# Patient Record
Sex: FEMALE | Race: WHITE | NOT HISPANIC OR LATINO | Employment: OTHER | URBAN - METROPOLITAN AREA
[De-identification: names, ages, dates, MRNs, and addresses within clinical notes are randomized per-mention and may not be internally consistent; named-entity substitution may affect disease eponyms.]

---

## 2017-04-28 ENCOUNTER — HOSPITAL ENCOUNTER (INPATIENT)
Facility: CLINIC | Age: 57
LOS: 4 days | Discharge: HOME | DRG: 617 | End: 2017-05-03
Attending: EMERGENCY MEDICINE | Admitting: INTERNAL MEDICINE

## 2017-04-28 ENCOUNTER — APPOINTMENT (OUTPATIENT)
Dept: GENERAL RADIOLOGY | Facility: CLINIC | Age: 57
DRG: 617 | End: 2017-04-28
Attending: EMERGENCY MEDICINE

## 2017-04-28 DIAGNOSIS — L03.032 CELLULITIS OF TOE OF LEFT FOOT: ICD-10-CM

## 2017-04-28 DIAGNOSIS — I96 GANGRENE OF TOE (H): Primary | ICD-10-CM

## 2017-04-28 DIAGNOSIS — Z79.4 UNCONTROLLED TYPE 2 DIABETES MELLITUS WITH HYPERGLYCEMIA, WITH LONG-TERM CURRENT USE OF INSULIN (H): ICD-10-CM

## 2017-04-28 DIAGNOSIS — I10 BENIGN ESSENTIAL HYPERTENSION: ICD-10-CM

## 2017-04-28 DIAGNOSIS — S92.901A FOOT FRACTURE, RIGHT, CLOSED, INITIAL ENCOUNTER: ICD-10-CM

## 2017-04-28 DIAGNOSIS — Z87.891 PERSONAL HISTORY OF TOBACCO USE, PRESENTING HAZARDS TO HEALTH: Chronic | ICD-10-CM

## 2017-04-28 DIAGNOSIS — L08.9 LEFT FOOT INFECTION: ICD-10-CM

## 2017-04-28 DIAGNOSIS — R73.9 HYPERGLYCEMIA: ICD-10-CM

## 2017-04-28 DIAGNOSIS — E11.65 UNCONTROLLED TYPE 2 DIABETES MELLITUS WITH HYPERGLYCEMIA, WITH LONG-TERM CURRENT USE OF INSULIN (H): ICD-10-CM

## 2017-04-28 DIAGNOSIS — E66.9 OBESITY, UNSPECIFIED OBESITY SEVERITY, UNSPECIFIED OBESITY TYPE: ICD-10-CM

## 2017-04-28 LAB
ANION GAP SERPL CALCULATED.3IONS-SCNC: 7 MMOL/L (ref 3–14)
BASE EXCESS BLDV CALC-SCNC: 6 MMOL/L
BASOPHILS # BLD AUTO: 0 10E9/L (ref 0–0.2)
BASOPHILS NFR BLD AUTO: 0.4 %
BUN SERPL-MCNC: 33 MG/DL (ref 7–30)
CALCIUM SERPL-MCNC: 8.5 MG/DL (ref 8.5–10.1)
CHLORIDE SERPL-SCNC: 89 MMOL/L (ref 94–109)
CO2 SERPL-SCNC: 31 MMOL/L (ref 20–32)
CREAT SERPL-MCNC: 1.45 MG/DL (ref 0.52–1.04)
DIFFERENTIAL METHOD BLD: ABNORMAL
EOSINOPHIL # BLD AUTO: 0.1 10E9/L (ref 0–0.7)
EOSINOPHIL NFR BLD AUTO: 0.9 %
ERYTHROCYTE [DISTWIDTH] IN BLOOD BY AUTOMATED COUNT: 13.2 % (ref 10–15)
GFR SERPL CREATININE-BSD FRML MDRD: 37 ML/MIN/1.7M2
GLUCOSE SERPL-MCNC: 743 MG/DL (ref 70–99)
HCO3 BLDV-SCNC: 32 MMOL/L (ref 21–28)
HCT VFR BLD AUTO: 31.7 % (ref 35–47)
HGB BLD-MCNC: 10.3 G/DL (ref 11.7–15.7)
IMM GRANULOCYTES # BLD: 0 10E9/L (ref 0–0.4)
IMM GRANULOCYTES NFR BLD: 0.4 %
LYMPHOCYTES # BLD AUTO: 1.5 10E9/L (ref 0.8–5.3)
LYMPHOCYTES NFR BLD AUTO: 16.4 %
MCH RBC QN AUTO: 27.3 PG (ref 26.5–33)
MCHC RBC AUTO-ENTMCNC: 32.5 G/DL (ref 31.5–36.5)
MCV RBC AUTO: 84 FL (ref 78–100)
MONOCYTES # BLD AUTO: 0.6 10E9/L (ref 0–1.3)
MONOCYTES NFR BLD AUTO: 6.6 %
NEUTROPHILS # BLD AUTO: 6.9 10E9/L (ref 1.6–8.3)
NEUTROPHILS NFR BLD AUTO: 75.3 %
NRBC # BLD AUTO: 0 10*3/UL
NRBC BLD AUTO-RTO: 0 /100
O2/TOTAL GAS SETTING VFR VENT: ABNORMAL %
PCO2 BLDV: 52 MM HG (ref 40–50)
PH BLDV: 7.39 PH (ref 7.32–7.43)
PLATELET # BLD AUTO: 298 10E9/L (ref 150–450)
PO2 BLDV: 23 MM HG (ref 25–47)
POTASSIUM SERPL-SCNC: 4.4 MMOL/L (ref 3.4–5.3)
RBC # BLD AUTO: 3.77 10E12/L (ref 3.8–5.2)
SODIUM SERPL-SCNC: 127 MMOL/L (ref 133–144)
WBC # BLD AUTO: 9.2 10E9/L (ref 4–11)

## 2017-04-28 PROCEDURE — 73620 X-RAY EXAM OF FOOT: CPT | Mod: 50

## 2017-04-28 PROCEDURE — 82803 BLOOD GASES ANY COMBINATION: CPT | Performed by: EMERGENCY MEDICINE

## 2017-04-28 PROCEDURE — 99285 EMERGENCY DEPT VISIT HI MDM: CPT | Mod: 25

## 2017-04-28 PROCEDURE — 85025 COMPLETE CBC W/AUTO DIFF WBC: CPT | Performed by: EMERGENCY MEDICINE

## 2017-04-28 PROCEDURE — 73610 X-RAY EXAM OF ANKLE: CPT | Mod: RT

## 2017-04-28 PROCEDURE — 87040 BLOOD CULTURE FOR BACTERIA: CPT | Performed by: EMERGENCY MEDICINE

## 2017-04-28 PROCEDURE — 83036 HEMOGLOBIN GLYCOSYLATED A1C: CPT | Performed by: EMERGENCY MEDICINE

## 2017-04-28 PROCEDURE — 80048 BASIC METABOLIC PNL TOTAL CA: CPT | Performed by: EMERGENCY MEDICINE

## 2017-04-28 RX ORDER — VANCOMYCIN HYDROCHLORIDE 1 G/200ML
1000 INJECTION, SOLUTION INTRAVENOUS ONCE
Status: COMPLETED | OUTPATIENT
Start: 2017-04-28 | End: 2017-04-29

## 2017-04-28 RX ORDER — AMPICILLIN AND SULBACTAM 2; 1 G/1; G/1
3 INJECTION, POWDER, FOR SOLUTION INTRAMUSCULAR; INTRAVENOUS EVERY 6 HOURS
Status: DISCONTINUED | OUTPATIENT
Start: 2017-04-29 | End: 2017-05-03 | Stop reason: HOSPADM

## 2017-04-28 RX ORDER — LIDOCAINE 40 MG/G
CREAM TOPICAL
Status: DISCONTINUED | OUTPATIENT
Start: 2017-04-28 | End: 2017-04-29

## 2017-04-28 ASSESSMENT — ENCOUNTER SYMPTOMS
NAUSEA: 0
VOMITING: 0
WOUND: 1
CHILLS: 0
COLOR CHANGE: 1
FEVER: 0

## 2017-04-28 NOTE — LETTER
"Transition Communication Hand-off for Care Transitions to Next Level of Care Provider    Name: Roxy Beaulieu  : 1960  MRN #: [id number]  Reason for Hospitalization:  Hyperglycemia [R73.9]  Cellulitis of toe of left foot [L03.032]  Foot fracture, right, closed, initial encounter [S92.901A]  Admit Date/Time: 2017  8:25 PM  Discharge Date:  5/3/2017    Reason for Communication Hand-off Referral: Other I & D Left foot infection    Discharge Plan:  Discharged to: Home with support                   Patient agreeable to post-hospital support suggestions:  Yes  Discharge Plan:  Home with support           Patient is on new medications:   Yes   amoxicillin-clavulanate 875-125 MG per tablet. Take 1 tablet by mouth 2 times daily   glipiZIDE 10 MG tablet. Take 1 tablet (10 mg) by mouth 2 times daily (before meals)   insulin isophane & regular susp. Inject 30 Units Subcutaneous 2 times daily (with meals)    insulin pen needle 31G X 8 MM. 1 Box of 100 insulin pen needles to be dispensed with   every insulin pen prescription    syringe/needle (disp) 30G X 1/2\" 1 ML Misc. 120 30 gauge x 1/2\" 1 ML syringes (ok to   substitute)   metoprolol 25 MG tablet. Take 1 tablet (25 mg) by mouth 2 times daily           MTM follow up recommended: No           Tel-Assurance program:  Ineligible           Follow-up specialty is recommended: Yes. Follow up with Podiatry in 7 - 10 days.   Follow-up plan:  No future appointments.    Any outstanding tests or procedures:  None recommended.       Key Recommendations:  Perform the following activities every 2 hours x 5 minutes:   -ankle ROM/calf massaging bilateral lower extremity. If you are not comfortable moving the surgical ankle, you can wiggle the toes on that foot.    -ambulation; keep in mind your weightbearing restrictions. Heel weight bearing left foot in surgical shoe. Elevate/rest foot 23/24 hours per day to facilitate healing. Follow up with Dr Fofana within 5-7 days of " discharge.   Patient was discharged on new medications. Please assess her understanding and compliance with these medications.     Communicated handoff via EPIC Comm Mgt to Dr. Dwain Camarillo's CC at [phone].      Grecia Mcmillan RN, BSN, CTS  [facility]  [phone]    AVS/Discharge Summary is the source of truth; this is a helpful guide for improved communication of patient story

## 2017-04-28 NOTE — IP AVS SNAPSHOT
[place] [address] [facility]    [address]    [facility] [address]    Phone:  [phone]    Fax:  [phone]                                       After Visit Summary   4/28/2017    Roxy Beaulieu    MRN: [id number]           After Visit Summary Signature Page     I have received my discharge instructions, and my questions have been answered. I have discussed any challenges I see with this plan with the nurse or doctor.    ..........................................................................................................................................  Patient/Patient Representative Signature      ..........................................................................................................................................  Patient Representative Print Name and Relationship to Patient    ..................................................               ................................................  Date                                            Time    ..........................................................................................................................................  Reviewed by Signature/Title    ...................................................              ..............................................  Date                                                            Time

## 2017-04-28 NOTE — IP AVS SNAPSHOT
MRN:[id number]                      After Visit Summary   4/28/2017    Roxy Beaulieu    MRN: [id number]           Thank you!     Thank you for choosing [facility] for your care. Our goal is always to provide you with excellent care. Hearing back from our patients is one way we can continue to improve our services. Please take a few minutes to complete the written survey that you may receive in the mail after you visit. If you would like to speak to someone directly about your visit please contact Patient Relations at [phone]. Thank you!          Patient Information     Date Of Birth          1960        Designated Caregiver       Most Recent Value    Caregiver    Will someone help with your care after discharge? yes    Name of designated caregiver Yuki/Courtney     Phone number of caregiver [phone]    Caregiver address na      About your hospital stay     You were admitted on:  April 29, 2017 You last received care in the:  [place] [address] [facility]    You were discharged on:  May 3, 2017        Reason for your hospital stay       You were hospitalized for infections of the feet and significantly elevated blood sugars.                  Who to Call     For medical emergencies, please call 911.  For non-urgent questions about your medical care, please call your primary care provider or clinic, None  For questions related to your surgery, please call your surgery clinic        Attending Provider     Provider Specialty    Mario Francis MD Emergency Medicine    Kamran Ortiz MD Internal Medicine       Primary Care Provider Fax #    [facility] [phone]       No address on file        After Care Instructions     Activity       1.  Perform the following activities every 2 hours x 5 minutes:  -ankle ROM/calf massaging bilateral lower extremity.  If you are not comfortable moving the surgical ankle, you can wiggle the toes on that  foot  -deep breathing/coughing exercises  -ambulation; keep in mind your weightbearing restrictions    2.  Heel weight bearing left foot in surgical shoe    3.  Elevate/rest foot 23/24 hours per day to facilitate healing.            Diet       Follow this diet upon discharge: Orders Placed This Encounter      Moderate Consistent CHO Diet            Wound care and dressings       Keep dressing clean, dry and intact.  Follow up immediately if bandage is loose, dirty or falls off                  Follow-up Appointments     Follow Up and recommended labs and tests       Follow up with Dr Fofana within 5-7 days of discharge            Follow Up and recommended labs and tests       Follow up with primary care provider, [facility], within 7-10 days to evaluate medication change and for hospital follow- up.  No follow up labs or test are needed.                  Further instructions from your care team       1. Follow up with Dr. Fofana in 5-7 days in podiatry clinic.  2. Your blood pressure medications were changed and now include Lisinopril and Metoprolol.  Do not take your old blood pressure medications with these.  3. Continue with 70/30 insulin twice daily.  4. Follow up with your primary care doctor in 7-10 days to evaluate blood pressure, blood glucose and review how you are doing after your hospitalization.    Pending Results     Date and Time Order Name Status Description    5/1/2017 1606 Surgical pathology exam In process     4/29/2017 0304 Blood culture ONE site Preliminary             Statement of Approval     Ordered          05/03/17 0816  I have reviewed and agree with all the recommendations and orders detailed in this document.  EFFECTIVE NOW     Approved and electronically signed by:  Eda Dumas MD             Admission Information     Date & Time Provider Department Dept. Phone    4/28/2017 Kamran Ortiz MD [place] [address] [facility] [phone]      Your Vitals Were     Blood  "Pressure Pulse Temperature Respirations Height Weight    166/65 (BP Location: Left arm) 80 97.7  F (36.5  C) (Oral) 18 1.715 m (5' 7.5\") 96.3 kg (212 lb 3.2 oz)    Pulse Oximetry BMI (Body Mass Index)                98% 32.74 kg/m2          MyChart Information     [employer] lets you send messages to your doctor, view your test results, renew your prescriptions, schedule appointments and more. To sign up, go to [email or web address] . Click on \"Log in\" on the left side of the screen, which will take you to the Welcome page. Then click on \"Sign up Now\" on the right side of the page.     You will be asked to enter the access code listed below, as well as some personal information. Please follow the directions to create your username and password.     Your access code is: [email or web address]  Expires: 2017  8:26 AM     Your access code will  in 90 days. If you need help or a new code, please call your [facility] clinic or [phone].        Care EveryWhere ID     This is your Care EveryWhere ID. This could be used by other organizations to access your [facility] medical records  [id number]           Review of your medicines      START taking        Dose / Directions    amoxicillin-clavulanate 875-125 MG per tablet   Commonly known as:  AUGMENTIN   Used for:  Cellulitis of toe of left foot        Dose:  1 tablet   Take 1 tablet by mouth 2 times daily   Quantity:  20 tablet   Refills:  0       glipiZIDE 10 MG tablet   Commonly known as:  GLUCOTROL        Dose:  10 mg   Take 1 tablet (10 mg) by mouth 2 times daily (before meals)   Quantity:  60 tablet   Refills:  0       insulin isophane & regular susp   Commonly known as:  HumuLIN MIX 70/30 PEN , NovoLIN MIX 70/30 PEN        Dose:  30 Units   Inject 30 Units Subcutaneous 2 times daily (with meals)   Quantity:  18 mL   Refills:  0       insulin pen needle 31G X 8 MM        1 Box of 100 insulin pen needles to be dispensed with every insulin pen prescription   Quantity:  " "100 each   Refills:  0       lisinopril 40 MG tablet   Commonly known as:  PRINIVIL/ZESTRIL   Used for:  Benign essential hypertension        Dose:  40 mg   Take 1 tablet (40 mg) by mouth daily   Quantity:  30 tablet   Refills:  0       metoprolol 25 MG tablet   Commonly known as:  LOPRESSOR   Used for:  Benign essential hypertension        Dose:  25 mg   Take 1 tablet (25 mg) by mouth 2 times daily   Quantity:  60 tablet   Refills:  0       syringe/needle (disp) 30G X 1/2\" 1 ML Misc        120 30 gauge x 1/2\" 1 ML syringes (ok to substitute)   Quantity:  120 each   Refills:  0         CONTINUE these medicines which have NOT CHANGED        Dose / Directions    LIPITOR PO        Dose:  40 mg   Take 40 mg by mouth   Refills:  0         STOP taking     amLODIPine 5 MG tablet   Commonly known as:  NORVASC           lisinopril-hydrochlorothiazide 20-25 MG per tablet   Commonly known as:  PRINZIDE/ZESTORETIC           NOVOLIN N RELION SC           NovoLIN R VIAL 100 UNIT/ML injection   Generic drug:  insulin regular                Where to get your medicines      These medications were sent to [facility] [address] [address]     Phone:  [phone]     amoxicillin-clavulanate 875-125 MG per tablet    glipiZIDE 10 MG tablet    insulin isophane & regular susp    insulin pen needle 31G X 8 MM    lisinopril 40 MG tablet    metoprolol 25 MG tablet    syringe/needle (disp) 30G X 1/2\" 1 ML Misc                Protect others around you: Learn how to safely use, store and throw away your medicines at www.disposemymeds.org.             Medication List: This is a list of all your medications and when to take them. Check marks below indicate your daily home schedule. Keep this list as a reference.      Medications           Morning Afternoon Evening Bedtime As Needed    amoxicillin-clavulanate 875-125 MG per tablet   Commonly known as:  AUGMENTIN " "  Take 1 tablet by mouth 2 times daily   Next Dose Due:  Tonight 5/3/2017                                      glipiZIDE 10 MG tablet   Commonly known as:  GLUCOTROL   Take 1 tablet (10 mg) by mouth 2 times daily (before meals)   Last time this was given:  10 mg on 5/3/2017  8:11 AM   Next Dose Due:  Tonight 5/3/2017                                      insulin isophane & regular susp   Commonly known as:  HumuLIN MIX 70/30 PEN , NovoLIN MIX 70/30 PEN   Inject 30 Units Subcutaneous 2 times daily (with meals)   Last time this was given:  30 Units on 5/3/2017  8:13 AM   Next Dose Due:  Tonight 5/3/2017                                      insulin pen needle 31G X 8 MM   1 Box of 100 insulin pen needles to be dispensed with every insulin pen prescription                                LIPITOR PO   Take 40 mg by mouth   Last time this was given:  40 mg on 5/2/2017  7:35 PM   Next Dose Due:  Tonight 5/3/2017                                   lisinopril 40 MG tablet   Commonly known as:  PRINIVIL/ZESTRIL   Take 1 tablet (40 mg) by mouth daily   Last time this was given:  40 mg on 5/3/2017  8:11 AM   Next Dose Due:  Tomorrow 5/4/2017                                   metoprolol 25 MG tablet   Commonly known as:  LOPRESSOR   Take 1 tablet (25 mg) by mouth 2 times daily   Last time this was given:  25 mg on 5/3/2017  8:11 AM   Next Dose Due:  Tonight 5/3/2017                                      syringe/needle (disp) 30G X 1/2\" 1 ML Misc   120 30 gauge x 1/2\" 1 ML syringes (ok to substitute)                                  "

## 2017-04-29 ENCOUNTER — APPOINTMENT (OUTPATIENT)
Dept: MRI IMAGING | Facility: CLINIC | Age: 57
DRG: 617 | End: 2017-04-29
Attending: PODIATRIST

## 2017-04-29 ENCOUNTER — APPOINTMENT (OUTPATIENT)
Dept: ULTRASOUND IMAGING | Facility: CLINIC | Age: 57
DRG: 617 | End: 2017-04-29
Attending: PODIATRIST

## 2017-04-29 PROBLEM — E66.9 OBESITY: Status: ACTIVE | Noted: 2017-04-29

## 2017-04-29 PROBLEM — E11.65 UNCONTROLLED TYPE 2 DIABETES MELLITUS WITH HYPERGLYCEMIA, WITH LONG-TERM CURRENT USE OF INSULIN (H): Status: ACTIVE | Noted: 2017-04-29

## 2017-04-29 PROBLEM — L08.9 LEFT FOOT INFECTION: Status: ACTIVE | Noted: 2017-04-29

## 2017-04-29 PROBLEM — Z87.891 PERSONAL HISTORY OF TOBACCO USE, PRESENTING HAZARDS TO HEALTH: Chronic | Status: ACTIVE | Noted: 2017-04-29

## 2017-04-29 PROBLEM — I96 GANGRENE OF TOE (H): Status: ACTIVE | Noted: 2017-04-29

## 2017-04-29 PROBLEM — Z79.4 UNCONTROLLED TYPE 2 DIABETES MELLITUS WITH HYPERGLYCEMIA, WITH LONG-TERM CURRENT USE OF INSULIN (H): Status: ACTIVE | Noted: 2017-04-29

## 2017-04-29 LAB
ANION GAP SERPL CALCULATED.3IONS-SCNC: 6 MMOL/L (ref 3–14)
BUN SERPL-MCNC: 31 MG/DL (ref 7–30)
CALCIUM SERPL-MCNC: 8.8 MG/DL (ref 8.5–10.1)
CHLORIDE SERPL-SCNC: 102 MMOL/L (ref 94–109)
CO2 BLDCOV-SCNC: 20 MMOL/L (ref 21–28)
CO2 SERPL-SCNC: 30 MMOL/L (ref 20–32)
CREAT SERPL-MCNC: 1.13 MG/DL (ref 0.52–1.04)
ERYTHROCYTE [SEDIMENTATION RATE] IN BLOOD BY WESTERGREN METHOD: 102 MM/H (ref 0–30)
GFR SERPL CREATININE-BSD FRML MDRD: 49 ML/MIN/1.7M2
GLUCOSE BLDC GLUCOMTR-MCNC: 132 MG/DL (ref 70–99)
GLUCOSE BLDC GLUCOMTR-MCNC: 148 MG/DL (ref 70–99)
GLUCOSE BLDC GLUCOMTR-MCNC: 159 MG/DL (ref 70–99)
GLUCOSE BLDC GLUCOMTR-MCNC: 164 MG/DL (ref 70–99)
GLUCOSE BLDC GLUCOMTR-MCNC: 168 MG/DL (ref 70–99)
GLUCOSE BLDC GLUCOMTR-MCNC: 168 MG/DL (ref 70–99)
GLUCOSE BLDC GLUCOMTR-MCNC: 178 MG/DL (ref 70–99)
GLUCOSE BLDC GLUCOMTR-MCNC: 208 MG/DL (ref 70–99)
GLUCOSE BLDC GLUCOMTR-MCNC: 212 MG/DL (ref 70–99)
GLUCOSE BLDC GLUCOMTR-MCNC: 256 MG/DL (ref 70–99)
GLUCOSE BLDC GLUCOMTR-MCNC: 280 MG/DL (ref 70–99)
GLUCOSE BLDC GLUCOMTR-MCNC: 311 MG/DL (ref 70–99)
GLUCOSE BLDC GLUCOMTR-MCNC: 529 MG/DL (ref 70–99)
GLUCOSE BLDC GLUCOMTR-MCNC: 586 MG/DL (ref 70–99)
GLUCOSE BLDC GLUCOMTR-MCNC: 70 MG/DL (ref 70–99)
GLUCOSE BLDC GLUCOMTR-MCNC: ABNORMAL MG/DL (ref 70–99)
GLUCOSE SERPL-MCNC: 124 MG/DL (ref 70–99)
HBA1C MFR BLD: 12.3 % (ref 4.3–6)
LACTATE BLD-SCNC: 2.2 MMOL/L (ref 0.7–2.1)
PCO2 BLDV: 38 MM HG (ref 40–50)
PH BLDV: 7.33 PH (ref 7.32–7.43)
PO2 BLDV: 18 MM HG (ref 25–47)
POTASSIUM SERPL-SCNC: 3.5 MMOL/L (ref 3.4–5.3)
SAO2 % BLDV FROM PO2: 25 %
SODIUM SERPL-SCNC: 138 MMOL/L (ref 133–144)

## 2017-04-29 PROCEDURE — 82803 BLOOD GASES ANY COMBINATION: CPT

## 2017-04-29 PROCEDURE — 12000007 ZZH R&B INTERMEDIATE

## 2017-04-29 PROCEDURE — 96361 HYDRATE IV INFUSION ADD-ON: CPT

## 2017-04-29 PROCEDURE — 25000125 ZZHC RX 250: Performed by: EMERGENCY MEDICINE

## 2017-04-29 PROCEDURE — 73718 MRI LOWER EXTREMITY W/O DYE: CPT | Mod: LT

## 2017-04-29 PROCEDURE — 25000128 H RX IP 250 OP 636: Performed by: EMERGENCY MEDICINE

## 2017-04-29 PROCEDURE — 25000131 ZZH RX MED GY IP 250 OP 636 PS 637: Performed by: HOSPITALIST

## 2017-04-29 PROCEDURE — 83605 ASSAY OF LACTIC ACID: CPT

## 2017-04-29 PROCEDURE — 25000128 H RX IP 250 OP 636: Performed by: INTERNAL MEDICINE

## 2017-04-29 PROCEDURE — 36415 COLL VENOUS BLD VENIPUNCTURE: CPT | Performed by: PODIATRIST

## 2017-04-29 PROCEDURE — 00000146 ZZHCL STATISTIC GLUCOSE BY METER IP

## 2017-04-29 PROCEDURE — 25800025 ZZH RX 258: Performed by: EMERGENCY MEDICINE

## 2017-04-29 PROCEDURE — 99223 1ST HOSP IP/OBS HIGH 75: CPT | Mod: AI | Performed by: INTERNAL MEDICINE

## 2017-04-29 PROCEDURE — 25000132 ZZH RX MED GY IP 250 OP 250 PS 637: Performed by: HOSPITALIST

## 2017-04-29 PROCEDURE — 11042 DBRDMT SUBQ TIS 1ST 20SQCM/<: CPT | Performed by: PODIATRIST

## 2017-04-29 PROCEDURE — 93922 UPR/L XTREMITY ART 2 LEVELS: CPT

## 2017-04-29 PROCEDURE — 96366 THER/PROPH/DIAG IV INF ADDON: CPT

## 2017-04-29 PROCEDURE — 85652 RBC SED RATE AUTOMATED: CPT | Performed by: PODIATRIST

## 2017-04-29 PROCEDURE — 96365 THER/PROPH/DIAG IV INF INIT: CPT

## 2017-04-29 PROCEDURE — 80048 BASIC METABOLIC PNL TOTAL CA: CPT | Performed by: HOSPITALIST

## 2017-04-29 PROCEDURE — 25000125 ZZHC RX 250: Performed by: INTERNAL MEDICINE

## 2017-04-29 PROCEDURE — 25000132 ZZH RX MED GY IP 250 OP 250 PS 637: Performed by: INTERNAL MEDICINE

## 2017-04-29 PROCEDURE — 25800025 ZZH RX 258: Performed by: INTERNAL MEDICINE

## 2017-04-29 PROCEDURE — 99221 1ST HOSP IP/OBS SF/LOW 40: CPT | Performed by: PODIATRIST

## 2017-04-29 RX ORDER — POTASSIUM CHLORIDE 1500 MG/1
20-40 TABLET, EXTENDED RELEASE ORAL
Status: DISCONTINUED | OUTPATIENT
Start: 2017-04-29 | End: 2017-05-03 | Stop reason: HOSPADM

## 2017-04-29 RX ORDER — INSULIN LISPRO 100 [IU]/ML
3 INJECTION, SUSPENSION SUBCUTANEOUS
Status: DISCONTINUED | OUTPATIENT
Start: 2017-04-29 | End: 2017-04-29

## 2017-04-29 RX ORDER — SODIUM CHLORIDE 9 MG/ML
1000 INJECTION, SOLUTION INTRAVENOUS CONTINUOUS
Status: DISCONTINUED | OUTPATIENT
Start: 2017-04-29 | End: 2017-05-02

## 2017-04-29 RX ORDER — PROCHLORPERAZINE 25 MG
25 SUPPOSITORY, RECTAL RECTAL EVERY 12 HOURS PRN
Status: DISCONTINUED | OUTPATIENT
Start: 2017-04-29 | End: 2017-05-03 | Stop reason: HOSPADM

## 2017-04-29 RX ORDER — NICOTINE POLACRILEX 4 MG
15-30 LOZENGE BUCCAL
Status: DISCONTINUED | OUTPATIENT
Start: 2017-04-29 | End: 2017-05-03 | Stop reason: HOSPADM

## 2017-04-29 RX ORDER — VANCOMYCIN HYDROCHLORIDE 1 G/200ML
1000 INJECTION, SOLUTION INTRAVENOUS ONCE
Status: COMPLETED | OUTPATIENT
Start: 2017-04-29 | End: 2017-04-29

## 2017-04-29 RX ORDER — OXYCODONE HYDROCHLORIDE 5 MG/1
5-10 TABLET ORAL
Status: DISCONTINUED | OUTPATIENT
Start: 2017-04-29 | End: 2017-05-03 | Stop reason: HOSPADM

## 2017-04-29 RX ORDER — AMOXICILLIN 250 MG
1-2 CAPSULE ORAL 2 TIMES DAILY PRN
Status: DISCONTINUED | OUTPATIENT
Start: 2017-04-29 | End: 2017-05-03 | Stop reason: HOSPADM

## 2017-04-29 RX ORDER — NALOXONE HYDROCHLORIDE 0.4 MG/ML
.1-.4 INJECTION, SOLUTION INTRAMUSCULAR; INTRAVENOUS; SUBCUTANEOUS
Status: DISCONTINUED | OUTPATIENT
Start: 2017-04-29 | End: 2017-05-01

## 2017-04-29 RX ORDER — ACETAMINOPHEN 325 MG/1
650 TABLET ORAL EVERY 4 HOURS PRN
Status: DISCONTINUED | OUTPATIENT
Start: 2017-04-29 | End: 2017-05-01

## 2017-04-29 RX ORDER — POTASSIUM CL/LIDO/0.9 % NACL 10MEQ/0.1L
10 INTRAVENOUS SOLUTION, PIGGYBACK (ML) INTRAVENOUS
Status: DISCONTINUED | OUTPATIENT
Start: 2017-04-29 | End: 2017-05-03 | Stop reason: HOSPADM

## 2017-04-29 RX ORDER — POTASSIUM CHLORIDE 1.5 G/1.58G
20-40 POWDER, FOR SOLUTION ORAL
Status: DISCONTINUED | OUTPATIENT
Start: 2017-04-29 | End: 2017-05-03 | Stop reason: HOSPADM

## 2017-04-29 RX ORDER — ONDANSETRON 4 MG/1
4 TABLET, ORALLY DISINTEGRATING ORAL EVERY 6 HOURS PRN
Status: DISCONTINUED | OUTPATIENT
Start: 2017-04-29 | End: 2017-05-03 | Stop reason: HOSPADM

## 2017-04-29 RX ORDER — DEXTROSE MONOHYDRATE 25 G/50ML
25-50 INJECTION, SOLUTION INTRAVENOUS
Status: DISCONTINUED | OUTPATIENT
Start: 2017-04-29 | End: 2017-04-29

## 2017-04-29 RX ORDER — SODIUM CHLORIDE 9 MG/ML
INJECTION, SOLUTION INTRAVENOUS CONTINUOUS
Status: DISCONTINUED | OUTPATIENT
Start: 2017-04-29 | End: 2017-04-30

## 2017-04-29 RX ORDER — LISINOPRIL 5 MG/1
5 TABLET ORAL DAILY
Status: DISCONTINUED | OUTPATIENT
Start: 2017-04-29 | End: 2017-05-01

## 2017-04-29 RX ORDER — PROCHLORPERAZINE MALEATE 5 MG
5-10 TABLET ORAL EVERY 6 HOURS PRN
Status: DISCONTINUED | OUTPATIENT
Start: 2017-04-29 | End: 2017-05-03 | Stop reason: HOSPADM

## 2017-04-29 RX ORDER — HYDROMORPHONE HYDROCHLORIDE 1 MG/ML
.3-.5 INJECTION, SOLUTION INTRAMUSCULAR; INTRAVENOUS; SUBCUTANEOUS
Status: DISCONTINUED | OUTPATIENT
Start: 2017-04-29 | End: 2017-05-03 | Stop reason: HOSPADM

## 2017-04-29 RX ORDER — DEXTROSE MONOHYDRATE 25 G/50ML
25-50 INJECTION, SOLUTION INTRAVENOUS
Status: DISCONTINUED | OUTPATIENT
Start: 2017-04-29 | End: 2017-05-03 | Stop reason: HOSPADM

## 2017-04-29 RX ORDER — AMLODIPINE BESYLATE 5 MG/1
5 TABLET ORAL DAILY
Status: ON HOLD | COMMUNITY
End: 2017-05-03

## 2017-04-29 RX ORDER — NICOTINE POLACRILEX 4 MG
15-30 LOZENGE BUCCAL
Status: DISCONTINUED | OUTPATIENT
Start: 2017-04-29 | End: 2017-04-29

## 2017-04-29 RX ORDER — ONDANSETRON 2 MG/ML
4 INJECTION INTRAMUSCULAR; INTRAVENOUS EVERY 6 HOURS PRN
Status: DISCONTINUED | OUTPATIENT
Start: 2017-04-29 | End: 2017-05-03 | Stop reason: HOSPADM

## 2017-04-29 RX ORDER — POTASSIUM CHLORIDE 29.8 MG/ML
20 INJECTION INTRAVENOUS
Status: DISCONTINUED | OUTPATIENT
Start: 2017-04-29 | End: 2017-05-03 | Stop reason: HOSPADM

## 2017-04-29 RX ORDER — LISINOPRIL AND HYDROCHLOROTHIAZIDE 20; 25 MG/1; MG/1
1 TABLET ORAL DAILY
Status: ON HOLD | COMMUNITY
End: 2017-05-03

## 2017-04-29 RX ORDER — MAGNESIUM SULFATE HEPTAHYDRATE 40 MG/ML
4 INJECTION, SOLUTION INTRAVENOUS EVERY 4 HOURS PRN
Status: DISCONTINUED | OUTPATIENT
Start: 2017-04-29 | End: 2017-05-03 | Stop reason: HOSPADM

## 2017-04-29 RX ORDER — METOPROLOL TARTRATE 25 MG/1
25 TABLET, FILM COATED ORAL 2 TIMES DAILY
Status: DISCONTINUED | OUTPATIENT
Start: 2017-04-29 | End: 2017-05-03 | Stop reason: HOSPADM

## 2017-04-29 RX ORDER — BISACODYL 10 MG
10 SUPPOSITORY, RECTAL RECTAL DAILY PRN
Status: DISCONTINUED | OUTPATIENT
Start: 2017-04-29 | End: 2017-05-03 | Stop reason: HOSPADM

## 2017-04-29 RX ORDER — AMLODIPINE BESYLATE 5 MG/1
5 TABLET ORAL DAILY
Status: DISCONTINUED | OUTPATIENT
Start: 2017-04-29 | End: 2017-04-30

## 2017-04-29 RX ORDER — POTASSIUM CHLORIDE 7.45 MG/ML
10 INJECTION INTRAVENOUS
Status: DISCONTINUED | OUTPATIENT
Start: 2017-04-29 | End: 2017-05-03 | Stop reason: HOSPADM

## 2017-04-29 RX ADMIN — SODIUM CHLORIDE: 9 INJECTION, SOLUTION INTRAVENOUS at 22:05

## 2017-04-29 RX ADMIN — HUMAN INSULIN 4 UNITS/HR: 100 INJECTION, SOLUTION SUBCUTANEOUS at 03:40

## 2017-04-29 RX ADMIN — SODIUM CHLORIDE, POTASSIUM CHLORIDE, SODIUM LACTATE AND CALCIUM CHLORIDE 1000 ML: 600; 310; 30; 20 INJECTION, SOLUTION INTRAVENOUS at 03:36

## 2017-04-29 RX ADMIN — AMLODIPINE BESYLATE 5 MG: 5 TABLET ORAL at 13:22

## 2017-04-29 RX ADMIN — AMPICILLIN SODIUM AND SULBACTAM SODIUM 3 G: 2; 1 INJECTION, POWDER, FOR SOLUTION INTRAMUSCULAR; INTRAVENOUS at 06:58

## 2017-04-29 RX ADMIN — AMPICILLIN SODIUM AND SULBACTAM SODIUM 3 G: 2; 1 INJECTION, POWDER, FOR SOLUTION INTRAMUSCULAR; INTRAVENOUS at 23:54

## 2017-04-29 RX ADMIN — LISINOPRIL 5 MG: 5 TABLET ORAL at 07:51

## 2017-04-29 RX ADMIN — INSULIN ASPART 2 UNITS: 100 INJECTION, SOLUTION INTRAVENOUS; SUBCUTANEOUS at 14:19

## 2017-04-29 RX ADMIN — SODIUM CHLORIDE: 9 INJECTION, SOLUTION INTRAVENOUS at 05:22

## 2017-04-29 RX ADMIN — INSULIN ASPART 5 UNITS: 100 INJECTION, SOLUTION INTRAVENOUS; SUBCUTANEOUS at 18:23

## 2017-04-29 RX ADMIN — AMPICILLIN SODIUM AND SULBACTAM SODIUM 3 G: 2; 1 INJECTION, POWDER, FOR SOLUTION INTRAMUSCULAR; INTRAVENOUS at 00:12

## 2017-04-29 RX ADMIN — INSULIN GLARGINE 20 UNITS: 100 INJECTION, SOLUTION SUBCUTANEOUS at 12:41

## 2017-04-29 RX ADMIN — VANCOMYCIN HYDROCHLORIDE 2000 MG: 10 INJECTION, POWDER, LYOPHILIZED, FOR SOLUTION INTRAVENOUS at 14:19

## 2017-04-29 RX ADMIN — VANCOMYCIN HYDROCHLORIDE 1000 MG: 1 INJECTION, SOLUTION INTRAVENOUS at 05:23

## 2017-04-29 RX ADMIN — HUMAN INSULIN 5.5 UNITS/HR: 100 INJECTION, SOLUTION SUBCUTANEOUS at 00:27

## 2017-04-29 RX ADMIN — AMPICILLIN SODIUM AND SULBACTAM SODIUM 3 G: 2; 1 INJECTION, POWDER, FOR SOLUTION INTRAMUSCULAR; INTRAVENOUS at 12:33

## 2017-04-29 RX ADMIN — SODIUM CHLORIDE 1000 ML: 9 INJECTION, SOLUTION INTRAVENOUS at 02:46

## 2017-04-29 RX ADMIN — METOPROLOL TARTRATE 25 MG: 25 TABLET ORAL at 20:31

## 2017-04-29 RX ADMIN — VANCOMYCIN HYDROCHLORIDE 1000 MG: 1 INJECTION, SOLUTION INTRAVENOUS at 01:41

## 2017-04-29 RX ADMIN — HUMAN INSULIN 12 UNITS/HR: 100 INJECTION, SOLUTION SUBCUTANEOUS at 01:54

## 2017-04-29 RX ADMIN — AMPICILLIN SODIUM AND SULBACTAM SODIUM 3 G: 2; 1 INJECTION, POWDER, FOR SOLUTION INTRAMUSCULAR; INTRAVENOUS at 17:23

## 2017-04-29 RX ADMIN — DEXTROSE MONOHYDRATE 25 ML: 500 INJECTION PARENTERAL at 09:55

## 2017-04-29 RX ADMIN — POTASSIUM CHLORIDE 20 MEQ: 1500 TABLET, EXTENDED RELEASE ORAL at 18:22

## 2017-04-29 RX ADMIN — METOPROLOL TARTRATE 25 MG: 25 TABLET ORAL at 07:51

## 2017-04-29 NOTE — PLAN OF CARE
Problem: Goal Outcome Summary  Goal: Goal Outcome Summary  Outcome: No Change     VSS, afebrile. Pt denies pain. A/Ox4, up with SBA. Pt is to be NWB on Rt foot, though is noncompliant at times, education provided. CAM boot and PT ordered by Podiatrist. MRI on L foot, pt refused CT on Rt food. US Doppler of BLE. Podiatry following. On insulin gtt initially this shift, d/c'd with BG check of 70, see provider notify note. SSI and Lantus ordered. See flowsheet for BG results. On IV Unasy and Craleneo, ID Consulted.

## 2017-04-29 NOTE — PHARMACY-ADMISSION MEDICATION HISTORY
Admission medication history interview status for this patient is complete. See [facility] admission navigator for allergy information, prior to admission medications and immunization status.     Medication history interview source(s):Patient  Medication history resources (including written lists, pill bottles, clinic record):None  Primary pharmacy: [facility]     Changes made to PTA medication list:  Added: amlodipine, lisinopril-hctz, novolin R, novolin N   Deleted: lantus, humalog mix pen, humalog  Changed: none     Actions taken by pharmacist (provider contacted, etc):None     Additional medication history information:None    Medication reconciliation/reorder completed by provider prior to medication history? No    For patients on insulin therapy: yes (Yes/No)  Lantus/levemir/NPH/Mix 70/30 dose:  20 units twice daily   Sliding scale Novolog Y/N 3 units per carb   If Yes, do you have a baseline novolog pre-meal dose:  ______units with meals   Patients eat three meals a day:   Y/N     Any Barriers to therapy:  cost of medications/comfortable with giving injections (if applicable)/ comfortable and confident with current diabetes regimen       Prior to Admission medications    Medication Sig Last Dose Taking? Auth Provider   Insulin NPH Human, Isophane, (NOVOLIN N RELION SC) Inject 20 Units Subcutaneous 2 times daily 4/28/2017 at pm Yes Unknown, Entered By History   insulin regular (NOVOLIN R VIAL) 100 UNIT/ML injection Inject Subcutaneous See Admin Instructions 3 units per carb with meals three times a day Past Month at Unknown time Yes Reported, Patient   lisinopril-hydrochlorothiazide (PRINZIDE/ZESTORETIC) 20-25 MG per tablet Take 1 tablet by mouth daily 4/28/2017 at Unknown time Yes Unknown, Entered By History   amLODIPine (NORVASC) 5 MG tablet Take 5 mg by mouth daily 4/28/2017 at Unknown time Yes Unknown, Entered By History   Atorvastatin Calcium (LIPITOR PO) Take 40 mg by mouth  Past Month at pm Yes  Reported, Patient

## 2017-04-29 NOTE — PLAN OF CARE
Problem: Goal Outcome Summary  Goal: Goal Outcome Summary     PT- Orders received.  Pt currently away at imaging.  Also has order for CAM boot.  Will check back on status later this date or reschedule PT eval tomorrow.

## 2017-04-29 NOTE — PROGRESS NOTES
[facility]  Hospitalist Progress Note  Patient Name: Roxy Beaulieu    MRN: [id number]  Provider:  Lor Chung MD  Date:04/29/2017      Initial presenting complaint/issue to hospital (Diagnosis): LE pain     Summary of Stay: Patient is a 56 year old female with history of type 2 diabetes, hypertension, and hypercholesterolemia who presented to the ED on 4/28/17 for evaluation of left 1st and 2nd toe pain and right ankle pain. ED work up showed severe hyperglycemia, infections of left 1st and 2nd toes, and bony deformity of right foot. Unfortunately, due to insurance issues, she has not been taking any short acting insulin lately. She was found to have uncontrolled BG up to 700's requiring insulin gtt.  Seen by podiatry, MRI showed LLE great toe osteomyelitis. Would likely need amputation          Assessment and Plan:      Osteomyelitis of left 1st and 2nd toe in patient with several months of uncontrolled diabetes. The patient had blisters on these toes for several months but just developed pain, erythema, and eschar on 2nd toe over the last few days. MRI on 4/29 showed   --Continue Unasyn and Vancomycin as started in the ED.  --appreciate Podiatry consult  --ID consult placed for abx management     Uncontrolled type 2 diabetes with blood glucose of 743, A1C~12: no plans for surgery today  --dc insulin ggt and resume lantus/aspart  --lantus 20 U, 3 U aspart w/ meals along with SSI     Pseudohyponatremia, due to hyperglycemia. This will correct as BG corrects.     Right foot deformity by XR. Podiatry consulted-TING today, possible amputation here vs transfer to [facility].      Dehydration, due to prolonged hyperglycemia. NS hydration.      Hypertension, uncontrolled. She is not sure what medicine she takes- I am not convinced that she has been taking any blood pressure medicines. I will start Metoprolol 25 mg BID and Lisinopril 5 mg daily.     Hypercholesterolemia.      Full code  Mechanical DVT  "prophylaxis  Estimated Disch Date / # of Days until Discharge: TBD        Interval History:      Pt has no complaints. States        Data reviewed today: I reviewed all new labs and imaging reports over the last 24 hours. I personally reviewed the MRI image(s) showing LLE osteomyelitis.         Physical Exam:      Last Vital Signs:  /77  Pulse 82  Temp 98.3  F (36.8  C) (Oral)  Resp 16  Ht 1.715 m (5' 7.5\")  Wt 96.3 kg (212 lb 3.2 oz)  SpO2 98%  BMI 32.74 kg/m2  GEN:  Alert, oriented x 3, appears comfortable, NAD.  HEENT:  Normocephalic/atraumatic, no scleral icterus, no nasal discharge, mouth moist.  CV:  Regular rate and rhythm, no murmur or JVD.  S1 + S2 noted, no S3 or S4.  LUNGS:  Clear to auscultation bilaterally without rales/rhonchi/wheezing/retractions.  Symmetric chest rise on inhalation noted.  ABD:  Active bowel sounds, soft, non-tender/non-distended.  No rebound/guarding/rigidity.  EXT:  No edema.  No cyanosis.  No joint synovitis noted. Left foot with macerated first toe with skin disruption, erythema, and drainage. Left second toe has black discoloration on the top, is red and tender, and has some associated streaking up the foot.  SKIN:  Dry to touch, no exanthems noted in the visualized areas.  Psych: appropriate          Medications:      All current medications were reviewed.         Data:      All new lab and imaging data was reviewed.   Data       Recent Labs  Lab 04/28/17 2120   WBC 9.2   HGB 10.3*   HCT 31.7*   MCV 84          Recent Labs  Lab 04/28/17  2120   *   POTASSIUM 4.4   CHLORIDE 89*   CO2 31   ANIONGAP 7   *   BUN 33*   CR 1.45*   GFRESTIMATED 37*   GFRESTBLACK 45*   GILL 8.5       Recent Results (from the past 24 hour(s))   Ankle XR, G/E 3 views, right    Narrative    ANKLE RIGHT THREE OR MORE VIEWS   FOOT BILATERAL TWO VIEWS   4/28/2017 10:23 PM     HISTORY: Ankle pain    COMPARISON: None.    FINDINGS:     Right foot: Surgical screws are in place in " the distal right fibula.  There are several corticated bony fragments within the right midfoot  that suggest old traumatic changes. I do not have a previous for  comparison, but no definite acute fracture fragment is identified. If  there is convincing acute symptomatology in the right mid and  hindfoot, then CT might be necessary to look for acute fragments. If  previous films are available elsewhere, these would be very useful for  comparison.    Left foot: Negative except for small calcaneal spur inferiorly.    Right ankle: Surgical screws in the distal fibula. Ankle mortise is  intact. Small bony fragments adjacent to the medial and lateral  malleoli appear corticated and should be chronic. Nothing clearly  acute.    There is vascular calcification bilaterally.      Impression    IMPRESSION:   1. Chronic-appearing disruption of the right mid and hindfoot. If  there is convincing acute symptomatology in this region, then CT or  comparison with available old films likely obtained elsewhere would be  useful.  2. Postoperative screws in the distal right fibula.  3. Vascular calcification.    JESUS CONTRERAS MD   XR Foot Bilateral 2 Views    Narrative    ANKLE RIGHT THREE OR MORE VIEWS   FOOT BILATERAL TWO VIEWS   4/28/2017 10:23 PM     HISTORY: Ankle pain    COMPARISON: None.    FINDINGS:     Right foot: Surgical screws are in place in the distal right fibula.  There are several corticated bony fragments within the right midfoot  that suggest old traumatic changes. I do not have a previous for  comparison, but no definite acute fracture fragment is identified. If  there is convincing acute symptomatology in the right mid and  hindfoot, then CT might be necessary to look for acute fragments. If  previous films are available elsewhere, these would be very useful for  comparison.    Left foot: Negative except for small calcaneal spur inferiorly.    Right ankle: Surgical screws in the distal fibula. Ankle mortise  is  intact. Small bony fragments adjacent to the medial and lateral  malleoli appear corticated and should be chronic. Nothing clearly  acute.    There is vascular calcification bilaterally.      Impression    IMPRESSION:   1. Chronic-appearing disruption of the right mid and hindfoot. If  there is convincing acute symptomatology in this region, then CT or  comparison with available old films likely obtained elsewhere would be  useful.  2. Postoperative screws in the distal right fibula.  3. Vascular calcification.    JESUS CONTRERAS MD   MR Foot Left w/o Contrast    Narrative    LEFT FOOT WITHOUT CONTRAST   4/29/2017 11:41 AM     HISTORY: First and second toe wounds, evaluate for osteomyelitis.    TECHNIQUE:  Sagittal, long axis, and short axis T1 and inversion  recovery pulse sequences.    FINDINGS:  Contrast was not utilized due to elevated creatinine.  Ill-defined marrow edema is present within the distal phalanx of the  great toe, highly suspicious for osteomyelitis, particularly if there  is an adjacent wound or draining sinus. Ill-defined marrow edema is  also noted in the proximal phalanx of the second toe. The head of the  second toe proximal phalanx may protrude through the dorsal aspect of  the skin. The middle phalanx is not well seen but likely increased in  signal as well. There is a small nonspecific cyst or erosion in the  dorsal margin of the first metatarsal head. A physiologic amount of  fluid is noted in the metatarsophalangeal joint. There is a bipartite  medial hallux sesamoid with marrow edema in the distal pole. Mild  subchondral marrow edema is noted about the second, third, and fourth  tarsometatarsal joints. Tarsometatarsal joint alignment appears within  normal limits. Marrow signal within the remainder of the forefoot  appears within normal limits. Dorsal and deep soft tissue edema is  noted within the forefoot. No discrete soft tissue fluid collection is  demonstrated.      Impression     IMPRESSION:  1. Great toe distal phalangeal and second toe proximal phalangeal  marrow edema. This is highly suspicious for osteomyelitis,  particularly if there is an adjacent wound or draining sinus.  2. Dorsal and deeper soft tissue edema within the forefoot. MR cannot  distinguish reactive edema from cellulitis. No forefoot soft tissue  fluid collection or abscess is identified.  3. Medial hallux sesamoiditis with marrow edema in the distal pole of  a bipartite sesamoid. This is likely degenerative. No joint effusion  is noted. Small cyst is present in the dorsal margin of the first  metatarsal head as well   4. Degenerative changes at the second through fourth tarsometatarsal  joints.       Lor Chung MD  Hospitalist  [facility] [address] [place] [address]

## 2017-04-29 NOTE — ED NOTES
"Pt complains of right ankle pain and swelling. Started about 6 weeks when she was getting out of a car and felt a pop.    Pt complains of left 2nd toe pain and redness with some red streaking going up foot; started with a blister last September.    Airway, breathing and circulation intact without need for intervention. Alert and interacting appropriately for age and situation.    HOME MEDICATIONS:  Pt is not taking any of her diabetic medications. \"I can't afford them.\"  Pt is taking two blood pressure medications of unknown names.  "

## 2017-04-29 NOTE — CONSULTS
Care Transition Initial Assessment - RN    Reason For Consult: discharge planning, financial concerns, medication concerns   Met with: Patient.    DATA   Active Problems:    Left foot infection    Uncontrolled type 2 diabetes mellitus with hyperglycemia, with long-term current use of insulin (H)       Cognitive Status: awake, alert and oriented.  Primary Care Clinic Name: [facility]  Primary Care MD Name: Dr. Camarillo  Contact information and PCP information verified: Yes    Lives With: alone     Quality Of Family Relationships: supportive  Description of Support System: Supportive   Who is your support system?: Sibling(s)         Insurance concerns: No Insurance issues identified    ASSESSMENT  Patient currently receives the following services:  none        Identified issues/concerns regarding health management: Met with patient verified demographics.  Pt states she lives alone, but does have support through her family. She still drives and states she does get to her appointments.  Patient declined to have writer make f/u appt.  She get most of her meds including diabetic supplies through [employer] and states this is affordable for her.  Financial Counseling did meet with her yesterday and gave her info regarding insurance benefits.  Writer offered to have a in-patient pharmacist but patient declined.      PLAN  Financial costs for the patient include n/a .  Patient given options and choices for discharge n/a .  Patient/family is agreeable to the plan?  Yes  Patient anticipates discharging to home .        Patient anticipates needs for home equipment: No    Plan/Disposition: Home   Appointments: Patient declined to have writer make f/u appt, she will make herself.     Care  (CTS) will continue to follow as needed.    Lorna Rock, RN, BSN, PHN, CTS  Care Transitions Specialist  [facility]  [phone]

## 2017-04-29 NOTE — H&P
[facility]  History and Physical   Hospitalist Service    Kamran Ortiz MD    Roxy Beaulieu MRN# [id number]   YOB: 1960 Age: 56 year old      Date of Admission:  4/28/2017           Assessment and Plan:   Patient is a 56 year old female with history of type 2 diabetes, hypertension, and hypercholesterolemia.  Unfortunately, due to insurance issues, she has not been taking any short acting insulin lately. She presented to the ED on 4/28/17 for evaluation of left 1st and 2nd toe pain and right ankle pain.  ED work up showed severe hyperglycemia, infections of left 1st and 2nd toes, and bony deformity of right foot.     Problem list:    1.  Infection of left 1st and 2nd toe in patient with several months of uncontrolled diabetes. The patient had blisters on these toes for several months but just developed pain, erythema, and eschar on 2nd toe over the last few days.  Continue Unasyn and Vancomycin as started in the ED. I will consult Podiatry for consideration of debridement versus amputation. Patient will be kept NPO pending Podiatry eval.    2.  Uncontrolled type 2 diabetes with blood glucose of 743.  Continue insulin drip that was started in the ED.  Hydrate with normal saline.    3.  Pseudohyponatremia, due to hyperglycemia. This will correct as BG corrects.    4.  Right foot deformity by XR.  Podiatry consult.     5.  Dehydration, due to prolonged hyperglycemia.  NS hydration.     6.  Hypertension, uncontrolled.  She is not sure what medicine she takes- I am not convinced that she has been taking any blood pressure medicines. I will start Metoprolol 25 mg BID and Lisinopril 5 mg daily.    7.  Hypercholesterolemia.     Full code  Mechanical DVT prophylaxis  Disposition. Admit as inpatient.               Code Status:   Full Code         Primary Care Physician:   [facility] None         Chief Complaint:   Left 1st and 2nd toe infection and right foot and ankle  pain    History is obtained from Dr. Tito Ramsey, and the medical record         History of Present Illness:   Roxy Beaulieu is a 56 year old female with history of type 2 diabetes, hypertension, and hypercholesterolemia.  Unfortunately, due to insurance issues, she has not been taking any short acting insulin lately. She presented to the ED on 4/28/17 for evaluation of left 1st and 2nd toe pain and right ankle pain. She had blisters on her left 1st and 2nd toes for several months.  These toes have become inflamed and tender over the last few days, and she developed some black discoloration on the top of the left second toe.  She also reported feeling a pop in her right foot several weeks ago and has had pain with walking since.  ED work up showed severe hyperglycemia ( without DKA), infections of left 1st and 2nd toes, and bony deformity of right foot. Roxy was started on Unasyn and Vancomycin and I was asked to admit her to the hospital for ongoing care.             Past Medical History:     Patient Active Problem List   Diagnosis     Left foot infection     Uncontrolled type 2 diabetes mellitus with hyperglycemia, with long-term current use of insulin (H)      Past Medical History:   Diagnosis Date     Diabetes (H)      High cholesterol      Hypertension              Past Surgical History:   History reviewed. No pertinent surgical history.         Home Medications:     Prior to Admission medications    Medication Sig Last Dose Taking? Auth Provider   Insulin Lispro, Human, (HUMALOG SC)    Reported, Patient   Insulin Glargine (LANTUS SC)    Reported, Patient   Atorvastatin Calcium (LIPITOR PO)    Reported, Patient   UNKNOWN TO PATIENT Blood pressure   Reported, Patient   insulin glargine (LANTUS VIAL) 100 UNIT/ML vial Inject 40 Units Subcutaneous At Bedtime   Rajani Ibarra MD   insulin lispro prot & lispro (HUMALOG MIX 75/25 PEN) 100 UNITS/ML susp Inject 3 Units Subcutaneous 3  times daily (before meals)   Rajani Ibarra MD            Allergies:   No Known Allergies         Social History:     Social History   Substance Use Topics     Smoking status: Former Smoker     Quit date: 7/25/2016     Smokeless tobacco: Not on file     Alcohol use No             Family History:   Patient denies any significant family medical history           Review of Systems:   The 10 point Review of Systems is negative other than as noted in the HPI.           Physical Exam:   Blood pressure 96/61, pulse 89, temperature 99.1  F (37.3  C), temperature source Oral, resp. rate 18, SpO2 97 %.  0 lbs 0 oz      GENERAL: Pleasant and cooperative. No acute distress.  EYES: Pupils equal and round. No scleral erythema or icterus.  ENT: External ears are normal without deformity. Posterior oropharynx is without erythem, swelling, or exudate.  NECK: Supple. No masses or swelling. No tenderness. Thyroid is normal without mass or tenderness.  CHEST: Clear to auscultation. Normal breath sounds. No retractions.   CV: Regular rate and rhythm. No JVD. Pulses normal.  ABDOMEN: Bowel sounds present. No tenderness. No masses or hernia.  EXTREMETIES: No clubbing, cyanosis, or ischemia. Left foot with macerated first toe with skin disruption, erythema, and drainage.  Left second toe has black discoloration on the top, is red and tender, and has some associated streaking up the foot.  SKIN: Warm and dry to touch. No wounds or rashes.  NEUROLOGIC: Strength and sensation are normal. Deep tendon reflexes are normal. Cranial nerves are normal.             Data:   All new lab and imaging data was reviewed.     Results for orders placed or performed during the hospital encounter of 04/28/17 (from the past 24 hour(s))   CBC with platelets differential   Result Value Ref Range    WBC 9.2 4.0 - 11.0 10e9/L    RBC Count 3.77 (L) 3.8 - 5.2 10e12/L    Hemoglobin 10.3 (L) 11.7 - 15.7 g/dL    Hematocrit 31.7 (L) 35.0 - 47.0 %    MCV 84 78 -  100 fl    MCH 27.3 26.5 - 33.0 pg    MCHC 32.5 31.5 - 36.5 g/dL    RDW 13.2 10.0 - 15.0 %    Platelet Count 298 150 - 450 10e9/L    Diff Method Automated Method     % Neutrophils 75.3 %    % Lymphocytes 16.4 %    % Monocytes 6.6 %    % Eosinophils 0.9 %    % Basophils 0.4 %    % Immature Granulocytes 0.4 %    Nucleated RBCs 0 0 /100    Absolute Neutrophil 6.9 1.6 - 8.3 10e9/L    Absolute Lymphocytes 1.5 0.8 - 5.3 10e9/L    Absolute Monocytes 0.6 0.0 - 1.3 10e9/L    Absolute Eosinophils 0.1 0.0 - 0.7 10e9/L    Absolute Basophils 0.0 0.0 - 0.2 10e9/L    Abs Immature Granulocytes 0.0 0 - 0.4 10e9/L    Absolute Nucleated RBC 0.0    Basic metabolic panel   Result Value Ref Range    Sodium 127 (L) 133 - 144 mmol/L    Potassium 4.4 3.4 - 5.3 mmol/L    Chloride 89 (L) 94 - 109 mmol/L    Carbon Dioxide 31 20 - 32 mmol/L    Anion Gap 7 3 - 14 mmol/L    Glucose 743 (HH) 70 - 99 mg/dL    Urea Nitrogen 33 (H) 7 - 30 mg/dL    Creatinine 1.45 (H) 0.52 - 1.04 mg/dL    GFR Estimate 37 (L) >60 mL/min/1.7m2    GFR Estimate If Black 45 (L) >60 mL/min/1.7m2    Calcium 8.5 8.5 - 10.1 mg/dL   Blood gas venous   Result Value Ref Range    Ph Venous 7.39 7.32 - 7.43 pH    PCO2 Venous 52 (H) 40 - 50 mm Hg    PO2 Venous 23 (L) 25 - 47 mm Hg    Bicarbonate Venous 32 (H) 21 - 28 mmol/L    Base Excess Venous 6.0 mmol/L    FIO2 Room Air    Ankle XR, G/E 3 views, right    Narrative    ANKLE RIGHT THREE OR MORE VIEWS   FOOT BILATERAL TWO VIEWS   4/28/2017 10:23 PM     HISTORY: Ankle pain    COMPARISON: None.    FINDINGS:     Right foot: Surgical screws are in place in the distal right fibula.  There are several corticated bony fragments within the right midfoot  that suggest old traumatic changes. I do not have a previous for  comparison, but no definite acute fracture fragment is identified. If  there is convincing acute symptomatology in the right mid and  hindfoot, then CT might be necessary to look for acute fragments. If  previous films are  available elsewhere, these would be very useful for  comparison.    Left foot: Negative except for small calcaneal spur inferiorly.    Right ankle: Surgical screws in the distal fibula. Ankle mortise is  intact. Small bony fragments adjacent to the medial and lateral  malleoli appear corticated and should be chronic. Nothing clearly  acute.    There is vascular calcification bilaterally.      Impression    IMPRESSION:   1. Chronic-appearing disruption of the right mid and hindfoot. If  there is convincing acute symptomatology in this region, then CT or  comparison with available old films likely obtained elsewhere would be  useful.  2. Postoperative screws in the distal right fibula.  3. Vascular calcification.    JESUS CONTRERAS MD   XR Foot Bilateral 2 Views    Narrative    ANKLE RIGHT THREE OR MORE VIEWS   FOOT BILATERAL TWO VIEWS   4/28/2017 10:23 PM     HISTORY: Ankle pain    COMPARISON: None.    FINDINGS:     Right foot: Surgical screws are in place in the distal right fibula.  There are several corticated bony fragments within the right midfoot  that suggest old traumatic changes. I do not have a previous for  comparison, but no definite acute fracture fragment is identified. If  there is convincing acute symptomatology in the right mid and  hindfoot, then CT might be necessary to look for acute fragments. If  previous films are available elsewhere, these would be very useful for  comparison.    Left foot: Negative except for small calcaneal spur inferiorly.    Right ankle: Surgical screws in the distal fibula. Ankle mortise is  intact. Small bony fragments adjacent to the medial and lateral  malleoli appear corticated and should be chronic. Nothing clearly  acute.    There is vascular calcification bilaterally.      Impression    IMPRESSION:   1. Chronic-appearing disruption of the right mid and hindfoot. If  there is convincing acute symptomatology in this region, then CT or  comparison with available old  films likely obtained elsewhere would be  useful.  2. Postoperative screws in the distal right fibula.  3. Vascular calcification.    JESUS CONTRERAS MD   Glucose by meter   Result Value Ref Range    Glucose >600  Dr/RN Notified   () 70 - 99 mg/dL   Glucose by meter   Result Value Ref Range    Glucose 586 () 70 - 99 mg/dL

## 2017-04-29 NOTE — PROVIDER NOTIFICATION
"  [id number]- MD paged, \"BG 70, Insulin gtt stopped, dextrose 25ml given.\"     [phone]- MD paged BG recheck 159, Ok for pt to go to US/MRI/CT per podiatry orders? Insulin gtt d/c'd, Lantus ordered. Pt care order rec'd  OK for pt to go for imaging. Rechecked BG in Radiology dept prior to MR (168).       "

## 2017-04-29 NOTE — CONSULTS
"CLINICAL NUTRITION SERVICES  -  ASSESSMENT NOTE      Malnutrition: Patient does not meet criteria for malnutrition at this time          REASON FOR ASSESSMENT  Roxy Beaulieu is a 56 year old female seen by Registered Dietitian for Admission Nutrition Risk Screen - Unintentional weight loss of 10# or more in past 2 months    NUTRITION HISTORY  - Information obtained from patient  - Patient is on patient diet at home  - Typical food/fluid intake: Patient reports that six weeks ago the pain in her lower extremity was so bad that she had a limited ability to get to the kitchen to eat/prepare meals. As a result, patient was only eating one meal/day; however she notes that the pain has improved over the past several days and she has been able to eat more regular meals throughout the day. She adds that she has been eating small, frequent meals.   - Recently, patient has been unable to afford her insulin. She also adds that he ability to comply with a diabetic diet depends on her ability to afford \"healthy foods\" such as fresh fruits and vegetables. She notes that she counts carbohydrates and monitor overall carbohydrate intake as able.   - Patient with history of DM2 (Lantus - 40 units at bedtime and Humalog 72/25 mix - 3 units TID before meals), HTN, and hypercholesterolemia. Patient has not been taking any of her short-acting insulin d/t insurance issues.       CURRENT NUTRITION ORDERS  Diet Order:     NPO     Current Intake/Tolerance:  Pt with limited opportunity for PO since admission secondary to potential for surgery.       PHYSICAL FINDINGS  Observed  -Obese   -No overt s/sx of malnutrition  Obtained from Chart/Interdisciplinary Team  -Left foot w/macerated first toe w/skin disruption, erythema and drainage  -Left second toe has black discoloration on top. It is also red and tender     ANTHROPOMETRICS  Height: 5' 7.5\"[Per pt[  Weight: 212 lbs 3.2 oz (96.3 kg)  Body mass index is 32.74 kg/(m^2).  Weight Status: "  Obesity Grade I BMI 30-34.9  IBW: 62.5 kg   % IBW: 154%  Weight History: Patient notes that she lost ~12 lbs x 6 weeks; however has since regained the weight. No recent changes to weight noted.   Wt Readings from Last 10 Encounters:   04/29/17 96.3 kg (212 lb 3.2 oz)   09/25/16 93 kg (205 lb)   ]    LABS  Labs reviewed  -BGs - 168-311 mg/dL (trending down w/insulin gtt)  -Hemoglobin A1c (4/28/17) - 12.3% (H)    MEDICATIONS  Medications reviewed  -Insulin gtt     Dosing Weight: 71 kg (adjusted weight)    ASSESSED NUTRITION NEEDS (PER APPROVED PRACTICE GUIDELINES):  Estimated Energy Needs: 2506-9139 kcals (20-25 Kcal/Kg)  Justification: obese  Estimated Protein Needs:  grams protein (1-1.5 g pro/Kg)  Justification: maintenance / wound healing  Estimated Fluid Needs: 2240-1973  mL (1 mL/Kcal)  Justification: maintenance    MALNUTRITION:  % Weight Loss:  None noted  % Intake:  </= 75% for >/= 1 month (severe malnutrition)  Subcutaneous Fat Loss:  None observed  Muscle Loss:  None observed  Fluid Retention:  None noted    Malnutrition Diagnosis: Patient does not meet two of the above criteria necessary for diagnosing malnutrition    NUTRITION DIAGNOSIS:  Altered nutrition-related lab value (BG) related to medication non-compliance secondary to lack of financial means as evidenced by BG on admission >600 mg/dL and hemoglobin A1c of 12.3%     NUTRITION INTERVENTIONS  Recommendations / Nutrition Prescription  1. Diet advancement to moderate consistent CHO as medically appropriate     2. MVI+mineral     Implementation  Nutrition education: Per Provider order if indicated. Discussed importance of BG control including the importance of monitoring carbohydrate intake and consumption of adequate protein intake for wound healing. Patient declined need for additional DM diet education at time of visit. Encouraged patient to consult RD with any questions/concers.      Multivitamin/Mineral - Ordered MVI+mineral given skin  breakdown to toes    Nutrition Goals  Pt to demonstrate importance BG control by express three ways to control BGs    MONITORING AND EVALUATION:  Food and Nutrition Knowledge/Skill - Need for additional DM education    Keren Desai RD, LD  Clinical Dietitian  3rd Floor/ICU Pager: [phone]  All Other Floors Pager: [phone]  Weekend/Holiday Pager: [phone]

## 2017-04-29 NOTE — PLAN OF CARE
Problem: Goal Outcome Summary  Goal: Goal Outcome Summary  Outcome: No Change  Pt arrived to the floor at 0450. VSS AO x4, denies pain. Had Insulin pump infusing upon arriving to the floor. Pt received vanco and Unasyn. Transfers with sba/ax1. Pt unsteady at times, uses a cane. Pt NPO with ice chips. L great toe and second toe has wounds. Has baseline neuropathy. R ankle slightly edematous. Pt had two loose stools this am. Will continue to monitor.

## 2017-04-29 NOTE — CONSULTS
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 Great toe distal phalangeal and second toe proximal phalangeal  marrow edema. This is highly suspicious for osteomyelitis,  particularly if there is an adjacent wound or draining sinus.  2. Dorsal and deeper soft tissue edema within the forefoot. MR cannot  distinguish reactive edema from cellulitis. No forefoot soft tissue  fluid collection or abscess is identified.  Podiatry bedside debridement (w/o needing any anesthesia) L 2nd toe    Note: Pt had blood glucose to > 700  !  HgbA1c   12.3 on admission   Apparently had not been taking diabetes meds (insurance / finance issues) as prescribed  Also smoke (up to 1 PPD) until 9 months ago    ROS    Review of systems / symptoms    no   Fever      no   Chills      no   Shakes (rigors)      no Night sweats      no SOB      no Cough      no Chest pain      no Sputum production      no Nausea      no Emesis      no Abdominal pain      no Diarrhea      no Dysuria      no Blood / pink in urine      no Urinary frequency      no Urinary urgency      no Headache      no Stiff neck      YES Wound   L great / 2nd toes   YES Sores on skin   L great / 2nd toes       OTHER HISTORY  Past Medical History:   Diagnosis Date     Diabetes (H)      High cholesterol   "    Hypertension      History reviewed. No pertinent surgical history.    Social History     Social History     Marital status: Single     Spouse name: N/A     Number of children: N/A     Years of education: N/A     Social History Main Topics     Smoking status: Former Smoker     Quit date: 7/25/2016     Smokeless tobacco: None     Alcohol use No     Drug use: No     Sexual activity: Not Asked     Other Topics Concern     None     Social History Narrative     No Known Allergies    There is no immunization history on file for this patient.  Prescription Medications as of 4/29/2017             Insulin NPH Human, Isophane, (NOVOLIN N RELION SC) Inject 20 Units Subcutaneous 2 times daily    insulin regular (NOVOLIN R VIAL) 100 UNIT/ML injection Inject Subcutaneous See Admin Instructions 3 units per carb with meals three times a day    lisinopril-hydrochlorothiazide (PRINZIDE/ZESTORETIC) 20-25 MG per tablet Take 1 tablet by mouth daily    amLODIPine (NORVASC) 5 MG tablet Take 5 mg by mouth daily    Atorvastatin Calcium (LIPITOR PO) Take 40 mg by mouth       Facility Administered Medications as of 4/29/2017             metoprolol (LOPRESSOR) tablet 25 mg Take 1 tablet (25 mg) by mouth 2 times daily    lisinopril (PRINIVIL/ZESTRIL) tablet 5 mg Take 1 tablet (5 mg) by mouth daily    naloxone (NARCAN) injection 0.1-0.4 mg Inject 0.25-1 mLs (0.1-0.4 mg) into the vein every 2 minutes as needed for opioid reversal    glucose 40 % gel 15-30 g Take 15-30 g by mouth every 15 minutes as needed for low blood sugar    Linked Group 1:  \"Or\" Linked Group Details     dextrose 50 % injection 25-50 mL Inject 25-50 mLs into the vein every 15 minutes as needed for low blood sugar    Linked Group 1:  \"Or\" Linked Group Details     glucagon injection 1 mg Inject 1 mg Subcutaneous every 15 minutes as needed for low blood sugar (May repeat x 1 only)    Linked Group 1:  \"Or\" Linked Group Details     0.9% sodium chloride infusion Inject into the " vein continuous    potassium chloride SA (K-DUR/KLOR-CON M) CR tablet 20-40 mEq Take 1-2 tablets (20-40 mEq) by mouth every 2 hours as needed for potassium supplementation    potassium chloride (KLOR-CON) Packet 20-40 mEq 20-40 mEq by Oral or Feeding Tube route every 2 hours as needed for potassium supplementation    potassium chloride 10 mEq in 100 mL intermittent infusion Inject 100 mLs (10 mEq) into the vein every hour as needed for potassium supplementation    potassium chloride 10 mEq in 100 mL intermittent infusion with 10 mg lidocaine Inject 100 mLs (10 mEq) into the vein every hour as needed for potassium supplementation    potassium chloride 20 mEq in 50 mL intermittent infusion Inject 50 mLs (20 mEq) into the vein every hour as needed for potassium supplementation    magnesium sulfate 2 g in NS intermittent infusion (PharMEDium or FV Cmpd) Inject 50 mLs (2 g) into the vein daily as needed for magnesium supplementation    magnesium sulfate 4 g in 100 mL sterile water (premade) Inject 100 mLs (4 g) into the vein every 4 hours as needed for magnesium supplementation    acetaminophen (TYLENOL) tablet 650 mg Take 2 tablets (650 mg) by mouth every 4 hours as needed for mild pain    oxyCODONE (ROXICODONE) IR tablet 5-10 mg Take 1-2 tablets (5-10 mg) by mouth every 3 hours as needed for moderate to severe pain    HYDROmorphone (PF) (DILAUDID) injection 0.3-0.5 mg Inject 0.3-0.5 mg into the vein every 2 hours as needed for severe pain    melatonin tablet 1 mg Take 1 tablet (1 mg) by mouth nightly as needed for sleep    senna-docusate (SENOKOT-S;PERICOLACE) 8.6-50 MG per tablet 1-2 tablet Take 1-2 tablets by mouth 2 times daily as needed (constipation )    magnesium hydroxide (MILK OF MAGNESIA) suspension 30 mL Take 30 mLs by mouth daily as needed for constipation    bisacodyl (DULCOLAX) Suppository 10 mg Place 1 suppository (10 mg) rectally daily as needed for constipation    ondansetron (ZOFRAN-ODT) ODT tab 4 mg  "Take 1 tablet (4 mg) by mouth every 6 hours as needed for nausea    Linked Group 2:  \"Or\" Linked Group Details     ondansetron (ZOFRAN) injection 4 mg Inject 2 mLs (4 mg) into the vein every 6 hours as needed for nausea or vomiting    Linked Group 2:  \"Or\" Linked Group Details     prochlorperazine (COMPAZINE) injection 5-10 mg Inject 1-2 mLs (5-10 mg) into the vein every 6 hours as needed for nausea or vomiting    Linked Group 3:  \"Or\" Linked Group Details     prochlorperazine (COMPAZINE) tablet 5-10 mg Take 1-2 tablets (5-10 mg) by mouth every 6 hours as needed for vomiting    Linked Group 3:  \"Or\" Linked Group Details     prochlorperazine (COMPAZINE) Suppository 25 mg Place 1 suppository (25 mg) rectally every 12 hours as needed for nausea or vomiting    Linked Group 3:  \"Or\" Linked Group Details     0.9% sodium chloride BOLUS Inject 1,000 mLs into the vein once    Linked Group 4:  \"Followed by\" Linked Group Details     0.9% sodium chloride infusion Inject 1,000 mLs into the vein continuous    Linked Group 4:  \"Followed by\" Linked Group Details     lactated ringers BOLUS 1,000 mL Inject 1,000 mLs into the vein once    vancomycin (VANCOCIN) 1000 mg in dextrose 5% 200 mL PREMIX Inject 200 mLs (1,000 mg) into the vein once    vancomycin (VANCOCIN) 2,000 mg in NaCl 0.9 % 500 mL intermittent infusion Inject 2,000 mg into the vein every 12 hours    dextrose 10 % 1,000 mL infusion Inject into the vein continuous prn (Give if on IV Insulin Infusion, and Parenteral or Enteral nutrition held or cycled off. )    insulin glargine (LANTUS) injection 20 Units Inject 20 Units Subcutaneous every morning (before breakfast)    insulin aspart (NovoLOG) inj (RAPID ACTING) Inject 1-10 Units Subcutaneous 3 times daily (before meals)    insulin aspart (NovoLOG) inj (RAPID ACTING) Inject 1-7 Units Subcutaneous At Bedtime    amLODIPine (NORVASC) tablet 5 mg Take 1 tablet (5 mg) by mouth daily    insulin aspart (NovoLOG) inj (RAPID ACTING) " "Inject 3 Units Subcutaneous 3 times daily (with meals)    dextrose 10 % 1,000 mL infusion (Discontinued) Inject into the vein continuous prn (Give if on IV Insulin Infusion, and Parenteral or Enteral nutrition held or cycled off. )    insulin 1 unit/mL in saline (NovoLIN, HumuLIN Regular) drip - ADULT IV Infusion (Discontinued) Inject 0-24 Units/hr into the vein continuous    insulin isophane & regular (HumuLIN MIX 70/30 PEN , NovoLIN MIX 70/30 PEN) injection 15 Units (Discontinued) Inject 15 Units Subcutaneous 2 times daily    glucose 40 % gel 15-30 g (Discontinued) Take 15-30 g by mouth every 15 minutes as needed for low blood sugar    Linked Group 5:  \"Or\" Linked Group Details     dextrose 50 % injection 25-50 mL (Discontinued) Inject 25-50 mLs into the vein every 15 minutes as needed for low blood sugar    Linked Group 5:  \"Or\" Linked Group Details     glucagon injection 1 mg (Discontinued) Inject 1 mg Subcutaneous every 15 minutes as needed for low blood sugar (May repeat x 1 only)    Linked Group 5:  \"Or\" Linked Group Details     insulin aspart (NovoLOG) inj (RAPID ACTING) (Discontinued) Inject 1-10 Units Subcutaneous 3 times daily (before meals)    insulin aspart (NovoLOG) inj (RAPID ACTING) (Discontinued) Inject 1-7 Units Subcutaneous At Bedtime    insulin aspart (NovoLOG) inj (RAPID ACTING) (Discontinued) Inject 5 Units Subcutaneous 3 times daily (with meals)    insulin 1 unit/mL in saline (NovoLIN, HumuLIN Regular) drip - ADULT IV Infusion (Discontinued) Inject 0-24 Units/hr into the vein continuous    glucose 40 % gel 15-30 g (Discontinued) Take 15-30 g by mouth every 15 minutes as needed for low blood sugar    Linked Group 6:  \"Or\" Linked Group Details     dextrose 50 % injection 25-50 mL (Discontinued) Inject 25-50 mLs into the vein every 15 minutes as needed for low blood sugar    Linked Group 6:  \"Or\" Linked Group Details     glucagon injection 1 mg (Discontinued) Inject 1 mg Subcutaneous every 15 " "minutes as needed for low blood sugar (May repeat x 1 only)    Linked Group 6:  \"Or\" Linked Group Details     insulin lispro prot & lispro (HumaLOG MIX 75/25 PEN) injection 3 Units (Discontinued) Inject 3 Units Subcutaneous 3 times daily (before meals)    ampicillin-sulbactam (UNASYN) 3 g vial to attach to  mL bag Inject 3 g into the vein every 6 hours    vancomycin (VANCOCIN) 1000 mg in dextrose 5% 200 mL PREMIX Inject 200 mLs (1,000 mg) into the vein once    lidocaine 1 % 1 mL (Discontinued) 1 mL by Other route every hour as needed (mild pain with VAD insertion or accessing implanted port)    lidocaine (LMX4) kit (Discontinued) Apply topically every hour as needed for pain (with VAD insertion or accessing implanted port.)    sodium chloride (PF) 0.9% PF flush 3 mL (Discontinued) 3 mLs by Intracatheter route every hour as needed for line flush (for peripheral IV flush post IV meds)    sodium chloride (PF) 0.9% PF flush 3 mL (Discontinued) 3 mLs by Intracatheter route every 8 hours    insulin 1 unit/1 mL in NS (NovoLIN, HumuLIN Regular) drip -ED DKA algorithm (Discontinued) Inject into the vein continuous        EXAM  /74  Pulse 88  Temp 99.4  F (37.4  C) (Oral)  Resp 16  Ht 1.715 m (5' 7.5\")  Wt 96.3 kg (212 lb 3.2 oz)  SpO2 97%  BMI 32.74 kg/m2    general   In bed  no acute distress     lungs   clear     abd   soft     CV   No palpable (for me) DP or PT pulse L  (+) palpable popliteal pulse L     extrem   Entire dorsum 2nd toe L is black  L great toe tip a bit dusky  Foul odor from toe ...    bilat lower leg edema     neuro   Awake  Pt initially did NOT remember podiatry visit or bedside debridement from earlier today ...     skin   pale     DATA  Cultures    Blood Neg so far    LABS  Recent Labs   Lab Test  04/28/17   2120   WBC  9.2                ASSESSMENT   SUGGESTIONS    I96  Gangrene of toe (H)  (primary encounter diagnosis)  L03.032  Cellulitis of toe of left foot  S92.901A Foot " fracture, right, closed, initial encounter  R73.9  Hyperglycemia  L08.9  Left foot infection  E11.65,  Z79.4 Uncontrolled type 2 diabetes mellitus with hyperglycemia, with long-term current use of insulin (H)  E66.9  Obesity, unspecified obesity severity, unspecified obesity type  Z87.891  Personal history of tobacco use, presenting hazards to health    Digit-threatening (but doubt limb-threatening) infection  Complicating and complicated by obesity / diabetes and hx of tobacco abuse  Pt has desk job, 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 Infectious Diseases  [phone]

## 2017-04-29 NOTE — ED PROVIDER NOTES
"  History     Chief Complaint:  Ankle Pain and Toe Pain    HPI   Roxy Beaulieu is a 56 year old female with history of diabetes who presents with left toe pain and right ankle pain. The patient has a history of diabetes and has been experiencing a blister to her left great toe ongoing for the past 5-6 months. During the past 1-2 week her left great toe has been increasingly more swollen and red. She indicated that she has neuropathy and does not have significant feeling in her toes at a baseline. Of note, she has been off her short acting insulin for the past 4 weeks, she doubled her dose of long-acting. Sugars between 400-500 during this time. She states that she can not afford her diabetes medication. The patient otherwise denies any fevers, nausea, vomiting, or other systemic symptoms.     The patient additionally states that her right ankle \"popped,\" 6 weeks ago while getting out of her car. Since that time she has been experiencing pain to her ankle with associated difficulty with ambulating. No numbness/weakness distally or otherwise.     Allergies:  NKDA      Medications:    Lantus  Humalog    Past Medical History:    Diabetes  Hypertension  Hyperlipidemia     Past Surgical History:    The patient does not have any pertinent past surgical history.   Family History:    No past pertinent family history.     Social History:  Current everyday smoker   Positive for alcohol use.    Marital Status:  Single [1]    Review of Systems   Constitutional: Negative for chills and fever.   Gastrointestinal: Negative for nausea and vomiting.   Musculoskeletal:        Positive for right ankle pain.    Skin: Positive for color change and wound (L 1st and 2nd toe).   All other systems reviewed and are negative.    Physical Exam   First Vitals:  BP: 180/89  Temp: 99.1  F (37.3  C)  Temp src: Oral  Pulse: 113  Resp: 16  SpO2: 98 % (04/28 2030)        Physical Exam  Constitutional:  Appears well-developed and well-nourished. " Alert. Conversant. Non toxic.  HENT:   Head: Atraumatic.   Nose: Nose normal.  Mouth/Throat: Oral mucosa is clear and moist. no trismus. Pharynx normal. Tonsils symmetric. No tonsillar enlargement, erythema, or exudate.  Eyes: Conjunctivae normal. EOM normal. Pupils equal, round, and reactive to light. No scleral icterus.   Neck: Normal range of motion. Neck supple. No tracheal deviation present.   Cardiovascular: Normal rate, regular rhythm. No gallop. No friction rub. No murmur heard. Symmetric radial and dorsalis pedis artery pulses   Pulmonary/Chest: Effort normal. No stridor. No respiratory distress. No wheezes. No rales. No rhonchi . No tenderness.   Abdominal: Soft. Bowel sounds normal. No distension. No mass. No tenderness. No rebound. No guarding.   Musculoskeletal: Upper extremities: Normal range of motion. No edema. No tenderness. No deformity   RLE: Normal except for ankle and foot.  There is ecchymosis and swelling of the ankle and the hindfoot and heel.  No definite crepitus.  No angulation.  No definite tenderness, but that may be limited by diabetic neuropathy   LLE: Normal except for toes and foot.  There is onychomycosis of her toenails.  On the distal tip of her great toe there is a small subcentimeter area of skin breakdown without much surrounding erythema.  No purulent drainage.  There is a 1 cm ulcer on the dorsum of her 2nd toe over the interphalangeal joint with a small amount of purulent drainage.  The skin on her 2nd toe has desquamated.  No active bleeding.  The tip of her 2nd toe is dry and black consistent with dry gangrene.  There is erythema of the 2nd toe and the base of it the small tendril of ascending lymphangitis about MCC up the dorsum of her foot.    Lymph: No cervical adenopathy.   Neurological: Alert and oriented to person, place, and time. Normal strength. CN II-VII intact. No sensory deficit. GCS eye subscore is 4. GCS verbal subscore is 5. GCS motor subscore is 6.  Normal coordination   Skin: Skin is warm and dry. No rash noted. No pallor. Normal capillary refill.  Psychiatric:  Normal mood. Normal affect.     Emergency Department Course   Imaging:  Radiographic findings were communicated with the patient who voiced understanding of the findings.    XR Foot, bilateral  1. Chronic-appearing disruption of the right mid and hindfoot. If  there is convincing acute symptomatology in this region, then CT or  comparison with available old films likely obtained elsewhere would be  useful.  2. Postoperative screws in the distal right fibula.  3. Vascular calcification. As per radiology.     XR Ankle, right:   1. Chronic-appearing disruption of the right mid and hindfoot. If  there is convincing acute symptomatology in this region, then CT or  comparison with available old films likely obtained elsewhere would be  useful.  2. Postoperative screws in the distal right fibula.  3. Vascular calcification. As per radiology.       Laboratory:  CBC: WBC: 9.2, HGB: 10.3(L), PLT: 296  BMP: Glucose 743(HH), Chloride 89(L), BUN 33(H), Creatinine 1.45(H), GFR 37(L) o/w WN.     VBG: pH 7.39 / PCO2 52(H) / PO2 23(L) / Bicarb 32(H), room air    Interventions:  Vancomycin 1000 mg in dextrose 5% 200mL IV  Unasyn 3g IV  Novolog / Humulin drip DKA algorithm IV     Emergency Department Course:  Nursing notes and vitals reviewed. I performed an exam of the patient as documented above.      Blood drawn. This was sent to the lab for further testing, results above.    The patient was sent for a bilateral foot xray and right ankle xray while in the emergency department, findings above.      0027 I consulted with Dr. Ortiz, hospitalist services.     Findings and plan explained to the Patient who consents to admission. Discussed the patient with Dr. Ortiz, who will admit the patient to a medical bed for further monitoring, evaluation, and treatment.     Impression & Plan    Medical Decision  "Making:  Roxy Beaulieu is a 56 year old female with a history of type II diabetes who has been off her short acting insulin for the past couple of months secondary to an insurance issue and she has been trying to manage her sugars with her long-acting Lantus. Her sugars have been ranging between 400-500. Along with this, she has swelling and bruising in her right foot and ankle; stemming from an episode where she stepped down on this and felt a \"pop,\" about 6 weeks ago. X-rays of her right foot and ankle reveal a disruption of the hind-foot and mid-foot, possibly a fracture, possibly a chronic deformity from diabetes, or possibly Charcot foot; orthopedic evaluation is indicated.     Evaluation of her left foot is more concerning as she clearly has evidence of cellulitis and probable early gangrene in the second toe of that foot with a small area of surrounding cellulitis and a thin tendril of ascending lymphangitis spreading up the foot. She also has a tiny spot of possible infection to the tip of her great toe, but without much erythema there. She is otherwise hemodynamically stable and her white blood cell count is normal. Metabolic profile shows renal insuffiencey and a markedly elevated sugar at 743; for which we will start an insulin drip to bring the sugar under control. Broad spectrum antibiotics were initiated and the patient will be admitted to the hospitalist services for further management.     Diagnosis:    ICD-10-CM    1. Cellulitis of toe of left foot L03.032    2. Foot fracture, right, closed, initial encounter S92.901A    3. Hyperglycemia R73.9      I, Nathaniel Christensen, am serving as a scribe on 4/28/2017 at 8:28 PM to personally document services performed by Mario Francis MD based on my observations and the provider's statements to me.     Nathaniel Christensen  4/28/2017   [facility] EMERGENCY DEPARTMENT       Mario Francis MD  04/29/17 0153    "

## 2017-04-29 NOTE — CONSULTS
"[facility]  April 29, 2017      ASSESSMENT:   L 1st and 2nd toe ulcerations, 2nd toe dry gangrene, suspicion of osteomyelitis  R midfoot fracture dislocation likely secondary to Charcot  PAD  Uncontrolled DM type II with neuropathy     PLAN:  Reviewed patient's chart in epic.  Discussed condition and treatment options including pros and cons.    Left 1st and 2nd toes with ulcerations, more extensive necrotic changes to 2nd toe.  Concern for significant PAD, infection including osteomyelitis.  Will order MRI.  Anticipate pt will need at least digital amputation pending findings.    Right foot with chronic traumatic changes likely 2/2 charcot process.  Will order CT for better assessment.  NWB on right foot advised.  PT ordered.  Will order CAM boot.    Also concern for PAD.  ABIs ordered.  Pt may need transfer to [facility] for Vascular intervention pending findings.    ESR, CRP ordered.    Continue IV abx.    With consent I performed excisional debridement of the necrotic appearing tissue on the tip of the left hallux given possible underlying fluid collection.  This was up to and including sub q with a tissue nipper.  Serous fluid expressed.  Wound approx 1.5cm x 1.0cm post debridement, probing to hard end point.  Sterile dressing applied to left foot.  No anesthesia needed.    Thank you for the consult.  Will follow.       Gurjit Francis DPM, FACFAS  Pager: [phone]      PATIENT HISTORY:  Roxy Beaulieu is a 56 year old female with hx of DM type II, HTN, hypercholesterolemia.  Pt reports several months of wounds on her left 1st and 2nd toes that have not healed.  Reports more redness, swelling over the past few days.  Also reports 6 wks ago she felt a \"pop\" in her right foot stepping out of her car.  Denies falling.  Reports swelling since that time.  No pain reported b/l at this time.  Pt admitted with BG of 743.  Denies f/c/n/v.     Review of Systems:  Rest of " 10 pt ROS neg except for HPI.       PAST MEDICAL HISTORY:   Past Medical History:   Diagnosis Date     Diabetes (H)      High cholesterol      Hypertension         PAST SURGICAL HISTORY: R ankle ORIF in 1980s.     MEDICATIONS:   Current Facility-Administered Medications:      metoprolol (LOPRESSOR) tablet 25 mg, 25 mg, Oral, BID, Kamran Ortiz MD, 25 mg at 04/29/17 0751     lisinopril (PRINIVIL/ZESTRIL) tablet 5 mg, 5 mg, Oral, Daily, Kamran Ortiz MD, 5 mg at 04/29/17 0751     naloxone (NARCAN) injection 0.1-0.4 mg, 0.1-0.4 mg, Intravenous, Q2 Min PRN, Kamran Ortiz MD     glucose 40 % gel 15-30 g, 15-30 g, Oral, Q15 Min PRN **OR** dextrose 50 % injection 25-50 mL, 25-50 mL, Intravenous, Q15 Min PRN **OR** glucagon injection 1 mg, 1 mg, Subcutaneous, Q15 Min PRN, Kamran Ortiz MD     0.9% sodium chloride infusion, , Intravenous, Continuous, Kamran Ortiz MD, Last Rate: 125 mL/hr at 04/29/17 0522     potassium chloride SA (K-DUR/KLOR-CON M) CR tablet 20-40 mEq, 20-40 mEq, Oral, Q2H PRN, Kamran Ortiz MD     potassium chloride (KLOR-CON) Packet 20-40 mEq, 20-40 mEq, Oral or Feeding Tube, Q2H PRN, Kamran Ortiz MD     potassium chloride 10 mEq in 100 mL intermittent infusion, 10 mEq, Intravenous, Q1H PRN, Kamran Ortiz MD     potassium chloride 10 mEq in 100 mL intermittent infusion with 10 mg lidocaine, 10 mEq, Intravenous, Q1H PRN, Kamran Ortiz MD     potassium chloride 20 mEq in 50 mL intermittent infusion, 20 mEq, Intravenous, Q1H PRN, Kamran Ortiz MD     magnesium sulfate 2 g in NS intermittent infusion ([employer]arMEDium or FV Cmpd), 2 g, Intravenous, Daily PRN, Kamran Ortiz MD     magnesium sulfate 4 g in 100 mL sterile water (premade), 4 g, Intravenous, Q4H PRN, Kamran Ortiz MD     acetaminophen (TYLENOL) tablet 650 mg, 650 mg, Oral, Q4H PRN, aKmran Ortiz MD     oxyCODONE (ROXICODONE) IR tablet 5-10 mg, 5-10 mg, Oral, Q3H PRN, Kamran Ortiz,  MD     HYDROmorphone (PF) (DILAUDID) injection 0.3-0.5 mg, 0.3-0.5 mg, Intravenous, Q2H PRN, Kamran Ortiz MD     melatonin tablet 1 mg, 1 mg, Oral, At Bedtime PRN, Kamran Ortiz MD     senna-docusate (SENOKOT-S;PERICOLACE) 8.6-50 MG per tablet 1-2 tablet, 1-2 tablet, Oral, BID PRN, Kamran Ortiz MD     magnesium hydroxide (MILK OF MAGNESIA) suspension 30 mL, 30 mL, Oral, Daily PRN, Kamran Ortiz MD     bisacodyl (DULCOLAX) Suppository 10 mg, 10 mg, Rectal, Daily PRN, Kamran Ortiz MD     ondansetron (ZOFRAN-ODT) ODT tab 4 mg, 4 mg, Oral, Q6H PRN **OR** ondansetron (ZOFRAN) injection 4 mg, 4 mg, Intravenous, Q6H PRN, Kamran Ortiz MD     prochlorperazine (COMPAZINE) injection 5-10 mg, 5-10 mg, Intravenous, Q6H PRN **OR** prochlorperazine (COMPAZINE) tablet 5-10 mg, 5-10 mg, Oral, Q6H PRN **OR** prochlorperazine (COMPAZINE) Suppository 25 mg, 25 mg, Rectal, Q12H PRN, Kamran Ortiz MD     [COMPLETED] 0.9% sodium chloride BOLUS, 1,000 mL, Intravenous, Once, Stopped at 04/29/17 0336 **FOLLOWED BY** 0.9% sodium chloride infusion, 1,000 mL, Intravenous, Continuous, Mario Francis MD, Last Rate: 125 mL/hr at 04/29/17 0748, 1,000 mL at 04/29/17 0748     vancomycin (VANCOCIN) 2,000 mg in NaCl 0.9 % 500 mL intermittent infusion, 2,000 mg, Intravenous, Q12H, Kamran Ortiz MD     dextrose 10 % 1,000 mL infusion, , Intravenous, Continuous PRN, Lor Chung MD     insulin 1 unit/mL in saline (NovoLIN, HumuLIN Regular) drip - ADULT IV Infusion, 0-24 Units/hr, Intravenous, Continuous, Lor Chung MD, Last Rate: 3 mL/hr at 04/29/17 0915, 3 Units/hr at 04/29/17 0915     ampicillin-sulbactam (UNASYN) 3 g vial to attach to  mL bag, 3 g, Intravenous, Q6H, Mario Francis MD, Last Rate: 0 mL/hr at 04/29/17 0115, 3 g at 04/29/17 0658     ALLERGIES:  No Known Allergies     SOCIAL HISTORY:   Social History     Social History     Marital status: Single      "Spouse name: N/A     Number of children: N/A     Years of education: N/A     Occupational History     Not on file.     Social History Main Topics     Smoking status: Former Smoker     Quit date: 7/25/2016     Smokeless tobacco: Not on file     Alcohol use No     Drug use: No     Sexual activity: Not on file     Other Topics Concern     Not on file     Social History Narrative        FAMILY HISTORY: No family history on file.     EXAM:Vitals: /59  Pulse 92  Temp 98  F (36.7  C) (Oral)  Resp 18  Ht 1.715 m (5' 7.5\")  Wt 96.3 kg (212 lb 3.2 oz)  SpO2 96%  BMI 32.74 kg/m2  BMI= Body mass index is 32.74 kg/(m^2).    General appearance: Patient is alert and fully cooperative with history & exam.  No sign of distress is noted during the visit.     Psychiatric: Affect is pleasant & appropriate.  Patient appears motivated to improve health.     Respiratory: Breathing is regular & unlabored while sitting.     HEENT: Hearing is intact to spoken word.  Speech is clear.  No gross evidence of visual impairment that would impact ambulation.     Dermatologic: Left hallux with necrotic appearing blister to tip of toe extending along medial side.  Left 2nd toe with dry gangrenous changes to proximal to the PIPJ, where there is exposed bone.  Erythema extending from base of 2nd toe with slight dorsal foot streaking.       Vascular: DP & PT pulses are absent.  Skin cool b/l.  Right rearfoot mild edema noted.     Neurologic: Lower extremity sensation is absent to light touch.     Musculoskeletal: Patient is ambulatory without assistive device or brace.  Right rearfoot/ankle with slight varus angulation, edema.  No pain on exam.  No gross instability noted.       Lab Results   Component Value Date    WBC 9.2 04/28/2017     Lab Results   Component Value Date    RBC 3.77 04/28/2017     Lab Results   Component Value Date    HGB 10.3 04/28/2017     Lab Results   Component Value Date    HCT 31.7 04/28/2017     No components found " for: MCT  Lab Results   Component Value Date    MCV 84 04/28/2017     Lab Results   Component Value Date    MCH 27.3 04/28/2017     Lab Results   Component Value Date    MCHC 32.5 04/28/2017     Lab Results   Component Value Date    RDW 13.2 04/28/2017     Lab Results   Component Value Date     04/28/2017         Imaging reviewed with pt:    ANKLE RIGHT THREE OR MORE VIEWS   FOOT BILATERAL TWO VIEWS 4/28/2017 10:23 PM      HISTORY: Ankle pain     COMPARISON: None.     FINDINGS:      Right foot: Surgical screws are in place in the distal right fibula.  There are several corticated bony fragments within the right midfoot  that suggest old traumatic changes. I do not have a previous for  comparison, but no definite acute fracture fragment is identified. If  there is convincing acute symptomatology in the right mid and  hindfoot, then CT might be necessary to look for acute fragments. If  previous films are available elsewhere, these would be very useful for  comparison.     Left foot: Negative except for small calcaneal spur inferiorly.     Right ankle: Surgical screws in the distal fibula. Ankle mortise is  intact. Small bony fragments adjacent to the medial and lateral  malleoli appear corticated and should be chronic. Nothing clearly  acute.     There is vascular calcification bilaterally.         IMPRESSION:   1. Chronic-appearing disruption of the right mid and hindfoot. If  there is convincing acute symptomatology in this region, then CT or  comparison with available old films likely obtained elsewhere would be  useful.  2. Postoperative screws in the distal right fibula.  3. Vascular calcification.     JESUS CONTRERAS MD

## 2017-04-30 ENCOUNTER — APPOINTMENT (OUTPATIENT)
Dept: PHYSICAL THERAPY | Facility: CLINIC | Age: 57
DRG: 617 | End: 2017-04-30
Attending: PODIATRIST

## 2017-04-30 LAB
ANION GAP SERPL CALCULATED.3IONS-SCNC: 4 MMOL/L (ref 3–14)
BASOPHILS # BLD AUTO: 0.1 10E9/L (ref 0–0.2)
BASOPHILS NFR BLD AUTO: 0.8 %
BUN SERPL-MCNC: 21 MG/DL (ref 7–30)
CALCIUM SERPL-MCNC: 8.2 MG/DL (ref 8.5–10.1)
CHLORIDE SERPL-SCNC: 109 MMOL/L (ref 94–109)
CO2 SERPL-SCNC: 28 MMOL/L (ref 20–32)
CREAT SERPL-MCNC: 1.05 MG/DL (ref 0.52–1.04)
DIFFERENTIAL METHOD BLD: ABNORMAL
EOSINOPHIL # BLD AUTO: 0.1 10E9/L (ref 0–0.7)
EOSINOPHIL NFR BLD AUTO: 2.2 %
ERYTHROCYTE [DISTWIDTH] IN BLOOD BY AUTOMATED COUNT: 13.2 % (ref 10–15)
ERYTHROCYTE [SEDIMENTATION RATE] IN BLOOD BY WESTERGREN METHOD: 89 MM/H (ref 0–30)
GFR SERPL CREATININE-BSD FRML MDRD: 54 ML/MIN/1.7M2
GLUCOSE BLDC GLUCOMTR-MCNC: 161 MG/DL (ref 70–99)
GLUCOSE BLDC GLUCOMTR-MCNC: 194 MG/DL (ref 70–99)
GLUCOSE BLDC GLUCOMTR-MCNC: 239 MG/DL (ref 70–99)
GLUCOSE BLDC GLUCOMTR-MCNC: 251 MG/DL (ref 70–99)
GLUCOSE BLDC GLUCOMTR-MCNC: 257 MG/DL (ref 70–99)
GLUCOSE SERPL-MCNC: 210 MG/DL (ref 70–99)
HCT VFR BLD AUTO: 28.8 % (ref 35–47)
HGB BLD-MCNC: 9.4 G/DL (ref 11.7–15.7)
IMM GRANULOCYTES # BLD: 0.1 10E9/L (ref 0–0.4)
IMM GRANULOCYTES NFR BLD: 0.8 %
LYMPHOCYTES # BLD AUTO: 1.5 10E9/L (ref 0.8–5.3)
LYMPHOCYTES NFR BLD AUTO: 23.4 %
MAGNESIUM SERPL-MCNC: 1.6 MG/DL (ref 1.6–2.3)
MCH RBC QN AUTO: 27.8 PG (ref 26.5–33)
MCHC RBC AUTO-ENTMCNC: 32.6 G/DL (ref 31.5–36.5)
MCV RBC AUTO: 85 FL (ref 78–100)
MONOCYTES # BLD AUTO: 0.5 10E9/L (ref 0–1.3)
MONOCYTES NFR BLD AUTO: 8.5 %
NEUTROPHILS # BLD AUTO: 4 10E9/L (ref 1.6–8.3)
NEUTROPHILS NFR BLD AUTO: 64.3 %
NRBC # BLD AUTO: 0 10*3/UL
NRBC BLD AUTO-RTO: 0 /100
PLATELET # BLD AUTO: 227 10E9/L (ref 150–450)
POTASSIUM SERPL-SCNC: 4.4 MMOL/L (ref 3.4–5.3)
RBC # BLD AUTO: 3.38 10E12/L (ref 3.8–5.2)
SODIUM SERPL-SCNC: 141 MMOL/L (ref 133–144)
VANCOMYCIN SERPL-MCNC: 32.2 MG/L
WBC # BLD AUTO: 6.2 10E9/L (ref 4–11)

## 2017-04-30 PROCEDURE — 83735 ASSAY OF MAGNESIUM: CPT | Performed by: INTERNAL MEDICINE

## 2017-04-30 PROCEDURE — 25000128 H RX IP 250 OP 636: Performed by: INTERNAL MEDICINE

## 2017-04-30 PROCEDURE — 85652 RBC SED RATE AUTOMATED: CPT | Performed by: INTERNAL MEDICINE

## 2017-04-30 PROCEDURE — 25000132 ZZH RX MED GY IP 250 OP 250 PS 637: Performed by: HOSPITALIST

## 2017-04-30 PROCEDURE — 25000128 H RX IP 250 OP 636: Performed by: HOSPITALIST

## 2017-04-30 PROCEDURE — 85025 COMPLETE CBC W/AUTO DIFF WBC: CPT | Performed by: INTERNAL MEDICINE

## 2017-04-30 PROCEDURE — 97530 THERAPEUTIC ACTIVITIES: CPT | Mod: GP

## 2017-04-30 PROCEDURE — 97116 GAIT TRAINING THERAPY: CPT | Mod: GP

## 2017-04-30 PROCEDURE — 25000132 ZZH RX MED GY IP 250 OP 250 PS 637: Performed by: INTERNAL MEDICINE

## 2017-04-30 PROCEDURE — 25000125 ZZHC RX 250: Performed by: EMERGENCY MEDICINE

## 2017-04-30 PROCEDURE — 25000125 ZZHC RX 250: Performed by: INTERNAL MEDICINE

## 2017-04-30 PROCEDURE — 99233 SBSQ HOSP IP/OBS HIGH 50: CPT | Performed by: HOSPITALIST

## 2017-04-30 PROCEDURE — 36415 COLL VENOUS BLD VENIPUNCTURE: CPT | Performed by: INTERNAL MEDICINE

## 2017-04-30 PROCEDURE — 80202 ASSAY OF VANCOMYCIN: CPT | Performed by: INTERNAL MEDICINE

## 2017-04-30 PROCEDURE — 99233 SBSQ HOSP IP/OBS HIGH 50: CPT | Performed by: PODIATRIST

## 2017-04-30 PROCEDURE — 12000007 ZZH R&B INTERMEDIATE

## 2017-04-30 PROCEDURE — 25000128 H RX IP 250 OP 636: Performed by: EMERGENCY MEDICINE

## 2017-04-30 PROCEDURE — 97162 PT EVAL MOD COMPLEX 30 MIN: CPT | Mod: GP

## 2017-04-30 PROCEDURE — 40000193 ZZH STATISTIC PT WARD VISIT

## 2017-04-30 PROCEDURE — 25000131 ZZH RX MED GY IP 250 OP 636 PS 637: Performed by: HOSPITALIST

## 2017-04-30 PROCEDURE — 00000146 ZZHCL STATISTIC GLUCOSE BY METER IP

## 2017-04-30 PROCEDURE — 80048 BASIC METABOLIC PNL TOTAL CA: CPT | Performed by: INTERNAL MEDICINE

## 2017-04-30 RX ORDER — HEPARIN SODIUM 5000 [USP'U]/.5ML
5000 INJECTION, SOLUTION INTRAVENOUS; SUBCUTANEOUS EVERY 12 HOURS
Status: DISCONTINUED | OUTPATIENT
Start: 2017-04-30 | End: 2017-04-30

## 2017-04-30 RX ORDER — HEPARIN SODIUM 5000 [USP'U]/.5ML
5000 INJECTION, SOLUTION INTRAVENOUS; SUBCUTANEOUS EVERY 12 HOURS
Status: DISCONTINUED | OUTPATIENT
Start: 2017-05-02 | End: 2017-05-03 | Stop reason: HOSPADM

## 2017-04-30 RX ORDER — ATORVASTATIN CALCIUM 40 MG/1
40 TABLET, FILM COATED ORAL
Status: DISCONTINUED | OUTPATIENT
Start: 2017-04-30 | End: 2017-05-03 | Stop reason: HOSPADM

## 2017-04-30 RX ORDER — CEFAZOLIN SODIUM 1 G/50ML
1250 SOLUTION INTRAVENOUS EVERY 12 HOURS
Status: DISCONTINUED | OUTPATIENT
Start: 2017-05-01 | End: 2017-05-02

## 2017-04-30 RX ADMIN — INSULIN ASPART 4 UNITS: 100 INJECTION, SOLUTION INTRAVENOUS; SUBCUTANEOUS at 18:13

## 2017-04-30 RX ADMIN — Medication 2 G: at 11:32

## 2017-04-30 RX ADMIN — METOPROLOL TARTRATE 25 MG: 25 TABLET ORAL at 07:38

## 2017-04-30 RX ADMIN — INSULIN ASPART 5 UNITS: 100 INJECTION, SOLUTION INTRAVENOUS; SUBCUTANEOUS at 12:46

## 2017-04-30 RX ADMIN — AMPICILLIN SODIUM AND SULBACTAM SODIUM 3 G: 2; 1 INJECTION, POWDER, FOR SOLUTION INTRAMUSCULAR; INTRAVENOUS at 06:11

## 2017-04-30 RX ADMIN — AMLODIPINE BESYLATE 5 MG: 5 TABLET ORAL at 07:38

## 2017-04-30 RX ADMIN — INSULIN ASPART 3 UNITS: 100 INJECTION, SOLUTION INTRAVENOUS; SUBCUTANEOUS at 07:39

## 2017-04-30 RX ADMIN — AMPICILLIN SODIUM AND SULBACTAM SODIUM 3 G: 2; 1 INJECTION, POWDER, FOR SOLUTION INTRAMUSCULAR; INTRAVENOUS at 18:04

## 2017-04-30 RX ADMIN — AMPICILLIN SODIUM AND SULBACTAM SODIUM 3 G: 2; 1 INJECTION, POWDER, FOR SOLUTION INTRAMUSCULAR; INTRAVENOUS at 12:58

## 2017-04-30 RX ADMIN — LISINOPRIL 5 MG: 5 TABLET ORAL at 07:38

## 2017-04-30 RX ADMIN — INSULIN GLARGINE 20 UNITS: 100 INJECTION, SOLUTION SUBCUTANEOUS at 07:38

## 2017-04-30 RX ADMIN — ATORVASTATIN CALCIUM 40 MG: 40 TABLET, FILM COATED ORAL at 19:41

## 2017-04-30 RX ADMIN — HEPARIN SODIUM 5000 UNITS: 10000 INJECTION, SOLUTION INTRAVENOUS; SUBCUTANEOUS at 09:59

## 2017-04-30 RX ADMIN — VANCOMYCIN HYDROCHLORIDE 2000 MG: 10 INJECTION, POWDER, LYOPHILIZED, FOR SOLUTION INTRAVENOUS at 02:01

## 2017-04-30 RX ADMIN — METOPROLOL TARTRATE 25 MG: 25 TABLET ORAL at 21:38

## 2017-04-30 RX ADMIN — SODIUM CHLORIDE: 9 INJECTION, SOLUTION INTRAVENOUS at 07:38

## 2017-04-30 RX ADMIN — Medication 1250 MG: at 23:38

## 2017-04-30 NOTE — PROGRESS NOTES
[facility]  Hospitalist Progress Note  Patient Name: Roxy Beaulieu    MRN: [id number]  Provider:  Lor Chung MD  Date:04/30/2017      Initial presenting complaint/issue to hospital (Diagnosis): LE pain     Summary of Stay: Patient is a 56 year old female with history of type 2 diabetes, hypertension, and hypercholesterolemia who presented to the ED on 4/28/17 for evaluation of left 1st and 2nd toe pain and right ankle pain. ED work up showed severe hyperglycemia, infections of left 1st and 2nd toes, and bony deformity of right foot. Unfortunately, due to insurance issues, she has not been taking any short acting insulin lately. She was found to have uncontrolled BG up to 700's requiring insulin gtt.  Seen by podiatry, MRI showed LLE great toe and 2nd toe osteomyelitis. Plan for partial amputation on 5/1 per podiatry.          Assessment and Plan:      Osteomyelitis of left Great and 2nd toe in patient with several months of uncontrolled diabetes. The patient had blisters on these toes for several months since ~sept last yr but just developed pain, erythema, and eschar on 2nd toe over the last few days. MRI on 4/29 showed osteomyelitis of 1st and 2nd toe. TING showed mild arterial insufficiancy on the Left.   --Continue Unasyn and Vancomycin per ID  --appreciate Podiatry consult- plan for amputation tomorrow   --ID consult appreciated     Uncontrolled type 2 diabetes with blood glucose of 743, A1C~12: pt could not afford short acting insulin so had been off for several months. According to her she was taking the 70/30 twice a day . She was on insulin ggt on admission now switched to lantus and aspart rita operatively. Would plan to get her back on her previous home regimen prior to dc   --lantus 25 U, 3 U aspart w/ meals along with SSI   --reduce lantus to 15 U since pt will be NPO after midnight tonight for surgery    Pseudohyponatremia, due to hyperglycemia. Improved with IVF and correction  "of BG. D/c IVF     Right foot deformity by XR. Podiatry consulted-TING acceptable. Ideally needs CT to evaluate extent of injury to the R foot but pt reluctant to get CT  --will need cam boot prior to dc  --will need PT eval prior to dc      Dehydration, due to prolonged hyperglycemia. NS hydration.      Hypertension, uncontrolled. She is not sure what medicine she takes- I am not convinced that she has been taking any blood pressure medicines.   --started on Metoprolol 25 mg BID and Lisinopril 5 mg daily. I have discontinued amlodipine and can possibly maximize current anti hypertensive's      Hypercholesterolemia. Started on lipitor      Full code  Mechanical DVT prophylaxis  Estimated Disch Date / # of Days until Discharge: TBD        Interval History:      Pt has no complaints   Denies pain  Aware of MRI findings and plan for surgery       Data reviewed today: I reviewed all new labs and imaging reports over the last 24 hours. I personally reviewed the MRI image(s) showing LLE osteomyelitis.         Physical Exam:      Last Vital Signs:  /75  Pulse 69  Temp 99.3  F (37.4  C) (Oral)  Resp 16  Ht 1.715 m (5' 7.5\")  Wt 96.3 kg (212 lb 3.2 oz)  SpO2 99%  BMI 32.74 kg/m2  GEN:  Alert, oriented x 3, appears comfortable, NAD.  HEENT:  Normocephalic/atraumatic, no scleral icterus, no nasal discharge, mouth moist.  CV:  Regular rate and rhythm, no murmur or JVD.  S1 + S2 noted, no S3 or S4.  LUNGS:  Clear to auscultation bilaterally without rales/rhonchi/wheezing/retractions.  Symmetric chest rise on inhalation noted.  ABD:  Active bowel sounds, soft, non-tender/non-distended.  No rebound/guarding/rigidity.  EXT:  No edema.  No cyanosis.  No joint synovitis noted. Left foot with macerated first toe with skin disruption, erythema, and drainage. Left second toe has black discoloration on the top, is red and tender, and has some associated streaking up the foot.  SKIN:  Dry to touch, no exanthems noted in the " visualized areas.  Psych: appropriate          Medications:      All current medications were reviewed.         Data:      All new lab and imaging data was reviewed.   Data       Recent Labs  Lab 04/30/17  0613 04/28/17 2120   WBC 6.2 9.2   HGB 9.4* 10.3*   HCT 28.8* 31.7*   MCV 85 84    298       Recent Labs  Lab 04/30/17  0613 04/29/17  0846 04/28/17 2120    138 127*   POTASSIUM 4.4 3.5 4.4   CHLORIDE 109 102 89*   CO2 28 30 31   ANIONGAP 4 6 7   * 124* 743*   BUN 21 31* 33*   CR 1.05* 1.13* 1.45*   GFRESTIMATED 54* 49* 37*   GFRESTBLACK 65 60* 45*   GILL 8.2* 8.8 8.5       Recent Results (from the past 24 hour(s))   US TING Doppler No Exercise    Narrative    ULTRASOUND ANKLE-BRACHIAL INDEX DOPPLER NO EXERCISE   4/29/2017 12:29  PM     HISTORY: PAD, left first and second toe wounds.    COMPARISON: None.    FINDINGS:  Right TING 0.93  Left TING 0.83    Right posterior tibial artery waveform is biphasic. The left posterior  tibial and dorsalis pedis artery waveforms are biphasic. The right  dorsalis pedis artery was not located secondary to edema.      Impression    IMPRESSION:   1. Normal right TING without evidence of arterial insufficiency.  2. Left TING suggests mild arterial insufficiency.    DO Lor FABIAN MD  Hospitalist  [facility] [address] [place] [address]

## 2017-04-30 NOTE — PLAN OF CARE
Problem: Goal Outcome Summary  Goal: Goal Outcome Summary  Outcome: Improving  VSS. A&OX4. Lung sounds clear.  Denies pain in bilateral LE's due to numbness r/t neuropathy. Left fot partially covered with gauze. Unable to assess toes. MRI of left foot shows possible osteomyelitis. Per MD, pt will likely get second toe removed. Ambulates with assist of one. Potassium replaced per protocol. NS running at 125 ml/hr.

## 2017-04-30 NOTE — PLAN OF CARE
Problem: Goal Outcome Summary  Goal: Goal Outcome Summary  Outcome: No Change     BP elevated, but stable. Low grade temp max 99.3. 99% on RA. Pt denies pain. Numbness present in BLE. Pt is A/Ox4, up with A1 walker, with CAM boot on RT foot. Requires reminding of NWB status on Rt foot. Alarms on for safety. PT following, provided walker, encourged to use at home to asst with NWB right foot. Podiatry following- Surgery tomorrow for L 1st and 2nd toe. ID Following. NPO at 6am tomorrow for surgery around 4pm. Notified by MD that patient SHOULD get her Lantus in AM as ordered. BG elevated, 194 and 257.      1342- Notified by lab of Critical Vancomycin levels, MD notified, see Provider notification note.

## 2017-04-30 NOTE — PROGRESS NOTES
"[facility]  April 30, 2017    A/P:  L 1st and 2nd toe ulcerations, 2nd toe dry gangrene, suspicion of osteomyelitis  R midfoot fracture dislocation likely secondary to Charcot  Uncontrolled DM type II with neuropathy      Discussed condition and treatment options including pros and cons.    MRI shows osteomyelitis of left 1st and 2nd toes.  Recommended toe amputations of left 1st and 2nd toes.  This will likely be complete 2nd toe amputation and partial 1st.  Pt agreed.  Will set up for tomorrow afternoon with Dr. Fofana.    ABIs show likely adequate flow for healing.    Potential risks associated with surgery include spreading of infection to non-infected areas, continued open wound at the surgical site, recurrent ulceration at the same or different location, hematoma, osteomyelitis, repeat surgery or amputation and blood clot.  Surgery is oftentimes partly for biopsy and results may necessitate further surgical resection.  Surgery attempts to improve the structure or function of the foot but that is not always possible.     Pt refusing CT on the right foot.   She states she does not want the scan or any surgical treatment on the right due to cost.  She is NWB in a boot on this side.  Discussed risk of progressive deformity, ulceration, infection, amputation.  Pt understands.    NPO after 6a tomorrow.    ID following.    Dr. Fofana is coving our service tomorrow.    Gurjit Francis DPM, FACFAS  Pager: [phone]      S:  Pt seen at bedside.  No acute concerns.  Mild temp today.    O:  /75  Pulse 69  Temp 99.3  F (37.4  C) (Oral)  Resp 16  Ht 1.715 m (5' 7.5\")  Wt 96.3 kg (212 lb 3.2 oz)  SpO2 99%  BMI 32.74 kg/m2    General appearance: Patient is alert and fully cooperative with history & exam. No sign of distress is noted during the visit.      Dermatologic: Left hallux with ulceration at tip of toe approx 1.5cm x 1.0cm that probes to hard end point. Left 2nd toe with " dry gangrenous changes to proximal to the PIPJ.  Exposed 2nd proximal phalangeal head noted.  Mild erythema extending from base of 2nd toe.      Vascular: DP & PT pulses are absent.       Neurologic: Lower extremity sensation is absent to light touch.      Musculoskeletal: Patient is ambulatory without assistive device or brace. CAM boot on right side.       Imaging reviewed with pt:    LEFT FOOT WITHOUT CONTRAST 4/29/2017 11:41 AM      HISTORY: First and second toe wounds, evaluate for osteomyelitis.     TECHNIQUE: Sagittal, long axis, and short axis T1 and inversion  recovery pulse sequences.     FINDINGS: Contrast was not utilized due to elevated creatinine.  Ill-defined marrow edema is present within the distal phalanx of the  great toe, highly suspicious for osteomyelitis, particularly if there  is an adjacent wound or draining sinus. Ill-defined marrow edema is  also noted in the proximal phalanx of the second toe. The head of the  second toe proximal phalanx may protrude through the dorsal aspect of  the skin. The middle phalanx is not well seen but likely increased in  signal as well. There is a small nonspecific cyst or erosion in the  dorsal margin of the first metatarsal head. A physiologic amount of  fluid is noted in the metatarsophalangeal joint. There is a bipartite  medial hallux sesamoid with marrow edema in the distal pole. Mild  subchondral marrow edema is noted about the second, third, and fourth  tarsometatarsal joints. Tarsometatarsal joint alignment appears within  normal limits. Marrow signal within the remainder of the forefoot  appears within normal limits. Dorsal and deep soft tissue edema is  noted within the forefoot. No discrete soft tissue fluid collection is  demonstrated.         IMPRESSION:  1. Great toe distal phalangeal and second toe proximal phalangeal  marrow edema. This is highly suspicious for osteomyelitis,  particularly if there is an adjacent wound or draining sinus.  2.  Dorsal and deeper soft tissue edema within the forefoot. MR cannot  distinguish reactive edema from cellulitis. No forefoot soft tissue  fluid collection or abscess is identified.  3. Medial hallux sesamoiditis with marrow edema in the distal pole of  a bipartite sesamoid. This is likely degenerative. No joint effusion  is noted. Small cyst is present in the dorsal margin of the first  metatarsal head as well   4. Degenerative changes at the second through fourth tarsometatarsal  joints.     RICHARD PARRA MD          ULTRASOUND ANKLE-BRACHIAL INDEX DOPPLER NO EXERCISE 4/29/2017 12:29  PM      HISTORY: PAD, left first and second toe wounds.     COMPARISON: None.     FINDINGS:  Right TING 0.93  Left TING 0.83     Right posterior tibial artery waveform is biphasic. The left posterior  tibial and dorsalis pedis artery waveforms are biphasic. The right  dorsalis pedis artery was not located secondary to edema.         IMPRESSION:   1. Normal right TING without evidence of arterial insufficiency.  2. Left TING suggests mild arterial insufficiency.     EVE YANG DO      Lab Results   Component Value Date    WBC 6.2 04/30/2017     Lab Results   Component Value Date    RBC 3.38 04/30/2017     Lab Results   Component Value Date    HGB 9.4 04/30/2017     Lab Results   Component Value Date    HCT 28.8 04/30/2017     No components found for: MCT  Lab Results   Component Value Date    MCV 85 04/30/2017     Lab Results   Component Value Date    MCH 27.8 04/30/2017     Lab Results   Component Value Date    MCHC 32.6 04/30/2017     Lab Results   Component Value Date    RDW 13.2 04/30/2017     Lab Results   Component Value Date     04/30/2017         ESR 89.

## 2017-04-30 NOTE — PLAN OF CARE
"Problem: Goal Outcome Summary  Goal: Goal Outcome Summary  VS /75  Pulse 76  Temp 98.4  F (36.9  C) (Oral)  Resp 16  Ht 1.715 m (5' 7.5\")  Wt 96.3 kg (212 lb 3.2 oz)  SpO2 96%  BMI 32.74 kg/m2  Lung sounds Clear/diminished  O2 Room air  BG Blood sugar 161 overnight  Tolerating mod cho diet  IVF NS at 125ml/hr  Activity up with assist x1  Pain denies  Patient/family centered care Plan of care discussed with patient. CAM boot ordered for R foot. Continue to provide supportive cares.   Discharge plan TBD      "

## 2017-04-30 NOTE — PLAN OF CARE
Problem: Goal Outcome Summary  Goal: Goal Outcome Summary     PT: Patient seen by physical therapy for evaluation and treatment.  Patient with past medical history significant for type 2 diabetes, HTN, hypercholesterolemia now admitted with uncontrolled DM, infections of left 1st and 2nd toes with planned amputation on 5/1, and bony deformity of right foot.  Patient reports that she lives alone in a 2 story town house; no stairs to enter home, kitchen/bath/living room on main level, bedroom on 2nd level.  Patient reports that her sister and neighbor are able to provide intermittent assistance.  Per Podiatry, patient is NWB at right LE due midfoot fracture dislocation and a CAM boot has been ordered for right LE.  Patient has been using a non-standard SEC/walking stick; therapist provided walker for use in hospital and education on NWB at right LE.  Patient performed bed mobility independently, transferred to standing at EOB with NWB at right LE noted.  Patient amb 30 feet with 2WW, unable to maintain NWB and placing right LE on ground during gait even with repeated verbal and tactile cueing.  Patient reported significant decrease in pain with decreased weight bearing through right LE and with donning of CAM walking boot.  Patient reported that she would be able to borrow a walker from her neighbor for home use.  At this time, patient is not able to maintain NWB at right LE.  Recommend use of walker vs cane during ambulation.  Recommend DC to home with assist from sister pending patient's weight bearing status following surgery tomorrow (if patient has limited weight bearing on her left LE as well as her right LE, may need to rent a wheelchair for discharge).

## 2017-04-30 NOTE — PROVIDER NOTIFICATION
"  [phone]- MD Dr. Chung paged \"Notified by lab of critical Vanco level 32.2. Please advise, thanks.\" MD requested writer to notify pharmacy. Pharmacy notified at 1400. HOLD 1400 Vanco   "

## 2017-04-30 NOTE — PROGRESS NOTES
04/30/17 0955   Quick Adds   Type of Visit Initial PT Evaluation   Living Environment   Lives With alone   Living Arrangements condominium   Home Accessibility bed not on first floor;stairs within home   Number of Stairs to Enter Home 0   Number of Stairs Within Home 12   Stair Railings at Home inside, present on right side   Transportation Available car;family or friend will provide   Living Environment [place]; no stairs to enter townhome, kitchen/1/2 bath, living room on main floor.  Bedroom and full bath located on 2nd floor. Sister and neighbor present to provide limited support (can assist with grocery shopping, arranging townhome for convelesence, etc)   Self-Care   Usual Activity Tolerance moderate   Current Activity Tolerance fair   Regular Exercise no   Equipment Currently Used at Home cane, straight   Activity/Exercise/Self-Care Comment Patient has been limited by peripheral neuropathy and right foot pain from charcot deformity.     Functional Level Prior   Ambulation 1-->assistive equipment   Transferring 1-->assistive equipment   Toileting 0-->independent   Bathing 0-->independent   Dressing 0-->independent   Eating 0-->independent   Communication 0-->understands/communicates without difficulty   Swallowing 0-->swallows foods/liquids without difficulty   Cognition 0 - no cognition issues reported   Fall history within last six months no   Which of the above functional risks had a recent onset or change? ambulation;transferring   Prior Functional Level Comment Patient has been using SEC secondary to pain at right LE.  Patient works from home and is highly motivated to return to work.   General Information   Referring Physician Gurjit Francis DPM   Patient/Family Goals Statement return to home ASAP for work   Pertinent History of Current Problem (include personal factors and/or comorbidities that impact the POC) Patient with past medical history significant  for type 2 diabetes, HTN, hypercholesterolemia now admitted with uncontrolled DM, infections of left 1st and 2nd toes with planned amputation on 5/1, and bony deformity of right foot.     Precautions/Limitations fall precautions   Weight-Bearing Status - RLE nonweight-bearing   General Info Comments Patient very concerned about finances and returning to home to access her work computer   Cognitive Status Examination   Orientation orientation to person, place and time   Personal Safety and Judgment impaired;impulsive   Memory intact   Cognitive Comment Patient unable to maintain weight bearing at right LE seocndary peripheral neuropathy and decreased UE strength, unaware of weight bearing   Pain Assessment   Patient Currently in Pain Yes, see Vital Sign flowsheet  (reports pain at right LE )   Integumentary/Edema   Integumentary/Edema Comments edema at right ankle and foot   Posture    Posture Forward head position;Protracted shoulders   Range of Motion (ROM)   ROM Comment Limited right ankle ROM due to edema and charcot deformity, all other ROM WFL   Strength   Strength Comments Moderate strength deficits, unable to support body weight through UEs    Bed Mobility   Bed Mobility Comments independent   Transfer Skills   Transfer Comments transferred to standing at EOB with NWB at right LE noted.   Gait   Gait Comments Patient amb 30 feet with 2WW, unable to maintain NWB and placing right LE on ground during gait even with repeated verbal and tactile cueing.     Balance   Balance Comments no loss of balance noted   Sensory Examination   Sensory Perception Comments bilateral peripheral neuropathy   Coordination   Coordination no deficits were identified   Muscle Tone   Muscle Tone no deficits were identified   General Therapy Interventions   Planned Therapy Interventions bed mobility training;gait training;strengthening;transfer training;home program guidelines;progressive activity/exercise   Clinical Impression  "  Criteria for Skilled Therapeutic Intervention yes, treatment indicated   PT Diagnosis decreased independence with ambulaiton   Influenced by the following impairments peripheral neuropathy   Clinical Presentation Evolving/Changing   Clinical Presentation Rationale significant medical history, uncotrolled diabetes, deficits at bilateral LEs, complicated social history   Clinical Decision Making (Complexity) Moderate complexity   Therapy Frequency` daily   Predicted Duration of Therapy Intervention (days/wks) 7 days   Anticipated Equipment Needs at Discharge shower chair;walker;wheelchair  (may require WC if left LE has WB precautions s/p amp)   Anticipated Discharge Disposition Home;Home with Home Therapy   Risk & Benefits of therapy have been explained Yes   Patient, Family & other staff in agreement with plan of care Yes   [facility] TM \"6 Clicks\"   2016, Trustees of [facility], under license to [employer].  All rights reserved.   6 Clicks Short Forms Basic Mobility Inpatient Short Form   [facility]  \"6 Clicks\" V.2 Basic Mobility Inpatient Short Form   1. Turning from your back to your side while in a flat bed without using bedrails? 4 - None   2. Moving from lying on your back to sitting on the side of a flat bed without using bedrails? 4 - None   3. Moving to and from a bed to a chair (including a wheelchair)? 3 - A Little   4. Standing up from a chair using your arms (e.g., wheelchair, or bedside chair)? 3 - A Little   5. To walk in hospital room? 3 - A Little   6. Climbing 3-5 steps with a railing? 2 - A Lot   Basic Mobility Raw Score (Score out of 24.Lower scores equate to lower levels of function) 19   Total Evaluation Time   Total Evaluation Time (Minutes) 5     "

## 2017-05-01 ENCOUNTER — ANESTHESIA (OUTPATIENT)
Dept: SURGERY | Facility: CLINIC | Age: 57
DRG: 617 | End: 2017-05-01

## 2017-05-01 ENCOUNTER — SURGERY (OUTPATIENT)
Age: 57
End: 2017-05-01

## 2017-05-01 ENCOUNTER — ANESTHESIA EVENT (OUTPATIENT)
Dept: SURGERY | Facility: CLINIC | Age: 57
DRG: 617 | End: 2017-05-01

## 2017-05-01 ENCOUNTER — APPOINTMENT (OUTPATIENT)
Dept: GENERAL RADIOLOGY | Facility: CLINIC | Age: 57
DRG: 617 | End: 2017-05-01
Attending: PODIATRIST

## 2017-05-01 LAB
ANION GAP SERPL CALCULATED.3IONS-SCNC: 6 MMOL/L (ref 3–14)
BUN SERPL-MCNC: 16 MG/DL (ref 7–30)
CALCIUM SERPL-MCNC: 8.8 MG/DL (ref 8.5–10.1)
CHLORIDE SERPL-SCNC: 109 MMOL/L (ref 94–109)
CO2 SERPL-SCNC: 26 MMOL/L (ref 20–32)
CREAT SERPL-MCNC: 1.02 MG/DL (ref 0.52–1.04)
CRP SERPL-MCNC: 5.9 MG/L (ref 0–8)
ERYTHROCYTE [DISTWIDTH] IN BLOOD BY AUTOMATED COUNT: 13.2 % (ref 10–15)
GFR SERPL CREATININE-BSD FRML MDRD: 55 ML/MIN/1.7M2
GLUCOSE BLDC GLUCOMTR-MCNC: 135 MG/DL (ref 70–99)
GLUCOSE BLDC GLUCOMTR-MCNC: 182 MG/DL (ref 70–99)
GLUCOSE BLDC GLUCOMTR-MCNC: 204 MG/DL (ref 70–99)
GLUCOSE BLDC GLUCOMTR-MCNC: 208 MG/DL (ref 70–99)
GLUCOSE BLDC GLUCOMTR-MCNC: 209 MG/DL (ref 70–99)
GLUCOSE BLDC GLUCOMTR-MCNC: 291 MG/DL (ref 70–99)
GLUCOSE SERPL-MCNC: 236 MG/DL (ref 70–99)
HCT VFR BLD AUTO: 29.1 % (ref 35–47)
HGB BLD-MCNC: 9.2 G/DL (ref 11.7–15.7)
MCH RBC QN AUTO: 27 PG (ref 26.5–33)
MCHC RBC AUTO-ENTMCNC: 31.6 G/DL (ref 31.5–36.5)
MCV RBC AUTO: 85 FL (ref 78–100)
PLATELET # BLD AUTO: 214 10E9/L (ref 150–450)
POTASSIUM SERPL-SCNC: 4.3 MMOL/L (ref 3.4–5.3)
RBC # BLD AUTO: 3.41 10E12/L (ref 3.8–5.2)
SODIUM SERPL-SCNC: 141 MMOL/L (ref 133–144)
WBC # BLD AUTO: 6 10E9/L (ref 4–11)

## 2017-05-01 PROCEDURE — 40000306 ZZH STATISTIC PRE PROC ASSESS II: Performed by: PODIATRIST

## 2017-05-01 PROCEDURE — 25000128 H RX IP 250 OP 636: Performed by: NURSE ANESTHETIST, CERTIFIED REGISTERED

## 2017-05-01 PROCEDURE — 25000125 ZZHC RX 250: Performed by: EMERGENCY MEDICINE

## 2017-05-01 PROCEDURE — 0Y6Q0Z1 DETACHMENT AT LEFT 1ST TOE, HIGH, OPEN APPROACH: ICD-10-PCS | Performed by: PODIATRIST

## 2017-05-01 PROCEDURE — 71000027 ZZH RECOVERY PHASE 2 EACH 15 MINS: Performed by: PODIATRIST

## 2017-05-01 PROCEDURE — 88305 TISSUE EXAM BY PATHOLOGIST: CPT | Performed by: PODIATRIST

## 2017-05-01 PROCEDURE — 36000052 ZZH SURGERY LEVEL 2 EA 15 ADDTL MIN: Performed by: PODIATRIST

## 2017-05-01 PROCEDURE — S0020 INJECTION, BUPIVICAINE HYDRO: HCPCS | Performed by: PODIATRIST

## 2017-05-01 PROCEDURE — 27210794 ZZH OR GENERAL SUPPLY STERILE: Performed by: PODIATRIST

## 2017-05-01 PROCEDURE — 27210995 ZZH RX 272: Performed by: PODIATRIST

## 2017-05-01 PROCEDURE — 00000146 ZZHCL STATISTIC GLUCOSE BY METER IP

## 2017-05-01 PROCEDURE — 25000132 ZZH RX MED GY IP 250 OP 250 PS 637: Performed by: HOSPITALIST

## 2017-05-01 PROCEDURE — 88305 TISSUE EXAM BY PATHOLOGIST: CPT | Mod: 26 | Performed by: PODIATRIST

## 2017-05-01 PROCEDURE — 25000132 ZZH RX MED GY IP 250 OP 250 PS 637: Performed by: INTERNAL MEDICINE

## 2017-05-01 PROCEDURE — 40000986 XR FOOT PORT LT 3 VW: Mod: LT

## 2017-05-01 PROCEDURE — 28825 PARTIAL AMPUTATION OF TOE: CPT | Mod: TA | Performed by: PODIATRIST

## 2017-05-01 PROCEDURE — 88311 DECALCIFY TISSUE: CPT | Performed by: PODIATRIST

## 2017-05-01 PROCEDURE — 28820 AMPUTATION OF TOE: CPT | Mod: T1 | Performed by: PODIATRIST

## 2017-05-01 PROCEDURE — 25000125 ZZHC RX 250: Performed by: PODIATRIST

## 2017-05-01 PROCEDURE — 25000128 H RX IP 250 OP 636: Performed by: INTERNAL MEDICINE

## 2017-05-01 PROCEDURE — 25800025 ZZH RX 258: Performed by: NURSE ANESTHETIST, CERTIFIED REGISTERED

## 2017-05-01 PROCEDURE — 25000131 ZZH RX MED GY IP 250 OP 636 PS 637: Performed by: HOSPITALIST

## 2017-05-01 PROCEDURE — 86140 C-REACTIVE PROTEIN: CPT | Performed by: HOSPITALIST

## 2017-05-01 PROCEDURE — 12000007 ZZH R&B INTERMEDIATE

## 2017-05-01 PROCEDURE — 36415 COLL VENOUS BLD VENIPUNCTURE: CPT | Performed by: HOSPITALIST

## 2017-05-01 PROCEDURE — 36000050 ZZH SURGERY LEVEL 2 1ST 30 MIN: Performed by: PODIATRIST

## 2017-05-01 PROCEDURE — 99233 SBSQ HOSP IP/OBS HIGH 50: CPT | Performed by: HOSPITALIST

## 2017-05-01 PROCEDURE — 0Y6S0Z0 DETACHMENT AT LEFT 2ND TOE, COMPLETE, OPEN APPROACH: ICD-10-PCS | Performed by: PODIATRIST

## 2017-05-01 PROCEDURE — 88311 DECALCIFY TISSUE: CPT | Mod: 26 | Performed by: PODIATRIST

## 2017-05-01 PROCEDURE — 80048 BASIC METABOLIC PNL TOTAL CA: CPT | Performed by: HOSPITALIST

## 2017-05-01 PROCEDURE — 37000008 ZZH ANESTHESIA TECHNICAL FEE, 1ST 30 MIN: Performed by: PODIATRIST

## 2017-05-01 PROCEDURE — 25000125 ZZHC RX 250: Performed by: NURSE ANESTHETIST, CERTIFIED REGISTERED

## 2017-05-01 PROCEDURE — 85027 COMPLETE CBC AUTOMATED: CPT | Performed by: HOSPITALIST

## 2017-05-01 PROCEDURE — 25000125 ZZHC RX 250: Performed by: INTERNAL MEDICINE

## 2017-05-01 PROCEDURE — 37000009 ZZH ANESTHESIA TECHNICAL FEE, EACH ADDTL 15 MIN: Performed by: PODIATRIST

## 2017-05-01 RX ORDER — TRAMADOL HYDROCHLORIDE 50 MG/1
50-100 TABLET ORAL EVERY 6 HOURS PRN
Status: DISCONTINUED | OUTPATIENT
Start: 2017-05-01 | End: 2017-05-03 | Stop reason: HOSPADM

## 2017-05-01 RX ORDER — SODIUM CHLORIDE, SODIUM LACTATE, POTASSIUM CHLORIDE, CALCIUM CHLORIDE 600; 310; 30; 20 MG/100ML; MG/100ML; MG/100ML; MG/100ML
INJECTION, SOLUTION INTRAVENOUS CONTINUOUS
Status: CANCELLED | OUTPATIENT
Start: 2017-05-01

## 2017-05-01 RX ORDER — ONDANSETRON 2 MG/ML
4 INJECTION INTRAMUSCULAR; INTRAVENOUS EVERY 30 MIN PRN
Status: DISCONTINUED | OUTPATIENT
Start: 2017-05-01 | End: 2017-05-01 | Stop reason: HOSPADM

## 2017-05-01 RX ORDER — NALOXONE HYDROCHLORIDE 0.4 MG/ML
.1-.4 INJECTION, SOLUTION INTRAMUSCULAR; INTRAVENOUS; SUBCUTANEOUS
Status: DISCONTINUED | OUTPATIENT
Start: 2017-05-01 | End: 2017-05-03 | Stop reason: HOSPADM

## 2017-05-01 RX ORDER — GLIPIZIDE 10 MG/1
10 TABLET ORAL
Status: DISCONTINUED | OUTPATIENT
Start: 2017-05-02 | End: 2017-05-02

## 2017-05-01 RX ORDER — LIDOCAINE 40 MG/G
CREAM TOPICAL
Status: CANCELLED | OUTPATIENT
Start: 2017-05-01

## 2017-05-01 RX ORDER — PROPOFOL 10 MG/ML
INJECTION, EMULSION INTRAVENOUS CONTINUOUS PRN
Status: DISCONTINUED | OUTPATIENT
Start: 2017-05-01 | End: 2017-05-01

## 2017-05-01 RX ORDER — ACETAMINOPHEN 325 MG/1
975 TABLET ORAL EVERY 8 HOURS
Status: DISCONTINUED | OUTPATIENT
Start: 2017-05-01 | End: 2017-05-03 | Stop reason: HOSPADM

## 2017-05-01 RX ORDER — CEFAZOLIN SODIUM 2 G/100ML
2 INJECTION, SOLUTION INTRAVENOUS
Status: DISCONTINUED | OUTPATIENT
Start: 2017-05-01 | End: 2017-05-01 | Stop reason: CLARIF

## 2017-05-01 RX ORDER — DEXTROSE MONOHYDRATE, SODIUM CHLORIDE, AND POTASSIUM CHLORIDE 50; 1.49; 4.5 G/1000ML; G/1000ML; G/1000ML
INJECTION, SOLUTION INTRAVENOUS CONTINUOUS
Status: DISCONTINUED | OUTPATIENT
Start: 2017-05-01 | End: 2017-05-02

## 2017-05-01 RX ORDER — SODIUM CHLORIDE, SODIUM LACTATE, POTASSIUM CHLORIDE, CALCIUM CHLORIDE 600; 310; 30; 20 MG/100ML; MG/100ML; MG/100ML; MG/100ML
INJECTION, SOLUTION INTRAVENOUS CONTINUOUS PRN
Status: DISCONTINUED | OUTPATIENT
Start: 2017-05-01 | End: 2017-05-01

## 2017-05-01 RX ORDER — ACETAMINOPHEN 325 MG/1
650 TABLET ORAL EVERY 4 HOURS PRN
Status: DISCONTINUED | OUTPATIENT
Start: 2017-05-04 | End: 2017-05-03 | Stop reason: HOSPADM

## 2017-05-01 RX ORDER — MAGNESIUM HYDROXIDE 1200 MG/15ML
LIQUID ORAL PRN
Status: DISCONTINUED | OUTPATIENT
Start: 2017-05-01 | End: 2017-05-01 | Stop reason: HOSPADM

## 2017-05-01 RX ORDER — HYDROMORPHONE HYDROCHLORIDE 1 MG/ML
.3-.5 INJECTION, SOLUTION INTRAMUSCULAR; INTRAVENOUS; SUBCUTANEOUS EVERY 5 MIN PRN
Status: DISCONTINUED | OUTPATIENT
Start: 2017-05-01 | End: 2017-05-01 | Stop reason: HOSPADM

## 2017-05-01 RX ORDER — FENTANYL CITRATE 50 UG/ML
25-50 INJECTION, SOLUTION INTRAMUSCULAR; INTRAVENOUS
Status: DISCONTINUED | OUTPATIENT
Start: 2017-05-01 | End: 2017-05-01 | Stop reason: HOSPADM

## 2017-05-01 RX ORDER — LIDOCAINE 40 MG/G
CREAM TOPICAL
Status: DISCONTINUED | OUTPATIENT
Start: 2017-05-01 | End: 2017-05-03 | Stop reason: HOSPADM

## 2017-05-01 RX ORDER — HYDRALAZINE HYDROCHLORIDE 20 MG/ML
10 INJECTION INTRAMUSCULAR; INTRAVENOUS EVERY 4 HOURS PRN
Status: DISCONTINUED | OUTPATIENT
Start: 2017-05-01 | End: 2017-05-03 | Stop reason: HOSPADM

## 2017-05-01 RX ORDER — SODIUM CHLORIDE, SODIUM LACTATE, POTASSIUM CHLORIDE, CALCIUM CHLORIDE 600; 310; 30; 20 MG/100ML; MG/100ML; MG/100ML; MG/100ML
INJECTION, SOLUTION INTRAVENOUS CONTINUOUS
Status: DISCONTINUED | OUTPATIENT
Start: 2017-05-01 | End: 2017-05-01 | Stop reason: HOSPADM

## 2017-05-01 RX ORDER — BUPIVACAINE HYDROCHLORIDE 5 MG/ML
INJECTION, SOLUTION EPIDURAL; INTRACAUDAL PRN
Status: DISCONTINUED | OUTPATIENT
Start: 2017-05-01 | End: 2017-05-01 | Stop reason: HOSPADM

## 2017-05-01 RX ORDER — LISINOPRIL 10 MG/1
10 TABLET ORAL DAILY
Status: DISCONTINUED | OUTPATIENT
Start: 2017-05-02 | End: 2017-05-02

## 2017-05-01 RX ORDER — ONDANSETRON 4 MG/1
4 TABLET, ORALLY DISINTEGRATING ORAL EVERY 30 MIN PRN
Status: DISCONTINUED | OUTPATIENT
Start: 2017-05-01 | End: 2017-05-01 | Stop reason: HOSPADM

## 2017-05-01 RX ORDER — FENTANYL CITRATE 50 UG/ML
INJECTION, SOLUTION INTRAMUSCULAR; INTRAVENOUS PRN
Status: DISCONTINUED | OUTPATIENT
Start: 2017-05-01 | End: 2017-05-01

## 2017-05-01 RX ADMIN — ONDANSETRON 4 MG: 2 INJECTION INTRAMUSCULAR; INTRAVENOUS at 16:15

## 2017-05-01 RX ADMIN — Medication 1250 MG: at 22:31

## 2017-05-01 RX ADMIN — LISINOPRIL 5 MG: 5 TABLET ORAL at 08:29

## 2017-05-01 RX ADMIN — METOPROLOL TARTRATE 25 MG: 25 TABLET ORAL at 22:23

## 2017-05-01 RX ADMIN — SODIUM CHLORIDE 200 ML: 900 IRRIGANT IRRIGATION at 16:13

## 2017-05-01 RX ADMIN — AMPICILLIN SODIUM AND SULBACTAM SODIUM 3 G: 2; 1 INJECTION, POWDER, FOR SOLUTION INTRAMUSCULAR; INTRAVENOUS at 01:29

## 2017-05-01 RX ADMIN — METOPROLOL TARTRATE 25 MG: 25 TABLET ORAL at 08:29

## 2017-05-01 RX ADMIN — AMPICILLIN SODIUM AND SULBACTAM SODIUM 3 G: 2; 1 INJECTION, POWDER, FOR SOLUTION INTRAMUSCULAR; INTRAVENOUS at 19:26

## 2017-05-01 RX ADMIN — PROPOFOL 25 MCG/KG/MIN: 10 INJECTION, EMULSION INTRAVENOUS at 15:46

## 2017-05-01 RX ADMIN — AMPICILLIN SODIUM AND SULBACTAM SODIUM 3 G: 2; 1 INJECTION, POWDER, FOR SOLUTION INTRAMUSCULAR; INTRAVENOUS at 08:29

## 2017-05-01 RX ADMIN — Medication 1250 MG: at 11:52

## 2017-05-01 RX ADMIN — INSULIN GLARGINE 15 UNITS: 100 INJECTION, SOLUTION SUBCUTANEOUS at 08:29

## 2017-05-01 RX ADMIN — ATORVASTATIN CALCIUM 40 MG: 40 TABLET, FILM COATED ORAL at 19:26

## 2017-05-01 RX ADMIN — FENTANYL CITRATE 100 MCG: 50 INJECTION, SOLUTION INTRAMUSCULAR; INTRAVENOUS at 15:46

## 2017-05-01 RX ADMIN — BUPIVACAINE HYDROCHLORIDE 20 ML: 5 INJECTION, SOLUTION EPIDURAL; INTRACAUDAL at 15:53

## 2017-05-01 RX ADMIN — SODIUM CHLORIDE, POTASSIUM CHLORIDE, SODIUM LACTATE AND CALCIUM CHLORIDE: 600; 310; 30; 20 INJECTION, SOLUTION INTRAVENOUS at 15:40

## 2017-05-01 RX ADMIN — MIDAZOLAM HYDROCHLORIDE 2 MG: 1 INJECTION, SOLUTION INTRAMUSCULAR; INTRAVENOUS at 15:40

## 2017-05-01 ASSESSMENT — ENCOUNTER SYMPTOMS
DYSRHYTHMIAS: 0
SEIZURES: 0

## 2017-05-01 ASSESSMENT — LIFESTYLE VARIABLES: TOBACCO_USE: 1

## 2017-05-01 NOTE — PROGRESS NOTES
[facility]  Hospitalist Progress Note  Patient Name: Roxy Beaulieu    MRN: [id number]  Provider:  Lor Chung MD  Date:05/01/2017      Initial presenting complaint/issue to hospital (Diagnosis): LE pain     Summary of Stay: Patient is a 56 year old female with history of type 2 diabetes, hypertension, and hypercholesterolemia who presented to the ED on 4/28/17 for evaluation of left 1st and 2nd toe pain and right ankle pain. ED work up showed severe hyperglycemia BG~700,chronic infection of left 1st and 2nd toes, and bony deformity of right foot. Unfortunately, due to insurance issues, she has not been taking any short acting insulin lately. She was found to have uncontrolled BG up to 700's requiring insulin gtt.  Seen by podiatry, MRI showed LLE great toe and 2nd toe osteomyelitis. Plan for partial amputation on 5/1 per podiatry. Would need adjustment of insulin post op and PT eval prior to dc          Assessment and Plan:      Osteomyelitis of left Great and 2nd toe in patient with several months of uncontrolled diabetes. The patient had blisters on these toes for several months since ~sept last yr but just developed pain, erythema, and eschar on 2nd toe over the last few days. MRI on 4/29 showed osteomyelitis of 1st and 2nd toe. TING showed mild arterial insufficiancy on the Left and normal RLE arterial flow.   --Continue Unasyn and Vancomycin per ID  --appreciate Podiatry consult- plan for amputation today  --ID consult appreciated     Uncontrolled type 2 diabetes with blood glucose of 743, A1C~12: pt could not afford short acting insulin so had been off for several months. According to her she was taking the 70/30 twice a day . She was on insulin ggt on admission now switched to lantus and aspart rita operatively. Would plan to get her back on her previous home regimen prior to dc   --lantus 15 U while she is NPO  --start 70/30 @30 U BID from tonight along with meal time aspart and titrate  "as needed (70/30 is more affordable to pt)  --start glucotrol 10 mg BID tomorrow  --consider metformin on dc     Pseudohyponatremia, due to hyperglycemia. Improved with IVF and correction of BG. D/c IVF     Right foot charcot deformity by XR. Podiatry consulted-TING acceptable. Ideally needs CT to evaluate extent of injury to the R foot but pt refusing to get CT  --will need cam boot prior to dc  --will need PT eval prior to dc      Dehydration, due to prolonged hyperglycemia. NS hydration.      Hypertension, uncontrolled. She is not sure what medicine she takes- I am not convinced that she has been taking any blood pressure medicines.   --started on Metoprolol 25 mg BID and Lisinopril 5 mg daily. I have discontinued amlodipine and can possibly maximize current anti hypertensive's if needed      Hypercholesterolemia. Started on lipitor      Full code  Mechanical DVT prophylaxis  Estimated Disch Date / # of Days until Discharge: TBD (pt adamant about leaving on Wednesday 5/3)        Interval History:      Pt has no complaints   No pain  Reiterating that she needs to leave be Wednesday and she will not stay any longer than that   No SOB or chest pain        Data reviewed today: I reviewed all new labs and imaging reports over the last 24 hours. I personally reviewed the MRI image(s) showing LLE osteomyelitis.         Physical Exam:      Last Vital Signs:  /66 (BP Location: Left arm)  Pulse 80  Temp 98.7  F (37.1  C) (Oral)  Resp 18  Ht 1.715 m (5' 7.5\")  Wt 96.3 kg (212 lb 3.2 oz)  SpO2 98%  BMI 32.74 kg/m2  GEN:  Alert, oriented x 3, appears comfortable, NAD.  HEENT:  Normocephalic/atraumatic, no scleral icterus, no nasal discharge, mouth moist.  CV:  Regular rate and rhythm, no murmur or JVD.  S1 + S2 noted, no S3 or S4.  LUNGS:  Clear to auscultation bilaterally without rales/rhonchi/wheezing/retractions.  Symmetric chest rise on inhalation noted.  ABD:  Active bowel sounds, soft, " non-tender/non-distended.  No rebound/guarding/rigidity.  EXT:  No edema.  No cyanosis.  No joint synovitis noted. Left foot with macerated first toe with skin disruption, erythema, and drainage. Left second toe has black discoloration on the top, is red and tender, and has some associated streaking up the foot. Now covered with ace wrap and gauze  SKIN:  Dry to touch, no exanthems noted in the visualized areas.  Psych: appropriate          Medications:      All current medications were reviewed.         Data:      All new lab and imaging data was reviewed.   Data       Recent Labs  Lab 05/01/17  0622 04/30/17  0613 04/28/17  2120   WBC 6.0 6.2 9.2   HGB 9.2* 9.4* 10.3*   HCT 29.1* 28.8* 31.7*   MCV 85 85 84    227 298       Recent Labs  Lab 05/01/17 0622 04/30/17  0613 04/29/17  0846    141 138   POTASSIUM 4.3 4.4 3.5   CHLORIDE 109 109 102   CO2 26 28 30   ANIONGAP 6 4 6   * 210* 124*   BUN 16 21 31*   CR 1.02 1.05* 1.13*   GFRESTIMATED 55* 54* 49*   GFRESTBLACK 67 65 60*   GILL 8.8 8.2* 8.8       No results found for this or any previous visit (from the past 24 hour(s)).    Lor Chung MD  Hospitalist  [facility] [address] [place] [address]

## 2017-05-01 NOTE — ANESTHESIA PREPROCEDURE EVALUATION
Anesthesia Evaluation     .             ROS/MED HX    ENT/Pulmonary:     (+)tobacco use, , . .   (-) asthma, sleep apnea and Other pulmonary disease   Neurologic:      (-) seizures, TIA, Other neuro hx and Dementia   Cardiovascular:     (+) Dyslipidemia, hypertension----. : . . . :. .      (-) CAD, CHF, arrhythmias and pulmonary hypertension   METS/Exercise Tolerance:     Hematologic:        (-) anemia   Musculoskeletal:   (+) arthritis, , , other musculoskeletal- Gangrene of Toes      GI/Hepatic:     (+) GERD      (-) hiatal hernia and hepatitis   Renal/Genitourinary:     (+) chronic renal disease,       Endo:     (+) type II DM (Poor Control) Using insulin .      Psychiatric:        (-) psychiatric history   Infectious Disease:         Malignancy:         Other:                     Physical Exam      Airway   Mallampati: II  TM distance: >3 FB  Neck ROM: full    Dental     Cardiovascular   Rhythm and rate: regular and normal  (-) no murmur    Pulmonary    breath sounds clear to auscultation    Other findings: Lab Test        05/01/17 04/30/17 04/28/17                       0622          0613          2120          WBC          6.0          6.2          9.2           HGB          9.2*         9.4*         10.3*         MCV          85           85           84            PLT          214          227          298            Lab Test        05/01/17 04/30/17 04/29/17                       0622          0613          0846          NA           141          141          138           POTASSIUM    4.3          4.4          3.5           CHLORIDE     109          109          102           CO2          26           28           30            BUN          16           21           31*           CR           1.02         1.05*        1.13*         ANIONGAP     6            4            6             GILL          8.8          8.2*         8.8           GLC          236*         210*         124*                         Anesthesia Plan      History & Physical Review  History and physical reviewed and following examination; no interval change.    ASA Status:  3 .    NPO Status:  > 8 hours    Plan for MAC Reason for MAC:  Deep or markedly invasive procedure (G8)  PONV prophylaxis:  Ondansetron (or other 5HT-3)       Postoperative Care  Postoperative pain management:  IV analgesics and Oral pain medications.      Consents  Anesthetic plan, risks, benefits and alternatives discussed with:  Patient..                          .

## 2017-05-01 NOTE — ANESTHESIA POSTPROCEDURE EVALUATION
Patient: Roxy Beaulieu    Procedure(s):  AMPUTATE FIRST AND SECOND LEFT TOES  - Wound Class: III-Contaminated    Diagnosis:Unknown  Diagnosis Additional Information: Ischemic ulcers left foot and diabetic with neuropathy/osteomyelitis    Anesthesia Type:  MAC    Note:  Anesthesia Post Evaluation    Patient location during evaluation: PACU  Patient participation: Able to fully participate in evaluation  Level of consciousness: awake  Pain management: adequate  Airway patency: patent  Cardiovascular status: acceptable  Respiratory status: acceptable  Hydration status: acceptable  PONV: controlled     Anesthetic complications: None          Last vitals:  Vitals:    05/01/17 1740 05/01/17 1758 05/01/17 1810   BP: 181/85 154/71 164/83   Pulse:      Resp: 16 16 16   Temp:  98.9  F (37.2  C)    SpO2: 98%           Electronically Signed By: Tyrell العراقي MD  May 1, 2017  6:48 PM

## 2017-05-01 NOTE — PLAN OF CARE
"Problem: Goal Outcome Summary  Goal: Goal Outcome Summary  VS /67 (BP Location: Left arm)  Pulse 74  Temp 99.1  F (37.3  C) (Oral)  Resp 18  Ht 1.715 m (5' 7.5\")  Wt 96.3 kg (212 lb 3.2 oz)  SpO2 99%  BMI 32.74 kg/m2  Lung sounds Clear   O2 Room air  NPO for surgery today 5/1  IV SL, IV abx unasyn and vanco  Activity up with SBA. Pt refuses bed alarm, calls.  Pain Denies  Patient/family centered car Plan of care discussed with patient. Plan to have amputation of 2nd toe, and partial of great toe today 5/1.   Discharge plan TBD      "

## 2017-05-01 NOTE — PLAN OF CARE
Problem: Goal Outcome Summary  Goal: Goal Outcome Summary     PT- Pt is scheduled for surgery later today, she doesn't feel a need for PT this morning.  Will re-schedule for tomorrow morning after surgery when JORDAN JULIO'ing will be known.

## 2017-05-01 NOTE — PROGRESS NOTES
CROSS COVER    Blood sugar remains high at 250  Similar reading at 6 pm and got 9 units  So will give 10 units novolog to add to the sliding scale of 3 units

## 2017-05-01 NOTE — ANESTHESIA CARE TRANSFER NOTE
Patient: Roxy Beaulieu    Procedure(s):  AMPUTATE FIRST AND SECOND LEFT TOES  - Wound Class: III-Contaminated    Diagnosis: Unknown  Diagnosis Additional Information: No value filed.    Anesthesia Type:   MAC     Note:  Airway :Room Air  Patient transferred to:PACU  Comments: Pt awake vss exchanging well report to rn      Vitals: (Last set prior to Anesthesia Care Transfer)    CRNA VITALS  5/1/2017 1600 - 5/1/2017 1630      5/1/2017             Pulse: 76    SpO2: 97 %                Electronically Signed By: RIK Callaway CRNA  May 1, 2017  4:30 PM

## 2017-05-01 NOTE — PROGRESS NOTES
"[facility]  Infectious Disease Progress Note          Assessment and Plan:   IMP 1 55 yo female L foot diabetic infection, gangrene and osteo, 1st and 2nd toes  2 poorly controlled DM  3 Likely PAD    REC await surgical findings and cx, vanco and unasyn for now        Interval History:   no new complaints T down , reviewed all, in OR.              Medications:       [Auto Hold] insulin isophane & regular  20 Units Subcutaneous BID     [Auto Hold] insulin aspart  5 Units Subcutaneous TID w/meals     [Auto Hold] atorvastatin  40 mg Oral Daily at 8 pm     [Auto Hold] vancomycin (VANCOCIN) IV  1,250 mg Intravenous Q12H     [Auto Hold] heparin  5,000 Units Subcutaneous Q12H     [Auto Hold] insulin aspart  10 Units Subcutaneous Once     [Auto Hold] metoprolol  25 mg Oral BID     [Auto Hold] lisinopril  5 mg Oral Daily     [Auto Hold] insulin aspart  1-10 Units Subcutaneous TID AC     [Auto Hold] insulin aspart  1-7 Units Subcutaneous At Bedtime     [Auto Hold] ampicillin-sulbactam (UNASYN) IV  3 g Intravenous Q6H                  Physical Exam:   Blood pressure 154/66, pulse 80, temperature 98.7  F (37.1  C), temperature source Oral, resp. rate 18, height 1.715 m (5' 7.5\"), weight 96.3 kg (212 lb 3.2 oz), SpO2 98 %.  Wt Readings from Last 2 Encounters:   04/29/17 96.3 kg (212 lb 3.2 oz)   09/25/16 93 kg (205 lb)     Vital Signs with Ranges  Temp:  [98.7  F (37.1  C)-99.9  F (37.7  C)] 98.7  F (37.1  C)  Pulse:  [74-85] 80  Resp:  [18] 18  BP: (127-158)/(64-73) 154/66  SpO2:  [98 %-99 %] 98 %    Constitutional: Awake, alert, cooperative, no apparent distress   Lungs: Clear to auscultation bilaterally, no crackles or wheezing   Cardiovascular: Regular rate and rhythm, normal S1 and S2, and no murmur noted   Abdomen: Normal bowel sounds, soft, non-distended, non-tender   Skin: No rashes, no cyanosis, no edema   Other:           Data:   All microbiology laboratory data reviewed.  Recent Labs   Lab Test "  05/01/17   0622  04/30/17   0613  04/28/17 2120   WBC  6.0  6.2  9.2   HGB  9.2*  9.4*  10.3*   HCT  29.1*  28.8*  31.7*   MCV  85  85  84   PLT  214  227  298     Recent Labs   Lab Test  05/01/17   0622  04/30/17   0613  04/29/17   0846   CR  1.02  1.05*  1.13*     Recent Labs   Lab Test  04/30/17   0613   SED  89*     Recent Labs   Lab Test  04/28/17 2120   CULT  No growth after 2 days

## 2017-05-01 NOTE — PLAN OF CARE
"Problem: Goal Outcome Summary  Goal: Goal Outcome Summary  Outcome: Improving  A&OX4. BP's elevated in the systolic in 150-160's. Temp slightly elevated at 99.9. Reminded to take deep breathes. Denies pain in bilateral LE's. Has numbness r/t neuropathy. CAM boot to right leg. Ambulates with standby assist. Refuses bed alarm. Calls appropriately. Anticipates surgery tomorrow.     2230: MD paged \"Please see blood sugar trends.\"  --MD ordered additional 10 units Novolog. Patient refused. MD notified.  "

## 2017-05-01 NOTE — BRIEF OP NOTE
[facility] Brief Operative Note    Pre-operative diagnosis: Ischemic ulcers left foot and diabetic with neuropathy/osteomyelitis   Post-operative diagnosis same   Procedure: Procedure(s):  AMPUTATE FIRST AND SECOND LEFT TOES  - Wound Class: III-Contaminated   Surgeon(s): Surgeon(s) and Role:     * Verna Fofana DPM, Podiatry/Foot and Ankle Surgery - Primary  First Assist: Kenya Carrera DPM   Estimated blood loss: 5 mL    Specimens:   ID Type Source Tests Collected by Time Destination   A : LEFT GREAT TOE Tissue Toe SURGICAL PATHOLOGY EXAM Verna Fofana DPM, Podiatry/Foot and Ankle Surgery 5/1/2017  4:05 PM    B : LEFT SECOND TOE Tissue Toe SURGICAL PATHOLOGY EXAM Verna Fofana DPM, Podiatry/Foot and Ankle Surgery 5/1/2017  4:05 PM       Findings: See op note

## 2017-05-01 NOTE — ANESTHESIA CARE TRANSFER NOTE
Patient: Roxy Beaulieu    Procedure(s):  AMPUTATE FIRST AND SECOND LEFT TOES  - Wound Class: III-Contaminated    Diagnosis: Unknown  Diagnosis Additional Information: No value filed.    Anesthesia Type:   MAC     Note:  Airway :Room Air  Patient transferred to:PACU  Comments: Pt awake vss exchanging well report to rn      Vitals: (Last set prior to Anesthesia Care Transfer)    CRNA VITALS  5/1/2017 1600 - 5/1/2017 1700      5/1/2017             Pulse: 76    SpO2: 97 %                Electronically Signed By: RIK Callaway CRNA  May 1, 2017  6:22 PM

## 2017-05-01 NOTE — PLAN OF CARE
"Problem: Goal Outcome Summary  Goal: Goal Outcome Summary  Outcome: No Change  [place]: A & O x 4.  VSS.  98% rm air. Denies respiratory symptoms.  Lung sounds clear. Denies pain.  Wearing CAM boot to right foot.  Pt to be NWB to right. Not following recommendations.  States, \"I am just putting a very small amount of weight on foot.\"  Scabbed wounds to 1st and second toe to left foot.  Black in color.  Denies pain, reports numbness to LE bilaterally. Transfers with SBA and walker.   PIV SL.  Pt receiving IV vanco, ancef.  Pt taking to surgery for amputattion of second toe, partial amputation of great toe.              "

## 2017-05-02 ENCOUNTER — APPOINTMENT (OUTPATIENT)
Dept: PHYSICAL THERAPY | Facility: CLINIC | Age: 57
DRG: 617 | End: 2017-05-02
Attending: PODIATRIST

## 2017-05-02 LAB
ANION GAP SERPL CALCULATED.3IONS-SCNC: 6 MMOL/L (ref 3–14)
BUN SERPL-MCNC: 13 MG/DL (ref 7–30)
CALCIUM SERPL-MCNC: 8.3 MG/DL (ref 8.5–10.1)
CHLORIDE SERPL-SCNC: 111 MMOL/L (ref 94–109)
CO2 SERPL-SCNC: 26 MMOL/L (ref 20–32)
CREAT SERPL-MCNC: 0.97 MG/DL (ref 0.52–1.04)
ERYTHROCYTE [DISTWIDTH] IN BLOOD BY AUTOMATED COUNT: 13.2 % (ref 10–15)
GFR SERPL CREATININE-BSD FRML MDRD: 59 ML/MIN/1.7M2
GLUCOSE BLDC GLUCOMTR-MCNC: 125 MG/DL (ref 70–99)
GLUCOSE BLDC GLUCOMTR-MCNC: 141 MG/DL (ref 70–99)
GLUCOSE BLDC GLUCOMTR-MCNC: 154 MG/DL (ref 70–99)
GLUCOSE BLDC GLUCOMTR-MCNC: 159 MG/DL (ref 70–99)
GLUCOSE BLDC GLUCOMTR-MCNC: 171 MG/DL (ref 70–99)
GLUCOSE SERPL-MCNC: 192 MG/DL (ref 70–99)
HCT VFR BLD AUTO: 29.3 % (ref 35–47)
HGB BLD-MCNC: 9.1 G/DL (ref 11.7–15.7)
MAGNESIUM SERPL-MCNC: 1.7 MG/DL (ref 1.6–2.3)
MCH RBC QN AUTO: 26.9 PG (ref 26.5–33)
MCHC RBC AUTO-ENTMCNC: 31.1 G/DL (ref 31.5–36.5)
MCV RBC AUTO: 87 FL (ref 78–100)
PLATELET # BLD AUTO: 194 10E9/L (ref 150–450)
POTASSIUM SERPL-SCNC: 4.3 MMOL/L (ref 3.4–5.3)
RBC # BLD AUTO: 3.38 10E12/L (ref 3.8–5.2)
SODIUM SERPL-SCNC: 143 MMOL/L (ref 133–144)
VANCOMYCIN SERPL-MCNC: 26.6 MG/L
WBC # BLD AUTO: 5.8 10E9/L (ref 4–11)

## 2017-05-02 PROCEDURE — 25000132 ZZH RX MED GY IP 250 OP 250 PS 637: Performed by: HOSPITALIST

## 2017-05-02 PROCEDURE — 40000193 ZZH STATISTIC PT WARD VISIT

## 2017-05-02 PROCEDURE — 97530 THERAPEUTIC ACTIVITIES: CPT | Mod: GP

## 2017-05-02 PROCEDURE — 36415 COLL VENOUS BLD VENIPUNCTURE: CPT | Performed by: HOSPITALIST

## 2017-05-02 PROCEDURE — 97164 PT RE-EVAL EST PLAN CARE: CPT | Mod: GP

## 2017-05-02 PROCEDURE — 25000125 ZZHC RX 250: Performed by: INTERNAL MEDICINE

## 2017-05-02 PROCEDURE — 25000132 ZZH RX MED GY IP 250 OP 250 PS 637: Performed by: PODIATRIST

## 2017-05-02 PROCEDURE — L3260 AMBULATORY SURGICAL BOOT EAC: HCPCS

## 2017-05-02 PROCEDURE — 85027 COMPLETE CBC AUTOMATED: CPT | Performed by: HOSPITALIST

## 2017-05-02 PROCEDURE — 25000131 ZZH RX MED GY IP 250 OP 636 PS 637: Performed by: HOSPITALIST

## 2017-05-02 PROCEDURE — 00000146 ZZHCL STATISTIC GLUCOSE BY METER IP

## 2017-05-02 PROCEDURE — 80202 ASSAY OF VANCOMYCIN: CPT | Performed by: HOSPITALIST

## 2017-05-02 PROCEDURE — 25000125 ZZHC RX 250: Performed by: EMERGENCY MEDICINE

## 2017-05-02 PROCEDURE — 25000128 H RX IP 250 OP 636: Performed by: EMERGENCY MEDICINE

## 2017-05-02 PROCEDURE — 99233 SBSQ HOSP IP/OBS HIGH 50: CPT | Performed by: HOSPITALIST

## 2017-05-02 PROCEDURE — 12000007 ZZH R&B INTERMEDIATE

## 2017-05-02 PROCEDURE — 83735 ASSAY OF MAGNESIUM: CPT | Performed by: HOSPITALIST

## 2017-05-02 PROCEDURE — 80048 BASIC METABOLIC PNL TOTAL CA: CPT | Performed by: HOSPITALIST

## 2017-05-02 PROCEDURE — 25000128 H RX IP 250 OP 636: Performed by: HOSPITALIST

## 2017-05-02 PROCEDURE — 97116 GAIT TRAINING THERAPY: CPT | Mod: GP

## 2017-05-02 RX ORDER — LISINOPRIL 20 MG/1
20 TABLET ORAL DAILY
Status: DISCONTINUED | OUTPATIENT
Start: 2017-05-03 | End: 2017-05-03

## 2017-05-02 RX ORDER — GLIPIZIDE 10 MG/1
10 TABLET ORAL
Status: DISCONTINUED | OUTPATIENT
Start: 2017-05-02 | End: 2017-05-03 | Stop reason: HOSPADM

## 2017-05-02 RX ADMIN — AMPICILLIN SODIUM AND SULBACTAM SODIUM 3 G: 2; 1 INJECTION, POWDER, FOR SOLUTION INTRAMUSCULAR; INTRAVENOUS at 13:53

## 2017-05-02 RX ADMIN — INSULIN ASPART 1 UNITS: 100 INJECTION, SOLUTION INTRAVENOUS; SUBCUTANEOUS at 13:08

## 2017-05-02 RX ADMIN — SODIUM CHLORIDE, PRESERVATIVE FREE 1000 ML: 5 INJECTION INTRAVENOUS at 05:42

## 2017-05-02 RX ADMIN — Medication 1250 MG: at 11:50

## 2017-05-02 RX ADMIN — INSULIN ASPART 2 UNITS: 100 INJECTION, SOLUTION INTRAVENOUS; SUBCUTANEOUS at 07:37

## 2017-05-02 RX ADMIN — INSULIN HUMAN 30 UNITS: 100 INJECTION, SUSPENSION SUBCUTANEOUS at 08:50

## 2017-05-02 RX ADMIN — METOPROLOL TARTRATE 25 MG: 25 TABLET ORAL at 21:49

## 2017-05-02 RX ADMIN — AMPICILLIN SODIUM AND SULBACTAM SODIUM 3 G: 2; 1 INJECTION, POWDER, FOR SOLUTION INTRAMUSCULAR; INTRAVENOUS at 07:50

## 2017-05-02 RX ADMIN — GLIPIZIDE 10 MG: 10 TABLET ORAL at 07:49

## 2017-05-02 RX ADMIN — AMPICILLIN SODIUM AND SULBACTAM SODIUM 3 G: 2; 1 INJECTION, POWDER, FOR SOLUTION INTRAMUSCULAR; INTRAVENOUS at 19:35

## 2017-05-02 RX ADMIN — METOPROLOL TARTRATE 25 MG: 25 TABLET ORAL at 07:53

## 2017-05-02 RX ADMIN — HEPARIN SODIUM 5000 UNITS: 10000 INJECTION, SOLUTION INTRAVENOUS; SUBCUTANEOUS at 19:35

## 2017-05-02 RX ADMIN — LISINOPRIL 10 MG: 10 TABLET ORAL at 07:53

## 2017-05-02 RX ADMIN — ATORVASTATIN CALCIUM 40 MG: 40 TABLET, FILM COATED ORAL at 19:35

## 2017-05-02 RX ADMIN — ACETAMINOPHEN 975 MG: 325 TABLET, FILM COATED ORAL at 09:47

## 2017-05-02 RX ADMIN — INSULIN HUMAN 30 UNITS: 100 INJECTION, SUSPENSION SUBCUTANEOUS at 17:31

## 2017-05-02 RX ADMIN — AMPICILLIN SODIUM AND SULBACTAM SODIUM 3 G: 2; 1 INJECTION, POWDER, FOR SOLUTION INTRAMUSCULAR; INTRAVENOUS at 01:57

## 2017-05-02 RX ADMIN — HEPARIN SODIUM 5000 UNITS: 10000 INJECTION, SOLUTION INTRAVENOUS; SUBCUTANEOUS at 07:49

## 2017-05-02 NOTE — PROGRESS NOTES
"[facility]  Infectious Disease Progress Note          Assessment and Plan:   IMP 1 57 yo female L foot diabetic infection, gangrene and osteo, 1st and 2nd toes, Sp amp, no prox infection, no systemic toxicity  2 poorly controlled DM  3 Likely PAD    REC await full surgical findings and bx, vanco level high and likely not needed, Dc it, unasyn for now but Ok po augmentin 875 bid 10 days and home when ok podiatry/medically        Interval History:   no new complaints T down ,OR noted, bx pending               Medications:       glipiZIDE  10 mg Oral BID AC     [START ON 5/3/2017] lisinopril  20 mg Oral Daily     sodium chloride (PF)  3 mL Intracatheter Q8H     acetaminophen  975 mg Oral Q8H     insulin isophane & regular  30 Units Subcutaneous BID w/meals     atorvastatin  40 mg Oral Daily at 8 pm     heparin  5,000 Units Subcutaneous Q12H     metoprolol  25 mg Oral BID     insulin aspart  1-10 Units Subcutaneous TID AC     insulin aspart  1-7 Units Subcutaneous At Bedtime     ampicillin-sulbactam (UNASYN) IV  3 g Intravenous Q6H                  Physical Exam:   Blood pressure 180/78, pulse 80, temperature 98  F (36.7  C), temperature source Oral, resp. rate 18, height 1.715 m (5' 7.5\"), weight 96.3 kg (212 lb 3.2 oz), SpO2 96 %.  Wt Readings from Last 2 Encounters:   04/29/17 96.3 kg (212 lb 3.2 oz)   09/25/16 93 kg (205 lb)     Vital Signs with Ranges  Temp:  [96.7  F (35.9  C)-98.9  F (37.2  C)] 98  F (36.7  C)  Heart Rate:  [67-82] 67  Resp:  [14-18] 18  BP: (111-181)/(46-85) 180/78  SpO2:  [96 %-100 %] 96 %    Constitutional: Awake, alert, cooperative, no apparent distress   Lungs: Clear to auscultation bilaterally, no crackles or wheezing   Cardiovascular: Regular rate and rhythm, normal S1 and S2, and no murmur noted   Abdomen: Normal bowel sounds, soft, non-distended, non-tender   Skin: No rashes, no cyanosis, no edema   Other:           Data:   All microbiology laboratory data " reviewed.  Recent Labs   Lab Test  05/02/17   0657  05/01/17   0622  04/30/17   0613   WBC  5.8  6.0  6.2   HGB  9.1*  9.2*  9.4*   HCT  29.3*  29.1*  28.8*   MCV  87  85  85   PLT  194  214  227     Recent Labs   Lab Test  05/02/17   0657  05/01/17   0622  04/30/17   0613   CR  0.97  1.02  1.05*     Recent Labs   Lab Test  04/30/17   0613   SED  89*     Recent Labs   Lab Test  04/28/17   2120   CULT  No growth after 3 days

## 2017-05-02 NOTE — PROGRESS NOTES
S. Patient was seen today at  for an evaluation for a post-op shoe for their left foot as ordered by Dr. Fofana. Dx:Infection  O/G. help protecte foot and promote healing.   A. Patient was fit with a Medium (size 8) post op shoe for her (left) foot.  The heel and foot were positioned in the shoe. The shoe was closed tightly and secured with the Velcro closure. Donning and doffing of the DH-shoe was explained.  Patient was told to watch her toes/foot for rubbing, red spots or abrasions. Patient was 1st going to get a cam boot but would not be able to walk with two cam boots.  Patients right Cam boot was not properly donned correctly to her right leg. I donned the cam boot to patiens right foot and showed her how to kathy the cam boot correctly.  Patient thanked me for showing her the correct way to wear her cam boot.      P. Patient was advised to contact this office with any problems or questions.

## 2017-05-02 NOTE — PROGRESS NOTES
05/02/17 1210   Quick Adds   Type of Visit PT Re-evaluation   Living Environment   Lives With alone   Living Arrangements condominium   Home Accessibility bed not on first floor;stairs within home   Number of Stairs to Enter Home 0   Number of Stairs Within Home 12   Stair Railings at Home inside, present on right side   Transportation Available car;family or friend will provide   Living Environment Comment Patient lives alone in 2 story townhome; no stairs to enter townhome, kitchen/1/2 bath, living room on main floor. Bedroom and full bath located on 2nd floor. Sister and neighbor present to provide limited support (can assist with grocery shopping, arranging townhome for convelesence, etc)   Self-Care   Usual Activity Tolerance moderate   Regular Exercise no   Equipment Currently Used at Home cane, straight   Activity/Exercise/Self-Care Comment Patient has been limited by peripheral neuropathy and right foot pain from charcot deformity.    Functional Level Prior   Ambulation 1-->assistive equipment   Transferring 1-->assistive equipment   Toileting 0-->independent   Bathing 0-->independent   Dressing 0-->independent   Eating 0-->independent   Communication 0-->understands/communicates without difficulty   Swallowing 0-->swallows foods/liquids without difficulty   Cognition 0 - no cognition issues reported   Fall history within last six months no   Which of the above functional risks had a recent onset or change? ambulation;transferring   Prior Functional Level Comment Patient has been using SEC secondary to pain at right LE.  Patient works from home and is highly motivated to return to work.  Patient will be borrowing 4WW with seat from neighbor to use to limit time spent standing/walking   General Information   Referring Physician Gurjit Francis DPM   Patient/Family Goals Statement return to home ASAP for work   Pertinent History of Current Problem (include personal factors and/or comorbidities that  impact the POC) Patient with past medical history significant for type 2 diabetes, HTN, hypercholesterolemia now admitted with uncontrolled DM, infecitons of left 1st and 2nd toes (now POD#2 of amputations of both toes), bony deformity (midfoot fx and dislocation) of right foot.   Precautions/Limitations fall precautions   Weight-Bearing Status - LLE (partical weight bearing through heel only)   Weight-Bearing Status - RLE nonweight-bearing   General Info Comments Patient very concerned about finances and returning to home to access her work computer   Cognitive Status Examination   Orientation orientation to person, place and time   Personal Safety and Judgment impulsive   Memory intact   Cognitive Comment Patient unable to maintain weight bearing at right LE seocndary peripheral neuropathy and decreased UE strength, unaware of weight bearing   Pain Assessment   Patient Currently in Pain Yes, see Vital Sign flowsheet  (reports pain at right LE )   Integumentary/Edema   Integumentary/Edema Comments edema at right ankle and foot   Posture    Posture Forward head position;Protracted shoulders   Range of Motion (ROM)   ROM Comment Limited right ankle ROM due to edema and charcot deformity, all other ROM WFL   Strength   Strength Comments Moderate strength deficits, unable to support body weight through UEs    Bed Mobility   Bed Mobility Comments independent   Transfer Skills   Transfer Comments transferred sit to stand with 2WW with verbal cueing to avoid weight bearing at right LE   Gait   Gait Comments Patient amb 60 feet; demonstrated partial heel weight bearing at left LE with verbal cueing, demonstrated partial weight bearing at right LE (unable to maintain NWB at right LE)   Balance   Balance Comments no LOB, however therapist provided SBA with all mobility secondary to peripheral neuropathy.   Sensory Examination   Sensory Perception Comments bilateral perpheral neuropathy   Coordination   Coordination no  "deficits were identified   Muscle Tone   Muscle Tone no deficits were identified   General Therapy Interventions   Planned Therapy Interventions balance training;gait training;transfer training;home program guidelines;progressive activity/exercise;other (see comments)   Clinical Impression   Criteria for Skilled Therapeutic Intervention yes, treatment indicated   PT Diagnosis decreased independence with ambulaiton   Clinical Presentation Evolving/Changing   Clinical Presentation Rationale significant medical history, uncotrolled diabetes, deficits at bilateral LEs, complicated social history   Clinical Decision Making (Complexity) Moderate complexity   Therapy Frequency` daily   Predicted Duration of Therapy Intervention (days/wks) 2 days   Anticipated Equipment Needs at Discharge shower chair;walker;wheelchair  (may require WC if left LE has WB precautions s/p amp)   Anticipated Discharge Disposition Home   Risk & Benefits of therapy have been explained Yes   Patient, Family & other staff in agreement with plan of care Yes   [facility] \"6 Clicks\"   2016, Trustees of [facility], under license to [employer].  All rights reserved.   6 Clicks Short Forms Basic Mobility Inpatient Short Form   [facility]  \"6 Clicks\" V.2 Basic Mobility Inpatient Short Form   1. Turning from your back to your side while in a flat bed without using bedrails? 4 - None   2. Moving from lying on your back to sitting on the side of a flat bed without using bedrails? 4 - None   3. Moving to and from a bed to a chair (including a wheelchair)? 3 - A Little   4. Standing up from a chair using your arms (e.g., wheelchair, or bedside chair)? 3 - A Little   5. To walk in hospital room? 3 - A Little   6. Climbing 3-5 steps with a railing? 2 - A Lot   Basic Mobility Raw Score (Score out of 24.Lower scores equate to lower levels of function) 19   Total Evaluation Time   Total Evaluation Time (Minutes) 5     "

## 2017-05-02 NOTE — PROGRESS NOTES
[facility]    Hospitalist Progress Note  Name: Roxy Beaulieu    MRN: [id number]  Provider:  Juan Luis Toledo DO, MPH  Date of Service: 05/02/2017    Summary of Stay: Patient is a 56 year old female with history of type 2 diabetes, hypertension, and hypercholesterolemia who presented to the ED on 4/28/17 for evaluation of left 1st and 2nd toe pain and right ankle pain. ED work up showed severe hyperglycemia BG~700,chronic infection of left 1st and 2nd toes, and bony deformity of right foot. Unfortunately, due to insurance issues, she has not been taking any short acting insulin lately. She was found to have uncontrolled BG up to 700's requiring insulin gtt. Seen by podiatry, MRI showed LLE great toe and 2nd toe osteomyelitis. Had partial amputation on 5/1 per podiatry. Still needs insulin adjustment, abx plan, PT evaluation.       Problem List:   Osteomyelitis of left Great and 2nd toe in patient with several months of uncontrolled diabetes. The patient had blisters on these toes for several months since ~sept last yr but just developed pain, erythema, and eschar on 2nd toe over the last few days. MRI on 4/29 showed osteomyelitis of 1st and 2nd toe. TING showed mild arterial insufficiancy on the Left and normal RLE arterial flow. Had amputation 1st and 2nd toes on left on 5/1.  --Continue Unasyn and Vancomycin per ID  --ID and podiatry consult appreciated      Uncontrolled type 2 diabetes with blood glucose of 743, A1C~12: Pt could not afford short acting insulin so had been off for several months. According to her she was taking the 70/30 twice a day . She was on insulin ggt on admission now switched to lantus and aspart rita operatively.    --continue 70/30 @30 U BID with meal time aspart (1 U novolog per 15 g carb - was taking 3 U per 15 g carb PTA) and titrate as needed (70/30 is more affordable to pt)  --continue glucotrol 10 mg BID   --consider metformin on dc      Pseudohyponatremia, due to  hyperglycemia. Improved with IVF and correction of BG.       Right foot charcot deformity by XR. Podiatry consulted-TING acceptable. Ideally needs CT to evaluate extent of injury to the R foot but pt refusing to get CT  --will need cam boot prior to dc  --will need PT eval prior to dc       Dehydration, due to prolonged hyperglycemia. NS hydration.       Hypertension, uncontrolled. She is not sure what medicine she takes- I am not convinced that she has been taking any blood pressure medicines.   --started on Metoprolol 25 mg BID and Lisinopril 5 mg daily (increase from 10 to 20 as continues to be hypertensive). Dr Chung has discontinued amlodipine and can possibly maximize current anti hypertensive's if needed       Hypercholesterolemia. Started on lipitor     DVT Prophylaxis: Pneumatic Compression Devices  Code Status: Full Code  Disposition: Expected discharge in 2-3 days to home vs TCU pending PT evaluation. Goals prior to discharge include BG control, abx plan, PT evalation.   Family updated today: No     Interval History   Assumed care from previous hospitalist. The history was fully reviewed.  Pt doing well. No chest pain or shortness of breath. No nausea, vomiting, diarrhea, constipation. No fevers. No other complaints identified. BG well controlled. No pain.    -Data reviewed today: I reviewed all new labs and imaging results over the last 24 hours.     Physical Exam   Temp: 98  F (36.7  C) Temp src: Oral BP: 180/78   Heart Rate: 67 Resp: 18 SpO2: 96 % O2 Device: None (Room air)    Vitals:    04/29/17 0456   Weight: 96.3 kg (212 lb 3.2 oz)     Vital Signs with Ranges  Temp:  [96.7  F (35.9  C)-98.9  F (37.2  C)] 98  F (36.7  C)  Heart Rate:  [67-82] 67  Resp:  [14-18] 18  BP: (111-181)/(46-85) 180/78  SpO2:  [96 %-100 %] 96 %  I/O last 3 completed shifts:  In: 1992 [P.O.:500; I.V.:1492]  Out: 3405 [Urine:3400; Blood:5]    GENERAL: No apparent distress. Awake, alert, and fully oriented.  HEENT:  Normocephalic, atraumatic. Extraocular movements intact.  CARDIOVASCULAR: Regular rate and rhythm without murmurs or rubs. No S3.  PULMONARY: Clear bilaterally.  GASTROINTESTINAL: Soft, non-tender, non-distended. Bowel sounds normoactive.   EXTREMITIES: No cyanosis or clubbing. No edema. Left 1st and 2nd toe amputations.  NEUROLOGICAL: CN 2-12 grossly intact, no focal neurological deficits.  DERMATOLOGICAL: No rash, ulcer, bruising, nor jaundice.     Medications     IV fluid REPLACEMENT ONLY         glipiZIDE  10 mg Oral BID AC     lisinopril  20 mg Oral Daily     sodium chloride (PF)  3 mL Intracatheter Q8H     acetaminophen  975 mg Oral Q8H     insulin isophane & regular  30 Units Subcutaneous BID w/meals     atorvastatin  40 mg Oral Daily at 8 pm     vancomycin (VANCOCIN) IV  1,250 mg Intravenous Q12H     heparin  5,000 Units Subcutaneous Q12H     metoprolol  25 mg Oral BID     insulin aspart  1-10 Units Subcutaneous TID AC     insulin aspart  1-7 Units Subcutaneous At Bedtime     ampicillin-sulbactam (UNASYN) IV  3 g Intravenous Q6H     Data     Laboratory:    Recent Labs  Lab 05/02/17  0657 05/01/17  0622 04/30/17  0613   WBC 5.8 6.0 6.2   HGB 9.1* 9.2* 9.4*   HCT 29.3* 29.1* 28.8*   MCV 87 85 85    214 227       Recent Labs  Lab 05/02/17  0657 05/01/17  0622 04/30/17  0613    141 141   POTASSIUM 4.3 4.3 4.4   CHLORIDE 111* 109 109   CO2 26 26 28   ANIONGAP 6 6 4   * 236* 210*   BUN 13 16 21   CR 0.97 1.02 1.05*   GFRESTIMATED 59* 55* 54*   GFRESTBLACK 71 67 65   GILL 8.3* 8.8 8.2*       Recent Labs  Lab 05/02/17  0734 05/02/17  0657 05/02/17  0156 05/01/17  2220 05/01/17  1631 05/01/17  1405  05/01/17  0622  04/30/17  0613  04/29/17  0846  04/28/17  2120   GLC  --  192*  --   --   --   --   --  236*  --  210*  --  124*  --  743*   *  --  154* 291* 135* 182*  < >  --   < >  --   < >  --   < >  --    < > = values in this interval not displayed.    Recent Labs  Lab 04/28/17 2120   CULT  No growth after 3 days       Imaging:  Recent Results (from the past 24 hour(s))   X-ray lt Foot 3 vw port: In PACU    Narrative    LEFT FOOT THREE VIEWS   5/1/2017  5:57 PM     HISTORY: Postop foot.    COMPARISON: None.      Impression    IMPRESSION: Partial amputation changes of the proximal phalanx of the  first digit and complete second digit amputation changes noted. No  gross erosive changes seen in the residual osseous structures.    MD Juan Luis HAAS DO MPH  [facility]  [address] [place] [address]  Pager: [phone]  05/02/2017

## 2017-05-02 NOTE — PROGRESS NOTES
Came to fit patient with loren but patient said feels like she wont be able to ambulate because of boot on right.  Feels like her ankle locked will keep her from heel-touch weight bearing.  She said she has a shoe at home that she got for the right to protect her toes but nobody is available to bring here.  I told her that I will contact Dr. Fofana and see how she would like to proceed.

## 2017-05-02 NOTE — PLAN OF CARE
Problem: Goal Outcome Summary  Goal: Goal Outcome Summary  A&OX4. BP's elevated in the systolic in 160's-180's. Recovering from surgery well. Left foot wrapped in ACE. No visible drainage. Diet advanced to regular. Denies pain in bilateral LE's. Has numbness r/t neuropathy. CAM boot to right leg. Ambulates with standby assist. Refuses bed alarm. Calls appropriately.

## 2017-05-02 NOTE — PROGRESS NOTES
"Foot & Ankle Surgery  May 2, 2017    Patient seen at bedside this PM POD#1 sp partial L hallux and total L 2nd toe amputation.  Doing well, no concerns.  Was given a CAM for her L foot, but because she's wearing a CAM on her R foot, orthotist will try shoe with more ankle ROM, federico to facilitate heel WB L foot    /78 (BP Location: Left arm)  Pulse 80  Temp 98  F (36.7  C) (Oral)  Resp 18  Ht 5' 7.5\" (1.715 m)  Wt 212 lb 3.2 oz (96.3 kg)  SpO2 96%  BMI 32.74 kg/m2    PE - dressing intact, no drainage.  Sutures intact, skin margins well coapted.  Minimal edema/erythema, necrosis    Imaging - IMPRESSION: Partial amputation changes of the proximal phalanx of the  first digit and complete second digit amputation changes noted. No  gross erosive changes seen in the residual osseous structures.      A/P - 57 yo female POD#1 sp above procedures  -dressing change at bedside  -continue PT for heel WB training left foot, recommend 1 more day of training  -will sign off; patient to follow up with Dr Fofana within 5-7 days of discharge  -please call with questions or acute changes to patient/wound status    Jose G Mayers DPM   Podiatric Foot & Ankle Surgeon  [facility]      "

## 2017-05-02 NOTE — PLAN OF CARE
Problem: Goal Outcome Summary  Goal: Goal Outcome Summary  Outcome: No Change  Amputate 1st and 2nd left toe on 5/1. Left leg elevated, no drainage noted, denies pain. SBP in the 160s, afebrile, on room air, sats upper 90s. Non-tele, lung sounds diminished. IVF infusing, , on SSI and NPH. Cont on Vanco and Unasyn. Podiatry, ID and PT following, see notes for recommendation. CAM boot to right leg.Up SBA. Refuses bed alarm. Calls appropriately. Discharge plan TBD. Will cont with POC.

## 2017-05-02 NOTE — PLAN OF CARE
Problem: Goal Outcome Summary  Goal: Goal Outcome Summary  Outcome: No Change  [place]: A & O x 4  VS:  T: 98.0  HR: 67  BP: 180/78  Resp: 18   O2: 96% rm air.   LS: Clear bilaterally.   GI: Bowel sounds active.  Passing flatus.  Pt denies nausea.  Tolerating PO foods and fluids well.   : WDL  Skin: Wounds to LLE covered by surgical dressing.  CAM boot to right foot  Pain: Denies pain.   Transfer: SBA.  Pt NWB to right foot, partial weight baring to left foot.  Has difficulty maintaining weight baring restrictions r/t numbness in lower extremities.   IV:  PIV SL WDL  Diet:  Mod Cho  Plan: Orthotics here today to evaluate pt for boot to LLE.  Pt refused full CAM boot.  Orthotics to contact Pediatry for alternate option.  Pt wants to discharge as soon as able.

## 2017-05-02 NOTE — PLAN OF CARE
Problem: Goal Outcome Summary  Goal: Goal Outcome Summary     PT: Patient seen by physical therapy for re-evaluation and treatment.  Patient with past medical history significant for type 2 diabetes, HTN, hypercholesterolemia now admitted with uncontrolled DM, infections of left 1st and 2nd toes now POD #1 s/p amputation on 5/1, and midfoot fx/dislocation due to Charcot deformity at right foot.  Patient reports that she lives alone in a 2 story town house; no stairs to enter home, kitchen/bath/living room on main level, bedroom on 2nd level.  Patient reports that her sister and neighbor are able to provide intermittent assistance.  Per Podiatry, patient is NWB at right LE due midfoot fracture dislocation and now is partial weight bearing on left heel.  Patient has been issued a tall CAM walking boot for her right LE; patient has requested a cast shoe for her left LE.  At baseline, patient was using a walking stick as a cane, patient has been using walker  in hospital and will borrow a 4WW with seat from her neighbor at discharge.      Reviewed weight bearing precautions with patient; patient indicated that she is not able to maintain NWB at right especially since her surgery.  Therapist provided education for return to home including education on stair negotiation (Patient plans to bump up/down stairs on bottom), car transfer education (recommended use of sedan vs full size truck), and minimizing all standing/walking as much as possible.    Patient performed bed mobility independently, transferred to standing at EOB with 2WW with verbal cueing to maintain NWB/partial weight bearing.  Patient amb 60 feet with 2WW, unable to maintain right NWB and placing right LE on ground during gait even with repeated verbal and tactile cueing.  Patient was able to demonstrate consistent partial weight bearing through left heel.  Patient IS NOT able to maintain NWB at right LE and rests CAM boot on the ground with at least partial  weight bearing.  Patient plans to sit on 4WW and wheeled chair at home to limit standing/walking as much as possible.  Discussed renting a wheelchair, but patient does not have the flexibility with finances to do this.  I do not anticipate that patient's ability to perform right NWB with left partial weight bearing will improve over multiple sessions/time since she is limited by complicated weight bearing status, peripheral neuropathy leading to limited balance/proprioception, and decreased UE strength (unable to support full body weight during left LE advancement).  Physical therapy will complete order at this time, please contact me with any questions/concerns,  Pretty Hadley DPT  Pager [phone]    Physical Therapy Discharge Summary    Reason for therapy discharge:    No expectation of change in functional mobility with current weight bearing status and compounding variables such as patient's finances and significant peripheral neuropathy    Progress towards therapy goal(s). See goals on Care Plan in [facility] electronic health record for goal details.  Goals partially met.  Barriers to achieving goals:   patient is not able to maintain weight bearing precautions.    Therapy recommendation(s):    Limited time spent in upright standing/walking, use of 4WW as a wheelchair

## 2017-05-03 VITALS
WEIGHT: 212.2 LBS | TEMPERATURE: 97.7 F | OXYGEN SATURATION: 98 % | HEART RATE: 80 BPM | RESPIRATION RATE: 18 BRPM | HEIGHT: 68 IN | BODY MASS INDEX: 32.16 KG/M2 | SYSTOLIC BLOOD PRESSURE: 166 MMHG | DIASTOLIC BLOOD PRESSURE: 65 MMHG

## 2017-05-03 LAB
ANION GAP SERPL CALCULATED.3IONS-SCNC: 4 MMOL/L (ref 3–14)
BUN SERPL-MCNC: 16 MG/DL (ref 7–30)
CALCIUM SERPL-MCNC: 8.7 MG/DL (ref 8.5–10.1)
CHLORIDE SERPL-SCNC: 109 MMOL/L (ref 94–109)
CO2 SERPL-SCNC: 27 MMOL/L (ref 20–32)
CREAT SERPL-MCNC: 1.06 MG/DL (ref 0.52–1.04)
GFR SERPL CREATININE-BSD FRML MDRD: 53 ML/MIN/1.7M2
GLUCOSE BLDC GLUCOMTR-MCNC: 129 MG/DL (ref 70–99)
GLUCOSE BLDC GLUCOMTR-MCNC: 139 MG/DL (ref 70–99)
GLUCOSE SERPL-MCNC: 161 MG/DL (ref 70–99)
MAGNESIUM SERPL-MCNC: 1.5 MG/DL (ref 1.6–2.3)
PLATELET # BLD AUTO: 216 10E9/L (ref 150–450)
POTASSIUM SERPL-SCNC: 4.6 MMOL/L (ref 3.4–5.3)
SODIUM SERPL-SCNC: 140 MMOL/L (ref 133–144)

## 2017-05-03 PROCEDURE — 25000132 ZZH RX MED GY IP 250 OP 250 PS 637: Performed by: HOSPITALIST

## 2017-05-03 PROCEDURE — 36415 COLL VENOUS BLD VENIPUNCTURE: CPT | Performed by: INTERNAL MEDICINE

## 2017-05-03 PROCEDURE — 25000128 H RX IP 250 OP 636: Performed by: HOSPITALIST

## 2017-05-03 PROCEDURE — 99239 HOSP IP/OBS DSCHRG MGMT >30: CPT | Performed by: INTERNAL MEDICINE

## 2017-05-03 PROCEDURE — 83735 ASSAY OF MAGNESIUM: CPT | Performed by: INTERNAL MEDICINE

## 2017-05-03 PROCEDURE — 25000132 ZZH RX MED GY IP 250 OP 250 PS 637: Performed by: INTERNAL MEDICINE

## 2017-05-03 PROCEDURE — 85049 AUTOMATED PLATELET COUNT: CPT | Performed by: INTERNAL MEDICINE

## 2017-05-03 PROCEDURE — 80048 BASIC METABOLIC PNL TOTAL CA: CPT | Performed by: INTERNAL MEDICINE

## 2017-05-03 PROCEDURE — 00000146 ZZHCL STATISTIC GLUCOSE BY METER IP

## 2017-05-03 PROCEDURE — 82565 ASSAY OF CREATININE: CPT | Performed by: INTERNAL MEDICINE

## 2017-05-03 PROCEDURE — 25000125 ZZHC RX 250: Performed by: EMERGENCY MEDICINE

## 2017-05-03 RX ORDER — GLIPIZIDE 10 MG/1
10 TABLET ORAL
Qty: 60 TABLET | Refills: 0 | Status: SHIPPED | OUTPATIENT
Start: 2017-05-03 | End: 2018-03-15

## 2017-05-03 RX ORDER — LISINOPRIL 40 MG/1
40 TABLET ORAL DAILY
Qty: 30 TABLET | Refills: 0 | Status: SHIPPED | OUTPATIENT
Start: 2017-05-03 | End: 2018-03-15

## 2017-05-03 RX ORDER — METOPROLOL TARTRATE 25 MG/1
25 TABLET, FILM COATED ORAL 2 TIMES DAILY
Qty: 60 TABLET | Refills: 0 | Status: SHIPPED | OUTPATIENT
Start: 2017-05-03 | End: 2018-03-15

## 2017-05-03 RX ORDER — LISINOPRIL 40 MG/1
40 TABLET ORAL DAILY
Status: DISCONTINUED | OUTPATIENT
Start: 2017-05-03 | End: 2017-05-03 | Stop reason: HOSPADM

## 2017-05-03 RX ADMIN — METOPROLOL TARTRATE 25 MG: 25 TABLET ORAL at 08:11

## 2017-05-03 RX ADMIN — GLIPIZIDE 10 MG: 10 TABLET ORAL at 08:11

## 2017-05-03 RX ADMIN — HEPARIN SODIUM 5000 UNITS: 10000 INJECTION, SOLUTION INTRAVENOUS; SUBCUTANEOUS at 08:15

## 2017-05-03 RX ADMIN — AMPICILLIN SODIUM AND SULBACTAM SODIUM 3 G: 2; 1 INJECTION, POWDER, FOR SOLUTION INTRAMUSCULAR; INTRAVENOUS at 03:19

## 2017-05-03 RX ADMIN — LISINOPRIL 40 MG: 40 TABLET ORAL at 08:11

## 2017-05-03 RX ADMIN — AMPICILLIN SODIUM AND SULBACTAM SODIUM 3 G: 2; 1 INJECTION, POWDER, FOR SOLUTION INTRAMUSCULAR; INTRAVENOUS at 08:08

## 2017-05-03 RX ADMIN — INSULIN HUMAN 30 UNITS: 100 INJECTION, SUSPENSION SUBCUTANEOUS at 08:13

## 2017-05-03 NOTE — DISCHARGE INSTRUCTIONS
1. Follow up with Dr. Fofana in 5-7 days in podiatry clinic.  2. Your blood pressure medications were changed and now include Lisinopril and Metoprolol.  Do not take your old blood pressure medications with these.  3. Continue with 70/30 insulin twice daily.  4. Follow up with your primary care doctor in 7-10 days to evaluate blood pressure, blood glucose and review how you are doing after your hospitalization.

## 2017-05-03 NOTE — PLAN OF CARE
Problem: Goal Outcome Summary  Goal: Goal Outcome Summary  Outcome: Adequate for Discharge Date Met:  05/03/17    Patient verbalized understanding of discharge instruction.  Patient discharged home, sister here to transfer patient.

## 2017-05-03 NOTE — PROGRESS NOTES
"Hand-off for Care Transitions to Next Level of Care Provider  Name: Roxy Beaulieu  : 1960  MRN #: [id number]  Reason for Hospitalization:  Hyperglycemia [R73.9]  Cellulitis of toe of left foot [L03.032]  Foot fracture, right, closed, initial encounter [S92.901A]  Admit Date/Time: 2017  8:25 PM  Discharge Date:  5/3/2017    Reason for Communication Hand-off Referral: Other I & D Left foot infection    Discharge Plan:  Discharged to: Home with support                   Patient agreeable to post-hospital support suggestions:  Yes  Discharge Plan:  Home with support           Patient is on new medications:   Yes   amoxicillin-clavulanate 875-125 MG per tablet. Take 1 tablet by mouth 2 times daily   glipiZIDE 10 MG tablet. Take 1 tablet (10 mg) by mouth 2 times daily (before meals)   insulin isophane & regular susp. Inject 30 Units Subcutaneous 2 times daily (with meals)    insulin pen needle 31G X 8 MM. 1 Box of 100 insulin pen needles to be dispensed with   every insulin pen prescription    syringe/needle (disp) 30G X 1/2\" 1 ML Misc. 120 30 gauge x 1/2\" 1 ML syringes (ok to   substitute)   metoprolol 25 MG tablet. Take 1 tablet (25 mg) by mouth 2 times daily           MTM follow up recommended: No           Tel-Ass[employer] program:  Ineligible           Follow-up specialty is recommended: Yes. Follow up with Podiatry in 7 - 10 days.   Follow-up plan:  No future appointments.    Any outstanding tests or procedures:  None recommended.       Key Recommendations:  Perform the following activities every 2 hours x 5 minutes:   -ankle ROM/calf massaging bilateral lower extremity. If you are not comfortable moving the surgical ankle, you can wiggle the toes on that foot.    -ambulation; keep in mind your weightbearing restrictions. Heel weight bearing left foot in surgical shoe. Elevate/rest foot 23/24 hours per day to facilitate healing. Follow up with Dr Fofana within 5-7 days of discharge.   Patient was " discharged on new medications. Please assess her understanding and compliance with these medications.     Communicated handoff via EPIC Comm Mgt to Dr. Dwain Camarillo's CC at [phone].      Grecia Mcmillan RN, BSN, [facility]  [phone]

## 2017-05-03 NOTE — PLAN OF CARE
Problem: Goal Outcome Summary  Goal: Goal Outcome Summary  Outcome: No Change  VS stable. No complaints of pain. Numbness in BLE (baseline). Slept well throughout the night.

## 2017-05-03 NOTE — OP NOTE
DATE OF SURGERY:  05/01/2017      SURGEON:  Verna Fofana DPM      FIRST ASSISTANT:  Diane Carrera DPM      PREOPERATIVE DIAGNOSES:   1.  Ischemic ulcerations, left first and second toes.   2.  Diabetic with neuropathy.   3.  Osteomyelitis.      POSTOPERATIVE DIAGNOSES:     1.  Ischemic ulcerations, left first and second toes.   2.  Diabetic with neuropathy.   3.  Osteomyelitis.      PROCEDURES:   1.  Left partial great toe amputation.   2.  Left second toe amputation.      ANESTHESIA:  MAC with local.      HEMOSTASIS:  None.      ESTIMATED BLOOD LOSS:  5 mL.      SPECIMENS:  Left first and second toes for pathology.      MATERIALS:  None.      INDICATIONS:  Ms. Roxy Beaulieu is a 56-year-old diabetic female that presented to the hospital for ulcerations to the left first and second toes and right ankle pain.  On physical exam, she has black eschar of the whole left second toe and distal left great toe.  MRI showed osteomyelitis at this area.  It was discussed with patient to go in and surgically remove this to try to prevent worsening infection of the leg or possible further loss of limb or life.  The patient wishes to proceed with surgery.      PROCEDURE #1:  The patient was brought to the operating room and placed on the operating table in a supine position.  Anesthesia was administered and local was injected.  The foot was prepped and draped using sterile technique.  Attention was directed to the distal aspect of the left great toe.  A curvilinear fishmouth incision was made around the IPJ full thickness with a #15 blade down to bone.  The skin was sharply dissected off of the distal phalanx and disarticulated the IPJ.  This was sent for pathology.  The distal aspect of the proximal phalanx skin was sharply dissected off of the bone for a flap closure and the distal one-fourth of the proximal phalanx was removed using bone rongeur.  The wound was flushed with copious amounts of normal saline.  The skin was  reapproximated using 4-0 Prolene.      PROCEDURE #2:  Attention was directed to the left second toe.  An elliptical incision was made around the base of the second toe at the MPJ full thickness with a #15 blade through skin.  The skin was sharply dissected off the base of the proximal phalanx and this was disarticulated at the metatarsophalangeal joint.  This was sent for pathology as well.  The wound was flushed with copious amounts of normal saline.  The skin edges were then reapproximated using 4-0 Prolene.        There was no pus noted to either wound.  Bleeding was minimal.  The patient's foot was placed in a dry sterile dressing.  The patient tolerated the procedure and anesthesia well and was transferred to recovery with vital signs stable and vascular status intact.        The patient will be minimal heel weightbearing for transfers in a cast boot on the left foot.  She is choosing not to have any intervention done with the right Charcot foot.         AMRITA CASSIDY DPM             D: 2017 16:23   T: 2017 21:53   MT: NICHOLAS#[id number]      Name:     ONDINA DENIS   MRN:      [id number]        Account:        [id number]   :      1960           Procedure Date: 2017      Document: [id number]

## 2017-05-03 NOTE — PLAN OF CARE
Problem: Goal Outcome Summary  Goal: Goal Outcome Summary  Outcome: Improving  Patient alert and oriented x4.  VSS, lung sounds clear. Fernando mod carb diet, ate 75%.  Denies nausea.  Dressing on Left foot CDI w/ no drainage, Right foot has CAM boot on.   Cont to rec Ampicillian.   and 141.  Plans to discharge tomorrow.

## 2017-05-03 NOTE — PHARMACY - DISCHARGE MEDICATION RECONCILIATION
Discharge medication review for this patient is complete. Face-to-face medication education was provided by the pharmacist.  See [facility] for allergy information and immunization status.   Pharmacist assisted with medication reconciliation of discharge medications with PTA medications.    Patient was informed to STOP taking the following HOME medications:   Lisinopril/HCTZ, Amlodipine, pta NPH and Reg Insulin for carb counting.     Patient was informed to START taking the following NEW medications:   Lisinopril, Metoprolol, Glipizide and Augmentin    Patient was informed to make the following changes to prior to admission medications:  Note insulin changes: had NPH and Regular insulin(for carb counting)-now on just Novolin 70/30 twice daily.    Patient was educated on the following for each discharge medication:   Rationale for therapy  Duration of treatment  Dosing and or monitoring drug levels  Common side effects  Importance of compliance  Drug/food interactions  Missed doses  Self monitoring parameters    Left written materials and instructions (Clinical notes from [facility]) for new medications: Yes    OUTCOMES: Patient verbalized understanding    Discharge Medication List     Review of your medicines      START taking       Dose / Directions    amoxicillin-clavulanate 875-125 MG per tablet   Commonly known as:  AUGMENTIN   Used for:  Cellulitis of toe of left foot        Dose:  1 tablet   Take 1 tablet by mouth 2 times daily   Quantity:  20 tablet   Refills:  0       glipiZIDE 10 MG tablet   Commonly known as:  GLUCOTROL        Dose:  10 mg   Take 1 tablet (10 mg) by mouth 2 times daily (before meals)   Quantity:  60 tablet   Refills:  0       insulin isophane & regular susp   Commonly known as:  HumuLIN MIX 70/30 PEN , NovoLIN MIX 70/30 PEN        Dose:  30 Units   Inject 30 Units Subcutaneous 2 times daily (with meals)   Quantity:  18 mL   Refills:  0       insulin pen needle 31G X 8 MM        1 Box of 100 insulin  "pen needles to be dispensed with every insulin pen prescription   Quantity:  100 each   Refills:  0       lisinopril 40 MG tablet   Commonly known as:  PRINIVIL/ZESTRIL   Used for:  Benign essential hypertension        Dose:  40 mg   Take 1 tablet (40 mg) by mouth daily   Quantity:  30 tablet   Refills:  0       metoprolol 25 MG tablet   Commonly known as:  LOPRESSOR   Used for:  Benign essential hypertension        Dose:  25 mg   Take 1 tablet (25 mg) by mouth 2 times daily   Quantity:  60 tablet   Refills:  0       syringe/needle (disp) 30G X 1/2\" 1 ML Misc        120 30 gauge x 1/2\" 1 ML syringes (ok to substitute)   Quantity:  120 each   Refills:  0         CONTINUE these medicines which have NOT CHANGED       Dose / Directions    LIPITOR PO        Dose:  40 mg   Take 40 mg by mouth   Refills:  0         STOP taking          amLODIPine 5 MG tablet   Commonly known as:  NORVASC           lisinopril-hydrochlorothiazide 20-25 MG per tablet   Commonly known as:  PRINZIDE/ZESTORETIC           NOVOLIN N RELION SC           NovoLIN R VIAL 100 UNIT/ML injection   Generic drug:  insulin regular                Where to get your medicines      These medications were sent to [facility] [id number] [address] [address]     Phone:  [phone]      amoxicillin-clavulanate 875-125 MG per tablet     glipiZIDE 10 MG tablet     insulin isophane & regular susp     insulin pen needle 31G X 8 MM     lisinopril 40 MG tablet     metoprolol 25 MG tablet     syringe/needle (disp) 30G X 1/2\" 1 ML Misc                   "

## 2017-05-03 NOTE — DISCHARGE SUMMARY
[facility]  Discharge Summary  Name: Roxy Beaulieu    MRN: [id number]  YOB: 1960    Age: 56 year old  Date of Discharge:  5/3/2017 11:15 AM  Date of Admission: 4/28/2017  Primary Care Provider: [facility], [place]  Discharge Physician:  Eda Dumas MD  Discharging Service:  Hospitalist      Discharge Diagnoses:  1. Osteomyelitis of left Great and 2nd Toe  2. Uncontrolled IDDM2  3. Pseudohyponatremia 2/2 Hyperglycemia  4. Right Foot Charcot Deformity on Xray  5. Dehydration due to Prolonged Hyperglycemia  6. Uncontrolled HTN  7. HLD     Hospital Course:  Roxy Beaulieu is a 56 year old female with history of type 2 diabetes, hypertension, and hypercholesterolemia who presented to the ED on 4/28/17 for evaluation of left 1st and 2nd toe pain and right ankle pain. ED work up showed severe hyperglycemia BG~700,chronic infection of left 1st and 2nd toes, and bony deformity of right foot. Unfortunately, due to insurance issues, she has not been taking any short acting insulin lately. She initially required insulin drip. Seen by podiatry, MRI showed LLE great toe and 2nd toe osteomyelitis. Had partial amputation on 5/1 per podiatry. Will complete antibiotic treatment with Augmentin.     Osteomyelitis of left Great and 2nd toe in patient with several months of uncontrolled diabetes: The patient had blisters on these toes for several months since ~sept last yr but just developed pain, erythema, and eschar on 2nd toe over the last few days PTA. MRI on 4/29 showed osteomyelitis of 1st and 2nd toe. TING showed mild arterial insufficiency on the Left and normal RLE arterial flow. Had amputation 1st and 2nd toes on left on 5/1.  She was initially on Vancomycin and Unasyn which was narrowed to Unasyn per ID.  On discharge will take Augmentin for 10 days.  Will follow up with Podiatry in 5-7 days.  She has significant weight bearing status and was discharged from PT with plans of using a walker  "with a seat in her home.      Uncontrolled type 2 diabetes with blood glucose of 743, A1C~12: Patient could not afford short acting insulin so had been off for several months. According to her she was taking the 70/30 twice a day. She was on insulin ggt on admission and subsequently switched to 70/30 BID with better control of glucose.  She will be discharged with 70/30 30 units BID ([facility] does carry this, was filled here prior to discharge).  She will continue with Glucotrol as well.  Will need to follow up with PCP for further glucose monitoring.       Pseudohyponatremia, due to hyperglycemia: Improved with IVF and correction of BG.       Right foot charcot deformity by XR: Podiatry consulted-TING acceptable. Ideally needs CT to evaluate extent of injury to the R foot but pt refusing to get CT.  She now has a CAM boot and was seen by PT.      Dehydration, due to prolonged hyperglycemia: NS hydration which has resolved completely during admission.       Hypertension, uncontrolled: She is not sure what medicine she takes, unclear if she was taking any antihypertensives PTA.  She was started on Metoprolol 25 mg BID and Lisinopril was titrated to 40 mg QD.  She will follow up with PCP for blood pressure check in the next one week.      Hypercholesterolemia: Started on Lipitor this admission.     Discharge Disposition:  Discharged to home     Allergies:  No Known Allergies     Condition on Discharge:  Discharge condition: Stable   Discharge vitals: Blood pressure 166/65, pulse 80, temperature 97.7  F (36.5  C), temperature source Oral, resp. rate 18, height 1.715 m (5' 7.5\"), weight 96.3 kg (212 lb 3.2 oz), SpO2 98 %.   Code status on discharge: Full Code     History of Illness:  See detailed admission note for full details.    Physical Exam:  Blood pressure 166/65, pulse 80, temperature 97.7  F (36.5  C), temperature source Oral, resp. rate 18, height 1.715 m (5' 7.5\"), weight 96.3 kg (212 lb 3.2 oz), SpO2 98 %.  Wt " Readings from Last 1 Encounters:   04/29/17 96.3 kg (212 lb 3.2 oz)     GENERAL: No apparent distress. Awake, alert, and fully oriented.  HEENT: Normocephalic, atraumatic. Extraocular movements intact.  CARDIOVASCULAR: Regular rate and rhythm without murmurs or rubs. No S3. No LE edema  PULMONARY: Clear bilaterally. Normal respirations.  No crackles or wheezing.  GASTROINTESTINAL: Soft, non-tender, non-distended. Bowel sounds normoactive.   EXTREMITIES: No cyanosis or clubbing. No edema. Left 1st and 2nd toe amputations with surgical boot in place and CAM boot on the right.  NEUROLOGICAL: CN 2-12 grossly intact, no focal neurological deficits.  DERMATOLOGICAL: No rash, ulcer, bruising, nor jaundice.     Procedures other than Imaging:  Insulin drip  Amputation first and second left toes     Imaging:  Results for orders placed or performed during the hospital encounter of 04/28/17   Ankle XR, G/E 3 views, right    Narrative    ANKLE RIGHT THREE OR MORE VIEWS   FOOT BILATERAL TWO VIEWS   4/28/2017 10:23 PM     HISTORY: Ankle pain    COMPARISON: None.    FINDINGS:     Right foot: Surgical screws are in place in the distal right fibula.  There are several corticated bony fragments within the right midfoot  that suggest old traumatic changes. I do not have a previous for  comparison, but no definite acute fracture fragment is identified. If  there is convincing acute symptomatology in the right mid and  hindfoot, then CT might be necessary to look for acute fragments. If  previous films are available elsewhere, these would be very useful for  comparison.    Left foot: Negative except for small calcaneal spur inferiorly.    Right ankle: Surgical screws in the distal fibula. Ankle mortise is  intact. Small bony fragments adjacent to the medial and lateral  malleoli appear corticated and should be chronic. Nothing clearly  acute.    There is vascular calcification bilaterally.      Impression    IMPRESSION:   1.  Chronic-appearing disruption of the right mid and hindfoot. If  there is convincing acute symptomatology in this region, then CT or  comparison with available old films likely obtained elsewhere would be  useful.  2. Postoperative screws in the distal right fibula.  3. Vascular calcification.    JESUS CONTRERAS MD   XR Foot Bilateral 2 Views    Narrative    ANKLE RIGHT THREE OR MORE VIEWS   FOOT BILATERAL TWO VIEWS   4/28/2017 10:23 PM     HISTORY: Ankle pain    COMPARISON: None.    FINDINGS:     Right foot: Surgical screws are in place in the distal right fibula.  There are several corticated bony fragments within the right midfoot  that suggest old traumatic changes. I do not have a previous for  comparison, but no definite acute fracture fragment is identified. If  there is convincing acute symptomatology in the right mid and  hindfoot, then CT might be necessary to look for acute fragments. If  previous films are available elsewhere, these would be very useful for  comparison.    Left foot: Negative except for small calcaneal spur inferiorly.    Right ankle: Surgical screws in the distal fibula. Ankle mortise is  intact. Small bony fragments adjacent to the medial and lateral  malleoli appear corticated and should be chronic. Nothing clearly  acute.    There is vascular calcification bilaterally.      Impression    IMPRESSION:   1. Chronic-appearing disruption of the right mid and hindfoot. If  there is convincing acute symptomatology in this region, then CT or  comparison with available old films likely obtained elsewhere would be  useful.  2. Postoperative screws in the distal right fibula.  3. Vascular calcification.    JESUS CONTRERAS MD   US TING Doppler No Exercise    Narrative    ULTRASOUND ANKLE-BRACHIAL INDEX DOPPLER NO EXERCISE   4/29/2017 12:29  PM     HISTORY: PAD, left first and second toe wounds.    COMPARISON: None.    FINDINGS:  Right TING 0.93  Left TING 0.83    Right posterior tibial artery  waveform is biphasic. The left posterior  tibial and dorsalis pedis artery waveforms are biphasic. The right  dorsalis pedis artery was not located secondary to edema.      Impression    IMPRESSION:   1. Normal right TING without evidence of arterial insufficiency.  2. Left TING suggests mild arterial insufficiency.    EVE YANG,    MR Foot Left w/o Contrast    Narrative    LEFT FOOT WITHOUT CONTRAST   4/29/2017 11:41 AM     HISTORY: First and second toe wounds, evaluate for osteomyelitis.    TECHNIQUE:  Sagittal, long axis, and short axis T1 and inversion  recovery pulse sequences.    FINDINGS:  Contrast was not utilized due to elevated creatinine.  Ill-defined marrow edema is present within the distal phalanx of the  great toe, highly suspicious for osteomyelitis, particularly if there  is an adjacent wound or draining sinus. Ill-defined marrow edema is  also noted in the proximal phalanx of the second toe. The head of the  second toe proximal phalanx may protrude through the dorsal aspect of  the skin. The middle phalanx is not well seen but likely increased in  signal as well. There is a small nonspecific cyst or erosion in the  dorsal margin of the first metatarsal head. A physiologic amount of  fluid is noted in the metatarsophalangeal joint. There is a bipartite  medial hallux sesamoid with marrow edema in the distal pole. Mild  subchondral marrow edema is noted about the second, third, and fourth  tarsometatarsal joints. Tarsometatarsal joint alignment appears within  normal limits. Marrow signal within the remainder of the forefoot  appears within normal limits. Dorsal and deep soft tissue edema is  noted within the forefoot. No discrete soft tissue fluid collection is  demonstrated.      Impression    IMPRESSION:  1. Great toe distal phalangeal and second toe proximal phalangeal  marrow edema. This is highly suspicious for osteomyelitis,  particularly if there is an adjacent wound or draining  sinus.  2. Dorsal and deeper soft tissue edema within the forefoot. MR cannot  distinguish reactive edema from cellulitis. No forefoot soft tissue  fluid collection or abscess is identified.  3. Medial hallux sesamoiditis with marrow edema in the distal pole of  a bipartite sesamoid. This is likely degenerative. No joint effusion  is noted. Small cyst is present in the dorsal margin of the first  metatarsal head as well   4. Degenerative changes at the second through fourth tarsometatarsal  joints.    RICHARD PARRA MD   X-ray lt Foot 3 vw port: In PACU    Narrative    LEFT FOOT THREE VIEWS   5/1/2017  5:57 PM     HISTORY: Postop foot.    COMPARISON: None.      Impression    IMPRESSION: Partial amputation changes of the proximal phalanx of the  first digit and complete second digit amputation changes noted. No  gross erosive changes seen in the residual osseous structures.    JON GOEL MD        Consultations:  Consultations This Hospital Stay   PHARMACY TO [facility]  PODIATRY IP CONSULT  PHARMACY IP CONSULT  ORTHOSIS EXTREMITY LOWER REFERRAL IP CONSULT  PHYSICAL THERAPY ADULT IP CONSULT  INFECTIOUS DISEASES IP CONSULT  CARE COORDINATOR IP CONSULT  PHARMACY DISCHARGE EDUCATION BY PHARMACIST  PHYSICAL THERAPY ADULT IP CONSULT  ORTHOSIS EXTREMITY LOWER REFERRAL IP CONSULT     Recent Lab Results:    Recent Labs  Lab 05/03/17  0725 05/02/17  0657 05/01/17  0622 04/30/17  0613   WBC  --  5.8 6.0 6.2   HGB  --  9.1* 9.2* 9.4*   HCT  --  29.3* 29.1* 28.8*   MCV  --  87 85 85    194 214 227       Recent Labs  Lab 05/03/17  0725 05/02/17  0657 05/01/17  0622    143 141   POTASSIUM 4.6 4.3 4.3   CHLORIDE 109 111* 109   CO2 27 26 26   ANIONGAP 4 6 6   * 192* 236*   BUN 16 13 16   CR 1.06* 0.97 1.02   GFRESTIMATED 53* 59* 55*   GFRESTBLACK 64 71 67   GILL 8.7 8.3* 8.8       Recent Labs  Lab 05/03/17  0732 05/03/17  0725 05/03/17  0243 05/02/17  2138 05/02/17  1725 05/02/17  1156  05/02/17  0657   05/01/17  0622  04/30/17  0613  04/29/17  0846   GLC  --  161*  --   --   --   --   --  192*  --  236*  --  210*  --  124*   *  --  129* 141* 125* 159*  < >  --   < >  --   < >  --   < >  --    < > = values in this interval not displayed.       Pending Results:    Unresulted Labs Ordered in the Past 30 Days of this Admission     Date and Time Order Name Status Description    5/1/2017 1606 Surgical pathology exam In process     4/29/2017 0304 Blood culture ONE site Preliminary            Discharge Instructions and Follow-Up:   Discharge Orders     Follow Up and recommended labs and tests   Follow up with Dr Fofana within 5-7 days of discharge     Activity   1.  Perform the following activities every 2 hours x 5 minutes:  -ankle ROM/calf massaging bilateral lower extremity.  If you are not comfortable moving the surgical ankle, you can wiggle the toes on that foot  -deep breathing/coughing exercises  -ambulation; keep in mind your weightbearing restrictions    2.  Heel weight bearing left foot in surgical shoe    3.  Elevate/rest foot 23/24 hours per day to facilitate healing.     Wound care and dressings   Keep dressing clean, dry and intact.  Follow up immediately if bandage is loose, dirty or falls off     Reason for your hospital stay   You were hospitalized for infections of the feet and significantly elevated blood sugars.     Follow Up and recommended labs and tests   Follow up with primary care provider, [place], within 7-10 days to evaluate medication change and for hospital follow- up.  No follow up labs or test are needed.     Full Code     Diet   Follow this diet upon discharge: Orders Placed This Encounter     Moderate Consistent CHO Diet       Discharge Medications   Current Discharge Medication List      START taking these medications    Details   glipiZIDE (GLUCOTROL) 10 MG tablet Take 1 tablet (10 mg) by mouth 2 times daily (before meals)  Qty: 60 tablet, Refills: 0    Associated  "Diagnoses: Uncontrolled type 2 diabetes mellitus with hyperglycemia, with long-term current use of insulin (H)      insulin isophane & regular (HUMULIN MIX 70/30 PEN , NOVOLIN MIX 70/30 PEN) susp Inject 30 Units Subcutaneous 2 times daily (with meals)  Qty: 18 mL, Refills: 0    Associated Diagnoses: Uncontrolled type 2 diabetes mellitus with hyperglycemia, with long-term current use of insulin (H)      lisinopril (PRINIVIL/ZESTRIL) 40 MG tablet Take 1 tablet (40 mg) by mouth daily  Qty: 30 tablet, Refills: 0    Associated Diagnoses: Benign essential hypertension      metoprolol (LOPRESSOR) 25 MG tablet Take 1 tablet (25 mg) by mouth 2 times daily  Qty: 60 tablet, Refills: 0    Associated Diagnoses: Benign essential hypertension      amoxicillin-clavulanate (AUGMENTIN) 875-125 MG per tablet Take 1 tablet by mouth 2 times daily  Qty: 20 tablet, Refills: 0    Associated Diagnoses: Cellulitis of toe of left foot      insulin pen needle 31G X 8 MM 1 Box of 100 insulin pen needles to be dispensed with every insulin pen prescription  Qty: 100 each, Refills: 0    Associated Diagnoses: Uncontrolled type 2 diabetes mellitus with hyperglycemia, with long-term current use of insulin (H)      syringe/needle, disp, 30G X 1/2\" 1 ML MISC 120 30 gauge x 1/2\" 1 ML syringes (ok to substitute)  Qty: 120 each, Refills: 0    Associated Diagnoses: Uncontrolled type 2 diabetes mellitus with hyperglycemia, with long-term current use of insulin (H)         CONTINUE these medications which have NOT CHANGED    Details   Atorvastatin Calcium (LIPITOR PO) Take 40 mg by mouth          STOP taking these medications       Insulin NPH Human, Isophane, (NOVOLIN N RELION SC) Comments:   Reason for Stopping:         insulin regular (NOVOLIN R VIAL) 100 UNIT/ML injection Comments:   Reason for Stopping:         lisinopril-hydrochlorothiazide (PRINZIDE/ZESTORETIC) 20-25 MG per tablet Comments:   Reason for Stopping:         amLODIPine (NORVASC) 5 MG " tablet Comments:   Reason for Stopping:               Time Spent on this Encounter   I, Eda Dumas, personally saw the patient today and spent greater than 30 minutes discharging this patient.    Eda Dumas MD

## 2017-05-04 LAB — COPATH REPORT: NORMAL

## 2017-05-05 LAB
BACTERIA SPEC CULT: NO GROWTH
Lab: NORMAL
MICRO REPORT STATUS: NORMAL
SPECIMEN SOURCE: NORMAL

## 2018-03-15 ENCOUNTER — APPOINTMENT (OUTPATIENT)
Dept: GENERAL RADIOLOGY | Facility: CLINIC | Age: 58
DRG: 493 | End: 2018-03-15
Attending: PHYSICIAN ASSISTANT

## 2018-03-15 ENCOUNTER — HOSPITAL ENCOUNTER (INPATIENT)
Facility: CLINIC | Age: 58
LOS: 2 days | Discharge: HOME | DRG: 493 | End: 2018-03-17
Attending: EMERGENCY MEDICINE | Admitting: INTERNAL MEDICINE

## 2018-03-15 ENCOUNTER — APPOINTMENT (OUTPATIENT)
Dept: GENERAL RADIOLOGY | Facility: CLINIC | Age: 58
DRG: 493 | End: 2018-03-15
Attending: EMERGENCY MEDICINE

## 2018-03-15 DIAGNOSIS — S82.892A CLOSED FRACTURE DISLOCATION OF LEFT ANKLE, INITIAL ENCOUNTER: ICD-10-CM

## 2018-03-15 DIAGNOSIS — Z91.199 MEDICAL NON-COMPLIANCE: ICD-10-CM

## 2018-03-15 DIAGNOSIS — R73.9 HYPERGLYCEMIA: ICD-10-CM

## 2018-03-15 DIAGNOSIS — E88.89 KETOSIS (H): ICD-10-CM

## 2018-03-15 DIAGNOSIS — E11.9 DIABETES MELLITUS WITHOUT COMPLICATION (H): Primary | ICD-10-CM

## 2018-03-15 PROBLEM — S82.832A FRACTURE OF FIBULA, DISTAL, LEFT, CLOSED: Status: ACTIVE | Noted: 2018-03-15

## 2018-03-15 LAB
ALBUMIN UR-MCNC: >499 MG/DL
ANION GAP SERPL CALCULATED.3IONS-SCNC: 10 MMOL/L (ref 3–14)
ANION GAP SERPL CALCULATED.3IONS-SCNC: 6 MMOL/L (ref 3–14)
APPEARANCE UR: CLEAR
BASE EXCESS BLDV CALC-SCNC: 1.6 MMOL/L
BASOPHILS # BLD AUTO: 0 10E9/L (ref 0–0.2)
BASOPHILS NFR BLD AUTO: 0.3 %
BILIRUB UR QL STRIP: NEGATIVE
BUN SERPL-MCNC: 48 MG/DL (ref 7–30)
BUN SERPL-MCNC: 50 MG/DL (ref 7–30)
CALCIUM SERPL-MCNC: 8.7 MG/DL (ref 8.5–10.1)
CALCIUM SERPL-MCNC: 9.1 MG/DL (ref 8.5–10.1)
CHLORIDE SERPL-SCNC: 100 MMOL/L (ref 94–109)
CHLORIDE SERPL-SCNC: 88 MMOL/L (ref 94–109)
CO2 SERPL-SCNC: 25 MMOL/L (ref 20–32)
CO2 SERPL-SCNC: 29 MMOL/L (ref 20–32)
COLOR UR AUTO: YELLOW
CREAT SERPL-MCNC: 1.37 MG/DL (ref 0.52–1.04)
CREAT SERPL-MCNC: 1.46 MG/DL (ref 0.52–1.04)
DIFFERENTIAL METHOD BLD: ABNORMAL
EOSINOPHIL # BLD AUTO: 0 10E9/L (ref 0–0.7)
EOSINOPHIL NFR BLD AUTO: 0.3 %
ERYTHROCYTE [DISTWIDTH] IN BLOOD BY AUTOMATED COUNT: 13.5 % (ref 10–15)
GFR SERPL CREATININE-BSD FRML MDRD: 37 ML/MIN/1.7M2
GFR SERPL CREATININE-BSD FRML MDRD: 39 ML/MIN/1.7M2
GLUCOSE BLDC GLUCOMTR-MCNC: 142 MG/DL (ref 70–99)
GLUCOSE BLDC GLUCOMTR-MCNC: 152 MG/DL (ref 70–99)
GLUCOSE BLDC GLUCOMTR-MCNC: 180 MG/DL (ref 70–99)
GLUCOSE BLDC GLUCOMTR-MCNC: 260 MG/DL (ref 70–99)
GLUCOSE BLDC GLUCOMTR-MCNC: 311 MG/DL (ref 70–99)
GLUCOSE BLDC GLUCOMTR-MCNC: 346 MG/DL (ref 70–99)
GLUCOSE BLDC GLUCOMTR-MCNC: 396 MG/DL (ref 70–99)
GLUCOSE BLDC GLUCOMTR-MCNC: >600 MG/DL (ref 70–99)
GLUCOSE BLDC GLUCOMTR-MCNC: >600 MG/DL (ref 70–99)
GLUCOSE SERPL-MCNC: 207 MG/DL (ref 70–99)
GLUCOSE SERPL-MCNC: 444 MG/DL (ref 70–99)
GLUCOSE SERPL-MCNC: 623 MG/DL (ref 70–99)
GLUCOSE SERPL-MCNC: 743 MG/DL (ref 70–99)
GLUCOSE SERPL-MCNC: 877 MG/DL (ref 70–99)
GLUCOSE UR STRIP-MCNC: >499 MG/DL
HBA1C MFR BLD: >16 % (ref 4.3–6)
HCO3 BLDV-SCNC: 27 MMOL/L (ref 21–28)
HCT VFR BLD AUTO: 33.4 % (ref 35–47)
HGB BLD-MCNC: 11.1 G/DL (ref 11.7–15.7)
HGB UR QL STRIP: ABNORMAL
IMM GRANULOCYTES # BLD: 0.1 10E9/L (ref 0–0.4)
IMM GRANULOCYTES NFR BLD: 0.5 %
INTERPRETATION ECG - MUSE: NORMAL
KETONES BLD-SCNC: 0 MMOL/L (ref 0–0.6)
KETONES BLD-SCNC: 1.4 MMOL/L (ref 0–0.6)
KETONES UR STRIP-MCNC: 5 MG/DL
LEUKOCYTE ESTERASE UR QL STRIP: NEGATIVE
LYMPHOCYTES # BLD AUTO: 1 10E9/L (ref 0.8–5.3)
LYMPHOCYTES NFR BLD AUTO: 10.4 %
MAGNESIUM SERPL-MCNC: 2.1 MG/DL (ref 1.6–2.3)
MCH RBC QN AUTO: 27.3 PG (ref 26.5–33)
MCHC RBC AUTO-ENTMCNC: 33.2 G/DL (ref 31.5–36.5)
MCV RBC AUTO: 82 FL (ref 78–100)
MONOCYTES # BLD AUTO: 0.7 10E9/L (ref 0–1.3)
MONOCYTES NFR BLD AUTO: 6.8 %
MRSA DNA SPEC QL NAA+PROBE: NEGATIVE
MUCOUS THREADS #/AREA URNS LPF: PRESENT /LPF
NEUTROPHILS # BLD AUTO: 8 10E9/L (ref 1.6–8.3)
NEUTROPHILS NFR BLD AUTO: 81.7 %
NITRATE UR QL: NEGATIVE
NRBC # BLD AUTO: 0 10*3/UL
NRBC BLD AUTO-RTO: 0 /100
O2/TOTAL GAS SETTING VFR VENT: NORMAL %
OXYHGB MFR BLDV: 54 %
PCO2 BLDV: 46 MM HG (ref 40–50)
PH BLDV: 7.38 PH (ref 7.32–7.43)
PH UR STRIP: 6 PH (ref 5–7)
PHOSPHATE SERPL-MCNC: 3.2 MG/DL (ref 2.5–4.5)
PLATELET # BLD AUTO: 250 10E9/L (ref 150–450)
PO2 BLDV: 28 MM HG (ref 25–47)
POTASSIUM SERPL-SCNC: 4.1 MMOL/L (ref 3.4–5.3)
POTASSIUM SERPL-SCNC: 4.4 MMOL/L (ref 3.4–5.3)
POTASSIUM SERPL-SCNC: 5 MMOL/L (ref 3.4–5.3)
RBC # BLD AUTO: 4.07 10E12/L (ref 3.8–5.2)
RBC #/AREA URNS AUTO: 1 /HPF (ref 0–2)
SODIUM SERPL-SCNC: 123 MMOL/L (ref 133–144)
SODIUM SERPL-SCNC: 135 MMOL/L (ref 133–144)
SOURCE: ABNORMAL
SP GR UR STRIP: 1.02 (ref 1–1.03)
SPECIMEN SOURCE: NORMAL
SQUAMOUS #/AREA URNS AUTO: <1 /HPF (ref 0–1)
UROBILINOGEN UR STRIP-MCNC: 0 MG/DL (ref 0–2)
WBC # BLD AUTO: 9.7 10E9/L (ref 4–11)
WBC #/AREA URNS AUTO: 1 /HPF (ref 0–5)

## 2018-03-15 PROCEDURE — 25000128 H RX IP 250 OP 636: Performed by: EMERGENCY MEDICINE

## 2018-03-15 PROCEDURE — 82805 BLOOD GASES W/O2 SATURATION: CPT | Performed by: PHYSICIAN ASSISTANT

## 2018-03-15 PROCEDURE — 83735 ASSAY OF MAGNESIUM: CPT | Performed by: INTERNAL MEDICINE

## 2018-03-15 PROCEDURE — 25000128 H RX IP 250 OP 636: Performed by: PHYSICIAN ASSISTANT

## 2018-03-15 PROCEDURE — 73590 X-RAY EXAM OF LOWER LEG: CPT | Mod: LT

## 2018-03-15 PROCEDURE — 40000278 XR SURGERY CARM FLUORO LESS THAN 5 MIN: Mod: TC

## 2018-03-15 PROCEDURE — 25000128 H RX IP 250 OP 636: Performed by: INTERNAL MEDICINE

## 2018-03-15 PROCEDURE — 85025 COMPLETE CBC W/AUTO DIFF WBC: CPT | Performed by: PHYSICIAN ASSISTANT

## 2018-03-15 PROCEDURE — 0QSKXZZ REPOSITION LEFT FIBULA, EXTERNAL APPROACH: ICD-10-PCS | Performed by: EMERGENCY MEDICINE

## 2018-03-15 PROCEDURE — 99233 SBSQ HOSP IP/OBS HIGH 50: CPT | Performed by: INTERNAL MEDICINE

## 2018-03-15 PROCEDURE — 2W3MX1Z IMMOBILIZATION OF LEFT LOWER EXTREMITY USING SPLINT: ICD-10-PCS | Performed by: EMERGENCY MEDICINE

## 2018-03-15 PROCEDURE — 87641 MR-STAPH DNA AMP PROBE: CPT | Performed by: INTERNAL MEDICINE

## 2018-03-15 PROCEDURE — 84132 ASSAY OF SERUM POTASSIUM: CPT | Performed by: INTERNAL MEDICINE

## 2018-03-15 PROCEDURE — 82947 ASSAY GLUCOSE BLOOD QUANT: CPT | Performed by: EMERGENCY MEDICINE

## 2018-03-15 PROCEDURE — 82010 KETONE BODYS QUAN: CPT | Performed by: PHYSICIAN ASSISTANT

## 2018-03-15 PROCEDURE — 20000003 ZZH R&B ICU

## 2018-03-15 PROCEDURE — 36415 COLL VENOUS BLD VENIPUNCTURE: CPT | Performed by: INTERNAL MEDICINE

## 2018-03-15 PROCEDURE — 36415 COLL VENOUS BLD VENIPUNCTURE: CPT | Performed by: EMERGENCY MEDICINE

## 2018-03-15 PROCEDURE — 96366 THER/PROPH/DIAG IV INF ADDON: CPT

## 2018-03-15 PROCEDURE — 27788 TREATMENT OF ANKLE FRACTURE: CPT | Mod: LT

## 2018-03-15 PROCEDURE — 80048 BASIC METABOLIC PNL TOTAL CA: CPT | Performed by: PHYSICIAN ASSISTANT

## 2018-03-15 PROCEDURE — 73610 X-RAY EXAM OF ANKLE: CPT | Mod: LT

## 2018-03-15 PROCEDURE — 00000146 ZZHCL STATISTIC GLUCOSE BY METER IP

## 2018-03-15 PROCEDURE — 96375 TX/PRO/DX INJ NEW DRUG ADDON: CPT

## 2018-03-15 PROCEDURE — 96365 THER/PROPH/DIAG IV INF INIT: CPT

## 2018-03-15 PROCEDURE — 99207 ZZC CDG-CODE CATEGORY CHANGED: CPT | Performed by: INTERNAL MEDICINE

## 2018-03-15 PROCEDURE — 83036 HEMOGLOBIN GLYCOSYLATED A1C: CPT | Performed by: INTERNAL MEDICINE

## 2018-03-15 PROCEDURE — 99285 EMERGENCY DEPT VISIT HI MDM: CPT | Mod: 25

## 2018-03-15 PROCEDURE — 81001 URINALYSIS AUTO W/SCOPE: CPT | Performed by: PHYSICIAN ASSISTANT

## 2018-03-15 PROCEDURE — 84100 ASSAY OF PHOSPHORUS: CPT | Performed by: INTERNAL MEDICINE

## 2018-03-15 PROCEDURE — 82947 ASSAY GLUCOSE BLOOD QUANT: CPT | Performed by: PHYSICIAN ASSISTANT

## 2018-03-15 PROCEDURE — 40000986 XR ANKLE LT G/E 3 VW: Mod: LT

## 2018-03-15 PROCEDURE — 82010 KETONE BODYS QUAN: CPT | Performed by: INTERNAL MEDICINE

## 2018-03-15 PROCEDURE — 25000131 ZZH RX MED GY IP 250 OP 636 PS 637: Performed by: PHYSICIAN ASSISTANT

## 2018-03-15 PROCEDURE — 36415 COLL VENOUS BLD VENIPUNCTURE: CPT | Performed by: PHYSICIAN ASSISTANT

## 2018-03-15 PROCEDURE — 87640 STAPH A DNA AMP PROBE: CPT | Performed by: INTERNAL MEDICINE

## 2018-03-15 PROCEDURE — 40000986 XR ANKLE LT 2 VW: Mod: LT

## 2018-03-15 PROCEDURE — 80048 BASIC METABOLIC PNL TOTAL CA: CPT | Performed by: INTERNAL MEDICINE

## 2018-03-15 RX ORDER — FENTANYL CITRATE 50 UG/ML
100 INJECTION, SOLUTION INTRAMUSCULAR; INTRAVENOUS ONCE
Status: COMPLETED | OUTPATIENT
Start: 2018-03-15 | End: 2018-03-15

## 2018-03-15 RX ORDER — ONDANSETRON 2 MG/ML
4 INJECTION INTRAMUSCULAR; INTRAVENOUS EVERY 6 HOURS PRN
Status: DISCONTINUED | OUTPATIENT
Start: 2018-03-15 | End: 2018-03-16

## 2018-03-15 RX ORDER — ONDANSETRON 4 MG/1
4 TABLET, ORALLY DISINTEGRATING ORAL EVERY 6 HOURS PRN
Status: DISCONTINUED | OUTPATIENT
Start: 2018-03-15 | End: 2018-03-16

## 2018-03-15 RX ORDER — DEXTROSE MONOHYDRATE 25 G/50ML
25-50 INJECTION, SOLUTION INTRAVENOUS
Status: DISCONTINUED | OUTPATIENT
Start: 2018-03-15 | End: 2018-03-16

## 2018-03-15 RX ORDER — OXYCODONE HYDROCHLORIDE 5 MG/1
5-10 TABLET ORAL
Status: DISCONTINUED | OUTPATIENT
Start: 2018-03-15 | End: 2018-03-16

## 2018-03-15 RX ORDER — SODIUM CHLORIDE, SODIUM LACTATE, POTASSIUM CHLORIDE, CALCIUM CHLORIDE 600; 310; 30; 20 MG/100ML; MG/100ML; MG/100ML; MG/100ML
1000 INJECTION, SOLUTION INTRAVENOUS CONTINUOUS
Status: DISCONTINUED | OUTPATIENT
Start: 2018-03-15 | End: 2018-03-16

## 2018-03-15 RX ORDER — NALOXONE HYDROCHLORIDE 0.4 MG/ML
.1-.4 INJECTION, SOLUTION INTRAMUSCULAR; INTRAVENOUS; SUBCUTANEOUS
Status: DISCONTINUED | OUTPATIENT
Start: 2018-03-15 | End: 2018-03-16

## 2018-03-15 RX ORDER — ACETAMINOPHEN 325 MG/1
650 TABLET ORAL EVERY 4 HOURS PRN
Status: DISCONTINUED | OUTPATIENT
Start: 2018-03-15 | End: 2018-03-16

## 2018-03-15 RX ORDER — SODIUM CHLORIDE 9 MG/ML
1000 INJECTION, SOLUTION INTRAVENOUS CONTINUOUS
Status: DISCONTINUED | OUTPATIENT
Start: 2018-03-15 | End: 2018-03-15

## 2018-03-15 RX ORDER — SODIUM CHLORIDE 9 MG/ML
INJECTION, SOLUTION INTRAVENOUS CONTINUOUS
Status: DISCONTINUED | OUTPATIENT
Start: 2018-03-15 | End: 2018-03-16

## 2018-03-15 RX ADMIN — SODIUM CHLORIDE, POTASSIUM CHLORIDE, SODIUM LACTATE AND CALCIUM CHLORIDE 1000 ML: 600; 310; 30; 20 INJECTION, SOLUTION INTRAVENOUS at 12:04

## 2018-03-15 RX ADMIN — SODIUM CHLORIDE 7 UNITS/HR: 9 INJECTION, SOLUTION INTRAVENOUS at 21:21

## 2018-03-15 RX ADMIN — SODIUM CHLORIDE: 9 INJECTION, SOLUTION INTRAVENOUS at 16:38

## 2018-03-15 RX ADMIN — SODIUM CHLORIDE, POTASSIUM CHLORIDE, SODIUM LACTATE AND CALCIUM CHLORIDE 1000 ML: 600; 310; 30; 20 INJECTION, SOLUTION INTRAVENOUS at 15:56

## 2018-03-15 RX ADMIN — SODIUM CHLORIDE: 9 INJECTION, SOLUTION INTRAVENOUS at 17:43

## 2018-03-15 RX ADMIN — SODIUM CHLORIDE: 9 INJECTION, SOLUTION INTRAVENOUS at 11:36

## 2018-03-15 RX ADMIN — FENTANYL CITRATE 100 MCG: 50 INJECTION INTRAMUSCULAR; INTRAVENOUS at 11:25

## 2018-03-15 ASSESSMENT — ENCOUNTER SYMPTOMS
COUGH: 0
DIFFICULTY URINATING: 0
APPETITE CHANGE: 0
PALPITATIONS: 0
RHINORRHEA: 0
DIZZINESS: 0
ACTIVITY CHANGE: 0
JOINT SWELLING: 1
LIGHT-HEADEDNESS: 0
ABDOMINAL PAIN: 0
BACK PAIN: 0
HEADACHES: 0
CHEST TIGHTNESS: 0
ARTHRALGIAS: 1
SHORTNESS OF BREATH: 0
WEAKNESS: 0
CHILLS: 0
CONFUSION: 0
FEVER: 0

## 2018-03-15 ASSESSMENT — ACTIVITIES OF DAILY LIVING (ADL)
DRESS: 0-->INDEPENDENT
TOILETING: 0-->INDEPENDENT
BATHING: 0-->INDEPENDENT
RETIRED_EATING: 0-->INDEPENDENT
SWALLOWING: 0-->SWALLOWS FOODS/LIQUIDS WITHOUT DIFFICULTY
RETIRED_COMMUNICATION: 0-->UNDERSTANDS/COMMUNICATES WITHOUT DIFFICULTY

## 2018-03-15 NOTE — ED NOTES
"Pt states she sprained her ankle 2 weeks ago, pt was not seen for that. Last night pt states the \"ankle bone popped out\". Pt states she is not able to bear any weight on her foot. Pt has great toe removed and sutures are in place for surgery 1 year ago per pt. Pt states she works 2 jobs and is not able to afford her insulin or any other medications so she is not currently taking anything. At rest the pt does not complain of pain. Foot is warm to touch and has good color. Pt has limited sensation d/t disease progression  "

## 2018-03-15 NOTE — ED PROVIDER NOTES
"Emergency Department Attending Supervision Note  3/15/2018  3:02 PM      I evaluated this patient in conjunction with Alphonse MAURO      57-year-old female presented to the ED with primary complaints of left ankle pain.  Patient states that she was walking yesterday at home when she suddenly felt her ankle \"pop.\"  She had immediate onset of pain.  The pain is primarily to the ankle and does not radiate to any other location.  She was initially able to ambulate but at some point became non-ambulatory due to the pain.  She denies any other traumatic related injury.  She also mentioned that she has diabetes but has been off all her medications in the last 8 months as she is unable to afford the medication.      On my exam:    Constitutional:  Pleasant, age appropriate.   EYES:   Conjunctiva normal.  NECK:    Supple, no meningismus.   CV:     Regular rate and rhythm     No murmurs, rubs or gallops.       1+ DP pulses bilateral.  PULM:    Clear to auscultation bilateral.       No respiratory distress.      No wheezing, rales or stridor.  ABD:    Soft, non-tender, non-distended.  No pulsatile masses.       No rebound or guarding.  MSK:     Left lower extremity :       No tenderness to the proximal fibula..        Allison test within normal limits.        Achilles tendon is palpated without tenderness and without defect.         Deformity with moderate tenderness to the left ankle.  No tenting of the skin or overlying laceration/skin defect.       No bony tenderness to the foot.  LYMPH:   No cervical lymphadenopathy.  NEURO:   Alert.      Left lower extremity:      Strength and sensation intact.  SKIN:    Warm, dry and intact.  PSYCH:    Mood is good and affect is appropriate.      Plain films of the left ankle revealed distal fibula fracture with dislocation of the talotibial joint.  Because of the patient's neuropathy she was able to be reduced with fentanyl alone.  Initial reduction was adequate but when splint was " placed she had recurrent dislocation.  Repeat reduction was undertaken this time under direct fluoroscopy.  Patient now has adequate alignment of the talotibial joint.  She was placed in a short leg posterior stirrup splint.  Orthopedic surgery was consulted.    She was also noted to have marked hyperglycemia with ketosis but no acidosis.  She does not fit formal criteria for DKA.  She was given IV fluids and eventually insulin drip infused.  Patient will be admitted for further management of her hyperglycemia related to medical noncompliance.        Diagnosis  (R73.9) Hyperglycemia    (E88.89) Ketosis (H)    (Z91.19) Medical non-compliance      (S82.892A) Closed fracture dislocation of left ankle, initial encounter      Farzad Camarillo MD, MD  03/15/18 [id number]

## 2018-03-15 NOTE — ED PROVIDER NOTES
Fracture reduction    Performed by: Farzad Altamirano M.D.  SITE:  Left ankle  INDICATIONS:  Left ankle dislocation with distal fibula fracture  REDUCTION COMMENTS:  The patient's ankle was grasped providing axial traction. There was recreation of the deformity while applying a lateral to medial force.  There was resolution of deformity.    COMPLICATIONS:  Patient tolerated the procedure well and there were no complications.      Narrative: Short leg posterior and stirrup splint       Orthoglass splint was applied and after placement I checked and adjusted the fit to ensure proper positioning. Patient was more comfortable with splint in place. Sensation and circulation are intact after splint placement.         Farzad Altamirano MD  03/15/18 [id number]

## 2018-03-15 NOTE — CONSULTS
"[facility] Orthopedic Consultation    Roxy Beaulieu MRN# [id number]   Age: 57 year old YOB: 1960     Date of Admission:  3/15/2018    Reason for consult: Left ankle fracture/dislocation        Requesting physician: Dr. Lopez       Level of consult: Consult, follow and place orders           Assessment and Plan:   Assessment:   1) Left ankle dislocation with oblique fracture of distal fibula. In post reduction x-rays: Ankle mortise restored with only mild lateral displacement of distal fibula fragment.         Plan:   1) I discussed case with Ortho Trauma Surgeon, Dr. Stone- this fracture will likely require surgical intervention.  Exact timing of surgery still to be determined. Patient with uncontrolled diabetes and currently on insulin drip. Discussed with Dr. Lopez- could possibly be ready for surgery tomorrow but will depend on how patient responds to insulin drip and IV fluids overnight. Hospitalist assistance greatly appreciated.    2) Keep NPO tonight in case patient medically ready for surgery tomorrow.   3) Hold aspirin and any anticoagulant medications  4) NWB on LLE   5) Elevate LLE on 2-3 pillows to decrease swelling              Chief Complaint:   Left ankle pain          History of Present Illness:   This patient is a 57 year old female who presents with the following condition requiring a hospital admission:    Patient reports that about 2 weeks ago she had a slip on the ice getting out of her car and her left foot twisted underneath her. She felt the ankle was just sprained and treated it herself with an ACE wrap and was able to ambulate short distances with some pain. Yesterday, she was walking when she felt a distinct \"pop\" in her left ankle and then noticed the ankle appeared deformed. She also had 10/10 pain when she tried to ambulate or put any weight on the LLE. She came into the ED today for further evaluation. X-rays in ED revealed an oblique left distal fibula " fracture with angulation and foreshortening, subluxation of the distal tibia and significant widening of the medial tibiotalar joint space consistent with deltoid ligament disruption. X-rays of the proximal tibia/fibula were negative for fracture. Post-reduction x-rays of the left ankle showed mild lateral displacement of the left distal fibula fracture fragment with restoration of the ankle mortise. Patient is seen in ED prior to reduction and splinting today. She reports mild pain in the left ankle when resting in bed but this increases to 10/10 if she tries to put any weight on it. She has clear deformity of the left ankle in the area of the medial malleolus. She has baseline peripheral neuropathy but sensation to light touch appears intact in LLE on exam. She is able to plantar/dorsiflex her left foot, 1+ DP pulse, wiggles toes. 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 Fofana from Podiatry in May 2017. The sutures from this procedure were still in place today- patient states that due to financial concerns she was not able to follow up with Dr Fofana for removal of sutures. She also comes in today with uncontrolled diabetes. Blood glucoses ranging from 600-800's in ED. She states she has not been able to afford her medications so has been off them for 8 months.           Past Medical History:     Past Medical History:   Diagnosis Date     Diabetes (H)      Gangrene of toe (H) 4/29/2017     High cholesterol      Hypertension      Obesity 4/29/2017     Personal history of tobacco use, presenting hazards to health 4/29/2017     Uncontrolled type 2 diabetes mellitus with hyperglycemia, with long-term current use of insulin (H) 4/29/2017             Past Surgical History:     Past Surgical History:   Procedure Laterality Date     AMPUTATE TOE(S) Left 5/1/2017    Procedure: AMPUTATE TOE(S);  Amputate first and second left toes;  Surgeon:  "Verna Fofana DPM, Podiatry/Foot and Ankle Surgery;  Location:  OR             Social History:     Social History   Substance Use Topics     Smoking status: Former Smoker     Quit date: 7/25/2016     Smokeless tobacco: Never Used     Alcohol use No             Family History:   No family history on file.          Immunizations:     VACCINE/DOSE   Diptheria   DPT   DTAP   HBIG   Hepatitis A   Hepatitis B   HIB   Influenza   Measles   Meningococcal   MMR   Mumps   Pneumococcal   Polio   Rubella   Small Pox   TDAP   Varicella   Zoster             Allergies:   No Known Allergies          Medications:     Current Facility-Administered Medications   Medication     lactated ringers BOLUS 1,000 mL    Followed by     lactated ringers infusion     dextrose 50 % injection 25-50 mL     insulin 1 unit/1 mL in NS (NovoLIN, HumuLIN Regular) drip -ED DKA algorithm     No current outpatient prescriptions on file.             Review of Systems:   CV: NEGATIVE for chest pain, palpitations or peripheral edema  C: NEGATIVE for fever, chills, change in weight  E/M: NEGATIVE for ear, mouth and throat problems  R: NEGATIVE for significant cough or SOB          Physical Exam:   All vitals have been reviewed  Patient Vitals for the past 24 hrs:   BP Temp Temp src Pulse Heart Rate Resp SpO2 Height Weight   03/15/18 1345 123/55 - - - - - - - -   03/15/18 1330 111/65 - - - - - - - -   03/15/18 1315 113/65 - - - - - - - -   03/15/18 1300 116/60 - - - - - - - -   03/15/18 1245 97/58 - - - - - 95 % - -   03/15/18 1200 - - - - 93 21 96 % - -   03/15/18 1145 - - - - 94 (!) 53 94 % - -   03/15/18 1130 (!) 191/93 - - - 92 11 100 % - -   03/15/18 1115 - - - - 91 15 98 % - -   03/15/18 1045 - - - - - - 98 % - -   03/15/18 0934 - 98.1  F (36.7  C) Oral - - - - - -   03/15/18 0924 (!) 159/91 98  F (36.7  C) Temporal 96 - 18 95 % 1.702 m (5' 7\") 95.3 kg (210 lb)     No intake or output data in the 24 hours ending 03/15/18 1630  Musculoskeletal:   LLE: " Notable deformity to left ankle in area of medial malleolus with mild erythema localized in this area. Mild swelling. Skin intact but dry. Partial amputation of left great toe and amputation of left 2nd toe- sutures still in place. No drainage or erythema around sutures.   - +TTP of left medial and left lateral malleolus. No TTP of proximal tibia or fibular head  - +DF/PF/EHL  - 1+ DP pulses  - baseline peripheral neuropathy but sensation to light touch appears intact  - Wiggles toes  - Able to perform SLR in LLE              Data:   All laboratory data reviewed  Results for orders placed or performed during the hospital encounter of 03/15/18   Ankle XR, G/E 3 views, left    Narrative    XR ANKLE LT G/E 3 VW 3/15/2018 10:05 AM    HISTORY: Left ankle pain and deformity.    COMPARISON: None.    FINDINGS: Oblique distal fibular fracture with angulation and  foreshortening at the fracture site. No definite tibial fracture  although there is anterior subluxation of the distal tibia relative to  the talar dome and marked widening of the medial tibiotalar joint  space consistent with deltoid ligament disruption.      Impression    IMPRESSION: Distal fibular fracture, anterior subluxation of the  distal tibia and soft tissue injury.    LISA LUKE MD   Tib/Fib XR, left    Narrative    XR TIBIA & FIBULA LT 2 VW 3/15/2018 11:06 AM    HISTORY: Ankle fracture.    COMPARISON: Ankle, 3/15/2018    FINDINGS: Proximal tibia and fibula are intact. Distal fibular  fracture is evident on the AP view.      Impression    IMPRESSION: Proximal tibia and fibula are intact.    LISA LUKE MD   Ankle XR, G/E 3 views, left    Narrative    XR ANKLE LT G/E 3 VW 3/15/2018 12:16 PM    HISTORY: Postreduction.    COMPARISON: 3/15/2018, prereduction    FINDINGS: Marked widening of the medial tibiotalar joint space and  angulation/foreshortening at the fibular fracture site are not  significantly changed. On the lateral view there does appear to  be  some interval improvement in alignment between the tibial articular  surface and the talar dome.      Impression    IMPRESSION: Some interval improvement in tibiotalar alignment on the  lateral view.    LISA LUKE MD   XR Surgery CHRISTIANO L/T 5 Min Fluoro    Narrative    This exam was marked as non-reportable because it will not be read by a   radiologist or a [place] non-radiologist provider.             XR Ankle Left 2 Views    Narrative    LEFT ANKLE TWO VIEWS  3/15/2018 2:12 PM    HISTORY:  Status post reduction.     COMPARISON:  3/15/2018, earlier today.      Impression    IMPRESSION:  The fracture dislocation at the ankle has been further  reduced. Mild 0.5 cm persistent lateral displacement of the distal  fibular fracture fragment. Ankle mortise has been restored. There is  likely mild widening of the lateral ankle mortise.   CBC with platelets differential   Result Value Ref Range    WBC 9.7 4.0 - 11.0 10e9/L    RBC Count 4.07 3.8 - 5.2 10e12/L    Hemoglobin 11.1 (L) 11.7 - 15.7 g/dL    Hematocrit 33.4 (L) 35.0 - 47.0 %    MCV 82 78 - 100 fl    MCH 27.3 26.5 - 33.0 pg    MCHC 33.2 31.5 - 36.5 g/dL    RDW 13.5 10.0 - 15.0 %    Platelet Count 250 150 - 450 10e9/L    Diff Method Automated Method     % Neutrophils 81.7 %    % Lymphocytes 10.4 %    % Monocytes 6.8 %    % Eosinophils 0.3 %    % Basophils 0.3 %    % Immature Granulocytes 0.5 %    Nucleated RBCs 0 0 /100    Absolute Neutrophil 8.0 1.6 - 8.3 10e9/L    Absolute Lymphocytes 1.0 0.8 - 5.3 10e9/L    Absolute Monocytes 0.7 0.0 - 1.3 10e9/L    Absolute Eosinophils 0.0 0.0 - 0.7 10e9/L    Absolute Basophils 0.0 0.0 - 0.2 10e9/L    Abs Immature Granulocytes 0.1 0 - 0.4 10e9/L    Absolute Nucleated RBC 0.0    Basic metabolic panel   Result Value Ref Range    Sodium 123 (L) 133 - 144 mmol/L    Potassium 5.0 3.4 - 5.3 mmol/L    Chloride 88 (L) 94 - 109 mmol/L    Carbon Dioxide 25 20 - 32 mmol/L    Anion Gap 10 3 - 14 mmol/L    Glucose 877 (HH) 70 - 99 mg/dL     Urea Nitrogen 48 (H) 7 - 30 mg/dL    Creatinine 1.37 (H) 0.52 - 1.04 mg/dL    GFR Estimate 39 (L) >60 mL/min/1.7m2    GFR Estimate If Black 48 (L) >60 mL/min/1.7m2    Calcium 9.1 8.5 - 10.1 mg/dL   UA with Microscopic   Result Value Ref Range    Color Urine Yellow     Appearance Urine Clear     Glucose Urine >499 (A) NEG^Negative mg/dL    Bilirubin Urine Negative NEG^Negative    Ketones Urine 5 (A) NEG^Negative mg/dL    Specific Gravity Urine 1.024 1.003 - 1.035    Blood Urine Small (A) NEG^Negative    pH Urine 6.0 5.0 - 7.0 pH    Protein Albumin Urine >499 (A) NEG^Negative mg/dL    Urobilinogen mg/dL 0.0 0.0 - 2.0 mg/dL    Nitrite Urine Negative NEG^Negative    Leukocyte Esterase Urine Negative NEG^Negative    Source Midstream Urine     WBC Urine 1 0 - 5 /HPF    RBC Urine 1 0 - 2 /HPF    Squamous Epithelial /HPF Urine <1 0 - 1 /HPF    Mucous Urine Present (A) NEG^Negative /LPF   Blood gas venous and oxyhgb   Result Value Ref Range    Ph Venous 7.38 7.32 - 7.43 pH    PCO2 Venous 46 40 - 50 mm Hg    PO2 Venous 28 25 - 47 mm Hg    Bicarbonate Venous 27 21 - 28 mmol/L    FIO2 Room Air     Oxyhemoglobin Venous 54 %    Base Excess Venous 1.6 mmol/L   Glucose by meter   Result Value Ref Range    Glucose >600 (HH) 70 - 99 mg/dL   Ketone Beta-Hydroxybutyrate Quantitative   Result Value Ref Range    Ketone Quantitative 1.4 (H) 0.0 - 0.6 mmol/L   Glucose by meter   Result Value Ref Range    Glucose >600 (HH) 70 - 99 mg/dL   Glucose   Result Value Ref Range    Glucose 743 (HH) 70 - 99 mg/dL   Glucose   Result Value Ref Range    Glucose 623 (HH) 70 - 99 mg/dL   Glucose   Result Value Ref Range    Glucose 444 (H) 70 - 99 mg/dL   EKG 12-lead, tracing only   Result Value Ref Range    Interpretation ECG Click View Image link to view waveform and result           Attestation:  I have reviewed today's vital signs, notes, medications, labs and imaging with Dr. Stone.  Amount of time performed on this consult: 45  minutes.    RIK Cristina CNP

## 2018-03-15 NOTE — IP AVS SNAPSHOT
[facility]    [address]    [facility] [address]    Phone:  [phone]    Fax:  [phone]                                       After Visit Summary   3/15/2018    Roxy Baeulieu    MRN: [id number]           After Visit Summary Signature Page     I have received my discharge instructions, and my questions have been answered. I have discussed any challenges I see with this plan with the nurse or doctor.    ..........................................................................................................................................  Patient/Patient Representative Signature      ..........................................................................................................................................  Patient Representative Print Name and Relationship to Patient    ..................................................               ................................................  Date                                            Time    ..........................................................................................................................................  Reviewed by Signature/Title    ...................................................              ..............................................  Date                                                            Time

## 2018-03-15 NOTE — PROGRESS NOTES
- Patient seen and examined in ED prior to reduction and splinting. Has left ankle dislocation and oblique left distal fibula fracture that will likely require surgical intervention. Has uncontrolled diabetes and hyperglycemia with BG's ranging from 600's- 800's today in ED. Agree with plan to admit to Hospitalist service for management of hyperglycemia and will also need pre-operative clearance. I will discuss timing of surgery further with Ortho Trauma Surgeon. NPO after midnight tonight, hold any anticoagulant medications, NWB on LLE. Full Ortho consult note to follow.

## 2018-03-15 NOTE — IP AVS SNAPSHOT
MRN:[id number]                      After Visit Summary   3/15/2018    Roxy Beaulieu    MRN: [id number]           Thank you!     Thank you for choosing [facility] for your care. Our goal is always to provide you with excellent care. Hearing back from our patients is one way we can continue to improve our services. Please take a few minutes to complete the written survey that you may receive in the mail after you visit. If you would like to speak to someone directly about your visit please contact Patient Relations at [phone]. Thank you!          Patient Information     Date Of Birth          1960        Designated Caregiver       Most Recent Value    Caregiver    Will someone help with your care after discharge? yes    Name of designated caregiver martha    Phone number of caregiver [phone]    Caregiver address [place]      About your hospital stay     You were admitted on:  March 15, 2018 You last received care in the:  [facility]    You were discharged on:  March 17, 2018        Reason for your hospital stay       Status post Open reduction and internal fixation of displaced left lateral malleolar fracture.  Uncontrolled diabetes with hyperglycemia                  Who to Call     For medical emergencies, please call 911.  For non-urgent questions about your medical care, please call your primary care provider or clinic, None  For questions related to your surgery, please call your surgery clinic        Attending Provider     Provider Specialty    Farzad Altamirano MD Emergency Medicine    Rachael Lopez MD Internal Medicine    Sumanth Wen MD Orthopedics       Primary Care Provider Fax #    Radha Faithet [phone]       When to contact your care team       Call Dr. Wen's office [phone] if you have any of the following: temperature greater than 101.5, increased numbness in left toes/foot and unable to  wiggle toes, increased drainage, increased swelling or increased pain that is not controlled with pain medications.                  After Care Instructions     Activity       Your activity upon discharge: Up with assist or assistive device as tolerated but non-weight bearing on your left lower extremity.            Diet       Follow this diet upon discharge: Orders Placed This Encounter      Consistent Carbohydrate Diet 2153-3981 Calories: Moderate Consistent CHO (4-6 CHO units/meal)            Discharge Instructions       Keep your left leg elevated on 2 to 3 pillows and apply ice for 20 minutes several times each day to decrease swelling.            Wound care and dressings       Instructions to care for your wound at home: Keep left leg splint clean and dry. Ok to shower but do not get splint wet, cover splint with plastic bag or leave left leg out of the shower if possible.                  Follow-up Appointments     Follow-up and recommended labs and tests        Orthopedic Follow Up: Please make a follow up appointment with Dr. Wen at [facility] in [place] 10-14 days after your surgery. Please call Dr. Wen's Care Coordinator, Mercedes, at [phone] to schedule this appointment.  Primary MD follow up within 1 week. Labs: cbc/BMP                  Pending Results     No orders found from 3/13/2018 to 3/16/2018.            Statement of Approval     Ordered          03/17/18 8299  I have reviewed and agree with all the recommendations and orders detailed in this document.  EFFECTIVE NOW     Approved and electronically signed by:  Sumanth Wen MD           03/17/18 6111  I have reviewed and agree with all the recommendations and orders detailed in this document.  EFFECTIVE NOW     Approved and electronically signed by:  Milton Julien MD             Admission Information     Date & Time Provider Department Dept. Phone    3/15/2018 Sumanth Wen MD [facility]  "[phone]      Your Vitals Were     Blood Pressure Pulse Temperature Respirations Height Weight    153/66 97 99.5  F (37.5  C) (Oral) 20 1.727 m (5' 8\") 83.5 kg (184 lb)    Pulse Oximetry BMI (Body Mass Index)                99% 27.98 kg/m2          My[employer]Cloudmeter Information     [employer] lets you send messages to your doctor, view your test results, renew your prescriptions, schedule appointments and more. To sign up, go to [email or web address] . Click on \"Log in\" on the left side of the screen, which will take you to the Welcome page. Then click on \"Sign up Now\" on the right side of the page.     You will be asked to enter the access code listed below, as well as some personal information. Please follow the directions to create your username and password.     Your access code is: [email or web address]  Expires: 6/15/2018  8:22 AM     Your access code will  in 90 days. If you need help or a new code, please call your [facility] clinic or [phone].        Care EveryWhere ID     This is your Care EveryWhere ID. This could be used by other organizations to access your [facility] medical records  [id number]        Equal Access to Services     JAIRO KIMBLE AH: Shon justiceo Sopatrick, waaxda luqadaha, qaybta kaalmada adeegyada, harshil hillman. So [facility] [phone].    ATENCIÓN: Si habla español, tiene a cramer disposición servicios gratuitos de asistencia lingüística. Alma al [phone].    We comply with applicable federal civil rights laws and [place] laws. We do not discriminate on the basis of race, color, national origin, age, disability, sex, sexual orientation, or gender identity.               Review of your medicines      START taking        Dose / Directions    acetaminophen 325 MG tablet   Commonly known as:  TYLENOL   Used for:  Closed fracture dislocation of left ankle, initial encounter        Dose:  975 mg   Take 3 tablets (975 mg) by mouth every 8 hours as needed for mild " pain or fever   Quantity:  100 tablet   Refills:  0       aspirin  MG EC tablet   Used for:  Closed fracture dislocation of left ankle, initial encounter        Dose:  325 mg   Take 1 tablet (325 mg) by mouth daily   Quantity:  30 tablet   Refills:  0       insulin aspart 100 UNIT/ML injection   Commonly known as:  NovoLOG FLEXPEN   Used for:  Diabetes mellitus without complication (H)        6 units before breakfast, 6 units before lunch, 6 units before dinner   Quantity:  15 mL   Refills:  1       insulin glargine 100 UNIT/ML injection   Commonly known as:  LANTUS SOLOSTAR   Used for:  Diabetes mellitus without complication (H)        Dose:  25 Units   Inject 25 Units Subcutaneous At Bedtime   Quantity:  7.5 mL   Refills:  1       oxyCODONE IR 5 MG tablet   Commonly known as:  ROXICODONE   Used for:  Closed fracture dislocation of left ankle, initial encounter        Dose:  5-10 mg   Take 1-2 tablets (5-10 mg) by mouth every 4 hours as needed for moderate to severe pain   Quantity:  30 tablet   Refills:  0       senna-docusate 8.6-50 MG per tablet   Commonly known as:  SENOKOT-S;PERICOLACE   Used for:  Closed fracture dislocation of left ankle, initial encounter        Dose:  1-2 tablet   Take 1-2 tablets by mouth 2 times daily as needed for constipation   Quantity:  30 tablet   Refills:  0            Where to get your medicines      These medications were sent to [facility] [address] [address]     Phone:  [phone]     acetaminophen 325 MG tablet    aspirin  MG EC tablet    insulin aspart 100 UNIT/ML injection    insulin glargine 100 UNIT/ML injection    senna-docusate 8.6-50 MG per tablet         Some of these will need a paper prescription and others can be bought over the counter. Ask your nurse if you have questions.     Bring a paper prescription for each of these medications     oxyCODONE IR 5 MG tablet                 Protect others around you: Learn how to safely use, store and throw away your medicines at www.disposemymeds.org.        Information about OPIOIDS     PRESCRIPTION OPIOIDS: WHAT YOU NEED TO KNOW    Prescription opioids can be used to help relieve moderate to severe pain and are often prescribed following a surgery or injury, or for certain health conditions. These medications can be an important part of treatment but also come with serious risks. It is important to work with your health care provider to make sure you are getting the safest, most effective care.    WHAT ARE THE RISKS AND SIDE EFFECTS OF OPIOID USE?  Prescription opioids carry serious risks of addiction and overdose, especially with prolonged use. An opioid overdose, often marked by slowed breathing can cause sudden death. The use of prescription opioids can have a number of side effects as well, even when taken as directed:      Tolerance - meaning you might need to take more of a medication for the same pain relief    Physical dependence - meaning you have symptoms of withdrawal when a medication is stopped    Increased sensitivity to pain    Constipation    Nausea, vomiting, and dry mouth    Sleepiness and dizziness    Confusion    Depression    Low levels of testosterone that can result in lower sex drive, energy, and strength    Itching and sweating    RISKS ARE GREATER WITH:    History of drug misuse, substance use disorder, or overdose    Mental health conditions (such as depression or anxiety)    Sleep apnea    Older age (65 years or older)    Pregnancy    Avoid alcohol while taking prescription opioids.   Also, unless specifically advised by your health care provider, medications to avoid include:    Benzodiazepines (such as Xanax or Valium)    Muscle relaxants (such as Soma or Flexeril)    Hypnotics (such as Ambien or Lunesta)    Other prescription opioids    KNOW YOUR OPTIONS:  Talk to your health care provider about ways to manage  your pain that do not involve prescription opioids. Some of these options may actually work better and have fewer risks and side effects:    Pain relievers such as acetaminophen, ibuprofen, and naproxen    Some medications that are also used for depression or seizures    Physical therapy and exercise    Cognitive behavioral therapy, a psychological, goal-directed approach, in which patients learn how to modify physical, behavioral, and emotional triggers of pain and stress    IF YOU ARE PRESCRIBED OPIOIDS FOR PAIN:    Never take opioids in greater amounts or more often than prescribed    Follow up with your primary health care provider and work together to create a plan on how to manage your pain.    Talk about ways to help manage your pain that do not involve prescription opioids    Talk about all concerns and side effects    Help prevent misuse and abuse    Never sell or share prescription opioids    Never use another person's prescription opioids    Store prescription opioids in a secure place and out of reach of others (this may include visitors, children, friends, and family)    Visit www.cdc.gov/drugoverdose to learn about risks of opioid abuse and overdose    If you believe you may be struggling with addiction, tell your health care provider and ask for guidance or call [facility]'s National Helpline at 8-007-221-HELP    LEARN MORE / www.cdc.gov/drugoverdose/prescribing/guideline.html    Safely dispose of unused prescription opioids: Find your local drug take-back programs and more information about the importance of safe disposal at www.doseofreality.mn.gov             Medication List: This is a list of all your medications and when to take them. Check marks below indicate your daily home schedule. Keep this list as a reference.      Medications           Morning Afternoon Evening Bedtime As Needed    acetaminophen 325 MG tablet   Commonly known as:  TYLENOL   Take 3 tablets (975 mg) by mouth every 8 hours as  needed for mild pain or fever                                aspirin  MG EC tablet   Take 1 tablet (325 mg) by mouth daily                                insulin aspart 100 UNIT/ML injection   Commonly known as:  NovoLOG FLEXPEN   6 units before breakfast, 6 units before lunch, 6 units before dinner   Last time this was given:  8 Units on 3/17/2018  7:45 AM                                insulin glargine 100 UNIT/ML injection   Commonly known as:  LANTUS SOLOSTAR   Inject 25 Units Subcutaneous At Bedtime                                oxyCODONE IR 5 MG tablet   Commonly known as:  ROXICODONE   Take 1-2 tablets (5-10 mg) by mouth every 4 hours as needed for moderate to severe pain                                senna-docusate 8.6-50 MG per tablet   Commonly known as:  SENOKOT-S;PERICOLACE   Take 1-2 tablets by mouth 2 times daily as needed for constipation

## 2018-03-15 NOTE — H&P
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  His blood sugar was elevated at 877.  Lab workup showed ketone 1.4, creatinine 1.37, sodium 123.  X-ray of the left ankle joint showed left distal fibular fracture dislocation.  In the ER, she was started on IV fluid boluses and insulin drip for hyperglycemia with ketosis.  His left ankle was also immobilized.  Orthopedic surgery was consulted and recommended admission for further evaluation and management.    1.  Recent left ankle sprain with left distal fibular fracture with dislocation: Status post reduction and splinting in the ER.   --  X-ray of the left ankle reveals distal fibular fracture with dislocation.  -- Patient was seen by orthopedic surgery PA in the ER and was recommended admission for possible surgical intervention tomorrow.    --Will put him on Tylenol and oxycodone as needed for pain control.   --Will control her blood sugar prior to her anticipated surgery.  She was already started on IV fluid and insulin drip in the ER    2.  Hyperglycemia with ketosis in a patient with poorly controlled type 2 diabetes mellitus with medication noncompliance.  --Patient does not been taking her insulin for the last 8 months.  She she stated that she was unable to afford her medications.  She has not seen a physician for more than 8 months  --Blood sugar 877, ketone 1.4.  She was given 2 L of Ringer's lactate in the ER.  She was started on insulin drip.  We will continue IV fluid and  insulin drip.  --We will check a hemoglobin A1c    3.  Acute kidney injury:  Likely due to dehydration with hyperglycemia.Her baseline creatinine is around 1.1.  Creatinine elevated at 1.37.   --Will continue IV fluid. Will monitor her renal function.    4.  Hyponatremia likely due to hyperglycemia with dehydration: She was given 2 L of lactated Ringer's in the ER.  Will continue IV fluid.  Will recheck her sodium level.    5.  Social/financial issues: Will consult  for assessment of a financial/insurance issues    Will admit patient to inpatient status.    DVT Prophylaxis: Pneumatic Compression Devices    Code Status: Full Code    Disposition: Expected discharge: Needs more than 2 nights of hospital course until her blood sugar is controlled and in her left ankle fracture is managed per ortho.     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  She stated that she sprained her left ankle 2 weeks ago but she has not seen a doctor for that.  She applied Ace bandage to her left ankle.  She stated that yesterday.  Ankle bone 'popped out' while she was walking.  She started to have pain left ankle.  She stated that she had infection of left foot last May for which she required surgery.  Patient was noted to have sutures on his left ankle from where surgery about a year ago.  Patient has not been following with her physician.  She stated that she did not get her insulin refill  for more than 8 months.  On arrival to emergency  room, her vital signs showed temperature 98.1, pulse 96, blood pressure 159/91, oxygen saturation 95% on room air.  Lab workup showed sodium 123, potassium 5.0, creatinine 1.37, blood glucose 877, ketones 1.4, WBC 9.7, hemoglobin 11.1.  X-ray of the left ankle was done and showed left distal fibular fracture with dislocation.  In the ER, she was started on IV fluid and insulin drip.  Orthopedic surgery was consulted and recommended admission for further management.  She was admitted to inpatient status.    Past Medical History    I have reviewed this patient's medical history and updated it with pertinent information if needed.   Past Medical History:   Diagnosis Date     Diabetes (H)      Gangrene of toe (H) 4/29/2017     High cholesterol      Hypertension      Obesity 4/29/2017     Personal history of tobacco use, presenting hazards to health 4/29/2017     Uncontrolled type 2 diabetes mellitus with hyperglycemia, with long-term current use of insulin (H) 4/29/2017       Past Surgical History   I have reviewed this patient's surgical history and updated it with pertinent information if needed.  Past Surgical History:   Procedure Laterality Date     AMPUTATE TOE(S) Left 5/1/2017    Procedure: AMPUTATE TOE(S);  Amputate first and second left toes;  Surgeon: Verna Fofana DPM, Podiatry/Foot and Ankle Surgery;  Location: [facility] OR       Prior to Admission Medications   None     Allergies   No Known Allergies    Social History   I have reviewed this patient's social history and updated it with pertinent information if needed. Roxy Beaulieu  reports that she quit smoking about 19 months ago. She has never used smokeless tobacco. She reports that she does not drink alcohol or use illicit drugs.    Family History   I have reviewed this patient's family history and updated it with pertinent information if needed.   She stated that she does not know her family history as she does biological parents    Review of Systems    The 10 point Review of Systems is negative other than noted in the HPI or here.  Recent fall, left ankle pain    Physical Exam   Temp: 98.1  F (36.7  C) Temp src: Oral BP: 123/55 Pulse: 96 Heart Rate: 93 Resp: 21 SpO2: 95 %      Vital Signs with Ranges  Temp:  [98  F (36.7  C)-98.1  F (36.7  C)] 98.1  F (36.7  C)  Pulse:  [96] 96  Heart Rate:  [91-94] 93  Resp:  [11-53] 21  BP: ()/(55-93) 123/55  SpO2:  [94 %-100 %] 95 %  210 lbs 0 oz    GEN:  Alert, oriented x 3, appears comfortable, NAD.  HEENT:  Normocephalic/atraumatic, no scleral icterus, no nasal discharge, mouth moist.  CV:  Regular rate and rhythm, no murmur or JVD.  S1 + S2 noted, no S3 or S4.  LUNGS:  Clear to auscultation bilaterally without rales/rhonchi/wheezing/retractions.  Symmetric chest rise on inhalation noted.  ABD:  Active bowel sounds, soft, non-tender/non-distended.  No rebound/guarding/rigidity.  EXT:  No edema or cyanosis.  Left lower extremity immobilized.  She has evidence of partial amputation of left big toe and foot amputation of left second toe.  SKIN:  Dry to touch, no exanthems noted in the visualized areas.  NEURO:  Symmetric muscle strength, sensation to touch grossly intact.  No new focal deficits appreciated.    Data   Data reviewed today:  I personally reviewed x-ray of the left ankle showed left distal fibular fracture    Recent Labs  Lab 03/15/18  1348 03/15/18  1245 03/15/18  0945   WBC  --   --  9.7   HGB  --   --  11.1*   MCV  --   --  82   PLT  --   --  250   NA  --   --  123*   POTASSIUM  --   --  5.0   CHLORIDE  --   --  88*   CO2  --   --  25   BUN  --   --  48*   CR  --   --  1.37*   ANIONGAP  --   --  10   GILL  --   --  9.1   * 743* 877*       Recent Results (from the past 24 hour(s))   Ankle XR, G/E 3 views, left    Narrative    XR ANKLE LT G/E 3 VW 3/15/2018 10:05 AM    HISTORY: Left ankle pain and deformity.    COMPARISON: None.    FINDINGS: Oblique distal fibular fracture with angulation  and  foreshortening at the fracture site. No definite tibial fracture  although there is anterior subluxation of the distal tibia relative to  the talar dome and marked widening of the medial tibiotalar joint  space consistent with deltoid ligament disruption.      Impression    IMPRESSION: Distal fibular fracture, anterior subluxation of the  distal tibia and soft tissue injury.    LISA LUKE MD   Tib/Fib XR, left    Narrative    XR TIBIA & FIBULA LT 2 VW 3/15/2018 11:06 AM    HISTORY: Ankle fracture.    COMPARISON: Ankle, 3/15/2018    FINDINGS: Proximal tibia and fibula are intact. Distal fibular  fracture is evident on the AP view.      Impression    IMPRESSION: Proximal tibia and fibula are intact.    LISA LUKE MD   Ankle XR, G/E 3 views, left    Narrative    XR ANKLE LT G/E 3 VW 3/15/2018 12:16 PM    HISTORY: Postreduction.    COMPARISON: 3/15/2018, prereduction    FINDINGS: Marked widening of the medial tibiotalar joint space and  angulation/foreshortening at the fibular fracture site are not  significantly changed. On the lateral view there does appear to be  some interval improvement in alignment between the tibial articular  surface and the talar dome.      Impression    IMPRESSION: Some interval improvement in tibiotalar alignment on the  lateral view.    LISA LUKE MD   XR Surgery CHRISTIANO L/T 5 Min Fluoro    Narrative    This exam was marked as non-reportable because it will not be read by a   radiologist or a [place] non-radiologist provider.

## 2018-03-15 NOTE — ED NOTES
"Presents with right ankle pain.  States sprained ankle a few days ago. This morning, she states that the right interior ankle \"popped out.\"  It does appear deformed, swollen and red.  States she cannot bear weight.  If she tries, the pain is 10/10. She states that she can \"pop the ankle bone back into place if she tries.\" Had 2nd toe amputated last year and stitches are still intact.  Non-compliant diabetic.  Does not take any insulin or check BS.  Last insulin was 8 months ago.  States cannot afford any meds.    "

## 2018-03-15 NOTE — PHARMACY-ADMISSION MEDICATION HISTORY
Admission medication history interview status for this patient is complete. See [facility] admission navigator for allergy information, prior to admission medications and immunization status.     Medication history interview source(s):Patient  Medication history resources (including written lists, pill bottles, clinic record):None  Primary pharmacy: n/a    Changes made to PTA medication list:  Added: none  Deleted:  All meds - atorvastatin, glipizide, humulin 70/30, lisinopril, metoprolol  Changed: none    Actions taken by pharmacist (provider contacted, etc):None     Additional medication history information:    **Pt states she stopped all meds last summer d/t cost.       Medication reconciliation/reorder completed by provider prior to medication history? No    Do you take OTC medications (eg tylenol, ibuprofen, fish oil, eye/ear drops, etc)? N(Y/N)    For patients on insulin therapy: N (Y/N)    Patient had been using humulin 70/30 10-15 units twice daily but has not used since last summer d/t cost.         Prior to Admission medications    Not on File

## 2018-03-15 NOTE — ED PROVIDER NOTES
"  History     Chief Complaint:    Ankle Pain      HPI   Roxy Beaulieu is a 57 year old female who presents with ankle pain. Patient states that she sprained her left ankle 2 weeks ago but was not seen by a doctor and she just treated herself at home with an Ace bandage. Yesterday while walking from one room to another she felt large pop and a 10 out of 10 pain in her left ankle. After removing her Ace bandage she noted a left ankle deformity and said that she was able to \"pop her ankle back into place but that it does not stay.\" On exam today there is left notable left ankle deformity CMS is intact distally with palpable pulses.  Of note, the patient does have a long history of uncontrolled diabetes she has a history of osteomyelitis in her left foot had a procedure for osteomyelitis one year ago sutures are still in place from that procedure, no signs of infection on exam today. Patient also states she has not taken her insulin and or any of her other medications in over 8 months because she could not afford. No other concerns or complaints today.    Allergies:    No Known Allergies     Medications:        glipiZIDE (GLUCOTROL) 10 MG tablet   insulin isophane & regular (HUMULIN MIX 70/30 PEN , NOVOLIN MIX 70/30 PEN) susp   lisinopril (PRINIVIL/ZESTRIL) 40 MG tablet   metoprolol (LOPRESSOR) 25 MG tablet   amoxicillin-clavulanate (AUGMENTIN) 875-125 MG per tablet   insulin pen needle 31G X 8 MM   syringe/needle, disp, 30G X 1/2\" 1 ML MISC   Atorvastatin Calcium (LIPITOR PO)       Problem List:      Patient Active Problem List    Diagnosis Date Noted     Gangrene of toe (H) 04/29/2017     Priority: High     Obesity 04/29/2017     Priority: High     Left foot infection 04/29/2017     Priority: Medium     Uncontrolled type 2 diabetes mellitus with hyperglycemia, with long-term current use of insulin (H) 04/29/2017     Priority: Medium     Personal history of tobacco use, presenting hazards to health 04/29/2017     " "Priority: Medium        Past Medical History:      Past Medical History:   Diagnosis Date     Diabetes (H)      Gangrene of toe (H) 4/29/2017     High cholesterol      Hypertension      Obesity 4/29/2017     Personal history of tobacco use, presenting hazards to health 4/29/2017     Uncontrolled type 2 diabetes mellitus with hyperglycemia, with long-term current use of insulin (H) 4/29/2017       Past Surgical History:      Past Surgical History:   Procedure Laterality Date     AMPUTATE TOE(S) Left 5/1/2017    Procedure: AMPUTATE TOE(S);  Amputate first and second left toes;  Surgeon: Verna Fofana DPM, Podiatry/Foot and Ankle Surgery;  Location: [facility] OR       Family History:      No family history on file.    Social History:    Marital Status:  Single [1]  Social History   Substance Use Topics     Smoking status: Former Smoker     Quit date: 7/25/2016     Smokeless tobacco: Never Used     Alcohol use No        Review of Systems   Constitutional: Negative for activity change, appetite change, chills and fever.   HENT: Negative for congestion and rhinorrhea.    Eyes: Negative for visual disturbance.   Respiratory: Negative for cough, chest tightness and shortness of breath.    Cardiovascular: Negative for chest pain and palpitations.   Gastrointestinal: Negative for abdominal pain.   Genitourinary: Negative for difficulty urinating.   Musculoskeletal: Positive for arthralgias and joint swelling. Negative for back pain.   Neurological: Negative for dizziness, weakness, light-headedness and headaches.   Psychiatric/Behavioral: Negative for confusion.   All other systems reviewed and are negative.        Physical Exam   First Vitals:  BP: (!) 159/91  Pulse: 96  Heart Rate: 91  Temp: 98  F (36.7  C)  Resp: 18  Height: 170.2 cm (5' 7\")  Weight: 95.3 kg (210 lb)  SpO2: 95 %      Physical Exam    Exam:  Constitutional: healthy, alert and no distress  Head: Normocephalic. No masses, lesions, tenderness or " abnormalities  Neck: Neck supple. No adenopathy. Thyroid symmetric, normal size,, Carotids without bruits.  ENT: ENT exam normal, no neck nodes or sinus tenderness  Cardiovascular: negative, PMI normal. No lifts, heaves, or thrills. RRR. No murmurs, clicks gallops or rub  Respiratory: negative, Percussion normal. Good diaphragmatic excursion. Lungs clear  Gastrointestinal: Abdomen soft, non-tender. BS normal. No masses, organomegaly  : Deferred  Musculoskeletal: Grossly deformed L ankle. extremities normal- no gross deformities noted, gait normal and normal muscle tone  Skin: no suspicious lesions or rashes  Neurologic: Gait normal. Reflexes normal and symmetric. Sensation grossly WNL.  Psychiatric: mentation appears normal and affect normal/bright  Hematologic/Lymphatic/Immunologic: Normal cervical lymph nodes      Emergency Department Course     Imaging:  Radiographic findings were communicated with the patient who voiced understanding of the findings.  XR Surgery CHRISTIANO L/T 5 Min Fluoro   Final Result      Ankle XR, G/E 3 views, left   Final Result   IMPRESSION: Some interval improvement in tibiotalar alignment on the   lateral view.      LISA LUKE MD      Tib/Fib XR, left   Final Result   IMPRESSION: Proximal tibia and fibula are intact.      LSIA LUKE MD      Ankle XR, G/E 3 views, left   Final Result   IMPRESSION: Distal fibular fracture, anterior subluxation of the   distal tibia and soft tissue injury.      LISA LUKE MD      XR Ankle Left 2 Views    (Results Pending)         Laboratory:  Labs Ordered and Resulted from Time of ED Arrival Up to the Time of Departure from the ED   CBC WITH PLATELETS DIFFERENTIAL - Abnormal; Notable for the following:        Result Value    Hemoglobin 11.1 (*)     Hematocrit 33.4 (*)     All other components within normal limits   BASIC METABOLIC PANEL - Abnormal; Notable for the following:     Sodium 123 (*)     Chloride 88 (*)     Glucose 877 (*)     Urea Nitrogen  48 (*)     Creatinine 1.37 (*)     GFR Estimate 39 (*)     GFR Estimate If Black 48 (*)     All other components within normal limits   GLUCOSE BY METER - Abnormal; Notable for the following:     Glucose >600 (*)     All other components within normal limits   KETONE BETA-HYDROXYBUTYRATE QUANTITATIVE - Abnormal; Notable for the following:     Ketone Quantitative 1.4 (*)     All other components within normal limits   GLUCOSE BY METER - Abnormal; Notable for the following:     Glucose >600 (*)     All other components within normal limits   GLUCOSE - Abnormal; Notable for the following:     Glucose 743 (*)     All other components within normal limits   GLUCOSE - Abnormal; Notable for the following:     Glucose 623 (*)     All other components within normal limits   GLUCOSE MONITOR NURSING POCT   BLOOD GAS VENOUS AND OXYHGB   GLUCOSE MONITOR NURSING POCT   ROUTINE UA WITH MICROSCOPIC   GLUCOSE   PERIPHERAL IV CATHETER   CARDIAC CONTINUOUS MONITORING   CARDIAC CONTINUOUS MONITORING   PERIPHERAL IV CATHETER   IV ACCESS   GLUCOSE BY METER POCT   GLUCOSE BY METER POCT   GLUCOSE BY METER POCT   GLUCOSE BY METER POCT   GLUCOSE BY METER POCT         Procedures:  Medications   lactated ringers BOLUS 1,000 mL (1,000 mLs Intravenous New Bag 3/15/18 1204)     Followed by   lactated ringers BOLUS 1,000 mL (not administered)     Followed by   lactated ringers infusion (not administered)   dextrose 50 % injection 25-50 mL (not administered)   insulin 1 unit/1 mL in NS (NovoLIN, HumuLIN Regular) drip -ED DKA algorithm ( Intravenous Rate/Dose Verify 3/15/18 1432)   fentaNYL (PF) (SUBLIMAZE) injection 100 mcg (100 mcg Intravenous Given 3/15/18 1125)             Interventions:      Procedure Note: Reduction of Fractured ankle   Physician: Farzad Oconnell MD  Diagnosis: ankle dislocation/fracture   Consent: Informed written, see paper chart  Description of Procedure: Consent as above.  Patient positioned in supine position.  Nasal  fentanyl was used for pain control.  The foot was grasped above and below fracture.  Recreating mechanism of injury the fracture area was reduced successfully.   The neurovascular exam was normal before and after reduction attempt.  The patient tolerated the procedure well, there were no complications.      Procedure Note: Splint Placement  Physician: Farzad Altamirano MD  Diagnosis: ankle dislocation/fracture  Consent: Informed written, see paper chart  Description of Procedure:  Following reduction of the ankle fracture, the leg was placed in a posterior splint with lateral stabilization with orthoglass material.  The ankle was maintained at  Anatomical position.  The neurovascular exam was normal before and after splint placement.  The patient tolerated the procedure well, there were no complications.       Impression & Plan           Medical Decision Making:    Roxy Beaulieu is a 57 year old female who presents for evaluation of ankle pain.  Signs and symptoms are consistent with an ankle fracture with dislocation.  This is confirmed with plain films of the ankle. No signs of septic arthritis, gout, pseudogout, fracture, cellulitis, etc.  There are no signs of fracture.  The patients neurovascular status is normal. A head to toe trauma exam is otherwise negative; the likelihood of other serious sequelae of trauma (spine, head, chest, abdomen, other extremities, pelvis) is low.  Ankle was reduced in the ED and ortho was consulted and they were unsure if she would require surgical intervention and would follow up on her upon her admission to the hospital for hyperglycemia as her blood sugars need to be under control before she can go to the OR.     Of note the patient has a long history of poorly controlled diabetes. States that she has not taken her insulin in over 8 months or any of her other medications because she could not afford them. Blood glucose in the ED on presentation was 877, ketones 1.4. VBG  shows no signs of DKA, no clinical features to suggest hyperosmolar issues. EKG was done and showed no signs of hyperkalemia. Blood work confirms normal potassium level of 5. She was started on insulin pump and given 2 liters LR in the ED. She is stable for admission.     Diagnosis:    ICD-10-CM    1. Hyperglycemia R73.9    2. Closed fracture of left ankle, initial encounter S82.892A        Disposition:  Admitted to floor for IV insulin.     Discharge Medications:  New Prescriptions    No medications on file         Alphonse Andersen  3/15/2018   [facility] EMERGENCY DEPARTMENT       Alphonse Andersen PA-C  03/15/18 [id number]

## 2018-03-16 ENCOUNTER — ANESTHESIA (OUTPATIENT)
Dept: SURGERY | Facility: CLINIC | Age: 58
DRG: 493 | End: 2018-03-16

## 2018-03-16 ENCOUNTER — ANESTHESIA EVENT (OUTPATIENT)
Dept: SURGERY | Facility: CLINIC | Age: 58
DRG: 493 | End: 2018-03-16

## 2018-03-16 ENCOUNTER — APPOINTMENT (OUTPATIENT)
Dept: GENERAL RADIOLOGY | Facility: CLINIC | Age: 58
DRG: 493 | End: 2018-03-16
Attending: INTERNAL MEDICINE

## 2018-03-16 PROBLEM — S82.892A ANKLE FRACTURE, LEFT: Status: ACTIVE | Noted: 2018-03-16

## 2018-03-16 LAB
ANION GAP SERPL CALCULATED.3IONS-SCNC: 4 MMOL/L (ref 3–14)
BASOPHILS # BLD AUTO: 0.1 10E9/L (ref 0–0.2)
BASOPHILS NFR BLD AUTO: 0.6 %
BUN SERPL-MCNC: 43 MG/DL (ref 7–30)
CALCIUM SERPL-MCNC: 8.6 MG/DL (ref 8.5–10.1)
CHLORIDE SERPL-SCNC: 104 MMOL/L (ref 94–109)
CO2 SERPL-SCNC: 30 MMOL/L (ref 20–32)
CREAT SERPL-MCNC: 1.34 MG/DL (ref 0.52–1.04)
DIFFERENTIAL METHOD BLD: ABNORMAL
EOSINOPHIL # BLD AUTO: 0.2 10E9/L (ref 0–0.7)
EOSINOPHIL NFR BLD AUTO: 2 %
ERYTHROCYTE [DISTWIDTH] IN BLOOD BY AUTOMATED COUNT: 13.8 % (ref 10–15)
GFR SERPL CREATININE-BSD FRML MDRD: 40 ML/MIN/1.7M2
GLUCOSE BLDC GLUCOMTR-MCNC: 114 MG/DL (ref 70–99)
GLUCOSE BLDC GLUCOMTR-MCNC: 126 MG/DL (ref 70–99)
GLUCOSE BLDC GLUCOMTR-MCNC: 138 MG/DL (ref 70–99)
GLUCOSE BLDC GLUCOMTR-MCNC: 140 MG/DL (ref 70–99)
GLUCOSE BLDC GLUCOMTR-MCNC: 142 MG/DL (ref 70–99)
GLUCOSE BLDC GLUCOMTR-MCNC: 149 MG/DL (ref 70–99)
GLUCOSE BLDC GLUCOMTR-MCNC: 150 MG/DL (ref 70–99)
GLUCOSE BLDC GLUCOMTR-MCNC: 153 MG/DL (ref 70–99)
GLUCOSE BLDC GLUCOMTR-MCNC: 162 MG/DL (ref 70–99)
GLUCOSE BLDC GLUCOMTR-MCNC: 164 MG/DL (ref 70–99)
GLUCOSE BLDC GLUCOMTR-MCNC: 165 MG/DL (ref 70–99)
GLUCOSE BLDC GLUCOMTR-MCNC: 167 MG/DL (ref 70–99)
GLUCOSE BLDC GLUCOMTR-MCNC: 183 MG/DL (ref 70–99)
GLUCOSE BLDC GLUCOMTR-MCNC: 188 MG/DL (ref 70–99)
GLUCOSE BLDC GLUCOMTR-MCNC: 205 MG/DL (ref 70–99)
GLUCOSE BLDC GLUCOMTR-MCNC: 231 MG/DL (ref 70–99)
GLUCOSE BLDC GLUCOMTR-MCNC: 399 MG/DL (ref 70–99)
GLUCOSE SERPL-MCNC: 201 MG/DL (ref 70–99)
HBA1C MFR BLD: >16 % (ref 4.3–6)
HCT VFR BLD AUTO: 31.8 % (ref 35–47)
HGB BLD-MCNC: 10.4 G/DL (ref 11.7–15.7)
IMM GRANULOCYTES # BLD: 0 10E9/L (ref 0–0.4)
IMM GRANULOCYTES NFR BLD: 0.3 %
LYMPHOCYTES # BLD AUTO: 2 10E9/L (ref 0.8–5.3)
LYMPHOCYTES NFR BLD AUTO: 23 %
MCH RBC QN AUTO: 27.4 PG (ref 26.5–33)
MCHC RBC AUTO-ENTMCNC: 32.7 G/DL (ref 31.5–36.5)
MCV RBC AUTO: 84 FL (ref 78–100)
MONOCYTES # BLD AUTO: 0.6 10E9/L (ref 0–1.3)
MONOCYTES NFR BLD AUTO: 7 %
NEUTROPHILS # BLD AUTO: 5.8 10E9/L (ref 1.6–8.3)
NEUTROPHILS NFR BLD AUTO: 67.1 %
NRBC # BLD AUTO: 0 10*3/UL
NRBC BLD AUTO-RTO: 0 /100
PLATELET # BLD AUTO: 242 10E9/L (ref 150–450)
POTASSIUM SERPL-SCNC: 3.6 MMOL/L (ref 3.4–5.3)
RBC # BLD AUTO: 3.8 10E12/L (ref 3.8–5.2)
SODIUM SERPL-SCNC: 138 MMOL/L (ref 133–144)
WBC # BLD AUTO: 8.7 10E9/L (ref 4–11)

## 2018-03-16 PROCEDURE — 27210794 ZZH OR GENERAL SUPPLY STERILE: Performed by: ORTHOPAEDIC SURGERY

## 2018-03-16 PROCEDURE — 37000009 ZZH ANESTHESIA TECHNICAL FEE, EACH ADDTL 15 MIN: Performed by: ORTHOPAEDIC SURGERY

## 2018-03-16 PROCEDURE — 25000128 H RX IP 250 OP 636: Performed by: ORTHOPAEDIC SURGERY

## 2018-03-16 PROCEDURE — 25000128 H RX IP 250 OP 636: Performed by: PHYSICIAN ASSISTANT

## 2018-03-16 PROCEDURE — 71000013 ZZH RECOVERY PHASE 1 LEVEL 1 EA ADDTL HR: Performed by: ORTHOPAEDIC SURGERY

## 2018-03-16 PROCEDURE — 25000566 ZZH SEVOFLURANE, EA 15 MIN: Performed by: ORTHOPAEDIC SURGERY

## 2018-03-16 PROCEDURE — 27210995 ZZH RX 272: Performed by: ORTHOPAEDIC SURGERY

## 2018-03-16 PROCEDURE — C1776 JOINT DEVICE (IMPLANTABLE): HCPCS | Performed by: ORTHOPAEDIC SURGERY

## 2018-03-16 PROCEDURE — 80048 BASIC METABOLIC PNL TOTAL CA: CPT | Performed by: INTERNAL MEDICINE

## 2018-03-16 PROCEDURE — 37000008 ZZH ANESTHESIA TECHNICAL FEE, 1ST 30 MIN: Performed by: ORTHOPAEDIC SURGERY

## 2018-03-16 PROCEDURE — 36415 COLL VENOUS BLD VENIPUNCTURE: CPT | Performed by: INTERNAL MEDICINE

## 2018-03-16 PROCEDURE — 25000125 ZZHC RX 250: Performed by: NURSE ANESTHETIST, CERTIFIED REGISTERED

## 2018-03-16 PROCEDURE — 83036 HEMOGLOBIN GLYCOSYLATED A1C: CPT | Performed by: INTERNAL MEDICINE

## 2018-03-16 PROCEDURE — 00000146 ZZHCL STATISTIC GLUCOSE BY METER IP

## 2018-03-16 PROCEDURE — 25000131 ZZH RX MED GY IP 250 OP 636 PS 637: Performed by: PHYSICIAN ASSISTANT

## 2018-03-16 PROCEDURE — 25000128 H RX IP 250 OP 636: Performed by: ANESTHESIOLOGY

## 2018-03-16 PROCEDURE — 40000306 ZZH STATISTIC PRE PROC ASSESS II: Performed by: ORTHOPAEDIC SURGERY

## 2018-03-16 PROCEDURE — 36000063 ZZH SURGERY LEVEL 4 EA 15 ADDTL MIN: Performed by: ORTHOPAEDIC SURGERY

## 2018-03-16 PROCEDURE — 25000128 H RX IP 250 OP 636: Performed by: NURSE ANESTHETIST, CERTIFIED REGISTERED

## 2018-03-16 PROCEDURE — 85025 COMPLETE CBC W/AUTO DIFF WBC: CPT | Performed by: INTERNAL MEDICINE

## 2018-03-16 PROCEDURE — 25000128 H RX IP 250 OP 636: Performed by: INTERNAL MEDICINE

## 2018-03-16 PROCEDURE — 99231 SBSQ HOSP IP/OBS SF/LOW 25: CPT | Performed by: INTERNAL MEDICINE

## 2018-03-16 PROCEDURE — 71000012 ZZH RECOVERY PHASE 1 LEVEL 1 FIRST HR: Performed by: ORTHOPAEDIC SURGERY

## 2018-03-16 PROCEDURE — C1713 ANCHOR/SCREW BN/BN,TIS/BN: HCPCS | Performed by: ORTHOPAEDIC SURGERY

## 2018-03-16 PROCEDURE — 25000131 ZZH RX MED GY IP 250 OP 636 PS 637: Performed by: INTERNAL MEDICINE

## 2018-03-16 PROCEDURE — 0QSK04Z REPOSITION LEFT FIBULA WITH INTERNAL FIXATION DEVICE, OPEN APPROACH: ICD-10-PCS | Performed by: ORTHOPAEDIC SURGERY

## 2018-03-16 PROCEDURE — 99207 ZZC CDG-MDM COMPONENT: MEETS LOW - DOWN CODED: CPT | Performed by: INTERNAL MEDICINE

## 2018-03-16 PROCEDURE — 36000065 ZZH SURGERY LEVEL 4 W FLUORO 1ST 30 MIN: Performed by: ORTHOPAEDIC SURGERY

## 2018-03-16 PROCEDURE — 12000007 ZZH R&B INTERMEDIATE

## 2018-03-16 PROCEDURE — 40000277 XR SURGERY CARM FLUORO LESS THAN 5 MIN W STILLS

## 2018-03-16 DEVICE — IMPLANTABLE DEVICE: Type: IMPLANTABLE DEVICE | Site: ANKLE | Status: FUNCTIONAL

## 2018-03-16 DEVICE — IMP SCR SYN LCP DIST 2.7X16MM SELF TAP SS 202.216: Type: IMPLANTABLE DEVICE | Site: ANKLE | Status: FUNCTIONAL

## 2018-03-16 DEVICE — IMP SCR SYN LCP DIST 2.7X14MM SELF TAP SS 202.214: Type: IMPLANTABLE DEVICE | Site: ANKLE | Status: FUNCTIONAL

## 2018-03-16 DEVICE — IMP SCR SYN LCP DIST 2.7X12MM SELF TAP SS 202.212: Type: IMPLANTABLE DEVICE | Site: ANKLE | Status: FUNCTIONAL

## 2018-03-16 DEVICE — IMP SCR SYN CORTEX 3.5X14MM SELF TAP SS 204.814: Type: IMPLANTABLE DEVICE | Site: ANKLE | Status: FUNCTIONAL

## 2018-03-16 DEVICE — IMP SCR SYN 3.5X14MM LOCKING W/STARDRIVE SS 212.103: Type: IMPLANTABLE DEVICE | Site: ANKLE | Status: FUNCTIONAL

## 2018-03-16 RX ORDER — SODIUM CHLORIDE, SODIUM LACTATE, POTASSIUM CHLORIDE, CALCIUM CHLORIDE 600; 310; 30; 20 MG/100ML; MG/100ML; MG/100ML; MG/100ML
INJECTION, SOLUTION INTRAVENOUS CONTINUOUS
Status: DISCONTINUED | OUTPATIENT
Start: 2018-03-16 | End: 2018-03-16 | Stop reason: HOSPADM

## 2018-03-16 RX ORDER — FENTANYL CITRATE 50 UG/ML
25-50 INJECTION, SOLUTION INTRAMUSCULAR; INTRAVENOUS
Status: CANCELLED | OUTPATIENT
Start: 2018-03-16

## 2018-03-16 RX ORDER — GLYCOPYRROLATE 0.2 MG/ML
INJECTION, SOLUTION INTRAMUSCULAR; INTRAVENOUS PRN
Status: DISCONTINUED | OUTPATIENT
Start: 2018-03-16 | End: 2018-03-16

## 2018-03-16 RX ORDER — OXYCODONE HYDROCHLORIDE 5 MG/1
5-10 TABLET ORAL EVERY 4 HOURS PRN
Status: DISCONTINUED | OUTPATIENT
Start: 2018-03-16 | End: 2018-03-17 | Stop reason: HOSPADM

## 2018-03-16 RX ORDER — NICOTINE POLACRILEX 4 MG
15-30 LOZENGE BUCCAL
Status: DISCONTINUED | OUTPATIENT
Start: 2018-03-16 | End: 2018-03-17 | Stop reason: HOSPADM

## 2018-03-16 RX ORDER — NALOXONE HYDROCHLORIDE 0.4 MG/ML
.1-.4 INJECTION, SOLUTION INTRAMUSCULAR; INTRAVENOUS; SUBCUTANEOUS
Status: DISCONTINUED | OUTPATIENT
Start: 2018-03-16 | End: 2018-03-17 | Stop reason: HOSPADM

## 2018-03-16 RX ORDER — ONDANSETRON 2 MG/ML
4 INJECTION INTRAMUSCULAR; INTRAVENOUS EVERY 30 MIN PRN
Status: DISCONTINUED | OUTPATIENT
Start: 2018-03-16 | End: 2018-03-16 | Stop reason: HOSPADM

## 2018-03-16 RX ORDER — CEFAZOLIN SODIUM 2 G/100ML
2 INJECTION, SOLUTION INTRAVENOUS
Status: COMPLETED | OUTPATIENT
Start: 2018-03-16 | End: 2018-03-16

## 2018-03-16 RX ORDER — LABETALOL HYDROCHLORIDE 5 MG/ML
10 INJECTION, SOLUTION INTRAVENOUS
Status: DISCONTINUED | OUTPATIENT
Start: 2018-03-16 | End: 2018-03-16 | Stop reason: HOSPADM

## 2018-03-16 RX ORDER — AMOXICILLIN 250 MG
1-2 CAPSULE ORAL 2 TIMES DAILY
Status: DISCONTINUED | OUTPATIENT
Start: 2018-03-16 | End: 2018-03-17 | Stop reason: HOSPADM

## 2018-03-16 RX ORDER — ACETAMINOPHEN 325 MG/1
975 TABLET ORAL EVERY 8 HOURS PRN
Status: DISCONTINUED | OUTPATIENT
Start: 2018-03-16 | End: 2018-03-17 | Stop reason: HOSPADM

## 2018-03-16 RX ORDER — CEFAZOLIN SODIUM 2 G/100ML
2 INJECTION, SOLUTION INTRAVENOUS EVERY 8 HOURS
Status: COMPLETED | OUTPATIENT
Start: 2018-03-16 | End: 2018-03-17

## 2018-03-16 RX ORDER — FENTANYL CITRATE 50 UG/ML
25-50 INJECTION, SOLUTION INTRAMUSCULAR; INTRAVENOUS
Status: DISCONTINUED | OUTPATIENT
Start: 2018-03-16 | End: 2018-03-16 | Stop reason: HOSPADM

## 2018-03-16 RX ORDER — DEXTROSE MONOHYDRATE 25 G/50ML
25-50 INJECTION, SOLUTION INTRAVENOUS
Status: DISCONTINUED | OUTPATIENT
Start: 2018-03-16 | End: 2018-03-17 | Stop reason: HOSPADM

## 2018-03-16 RX ORDER — LIDOCAINE HYDROCHLORIDE 10 MG/ML
INJECTION, SOLUTION INFILTRATION; PERINEURAL PRN
Status: DISCONTINUED | OUTPATIENT
Start: 2018-03-16 | End: 2018-03-16

## 2018-03-16 RX ORDER — DEXAMETHASONE SODIUM PHOSPHATE 4 MG/ML
INJECTION, SOLUTION INTRA-ARTICULAR; INTRALESIONAL; INTRAMUSCULAR; INTRAVENOUS; SOFT TISSUE PRN
Status: DISCONTINUED | OUTPATIENT
Start: 2018-03-16 | End: 2018-03-16

## 2018-03-16 RX ORDER — NALOXONE HYDROCHLORIDE 0.4 MG/ML
.1-.4 INJECTION, SOLUTION INTRAMUSCULAR; INTRAVENOUS; SUBCUTANEOUS
Status: DISCONTINUED | OUTPATIENT
Start: 2018-03-16 | End: 2018-03-16 | Stop reason: HOSPADM

## 2018-03-16 RX ORDER — CEFAZOLIN SODIUM 1 G/3ML
1 INJECTION, POWDER, FOR SOLUTION INTRAMUSCULAR; INTRAVENOUS SEE ADMIN INSTRUCTIONS
Status: DISCONTINUED | OUTPATIENT
Start: 2018-03-16 | End: 2018-03-16 | Stop reason: HOSPADM

## 2018-03-16 RX ORDER — ACETAMINOPHEN 325 MG/1
975 TABLET ORAL EVERY 8 HOURS PRN
Qty: 100 TABLET | Refills: 0 | Status: SHIPPED | OUTPATIENT
Start: 2018-03-16 | End: 2018-10-04

## 2018-03-16 RX ORDER — PROPOFOL 10 MG/ML
INJECTION, EMULSION INTRAVENOUS PRN
Status: DISCONTINUED | OUTPATIENT
Start: 2018-03-16 | End: 2018-03-16

## 2018-03-16 RX ORDER — HYDROMORPHONE HYDROCHLORIDE 1 MG/ML
.3-.5 INJECTION, SOLUTION INTRAMUSCULAR; INTRAVENOUS; SUBCUTANEOUS EVERY 10 MIN PRN
Status: DISCONTINUED | OUTPATIENT
Start: 2018-03-16 | End: 2018-03-16 | Stop reason: HOSPADM

## 2018-03-16 RX ORDER — FENTANYL CITRATE 50 UG/ML
INJECTION, SOLUTION INTRAMUSCULAR; INTRAVENOUS PRN
Status: DISCONTINUED | OUTPATIENT
Start: 2018-03-16 | End: 2018-03-16

## 2018-03-16 RX ORDER — MAGNESIUM HYDROXIDE 1200 MG/15ML
LIQUID ORAL PRN
Status: DISCONTINUED | OUTPATIENT
Start: 2018-03-16 | End: 2018-03-16

## 2018-03-16 RX ORDER — LIDOCAINE 40 MG/G
CREAM TOPICAL
Status: DISCONTINUED | OUTPATIENT
Start: 2018-03-16 | End: 2018-03-17 | Stop reason: HOSPADM

## 2018-03-16 RX ORDER — AMOXICILLIN 250 MG
1-2 CAPSULE ORAL 2 TIMES DAILY PRN
Qty: 30 TABLET | Refills: 0 | Status: SHIPPED | OUTPATIENT
Start: 2018-03-16 | End: 2018-10-04

## 2018-03-16 RX ORDER — OXYCODONE HYDROCHLORIDE 5 MG/1
5-10 TABLET ORAL EVERY 4 HOURS PRN
Qty: 30 TABLET | Refills: 0 | Status: SHIPPED | OUTPATIENT
Start: 2018-03-16 | End: 2018-10-04

## 2018-03-16 RX ORDER — MEPERIDINE HYDROCHLORIDE 50 MG/ML
12.5 INJECTION INTRAMUSCULAR; INTRAVENOUS; SUBCUTANEOUS
Status: DISCONTINUED | OUTPATIENT
Start: 2018-03-16 | End: 2018-03-16 | Stop reason: HOSPADM

## 2018-03-16 RX ORDER — ONDANSETRON 4 MG/1
4 TABLET, ORALLY DISINTEGRATING ORAL EVERY 30 MIN PRN
Status: DISCONTINUED | OUTPATIENT
Start: 2018-03-16 | End: 2018-03-16 | Stop reason: HOSPADM

## 2018-03-16 RX ORDER — SODIUM CHLORIDE 9 MG/ML
INJECTION, SOLUTION INTRAVENOUS CONTINUOUS
Status: DISCONTINUED | OUTPATIENT
Start: 2018-03-16 | End: 2018-03-17 | Stop reason: HOSPADM

## 2018-03-16 RX ADMIN — FENTANYL CITRATE 50 MCG: 50 INJECTION INTRAMUSCULAR; INTRAVENOUS at 10:28

## 2018-03-16 RX ADMIN — PHENYLEPHRINE HYDROCHLORIDE 200 MCG: 10 INJECTION, SOLUTION INTRAMUSCULAR; INTRAVENOUS; SUBCUTANEOUS at 08:55

## 2018-03-16 RX ADMIN — INSULIN ASPART 1 UNITS: 100 INJECTION, SOLUTION INTRAVENOUS; SUBCUTANEOUS at 18:00

## 2018-03-16 RX ADMIN — SODIUM CHLORIDE: 9 INJECTION, SOLUTION INTRAVENOUS at 09:00

## 2018-03-16 RX ADMIN — GLYCOPYRROLATE 0.2 MG: 0.2 INJECTION, SOLUTION INTRAMUSCULAR; INTRAVENOUS at 08:39

## 2018-03-16 RX ADMIN — CEFAZOLIN SODIUM 2 G: 2 INJECTION, SOLUTION INTRAVENOUS at 08:31

## 2018-03-16 RX ADMIN — SODIUM CHLORIDE 2 UNITS/HR: 9 INJECTION, SOLUTION INTRAVENOUS at 09:52

## 2018-03-16 RX ADMIN — FENTANYL CITRATE 100 MCG: 50 INJECTION, SOLUTION INTRAMUSCULAR; INTRAVENOUS at 08:39

## 2018-03-16 RX ADMIN — INSULIN GLARGINE 15 UNITS: 100 INJECTION, SOLUTION SUBCUTANEOUS at 13:08

## 2018-03-16 RX ADMIN — INSULIN ASPART 1 UNITS: 100 INJECTION, SOLUTION INTRAVENOUS; SUBCUTANEOUS at 13:08

## 2018-03-16 RX ADMIN — CEFAZOLIN SODIUM 2 G: 2 INJECTION, SOLUTION INTRAVENOUS at 16:07

## 2018-03-16 RX ADMIN — SODIUM CHLORIDE: 9 INJECTION, SOLUTION INTRAVENOUS at 12:15

## 2018-03-16 RX ADMIN — PHENYLEPHRINE HYDROCHLORIDE 200 MCG: 10 INJECTION, SOLUTION INTRAMUSCULAR; INTRAVENOUS; SUBCUTANEOUS at 09:02

## 2018-03-16 RX ADMIN — INSULIN GLARGINE 15 UNITS: 100 INJECTION, SOLUTION SUBCUTANEOUS at 22:00

## 2018-03-16 RX ADMIN — ONDANSETRON 4 MG: 2 INJECTION INTRAMUSCULAR; INTRAVENOUS at 09:44

## 2018-03-16 RX ADMIN — INSULIN ASPART 2 UNITS: 100 INJECTION, SOLUTION INTRAVENOUS; SUBCUTANEOUS at 21:59

## 2018-03-16 RX ADMIN — SODIUM CHLORIDE: 9 INJECTION, SOLUTION INTRAVENOUS at 01:51

## 2018-03-16 RX ADMIN — MIDAZOLAM 2 MG: 1 INJECTION INTRAMUSCULAR; INTRAVENOUS at 08:31

## 2018-03-16 RX ADMIN — PROPOFOL 160 MG: 10 INJECTION, EMULSION INTRAVENOUS at 08:39

## 2018-03-16 RX ADMIN — PHENYLEPHRINE HYDROCHLORIDE 200 MCG: 10 INJECTION, SOLUTION INTRAMUSCULAR; INTRAVENOUS; SUBCUTANEOUS at 08:50

## 2018-03-16 RX ADMIN — PHENYLEPHRINE HYDROCHLORIDE 200 MCG: 10 INJECTION, SOLUTION INTRAMUSCULAR; INTRAVENOUS; SUBCUTANEOUS at 09:08

## 2018-03-16 RX ADMIN — DEXAMETHASONE SODIUM PHOSPHATE 4 MG: 4 INJECTION, SOLUTION INTRA-ARTICULAR; INTRALESIONAL; INTRAMUSCULAR; INTRAVENOUS; SOFT TISSUE at 08:39

## 2018-03-16 RX ADMIN — LIDOCAINE HYDROCHLORIDE 30 MG: 10 INJECTION, SOLUTION INFILTRATION; PERINEURAL at 08:39

## 2018-03-16 NOTE — CONSULTS
Consult Date:  2018      ORTHOPEDIC CONSULTATION       CHIEF COMPLAINT:  Left ankle pain.      HISTORY OF PRESENT ILLNESS:  This 57-year-old woman with poorly controlled diabetes was admitted yesterday with a fracture dislocation of her left ankle.  Her blood sugar was too high for surgery yesterday and therefore she was held over for me to provide definitive management today.      PHYSICAL EXAMINATION:  The patient is alert, I am not sure how oriented she is.  She thinks she broke her ankle 2 weeks ago.  In any case, she has poor sensation to her feet, although she does seem to have vascular supply.      IMAGING:  X-rays show distal fibular fracture, but fibular fracture was unstable enough to allow for dislocation of her ankle.      IMPRESSION:  Left ankle lateral malleolar fracture dislocation.      PLAN:  I had a long and pleasant discussion with the patient.  I told her that she is at high risk for complications.  She likely has a Charcot ankle and foot already.  I told her very frankly that no matter what we do, she may end up with a below-knee amputation.  Short of this, I would recommend open reduction and internal fixation.  We usually over-fix the fracture with a distal fibular plate and perhaps even a TightRope in order to allow for the fact that this will be very slow at healing.  We talked about the possibility of wound complications, etc.  She understands and does agree to proceed.        We will schedule the surgery as soon as possible.         DOREEN HODGSON MD             D: 2018   T: 2018   MT: TERRENCE      Name:     ONDINA DENIS   MRN:      [id number]        Account:       [id number]   :      1960           Consult Date:  2018      Document: [id number]       cc: Doreen Hodgson MD

## 2018-03-16 NOTE — PROGRESS NOTES
[facility]  Hospitalist Progress Note  Name: Roxy Beaulieu    MRN: [id number]  Provider:  Eugenia Parnell MD  03/16/18    Initial presenting complaint/issue to hospital (Diagnosis): L ankle fx s/p ORIF.         Assessment and Plan:      Summary of Stay: Roxy Beaulieu is a 57 year old female admitted on 3/15/2018 with past medical history of poorly controlled diabetes mellitus type 2 with medication noncompliance, history of gangrene of left toe requiring amputation of left second toe and partial amputation of left toe last May, hypertension, came to emergency room with a complaint of left ankle pain and swelling.  His blood sugar was elevated at 877.  Lab workup showed ketone 1.4, creatinine 1.37, sodium 123.  X-ray of the left ankle joint showed left distal fibular fracture dislocation.  In the ER, she was started on IV fluid boluses and insulin drip for hyperglycemia with ketosis.  His left ankle was also immobilized. S/p ORIF  3/16.      Problem List:     1. L ankle fx s/p ORIF.  - cares and DVT ppx per ortho.    2. Type 2 DM.  - A1C > 16.  - Not on meds.  - On insulin gtt.  - Switch off insulin gtt.  -  on compliance.    3. CKD stage 3.  - Avoid nephrotoxins.      # Pain Assessment:   Current Pain Score 3/16/2018 3/16/2018 3/16/2018   Patient currently in pain? denies denies yes   Pain score (0-10) - - 4   Pain location - - -   Pain descriptors - - -   - Roxy is experiencing pain due to L ankle surgery.. Pain management was discussed and the plan was created in a collaborative fashion.  Roxy's response to the current recommendations: engaged  - oxycodone prn.          DVT Prophylaxis:  -  lovenox.  Code Status: Full Code  Discharge Dispo: TBD.  Estimated Disch Date / # of Days until Discharge: 1-2 days.        Interval History:        No chest pain/SOB/N/V/fever/chills. Stopped taking meds due to cost.                  Physical Exam:      Last Vital Signs:  Temp: 98.7  F  (37.1  C) Temp src: Temporal BP: 147/86   Heart Rate: 90 Resp: (!) 32 SpO2: 96 % O2 Device: None (Room air)      Intake/Output Summary (Last 24 hours) at 03/16/18 1238  Last data filed at 03/16/18 0947   Gross per 24 hour   Intake          2589.45 ml   Output               10 ml   Net          2579.45 ml     I/O last 3 completed shifts:  In: 1939.45 [P.O.:360; I.V.:1579.45]  Out: -   Vitals:    03/15/18 0924 03/15/18 1718 03/16/18 0400   Weight: 95.3 kg (210 lb) 82.8 kg (182 lb 8.7 oz) 83.5 kg (184 lb)       Gen - AAO x 3 in NAD.  Lungs - CTA B.  Heart - RR,S1+S2 nml, no m/g/r.  Abd - soft,NT, ND, + BS.  Ext - LLE in cast.         Medications:      All current medications were reviewed.         Data:      All new lab and imaging data was reviewed.   Labs:    Recent Labs  Lab 03/16/18  0511 03/15/18  0945   WBC 8.7 9.7   HGB 10.4* 11.1*   HCT 31.8* 33.4*   MCV 84 82    250       Recent Labs  Lab 03/16/18  0511 03/15/18  2041 03/15/18  1825 03/15/18  1529  03/15/18  0945    135  --   --   --  123*   POTASSIUM 3.6 4.1 4.4  --   --  5.0   CHLORIDE 104 100  --   --   --  88*   CO2 30 29  --   --   --  25   ANIONGAP 4 6  --   --   --  10   * 207*  --  444*  < > 877*   BUN 43* 50*  --   --   --  48*   CR 1.34* 1.46*  --   --   --  1.37*   GFRESTIMATED 40* 37*  --   --   --  39*   GFRESTBLACK 49* 44*  --   --   --  48*   GILL 8.6 8.7  --   --   --  9.1   MAG  --  2.1  --   --   --   --    PHOS  --  3.2  --   --   --   --    < > = values in this interval not displayed.   Recent Imaging:   Recent Results (from the past 24 hour(s))   XR Surgery CHRISTIANO L/T 5 Min Fluoro    Narrative    This exam was marked as non-reportable because it will not be read by a   radiologist or a [facility] non-radiologist provider.             XR Ankle Left 2 Views    Narrative    LEFT ANKLE TWO VIEWS  3/15/2018 2:12 PM    HISTORY:  Status post reduction.     COMPARISON:  3/15/2018, earlier today.      Impression    IMPRESSION:  The  fracture dislocation at the ankle has been further  reduced. Mild 0.5 cm persistent lateral displacement of the distal  fibular fracture fragment. Ankle mortise has been restored. There is  likely mild widening of the lateral ankle mortise.   XR Surgery CHRISTIANO Fluoro L/T 5 Min w Stills    Narrative    XR SURGERY C-ARM FLUOROSCOPY LESS THAN 5 MINUTES WITH STILLS   3/16/2018 9:40 AM     COMPARISON: None.    HISTORY: Left fibula ORIF.     NUMBER OF IMAGES ACQUIRED: 2    VIEWS: 2    FLUOROSCOPY TIME: .3 minutes.      Impression    IMPRESSION: Open reduction and internal fixation of distal fibula  fracture with plate and screws. Fixation of the distal tibiofibular  ligament.    IOANA VELAZQUEZ MD

## 2018-03-16 NOTE — CONSULTS
SWS:  SW consult to assist with no insurance/financial issues. Chart reviewed. Financial Counselors met with pt and pt reports that she is working on getting insurance and declined Financial Counselor assistance.  SW will complete consult.

## 2018-03-16 NOTE — OP NOTE
Procedure Date: 03/16/2018      PREOPERATIVE DIAGNOSIS:  Displaced left lateral malleolar fracture.      POSTOPERATIVE DIAGNOSIS:  Displaced left lateral malleolar fracture.       PROCEDURE:  Open reduction and internal fixation of displaced left lateral malleolar fracture.      ANESTHESIA:  General.      FIRST ASSISTANT:  IZABELLA Berrios      INDICATIONS:  This 57-year-old woman fell and sustained the above-described injury.  Appropriate risks and alternatives have been discussed at length, and it has been elected to proceed with surgical intervention.      PROCEDURE:  The patient was brought to the operating room, given a general anesthetic, left ankle prepped and draped sterilely in the usual manner.  Under tourniquet control, I made a straight lateral incision.  Dissection was carried down sharply to the periosteum so as to not devascularize the skin flaps.  The fracture was opened.  We irrigated the fracture of clot.  I reduced it and held it temporarily with a clamp.      A 4-hole distal fibular locking plate was placed.  I placed distal fibular screws and then one proximal screw.  At this point, we brought the image intensifier in.  We noted that we did not quite have the fracture out to length as we still had some medial wide space.  We therefore removed the fibular shaft screw, reduced the fracture down to 3 to 4 mm distally and replaced the fibular locked cortical screw.  At this point, the image intensification x-rays showed anatomic reduction of the mortise.      We filled all the remaining screw holes of the distal fibular locking screws and then locked the fracture with multiple screw holes proximally.  I used more screws than I  normally would because of this patient's fragile diabetic status and the fact that this will likely be a slow-healing fracture.  We also placed a syndesmotic TightRope, again because of the unstable nature of the fracture and the difficulty in this diabetic position, at  the end was confirmed with image intensification to have excellent position of all fixation and excellent reduction of the fracture.        The wound was irrigated copiously and closed in layers, closing the fascia with 0 Vicryl, subcutaneous tissue with 2-0 Vicryl, and staples on the skin.  Sterile compressive dressing with plaster splints applied.  The patient was awakened and taken to the recovery room in good condition.  There were no intraoperative complications and minimal blood loss.         DOREEN HODGSON MD             D: 2018   T: 2018   MT: TANISHA      Name:     ONDINA DENIS   MRN:      [id number]        Account:        [id number]   :      1960           Procedure Date: 2018      Document: [id number]       cc: Doreen Hodgson MD

## 2018-03-16 NOTE — CONSULTS
"CLINICAL NUTRITION SERVICES  -  ASSESSMENT NOTE      Malnutrition: Does not meet criteria at this time        REASON FOR ASSESSMENT  Roxy Beaulieu is a 57 year old female seen by Registered Dietitian for Admission Nutrition Risk Screen - New/uncontrolled diabetes + RN consult \"A1C >15 noncompliance?\"      NUTRITION HISTORY  - Information obtained from patient and chart.    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  - Reports she last received nutrition education relating to diabetic diet last admit (received from [facility] RD 4/29/2017)  - Patient reports following a regular diet at home.  Does not count CHO.  Comments \"if I were taking insulin I would\".    - Is not able to verbalize g CHO recommended at meals/snacks but with good knowledge base of CHO-containing food items.    - Breakfast: Cold cereal (multi-grain Cheerios) with milk, fruit or toast, or scrambled eggs  - Lunch: Full sandwich or soup  - Dinner: Hot dog on bun or \"whatever's around\"  - Drinks diet pop, does not drink fruit juice  - Denies snacks  - No decrease in appetite/intake PTA per her report.    - NKFA         CURRENT NUTRITION ORDERS  Diet Order:     Mod CHO    Current Intake/Tolerance:  Previously NPO for procedure as below.  100% intake for dinner last evening and today's lunch meal.      PHYSICAL FINDINGS  Observed  No fat or muscle loss observed  Obtained from Chart/Interdisciplinary Team  LSW consult in place    ANTHROPOMETRICS  Height: 5' 8\"  Weight: 83.6 kg (184#)  Body mass index is 27.98 kg/(m^2).  Weight Status:  Overweight BMI 25-29.9  IBW: 63.6 kg (140#)  % IBW: 131%  Weight History:  Wt Readings from Last 10 Encounters:   03/16/18 83.5 kg (184 lb)   04/29/17 96.3 kg (212 lb 3.2 oz)   09/25/16 93 kg (205 lb)   - Patient confirms wt loss over the past at least 6 months.  13% loss.  As above, patient " "denies changes to oral intakes, likely the result of continuous hyperglycemia.      LABS  Labs reviewed:  Lab Results   Component Value Date    A1C >16.0 03/16/2018    A1C >16.0 03/15/2018    A1C 12.3 04/28/2017       MEDICATIONS  Medications reviewed:  - Insulin gtt  - IV lactated ringers at 25 ml/hr, NaCl @ 125 ml/hr    Dosing Weight 68.6 kg - adjusted weight     ASSESSED NUTRITION NEEDS PER APPROVED PRACTICE GUIDELINES:  Estimated Energy Needs: 6289-8582 kcals (25-30 Kcal/Kg)  Justification: maintenance  Estimated Protein Needs: 69-82 grams protein (1-1.2 g pro/Kg)  Justification: maintenance  Estimated Fluid Needs: 3119-1525 mL (1 mL/Kcal)  Justification: maintenance and per provider pending fluid status    MALNUTRITION:  % Weight Loss:  Weight loss does not meet criteria for malnutrition   % Intake:  No decreased intake noted  Subcutaneous Fat Loss:  None observed  Muscle Loss:  None observed  Fluid Retention:  None noted    Malnutrition Diagnosis: Patient does not meet two of the above criteria necessary for diagnosing malnutrition    NUTRITION DIAGNOSIS:  Altered nutrition-related lab value (BG) related to financial barriers to obtaining medications without monitoring of CHO intake at baseline as evidenced by A1C >16% and report of regular diet baseline, inability to verbalize g CHO recommended at meals/snacks.      NUTRITION INTERVENTIONS  Recommendations / Nutrition Prescription  Continue mod CHO diet order.        Implementation  Nutrition education: Provided education on DMII nutrition therapy:    Assessed learning needs, learning preferences, and willingness to learn    Nutrition Education (Content):  a) Provided handout \"DMII nutrition therapy\".  b) Discussed foods which contain CHO, the importance of portion control, and reduction in added sugars  c) Discussed MyPlate for diabetics and how to read food labels  d) Discussed recommendations for g CHO at meals/snacks    Nutrition Education " (Application):  a) Discussed eating habits and recommended alternative food choices  b) Encouraged tracking of CHO content for at least 1 week at d/c    Patient verbalizes understanding of diet by stating desire to consume </=45 g total CHO per meal    Anticipate fair compliance    Diet Education - refer to Education Flowsheet    Collaboration and Referral of Nutrition care: Discussed POC with team during rounds and education with RN.      Nutrition Goals  BG <250 during review period.      MONITORING AND EVALUATION:  Progress towards goals will be monitored and evaluated per protocol and Practice Guidelines        Marifer Mcbride RD, LD  Clinical Dietitian  3rd floor/ICU: [phone]  All other floors: [phone]  Weekend/holiday: [phone]

## 2018-03-16 NOTE — BRIEF OP NOTE
[facility] Brief Operative Note    Pre-operative diagnosis: fibula fx   Post-operative diagnosis * No post-op diagnosis entered *     Procedure: Procedure(s):  OPEN REDUCTION INTERNAL FIXATION FIBULA - Wound Class: I-Clean   Surgeon(s): Surgeon(s) and Role:     * Sumanth Wen MD - Primary   Estimated blood loss: 10 mL    Specimens: * No specimens in log *   Findings:

## 2018-03-16 NOTE — ANESTHESIA POSTPROCEDURE EVALUATION
Patient: Roxy Beaulieu    Procedure(s):  Open Reduction and Internal Fixation of Left Ankle Fracture - Wound Class: I-Clean    Diagnosis:fibula fx  Diagnosis Additional Information: PREOPERATIVE DIAGNOSIS:  Displaced left lateral malleolar fracture.       POSTOPERATIVE DIAGNOSIS:  Displaced left lateral malleolar fracture.        PROCEDURE:  Open reduction and internal fixation of displaced left lateral malleolar fracture.     Anesthesia Type:  General, ETT    Note:  Anesthesia Post Evaluation    Patient location during evaluation: PACU  Patient participation: Able to fully participate in evaluation  Level of consciousness: awake and alert  Pain management: adequate  Airway patency: patent  Cardiovascular status: acceptable  Respiratory status: acceptable  Hydration status: acceptable  PONV: none     Anesthetic complications: None          Last vitals:  Vitals:    03/16/18 1500 03/16/18 1515 03/16/18 1530   BP: 158/76 157/82 (!) 164/99   Pulse:      Resp: 13 14 16   Temp:      SpO2:            Electronically Signed By: Gurjit Mcdaniel MD  March 16, 2018  4:13 PM

## 2018-03-16 NOTE — PLAN OF CARE
Problem: Patient Care Overview  Goal: Plan of Care/Patient Progress Review  Outcome: No Change  ICU End of Shift Summary.  For vital signs and complete assessments, please see documentation flowsheets.     Pertinent assessments: AOX4, Tele SR, LS clear on RA, GI/ no BM this shift, voided, LLE CMS unchanged, numbness at baseline, DINORA pedal pulses due to dsg, no bleeding noted on dsg, NWB status maintained, however pt is non compliant with WB status, tends to be impulsive when repositioning will need continued reinforcement, pt wants to go home tomorrow notified that PT/OT will need to work with pt before d/c, will need reinforcement as she's adamant she's going home regardless.    Major Shift Events: ORIF LLE, insulin gtt off   Plan (Upcoming Events): monitor neurovascular status, post-op complications, monitor Hgb,  plans per  Ortho.  Discharge/Transfer Needs: transfer to floor when bed available    Bedside Shift Report Completed : Report called to Courtney RN 6th floor, all pt belongings transferred with pt and family notified by pt.  Bedside Safety Check Completed:

## 2018-03-16 NOTE — PLAN OF CARE
Problem: Patient Care Overview  Goal: Plan of Care/Patient Progress Review  Outcome: No Change  ICU End of Shift Summary.  For vital signs and complete assessments, please see documentation flowsheets.     Pertinent assessments: pt alert and oriented x4. Denies SOB or any chest discomfort. BPs slightly elevated. Active BS, Voiding ok. reports numbness to BLE baseline. L ankle casted. Toes warm and able to wiggle, warm to touch.hx of L partiaL amputation of great toe and full 2nd toe. Denies any pain. Up with staff assist to bedside commode Blood sugar down to 100S and now back to 205. Moved back to algorithm 2 now.    Major Shift Events: blood sugars shifts with Insulin gtt  Plan (Upcoming Events): surgery, hyperglycemia control  Discharge/Transfer Needs: TBD    Bedside Shift Report Completed : Y   Bedside Safety Check Completed: Y

## 2018-03-16 NOTE — ANESTHESIA CARE TRANSFER NOTE
Patient: Roxy Beaulieu    Procedure(s):  Open Reduction and Internal Fixation of Left Ankle Fracture - Wound Class: I-Clean    Diagnosis: fibula fx  Diagnosis Additional Information: No value filed.    Anesthesia Type:   General, ETT     Note:  Airway :Face Mask  Patient transferred to:PACU  Handoff Report: Identifed the Patient, Identified the Reponsible Provider, Reviewed the pertinent medical history, Discussed the surgical course, Reviewed Intra-OP anesthesia mangement and issues during anesthesia, Set expectations for post-procedure period and Allowed opportunity for questions and acknowledgement of understanding      Vitals: (Last set prior to Anesthesia Care Transfer)    CRNA VITALS  3/16/2018 0906 - 3/16/2018 0949      3/16/2018             Pulse: 89    SpO2: 99 %                Electronically Signed By: RIK Callaway CRNA  March 16, 2018  9:49 AM

## 2018-03-16 NOTE — PLAN OF CARE
Problem: Hyperglycemia, Persistent (Adult)  Goal: Signs and Symptoms of Listed Potential Problems Will be Absent, Minimized or Managed (Hyperglycemia, Persistent)  Signs and symptoms of listed potential problems will be absent, minimized or managed by discharge/transition of care (reference Hyperglycemia, Persistent (Adult) CPG).   Outcome: Improving  ICU End of Shift Summary.  For vital signs and complete assessments, please see documentation flowsheets.     Pertinent assessments: Patient adm to ICU @ 1718.  Alert and Oriented. VSS. Blood sugars now in 300s.  Sugars Dropping approx. 70/hour.  Alg 1 and Insulin gtt @ 5.5 u /hr.  L ankle is casted, toes are warm.  Pt has a previous amputation to  L partial great toe and all of second toe.    Major Shift Events: Insulin  Gtt continues.  No longer Ketotic with last blood draw.  K is 4.4.   Plan (Upcoming Events): Continue to drop BS @ 50 to 75/hr. NPO after mdnoc. Upcoming surgery L ankle.   Discharge/Transfer Needs: Eager to go home. States sis and neighbor can help.     Bedside Shift Report Completed : done  Bedside Safety Check Completed: done

## 2018-03-16 NOTE — ANESTHESIA PREPROCEDURE EVALUATION
PAC NOTE:       ANESTHESIA PRE EVALUATION:  Anesthesia Evaluation     . Pt has had prior anesthetic. Type: General    No history of anesthetic complications          ROS/MED HX    ENT/Pulmonary:  - neg pulmonary ROS     Neurologic:  - neg neurologic ROS     Cardiovascular:     (+) hypertension----. : . . . :. .       METS/Exercise Tolerance:     Hematologic:  - neg hematologic  ROS       Musculoskeletal:  - neg musculoskeletal ROS       GI/Hepatic:  - neg GI/hepatic ROS       Renal/Genitourinary:  - ROS Renal section negative       Endo:     (+) type II DM Last HgA1c: >16 Using insulin Obesity, .   Type I DM: >16.   Psychiatric:  - neg psychiatric ROS       Infectious Disease:  - neg infectious disease ROS       Malignancy:         Other:    - neg other ROS                 Physical Exam  Normal systems: cardiovascular, pulmonary and dental    Airway   Mallampati: II  TM distance: >3 FB  Neck ROM: full    Dental     Cardiovascular       Pulmonary              Anesthesia Plan      History & Physical Review  History and physical reviewed and following examination; no interval change.    ASA Status:  3 .    NPO Status:  > 8 hours    Plan for General and ETT with Intravenous induction. Maintenance will be Balanced.    PONV prophylaxis:  Ondansetron (or other 5HT-3) and Dexamethasone or Solumedrol       Postoperative Care  Postoperative pain management:  IV analgesics and Oral pain medications.      Consents  Anesthetic plan, risks, benefits and alternatives discussed with:  Patient..                            .

## 2018-03-17 ENCOUNTER — APPOINTMENT (OUTPATIENT)
Dept: PHYSICAL THERAPY | Facility: CLINIC | Age: 58
DRG: 493 | End: 2018-03-17
Attending: ORTHOPAEDIC SURGERY

## 2018-03-17 VITALS
HEIGHT: 68 IN | WEIGHT: 184 LBS | SYSTOLIC BLOOD PRESSURE: 153 MMHG | TEMPERATURE: 99.5 F | BODY MASS INDEX: 27.89 KG/M2 | OXYGEN SATURATION: 99 % | DIASTOLIC BLOOD PRESSURE: 66 MMHG | RESPIRATION RATE: 20 BRPM | HEART RATE: 97 BPM

## 2018-03-17 LAB
GLUCOSE BLDC GLUCOMTR-MCNC: 298 MG/DL (ref 70–99)
HGB BLD-MCNC: 9.4 G/DL (ref 11.7–15.7)

## 2018-03-17 PROCEDURE — 85018 HEMOGLOBIN: CPT | Performed by: ORTHOPAEDIC SURGERY

## 2018-03-17 PROCEDURE — 97116 GAIT TRAINING THERAPY: CPT | Mod: GP | Performed by: PHYSICAL THERAPIST

## 2018-03-17 PROCEDURE — 97530 THERAPEUTIC ACTIVITIES: CPT | Mod: GP | Performed by: PHYSICAL THERAPIST

## 2018-03-17 PROCEDURE — 25000131 ZZH RX MED GY IP 250 OP 636 PS 637: Performed by: INTERNAL MEDICINE

## 2018-03-17 PROCEDURE — 99238 HOSP IP/OBS DSCHRG MGMT 30/<: CPT | Performed by: INTERNAL MEDICINE

## 2018-03-17 PROCEDURE — 00000146 ZZHCL STATISTIC GLUCOSE BY METER IP

## 2018-03-17 PROCEDURE — 36415 COLL VENOUS BLD VENIPUNCTURE: CPT | Performed by: ORTHOPAEDIC SURGERY

## 2018-03-17 PROCEDURE — 25000128 H RX IP 250 OP 636: Performed by: ORTHOPAEDIC SURGERY

## 2018-03-17 PROCEDURE — 97162 PT EVAL MOD COMPLEX 30 MIN: CPT | Mod: GP | Performed by: PHYSICAL THERAPIST

## 2018-03-17 PROCEDURE — 40000193 ZZH STATISTIC PT WARD VISIT: Performed by: PHYSICAL THERAPIST

## 2018-03-17 RX ADMIN — INSULIN GLARGINE 15 UNITS: 100 INJECTION, SOLUTION SUBCUTANEOUS at 08:05

## 2018-03-17 RX ADMIN — ENOXAPARIN SODIUM 40 MG: 40 INJECTION SUBCUTANEOUS at 08:05

## 2018-03-17 RX ADMIN — INSULIN ASPART 7 UNITS: 100 INJECTION, SOLUTION INTRAVENOUS; SUBCUTANEOUS at 07:42

## 2018-03-17 RX ADMIN — CEFAZOLIN SODIUM 2 G: 2 INJECTION, SOLUTION INTRAVENOUS at 00:02

## 2018-03-17 NOTE — PLAN OF CARE
Problem: Patient Care Overview  Goal: Plan of Care/Patient Progress Review  Discharge Planner OT   Patient plan for discharge: home  Current status: OT- Orders received, spoke with PT who reports patient would benefit from OT services, however is not receptive to OT and declined OT. Will discontinue OT orders per patient request.   Barriers to return to prior living situation: defer to PT  Recommendations for discharge: defer to PT  Rationale for recommendations: patient has declined OT services, will discontinue OT orders       Entered by: Goldie Dalal 03/17/2018 8:49 AM

## 2018-03-17 NOTE — PROGRESS NOTES
Comfortable. N/V intact.    Plan: Wants to go home. I emphasized non-wt bearing and keeping elevateed as well as management of diabetes. She understands and agrees. F/U 10 days to office.

## 2018-03-17 NOTE — DISCHARGE SUMMARY
[facility]  Discharge Summary        Roxy Beaulieu MRN# [id number]   YOB: 1960 Age: 57 year old     Date of Admission:  3/15/2018  Date of Discharge:  No discharge date for patient encounter.  Admitting Physician:  Sumanth Wen MD  Discharge Physician: Milton Julien MD  Discharging Service: Hospitalist     Primary Provider: [facility], [place]  Primary Care Physician Phone Number: None         Discharge Diagnoses/Problem Oriented Hospital Course (Providers):    Roxy Beaulieu was admitted on 3/15/2018 by Sumanth Wen MD and I would refer you to their history and physical.      Patient was seen and examined on the day of discharge    Discharge diagnoses:  1. Left lateral malleolus fracture  2. Type 2 diabetes, uncontrolled secondary to noncompliance  3. Chronic kidney disease    Hospital course:  1. Left lateral malleolar fracture: Patient is a 57-year-old female with a history of type 2 diabetes, poorly controlled secondary to noncompliance, previous history of gangrene of the left toe requiring amputation of the second toe, and chronic kidney disease stage III who presents here with left ankle pain and swelling.  Mechanism of injury is not known.  In any event, x-ray of the left ankle did demonstrate a distal fibular fracture with dislocation.  Patient was also notably hyperglycemic with ketosis.  Patient was initially admitted to the ICU because of the hyperglycemia.  In any event, orthopedics was consulted and patient underwent ORIF on 3/16/18.  Postoperative course was unremarkable.  Will follow up with orthopedics in 10 days.  Nonweightbearing until cleared by orthopedics and follow-up.  2. Type 2 diabetes, uncontrolled: Noncompliant with meds.  Per patient report, she buys over-the-counter Novolin 70/30 and adjust medications on her own.  She was placed on insulin drip and then transitioned to Lantus and NovoLog with a high intensity sliding scale.  Diabetes  education and counseling was provided.  Did educate and  the patient again on the need for compliance and risks and ramifications of long-term uncontrolled diabetes.  3. Chronic kidney disease: Not an active issue at this admission but counseled on avoiding nephrotoxins such as NSAIDs.    Disposition: Discharged to home in stable condition.  Encourage primary MD follow-up within 1 week for further diabetes management.  In addition, follow-up with orthopedics in 10-14 days.             Pending Results:        Unresulted Labs Ordered in the Past 30 Days of this Admission     No orders found from 1/14/2018 to 3/16/2018.            Discharge Instructions and Follow-Up:      Follow-up Appointments     Follow-up and recommended labs and tests        Orthopedic Follow Up: Please make a follow up appointment with Dr. Wen at [facility] in [place] 10-14 days after your   surgery. Please call Dr. Wen's Care Coordinator, Mercedes, at [phone]   to schedule this appointment.  Primary MD follow up within 1 week. Labs: cbc/BMP                      Discharge Disposition:      Discharged to home         Discharge Medications:        Current Discharge Medication List      START taking these medications    Details   insulin glargine (LANTUS SOLOSTAR) 100 UNIT/ML injection Inject 25 Units Subcutaneous At Bedtime  Qty: 7.5 mL, Refills: 1    Associated Diagnoses: Diabetes mellitus without complication (H)      insulin aspart (NOVOLOG FLEXPEN) 100 UNIT/ML injection 6 units before breakfast, 6 units before lunch, 6 units before dinner  Qty: 15 mL, Refills: 1    Associated Diagnoses: Diabetes mellitus without complication (H)      oxyCODONE IR (ROXICODONE) 5 MG tablet Take 1-2 tablets (5-10 mg) by mouth every 4 hours as needed for moderate to severe pain  Qty: 30 tablet, Refills: 0    Associated Diagnoses: Closed fracture dislocation of left ankle, initial encounter      acetaminophen (TYLENOL) 325 MG tablet  Take 3 tablets (975 mg) by mouth every 8 hours as needed for mild pain or fever  Qty: 100 tablet, Refills: 0    Associated Diagnoses: Closed fracture dislocation of left ankle, initial encounter      senna-docusate (SENOKOT-S;PERICOLACE) 8.6-50 MG per tablet Take 1-2 tablets by mouth 2 times daily as needed for constipation  Qty: 30 tablet, Refills: 0    Associated Diagnoses: Closed fracture dislocation of left ankle, initial encounter      aspirin  MG EC tablet Take 1 tablet (325 mg) by mouth daily  Qty: 30 tablet, Refills: 0    Comments: Please take daily for 30 days following surgery to prevent blood clots.  Associated Diagnoses: Closed fracture dislocation of left ankle, initial encounter               Allergies:       No Known Allergies        Consultations This Hospital Stay:      Consultation during this admission received from orthopedics           Image Results From This Hospital Stay (For Non-EPIC Providers):        Results for orders placed or performed during the hospital encounter of 03/15/18   Ankle XR, G/E 3 views, left    Narrative    XR ANKLE LT G/E 3 VW 3/15/2018 10:05 AM    HISTORY: Left ankle pain and deformity.    COMPARISON: None.    FINDINGS: Oblique distal fibular fracture with angulation and  foreshortening at the fracture site. No definite tibial fracture  although there is anterior subluxation of the distal tibia relative to  the talar dome and marked widening of the medial tibiotalar joint  space consistent with deltoid ligament disruption.      Impression    IMPRESSION: Distal fibular fracture, anterior subluxation of the  distal tibia and soft tissue injury.    LISA LUKE MD   Tib/Fib XR, left    Narrative    XR TIBIA & FIBULA LT 2 VW 3/15/2018 11:06 AM    HISTORY: Ankle fracture.    COMPARISON: Ankle, 3/15/2018    FINDINGS: Proximal tibia and fibula are intact. Distal fibular  fracture is evident on the AP view.      Impression    IMPRESSION: Proximal tibia and fibula are  intact.    LISA LUKE MD   Ankle XR, G/E 3 views, left    Narrative    XR ANKLE LT G/E 3 VW 3/15/2018 12:16 PM    HISTORY: Postreduction.    COMPARISON: 3/15/2018, prereduction    FINDINGS: Marked widening of the medial tibiotalar joint space and  angulation/foreshortening at the fibular fracture site are not  significantly changed. On the lateral view there does appear to be  some interval improvement in alignment between the tibial articular  surface and the talar dome.      Impression    IMPRESSION: Some interval improvement in tibiotalar alignment on the  lateral view.    LISA LUKE MD   XR Surgery CHRISTIANO L/T 5 Min Fluoro    Narrative    This exam was marked as non-reportable because it will not be read by a   radiologist or a [place] non-radiologist provider.             XR Ankle Left 2 Views    Narrative    LEFT ANKLE TWO VIEWS  3/15/2018 2:12 PM    HISTORY:  Status post reduction.     COMPARISON:  3/15/2018, earlier today.      Impression    IMPRESSION:  The fracture dislocation at the ankle has been further  reduced. Mild 0.5 cm persistent lateral displacement of the distal  fibular fracture fragment. Ankle mortise has been restored. There is  likely mild widening of the lateral ankle mortise.    KT CHONG MD   XR Surgery CHRISTIANO Fluoro L/T 5 Min w Stills    Narrative    XR SURGERY C-ARM FLUOROSCOPY LESS THAN 5 MINUTES WITH STILLS   3/16/2018 9:40 AM     COMPARISON: None.    HISTORY: Left fibula ORIF.     NUMBER OF IMAGES ACQUIRED: 2    VIEWS: 2    FLUOROSCOPY TIME: .3 minutes.      Impression    IMPRESSION: Open reduction and internal fixation of distal fibula  fracture with plate and screws. Fixation of the distal tibiofibular  ligament.    IOANA VELAZQUEZ MD

## 2018-03-17 NOTE — PLAN OF CARE
"Problem: Patient Care Overview  Goal: Plan of Care/Patient Progress Review  PT: PT eval completed. Pt is status post ORIF of L fib malleolar fx. Pt is NWB to L LE.   Discharge Planner PT   Patient plan for discharge: Home with sister  Current status: Pt not receptive to education throughout session. Pt was educated on safe transfers with FWW, able to perform mod I when not fatigued and following instructions. Pt needing up to max A x 2 for safe transfer with fatigue and when pt not receptive to education. Pt was educated on perfomance of stair, pt was cued visually for proper transfer up stair. Pt was shown with use of FWW. Pt reporting does not feel safe once standing. Pt visually cued for chair transfer into home. Pt able to perform but again needing max A x 2 for safety into chair. Pt educated to use stable chair with arm rests. Pt declining that she is planning on using chair with wheels. Pt educated on safety risk with this [place] due to her quick fatgue. Pt eduacated to ask neighbor for stable chair for safety. Pt states \"she will figure it out\". Pt able to perform 5 feet with FWW.  Pt fatiguing quickly needing W/C behind pt. Pt educated on knee scooter or crutches as an option as well. Pt declining attempt with other devices. Pt reports she will probably just sit in a roller chair and scoot around. Pt educated on W/C rental instead for safety, pt declining.   Barriers to return to prior living situation: Pt impulsive with mobility items and not receptive to education. Lives alone, able to have sister stay with her. Not receptive to education of having 24 hour assist.   Recommendations for discharge: Home with 24 hour assist from sister, A x 2 for safe transfer into house, chair without wheels for safe transfer into home, W/C rental- declining this, FWW use- looking into borrowing, 2nd session in pm- pt declining this as well  Rationale for recommendations: Pt would benefit from further training from PT for " improved transfers and technique for stairs. Pt declining at this time.        Entered by: Laura Sotomayor 03/17/2018 9:19 AM     Physical Therapy Discharge Summary    Reason for therapy discharge:    Patient/family request discontinuation of services.    Progress towards therapy goal(s). See goals on Care Plan in [facility] electronic health record for goal details.  Goals not met.  Barriers to achieving goals:   Pt not receptive to PT education.    Therapy recommendation(s):    see above

## 2018-03-17 NOTE — PROGRESS NOTES
03/17/18 0811   Quick Adds   Type of Visit Initial PT Evaluation   Living Environment   Lives With alone   Living Arrangements condominium   Number of Stairs to Enter Home 1   Number of Stairs Within Home 12   Stair Railings at Home inside, present on right side   Living Environment Comment Pt reports sister can stay and get her home. Set up home.   Self-Care   Usual Activity Tolerance moderate   Current Activity Tolerance fair   Regular Exercise yes   Activity/Exercise Type walking  (but hard during winter months)   Equipment Currently Used at Home cane, straight   Functional Level Prior   Ambulation 0-->independent   Transferring 0-->independent   Toileting 0-->independent   Bathing 0-->independent   Dressing 0-->independent   Eating 0-->independent   Communication 0-->understands/communicates without difficulty   Swallowing 0-->swallows foods/liquids without difficulty   Cognition 0 - no cognition issues reported   Fall history within last six months yes   Number of times patient has fallen within last six months 1   Prior Functional Level Comment Pt works from home. Pt reports I with all IADLs   General Information   Onset of Illness/Injury or Date of Surgery - Date 03/16/18   Referring Physician Dr. Wen   Patient/Family Goals Statement Pt hoping to D/C home by noon.   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 Pt found to have displaced malleolar fracture. Pt is status post open reduction and internal fixation of displaced left lateral malleolar fracture.    Precautions/Limitations fall precautions   Weight-Bearing Status - LLE nonweight-bearing   General Info Comments Pt not very receptive to PT   Cognitive Status Examination   Orientation orientation to person, place and time   Level of Consciousness alert   Follows Commands and Answers Questions 100% of the time;able to follow single-step instructions   Personal Safety and Judgment impulsive   Cognitive Comment Pt impulsive with movement   Integumentary/Edema   Integumentary/Edema Comments as expected following ORIF   Posture    Posture Forward head position;Protracted shoulders   Range of Motion (ROM)   ROM Comment limited due to casted foot to knee on L side   Strength   Strength Comments able to perform SLR B   Bed Mobility   Bed Mobility Comments Independent   Transfer Skills   Transfer Comments sit<>stand with mod I, bed to chair min A x 1 with FWW, with fatigue needing max A x 2 for safe transfer   Gait   Gait Comments able to perform 5 feet of forward movement, but fatiguing quickly, needing chair brought behind pt   Balance   Balance Comments good static standing balance with ability to perform NWB in standing wtih FWW. With fatigue, impulsivity making pt more off balance.   Sensory Examination   Sensory Perception Comments reports B peripheral nueropathy   Modality Interventions   Planned Modality Interventions Cryotherapy   General Therapy Interventions   Planned Therapy Interventions bed mobility training;gait training;transfer training;risk factor education;progressive activity/exercise;home program guidelines   Clinical Impression   Criteria for Skilled Therapeutic Intervention yes, treatment indicated   PT Diagnosis decreased functional mobility status post ORIF to L malleolar fracture   Influenced by the following impairments NWB, decreased UE strength/endurance   Functional limitations due to impairments decreased safe transfers, ambulation, stairs   Clinical Presentation Evolving/Changing  "  Clinical Presentation Rationale Pt impulsive and not receptive to education making mobility items more challenging   Clinical Decision Making (Complexity) Moderate complexity   Therapy Frequency` 2 times/day  (not receptive to further PT)   Predicted Duration of Therapy Intervention (days/wks) 1 day  (not receptive to further PT intervention)   Anticipated Equipment Needs at Discharge front wheeled walker  (pt looking into finding one outside of hospital-borrowing)   Anticipated Discharge Disposition Home with Assist   Risk & Benefits of therapy have been explained Yes   Patient, Family & other staff in agreement with plan of care Yes   [facility] TM \"6 Clicks\"   2016, Trustees of [facility], under license to [employer].  All rights reserved.   6 Clicks Short Forms Basic Mobility Inpatient Short Form   [facility]  \"6 Clicks\" V.2 Basic Mobility Inpatient Short Form   1. Turning from your back to your side while in a flat bed without using bedrails? 4 - None   2. Moving from lying on your back to sitting on the side of a flat bed without using bedrails? 4 - None   3. Moving to and from a bed to a chair (including a wheelchair)? 3 - A Little   4. Standing up from a chair using your arms (e.g., wheelchair, or bedside chair)? 4 - None   5. To walk in hospital room? 2 - A Lot   6. Climbing 3-5 steps with a railing? 1 - Total   Basic Mobility Raw Score (Score out of 24.Lower scores equate to lower levels of function) 18   Total Evaluation Time   Total Evaluation Time (Minutes) 10     "

## 2018-03-17 NOTE — PLAN OF CARE
Problem: Patient Care Overview  Goal: Plan of Care/Patient Progress Review  A/O, VSS NWB LLE, up with assist of 1 to the bed side commode.  Voiding adequate denies pain.  Refused IV fluids.  Agreed to keep saline lock in until antibiotics are finished.  Denies pain.  States numbness bilateral has improved since admission.  Dressing CDI elevated as tolerated. Refused ice to ankle.  Wants to discharge today around lunch time.  Blood glucose 231 sliding scale coverage and lantus given

## 2018-03-17 NOTE — PROGRESS NOTES
Reviewed discharge instructions with patient.  Questions answered. Patient discharged to home with  discharge instructions, script for Oxycodone and belongings at this time.  She declined getting medications filled here and did not want them sent to another pharmacy.  She said she will get insulin from [facility] pharmacy as we do not carry her type here.

## 2018-05-19 ENCOUNTER — HOSPITAL ENCOUNTER (EMERGENCY)
Facility: CLINIC | Age: 58
Discharge: HOME | End: 2018-05-19
Attending: EMERGENCY MEDICINE | Admitting: EMERGENCY MEDICINE

## 2018-05-19 ENCOUNTER — APPOINTMENT (OUTPATIENT)
Dept: GENERAL RADIOLOGY | Facility: CLINIC | Age: 58
End: 2018-05-19
Attending: EMERGENCY MEDICINE

## 2018-05-19 VITALS
SYSTOLIC BLOOD PRESSURE: 147 MMHG | HEART RATE: 74 BPM | DIASTOLIC BLOOD PRESSURE: 99 MMHG | TEMPERATURE: 97.2 F | OXYGEN SATURATION: 98 % | RESPIRATION RATE: 20 BRPM

## 2018-05-19 DIAGNOSIS — S82.892D CLOSED FRACTURE OF LEFT ANKLE WITH ROUTINE HEALING, SUBSEQUENT ENCOUNTER: ICD-10-CM

## 2018-05-19 PROCEDURE — 73610 X-RAY EXAM OF ANKLE: CPT | Mod: LT

## 2018-05-19 PROCEDURE — 99284 EMERGENCY DEPT VISIT MOD MDM: CPT

## 2018-05-19 ASSESSMENT — ENCOUNTER SYMPTOMS: ARTHRALGIAS: 0

## 2018-05-19 NOTE — DISCHARGE INSTRUCTIONS
No weight bearing on the ankle.  Wear the ankle boot.   Remove boot 3x per day to perform range of motion of the ankle.

## 2018-05-19 NOTE — ED TRIAGE NOTES
Arrives stating that she would like an x-ray to see if the cast on her left lower leg and ankle can be removed, states she had surgery in March and does not know who to follow up with and would like the cast off. Alert and oriented, denies pain or other complaints, ABCs intact.

## 2018-05-19 NOTE — ED AVS SNAPSHOT
[facility] Emergency Department    [address]    [facility] [address]    Phone:  [phone]    Fax:  [phone]                                       Roxy Beaulieu   MRN: [id number]    Department:  [facility] Emergency Department   Date of Visit:  5/19/2018           Patient Information     Date Of Birth          1960        Your diagnoses for this visit were:     Closed fracture of left ankle with routine healing, subsequent encounter        You were seen by Tierney Espinosa MD.      Follow-up Information     Follow up with Sumanth Wen MD In 1 week.    Specialty:  Orthopedics    Contact information:    [facility]  [address]  [facility] [address]  [phone]          Follow up with [facility] Emergency Department.    Specialty:  EMERGENCY MEDICINE    Why:  If symptoms worsen    Contact information:    [address] [facility] [phone]        Discharge Instructions       No weight bearing on the ankle.  Wear the ankle boot.   Remove boot 3x per day to perform range of motion of the ankle.      24 Hour Appointment Hotline       To make an appointment at any [facility] clinic, call [phone] ([phone]). If you don't have a family doctor or clinic, we will help you find one. [facility] clinics are conveniently located to serve the needs of you and your family.             Review of your medicines      Our records show that you are taking the medicines listed below. If these are incorrect, please call your family doctor or clinic.        Dose / Directions Last dose taken    acetaminophen 325 MG tablet   Commonly known as:  TYLENOL   Dose:  975 mg   Quantity:  100 tablet        Take 3 tablets (975 mg) by mouth every 8 hours as needed for mild pain or fever   Refills:  0        aspirin 325 MG EC tablet   Dose:  325 mg   Quantity:  30 tablet        Take 1 tablet  (325 mg) by mouth daily   Refills:  0        insulin aspart 100 UNIT/ML injection   Commonly known as:  NovoLOG FLEXPEN   Quantity:  15 mL        6 units before breakfast, 6 units before lunch, 6 units before dinner   Refills:  1        insulin glargine 100 UNIT/ML injection   Commonly known as:  LANTUS SOLOSTAR   Dose:  25 Units   Quantity:  7.5 mL        Inject 25 Units Subcutaneous At Bedtime   Refills:  1        oxyCODONE IR 5 MG tablet   Commonly known as:  ROXICODONE   Dose:  5-10 mg   Quantity:  30 tablet        Take 1-2 tablets (5-10 mg) by mouth every 4 hours as needed for moderate to severe pain   Refills:  0        senna-docusate 8.6-50 MG per tablet   Commonly known as:  SENOKOT-S;PERICOLACE   Dose:  1-2 tablet   Quantity:  30 tablet        Take 1-2 tablets by mouth 2 times daily as needed for constipation   Refills:  0                Procedures and tests performed during your visit     XR Ankle Left G/E 3 Views      Orders Needing Specimen Collection     None      Pending Results     No orders found from 5/17/2018 to 5/20/2018.            Pending Culture Results     No orders found from 5/17/2018 to 5/20/2018.            Pending Results Instructions     If you had any lab results that were not finalized at the time of your Discharge, you can call the ED Lab Result RN at [phone]. You will be contacted by this team for any positive Lab results or changes in treatment. The nurses are available 7 days a week from 10A to 6:30P.  You can leave a message 24 hours per day and they will return your call.        Test Results From Your Hospital Stay              5/19/2018  5:11 PM      Narrative     XR ANKLE LT G/E 3 VW 5/19/2018 5:09 PM    HISTORY: Postop. Evaluate for healing.    COMPARISON: 3/15/2018    FINDINGS: Tibial and fibular hardware appears intact. There is  bridging callus formation at the fibular fracture site consistent with  healing. The mortise joint is congruent. No acute osseous  abnormality.        Impression     IMPRESSION: Intact hardware and alignment. No acute abnormality.    LISA LUKE MD                Clinical Quality Measure: Blood Pressure Screening     Your blood pressure was checked while you were in the emergency department today. The last reading we obtained was  BP: (!) 147/99 . Please read the guidelines below about what these numbers mean and what you should do about them.  If your systolic blood pressure (the top number) is less than 120 and your diastolic blood pressure (the bottom number) is less than 80, then your blood pressure is normal. There is nothing more that you need to do about it.  If your systolic blood pressure (the top number) is 120-139 or your diastolic blood pressure (the bottom number) is 80-89, your blood pressure may be higher than it should be. You should have your blood pressure rechecked within a year by a primary care provider.  If your systolic blood pressure (the top number) is 140 or greater or your diastolic blood pressure (the bottom number) is 90 or greater, you may have high blood pressure. High blood pressure is treatable, but if left untreated over time it can put you at risk for heart attack, stroke, or kidney failure. You should have your blood pressure rechecked by a primary care provider within the next 4 weeks.  If your provider in the emergency department today gave you specific instructions to follow-up with your doctor or provider even sooner than that, you should follow that instruction and not wait for up to 4 weeks for your follow-up visit.        Thank you for choosing [facility]       Thank you for choosing [facility] for your care. Our goal is always to provide you with excellent care. Hearing back from our patients is one way we can continue to improve our services. Please take a few minutes to complete the written survey that you may receive in the mail after you visit with us. Thank you!        MyChart Information      "[facility] lets you send messages to your doctor, view your test results, renew your prescriptions, schedule appointments and more. To sign up, go to [email or web address] . Click on \"Log in\" on the left side of the screen, which will take you to the Welcome page. Then click on \"Sign up Now\" on the right side of the page.     You will be asked to enter the access code listed below, as well as some personal information. Please follow the directions to create your username and password.     Your access code is: [email or web address]  Expires: 6/15/2018  8:22 AM     Your access code will  in 90 days. If you need help or a new code, please call your [facility] clinic or [phone].        Care EveryWhere ID     This is your Care EveryWhere ID. This could be used by other organizations to access your [facility] medical records  [id number]        Equal Access to Services     JAIRO KIMBLE AH: Hadii dary Mayer, wadanial marcos, qademetrice kaalmabhavesh castro, harshil dow . So [facility] [phone].    ATENCIÓN: Si habla español, tiene a cramer disposición servicios gratuitos de asistencia lingüística. Llame al [phone].    We comply with applicable federal civil rights laws and [place] laws. We do not discriminate on the basis of race, color, national origin, age, disability, sex, sexual orientation, or gender identity.            After Visit Summary       This is your record. Keep this with you and show to your community pharmacist(s) and doctor(s) at your next visit.                  "

## 2018-05-19 NOTE — ED PROVIDER NOTES
History     Chief Complaint:  Cast Problem    HPI   Roxy Beaulieu is a 57 year old female who presents to the emergency department today for evaluation of cast problem in the setting of being 9 weeks status-post ORIF distal left malleolar fracture completed on 03/16/18. The patient was supposed to follow up with her orthopedic surgeon 10 post-op, but the patient states she never did this due to the fact she does not have insurance. She presents today requesting to have her cast removed. She reports being non-weightbearing ever since the surgery and want to have the cast removed.  She denies being in any pain. She denies any recent injury to the leg.  The ankle and foot are not painful.    Allergies:  No Known Drug Allergies      Medications:    Aspirin  Insulin  Oxycodone    Past Medical History:    Gangrene  Type 2 diabetes mellitus  Hypercholesteremia  Hypertension   Obesity     Past Surgical History:    Left toe amputation   ORIF left fibula    Family History:    History reviewed. No pertinent family history.      Social History:  She is here alone today, no family members or companions are here with her.   Smoking Status: former  Alcohol Use: no   Marital Status:  Single [1]     Review of Systems   Musculoskeletal: Negative for arthralgias.   All other systems reviewed and are negative.    Physical Exam   First Vitals:  BP: (!) 147/99  Pulse: 74  Temp: 97.2  F (36.2  C)  Resp: 20  SpO2: 98 %     Physical Exam  Gen: alert  CV: LLE warm and well perfused with 2+ DP pulse  Skin: LLE skin very dry. Incision to lateral lower leg c/d/i, staples in place  MSK: no tenderness or swelling over ankle, no calf tenderness  Neuro: sensation intact to light touch over dorsum of foot     Emergency Department Course     Imaging:  Radiology findings were communicated with the patient who voiced understanding of the findings.  XR Ankle Left G/E 3 Views  Intact hardware and alignment. No acute abnormality.  LISA LUKE,  MD    Emergency Department Course:  Nursing notes and vitals reviewed.  I entered the room.  I performed an exam of the patient as documented above.   The patient was sent for X-ray while in the emergency department, results above.   1729 I spoke with Dr. Owens of the orthopedic service regarding patient's presentation, findings, and plan of care.   1740 the patient was rechecked and updated regarding the results of the imaging studies.    I discussed the treatment plan with the patient. They expressed understanding of this plan and consented to discharge. They will be discharged home with instructions for care and follow up. In addition, the patient will return to the emergency department if their symptoms worsen, if new symptoms arise or if there is any concern.  All questions were answered.     Impression & Plan      Medical Decision Making:  Roxy Beaulieu is a 57 year old female who presents to the emergency department today with questions regarding her cast. The cast was removed. Skin dry but otherwised looked great. Incision c/d/i. Staples were removed after approval from orthopedics. X-ray was obtained and showed evidence healing. Per orthopedics instructions, the patient will remain nonweightbearing. Transition to cam boot. Follow-up with orthopedics.    Diagnosis:    ICD-10-CM    1. Closed fracture of left ankle with routine healing, subsequent encounter S82.110F      Disposition:   The patient was discharged to home.    Scribe Disclosure:  I, Joseph Norris, am serving as a scribe on 5/19/2018 to document services personally performed by Tierney Espinosa*, based on my observations and the provider's statements to me.   [facility] EMERGENCY DEPARTMENT       Tierney Espinosa MD  05/19/18 8436

## 2018-07-06 ENCOUNTER — APPOINTMENT (OUTPATIENT)
Dept: GENERAL RADIOLOGY | Facility: CLINIC | Age: 58
End: 2018-07-06
Attending: NURSE PRACTITIONER

## 2018-07-06 ENCOUNTER — HOSPITAL ENCOUNTER (EMERGENCY)
Facility: CLINIC | Age: 58
Discharge: HOME | End: 2018-07-06
Attending: NURSE PRACTITIONER | Admitting: NURSE PRACTITIONER

## 2018-07-06 VITALS
HEIGHT: 68 IN | SYSTOLIC BLOOD PRESSURE: 187 MMHG | WEIGHT: 185 LBS | TEMPERATURE: 98.8 F | BODY MASS INDEX: 28.04 KG/M2 | RESPIRATION RATE: 16 BRPM | OXYGEN SATURATION: 99 % | DIASTOLIC BLOOD PRESSURE: 92 MMHG

## 2018-07-06 DIAGNOSIS — L89.622 DECUBITUS ULCER OF LEFT HEEL, STAGE 2 (H): ICD-10-CM

## 2018-07-06 LAB
ANION GAP SERPL CALCULATED.3IONS-SCNC: 7 MMOL/L (ref 3–14)
BUN SERPL-MCNC: 26 MG/DL (ref 7–30)
CALCIUM SERPL-MCNC: 8.5 MG/DL (ref 8.5–10.1)
CHLORIDE SERPL-SCNC: 98 MMOL/L (ref 94–109)
CO2 SERPL-SCNC: 28 MMOL/L (ref 20–32)
CREAT SERPL-MCNC: 1.75 MG/DL (ref 0.52–1.04)
ERYTHROCYTE [DISTWIDTH] IN BLOOD BY AUTOMATED COUNT: 13.6 % (ref 10–15)
GFR SERPL CREATININE-BSD FRML MDRD: 30 ML/MIN/1.7M2
GLUCOSE SERPL-MCNC: 464 MG/DL (ref 70–99)
HCT VFR BLD AUTO: 33.6 % (ref 35–47)
HGB BLD-MCNC: 11 G/DL (ref 11.7–15.7)
LACTATE SERPL-SCNC: 0.8 MMOL/L (ref 0.4–2)
MCH RBC QN AUTO: 26.2 PG (ref 26.5–33)
MCHC RBC AUTO-ENTMCNC: 32.7 G/DL (ref 31.5–36.5)
MCV RBC AUTO: 80 FL (ref 78–100)
PLATELET # BLD AUTO: 263 10E9/L (ref 150–450)
POTASSIUM SERPL-SCNC: 4 MMOL/L (ref 3.4–5.3)
RBC # BLD AUTO: 4.2 10E12/L (ref 3.8–5.2)
SODIUM SERPL-SCNC: 133 MMOL/L (ref 133–144)
WBC # BLD AUTO: 7.8 10E9/L (ref 4–11)

## 2018-07-06 PROCEDURE — 73630 X-RAY EXAM OF FOOT: CPT | Mod: LT

## 2018-07-06 PROCEDURE — 85027 COMPLETE CBC AUTOMATED: CPT | Performed by: NURSE PRACTITIONER

## 2018-07-06 PROCEDURE — 80048 BASIC METABOLIC PNL TOTAL CA: CPT | Performed by: NURSE PRACTITIONER

## 2018-07-06 PROCEDURE — 83605 ASSAY OF LACTIC ACID: CPT | Performed by: NURSE PRACTITIONER

## 2018-07-06 PROCEDURE — 36415 COLL VENOUS BLD VENIPUNCTURE: CPT | Performed by: NURSE PRACTITIONER

## 2018-07-06 PROCEDURE — 99284 EMERGENCY DEPT VISIT MOD MDM: CPT

## 2018-07-06 RX ORDER — CEPHALEXIN 500 MG/1
500 CAPSULE ORAL 3 TIMES DAILY
Qty: 42 CAPSULE | Refills: 0 | Status: SHIPPED | OUTPATIENT
Start: 2018-07-06 | End: 2018-07-20

## 2018-07-06 ASSESSMENT — ENCOUNTER SYMPTOMS
WOUND: 1
CHILLS: 0
SHORTNESS OF BREATH: 0
FEVER: 0

## 2018-07-06 NOTE — ED AVS SNAPSHOT
[facility] Emergency Department    [address]    [facility] [address]    Phone:  [phone]    Fax:  [phone]                                       Roxy Beaulieu   MRN: [id number]    Department:  [facility] Emergency Department   Date of Visit:  7/6/2018           After Visit Summary Signature Page     I have received my discharge instructions, and my questions have been answered. I have discussed any challenges I see with this plan with the nurse or doctor.    ..........................................................................................................................................  Patient/Patient Representative Signature      ..........................................................................................................................................  Patient Representative Print Name and Relationship to Patient    ..................................................               ................................................  Date                                            Time    ..........................................................................................................................................  Reviewed by Signature/Title    ...................................................              ..............................................  Date                                                            Time

## 2018-07-06 NOTE — ED AVS SNAPSHOT
[facility] Emergency Department    [address]    [facility] [address]    Phone:  [phone]    Fax:  [phone]                                       Roxy Beaulieu   MRN: [id number]    Department:  [facility] Emergency Department   Date of Visit:  7/6/2018           Patient Information     Date Of Birth          1960        Your diagnoses for this visit were:     Decubitus ulcer of left heel, stage 2        You were seen by Kirt Buchanan APRN CNP.      Follow-up Information     Follow up with wound care clinic handout with info given.        Follow up with [facility], [place] In 3 days.    Specialty:  Family Practice        Follow up with [facility] Emergency Department.    Specialty:  EMERGENCY MEDICINE    Why:  If symptoms worsen    Contact information:    [address]  [facility] [address]  [phone]        Discharge Instructions         Treating Pressure Injuries of the Foot    With your healthcare provider s care, hot spots, small cracks, or sores can be treated before they get infected. If infection is already present, your provider will probably prescribe medicine. You may also need surgery if the infection has spread.  Checking your feet  What to look for:    Use a mirror to look at the bottom of your feet each day. By doing so, you can catch small skin changes before they turn into pressure injuries.    Call your healthcare provider if you notice hot spots, red streaks, swelling, or any cracks or sores. Never try to treat corns or calluses yourself.     Check the soles and insides of your shoes before putting them on. Remove any objects, such as deandre.  Improving your overall health  Do your best to control health problems that may affect your feet, such as diabetes and kidney disease. Eat right and exercise. If you are given medicines, take them as directed. If you smoke, stop. Smoking reduces blood flow  and slows healing. Limiting alcohol intake may also be helpful.      Cleaning the pressure injuries  To help with healing, your healthcare provider may clear away the thickened skin around the pressure injury. He or she may put medicated ointment or cream on the injury to prevent infection. Sometimes a special dressing is used to help keep the wound dry.     Reducing force  To take pressure off hot spots and ulcers, your healthcare provider may prescribe orthoses. These custom-made shoe inserts absorb or move pressure from problem areas. You may need special shoes or temporary casts.     Using antibiotics  To control or prevent infection, your healthcare provider may prescribe antibiotics. Take them all, and take them as directed. If you stop using an antibiotic too soon, the infection may come back.     If surgery is needed  You may need surgery if infection enters deep tissues or bone. In such cases, your healthcare provider cleans away the infection while removing as little tissue or bone as possible. You may also be given intravenous (IV) antibiotics to fight the infection.  Date Last Reviewed: 6/1/2016 2000-2017 [employer]. [address] [place] [address]. All rights reserved. This information is not intended as a substitute for professional medical care. Always follow your healthcare professional's instructions.          Wound Care  Taking proper care of your wound will help it heal. Your healthcare provider may show you how to clean and dress the wound. He or she will also explain how to tell if the wound is healing normally. If you are unsure of how to take care of the wound, be sure to clarify what dressing to use and how often you should change the bandages. Here are the basic steps.     A wound that's not healing normally may be dark in color or have white streaks.   Wash your hands  Tips for washing your hands include:    Use liquid soap and lather for 2 minutes. Scrub between  your fingers and under your nails.    Rinse with warm water, keeping your fingers pointing down.    Use a paper towel to dry your hands and to turn off the faucet.  Remove the used dressing  Here are suggestions for removing the dressing:    If dressing changes cause you pain, be sure to take your pain medicine as prescribed by your healthcare provider 30 minutes before dressing changes.    Set up your supplies.    Put on disposable gloves if you re dressing a wound for someone else or your wound is infected.    Loosen the tape by pulling gently toward the wound.    Gently take off the old dressing. If the dressing is stuck to the wound, moisten it with saline (if available) or clean water.    If you have a drain or tube in the wound, be careful not to pull on it.    Remove the dressing 1 layer at a time and put it in a plastic bag. Seal the bag and put it in the trash.    Remove your gloves.  Inspect and dress the wound  Check the wound carefully:    Each time you change the dressing, check the wound carefully to be sure it s healing normally by making sure your wound appears to be pink and moist, and is free of infection.      Wash your hands again. Put on a new pair of gloves.    Clean and dress the wound as directed by your healthcare provider or nurse. Don't put anything in the wound that is not prescribed or directed by your healthcare provider. If you have a drain or tube, be careful not to pull on it. Make sure to secure the drain or tube as well.    Put all unused supplies in a clean plastic bag. Seal the bag and store it in a clean, dry area between dressing changes.     Be sure to wash your hands again.  Call your healthcare provider  Call your healthcare provider if you see any of the following signs of a problem:    Bleeding that soaks the dressing    Pink fluid weeping from the wound    Increased drainage or drainage that is yellow, yellow-green, or foul-smelling    Increased swelling or pain, or  redness or swelling in the skin around the wound    A change in the color of the wound, or if streaks develop in a direction away from the wound    The area between any stitches opens up    An increase in the size of the wound    A fever of 100.4 F (38 C) or higher, or as directed by your healthcare provider    Chills, increased fatigue, or a loss of appetite      Date Last Reviewed: 7/1/2017 2000-2017 [employer]. [address] [place] [address]. All rights reserved. This information is not intended as a substitute for professional medical care. Always follow your healthcare professional's instructions.          24 Hour Appointment Hotline       To make an appointment at any [facility], call [phone] ([phone]). If you don't have a family doctor or clinic, we will help you find one. [facility] clinics are conveniently located to serve the needs of you and your family.             Review of your medicines      START taking        Dose / Directions Last dose taken    cephALEXin 500 MG capsule   Commonly known as:  KEFLEX   Dose:  500 mg   Quantity:  42 capsule        Take 1 capsule (500 mg) by mouth 3 times daily for 14 days   Refills:  0          Our records show that you are taking the medicines listed below. If these are incorrect, please call your family doctor or clinic.        Dose / Directions Last dose taken    acetaminophen 325 MG tablet   Commonly known as:  TYLENOL   Dose:  975 mg   Quantity:  100 tablet        Take 3 tablets (975 mg) by mouth every 8 hours as needed for mild pain or fever   Refills:  0        aspirin 325 MG EC tablet   Dose:  325 mg   Quantity:  30 tablet        Take 1 tablet (325 mg) by mouth daily   Refills:  0        insulin aspart 100 UNIT/ML injection   Commonly known as:  NovoLOG FLEXPEN   Quantity:  15 mL        6 units before breakfast, 6 units before lunch, 6 units before dinner   Refills:  1        insulin glargine 100 UNIT/ML injection    Commonly known as:  LANTUS SOLOSTAR   Dose:  25 Units   Quantity:  7.5 mL        Inject 25 Units Subcutaneous At Bedtime   Refills:  1        oxyCODONE IR 5 MG tablet   Commonly known as:  ROXICODONE   Dose:  5-10 mg   Quantity:  30 tablet        Take 1-2 tablets (5-10 mg) by mouth every 4 hours as needed for moderate to severe pain   Refills:  0        senna-docusate 8.6-50 MG per tablet   Commonly known as:  SENOKOT-S;PERICOLACE   Dose:  1-2 tablet   Quantity:  30 tablet        Take 1-2 tablets by mouth 2 times daily as needed for constipation   Refills:  0                Prescriptions were sent or printed at these locations (1 Prescription)                   Other Prescriptions                Printed at Department/Unit printer (1 of 1)         cephALEXin (KEFLEX) 500 MG capsule                Procedures and tests performed during your visit     Basic metabolic panel    CBC (platelets, no diff)    Lactic acid    Saline Lock IV    XR Foot Left G/E 3 Views      Orders Needing Specimen Collection     None      Pending Results     No orders found from 7/4/2018 to 7/7/2018.            Pending Culture Results     No orders found from 7/4/2018 to 7/7/2018.            Pending Results Instructions     If you had any lab results that were not finalized at the time of your Discharge, you can call the ED Lab Result RN at [phone]. You will be contacted by this team for any positive Lab results or changes in treatment. The nurses are available 7 days a week from 10A to 6:30P.  You can leave a message 24 hours per day and they will return your call.        Test Results From Your Hospital Stay        7/6/2018  3:53 PM      Narrative     XR FOOT LT G/E 3 VW 7/6/2018 3:48 PM     HISTORY: infection;     COMPARISON: 5/1/2017        Impression     IMPRESSION: Diffuse osteopenia. 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 Amputation of the  second toe. No definite osseous erosive  changes to suggest  osteomyelitis.    DARIANA RIOS MD         7/6/2018  3:10 PM      Component Results     Component Value Ref Range & Units Status    Sodium 133 133 - 144 mmol/L Final    Potassium 4.0 3.4 - 5.3 mmol/L Final    Chloride 98 94 - 109 mmol/L Final    Carbon Dioxide 28 20 - 32 mmol/L Final    Anion Gap 7 3 - 14 mmol/L Final    Glucose 464 (H) 70 - 99 mg/dL Final    Urea Nitrogen 26 7 - 30 mg/dL Final    Creatinine 1.75 (H) 0.52 - 1.04 mg/dL Final    GFR Estimate 30 (L) >60 mL/min/1.7m2 Final    Non  GFR Calc    GFR Estimate If Black 36 (L) >60 mL/min/1.7m2 Final    African American GFR Calc    Calcium 8.5 8.5 - 10.1 mg/dL Final         7/6/2018  2:55 PM      Component Results     Component Value Ref Range & Units Status    WBC 7.8 4.0 - 11.0 10e9/L Final    RBC Count 4.20 3.8 - 5.2 10e12/L Final    Hemoglobin 11.0 (L) 11.7 - 15.7 g/dL Final    Hematocrit 33.6 (L) 35.0 - 47.0 % Final    MCV 80 78 - 100 fl Final    MCH 26.2 (L) 26.5 - 33.0 pg Final    MCHC 32.7 31.5 - 36.5 g/dL Final    RDW 13.6 10.0 - 15.0 % Final    Platelet Count 263 150 - 450 10e9/L Final         7/6/2018  3:01 PM      Component Results     Component Value Ref Range & Units Status    Lactic Acid 0.8 0.4 - 2.0 mmol/L Final                Clinical Quality Measure: Blood Pressure Screening     Your blood pressure was checked while you were in the emergency department today. The last reading we obtained was  BP: (!) 187/92 . Please read the guidelines below about what these numbers mean and what you should do about them.  If your systolic blood pressure (the top number) is less than 120 and your diastolic blood pressure (the bottom number) is less than 80, then your blood pressure is normal. There is nothing more that you need to do about it.  If your systolic blood pressure (the top number) is 120-139 or your diastolic blood pressure (the bottom number) is 80-89, your blood pressure may be higher than it should be. You  "should have your blood pressure rechecked within a year by a primary care provider.  If your systolic blood pressure (the top number) is 140 or greater or your diastolic blood pressure (the bottom number) is 90 or greater, you may have high blood pressure. High blood pressure is treatable, but if left untreated over time it can put you at risk for heart attack, stroke, or kidney failure. You should have your blood pressure rechecked by a primary care provider within the next 4 weeks.  If your provider in the emergency department today gave you specific instructions to follow-up with your doctor or provider even sooner than that, you should follow that instruction and not wait for up to 4 weeks for your follow-up visit.        Thank you for choosing [facility]       Thank you for choosing [facility] for your care. Our goal is always to provide you with excellent care. Hearing back from our patients is one way we can continue to improve our services. Please take a few minutes to complete the written survey that you may receive in the mail after you visit with us. Thank you!        [employer] Information     [employer] lets you send messages to your doctor, view your test results, renew your prescriptions, schedule appointments and more. To sign up, go to [email or web address] . Click on \"Log in\" on the left side of the screen, which will take you to the Welcome page. Then click on \"Sign up Now\" on the right side of the page.     You will be asked to enter the access code listed below, as well as some personal information. Please follow the directions to create your username and password.     Your access code is: [email or web address]  Expires: 10/4/2018  4:48 PM     Your access code will  in 90 days. If you need help or a new code, please call your [facility] clinic or [phone].        Care EveryWhere ID     This is your Care EveryWhere ID. This could be used by other organizations to access your [facility] medical " records  [id number]        Equal Access to Services     JAIRO KIMBLE : Shon Mayer, brittany marcos, harshil ortiz. So [facility] [phone].    ATENCIÓN: Si habla español, tiene a cramer disposición servicios gratuitos de asistencia lingüística. Llame al [phone].    We comply with applicable federal civil rights laws and [place] laws. We do not discriminate on the basis of race, color, national origin, age, disability, sex, sexual orientation, or gender identity.            After Visit Summary       This is your record. Keep this with you and show to your community pharmacist(s) and doctor(s) at your next visit.

## 2018-07-06 NOTE — ED TRIAGE NOTES
A&Ox4. ABC's intact. Pt c/o left foot infection. States there is an open spot on the top of her foot with redness around it.  Pt is using a walking boot after ankle fx and surgery from March.  Denies pain at this time.

## 2018-07-06 NOTE — DISCHARGE INSTRUCTIONS
Treating Pressure Injuries of the Foot    With your healthcare provider s care, hot spots, small cracks, or sores can be treated before they get infected. If infection is already present, your provider will probably prescribe medicine. You may also need surgery if the infection has spread.  Checking your feet  What to look for:    Use a mirror to look at the bottom of your feet each day. By doing so, you can catch small skin changes before they turn into pressure injuries.    Call your healthcare provider if you notice hot spots, red streaks, swelling, or any cracks or sores. Never try to treat corns or calluses yourself.     Check the soles and insides of your shoes before putting them on. Remove any objects, such as deandre.  Improving your overall health  Do your best to control health problems that may affect your feet, such as diabetes and kidney disease. Eat right and exercise. If you are given medicines, take them as directed. If you smoke, stop. Smoking reduces blood flow and slows healing. Limiting alcohol intake may also be helpful.      Cleaning the pressure injuries  To help with healing, your healthcare provider may clear away the thickened skin around the pressure injury. He or she may put medicated ointment or cream on the injury to prevent infection. Sometimes a special dressing is used to help keep the wound dry.     Reducing force  To take pressure off hot spots and ulcers, your healthcare provider may prescribe orthoses. These custom-made shoe inserts absorb or move pressure from problem areas. You may need special shoes or temporary casts.     Using antibiotics  To control or prevent infection, your healthcare provider may prescribe antibiotics. Take them all, and take them as directed. If you stop using an antibiotic too soon, the infection may come back.     If surgery is needed  You may need surgery if infection enters deep tissues or bone. In such cases, your healthcare provider cleans away  the infection while removing as little tissue or bone as possible. You may also be given intravenous (IV) antibiotics to fight the infection.  Date Last Reviewed: 6/1/2016 2000-2017 [employer]. [address] [place] [address], [place] [address]. All rights reserved. This information is not intended as a substitute for professional medical care. Always follow your healthcare professional's instructions.          Wound Care  Taking proper care of your wound will help it heal. Your healthcare provider may show you how to clean and dress the wound. He or she will also explain how to tell if the wound is healing normally. If you are unsure of how to take care of the wound, be sure to clarify what dressing to use and how often you should change the bandages. Here are the basic steps.     A wound that's not healing normally may be dark in color or have white streaks.   Wash your hands  Tips for washing your hands include:    Use liquid soap and lather for 2 minutes. Scrub between your fingers and under your nails.    Rinse with warm water, keeping your fingers pointing down.    Use a paper towel to dry your hands and to turn off the faucet.  Remove the used dressing  Here are suggestions for removing the dressing:    If dressing changes cause you pain, be sure to take your pain medicine as prescribed by your healthcare provider 30 minutes before dressing changes.    Set up your supplies.    Put on disposable gloves if you re dressing a wound for someone else or your wound is infected.    Loosen the tape by pulling gently toward the wound.    Gently take off the old dressing. If the dressing is stuck to the wound, moisten it with saline (if available) or clean water.    If you have a drain or tube in the wound, be careful not to pull on it.    Remove the dressing 1 layer at a time and put it in a plastic bag. Seal the bag and put it in the trash.    Remove your gloves.  Inspect and dress the wound  Check the wound  carefully:    Each time you change the dressing, check the wound carefully to be sure it s healing normally by making sure your wound appears to be pink and moist, and is free of infection.      Wash your hands again. Put on a new pair of gloves.    Clean and dress the wound as directed by your healthcare provider or nurse. Don't put anything in the wound that is not prescribed or directed by your healthcare provider. If you have a drain or tube, be careful not to pull on it. Make sure to secure the drain or tube as well.    Put all unused supplies in a clean plastic bag. Seal the bag and store it in a clean, dry area between dressing changes.     Be sure to wash your hands again.  Call your healthcare provider  Call your healthcare provider if you see any of the following signs of a problem:    Bleeding that soaks the dressing    Pink fluid weeping from the wound    Increased drainage or drainage that is yellow, yellow-green, or foul-smelling    Increased swelling or pain, or redness or swelling in the skin around the wound    A change in the color of the wound, or if streaks develop in a direction away from the wound    The area between any stitches opens up    An increase in the size of the wound    A fever of 100.4 F (38 C) or higher, or as directed by your healthcare provider    Chills, increased fatigue, or a loss of appetite      Date Last Reviewed: 7/1/2017 2000-2017 [employer]. [address] [place] [address]. All rights reserved. This information is not intended as a substitute for professional medical care. Always follow your healthcare professional's instructions.

## 2018-07-06 NOTE — ED PROVIDER NOTES
History   Chief Complaint:  Foot Pain    The history is provided by the patient.   Roxy Beaulieu is a 58 year old female with a history of diabetes mellitus, HTN, high cholesterol, and gangrene of toe who presents to the emergency department for evaluation of left foot pain. Of note, the patient broke her left ankle in March and presents wearing a walking boot in the final stages of her rehab process. She had surgery here at [facility]. She reports they went in from the lateral aspect of the left ankle and put staples in, which were removed about a month ago. Yesterday, the patient noticed an open sore on the top of her foot with redness around it. She states she looked down into the sock yesterday and noticed the sore on the top of her left foot. This pain prompted the patient to seek evaluation here in the emergency department here today for evaluation.     Here, the patient continues to complain of the sore on the top of her left foot as well as an additional sore on the medial aspect of her right foot, which she just noticed as you was taking her walking boot off in the ED.     Allergies:  NKDA     Medications:    Aspirin  Insulin aspart  Insulin glargine  Oxycodone  Senna-docusate    Past Medical History:    Gangrene of toe  Obesity  diabetes mellitus   HTN  High cholesterol    Past Surgical History:    Left toe amputation  Open reduction internal fixation fibula, left (3/16/2018)    Family History:    No past pertinent family history.    Social History:  Former smoker: quit date 7/25/20161  Negative for alcohol use.  Marital Status:  Single      Review of Systems   Constitutional: Negative for chills and fever.   Respiratory: Negative for shortness of breath.    Musculoskeletal:        Left foot pain   Skin: Positive for wound (left foot).   All other systems reviewed and are negative.    Physical Exam     Patient Vitals for the past 24 hrs:   BP Temp Temp src Heart Rate Resp SpO2 Height Weight  "  07/06/18 1630 (!) 187/92 - - - - 99 % - -   07/06/18 1256 (!) 181/118 - - - - - - -   07/06/18 1254 - 98.8  F (37.1  C) Oral 98 16 97 % 1.727 m (5' 8\") 83.9 kg (185 lb)       Physical Exam  General: Alert, No obvious discomfort, well kept   HENT:  Normal voice, No lymphadenopathy  Eyes:  The pupils are equal, round, and reactive to light, Conjunctiva normal, No scleral icterus   Neck:  Normal range of motion  CV:  Normal Pulses  Resp:  Non-labored, No cough  MS:  Normal muscular tone, moves all extremities  Skin:  2 cm stage II ulcer with approximately 4-5 cm of surrounding erythema and mild edema.  No proximal streaking.  No drainage.  Nontender.  Neuro: Speech is normal and fluent  Psych:  Awake. Alert.  Normal affect.  Appropriate interactions. Good eye contact      Emergency Department Course   Imaging:  Radiographic findings were communicated with the patient who voiced understanding of the findings.    XR Foot, Left G/E 3 views:  Diffuse osteopenia. 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 Amputation of the  second toe. No definite osseous erosive changes to suggest  osteomyelitis. As per radiology.     Laboratory:  CBC: WBC: 7.8, HGB: 11.0 (L), PLT: 263  BMP: Glucose 464 (H), Creatinine 1.75 (H), GFR 30 (L), o/w WNL  Lactic acid: 0.8    Emergency Department Course:  1410 Nursing notes and vitals reviewed.  I performed an exam of the patient as documented above.     IV inserted. Medicine administered as documented above. Blood drawn. This was sent to the lab for further testing, results above.    The patient was sent for a left foot xray  while in the emergency department, findings above.     1645 I rechecked the patient and discussed the results of her workup thus far.     Findings and plan explained to the Patient. Patient discharged home with instructions regarding supportive care, medications, and reasons to return. The importance of close follow-up was " reviewed. The patient was prescribed Keflex.    I personally reviewed the laboratory results with the Patient and answered all related questions prior to discharge.   Impression & Plan    Medical Decision Making:  Royx Beaulieu is a 58 year old female who presents today for evaluation of possible cellulitis.  She noticed yesterday that she had a sore on the side of her left foot.  She had surgery on this foot in March and has been wearing a walking boot since then.  She denies any pain.  She was concerned about cellulitis.  Her examination is more consistent with stage II pressure ulcer.  There is no proximal streaking.  Her laboratory studies show a normal white cell count and normal lactic acid.  She did have elevated glucose today.  She had no symptoms of DKA.  The remainder of her laboratory studies were within normal limits for patient.  X-ray was obtained without indication for osseous erosive changes consistent with osteomyelitis.  No free air consistent with neck fashion.  Area was padded wound care was given.  She is started on a 14 day course of Keflex.  She is given information for the wound clinic and advised to follow-up with primary care provider early next week.  This point time she appears to be safe and appropriate for outpatient management and is discharged to home.    Diagnosis:    ICD-10-CM    1. Decubitus ulcer of left heel, stage 2 L89.622        Disposition:  discharged to home    Discharge Medications:  New Prescriptions    CEPHALEXIN (KEFLEX) 500 MG CAPSULE    Take 1 capsule (500 mg) by mouth 3 times daily for 14 days     Scribe Disclosure:   I, Gabrielle Lilly, am serving as a scribe on 7/6/2018 at 2:01 PM to personally document services performed by EVERETT Jefferson based on my observations and the provider's statements to me.     Gabrielle Lilly  7/6/2018   [facility] EMERGENCY DEPARTMENT       Kirt Buchanan, RIK CNP  07/06/18 0948

## 2018-07-16 NOTE — PROGRESS NOTES
Patient was seen by the on-call orthotist yesterday.  She arrived yesterday to find that patient already had a camboot.  She didn't provide one.     Spoke with daughter Irina. She stated that her mother's HR ran over 115 all weekend. Patient is still asymptomatic. Irina stated that her mother's HR did not come down with the increased dose of metoprolol that was started on 7/3/18 (metoprolol succinate 75 mg daily). Irina is wondering if medications need to be adjusted. She is aware provider will review and we will call her back with recommendations as she will be with her mother today. She thanked writer.

## 2018-10-04 ENCOUNTER — APPOINTMENT (OUTPATIENT)
Dept: ULTRASOUND IMAGING | Facility: CLINIC | Age: 58
End: 2018-10-04
Attending: EMERGENCY MEDICINE
Payer: MEDICAID

## 2018-10-04 ENCOUNTER — HOSPITAL ENCOUNTER (INPATIENT)
Facility: CLINIC | Age: 58
LOS: 4 days | Discharge: HOME | End: 2018-10-08
Attending: EMERGENCY MEDICINE | Admitting: INTERNAL MEDICINE
Payer: MEDICAID

## 2018-10-04 ENCOUNTER — APPOINTMENT (OUTPATIENT)
Dept: GENERAL RADIOLOGY | Facility: CLINIC | Age: 58
End: 2018-10-04
Attending: EMERGENCY MEDICINE
Payer: MEDICAID

## 2018-10-04 DIAGNOSIS — Z91.199 MEDICAL NON-COMPLIANCE: ICD-10-CM

## 2018-10-04 DIAGNOSIS — R79.89 ELEVATED D-DIMER: ICD-10-CM

## 2018-10-04 DIAGNOSIS — I50.41 ACUTE COMBINED SYSTOLIC AND DIASTOLIC CONGESTIVE HEART FAILURE (H): ICD-10-CM

## 2018-10-04 DIAGNOSIS — N18.30 STAGE 3 CHRONIC KIDNEY DISEASE (H): ICD-10-CM

## 2018-10-04 DIAGNOSIS — E11.65 UNCONTROLLED TYPE 2 DIABETES MELLITUS WITH HYPERGLYCEMIA, WITH LONG-TERM CURRENT USE OF INSULIN (H): Primary | ICD-10-CM

## 2018-10-04 DIAGNOSIS — I82.4Z9 ACUTE DEEP VEIN THROMBOSIS (DVT) OF DISTAL VEIN OF LOWER EXTREMITY, UNSPECIFIED LATERALITY (H): ICD-10-CM

## 2018-10-04 DIAGNOSIS — I10 HYPERTENSION, UNSPECIFIED TYPE: ICD-10-CM

## 2018-10-04 DIAGNOSIS — E78.5 DYSLIPIDEMIA: ICD-10-CM

## 2018-10-04 DIAGNOSIS — Z79.4 UNCONTROLLED TYPE 2 DIABETES MELLITUS WITH HYPERGLYCEMIA, WITH LONG-TERM CURRENT USE OF INSULIN (H): Primary | ICD-10-CM

## 2018-10-04 LAB
ALBUMIN SERPL-MCNC: 2.7 G/DL (ref 3.4–5)
ALP SERPL-CCNC: 167 U/L (ref 40–150)
ALT SERPL W P-5'-P-CCNC: 18 U/L (ref 0–50)
ANION GAP SERPL CALCULATED.3IONS-SCNC: 5 MMOL/L (ref 3–14)
AST SERPL W P-5'-P-CCNC: 26 U/L (ref 0–45)
BASOPHILS # BLD AUTO: 0 10E9/L (ref 0–0.2)
BASOPHILS NFR BLD AUTO: 0.4 %
BILIRUB SERPL-MCNC: 0.3 MG/DL (ref 0.2–1.3)
BUN SERPL-MCNC: 45 MG/DL (ref 7–30)
CALCIUM SERPL-MCNC: 8.3 MG/DL (ref 8.5–10.1)
CHLORIDE SERPL-SCNC: 104 MMOL/L (ref 94–109)
CO2 SERPL-SCNC: 27 MMOL/L (ref 20–32)
CREAT SERPL-MCNC: 2.48 MG/DL (ref 0.52–1.04)
D DIMER PPP FEU-MCNC: 2.8 UG/ML FEU (ref 0–0.5)
DIFFERENTIAL METHOD BLD: ABNORMAL
EOSINOPHIL # BLD AUTO: 0.2 10E9/L (ref 0–0.7)
EOSINOPHIL NFR BLD AUTO: 1.7 %
ERYTHROCYTE [DISTWIDTH] IN BLOOD BY AUTOMATED COUNT: 14.4 % (ref 10–15)
GFR SERPL CREATININE-BSD FRML MDRD: 20 ML/MIN/1.7M2
GLUCOSE BLDC GLUCOMTR-MCNC: 253 MG/DL (ref 70–99)
GLUCOSE SERPL-MCNC: 273 MG/DL (ref 70–99)
HCT VFR BLD AUTO: 32.5 % (ref 35–47)
HGB BLD-MCNC: 10.2 G/DL (ref 11.7–15.7)
IMM GRANULOCYTES # BLD: 0 10E9/L (ref 0–0.4)
IMM GRANULOCYTES NFR BLD: 0.4 %
INR PPP: 0.92 (ref 0.86–1.14)
LYMPHOCYTES # BLD AUTO: 0.9 10E9/L (ref 0.8–5.3)
LYMPHOCYTES NFR BLD AUTO: 10.4 %
MCH RBC QN AUTO: 26.4 PG (ref 26.5–33)
MCHC RBC AUTO-ENTMCNC: 31.4 G/DL (ref 31.5–36.5)
MCV RBC AUTO: 84 FL (ref 78–100)
MONOCYTES # BLD AUTO: 0.6 10E9/L (ref 0–1.3)
MONOCYTES NFR BLD AUTO: 6.4 %
NEUTROPHILS # BLD AUTO: 7.3 10E9/L (ref 1.6–8.3)
NEUTROPHILS NFR BLD AUTO: 80.7 %
NRBC # BLD AUTO: 0 10*3/UL
NRBC BLD AUTO-RTO: 0 /100
NT-PROBNP SERPL-MCNC: 7580 PG/ML (ref 0–900)
PLATELET # BLD AUTO: 257 10E9/L (ref 150–450)
POTASSIUM SERPL-SCNC: 5 MMOL/L (ref 3.4–5.3)
PROT SERPL-MCNC: 7 G/DL (ref 6.8–8.8)
RBC # BLD AUTO: 3.86 10E12/L (ref 3.8–5.2)
SODIUM SERPL-SCNC: 136 MMOL/L (ref 133–144)
TROPONIN I SERPL-MCNC: 0.03 UG/L (ref 0–0.04)
TSH SERPL DL<=0.005 MIU/L-ACNC: 2.14 MU/L (ref 0.4–4)
WBC # BLD AUTO: 9.1 10E9/L (ref 4–11)

## 2018-10-04 PROCEDURE — 71046 X-RAY EXAM CHEST 2 VIEWS: CPT

## 2018-10-04 PROCEDURE — 25000131 ZZH RX MED GY IP 250 OP 636 PS 637: Performed by: INTERNAL MEDICINE

## 2018-10-04 PROCEDURE — 25000128 H RX IP 250 OP 636: Performed by: EMERGENCY MEDICINE

## 2018-10-04 PROCEDURE — 83880 ASSAY OF NATRIURETIC PEPTIDE: CPT | Performed by: EMERGENCY MEDICINE

## 2018-10-04 PROCEDURE — 84443 ASSAY THYROID STIM HORMONE: CPT | Performed by: EMERGENCY MEDICINE

## 2018-10-04 PROCEDURE — 96372 THER/PROPH/DIAG INJ SC/IM: CPT

## 2018-10-04 PROCEDURE — 85379 FIBRIN DEGRADATION QUANT: CPT | Performed by: EMERGENCY MEDICINE

## 2018-10-04 PROCEDURE — 85610 PROTHROMBIN TIME: CPT | Performed by: EMERGENCY MEDICINE

## 2018-10-04 PROCEDURE — 12000007 ZZH R&B INTERMEDIATE

## 2018-10-04 PROCEDURE — 96375 TX/PRO/DX INJ NEW DRUG ADDON: CPT

## 2018-10-04 PROCEDURE — 85025 COMPLETE CBC W/AUTO DIFF WBC: CPT | Performed by: EMERGENCY MEDICINE

## 2018-10-04 PROCEDURE — 93970 EXTREMITY STUDY: CPT

## 2018-10-04 PROCEDURE — 96374 THER/PROPH/DIAG INJ IV PUSH: CPT

## 2018-10-04 PROCEDURE — 84484 ASSAY OF TROPONIN QUANT: CPT | Performed by: EMERGENCY MEDICINE

## 2018-10-04 PROCEDURE — 93005 ELECTROCARDIOGRAM TRACING: CPT

## 2018-10-04 PROCEDURE — 99285 EMERGENCY DEPT VISIT HI MDM: CPT | Mod: 25

## 2018-10-04 PROCEDURE — 80053 COMPREHEN METABOLIC PANEL: CPT | Performed by: EMERGENCY MEDICINE

## 2018-10-04 PROCEDURE — 83036 HEMOGLOBIN GLYCOSYLATED A1C: CPT | Performed by: EMERGENCY MEDICINE

## 2018-10-04 PROCEDURE — 99207 ZZC CDG-HISTORY COMP: MEETS EXP. PROB FOCUSED- DOWN CODED LACK OF PFSH: CPT | Performed by: INTERNAL MEDICINE

## 2018-10-04 PROCEDURE — 00000146 ZZHCL STATISTIC GLUCOSE BY METER IP

## 2018-10-04 PROCEDURE — 25000132 ZZH RX MED GY IP 250 OP 250 PS 637: Performed by: INTERNAL MEDICINE

## 2018-10-04 PROCEDURE — 99221 1ST HOSP IP/OBS SF/LOW 40: CPT | Mod: AI | Performed by: INTERNAL MEDICINE

## 2018-10-04 RX ORDER — ASPIRIN 325 MG
325 TABLET, DELAYED RELEASE (ENTERIC COATED) ORAL EVERY EVENING
Status: ON HOLD | COMMUNITY
End: 2018-10-08

## 2018-10-04 RX ORDER — NALOXONE HYDROCHLORIDE 0.4 MG/ML
.1-.4 INJECTION, SOLUTION INTRAMUSCULAR; INTRAVENOUS; SUBCUTANEOUS
Status: DISCONTINUED | OUTPATIENT
Start: 2018-10-04 | End: 2018-10-08 | Stop reason: HOSPADM

## 2018-10-04 RX ORDER — METOPROLOL TARTRATE 25 MG/1
25 TABLET, FILM COATED ORAL 2 TIMES DAILY
Status: DISCONTINUED | OUTPATIENT
Start: 2018-10-04 | End: 2018-10-08

## 2018-10-04 RX ORDER — ONDANSETRON 4 MG/1
4 TABLET, ORALLY DISINTEGRATING ORAL EVERY 6 HOURS PRN
Status: DISCONTINUED | OUTPATIENT
Start: 2018-10-04 | End: 2018-10-08 | Stop reason: HOSPADM

## 2018-10-04 RX ORDER — LIDOCAINE 40 MG/G
CREAM TOPICAL
Status: DISCONTINUED | OUTPATIENT
Start: 2018-10-04 | End: 2018-10-08 | Stop reason: HOSPADM

## 2018-10-04 RX ORDER — ONDANSETRON 2 MG/ML
4 INJECTION INTRAMUSCULAR; INTRAVENOUS EVERY 6 HOURS PRN
Status: DISCONTINUED | OUTPATIENT
Start: 2018-10-04 | End: 2018-10-08 | Stop reason: HOSPADM

## 2018-10-04 RX ORDER — FUROSEMIDE 20 MG
20 TABLET ORAL DAILY
Status: DISCONTINUED | OUTPATIENT
Start: 2018-10-05 | End: 2018-10-05

## 2018-10-04 RX ORDER — FUROSEMIDE 10 MG/ML
40 INJECTION INTRAMUSCULAR; INTRAVENOUS ONCE
Status: COMPLETED | OUTPATIENT
Start: 2018-10-04 | End: 2018-10-04

## 2018-10-04 RX ORDER — WARFARIN SODIUM 5 MG/1
5 TABLET ORAL ONCE
Status: COMPLETED | OUTPATIENT
Start: 2018-10-04 | End: 2018-10-04

## 2018-10-04 RX ORDER — LABETALOL HYDROCHLORIDE 5 MG/ML
20 INJECTION, SOLUTION INTRAVENOUS EVERY 10 MIN PRN
Status: DISCONTINUED | OUTPATIENT
Start: 2018-10-04 | End: 2018-10-08 | Stop reason: HOSPADM

## 2018-10-04 RX ORDER — DEXTROSE MONOHYDRATE 25 G/50ML
25-50 INJECTION, SOLUTION INTRAVENOUS
Status: DISCONTINUED | OUTPATIENT
Start: 2018-10-04 | End: 2018-10-08 | Stop reason: HOSPADM

## 2018-10-04 RX ORDER — HYDRALAZINE HYDROCHLORIDE 20 MG/ML
10 INJECTION INTRAMUSCULAR; INTRAVENOUS EVERY 4 HOURS PRN
Status: DISCONTINUED | OUTPATIENT
Start: 2018-10-04 | End: 2018-10-08 | Stop reason: HOSPADM

## 2018-10-04 RX ORDER — NICOTINE POLACRILEX 4 MG
15-30 LOZENGE BUCCAL
Status: DISCONTINUED | OUTPATIENT
Start: 2018-10-04 | End: 2018-10-08 | Stop reason: HOSPADM

## 2018-10-04 RX ADMIN — METOPROLOL TARTRATE 25 MG: 25 TABLET ORAL at 22:39

## 2018-10-04 RX ADMIN — ENOXAPARIN SODIUM 80 MG: 80 INJECTION SUBCUTANEOUS at 17:59

## 2018-10-04 RX ADMIN — WARFARIN SODIUM 5 MG: 5 TABLET ORAL at 22:39

## 2018-10-04 RX ADMIN — INSULIN ASPART 3 UNITS: 100 INJECTION, SOLUTION INTRAVENOUS; SUBCUTANEOUS at 23:04

## 2018-10-04 RX ADMIN — FUROSEMIDE 40 MG: 10 INJECTION, SOLUTION INTRAVENOUS at 17:58

## 2018-10-04 RX ADMIN — LABETALOL HYDROCHLORIDE 20 MG: 5 INJECTION INTRAVENOUS at 17:55

## 2018-10-04 ASSESSMENT — ENCOUNTER SYMPTOMS
COUGH: 0
FEVER: 0
SHORTNESS OF BREATH: 1

## 2018-10-04 NOTE — IP AVS SNAPSHOT
MRN:[id number]                      After Visit Summary   10/4/2018    Roxy Beaulieu    MRN: [id number]           Thank you!     Thank you for choosing [facility] for your care. Our goal is always to provide you with excellent care. Hearing back from our patients is one way we can continue to improve our services. Please take a few minutes to complete the written survey that you may receive in the mail after you visit. If you would like to speak to someone directly about your visit please contact Patient Relations at [phone]. Thank you!          Patient Information     Date Of Birth          1960        Designated Caregiver       Most Recent Value    Caregiver    Will someone help with your care after discharge? yes    Name of designated caregiver Yuki    Phone number of caregiver [phone]    Caregiver address [place]       About your hospital stay     You were admitted on:  October 4, 2018 You last received care in the:  [place] [address] [facility]    You were discharged on:  October 8, 2018        Reason for your hospital stay       Acute DVT/ possible PE, malignant HTN, uncontrolled DM.                  Who to Call     For medical emergencies, please call 911.  For non-urgent questions about your medical care, please call your primary care provider or clinic, None          Attending Provider     Provider Specialty    Palomo Byers MD Emergency Medicine    [facility]       Primary Care Provider Fax #    [facility] [phone]      After Care Instructions     Activity       Your activity upon discharge: activity as tolerated            Diet       Follow this diet upon discharge:       Combination Diet 5987-7492 Calories: Moderate Consistent CHO (4-6 CHO units/meal)            Discharge Instructions       Accu checks Ac & HS                  Follow-up Appointments     Follow-up and recommended labs and  tests        Follow up with primary care provider, [facility], in Am & this weew to evaluate medication change, to evaluate treatment change and for hospital follow- up.  The following labs/tests are recommended:   INR in AM 10/8/2018 & prn   BMP later this week.                  Additional Services     MED THERAPY MANAGE REFERRAL       Your provider has referred you to: **[facility] Scheduling (numerous locations) [phone]   [email or web address]    Reason for Referral:  A1c = 11.6%    The [facility] department will contact you to schedule an appointment.  You may also schedule the appointment by calling [phone].  For [facility] [place] patients, please call [phone] to confirm/schedule your appointment on the next business day.    This service is designed to help you get the most from your medications.  A specially trained Pharmacist will work closely with you and your providers to solve any questions, concerns, issues or problems related to your medications.    Please bring all of your prescription and non-prescription medications (such as vitamins, over-the-counter medications, and herbals) or a detailed medication list to your appointment.    If you have a glucose meter or other home monitoring information, please also bring this to your appointment (i.e. blood glucose log, blood pressure log, pain log, etc.).            NUTRITION REFERRAL       Your provider has referred you to: [facility]: [facility] [phone]   [email or web address]    Please be aware that coverage of these services is subject to the terms and limitations of your health insurance plan.  Call member services at your health plan with any benefit or coverage questions.      Please bring the following with you to your appointment:    (1) This referral request  (2) Any  "documents given to you regarding this referral  (3) Any specific questions you have about diet and/or food choices                  Further instructions from your care team       Please call [facility] office at [phone]  to schedule a hospital follow-up appointment within 7-10 days of discharge. Please bring your hospital discharge instructions and any new medications with you to your appointment.    Warfarin Instruction     You have started taking a medicine called warfarin. This is a blood-thinning medicine (anticoagulant). It helps prevent and treat blood clots.      Before leaving the hospital, make sure you know how much to take and how long to take it.      You will need regular blood tests to make sure your blood is clotting safely. It is very important to see your doctor for regular blood tests.    Talk to your doctor before taking any new medicine (this includes over-the-counter drugs and herbal products). Many medicines can interact with warfarin. This may cause more bleeding or too much clotting.     Eating a lot of vitamin K--found in green, leafy vegetables--can change the way warfarin works in your body. Do NOT avoid these foods. Instead, try to eat the same amount each day.     Bleeding is the most common side-effect of warfarin. You may notice bleeding gums, a bloody nose, bruises and bleeding longer when you cut yourself. See a doctor at once if:   o You cough up blood  o You find blood in your stool (poop)  o You have a deep cut, or a cut that bleeds longer than 10 minutes   o You have a bad cut, hard fall, accident or hit your head (go to urgent care or the emergency room).    For women who can get pregnant: This medicine can harm an unborn baby. Be very careful not to get pregnant while taking this medicine. If you think you might be pregnant, call your doctor right away.    For more information, read \"Guide to Warfarin Therapy,  the booklet you received in the hospital.      " "  Pending Results     Date and Time Order Name Status Description    10/7/2018 0000 Anti Nuclear Gin IgG by IFA with Reflex In process             Statement of Approval     Ordered          10/08/18 1604  I have reviewed and agree with all the recommendations and orders detailed in this document.  EFFECTIVE NOW     Approved and electronically signed by:  Zully Wise MD             Admission Information     Date & Time Provider Department Dept. Phone    10/4/2018 Rachael Lopez MD [place] [address] [facility] [phone]      Your Vitals Were     Blood Pressure Pulse Temperature Respirations Height Weight    173/72 (BP Location: Left arm) 84 98.3  F (36.8  C) (Oral) 20 1.727 m (5' 8\") 89.4 kg (197 lb)    Pulse Oximetry BMI (Body Mass Index)                97% 29.95 kg/m2          My[employer] Information     [employer] lets you send messages to your doctor, view your test results, renew your prescriptions, schedule appointments and more. To sign up, go to [email or web address] . Click on \"Log in\" on the left side of the screen, which will take you to the Welcome page. Then click on \"Sign up Now\" on the right side of the page.     You will be asked to enter the access code listed below, as well as some personal information. Please follow the directions to create your username and password.     Your access code is: [email or web address]  Expires: 2019  5:57 PM     Your access code will  in 90 days. If you need help or a new code, please call your [place] clinic or [phone].        Care EveryWhere ID     This is your Care EveryWhere ID. This could be used by other organizations to access your [place] medical records  [id number]        Equal Access to Services     JAIRO KIMBLE : Shon Mayer, brittany marcos, og kafelice castro, harshil hillman. So [facility] [phone].    ATENCIÓN: Si habla español, tiene a cramer disposición servicios " stephane de asistencia lingüística. Alma hair [phone].    We comply with applicable federal civil rights laws and [place] laws. We do not discriminate on the basis of race, color, national origin, age, disability, sex, sexual orientation, or gender identity.               Review of your medicines      START taking        Dose / Directions    amLODIPine 10 MG tablet   Commonly known as:  NORVASC   Used for:  Hypertension, unspecified type        Dose:  10 mg   Start taking on:  10/9/2018   Take 1 tablet (10 mg) by mouth daily   Quantity:  30 tablet   Refills:  0       atorvastatin 20 MG tablet   Commonly known as:  LIPITOR   Used for:  Dyslipidemia        Dose:  20 mg   Take 1 tablet (20 mg) by mouth every evening   Quantity:  30 tablet   Refills:  0       bumetanide 0.5 MG tablet   Commonly known as:  BUMEX   Used for:  Hypertension, unspecified type        Dose:  0.5 mg   Start taking on:  10/9/2018   Take 1 tablet (0.5 mg) by mouth daily   Quantity:  30 tablet   Refills:  0       DRISDOL 68109 units Caps   Used for:  Stage 3 chronic kidney disease (H)        Dose:  48916 Units   Take 50,000 Units by mouth every 7 days   Quantity:  30 capsule   Refills:  0       enoxaparin 100 MG/ML injection   Commonly known as:  LOVENOX   Used for:  Acute deep vein thrombosis (DVT) of distal vein of lower extremity, unspecified laterality (H)        Dose:  1 mg/kg   Inject 0.86 mLs (86 mg) Subcutaneous every 24 hours for 5 days   Quantity:  4.3 mL   Refills:  0       * insulin aspart 100 UNIT/ML injection   Commonly known as:  NovoLOG PEN   Used for:  Uncontrolled type 2 diabetes mellitus with hyperglycemia, with long-term current use of insulin (H)        Dose:  1-10 Units   Inject 1-10 Units Subcutaneous 3 times daily (with meals)   Quantity:  3 mL   Refills:  0       * insulin aspart 100 UNIT/ML injection   Commonly known as:  NovoLOG PEN   Used for:  Uncontrolled type 2 diabetes mellitus with hyperglycemia, with  long-term current use of insulin (H)        Dose:  1-7 Units   Inject 1-7 Units Subcutaneous At Bedtime   Quantity:  3 mL   Refills:  0       * insulin aspart 100 UNIT/ML injection   Commonly known as:  NovoLOG PEN   Used for:  Uncontrolled type 2 diabetes mellitus with hyperglycemia, with long-term current use of insulin (H)        Dose:  3 Units   Inject 3 Units Subcutaneous 3 times daily (before meals)   Quantity:  3 mL   Refills:  0       insulin glargine 100 UNIT/ML injection   Commonly known as:  LANTUS   Used for:  Uncontrolled type 2 diabetes mellitus with hyperglycemia, with long-term current use of insulin (H)        Dose:  15 Units   Inject 15 Units Subcutaneous At Bedtime   Quantity:  3 mL   Refills:  0       metoprolol tartrate 50 MG tablet   Commonly known as:  LOPRESSOR   Used for:  Hypertension, unspecified type        Dose:  50 mg   Take 1 tablet (50 mg) by mouth 2 times daily   Quantity:  60 tablet   Refills:  0       warfarin 2 MG tablet   Commonly known as:  COUMADIN   Used for:  Acute deep vein thrombosis (DVT) of distal vein of lower extremity, unspecified laterality (H)        Q 6 PM Take 10 mg today & get INR in AM 10/9 for further dosing.   Quantity:  60 tablet   Refills:  0       * Notice:  This list has 3 medication(s) that are the same as other medications prescribed for you. Read the directions carefully, and ask your doctor or other care provider to review them with you.      STOP taking     aspirin 325 MG EC tablet           RELION 70/30 SC                Where to get your medicines      These medications were sent to [facility] [address] [place] [address] [facility]  [id number] [place] [address]     Phone:  [phone]     amLODIPine 10 MG tablet    atorvastatin 20 MG tablet    bumetanide 0.5 MG tablet    DRISDOL 21930 units Caps    enoxaparin 100 MG/ML injection    insulin aspart 100 UNIT/ML injection    insulin aspart 100 UNIT/ML injection    insulin  aspart 100 UNIT/ML injection    insulin glargine 100 UNIT/ML injection    metoprolol tartrate 50 MG tablet         Some of these will need a paper prescription and others can be bought over the counter. Ask your nurse if you have questions.     Bring a paper prescription for each of these medications     warfarin 2 MG tablet                Protect others around you: Learn how to safely use, store and throw away your medicines at www.disposemymeds.org.             Medication List: This is a list of all your medications and when to take them. Check marks below indicate your daily home schedule. Keep this list as a reference.      Medications           Morning Afternoon Evening Bedtime As Needed    amLODIPine 10 MG tablet   Commonly known as:  NORVASC   Take 1 tablet (10 mg) by mouth daily   Start taking on:  10/9/2018   Last time this was given:  10 mg on 10/8/2018  8:28 AM                                   atorvastatin 20 MG tablet   Commonly known as:  LIPITOR   Take 1 tablet (20 mg) by mouth every evening                                   bumetanide 0.5 MG tablet   Commonly known as:  BUMEX   Take 1 tablet (0.5 mg) by mouth daily   Start taking on:  10/9/2018   Last time this was given:  0.5 mg on 10/8/2018  8:28 AM                                   DRISDOL 99489 units Caps   Take 50,000 Units by mouth every 7 days                                enoxaparin 100 MG/ML injection   Commonly known as:  LOVENOX   Inject 0.86 mLs (86 mg) Subcutaneous every 24 hours for 5 days   Last time this was given:  90 mg on 10/7/2018  5:25 PM   Next Dose Due:  Tomorrow evening 10/9/18                                   * insulin aspart 100 UNIT/ML injection   Commonly known as:  NovoLOG PEN   Inject 1-10 Units Subcutaneous 3 times daily (with meals)   Last time this was given:  1 Units on 10/8/2018 12:54 PM                                * insulin aspart 100 UNIT/ML injection   Commonly known as:  NovoLOG PEN   Inject 1-7 Units  Subcutaneous At Bedtime   Last time this was given:  1 Units on 10/8/2018 12:54 PM                                * insulin aspart 100 UNIT/ML injection   Commonly known as:  NovoLOG PEN   Inject 3 Units Subcutaneous 3 times daily (before meals)   Last time this was given:  1 Units on 10/8/2018 12:54 PM                                insulin glargine 100 UNIT/ML injection   Commonly known as:  LANTUS   Inject 15 Units Subcutaneous At Bedtime                                metoprolol tartrate 50 MG tablet   Commonly known as:  LOPRESSOR   Take 1 tablet (50 mg) by mouth 2 times daily   Last time this was given:  25 mg on 10/8/2018  8:28 AM   Next Dose Due:  Tonight 10/8/2018                                   warfarin 2 MG tablet   Commonly known as:  COUMADIN   Q 6 PM Take 10 mg today & get INR in AM 10/9 for further dosing.   Last time this was given:  10 mg on 10/7/2018  5:25 PM                    Take 10mg tonight at 6pm and get INR rechecked in the morning 10/9 for further dosing.                * Notice:  This list has 3 medication(s) that are the same as other medications prescribed for you. Read the directions carefully, and ask your doctor or other care provider to review them with you.

## 2018-10-04 NOTE — IP AVS SNAPSHOT
[place] [address] [facility]    [address]    [facility] [address]    Phone:  [phone]    Fax:  [phone]                                       After Visit Summary   10/4/2018    Roxy Beaulieu    MRN: [id number]           After Visit Summary Signature Page     I have received my discharge instructions, and my questions have been answered. I have discussed any challenges I see with this plan with the nurse or doctor.    ..........................................................................................................................................  Patient/Patient Representative Signature      ..........................................................................................................................................  Patient Representative Print Name and Relationship to Patient    ..................................................               ................................................  Date                                   Time    ..........................................................................................................................................  Reviewed by Signature/Title    ...................................................              ..............................................  Date                                               Time          22EPIC Rev 08/18

## 2018-10-04 NOTE — ED NOTES
New leg swelling. SOB for 10 days but today feels like she can't catch her breath. No fevers. Pt hypertensive

## 2018-10-04 NOTE — ED PROVIDER NOTES
History     Chief Complaint:  Shortness of Breath    HPI   Roxy Beaulieu is a 58 year old female with a medical history of hypertension, hyperlipidemia, and diabetes who presents with shortness of breath. The patient reports she developed rhinorrhea and nasal congestion, and then had an onset of shortness of breath for 10 days. Of note, patient had an open reduction internal fixation fibula on 03/16/18, and patient noticed new leg swelling predominantly in the left leg after the cast was removed. Patient presents to the emergency department today for worsening shortness of breath and leg swelling. Of note, with patient's history of hypertension, she is not currently on any antihypertensive medications, and is only taking insulin for her diabetes because she states she is unable to afford her medications so she is not able to take them as often as she should. Otherwise denies fevers, chills, chest pain, cough, or any other symptoms at this time. Notably, patient has a personal history of blood clots many years ago. She is not on any anticoagulant therapy. No known personal history of heart disease.     CARDIAC RISK FACTORS:  Sex:    Female  Tobacco:   Former smoker  Hypertension:   Yes  Hyperlipidemia:  Yes  Diabetes:   Yes  Family History:  Unknown    PE/DVT RISK FACTORS:  Sex:    Female  Hormones:   No  Tobacco:   Former smoker  Cancer:   No  Travel:   No  Surgery:   No  Other immobilization: No  Personal history:  Yes  Family history:  Unknown     Allergies:  No Known Allergies     Medications:    Insulin   Aspirin    Past Medical History:    Diabetes  Gangrene of toe  Hyperlipidemia  Hypertension  Obesity    Past Surgical History:    Amputate toes  Open reduction internal fixation fibula    Family History:    History reviewed. No pertinent family history.      Social History:  Smoking status: Former smoker, 2016  Alcohol use: No   Marital Status:  Single [1]  Accompanied to the ED by Sister-in-Law.  PCP:  [facility], [place]      Review of Systems   Constitutional: Negative for fever.   Respiratory: Positive for shortness of breath. Negative for cough.    Cardiovascular: Positive for leg swelling. Negative for chest pain.   All other systems reviewed and are negative.    Physical Exam   Patient Vitals for the past 24 hrs:   BP Temp Temp src Pulse Heart Rate Resp SpO2 Weight   10/04/18 1945 139/75 - - - 82 - 96 % -   10/04/18 1900 (!) 144/96 - - - 90 - 94 % -   10/04/18 1845 143/72 - - - 79 - 99 % -   10/04/18 1830 146/82 - - - 77 - 99 % -   10/04/18 1815 (!) 144/101 - - - 75 - 98 % -   10/04/18 1800 151/78 - - - 77 - 96 % -   10/04/18 1758 157/80 - - - 79 - 98 % -   10/04/18 1753 (!) 196/101 - - - 89 - 94 % -   10/04/18 1745 - - - - 89 - (!) 87 % -   10/04/18 1730 - - - - 90 - 96 % -   10/04/18 1724 - - - - - - - 86.2 kg (190 lb)   10/04/18 1706 - - - - - 20 - -   10/04/18 1700 - - - - - - 99 % -   10/04/18 1630 (!) 210/109 - - - 95 - 99 % -   10/04/18 1615 (!) 210/114 - - - - - - -   10/04/18 1600 - - - - 104 - 99 % -   10/04/18 1545 - - - - - - 99 % -   10/04/18 1536 - - - - - - 97 % -   10/04/18 1535 - - - - - 28 98 % -   10/04/18 1532 (!) 225/122 - - - - - - -   10/04/18 1526 123/68 - - - - - 96 % -   10/04/18 1525 - 97.5  F (36.4  C) Temporal 108 108 - (!) 83 % -      Physical Exam  General: appears ill, tachycardic, hypertensive, tachypneic.   HENT:  Normal nose, oropharynx. Moist oral mucosa.  Eyes: EOMI. Normal conjunctiva.  Neck:  Normal range of motion and appearance.   Cardiovascular:  Tachycardic rate, regular rhythm and normal heart sounds.   Pulmonary/Chest:  Increased effort.  No wheezing.   Abdominal: Soft. No distension or tenderness.     Musculoskeletal: symmetric lower leg edema.  No calf tenderness or asymmetry.   Neurological: oriented, normal strength, sensation, and coordination.   Skin: Warm and dry. No rash or bruising.   Psychiatric: Normal mood and affect. Normal behavior and  judgement.      Emergency Department Course   ECG (15:45:10)  Sinus tachycardia. Abnormal ECG.   Agree with computer interpretation.   Interpreted at 1545 by Palomo Byers MD.   Rate 106 bpm. NE interval 148. QRS duration 88. QT/QTc 350/464. P-R-T axes 60 14 76.     Imaging:  Radiographic findings were communicated with the patient who voiced understanding of the findings.  XR Chest 2 Views  Somewhat shallow inspiration. Small left pleural effusion  and mild left basilar atelectasis or infiltrate. Possible tiny pleural  effusion on the right. Heart size within normal limits. Degenerative  changes thoracic spine.  As read by Radiology.     US Lower Extremity Venous Duplex Bilateral  1. Acute-appearing nonocclusive thrombus in the left common femoral,  femoral and popliteal veins.  2. No DVT in the right lower extremity.  As read by Radiology.     Laboratory:  CBC: WBC 9.1,  (L),   CMP: Glucose 273 (H), BUN 45 (H), Creatinine 2.48 (H), GFR estimate 20 (L), Calcium 8.3 (L), Albumin 2.7 (L), Alkphos 167 (H)  Troponin (1611): 0.029  D-dimer (1611): 2.8 (H)  BNP: 7580 (H)  TSH: 2.14    Interventions:  1755: Normodyne/Trandate 20 mg IV  1758: Lasix 40 mg IV  1759: Lovenox 80 mg subcutaneous     Emergency Department Course:  1545: ECG obtained, findings as noted above.    Past medical records, nursing notes, and vitals reviewed.  1548: I performed an exam of the patient and obtained history, as documented above.   1611: IV inserted and blood drawn for basic laboratory. Troponin and D-dimer obtained. Results as noted above.     Patient was given the above interventions while here in the emergency department.   The patient was sent for a chest XR while in the emergency department, findings above.   1700: D-dimer returned elevated at 2.8.  1731: I rechecked the patient and updated the patient regarding the findings.   The patient was sent for a US lower extremity while in the emergency department, findings  above.   2015: I discussed the case with Dr. Lopez, hospitalist service who accepts the patient for further care, monitoring, and treatment.   2105: I rechecked the patient and updated the patient regarding the findings.     Impression & Plan    Medical Decision Making:  This is an afebrile 58-year-old female who arrives in respiratory distress.  She is describing an approximate 10-day history of progressively worsening symptoms.  She is also describing lower extremity swelling and noncompliance with her medications.  She has a remote history of pulmonary embolism but has not been anticoagulated for years.  She also has a history of untreated hypertension and insulin requiring diabetes.  She reports using her insulin sparingly due to financial concerns.  Testing has included an EKG depicting a sinus tachycardia with no obvious ischemic appearing changes.  Labs include troponin of 0.029.  Her d-dimer is 2.8 and a BNP is 7580.  A BMP is notable for a creatinine of 2.48 and GFR of 20.  Chest x-ray is fairly unimpressive given the degree of her respiratory distress.  She has small pleural effusions and left basilar atelectasis versus infiltrate.  Lower extremity ultrasound is notable for an acute nonocclusive thrombus of the left common femoral, femoral and popliteal veins.  Clinically I am concerned she has experienced an acute pulmonary embolism.  She is not a candidate for CT imaging for confirmation due to her renal dysfunction.  She has been medicated with subcutaneous Lovenox.  Her blood pressures responded to IV labetalol and she was also treated with IV Lasix due to her leg edema and concern for congestive heart failure.  Admission has been arranged for further management and the hospitalist is comfortable with deferring further PE workup as she is being anticoagulated and that can take place as an inpatient, a likely VQ scan.  There is no evidence for real concern for cardiac ischemia and pneumonia is felt to  be unlikely.    Diagnosis:    ICD-10-CM   1. Acute combined systolic and diastolic congestive heart failure (H) I50.41   2. Hypertension, unspecified type I10   3. Medical non-compliance Z91.19   4. Elevated d-dimer R79.89     Disposition:  Admitted to cardiac telemetry.    Daisy Do  10/4/2018   [facility] EMERGENCY DEPARTMENT  I, Daisy Do, am serving as a scribe at 3:48 PM on 10/4/2018 to document services personally performed by Palomo Byers MD based on my observations and the provider's statements to me.       Palomo Byers MD  10/04/18 8237

## 2018-10-04 NOTE — ED TRIAGE NOTES
Pt presents with difficulty breathing for the past week but worsened today, pt has a past hx of PE years ago, states it feels same. Concerned it may be pneumonia or another PE. Pt alert, oriented x3. ABcs intact    Pt 83% on RA

## 2018-10-05 ENCOUNTER — APPOINTMENT (OUTPATIENT)
Dept: ULTRASOUND IMAGING | Facility: CLINIC | Age: 58
End: 2018-10-05
Attending: INTERNAL MEDICINE
Payer: MEDICAID

## 2018-10-05 ENCOUNTER — APPOINTMENT (OUTPATIENT)
Dept: CARDIOLOGY | Facility: CLINIC | Age: 58
End: 2018-10-05
Attending: INTERNAL MEDICINE
Payer: MEDICAID

## 2018-10-05 LAB
ALBUMIN UR-MCNC: >499 MG/DL
ANION GAP SERPL CALCULATED.3IONS-SCNC: 6 MMOL/L (ref 3–14)
APPEARANCE UR: CLEAR
BASOPHILS # BLD AUTO: 0.1 10E9/L (ref 0–0.2)
BASOPHILS NFR BLD AUTO: 0.8 %
BILIRUB UR QL STRIP: NEGATIVE
BUN SERPL-MCNC: 45 MG/DL (ref 7–30)
CALCIUM SERPL-MCNC: 8.2 MG/DL (ref 8.5–10.1)
CHLORIDE SERPL-SCNC: 106 MMOL/L (ref 94–109)
CHOLEST SERPL-MCNC: 200 MG/DL
CO2 SERPL-SCNC: 27 MMOL/L (ref 20–32)
COLOR UR AUTO: ABNORMAL
CREAT SERPL-MCNC: 2.68 MG/DL (ref 0.52–1.04)
CREAT UR-MCNC: 42 MG/DL
CREAT UR-MCNC: 43 MG/DL
DIFFERENTIAL METHOD BLD: ABNORMAL
EOSINOPHIL # BLD AUTO: 0.2 10E9/L (ref 0–0.7)
EOSINOPHIL NFR BLD AUTO: 2.6 %
ERYTHROCYTE [DISTWIDTH] IN BLOOD BY AUTOMATED COUNT: 14.5 % (ref 10–15)
FRACT EXCRET NA UR+SERPL-RTO: 3.7 %
GFR SERPL CREATININE-BSD FRML MDRD: 18 ML/MIN/1.7M2
GLUCOSE BLDC GLUCOMTR-MCNC: 108 MG/DL (ref 70–99)
GLUCOSE BLDC GLUCOMTR-MCNC: 154 MG/DL (ref 70–99)
GLUCOSE BLDC GLUCOMTR-MCNC: 174 MG/DL (ref 70–99)
GLUCOSE BLDC GLUCOMTR-MCNC: 271 MG/DL (ref 70–99)
GLUCOSE BLDC GLUCOMTR-MCNC: 322 MG/DL (ref 70–99)
GLUCOSE SERPL-MCNC: 297 MG/DL (ref 70–99)
GLUCOSE UR STRIP-MCNC: 150 MG/DL
HBA1C MFR BLD: 11.6 % (ref 0–5.6)
HCT VFR BLD AUTO: 31.5 % (ref 35–47)
HDLC SERPL-MCNC: 41 MG/DL
HGB BLD-MCNC: 9.7 G/DL (ref 11.7–15.7)
HGB UR QL STRIP: ABNORMAL
HYALINE CASTS #/AREA URNS LPF: 3 /LPF (ref 0–2)
IMM GRANULOCYTES # BLD: 0 10E9/L (ref 0–0.4)
IMM GRANULOCYTES NFR BLD: 0.3 %
INR PPP: 1.09 (ref 0.86–1.14)
INTERPRETATION ECG - MUSE: NORMAL
KETONES UR STRIP-MCNC: NEGATIVE MG/DL
LDLC SERPL CALC-MCNC: 123 MG/DL
LEUKOCYTE ESTERASE UR QL STRIP: ABNORMAL
LYMPHOCYTES # BLD AUTO: 1.3 10E9/L (ref 0.8–5.3)
LYMPHOCYTES NFR BLD AUTO: 20.5 %
MCH RBC QN AUTO: 26.5 PG (ref 26.5–33)
MCHC RBC AUTO-ENTMCNC: 30.8 G/DL (ref 31.5–36.5)
MCV RBC AUTO: 86 FL (ref 78–100)
MONOCYTES # BLD AUTO: 0.5 10E9/L (ref 0–1.3)
MONOCYTES NFR BLD AUTO: 7.9 %
NEUTROPHILS # BLD AUTO: 4.4 10E9/L (ref 1.6–8.3)
NEUTROPHILS NFR BLD AUTO: 67.9 %
NITRATE UR QL: NEGATIVE
NONHDLC SERPL-MCNC: 159 MG/DL
NRBC # BLD AUTO: 0 10*3/UL
NRBC BLD AUTO-RTO: 0 /100
PH UR STRIP: 6 PH (ref 5–7)
PLATELET # BLD AUTO: 265 10E9/L (ref 150–450)
POTASSIUM SERPL-SCNC: 4.8 MMOL/L (ref 3.4–5.3)
PROT UR-MCNC: 3.87 G/L
PROT/CREAT 24H UR: 9 G/G CR (ref 0–0.2)
RBC # BLD AUTO: 3.66 10E12/L (ref 3.8–5.2)
RBC #/AREA URNS AUTO: 1 /HPF (ref 0–2)
SODIUM SERPL-SCNC: 139 MMOL/L (ref 133–144)
SODIUM UR-SCNC: 81 MMOL/L
SOURCE: ABNORMAL
SP GR UR STRIP: 1.01 (ref 1–1.03)
SQUAMOUS #/AREA URNS AUTO: 1 /HPF (ref 0–1)
TRIGL SERPL-MCNC: 179 MG/DL
UROBILINOGEN UR STRIP-MCNC: 0 MG/DL (ref 0–2)
WBC # BLD AUTO: 6.4 10E9/L (ref 4–11)
WBC #/AREA URNS AUTO: 4 /HPF (ref 0–5)

## 2018-10-05 PROCEDURE — 25000132 ZZH RX MED GY IP 250 OP 250 PS 637: Performed by: INTERNAL MEDICINE

## 2018-10-05 PROCEDURE — 99207 ZZC CDG-MDM COMPONENT: MEETS LOW - DOWN CODED: CPT | Performed by: INTERNAL MEDICINE

## 2018-10-05 PROCEDURE — 80061 LIPID PANEL: CPT | Performed by: INTERNAL MEDICINE

## 2018-10-05 PROCEDURE — 82570 ASSAY OF URINE CREATININE: CPT | Performed by: INTERNAL MEDICINE

## 2018-10-05 PROCEDURE — 76770 US EXAM ABDO BACK WALL COMP: CPT

## 2018-10-05 PROCEDURE — 86335 IMMUNFIX E-PHORSIS/URINE/CSF: CPT | Performed by: INTERNAL MEDICINE

## 2018-10-05 PROCEDURE — 25000131 ZZH RX MED GY IP 250 OP 636 PS 637: Performed by: INTERNAL MEDICINE

## 2018-10-05 PROCEDURE — 40000264 ECHO COMPLETE WITH OPTISON

## 2018-10-05 PROCEDURE — 84166 PROTEIN E-PHORESIS/URINE/CSF: CPT | Performed by: INTERNAL MEDICINE

## 2018-10-05 PROCEDURE — 81001 URINALYSIS AUTO W/SCOPE: CPT | Performed by: INTERNAL MEDICINE

## 2018-10-05 PROCEDURE — 82565 ASSAY OF CREATININE: CPT | Performed by: INTERNAL MEDICINE

## 2018-10-05 PROCEDURE — G0463 HOSPITAL OUTPT CLINIC VISIT: HCPCS

## 2018-10-05 PROCEDURE — 80048 BASIC METABOLIC PNL TOTAL CA: CPT | Performed by: INTERNAL MEDICINE

## 2018-10-05 PROCEDURE — 84156 ASSAY OF PROTEIN URINE: CPT | Performed by: INTERNAL MEDICINE

## 2018-10-05 PROCEDURE — 99232 SBSQ HOSP IP/OBS MODERATE 35: CPT | Performed by: INTERNAL MEDICINE

## 2018-10-05 PROCEDURE — 00000146 ZZHCL STATISTIC GLUCOSE BY METER IP

## 2018-10-05 PROCEDURE — 93306 TTE W/DOPPLER COMPLETE: CPT | Mod: 26 | Performed by: INTERNAL MEDICINE

## 2018-10-05 PROCEDURE — 12000007 ZZH R&B INTERMEDIATE

## 2018-10-05 PROCEDURE — 85025 COMPLETE CBC W/AUTO DIFF WBC: CPT | Performed by: INTERNAL MEDICINE

## 2018-10-05 PROCEDURE — 25000128 H RX IP 250 OP 636: Performed by: INTERNAL MEDICINE

## 2018-10-05 PROCEDURE — 36415 COLL VENOUS BLD VENIPUNCTURE: CPT | Performed by: INTERNAL MEDICINE

## 2018-10-05 PROCEDURE — 84300 ASSAY OF URINE SODIUM: CPT | Performed by: INTERNAL MEDICINE

## 2018-10-05 PROCEDURE — 25500064 ZZH RX 255 OP 636: Performed by: INTERNAL MEDICINE

## 2018-10-05 PROCEDURE — 85610 PROTHROMBIN TIME: CPT | Performed by: INTERNAL MEDICINE

## 2018-10-05 RX ORDER — WARFARIN SODIUM 5 MG/1
5 TABLET ORAL
Status: COMPLETED | OUTPATIENT
Start: 2018-10-05 | End: 2018-10-05

## 2018-10-05 RX ORDER — AMLODIPINE BESYLATE 5 MG/1
5 TABLET ORAL DAILY
Status: DISCONTINUED | OUTPATIENT
Start: 2018-10-05 | End: 2018-10-06

## 2018-10-05 RX ADMIN — INSULIN ASPART 6 UNITS: 100 INJECTION, SOLUTION INTRAVENOUS; SUBCUTANEOUS at 07:52

## 2018-10-05 RX ADMIN — ENOXAPARIN SODIUM 90 MG: 100 INJECTION SUBCUTANEOUS at 17:08

## 2018-10-05 RX ADMIN — AMLODIPINE BESYLATE 5 MG: 5 TABLET ORAL at 10:32

## 2018-10-05 RX ADMIN — INSULIN ASPART 2 UNITS: 100 INJECTION, SOLUTION INTRAVENOUS; SUBCUTANEOUS at 17:41

## 2018-10-05 RX ADMIN — HUMAN ALBUMIN MICROSPHERES AND PERFLUTREN 3 ML: 10; .22 INJECTION, SOLUTION INTRAVENOUS at 09:00

## 2018-10-05 RX ADMIN — METOPROLOL TARTRATE 25 MG: 25 TABLET ORAL at 21:52

## 2018-10-05 RX ADMIN — WARFARIN SODIUM 5 MG: 5 TABLET ORAL at 17:08

## 2018-10-05 RX ADMIN — INSULIN ASPART 1 UNITS: 100 INJECTION, SOLUTION INTRAVENOUS; SUBCUTANEOUS at 12:48

## 2018-10-05 RX ADMIN — METOPROLOL TARTRATE 25 MG: 25 TABLET ORAL at 07:57

## 2018-10-05 RX ADMIN — INSULIN GLARGINE 15 UNITS: 100 INJECTION, SOLUTION SUBCUTANEOUS at 08:18

## 2018-10-05 RX ADMIN — FUROSEMIDE 20 MG: 20 TABLET ORAL at 07:57

## 2018-10-05 ASSESSMENT — ACTIVITIES OF DAILY LIVING (ADL)
ADLS_ACUITY_SCORE: 14
ADLS_ACUITY_SCORE: 13
ADLS_ACUITY_SCORE: 14
ADLS_ACUITY_SCORE: 14

## 2018-10-05 NOTE — CONSULTS
Consult Date:  10/05/2018      IDENTIFICATION:  58-year-old female admitted through the emergency room in the setting of shortness of breath.      REASON FOR CONSULTATION:  Evaluation and assessment with respect to apparent chronic kidney disease, proteinuria, and concern for acute on chronic renal insufficiency.      ASSESSMENT:   1.  Baseline chronic kidney disease on the basis of data review.  Baseline creatinine appears to be increasing, suggesting progressive decline in renal function.  Etiology is almost certainly poorly controlled diabetes.   2.  Dipstick positive proteinuria.   3.  Elevated serum creatinine representing either acute versus chronic progressive renal insufficiency.  She is borderline oliguric.  She did receive a diuretic yesterday.  She has not received IV contrast.  She is not using significant nonsteroidal anti-inflammatories.   4.  Poorly controlled diabetes with an A1c noted to be 11.6.   5.  Documented left lower extremity DVT.  Probable pulmonary embolus.   6.  Hypoxic respiratory failure secondary to above.  Concern for possible congestive heart failure.   7.  Hyperlipidemia.   8.  Hypertension.   9.  Status post previous ORIF ankle.   10.  Hypoalbuminemia.   11.  Concern for nephrotic syndrome giving hypercholesterolemia, large protein excretion.      DISCUSSION:  Elderly female with a number of comorbid medical problems.  She has advancing chronic kidney disease with concern for possible acute on chronic component.  Her underlying issue is diabetic nephropathy.  She needs quantification of urine protein and assessment for the complications of chronic kidney disease.  In addition, she will need ACE inhibitor therapy given her proteinuria, chronic kidney disease, and hypertension.  Her renal prognosis remains guarded.  She will need renal followup post-hospitalization.  Her primary care is through [facility] and she likely will need to be referred to a [facility] nephrologist.       RECOMMENDATIONS AND PLAN:   1.  Renal ultrasound to evaluate size and symmetry.   2.  Quantification of urine protein.   3.  Documentation of iron studies.   4.  Evaluation for mineral bone disease.   5.  Will need institution of ACE inhibitor.   6.  Oral diuretics.   7.  Will continue to follow with you.       HISTORY OF PRESENT ILLNESS:  58-year-old female who has diabetes of at least 10-15 years' duration.  It is poorly controlled at best with A1c greater than 10.  She presents with shortness of breath and was found to have thromboembolic disease.  This is in the setting of recent ankle surgery.  It is unclear if immobilization related to her ankle surgery, etc. resulted in a DVT or if, in fact, it is related to being hypercoagulable from nephrotic syndrome.      She takes limited ibuprofen.  She has not seen a kidney doctor previously.  She has had bilateral lower extremity edema, worse on her affected side for the past several weeks.  She has not noted any changes in her urinary tract habits.  Her admission medications did not include antihypertensive medications or ARB ACE inhibitor therapy.      Greater than 30 minutes was spent with the patient discussing chronic kidney disease, diabetes.        PAST MEDICAL HISTORY:   1.  Diabetes.   2.  Probable diabetic nephropathy.   3.  Hypercholesterolemia.   4.  Hypertension.   5.  History of tobacco.   6.  Recent ankle fracture 03/2018.        ADMISSION MEDICATIONS: Insulin, aspirin.      ALLERGIES:  NONE.      SOCIAL HISTORY:  No current alcohol, recent discontinuation of tobacco.      FAMILY HISTORY:  No renal disease of note.      REVIEW OF SYSTEMS:  Complete 10-point review of systems undertaken.  Pertinent positives and negatives are noted as above.      PHYSICAL EXAMINATION:   VITAL SIGNS:  She is afebrile.  Her rhythm is sinus.  Her rates 90.  Blood pressure is in the range of 160-170.  Sats are adequate on room air.  Her weight is 200 pounds.  I's and  O's were reviewed.   GENERAL:  No acute distress, appropriate and interactive.   HEENT:  Normocephalic, atraumatic.  Pharynx without lesion or exudate.   NECK:  Supple.   CHEST:  Symmetric and clear.   CARDIOVASCULAR:  Regular S1, S2.   ABDOMEN:  Obese but soft.   EXTREMITIES:  Show bilateral lower extremity edema, 2+.   NEUROLOGIC:  Grossly nonfocal.  Cranial nerves intact.   PSYCHIATRIC:  Normal affect, pleasant mood.      LABORATORY DATA:  Current and historic reviewed in detail.         SAVANNAH ELIZABETH MD             D: 10/05/2018   T: 10/05/2018   MT: TERRENCE      Name:     ONDINA DENIS   MRN:      [id number]        Account:       [id number]   :      1960           Consult Date:  10/05/2018      Document: [id number]

## 2018-10-05 NOTE — PROGRESS NOTES
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  On arrival to emergency room, her blood pressure elevated at 225/122.  Laboratory workup showed elevated creatinine at 2.48.  BNP 7580, troponin 0 0.029, d-dimer 2.8. Ultrasound of lower extremity showed acute occlusive thrombus in the left common femoral, femoral and popliteal veins.  Chest x-ray showed small left pleural effusion, tiny right pleural effusion and atelectasis.  In the ER, she was given Lasix 40 mg IV, labetalol 20 mg IV, Lovenox 80 mg subcu.  She was admitted for further management.  WOC nurse consulted for dry scab on left medial LE, RN placed a bandaid which when removed took off the dry scab exposing new pink epidermis.   I: assessment, discussion with pt and RN, provided Sween for dry LE    A/P: dry stable scab was easily removed with band aid; new pink intact dry skin   Pt has scattered dry white patches on skin, pt has no idea what they are, recommended to visit a dermatologist to investigate.  Continue with Sween to moist skin PRN.

## 2018-10-05 NOTE — PROGRESS NOTES
Discharge Planner   Discharge Plans in progress: Yes.  Barriers to discharge plan: Patient with a history of diabetes with an A1C of 11.6. Patient admitted with shortness of breath, awaiting further tests and diagnosis, PE vs. CHF.  Follow up plan: Anticipate discharge home. Follow up if needing any further CHF education.        Entered by: Autumn Quintero 10/05/2018 3:44 PM

## 2018-10-05 NOTE — PHARMACY-ANTICOAGULATION SERVICE
Clinical Pharmacy - Warfarin Dosing Consult     Pharmacy has been consulted to manage this patient s warfarin therapy.  Indication: DVT/ PE Treatment  Therapy Goal: INR 2-3  Warfarin Prior to Admission: No  Significant drug interactions: lovenox  Recent documented change in oral intake/nutrition: Unknown  Dose Comments: 57 yo F, Wt 91 kg, warfarin 5 mg    INR   Date Value Ref Range Status   10/04/2018 0.92 0.86 - 1.14 Final       Recommend warfarin 5 mg today.  Pharmacy will monitor Roxy L Christofer daily and order warfarin doses to achieve specified goal.      Please contact pharmacy as soon as possible if the warfarin needs to be held for a procedure or if the warfarin goals change.

## 2018-10-05 NOTE — CONSULTS
"Care Transition Initial Assessment - RN    Reason For Consult: care coordination/care conference, discharge planning   Met with: Patient.  DATA   Active Problems:    Shortness of breath       Cognitive Status: awake, alert and oriented.  Primary Care Clinic Name: [facility] [place]     Contact information and PCP information verified: Yes  Lives With: alone                    Insurance concerns: No Insurance issues identified  ASSESSMENT  Patient currently receives the following services:  None        Identified issues/concerns regarding health management: Patient admitted with shortness of breath thought to be related to PE or CHF. BNP 7580. Echo completed today and found pericardial and pleural fluid. Discussed managing fluid with patient with monitoring weight, low salt diet, and taking diuretics as prescribed. Patient states that she does consume canned soup and recognizes that this would increase salt intake. Patient mostly eats meat and fresh fruits and vegetables.   Patient has a history of diabetes. A1C in March was greater than 16. Current A1C 11.6. Patient states that she has a working glucometer at home and checks blood glucoses twice daily. Patient states was taking 70/30 insulin but will be prescribed differently insulin regimen upon discharge. Patient currently does not follow with endocrine, stating \"I'll only go if they make me.\" Patient states that she has been adapting diet since earlier this year, focusing on fresh fruits and vegetables and meat, limiting carbs such as potatoes and processed foods. Patient given calorie susan booklet and reviewed identifying carbohydrates in food. Patient appreciative of calorie susan book.   PLAN  Financial costs for the patient not discussed .  Patient given options and choices for discharge Yes .  Patient/family is agreeable to the plan?  Yes.  Patient anticipates discharging to Home .        Patient anticipates needs for home equipment: No  Plan/Disposition: " Home   Appointments: None made at this time, patient to make own follow up appointment.       Care  (CTS) will continue to follow as needed.    Autumn Quintero MA-JASE  Care Transition Services  [phone]

## 2018-10-05 NOTE — PLAN OF CARE
Problem: Patient Care Overview  Goal: Plan of Care/Patient Progress Review  Outcome: No Change  A&O X4. Up independent. HTN throughout shift, added PO Norvasc. RA 95%, denies SOB/LEVIN. Denies pain. LLE DVT, on Lovenox and Coumadin with INR 1.09. 2+ BLE edema. Echo done today, see results. Tele SR with PVCs. , 152, 174 with coverage and carb count. Creatinine increasing, 2.68. Nephrology consulted and UA sent. Renal ultrasound this afternoon, see results. Left wound to foot, WOC following. Discharge two days.        10/05/18 0755 10/05/18 1031   Vitals   /87 140/71   BP Location Left arm Left arm

## 2018-10-05 NOTE — ED NOTES
[facility]  ED Nurse Handoff Report    Roxy Beaulieu is a 58 year old female   ED Chief complaint: Shortness of Breath  . ED Diagnosis:   Final diagnoses:   Acute combined systolic and diastolic congestive heart failure (H)   Hypertension, unspecified type   Medical non-compliance   Elevated d-dimer     Allergies: No Known Allergies    Code Status: Full Code  Activity level - Baseline/Home:  Independent. Activity Level - Current:   Independent. Lift room needed: No. Bariatric: No   Needed: No   Isolation: No. Infection: Not Applicable.     Vital Signs:   Vitals:    10/04/18 1830 10/04/18 1845 10/04/18 1900 10/04/18 1945   BP: 146/82 143/72 (!) 144/96 139/75   Pulse:       Resp:       Temp:       TempSrc:       SpO2: 99% 99% 94% 96%   Weight:           Cardiac Rhythm:  ,      Pain level: 0-10 Pain Scale: 0  Patient confused: No. Patient Falls Risk: Yes.   Elimination Status: Has voided   Patient Report - Initial Complaint: Pt presents with difficulty breathing for the past week but worsened today, pt has a past hx of PE years ago, states it feels same. Concerned it may be pneumonia or another PE. Pt alert, oriented x3. ABcs intact. Pt 83% on RA. New leg swelling. SOB for 10 days but today feels like she can't catch her breath. No fevers. Pt hypertensive Focused Assessment: Respiratory - Respiratory WDL:  WDL except; all (increasing SOB over 10 days; new bilateral leg swelling) Rhythm/Pattern, Respiratory: tachypnea; shortness of breath reported; shallow; dyspneic Expansion/Accessory Muscles/Retractions: accessory muscle use   Tests Performed: labs, xray, US, EKG. Abnormal Results:   Labs Ordered and Resulted from Time of ED Arrival Up to the Time of Departure from the ED   COMPREHENSIVE METABOLIC PANEL - Abnormal; Notable for the following:        Result Value    Glucose 273 (*)     Urea Nitrogen 45 (*)     Creatinine 2.48 (*)     GFR Estimate 20 (*)     GFR Estimate If Black 24 (*)      Calcium 8.3 (*)     Albumin 2.7 (*)     Alkaline Phosphatase 167 (*)     All other components within normal limits   D DIMER QUANTITATIVE - Abnormal; Notable for the following:     D Dimer 2.8 (*)     All other components within normal limits   NT PROBNP INPATIENT - Abnormal; Notable for the following:     N-Terminal Pro BNP Inpatient 7580 (*)     All other components within normal limits   CBC WITH PLATELETS DIFFERENTIAL - Abnormal; Notable for the following:     Hemoglobin 10.2 (*)     Hematocrit 32.5 (*)     MCH 26.4 (*)     MCHC 31.4 (*)     All other components within normal limits   TROPONIN I   TSH   PERIPHERAL IV CATHETER   CARDIAC CONTINUOUS MONITORING   PULSE OXIMETRY NURSING     US Lower Extremity Venous Duplex Bilateral   Preliminary Result   IMPRESSION:   1. Acute-appearing nonocclusive thrombus in the left common femoral,   femoral and popliteal veins.   2. No DVT in the right lower extremity.      XR Chest 2 Views   Final Result   IMPRESSION: Somewhat shallow inspiration. Small left pleural effusion   and mild left basilar atelectasis or infiltrate. Possible tiny pleural   effusion on the right. Heart size within normal limits. Degenerative   changes thoracic spine.      BO PARKS MD        .   Treatments provided: lasix, labetolol, lovenox  Family Comments: sister at bedside  OBS brochure/video discussed/provided to patient:  N/A  ED Medications:   Medications   lidocaine 1 % 1 mL (not administered)   lidocaine (LMX4) cream (not administered)   sodium chloride (PF) 0.9% PF flush 3 mL (not administered)   sodium chloride (PF) 0.9% PF flush 3 mL (not administered)   labetalol (NORMODYNE/TRANDATE) injection 20 mg (20 mg Intravenous Given 10/4/18 1755)   enoxaparin (LOVENOX) injection 80 mg (80 mg Subcutaneous Given 10/4/18 1759)   furosemide (LASIX) injection 40 mg (40 mg Intravenous Given 10/4/18 1758)     Drips infusing:  No  For the majority of the shift, the patient's behavior Green.  Interventions performed were none.     Severe Sepsis OR Septic Shock Diagnosis Present: No      ED Nurse Name/Phone Number: Krys Goins,   8:24 PM      RECEIVING UNIT ED HANDOFF REVIEW    Above ED Nurse Handoff Report was reviewed: Yes, but room is still being cleaned.   Reviewed by: Angelica Dunn on October 4, 2018 at 9:01 PM

## 2018-10-05 NOTE — PROGRESS NOTES
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  On arrival to emergency room, her blood pressure elevated at 225/122.  Laboratory workup showed elevated creatinine at 2.48.  BNP 7580, troponin 0 0.029, d-dimer 2.8. Ultrasound of lower extremity showed acute occlusive thrombus in the left common femoral, femoral and popliteal veins.  Chest x-ray showed small left pleural effusion, tiny right pleural effusion and atelectasis.  In the ER, she was given Lasix 40 mg IV, labetalol 20 mg IV, Lovenox 80 mg subcu.  She was admitted for further management.     Left lower extremity DVT, probable PE:  -  History of prior clots.  Self stopped warfarin a few years ago.  She is willing to resume.  Plan warfarin here.  She is on lovenox for bridging once daily given renal failure.  If her renal failure keeps worsening though lovenox may become contraindicated.  I recommended she stay on life long warfarin unless a future contraindication.  -  I would not check a CT with contrast given renal failure.  I would not obtain a V/Q now as it will not  given history and known DVT.    Initial acute hypoxic respiratory failure felt likely due to PE, possible element of CHF:  -  Off oxygen and breathing easier today.  Continue anticoagulation as above.  Echo planned today.  Hold on further lasix given renal issues and her volume status seems much better today.    CHRISTINA superimposed on CKD:  -   Patient with poorly controlled DM and HTN likely contributing.  I note her renal function has been worsening for a few years though appears its worst currently and creatinine continues to trend up.  She denies following with a nephrologist.  I will ask nephrology to see her here.  -  Check UA/FeNA    DM, uncontrolled:  -  Patient prior was not taking insulin routinely but reports over a month of 70/30 BID usage.  I don't think 70/30 is a good insulin given her level of brittle diabetes.  She was placed on lantus which I will continue and I added novolog TID meal coverage insulin.    Hyperlipidemia:  -  Not on routine tx, fasting lipid with elevated LDL.  Consider starting statin at discharge.    HTN, poorly controlled:  -  BP spiking at times.  Started on metoprolol here.  I will also add norvasc after discussing options and risks/benefits with her.    Left ankle wound:  -  Does not appear grossly infected and was present on admission.  I will ask wound care nursing to follow.    DVT Prophylaxis: Enoxaparin (Lovenox) subcutaneous, warfarin  Code Status: Full Code    Disposition Plan   Expected discharge in 1-2 days to prior living arrangement once renal function stable/clinical status improved.     Entered: Alphonse Larson 10/05/2018, 9:53 AM       Interval History   Assumed care for the day, history reviewed.  Ms. Beaulieu without new complaints.  No chest pain.  SOB has improved.  No nausea, other new complaints.  She admits to not being very compliant with medications the past couple years but wants to improve her condition and plans to follow recommendations better.    -Data reviewed today: I reviewed all new labs and imaging reports over the last 24 hours. I personally reviewed no images or EKG's today.    Physical Exam   Temp: 97.1  F (36.2  C) Temp src: Axillary BP: 171/87 Pulse: 108 Heart Rate: 90 Resp: 20 SpO2: 93 % O2 Device: None (Room air) Oxygen Delivery: 2 LPM  Vitals:    10/04/18 1724 10/04/18 2154  10/05/18 0646   Weight: 86.2 kg (190 lb) 91 kg (200 lb 11.2 oz) 90.6 kg (199 lb 12.8 oz)     Vital Signs with Ranges  Temp:  [97.1  F (36.2  C)-98.4  F (36.9  C)] 97.1  F (36.2  C)  Pulse:  [108] 108  Heart Rate:  [] 90  Resp:  [18-28] 20  BP: (123-225)/() 171/87  SpO2:  [83 %-99 %] 93 %  I/O last 3 completed shifts:  In: -   Out: 700 [Urine:700]    GEN:  Alert, oriented x 3, appears comfortable, NAD.  HEENT:  Normocephalic/atraumatic, no scleral icterus, no nasal discharge, mouth moist.  CV:  Regular rate and rhythm, no murmur or JVD.  S1 + S2 noted, no S3 or S4.  LUNGS:  Clear to auscultation upper with mildly decreased breath sounds bases.  No wheezes/retractions.  Symmetric chest rise on inhalation noted.  ABD:  Active bowel sounds, soft, non-tender/non-distended.  No rebound/guarding/rigidity.  EXT:  Trace edema.  No cyanosis.  No acute joint synovitis noted.  SKIN:  Dry to touch, left medial ankle wound that appears fairly superficial and non-erythemic.  No rashes noted in the visualized areas.    Medications     Warfarin Therapy Reminder         enoxaparin  1 mg/kg Subcutaneous Q24H     insulin aspart   Subcutaneous TID AC     insulin aspart  1-10 Units Subcutaneous TID AC     insulin aspart  1-7 Units Subcutaneous At Bedtime     insulin glargine  15 Units Subcutaneous QAM AC     metoprolol tartrate  25 mg Oral BID     sodium chloride (PF)  3 mL Intracatheter Q8H     Data       Recent Labs  Lab 10/05/18  0655 10/04/18  1730   WBC 6.4 9.1   HGB 9.7* 10.2*   HCT 31.5* 32.5*   MCV 86 84    257       Recent Labs  Lab 10/05/18  0655 10/04/18  1611    136   POTASSIUM 4.8 5.0   CHLORIDE 106 104   CO2 27 27   ANIONGAP 6 5   * 273*   BUN 45* 45*   CR 2.68* 2.48*   GFRESTIMATED 18* 20*   GFRESTBLACK 22* 24*   GILL 8.2* 8.3*   PROTTOTAL  --  7.0   ALBUMIN  --  2.7*   BILITOTAL  --  0.3   ALKPHOS  --  167*   AST  --  26   ALT  --  18       Recent Labs  Lab 10/05/18  0750 10/05/18  0655  10/05/18  0214 10/04/18  2216 10/04/18  1611   GLC  --  297*  --   --  273*   *  --  322* 253*  --        Recent Labs  Lab 10/04/18  1611   TROPI 0.029         Recent Results (from the past 24 hour(s))   XR Chest 2 Views    Narrative    XR CHEST TWO VIEWS   10/4/2018 5:01 PM     INDICATION: Shortness of breath.    COMPARISON: None.      Impression    IMPRESSION: Somewhat shallow inspiration. Small left pleural effusion  and mild left basilar atelectasis or infiltrate. Possible tiny pleural  effusion on the right. Heart size within normal limits. Degenerative  changes thoracic spine.    BO PARKS MD   US Lower Extremity Venous Duplex Bilateral    Narrative    BILATERAL LOWER EXTREMITY VENOUS DUPLEX ULTRASOUND   10/4/2018 7:37 PM      COMPARISON: None    HISTORY: Leg swelling, history pulmonary embolism.      TECHNIQUE: Doppler and spectral waveform analysis was used in  evaluation of the deep veins of the extremities.    FINDINGS: The right common femoral, proximal greater saphenous,  femoral, popliteal and posterior tibial veins are patent, fully  compressible and demonstrate normal venous Doppler flow.    There is nonocclusive thrombus in the left common femoral and proximal  femoral veins as well as in the distal femoral vein and popliteal  vein.      Impression    IMPRESSION:  1. Acute-appearing nonocclusive thrombus in the left common femoral,  femoral and popliteal veins.  2. No DVT in the right lower extremity.    DANIEL PIERCE MD

## 2018-10-05 NOTE — H&P
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  She stated that she had rhinorrhea and congestion at the onset of her symptoms.  On arrival to emergency room, her blood pressure elevated at 225/122.  Laboratory workup showed elevated creatinine at 2.48.  BNP 7580, troponin 0 0.029, d-dimer 2.8. Ultrasound of lower extremity showed acute occlusive thrombus in the left common femoral, femoral and popliteal veins.  Chest x-ray showed small left pleural effusion, tiny right pleural effusion and atelectasis.  In the ER, she was given Lasix 40 mg IV, labetalol 20 mg IV, Lovenox 80 mg subcu.  She was admitted for further management.    1.  Acute hypoxic respiratory failure secondary to possible pulmonary embolism, acute CHF  --Ultrasound of lower extremity showed left lower extremity DVT.  D-dimer elevated.  CT of the chest  with contrast not done because of elevated creatinine.  She does have history of unprovoked DVT/PE for which she has been on anticoagulation for more than 10 years.  She quit taking coumadin on her own.  --She was given Lovenox 80 mg subcu.  Will put her on Lovenox 1 mg/kg every 24 hours.  Discussed with patient regarding need for lifelong anticoagulation given her history and she agrees to be back on Coumadin.  Pharmacy consulted for Coumadin dosing  --She was also given Lasix 40 mg IV.  Will put her on Lasix 20 mg daily and to closely monitor his renal  function  --Currently her oxygen saturation is stable.  --Will do echocardiogram. Consider VQ scan in a.m. for completeness although this does not change long-term management as she needs to be on lifelong anticoagulation.    2.  Hypertensive urgency: Her blood pressure was elevated at 225/122.  Patient is noncompliant with her blood pressure medications.  --She was given labetalol 20 mg IV in the ER.  Will put her on metoprolol 25 mg p.o. twice daily and adjust the dose as needed. We will put her on as needed hydralazine 10 mg IV every 4 hours as needed for systolic blood pressure above 180.  --Will do echocardiogram in the morning and adjust her blood pressure medications as needed.    3.  Diabetes mellitus type 2, insulin requiring, uncontrolled  --She is noncompliant with her medications and admitted that she was missing her doses.  Her blood sugar is 464.  --Hemoglobin A1c on 3/16/28 it was greater than 16.  --Will put her on insulin sliding scale.   --We will put her on Lantus insulin 15 units daily.  Will need further insulin dose adjustment based on her blood sugar.  Will closely monitor    4. CKD, likely due to uncontrolled diabetes and hypertension.  Baseline creatinine 1.757/2018.  Creatinine 2.48 today.  Will monitor renal function.    5.  History of hyperlipidemia: Currently not on medications.  Will check fasting lipid profile    Plan of care was discussed in detail with patient and her sister.  DVT Prophylaxis: Started on Lovenox and warfarin  Code Status: Full Code    Disposition: Expected discharge in 1-3 days if her breathing remains stable    Rachael Lopez MD    Primary Care Physician   [facility]    Chief Complaint   shortness of breath, leg swelling    History is obtained from the patient and her sister    History of Present Illness   Roxy Beaulieu is a 58 year old female patient with past medical history of diabetes mellitus type 2, insulin requiring, hypertension,  hyperlipidemia, gangrene of left first and second toes requiring amputation in 4/29/ 2017, history of displaced left lateral malleolar fracture requiring ORIF in 3/16/28, remote history of unprovoked DVT/PE, came to emergency room with a complaint of shortness of breath.  She stated that she had rhinorrhea and congestion at the onset of her symptoms.  She stated that she had shortness of breath which started a week ago and was gradually getting worse.  She stated that if symptoms worsened today and she decided to come to emergency room for evaluation.  She denies associated cough or chest pain.  She also denies fever.  She complains of bilateral leg swelling which is worse on the left side.  She has prior history of unprovoked DVT/PE in 2001 and has been on blood thinner for about 10 years.  She quit taking the medications.   She stated that she had a cast on her left leg in March of this year after she had fracture. Patient has history of noncompliance with her prescription medications.  On arrival to emergency room, she was hypoxic.  Temperature 97.5, pulse 108, blood pressure 225/122.  Laboratory workup showed elevated creatinine at 2.48.  BNP 7580, troponin 0 0.029, d-dimer 2.8, WBC 9.1, hemoglobin 10.2.  Chest x-ray showed small left pleural effusion.  Ultrasound of lower extremity was done and showed acute appearing occlusive thrombus in the left common femoral, femoral and popliteal veins. There is no evidence of DVT in the right lower extremity.  CT of the chest with contrast was not done because of her elevated creatinine.  Patient was given Lovenox 80 mg subcu, Lasix 40 mg IV.  She was also given labetalol 20 mg IV.  Patient was put on oxygen.  She had improvement of her symptoms.  She was admitted for further management.    Past Medical History    I have reviewed this patient's medical history and updated it with pertinent information if needed.   Past Medical History:   Diagnosis Date     Diabetes (H)       Gangrene of toe (H) 4/29/2017     High cholesterol      Hypertension      Obesity 4/29/2017     Personal history of tobacco use, presenting hazards to health 4/29/2017     Uncontrolled type 2 diabetes mellitus with hyperglycemia, with long-term current use of insulin (H) 4/29/2017       Past Surgical History   I have reviewed this patient's surgical history and updated it with pertinent information if needed.  Past Surgical History:   Procedure Laterality Date     AMPUTATE TOE(S) Left 5/1/2017    Procedure: AMPUTATE TOE(S);  Amputate first and second left toes;  Surgeon: Verna Fofana DPM, Podiatry/Foot and Ankle Surgery;  Location: [facility] OR     OPEN REDUCTION INTERNAL FIXATION FIBULA Left 3/16/2018    Procedure: OPEN REDUCTION INTERNAL FIXATION FIBULA;  Open reduction and internal fixation of displaced left lateral malleolar fracture;  Surgeon: Sumanth Wen MD;  Location:  OR       Prior to Admission Medications   Prior to Admission Medications   Prescriptions Last Dose Informant Patient Reported? Taking?   Insulin NPH Isophane & Regular (RELION 70/30 SC) 10/4/2018 at Unknown time  Yes Yes   Sig: Inject 10-15 Units Subcutaneous every morning   Insulin NPH Isophane & Regular (RELION 70/30 SC) 10/3/2018 at Unknown time  Yes Yes   Sig: Inject 10-20 Units Subcutaneous every evening   aspirin 325 MG EC tablet 10/3/2018 at Unknown time  Yes Yes   Sig: Take 325 mg by mouth every evening      Facility-Administered Medications: None     Allergies   No Known Allergies    Social History   I have reviewed this patient's social history and updated it with pertinent information if needed. Roxy ORTEGA Christofer  reports that she quit smoking about 2 years ago. She has never used smokeless tobacco. She reports that she does not drink alcohol or use illicit drugs.    Family History   I have reviewed this patient's family history and updated it with pertinent information if needed.   No family history on file.    Review of  Systems   The 10 point Review of Systems is negative other than noted in the HPI or here.     Physical Exam   Temp: 97.5  F (36.4  C) Temp src: Temporal BP: 139/75 Pulse: 108 Heart Rate: 82 Resp: 20 SpO2: 96 % O2 Device: Nasal cannula Oxygen Delivery: 2 LPM  Vital Signs with Ranges  Temp:  [97.5  F (36.4  C)] 97.5  F (36.4  C)  Pulse:  [108] 108  Heart Rate:  [] 82  Resp:  [20-28] 20  BP: (123-225)/() 139/75  SpO2:  [83 %-99 %] 96 %  190 lbs 0 oz    GEN:  Alert, oriented x 3, appears comfortable, NAD.  HEENT:  Normocephalic/atraumatic, no scleral icterus, no nasal discharge, mouth moist.  CV:  Regular rate and rhythm, no murmur or JVD.  S1 + S2 noted, no S3 or S4.  LUNGS:  Clear to auscultation bilaterally without rales/rhonchi/wheezing/retractions.  Symmetric chest rise on inhalation noted.  ABD:  Active bowel sounds, soft, non-tender/non-distended.  No rebound/guarding/rigidity.  EXT: 1-2+ edema of lower extremities, left greater than right.  Partial amputation of first toe of left foot and amputated second toe of left foot hands/feet warm to touch with good signs of peripheral perfusion.  No joint synovitis noted.  SKIN: Scab wound on the medial aspect of left ankle area  NEURO:  Symmetric muscle strength, sensation to touch grossly intact.  No new focal deficits appreciated.    Data   Data reviewed today:  I personally reviewed: X-ray of the chest showed small left pleural effusion and mild left basilar atelectasis.  Possible tiny pleural effusion on the right.  Ultrasound of lower extremities showed acute appearing nonocclusive thrombus in the left common femoral, femoral and popliteal veins.  No DVT on the right lower extremity.    Recent Labs  Lab 10/04/18  1730 10/04/18  1611   WBC 9.1  --    HGB 10.2*  --    MCV 84  --      --    NA  --  136   POTASSIUM  --  5.0   CHLORIDE  --  104   CO2  --  27   BUN  --  45*   CR  --  2.48*   ANIONGAP  --  5   GILL  --  8.3*   GLC  --  273*   ALBUMIN  --   2.7*   PROTTOTAL  --  7.0   BILITOTAL  --  0.3   ALKPHOS  --  167*   ALT  --  18   AST  --  26   TROPI  --  0.029       Recent Results (from the past 24 hour(s))   XR Chest 2 Views    Narrative    XR CHEST TWO VIEWS   10/4/2018 5:01 PM     INDICATION: Shortness of breath.    COMPARISON: None.      Impression    IMPRESSION: Somewhat shallow inspiration. Small left pleural effusion  and mild left basilar atelectasis or infiltrate. Possible tiny pleural  effusion on the right. Heart size within normal limits. Degenerative  changes thoracic spine.    BO PARKS MD   US Lower Extremity Venous Duplex Bilateral    Narrative    BILATERAL LOWER EXTREMITY VENOUS DUPLEX ULTRASOUND   10/4/2018 7:37 PM      COMPARISON: None    HISTORY: Leg swelling, history pulmonary embolism.      TECHNIQUE: Doppler and spectral waveform analysis was used in  evaluation of the deep veins of the extremities.    FINDINGS: The right common femoral, proximal greater saphenous,  femoral, popliteal and posterior tibial veins are patent, fully  compressible and demonstrate normal venous Doppler flow.    There is nonocclusive thrombus in the left common femoral and proximal  femoral veins as well as in the distal femoral vein and popliteal  vein.      Impression    IMPRESSION:  1. Acute-appearing nonocclusive thrombus in the left common femoral,  femoral and popliteal veins.  2. No DVT in the right lower extremity.

## 2018-10-05 NOTE — DISCHARGE INSTRUCTIONS
Please call [facility] [place] office at [phone]  to schedule a hospital follow-up appointment within 7-10 days of discharge. Please bring your hospital discharge instructions and any new medications with you to your appointment.

## 2018-10-05 NOTE — PLAN OF CARE
Problem: Patient Care Overview  Goal: Plan of Care/Patient Progress Review  Outcome: No Change  New admit from ED with shortness of breath. VSS. Tele - SR. Up SBA in room. Blood sugar 253. Started on coumadin and lovenox. Denies pain. ECHO tomorrow. Plan: discharge in 1-3 days.

## 2018-10-05 NOTE — PHARMACY-ADMISSION MEDICATION HISTORY
"Admission medication history interview status for this patient is complete. See [facility] admission navigator for allergy information, prior to admission medications and immunization status.     Medication history interview source(s):Patient  Medication history resources (including written lists, pill bottles, clinic record):None  Primary pharmacy:[facility]    Changes made to PTA medication list:  Added: NPH insulin  Deleted: lantus, novolog, oxycodone, senna and acetaminophen  Changed: none    Actions taken by pharmacist (provider contacted, etc):None     Additional medication history information:None    Medication reconciliation/reorder completed by provider prior to medication history? No    For patients on insulin therapy: yes (Yes/No)   Mix 70/30 dose: 10-20 in twice daily   Sliding scale Novolog N  If Yes, do you have a baseline novolog pre-meal dose: ______units with meals   Patients eat three meals a day: Y  NOTE- pt does not have a set scale. She adjust as she feels works for her. \"her own scale\"  Any Barriers to therapy: cost of medications/comfortable with giving injections (if applicable)/ comfortable and confident with current diabetes regimen       Prior to Admission medications    Medication Sig Last Dose Taking? Auth Provider   aspirin 325 MG EC tablet Take 325 mg by mouth every evening 10/3/2018 at Unknown time Yes Unknown, Entered By History   Insulin NPH Isophane & Regular (RELION 70/30 SC) Inject 10-15 Units Subcutaneous every morning 10/4/2018 at Unknown time Yes Unknown, Entered By History   Insulin NPH Isophane & Regular (RELION 70/30 SC) Inject 10-20 Units Subcutaneous every evening 10/3/2018 at Unknown time Yes Unknown, Entered By History         "

## 2018-10-05 NOTE — PLAN OF CARE
Problem: Patient Care Overview  Goal: Plan of Care/Patient Progress Review  Outcome: Improving  A&O. Denies pain. VSS. 0200 . Denies SOB. O2 94% on RA.  LS clear. Plan for echo in am. SBA with ambulation. Tele:

## 2018-10-06 LAB
ANION GAP SERPL CALCULATED.3IONS-SCNC: 8 MMOL/L (ref 3–14)
BUN SERPL-MCNC: 48 MG/DL (ref 7–30)
CALCIUM SERPL-MCNC: 8.2 MG/DL (ref 8.5–10.1)
CHLORIDE SERPL-SCNC: 108 MMOL/L (ref 94–109)
CO2 SERPL-SCNC: 27 MMOL/L (ref 20–32)
CREAT SERPL-MCNC: 2.7 MG/DL (ref 0.52–1.04)
FERRITIN SERPL-MCNC: 165 NG/ML (ref 8–252)
GFR SERPL CREATININE-BSD FRML MDRD: 18 ML/MIN/1.7M2
GLUCOSE BLDC GLUCOMTR-MCNC: 115 MG/DL (ref 70–99)
GLUCOSE BLDC GLUCOMTR-MCNC: 123 MG/DL (ref 70–99)
GLUCOSE BLDC GLUCOMTR-MCNC: 124 MG/DL (ref 70–99)
GLUCOSE BLDC GLUCOMTR-MCNC: 158 MG/DL (ref 70–99)
GLUCOSE BLDC GLUCOMTR-MCNC: 187 MG/DL (ref 70–99)
GLUCOSE SERPL-MCNC: 154 MG/DL (ref 70–99)
INR PPP: 1.03 (ref 0.86–1.14)
IRON SATN MFR SERPL: 19 % (ref 15–46)
IRON SERPL-MCNC: 33 UG/DL (ref 35–180)
PHOSPHATE SERPL-MCNC: 4.7 MG/DL (ref 2.5–4.5)
POTASSIUM SERPL-SCNC: 5.1 MMOL/L (ref 3.4–5.3)
PTH-INTACT SERPL-MCNC: 248 PG/ML (ref 18–80)
SODIUM SERPL-SCNC: 143 MMOL/L (ref 133–144)
TIBC SERPL-MCNC: 170 UG/DL (ref 240–430)

## 2018-10-06 PROCEDURE — 82784 ASSAY IGA/IGD/IGG/IGM EACH: CPT | Performed by: INTERNAL MEDICINE

## 2018-10-06 PROCEDURE — 86334 IMMUNOFIX E-PHORESIS SERUM: CPT | Performed by: INTERNAL MEDICINE

## 2018-10-06 PROCEDURE — 83970 ASSAY OF PARATHORMONE: CPT | Performed by: INTERNAL MEDICINE

## 2018-10-06 PROCEDURE — 82728 ASSAY OF FERRITIN: CPT | Performed by: INTERNAL MEDICINE

## 2018-10-06 PROCEDURE — 25000132 ZZH RX MED GY IP 250 OP 250 PS 637: Performed by: INTERNAL MEDICINE

## 2018-10-06 PROCEDURE — 25000132 ZZH RX MED GY IP 250 OP 250 PS 637

## 2018-10-06 PROCEDURE — 99207 ZZC CDG-MDM COMPONENT: MEETS LOW - DOWN CODED: CPT | Performed by: INTERNAL MEDICINE

## 2018-10-06 PROCEDURE — 85610 PROTHROMBIN TIME: CPT | Performed by: INTERNAL MEDICINE

## 2018-10-06 PROCEDURE — 00000146 ZZHCL STATISTIC GLUCOSE BY METER IP

## 2018-10-06 PROCEDURE — 25000131 ZZH RX MED GY IP 250 OP 636 PS 637: Performed by: INTERNAL MEDICINE

## 2018-10-06 PROCEDURE — 83550 IRON BINDING TEST: CPT | Performed by: INTERNAL MEDICINE

## 2018-10-06 PROCEDURE — 84100 ASSAY OF PHOSPHORUS: CPT | Performed by: INTERNAL MEDICINE

## 2018-10-06 PROCEDURE — 99232 SBSQ HOSP IP/OBS MODERATE 35: CPT | Performed by: INTERNAL MEDICINE

## 2018-10-06 PROCEDURE — 82306 VITAMIN D 25 HYDROXY: CPT | Performed by: INTERNAL MEDICINE

## 2018-10-06 PROCEDURE — 83540 ASSAY OF IRON: CPT | Performed by: INTERNAL MEDICINE

## 2018-10-06 PROCEDURE — 12000007 ZZH R&B INTERMEDIATE

## 2018-10-06 PROCEDURE — 84165 PROTEIN E-PHORESIS SERUM: CPT | Performed by: INTERNAL MEDICINE

## 2018-10-06 PROCEDURE — 80048 BASIC METABOLIC PNL TOTAL CA: CPT | Performed by: INTERNAL MEDICINE

## 2018-10-06 PROCEDURE — 25000128 H RX IP 250 OP 636: Performed by: INTERNAL MEDICINE

## 2018-10-06 PROCEDURE — 36415 COLL VENOUS BLD VENIPUNCTURE: CPT | Performed by: INTERNAL MEDICINE

## 2018-10-06 PROCEDURE — 00000402 ZZHCL STATISTIC TOTAL PROTEIN: Performed by: INTERNAL MEDICINE

## 2018-10-06 RX ORDER — WARFARIN SODIUM 7.5 MG/1
7.5 TABLET ORAL
Status: COMPLETED | OUTPATIENT
Start: 2018-10-06 | End: 2018-10-06

## 2018-10-06 RX ORDER — AMLODIPINE BESYLATE 10 MG/1
10 TABLET ORAL DAILY
Status: DISCONTINUED | OUTPATIENT
Start: 2018-10-07 | End: 2018-10-08 | Stop reason: HOSPADM

## 2018-10-06 RX ORDER — AMLODIPINE BESYLATE 5 MG/1
5 TABLET ORAL ONCE
Status: COMPLETED | OUTPATIENT
Start: 2018-10-06 | End: 2018-10-06

## 2018-10-06 RX ADMIN — METOPROLOL TARTRATE 25 MG: 25 TABLET ORAL at 20:02

## 2018-10-06 RX ADMIN — AMLODIPINE BESYLATE 5 MG: 5 TABLET ORAL at 07:38

## 2018-10-06 RX ADMIN — AMLODIPINE BESYLATE 5 MG: 5 TABLET ORAL at 12:19

## 2018-10-06 RX ADMIN — INSULIN ASPART 1 UNITS: 100 INJECTION, SOLUTION INTRAVENOUS; SUBCUTANEOUS at 07:42

## 2018-10-06 RX ADMIN — INSULIN ASPART 2 UNITS: 100 INJECTION, SOLUTION INTRAVENOUS; SUBCUTANEOUS at 13:16

## 2018-10-06 RX ADMIN — WARFARIN SODIUM 7.5 MG: 7.5 TABLET ORAL at 17:57

## 2018-10-06 RX ADMIN — METOPROLOL TARTRATE 25 MG: 25 TABLET ORAL at 07:39

## 2018-10-06 RX ADMIN — ENOXAPARIN SODIUM 90 MG: 100 INJECTION SUBCUTANEOUS at 17:57

## 2018-10-06 RX ADMIN — INSULIN GLARGINE 15 UNITS: 100 INJECTION, SOLUTION SUBCUTANEOUS at 07:42

## 2018-10-06 ASSESSMENT — ACTIVITIES OF DAILY LIVING (ADL)
ADLS_ACUITY_SCORE: 14
ADLS_ACUITY_SCORE: 15
ADLS_ACUITY_SCORE: 14
ADLS_ACUITY_SCORE: 15

## 2018-10-06 NOTE — PROGRESS NOTES
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  On arrival to emergency room, her blood pressure elevated at 225/122.  Laboratory workup showed elevated creatinine at 2.48.  BNP 7580, troponin 0 0.029, d-dimer 2.8. Ultrasound of lower extremity showed acute occlusive thrombus in the left common femoral, femoral and popliteal veins. Chest x-ray showed small left pleural effusion, tiny right pleural effusion and atelectasis.  In the ER, she was given Lasix 40 mg IV, labetalol 20 mg IV, Lovenox 80 mg subcu.  She was admitted for further management.     Left lower extremity DVT, probable PE:  -  History of prior clots.  Self stopped warfarin a few years ago.  She is willing to resume.  Plan warfarin here.  She is on lovenox for bridging once daily given renal failure.  If her renal failure keeps worsening though lovenox may become contraindicated.   -  she stay on life long warfarin unless a future contraindication.  -  CT with contrast not done given renal failure. V/Q now would not change treatment given known DVT.  - she needs colonoscopy and GYN for cancer screening with mammogram as she is not update in term of cancer screening for her age  - given pericardial effusion and new skin rash, will screen her for LED     Initial acute hypoxic respiratory failure felt likely due to PE, left side effusion, CHF and pericardial effusion   -  Off oxygen and breathing easier today.  Continue anticoagulation as  above. Hold on further lasix given renal issues and her volume status seems much better today.    Probable HFpEF and Pericardial effusion on echo  ECHO: The left ventricle is normal in size. EF  50-55%. Grade II or moderate diastolic dysfunction. Diastolic Doppler findings (E/E' ratio and/or other parameters) suggest left ventricular filling pressures are increased.The right ventricle is  Size and systolic function are normal.Mild to moderate (1-2+) mitral regurgitation. Small pericardial effusion. There is evidence of mitral respiratory flow variation, but no other signs of hemodynamic compromise secondary to the effusion are present. Moderate left pleural effusion No previous study for comparison.  - repeat limited echo on Monday for reassessment of pericardial effusion size and sign of tamponnade  - continue to hold diuretics  - cause of effusion unclear (screen for lupus), Will need follow up and workup as outpatient     CHRISTINA superimposed on CKD:  -  Patient with poorly controlled DM and HTN likely contributing.  I note her renal function has been worsening for a few years though appears its worst currently and creatinine continues to trend up.  She denies following with a nephrologist.  A nephrology consult has been placed. Input appreciated and renal recommendations are as  1.  Renal ultrasound to evaluate size and symmetry.   2.  Quantification of urine protein.   3.  Documentation of iron studies.   4.  Evaluation for mineral bone disease.   5.  Will need institution of ACE inhibitor.   6.  Oral diuretics  7.  Nephrology follow up      DM, uncontrolled:  -  Patient prior was not taking insulin routinely but reports over a month of 70/30 BID usage.  I don't think 70/30 is a good insulin given her level of brittle diabetes.    -  lantus + novolog TID meal coverage insulin.    Hyperlipidemia:  -  Not on routine tx, fasting lipid with elevated LDL.  Consider starting statin at discharge.    HTN, poorly  controlled:  -  Started on metoprolol here and norvasc . BP improved but still above target BP for her. Increase amlodipine dose   -  waiting for renal function to get closer to baseline before starting an ACEI    Left ankle wound:  -  Does not appear grossly infected and was present on admission. wound care nursing to follow.    DVT Prophylaxis: Enoxaparin (Lovenox) subcutaneous, warfarin  Code Status: Full Code    Disposition Plan   Expected discharge in 2-3  days to prior living arrangement once renal function stable/clinical status improved and repeat echo show no worsening effusion    Interval History   No chest pain, less SOB m, recent non itching rash lower extermities    -Data reviewed today: I reviewed all new labs and imaging reports over the last 24 hours. I personally reviewed no images or EKG's today.    Physical Exam   Temp: 98.2  F (36.8  C) Temp src: Oral BP: 152/78   Heart Rate: 80 Resp: 16 SpO2: 95 % O2 Device: None (Room air)    Vitals:    10/04/18 1724 10/04/18 2154 10/05/18 0646   Weight: 86.2 kg (190 lb) 91 kg (200 lb 11.2 oz) 90.6 kg (199 lb 12.8 oz)     Vital Signs with Ranges  Temp:  [97.8  F (36.6  C)-98.2  F (36.8  C)] 98.2  F (36.8  C)  Heart Rate:  [80-87] 80  Resp:  [16-18] 16  BP: (141-152)/(65-78) 152/78  SpO2:  [93 %-95 %] 95 %  I/O last 3 completed shifts:  In: 840 [P.O.:840]  Out: 950 [Urine:950]    GEN:  Alert, oriented x 3, appears comfortable, NAD.  HEENT:  Normocephalic/atraumatic, no scleral icterus, no nasal discharge, mouth moist.  CV:  Regular rate and rhythm, no murmur or JVD.  S1 + S2 noted, no S3 or S4.  NO PULSUS PARADOXICUS  LUNGS:  Clear to auscultation upper with mildly decreased breath sounds bases.  No wheezes/retractions.  Symmetric chest rise on inhalation noted.  ABD:  Active bowel sounds, soft, non-tender/non-distended.  No rebound/guarding/rigidity.  EXT:  Trace edema.  No cyanosis.  No acute joint synovitis noted.  SKIN:  Dry to touch, left medial ankle  wound, few erytheamtous lesion with some bullous changes in those lesions and desquamative changes in both LE below the knee, 1 + edema    Medications     Warfarin Therapy Reminder         amLODIPine  5 mg Oral Daily     enoxaparin  1 mg/kg Subcutaneous Q24H     insulin aspart   Subcutaneous TID AC     insulin aspart  1-10 Units Subcutaneous TID AC     insulin aspart  1-7 Units Subcutaneous At Bedtime     insulin glargine  15 Units Subcutaneous QAM AC     metoprolol tartrate  25 mg Oral BID     sodium chloride (PF)  3 mL Intracatheter Q8H     Data       Recent Labs  Lab 10/05/18  0655 10/04/18  1730   WBC 6.4 9.1   HGB 9.7* 10.2*   HCT 31.5* 32.5*   MCV 86 84    257       Recent Labs  Lab 10/06/18  0609 10/05/18  0655 10/04/18  1611    139 136   POTASSIUM 5.1 4.8 5.0   CHLORIDE 108 106 104   CO2 27 27 27   ANIONGAP 8 6 5   * 297* 273*   BUN 48* 45* 45*   CR 2.70* 2.68* 2.48*   GFRESTIMATED 18* 18* 20*   GFRESTBLACK 22* 22* 24*   GILL 8.2* 8.2* 8.3*   PHOS 4.7*  --   --    PROTTOTAL  --   --  7.0   ALBUMIN  --   --  2.7*   BILITOTAL  --   --  0.3   ALKPHOS  --   --  167*   AST  --   --  26   ALT  --   --  18       Recent Labs  Lab 10/06/18  0730 10/06/18  0609 10/06/18  0119 10/05/18  2124 10/05/18  1719 10/05/18  1218  10/05/18  0655  10/04/18  1611   GLC  --  154*  --   --   --   --   --  297*  --  273*   *  --  123* 108* 174* 154*  < >  --   < >  --    < > = values in this interval not displayed.    Recent Labs  Lab 10/04/18  1611   TROPI 0.029         Recent Results (from the past 24 hour(s))   US Retroperitoneal Complete Portable    Narrative    ULTRASOUND RETROPERITONEAL COMPLETE PORTABLE 10/5/2018 2:15 PM     HISTORY: Acute renal insufficiency.    COMPARISON: None.     FINDINGS:   Right kidney: Normal size and morphology. Renal cortical thickness is  normal normal. Slightly increased cortical echogenicity could indicate  parenchymal renal disease. No hydronephrosis, mass lesions or  abnormal  calcifications.    Left kidney: Normal size and morphology. Renal cortical thickness is  normal normal. Slightly increased cortical echogenicity could indicate  parenchymal renal disease. No hydronephrosis, mass lesions or abnormal  calcifications.    Urinary bladder: Partially distended urinary bladder appears normal.     Incidentally note of a tiny amount of free fluid in Morison's pouch.      Impression    IMPRESSION:   1. Slightly increased cortical echogenicity bilaterally may indicate  parenchymal disease.  2. Tiny amount of free fluid in Morison's pouch.    VIKAS CASTANEDA MD

## 2018-10-06 NOTE — PLAN OF CARE
Problem: Patient Care Overview  Goal: Plan of Care/Patient Progress Review  Outcome: No Change  A&O X4. Up independent. VSS on RA except HTN - adjusted Norvasc. Denies pain, SOB. Tele SR. LLE DVT, Lovenox and Coumadin with INR 1.03. , 187, 115 coverage per protocol. Creatinine increased, 2.70. Nephrology following. Iron studies completed, see labs. Discharge pending kidney function.

## 2018-10-07 LAB
ANION GAP SERPL CALCULATED.3IONS-SCNC: 8 MMOL/L (ref 3–14)
BUN SERPL-MCNC: 50 MG/DL (ref 7–30)
CALCIUM SERPL-MCNC: 8.1 MG/DL (ref 8.5–10.1)
CHLORIDE SERPL-SCNC: 112 MMOL/L (ref 94–109)
CO2 SERPL-SCNC: 24 MMOL/L (ref 20–32)
CREAT SERPL-MCNC: 2.71 MG/DL (ref 0.52–1.04)
DEPRECATED CALCIDIOL+CALCIFEROL SERPL-MC: 4 UG/L (ref 20–75)
GFR SERPL CREATININE-BSD FRML MDRD: 18 ML/MIN/1.7M2
GLUCOSE BLDC GLUCOMTR-MCNC: 108 MG/DL (ref 70–99)
GLUCOSE BLDC GLUCOMTR-MCNC: 181 MG/DL (ref 70–99)
GLUCOSE BLDC GLUCOMTR-MCNC: 98 MG/DL (ref 70–99)
GLUCOSE SERPL-MCNC: 105 MG/DL (ref 70–99)
INR PPP: 1.14 (ref 0.86–1.14)
PLATELET # BLD AUTO: 229 10E9/L (ref 150–450)
POTASSIUM SERPL-SCNC: 5 MMOL/L (ref 3.4–5.3)
SODIUM SERPL-SCNC: 144 MMOL/L (ref 133–144)

## 2018-10-07 PROCEDURE — 86146 BETA-2 GLYCOPROTEIN ANTIBODY: CPT | Performed by: INTERNAL MEDICINE

## 2018-10-07 PROCEDURE — 86147 CARDIOLIPIN ANTIBODY EA IG: CPT | Performed by: INTERNAL MEDICINE

## 2018-10-07 PROCEDURE — 12000007 ZZH R&B INTERMEDIATE

## 2018-10-07 PROCEDURE — 82310 ASSAY OF CALCIUM: CPT | Performed by: INTERNAL MEDICINE

## 2018-10-07 PROCEDURE — 36415 COLL VENOUS BLD VENIPUNCTURE: CPT | Performed by: INTERNAL MEDICINE

## 2018-10-07 PROCEDURE — 25000132 ZZH RX MED GY IP 250 OP 250 PS 637: Performed by: INTERNAL MEDICINE

## 2018-10-07 PROCEDURE — 80051 ELECTROLYTE PANEL: CPT | Performed by: INTERNAL MEDICINE

## 2018-10-07 PROCEDURE — 85610 PROTHROMBIN TIME: CPT | Performed by: INTERNAL MEDICINE

## 2018-10-07 PROCEDURE — 82565 ASSAY OF CREATININE: CPT | Performed by: INTERNAL MEDICINE

## 2018-10-07 PROCEDURE — 00000146 ZZHCL STATISTIC GLUCOSE BY METER IP

## 2018-10-07 PROCEDURE — 25000132 ZZH RX MED GY IP 250 OP 250 PS 637

## 2018-10-07 PROCEDURE — 25000131 ZZH RX MED GY IP 250 OP 636 PS 637: Performed by: INTERNAL MEDICINE

## 2018-10-07 PROCEDURE — 25000128 H RX IP 250 OP 636: Performed by: INTERNAL MEDICINE

## 2018-10-07 PROCEDURE — 86038 ANTINUCLEAR ANTIBODIES: CPT | Performed by: INTERNAL MEDICINE

## 2018-10-07 PROCEDURE — 82947 ASSAY GLUCOSE BLOOD QUANT: CPT | Performed by: INTERNAL MEDICINE

## 2018-10-07 PROCEDURE — 85049 AUTOMATED PLATELET COUNT: CPT | Performed by: INTERNAL MEDICINE

## 2018-10-07 PROCEDURE — 84520 ASSAY OF UREA NITROGEN: CPT | Performed by: INTERNAL MEDICINE

## 2018-10-07 PROCEDURE — 99232 SBSQ HOSP IP/OBS MODERATE 35: CPT | Performed by: INTERNAL MEDICINE

## 2018-10-07 RX ORDER — BUMETANIDE 0.5 MG/1
0.5 TABLET ORAL DAILY
Status: DISCONTINUED | OUTPATIENT
Start: 2018-10-07 | End: 2018-10-08 | Stop reason: HOSPADM

## 2018-10-07 RX ORDER — WARFARIN SODIUM 10 MG/1
10 TABLET ORAL
Status: COMPLETED | OUTPATIENT
Start: 2018-10-07 | End: 2018-10-07

## 2018-10-07 RX ADMIN — INSULIN GLARGINE 15 UNITS: 100 INJECTION, SOLUTION SUBCUTANEOUS at 09:04

## 2018-10-07 RX ADMIN — INSULIN ASPART 1 UNITS: 100 INJECTION, SOLUTION INTRAVENOUS; SUBCUTANEOUS at 18:09

## 2018-10-07 RX ADMIN — ENOXAPARIN SODIUM 90 MG: 100 INJECTION SUBCUTANEOUS at 17:25

## 2018-10-07 RX ADMIN — WARFARIN SODIUM 10 MG: 10 TABLET ORAL at 17:25

## 2018-10-07 RX ADMIN — METOPROLOL TARTRATE 25 MG: 25 TABLET ORAL at 20:29

## 2018-10-07 RX ADMIN — METOPROLOL TARTRATE 25 MG: 25 TABLET ORAL at 08:58

## 2018-10-07 RX ADMIN — BUMETANIDE 0.5 MG: 0.5 TABLET ORAL at 15:52

## 2018-10-07 RX ADMIN — AMLODIPINE BESYLATE 10 MG: 10 TABLET ORAL at 08:58

## 2018-10-07 RX ADMIN — INSULIN ASPART 2 UNITS: 100 INJECTION, SOLUTION INTRAVENOUS; SUBCUTANEOUS at 12:34

## 2018-10-07 ASSESSMENT — ACTIVITIES OF DAILY LIVING (ADL)
ADLS_ACUITY_SCORE: 15
ADLS_ACUITY_SCORE: 15
ADLS_ACUITY_SCORE: 14
ADLS_ACUITY_SCORE: 14
ADLS_ACUITY_SCORE: 15
ADLS_ACUITY_SCORE: 14

## 2018-10-07 NOTE — PLAN OF CARE
MD notified at 4242: Patient c/o blurred vision upon waking up this morning. Cannot read print (this is new for patient). VSS, . Please advise.     MD notified at 1313: Loss of IV access, unsuccessful attempt X3. Can we get order to leave IV out? Not getting IV medications.

## 2018-10-07 NOTE — PROGRESS NOTES
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  On arrival to emergency room, her blood pressure elevated at 225/122.  Laboratory workup showed elevated creatinine at 2.48.  BNP 7580, troponin 0 0.029, d-dimer 2.8. Ultrasound of lower extremity showed acute occlusive thrombus in the left common femoral, femoral and popliteal veins. Chest x-ray showed small left pleural effusion, tiny right pleural effusion and atelectasis.  In the ER, she was given Lasix 40 mg IV, labetalol 20 mg IV, Lovenox 80 mg subcu.  She was admitted for further management.     Left lower extremity DVT, probable PE:  -  History of prior clots.  Self stopped warfarin a few years ago.  She is willing to resume. starrted thus warfarin here.  She is on lovenox for bridging once daily given renal failure.  If her renal failure keeps worsening though lovenox may become contraindicated.   -  she should stay on life long warfarin unless a future contraindication given recurrence  -  CT with contrast not done given renal failure. V/Q now would not change treatment given known DVT.  - she needs colonoscopy and GYN for cancer screening with mammogram as she is not up to date in term of cancer screening for her age  - given pericardial effusion and new skin rash, will screen her for LED     Initial acute hypoxic respiratory failure felt likely due to PE, left side effusion, CHF and pericardial effusion   -  Off oxygen and breathing easier  today.  Continue anticoagulation as above. Hold on further lasix given renal issues and her volume status seems much better today.    Probable HFpEF and Pericardial effusion on echo  ECHO: The left ventricle is normal in size. EF  50-55%. Grade II or moderate diastolic dysfunction. Diastolic Doppler findings (E/E' ratio and/or other parameters) suggest left ventricular filling pressures are increased.The right ventricle is  Size and systolic function are normal.Mild to moderate (1-2+) mitral regurgitation. Small pericardial effusion. There is evidence of mitral respiratory flow variation, but no other signs of hemodynamic compromise secondary to the effusion are present. Moderate left pleural effusion No previous study for comparison.  - repeat limited echo on Monday for reassessment of pericardial effusion size and sign of tamponnade  - continue to hold diuretics  - cause of effusion unclear (screen for lupus,no hx of pericarditis), Will need follow up and workup as outpatient  with outpatient cardiology    CHRISTINA superimposed on CKD:  -  Patient with poorly controlled DM and HTN likely contributing.  I note her renal function has been worsening for a few years though appears its worst currently and creatinine continues to trend up.  She denies following with a nephrologist.  A nephrology consult has been placed. Input appreciated and renal recommendations are as  1.  Renal ultrasound to evaluate size and symmetry.   2.  Quantification of urine protein.   3.  Documentation of iron studies.   4.  Evaluation for mineral bone disease.   5.  Will need institution of ACE inhibitor.   6.  Oral diuretics  7.  Nephrology follow up      DM, uncontrolled:  -  Patient prior was not taking insulin routinely but reports over a month of 70/30 BID usage.  I don't think 70/30 is a good insulin given her level of brittle diabetes.    -  lantus + novolog TID meal coverage insulin.    Hyperlipidemia:  -  Not on routine tx, fasting lipid  with elevated LDL.  Consider starting statin at discharge.    HTN, poorly controlled:  -  Started on metoprolol here and norvasc . BP improved but still above target BP for her. Increased amlodipine dose yesterday.   -  waiting for renal function to get closer to baseline before starting an ACEI    Left ankle wound:  -  Does not appear grossly infected and was present on admission. wound care nursing to follow.    Skin rash LE  - more so RLE, vesicular with pus color fluid in one lesion, Related to DM?   - will need dermatology follow up    Blurred vision  - related to DM most likely. Much improved since this am. Patient recalls this has happen in the past as her diabetes gets under better control.     DVT Prophylaxis: Enoxaparin (Lovenox) subcutaneous, warfarin  Code Status: Full Code    Disposition Plan   Expected discharge on Monday due to prior living arrangement once renal function stable/clinical status improved and repeat echo show no worsening effusion (schedule in am Monday).     Interval History   No chest pain, less SOB m, recent non itching rash lower extermities. Blurred vision in the morning when waking up. Has essentially resolved. No new complains. Eager to go home tomorrow.     -Data reviewed today: I reviewed all new labs and imaging reports over the last 24 hours. I personally reviewed no images or EKG's today.    Physical Exam   Temp: 98.5  F (36.9  C) Temp src: Oral BP: 159/73 Pulse: 84 Heart Rate: 79 Resp: 14 SpO2: 96 % O2 Device: None (Room air)    Vitals:    10/05/18 0646 10/06/18 1000 10/07/18 0700   Weight: 90.6 kg (199 lb 12.8 oz) 85.7 kg (189 lb) 89.4 kg (197 lb)     Vital Signs with Ranges  Temp:  [98  F (36.7  C)-98.7  F (37.1  C)] 98.5  F (36.9  C)  Pulse:  [84] 84  Heart Rate:  [77-85] 79  Resp:  [14-16] 14  BP: (136-159)/(62-83) 159/73  SpO2:  [95 %-96 %] 96 %  I/O last 3 completed shifts:  In: 840 [P.O.:840]  Out: 1400 [Urine:1400]    GEN:  Alert, oriented x 3, appears comfortable,  NAD.  HEENT:  Normocephalic/atraumatic, no scleral icterus, no nasal discharge, mouth moist.  CV:  Regular rate and rhythm, no murmur or JVD.  S1 + S2 noted, no S3 or S4.  NO PULSUS PARADOXICUS  LUNGS:  Clear to auscultation upper with mildly decreased breath sounds bases.  No wheezes/retractions.  Symmetric chest rise on inhalation noted.  ABD:  Active bowel sounds, soft, non-tender/non-distended.  No rebound/guarding/rigidity.  EXT:  Trace edema.  No cyanosis.  No acute joint synovitis noted.  SKIN:  Dry to touch, left medial ankle wound, few erytheamtous lesion with some bullous changes in those lesions and desquamative changes in both LE below the knee, 1 + edema    Medications     Warfarin Therapy Reminder         amLODIPine  10 mg Oral Daily     enoxaparin  1 mg/kg Subcutaneous Q24H     insulin aspart   Subcutaneous TID AC     insulin aspart  1-10 Units Subcutaneous TID AC     insulin aspart  1-7 Units Subcutaneous At Bedtime     insulin glargine  15 Units Subcutaneous QAM AC     metoprolol tartrate  25 mg Oral BID     sodium chloride (PF)  3 mL Intracatheter Q8H     Data       Recent Labs  Lab 10/07/18  0617 10/05/18  0655 10/04/18  1730   WBC  --  6.4 9.1   HGB  --  9.7* 10.2*   HCT  --  31.5* 32.5*   MCV  --  86 84    265 257       Recent Labs  Lab 10/06/18  0609 10/05/18  0655 10/04/18  1611    139 136   POTASSIUM 5.1 4.8 5.0   CHLORIDE 108 106 104   CO2 27 27 27   ANIONGAP 8 6 5   * 297* 273*   BUN 48* 45* 45*   CR 2.70* 2.68* 2.48*   GFRESTIMATED 18* 18* 20*   GFRESTBLACK 22* 22* 24*   GILL 8.2* 8.2* 8.3*   PHOS 4.7*  --   --    PROTTOTAL  --   --  7.0   ALBUMIN  --   --  2.7*   BILITOTAL  --   --  0.3   ALKPHOS  --   --  167*   AST  --   --  26   ALT  --   --  18       Recent Labs  Lab 10/07/18  0730 10/07/18  0105 10/06/18  2203 10/06/18  1722 10/06/18  1148  10/06/18  0609  10/05/18  0655  10/04/18  1611   GLC  --   --   --   --   --   --  154*  --  297*  --  273*   * 98 124*  115* 187*  < >  --   < >  --   < >  --    < > = values in this interval not displayed.    Recent Labs  Lab 10/04/18  1611   TROPI 0.029         No results found for this or any previous visit (from the past 24 hour(s)).

## 2018-10-07 NOTE — PLAN OF CARE
Problem: Patient Care Overview  Goal: Plan of Care/Patient Progress Review  Outcome: No Change  A&O X4. Up independent. VSS on RA. Denies pain, SOB. LLE DVT, on Coumadin and Lovenox with INR 1.14. , 181 coverage per protocol. Nephrology following for elevated Creatinine, 2.71. Added daily PO Bumex with Nutrition consult. Previous EF 50-55%. Plan for repeat echocardiogram tomorrow with possible discharge tomorrow afternoon.

## 2018-10-07 NOTE — PLAN OF CARE
Problem: Patient Care Overview  Goal: Plan of Care/Patient Progress Review  Outcome: No Change  VSS, tele SR with PVC.  Pt c/o blurred vision this AM at change of shift, vitals stable but slightly elevated BP at that time.  Pt denies pain, nausea.  Up independent in room.  Continue POC.

## 2018-10-07 NOTE — PLAN OF CARE
Problem: Patient Care Overview  Goal: Plan of Care/Patient Progress Review  Outcome: No Change  Assumed care of patient at 1900. Pt is AOx4, up independently, VSS.

## 2018-10-07 NOTE — PROGRESS NOTES
[facility]     Renal Progress Note       SHORTHAND KEY FOR MY NOTES:  c = with, s = without, p = after, a = before, x = except, asx = asymptomatic, tx = transplant or treatment, sx = symptoms or symptomatic, cx = canceled or culture, rxn = reaction, yday = yesterday, nl = normal, abx = antibiotics, fxn = function, dx = diagnosis, dz = disease, m/h = melena/hematochezia, c/d/l/ha = cramping/dizziness/lightheadedness/headache, d/c = discharge or diarrhea/constipation, f/c/n/v = fevers/chills/nausea/vomiting, cp/sob = chest pain/shortness of breath.         Assessment/Plan:     1.  CKD IV.  Pt's cr is stable in ~2.7.  She is TBV up from third-spacing.  She was on furos, but it was stopped on 10/5 for some reason (can't find it in the notes.)  No major uremic sx.  She will need a nephrologist as an outpt.  Us (personally reviewed) shows larger, somewhat lobulated kidneys c/w diabetic dz.  A.  F/u c HP Neph in the next 2-3 wks.    B.  Follow labs, clinically.  C.  Avoid nephrotoxics.   D.  Start bumet 0.5 every day and titrate up as outpt.    2.  Nephrotic syndrome.  Most likely related to poor diabetic control causing diabetic nephropathy.  Will benefit from an ACEI, but need to stabilize renal fxn first.  Cholesterol is high and alb low, along c edema.  A.  Follow clinically.  B.  Eventually, an ACEI.    3.  HTN.  Her BP is not ideally controlled, overall.  She is on amlod and would benefit from an ACEI (as above).  This can be done as an outpt.  A.  Continue amlod.  B.  Start ACEI as outpt.  Will need to monitor k, cr if this is started.    4.  Fe def anemia.  She is anemic and has fe def.  She would like to start c oral fe.  Explained side effects of oral fe (constipation, black stools), so she will monitor.    A.  Start fe so4 325 every day.      5.  DM2.  Her sugars are poorly controlled.    A.  Per primary.    6.  FEN.  Pt's electrolytes have been ok.  She would benefit from a lower protein diet, but  "given that she is diabetic, will need to manage a combo diet.  A.  Dietitian to see pt tmrw to discuss renal diabetic diet (lower k, low prot, diabetic).        Interval History:     Pt feels ok today.  She is clear that she is leaving the hospital tmrw.  No cp/sob/orthopnea.  No f/c/v, but occ nausea.  UO is ok.           Medications and Allergies:       amLODIPine  10 mg Oral Daily     enoxaparin  1 mg/kg Subcutaneous Q24H     insulin aspart   Subcutaneous TID AC     insulin aspart  1-10 Units Subcutaneous TID AC     insulin aspart  1-7 Units Subcutaneous At Bedtime     insulin glargine  15 Units Subcutaneous QAM AC     metoprolol tartrate  25 mg Oral BID     sodium chloride (PF)  3 mL Intracatheter Q8H     warfarin  10 mg Oral ONCE at 18:00      No Known Allergies       Physical Exam:     Vitals were reviewed    Heart Rate: 79, Blood pressure 159/73, pulse 84, temperature 98.5  F (36.9  C), temperature source Oral, resp. rate 14, height 1.727 m (5' 8\"), weight 89.4 kg (197 lb), SpO2 96 %, not currently breastfeeding.  Wt Readings from Last 3 Encounters:   10/07/18 89.4 kg (197 lb)   07/06/18 83.9 kg (185 lb)   03/16/18 83.5 kg (184 lb)     Intake/Output Summary (Last 24 hours) at 10/07/18 1320  Last data filed at 10/07/18 1236   Gross per 24 hour   Intake              740 ml   Output              150 ml   Net              590 ml     GENERAL APPEARANCE: pleasant, NAD, alert  HEENT:  Eyes/ears/nose/neck grossly normal  RESP: lungs cta b c good efforts, no crackles; diminished at bases - R > L  CV: RRR, nl S1/S2   ABDOMEN: o/s/nt/nd  EXTREMITIES/SKIN: 1+ ble edema; some red spots on legs         Data:     CBC RESULTS:     Recent Labs  Lab 10/07/18  0617 10/05/18  0655 10/04/18  1730   WBC  --  6.4 9.1   RBC  --  3.66* 3.86   HGB  --  9.7* 10.2*   HCT  --  31.5* 32.5*    265 257     Basic Metabolic Panel:    Recent Labs  Lab 10/07/18  0617 10/06/18  0609 10/05/18  0655 10/04/18  1611   NA  --  143 139 136 "   POTASSIUM  --  5.1 4.8 5.0   CHLORIDE  --  108 106 104   CO2  --  27 27 27   BUN  --  48* 45* 45*   CR 2.71* 2.70* 2.68* 2.48*   GLC  --  154* 297* 273*   GILL  --  8.2* 8.2* 8.3*     INR  Recent Labs  Lab 10/07/18  0617 10/06/18  0609 10/05/18  0655 10/04/18  1611   INR 1.14 1.03 1.09 0.92      Attestation:   I have reviewed today's relevant vital signs, notes, medications, labs and imaging.    Rishi Benoit MD  [facility] - Nephrology  [phone]

## 2018-10-08 ENCOUNTER — APPOINTMENT (OUTPATIENT)
Dept: CARDIOLOGY | Facility: CLINIC | Age: 58
End: 2018-10-08
Attending: INTERNAL MEDICINE
Payer: MEDICAID

## 2018-10-08 VITALS
TEMPERATURE: 98.3 F | SYSTOLIC BLOOD PRESSURE: 173 MMHG | OXYGEN SATURATION: 97 % | BODY MASS INDEX: 29.86 KG/M2 | WEIGHT: 197 LBS | HEIGHT: 68 IN | HEART RATE: 84 BPM | DIASTOLIC BLOOD PRESSURE: 72 MMHG | RESPIRATION RATE: 20 BRPM

## 2018-10-08 LAB
ALBUMIN SERPL ELPH-MCNC: 2.8 G/DL (ref 3.7–5.1)
ALPHA1 GLOB SERPL ELPH-MCNC: 0.5 G/DL (ref 0.2–0.4)
ALPHA2 GLOB SERPL ELPH-MCNC: 1 G/DL (ref 0.5–0.9)
ANION GAP SERPL CALCULATED.3IONS-SCNC: 7 MMOL/L (ref 3–14)
B-GLOBULIN SERPL ELPH-MCNC: 0.7 G/DL (ref 0.6–1)
B2 GLYCOPROT1 IGG SERPL IA-ACNC: <0.6 U/ML
B2 GLYCOPROT1 IGM SERPL IA-ACNC: 2.2 U/ML
BUN SERPL-MCNC: 53 MG/DL (ref 7–30)
CALCIUM SERPL-MCNC: 8.2 MG/DL (ref 8.5–10.1)
CARDIOLIPIN ANTIBODY IGG: <1.6 GPL-U/ML (ref 0–19.9)
CARDIOLIPIN ANTIBODY IGM: 0.4 MPL-U/ML (ref 0–19.9)
CHLORIDE SERPL-SCNC: 111 MMOL/L (ref 94–109)
CO2 SERPL-SCNC: 25 MMOL/L (ref 20–32)
CREAT SERPL-MCNC: 2.62 MG/DL (ref 0.52–1.04)
ERYTHROCYTE [DISTWIDTH] IN BLOOD BY AUTOMATED COUNT: 14.5 % (ref 10–15)
GAMMA GLOB SERPL ELPH-MCNC: 0.8 G/DL (ref 0.7–1.6)
GFR SERPL CREATININE-BSD FRML MDRD: 19 ML/MIN/1.7M2
GLUCOSE BLDC GLUCOMTR-MCNC: 135 MG/DL (ref 70–99)
GLUCOSE BLDC GLUCOMTR-MCNC: 143 MG/DL (ref 70–99)
GLUCOSE BLDC GLUCOMTR-MCNC: 161 MG/DL (ref 70–99)
GLUCOSE BLDC GLUCOMTR-MCNC: 167 MG/DL (ref 70–99)
GLUCOSE SERPL-MCNC: 135 MG/DL (ref 70–99)
HCT VFR BLD AUTO: 30.1 % (ref 35–47)
HGB BLD-MCNC: 9.2 G/DL (ref 11.7–15.7)
IGA SERPL-MCNC: 120 MG/DL (ref 70–380)
IGG SERPL-MCNC: 699 MG/DL (ref 695–1620)
IGM SERPL-MCNC: 324 MG/DL (ref 60–265)
INR PPP: 1.2 (ref 0.86–1.14)
M PROTEIN SERPL ELPH-MCNC: 0 G/DL
MCH RBC QN AUTO: 26.5 PG (ref 26.5–33)
MCHC RBC AUTO-ENTMCNC: 30.6 G/DL (ref 31.5–36.5)
MCV RBC AUTO: 87 FL (ref 78–100)
PLATELET # BLD AUTO: 276 10E9/L (ref 150–450)
POTASSIUM SERPL-SCNC: 5 MMOL/L (ref 3.4–5.3)
PROT ELPH PNL UR ELPH: NORMAL
PROT PATTERN SERPL ELPH-IMP: ABNORMAL
PROT PATTERN SERPL IFE-IMP: ABNORMAL
PROT PATTERN UR ELPH-IMP: NORMAL
RBC # BLD AUTO: 3.47 10E12/L (ref 3.8–5.2)
SODIUM SERPL-SCNC: 143 MMOL/L (ref 133–144)
WBC # BLD AUTO: 5.9 10E9/L (ref 4–11)

## 2018-10-08 PROCEDURE — 25000132 ZZH RX MED GY IP 250 OP 250 PS 637: Performed by: INTERNAL MEDICINE

## 2018-10-08 PROCEDURE — 00000146 ZZHCL STATISTIC GLUCOSE BY METER IP

## 2018-10-08 PROCEDURE — 93321 DOPPLER ECHO F-UP/LMTD STD: CPT | Mod: 26 | Performed by: INTERNAL MEDICINE

## 2018-10-08 PROCEDURE — 99239 HOSP IP/OBS DSCHRG MGMT >30: CPT | Performed by: INTERNAL MEDICINE

## 2018-10-08 PROCEDURE — 25000131 ZZH RX MED GY IP 250 OP 636 PS 637: Performed by: INTERNAL MEDICINE

## 2018-10-08 PROCEDURE — 36415 COLL VENOUS BLD VENIPUNCTURE: CPT | Performed by: INTERNAL MEDICINE

## 2018-10-08 PROCEDURE — 93308 TTE F-UP OR LMTD: CPT

## 2018-10-08 PROCEDURE — 85610 PROTHROMBIN TIME: CPT | Performed by: INTERNAL MEDICINE

## 2018-10-08 PROCEDURE — 80048 BASIC METABOLIC PNL TOTAL CA: CPT | Performed by: INTERNAL MEDICINE

## 2018-10-08 PROCEDURE — 85027 COMPLETE CBC AUTOMATED: CPT | Performed by: INTERNAL MEDICINE

## 2018-10-08 PROCEDURE — 82565 ASSAY OF CREATININE: CPT | Performed by: INTERNAL MEDICINE

## 2018-10-08 PROCEDURE — 25000128 H RX IP 250 OP 636: Performed by: INTERNAL MEDICINE

## 2018-10-08 PROCEDURE — 93325 DOPPLER ECHO COLOR FLOW MAPG: CPT | Mod: 26 | Performed by: INTERNAL MEDICINE

## 2018-10-08 PROCEDURE — 93308 TTE F-UP OR LMTD: CPT | Mod: 26 | Performed by: INTERNAL MEDICINE

## 2018-10-08 RX ORDER — METOPROLOL TARTRATE 50 MG
50 TABLET ORAL 2 TIMES DAILY
Qty: 60 TABLET | Refills: 0 | Status: SHIPPED | OUTPATIENT
Start: 2018-10-08 | End: 2018-12-17

## 2018-10-08 RX ORDER — ERGOCALCIFEROL 1.25 MG/1
50000 CAPSULE, LIQUID FILLED ORAL
Qty: 30 CAPSULE | Refills: 0 | Status: SHIPPED | OUTPATIENT
Start: 2018-10-08 | End: 2018-12-17

## 2018-10-08 RX ORDER — ATORVASTATIN CALCIUM 20 MG/1
20 TABLET, FILM COATED ORAL EVERY EVENING
Status: DISCONTINUED | OUTPATIENT
Start: 2018-10-08 | End: 2018-10-08 | Stop reason: HOSPADM

## 2018-10-08 RX ORDER — ERGOCALCIFEROL 1.25 MG/1
50000 CAPSULE, LIQUID FILLED ORAL
Status: DISCONTINUED | OUTPATIENT
Start: 2018-10-08 | End: 2018-10-08 | Stop reason: HOSPADM

## 2018-10-08 RX ORDER — WARFARIN SODIUM 2 MG/1
TABLET ORAL
Qty: 60 TABLET | Refills: 0 | Status: ON HOLD | OUTPATIENT
Start: 2018-10-08 | End: 2018-11-05

## 2018-10-08 RX ORDER — ATORVASTATIN CALCIUM 20 MG/1
20 TABLET, FILM COATED ORAL EVERY EVENING
Qty: 30 TABLET | Refills: 0 | Status: ON HOLD | OUTPATIENT
Start: 2018-10-08 | End: 2018-12-19

## 2018-10-08 RX ORDER — BUMETANIDE 0.5 MG/1
0.5 TABLET ORAL DAILY
Qty: 30 TABLET | Refills: 0 | Status: SHIPPED | OUTPATIENT
Start: 2018-10-09 | End: 2018-12-17

## 2018-10-08 RX ORDER — METOPROLOL TARTRATE 50 MG
50 TABLET ORAL 2 TIMES DAILY
Status: DISCONTINUED | OUTPATIENT
Start: 2018-10-08 | End: 2018-10-08 | Stop reason: HOSPADM

## 2018-10-08 RX ORDER — AMLODIPINE BESYLATE 10 MG/1
10 TABLET ORAL DAILY
Qty: 30 TABLET | Refills: 0 | Status: ON HOLD | OUTPATIENT
Start: 2018-10-09 | End: 2018-12-19

## 2018-10-08 RX ORDER — WARFARIN SODIUM 10 MG/1
10 TABLET ORAL
Status: DISCONTINUED | OUTPATIENT
Start: 2018-10-08 | End: 2018-10-08 | Stop reason: HOSPADM

## 2018-10-08 RX ADMIN — AMLODIPINE BESYLATE 10 MG: 10 TABLET ORAL at 08:28

## 2018-10-08 RX ADMIN — INSULIN GLARGINE 15 UNITS: 100 INJECTION, SOLUTION SUBCUTANEOUS at 08:28

## 2018-10-08 RX ADMIN — ERGOCALCIFEROL 50000 UNITS: 1.25 CAPSULE, LIQUID FILLED ORAL at 17:13

## 2018-10-08 RX ADMIN — INSULIN ASPART 1 UNITS: 100 INJECTION, SOLUTION INTRAVENOUS; SUBCUTANEOUS at 12:54

## 2018-10-08 RX ADMIN — ENOXAPARIN SODIUM 90 MG: 100 INJECTION SUBCUTANEOUS at 17:13

## 2018-10-08 RX ADMIN — BUMETANIDE 0.5 MG: 0.5 TABLET ORAL at 08:28

## 2018-10-08 RX ADMIN — METOPROLOL TARTRATE 25 MG: 25 TABLET ORAL at 08:28

## 2018-10-08 ASSESSMENT — ACTIVITIES OF DAILY LIVING (ADL)
ADLS_ACUITY_SCORE: 15
ADLS_ACUITY_SCORE: 14
ADLS_ACUITY_SCORE: 15
ADLS_ACUITY_SCORE: 14
ADLS_ACUITY_SCORE: 15

## 2018-10-08 NOTE — PLAN OF CARE
Problem: Patient Care Overview  Goal: Plan of Care/Patient Progress Review  Reviewed and info on Bumex, Metoprolol, Amlodipine. Reviewed Lovenox and coumadin diet and risks-has been on both in past. Importance of b/p checks and daily weight discussed.

## 2018-10-08 NOTE — PLAN OF CARE
Problem: Patient Care Overview  Goal: Plan of Care/Patient Progress Review  Outcome: Improving  Vitals: VSS, afebrile  Oxygen: RA  LOC: AAOx4  Pain: denies  Ambulation: Ind  Nontele  Cardiac: WNL  Lungs: LS-clear, denies SOB  GI: WNL, denies nausea  : WNL  Skin: scabs, LE edema  Plan:Repeat echo today, plan for possible discharge today

## 2018-10-08 NOTE — CONSULTS
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   - Patient states she has a good knowledge of diabetic diet recommendations and knows exactly what to do, but she has a hard time actually following recommendations.   - She follows with a primary care doctor that helps manage her DM, but they speak minimally about carbohydrates/diet.   - At baseline, her appetite is good though she does not stick to a diabetic diet.   - Eats little meat, maybe 3x weekly. Gets protein from cottage cheese/other cheeses.   - She used to be a  so she does not have problems cooking.     CURRENT DIET:  Moderate CHO  Intake: 100% intakes recorded.     - Labs    Recent Labs  Lab 10/08/18  0652 10/08/18  0228 10/07/18  2152 10/07/18  1727 10/07/18  1147 10/07/18  0730 10/07/18  0617 10/07/18  0105  10/06/18  0609  10/05/18  0655  10/04/18  1611   *  --   --   --   --   --  105*  --   --  154*  --  297*  --  273*   BGM  --  135* 167* 161* 181* 108*  --  98  < >  --   < >  --   < >  --    < > = values in this interval not displayed.  Lab Results   Component Value Date    A1C 11.6 10/04/2018    A1C >16.0 03/16/2018    A1C >16.0 03/15/2018    A1C 12.3 04/28/2017         NUTRITION DIAGNOSIS:  Food- and nutrition-related knowledge deficit R/t renal diet, adherence to DM diet AEB A1c 11.6%, no prior education regarding renal diet.     INTERVENTIONS:    Nutrition  Prescription:  Per MD    Implementation:      *  Nutrition Education (Content):   A)  Provided handout     Type 2 DM Nutrition therapy + Label reading    Potassium content of foods   B)  Discussed     Rationale for Mod CHO diet    Rationale for Renal/low potassium diet    How to limit potassium, high potassium foods    Using nutrition labels    Discussed protein intakes --> she does not eat much at baseline       *  Nutrition Education (Application):   A)  Discussed current eating habits and recommended alternative food choices    Recommended mod CHO diet      *  Anticipate good compliance      *  Diet Education - refer to Education Flowsheet    Goals:      *  Patient will verbalize understanding of diet       *  All of the above goals met during the education session    Follow Up/Monitoring:      *  Provided RD contact information for future questions      *  Entered consult for Out-Patient Nutrition Referral, if further diet instructions are needed      Danica Rubio RD, LD  3rd floor/ICU: [phone]  All other floors: [phone]  Weekend/holiday: [phone]  Office: [phone]

## 2018-10-08 NOTE — PLAN OF CARE
Problem: Patient Care Overview  Goal: Plan of Care/Patient Progress Review  Outcome: Improving  B/P 171/78 Meds given. Nutrition to see for renal/carb controlled diet.  INR 1.20 continues on Lovenox. ECHO completed. Anticipate d/c today.

## 2018-10-08 NOTE — DISCHARGE SUMMARY
[facility]    Discharge Summary  Hospitalist    Date of Admission:  10/4/2018  Date of Discharge:  10/8/2018  Discharging Provider: Zully Wise MD    Discharge Diagnoses   1. Left lower extremity DVT,  2. Probable PE  3. Acute hypoxic respiratory failure   4. Diastolic dysfunction   4. 1-2 + Mitral regurgitation   5. CHRISTINA, nephrotic syndrome  6. CKD ,Diabetic nephropathy  7. Hypertensive urgency  8. Blurred vission  9. Dyslipidemia  10. Medication noncompliance    Chief Complaint: Shortness of Breath    History of Present Illness   Roxy Beaulieu is a 58 year old female with a medical history of hypertension, hyperlipidemia, and diabetes who presents with shortness of breath. The patient reports she developed rhinorrhea and nasal congestion, and then had an onset of shortness of breath for 10 days. Of note, patient had an open reduction internal fixation fibula on 03/16/18, and patient noticed new leg swelling predominantly in the left leg after the cast was removed. Patient presents to the emergency department today for worsening shortness of breath and leg swelling. Of note, with patient's history of hypertension, she is not currently on any antihypertensive medications, and is only taking insulin for her diabetes because she states she is unable to afford her medications so she is not able to take them as often as she should. Otherwise denies fevers, chills, chest pain, cough, or any other symptoms at this time. Notably, patient has a personal history of blood clots many years ago. She is not on any anticoagulant therapy. No known personal history of heart disease.     Hospital Course   Roxy Beaulieu was admitted on 10/4/2018.  The following problems were addressed during her hospitalization:    In the ER, she was given Lasix 40 mg IV, labetalol 20 mg IV, Lovenox 80 mg subcu.  She was admitted for further management.      Left lower extremity DVT, probable PE:  -  History of  prior clots.  Self stopped warfarin a few years ago.  She is willing to resume. starrted thus warfarin here.  She is on lovenox for bridging once daily given renal failure.  If her renal failure keeps worsening though lovenox may become contraindicated.   -  she should stay on life long warfarin unless a future contraindication given recurrence  -  CT with contrast not done given renal failure. V/Q now would not change treatment given known DVT.  - at discharge she needs colonoscopy and GYN for cancer screening with mammogram as she is not up to date in term of cancer screening for her age  - at discharge continue warfarin 10 mg today + Lovenox 90 mg Qday(dose reduced for renal insuffiencey). Stop Lovenox when INR > 2.5 x 48 hrs.  - f/u with PCP clinic in AM for hospital f/u & INR, warfarin dosing     Initial acute hypoxic respiratory failure felt likely due to PE, left side effusion, CHF and pericardial effusion   -  Off oxygen and breathing easier today.    Continue anticoagulation as above. Hold on further lasix given renal issues and her volume status seems much better today.     Probable HFpEF and Pericardial effusion on echo  ECHO: The left ventricle is normal in size. EF  50-55%. Grade II or moderate diastolic dysfunction. Diastolic Doppler findings (E/E' ratio and/or other parameters) suggest left ventricular filling pressures are increased.The right ventricle is  Size and systolic function are normal.Mild to moderate (1-2+) mitral regurgitation. Small pericardial effusion. There is evidence of mitral respiratory flow variation, but no other signs of hemodynamic compromise secondary to the effusion are present. Moderate left pleural effusion No previous study for comparison.  - repeat limited echo on Monday for reassessment of pericardial effusion size and sign of tamponnade  - continue to hold diuretics  - repeat ECHO no effusion   follow up with outpatient cardiology     CHRISTINA superimposed on CKD:  -   Patient with poorly controlled DM and HTN likely contributing.  I note her renal function has been worsening for a few years though appears its worst currently and creatinine continues to trend up.  She denies following with a nephrologist.  A nephrology consult has been placed. Input appreciated and renal recommendations are as  Nephrology consulted & at discharge recommended   Increase metoprolol to 50 mg BID  Ergocalciferol 50 K units weekly for 12 weeks.    She needs to be seen in [facility]/ nephrology clinic after discharge.    OK for discharge from my standpoint.       DM, uncontrolled:  -  Patient prior was not taking insulin routinely but reports over a month of 70/30 BID usage.  I don't think 70/30 is a good insulin given her level of brittle diabetes.    -  BS better controlled    at discharge to continue on Lantus 15 U lantus Q AM+ 1 U per gm CHO novolog TID meal + ISS      Hyperlipidemia:  -  Not on routine tx, fasting lipid with elevated LDL.  Consider starting statin at discharge.     HTN, poorly controlled:  -  Started on metoprolol here and norvasc . BP improved but still above target BP for her. Increased amlodipine dose yesterday.   - at discharge nephrology recommended  Increase in Metoprolol to 50 mg BID   f/u at [facility]/ nephrology clinic after discharge   - to consider waiting for renal function to get closer to baseline before starting an ACEI     Left ankle wound:  -  Does not appear grossly infected and was present on admission.   - wound care nursing evaluation noted-  dry stable scab was easily removed with band aid; new pink intact dry skin Pt has scattered dry white patches on skin, pt has no idea what they are, recommended to visit a dermatologist to investigate.     Skin rash LE  - more so RLE, vesicular with pus color fluid in one lesion, Related to DM?   - will need dermatology follow up     Blurred vision  - related to DM most likely. Much improved since this am. Patient recalls  this has happen in the past as her diabetes gets under better control.   - at discharge advised outpatient ophthalmology elavuation     Zully Wise MD    Significant Results and Procedures   none    Pending Results   These results will be followed up by PCP  Unresulted Labs Ordered in the Past 30 Days of this Admission     Date and Time Order Name Status Description    10/7/2018 0000 Anti Nuclear Gin IgG by IFA with Reflex In process           Code Status   Full Code       Primary Care Physician   [facility]    Physical Exam   Temp: 98.3  F (36.8  C) Temp src: Oral BP: 173/72   Heart Rate: 81 Resp: 20 SpO2: 97 % O2 Device: None (Room air)    Vitals:    10/05/18 0646 10/06/18 1000 10/07/18 0700   Weight: 90.6 kg (199 lb 12.8 oz) 85.7 kg (189 lb) 89.4 kg (197 lb)     Vital Signs with Ranges  Temp:  [98.1  F (36.7  C)-98.3  F (36.8  C)] 98.3  F (36.8  C)  Heart Rate:  [76-84] 81  Resp:  [15-20] 20  BP: (136-173)/(67-78) 173/72  SpO2:  [96 %-99 %] 97 %  I/O last 3 completed shifts:  In: 1320 [P.O.:1320]  Out: 400 [Urine:400]    Examination:  Well-built well-nourished. In NAD  Heart: Regular rhythm. S1S2, no murmurs, rubs,gallops  Lungs: Clear to auscultation. And no rhonchi rales or wheezing heard.  Abdomen: Soft and nontender. Bowel sounds present.  Extremities: No swelling.  Neuro:A,A,Ox3, motor sensory grossly intact    Discharge Disposition   Discharged to home  Condition at discharge: Stable    Consultations This Hospital Stay   PHARMACY TO DOSE WARFARIN  WOUND OSTOMY CONTINENCE NURSE  IP CONSULT  NEPHROLOGY IP CONSULT  CARE COORDINATOR IP CONSULT  NUTRITION SERVICES ADULT IP CONSULT  PHARMACY DISCHARGE EDUCATION BY PHARMACIST    Time Spent on this Encounter   Zully LY MD, personally saw the patient today and spent greater than 30 minutes discharging this patient.    Discharge Orders     MED THERAPY MANAGE REFERRAL     NUTRITION REFERRAL     Reason for your hospital stay    Acute DVT/ possible PE, malignant HTN, uncontrolled DM.     Follow-up and recommended labs and tests    Follow up with primary care provider, [facility], in Am & this weew to evaluate medication change, to evaluate treatment change and for hospital follow- up.  The following labs/tests are recommended:  INR in AM 10/8/2018 & prn   BMP later this week.     Activity   Your activity upon discharge: activity as tolerated     Discharge Instructions   Accu checks Ac & HS     Full Code     Diet   Follow this diet upon discharge:      Combination Diet 1855-5928 Calories: Moderate Consistent CHO (4-6 CHO units/meal)       Discharge Medications   Current Discharge Medication List      START taking these medications    Details   amLODIPine (NORVASC) 10 MG tablet Take 1 tablet (10 mg) by mouth daily  Qty: 30 tablet, Refills: 0    Associated Diagnoses: Hypertension, unspecified type      atorvastatin (LIPITOR) 20 MG tablet Take 1 tablet (20 mg) by mouth every evening  Qty: 30 tablet, Refills: 0    Associated Diagnoses: Dyslipidemia      bumetanide (BUMEX) 0.5 MG tablet Take 1 tablet (0.5 mg) by mouth daily  Qty: 30 tablet, Refills: 0    Associated Diagnoses: Hypertension, unspecified type      enoxaparin (LOVENOX) 100 MG/ML injection Inject 0.86 mLs (86 mg) Subcutaneous every 24 hours for 5 days  Qty: 4.3 mL, Refills: 0    Associated Diagnoses: Acute deep vein thrombosis (DVT) of distal vein of lower extremity, unspecified laterality (H)      Ergocalciferol (VITAMIN D) 00469 units CAPS Take 50,000 Units by mouth every 7 days  Qty: 30 capsule, Refills: 0    Associated Diagnoses: Stage 3 chronic kidney disease (H)      !! insulin aspart (NOVOLOG PEN) 100 UNIT/ML injection Inject 1-10 Units Subcutaneous 3 times daily (with meals)  Qty: 3 mL, Refills: 0    Associated Diagnoses: Uncontrolled type 2 diabetes mellitus with hyperglycemia, with long-term current use of insulin (H)      !! insulin aspart (NOVOLOG PEN) 100  UNIT/ML injection Inject 1-7 Units Subcutaneous At Bedtime  Qty: 3 mL, Refills: 0    Associated Diagnoses: Uncontrolled type 2 diabetes mellitus with hyperglycemia, with long-term current use of insulin (H)      !! insulin aspart (NOVOLOG PEN) 100 UNIT/ML injection Inject 3 Units Subcutaneous 3 times daily (before meals)  Qty: 3 mL, Refills: 0    Associated Diagnoses: Uncontrolled type 2 diabetes mellitus with hyperglycemia, with long-term current use of insulin (H)      insulin glargine (LANTUS SOLOSTAR) 100 UNIT/ML pen Inject 15 Units Subcutaneous At Bedtime  Qty: 3 mL, Refills: 0    Associated Diagnoses: Uncontrolled type 2 diabetes mellitus with hyperglycemia, with long-term current use of insulin (H)      metoprolol tartrate (LOPRESSOR) 50 MG tablet Take 1 tablet (50 mg) by mouth 2 times daily  Qty: 60 tablet, Refills: 0    Associated Diagnoses: Hypertension, unspecified type      warfarin (COUMADIN) 2 MG tablet Q 6 PM  Take 10 mg today & get INR in AM 10/9 for further dosing.  Qty: 60 tablet, Refills: 0    Associated Diagnoses: Acute deep vein thrombosis (DVT) of distal vein of lower extremity, unspecified laterality (H)       !! - Potential duplicate medications found. Please discuss with provider.      STOP taking these medications       aspirin 325 MG EC tablet Comments:   Reason for Stopping:         Insulin NPH Isophane & Regular (RELION 70/30 SC) Comments:   Reason for Stopping:         Insulin NPH Isophane & Regular (RELION 70/30 SC) Comments:   Reason for Stopping:             Allergies   No Known Allergies  Data   Most Recent 3 CBC's:  Recent Labs   Lab Test  10/08/18   0652  10/07/18   0617  10/05/18   0655  10/04/18   1730   WBC  5.9   --   6.4  9.1   HGB  9.2*   --   9.7*  10.2*   MCV  87   --   86  84   PLT  276  229  265  257      Most Recent 3 BMP's:  Recent Labs   Lab Test  10/08/18   0652  10/07/18   0617  10/06/18   0609   NA  143  144  143   POTASSIUM  5.0  5.0  5.1   CHLORIDE  111*  112*   108   CO2  25  24  27   BUN  53*  50*  48*   CR  2.62*  2.71*  2.70*   ANIONGAP  7  8  8   GILL  8.2*  8.1*  8.2*   GLC  135*  105*  154*     Most Recent 2 LFT's:  Recent Labs   Lab Test  10/04/18   1611   AST  26   ALT  18   ALKPHOS  167*   BILITOTAL  0.3     Most Recent INR's and Anticoagulation Dosing History:  Anticoagulation Dose History     Recent Dosing and Labs Latest Ref Rng & Units 10/4/2018 10/5/2018 10/6/2018 10/7/2018 10/8/2018    Warfarin 5 mg - 5 mg 5 mg - - -    Warfarin 7.5 mg - - - 7.5 mg - -    Warfarin 10 mg - - - - 10 mg -    INR 0.86 - 1.14 0.92 1.09 1.03 1.14 1.20(H)        Most Recent 3 Troponin's:  Recent Labs   Lab Test  10/04/18   1611   TROPI  0.029     Most Recent Cholesterol Panel:  Recent Labs   Lab Test  10/05/18   0655   CHOL  200*   LDL  123*   HDL  41*   TRIG  179*     Most Recent 6 Bacteria Isolates From Any Culture (See EPIC Reports for Culture Details):  Recent Labs   Lab Test  04/28/17   2120   CULT  No growth     Most Recent TSH, T4 and A1c Labs:  Recent Labs   Lab Test  10/04/18   1730  10/04/18   1611   TSH   --   2.14   A1C  11.6*   --      Results for orders placed or performed during the hospital encounter of 10/04/18   XR Chest 2 Views    Narrative    XR CHEST TWO VIEWS   10/4/2018 5:01 PM     INDICATION: Shortness of breath.    COMPARISON: None.      Impression    IMPRESSION: Somewhat shallow inspiration. Small left pleural effusion  and mild left basilar atelectasis or infiltrate. Possible tiny pleural  effusion on the right. Heart size within normal limits. Degenerative  changes thoracic spine.    BO PARKS MD   US Lower Extremity Venous Duplex Bilateral    Narrative    BILATERAL LOWER EXTREMITY VENOUS DUPLEX ULTRASOUND   10/4/2018 7:37 PM      COMPARISON: None    HISTORY: Leg swelling, history pulmonary embolism.      TECHNIQUE: Doppler and spectral waveform analysis was used in  evaluation of the deep veins of the extremities.    FINDINGS: The right common  femoral, proximal greater saphenous,  femoral, popliteal and posterior tibial veins are patent, fully  compressible and demonstrate normal venous Doppler flow.    There is nonocclusive thrombus in the left common femoral and proximal  femoral veins as well as in the distal femoral vein and popliteal  vein.      Impression    IMPRESSION:  1. Acute-appearing nonocclusive thrombus in the left common femoral,  femoral and popliteal veins.  2. No DVT in the right lower extremity.    DANIEL PIERCE MD   US Retroperitoneal Complete Portable    Narrative    ULTRASOUND RETROPERITONEAL COMPLETE PORTABLE 10/5/2018 2:15 PM     HISTORY: Acute renal insufficiency.    COMPARISON: None.     FINDINGS:   Right kidney: Normal size and morphology. Renal cortical thickness is  normal normal. Slightly increased cortical echogenicity could indicate  parenchymal renal disease. No hydronephrosis, mass lesions or abnormal  calcifications.    Left kidney: Normal size and morphology. Renal cortical thickness is  normal normal. Slightly increased cortical echogenicity could indicate  parenchymal renal disease. No hydronephrosis, mass lesions or abnormal  calcifications.    Urinary bladder: Partially distended urinary bladder appears normal.     Incidentally note of a tiny amount of free fluid in Morison's pouch.      Impression    IMPRESSION:   1. Slightly increased cortical echogenicity bilaterally may indicate  parenchymal disease.  2. Tiny amount of free fluid in Morison's pouch.    VIKAS CASTANEDA MD

## 2018-10-08 NOTE — PROGRESS NOTES
[facility]    Nephrology Progress Note     Assessment & Plan       1) CKD 4:  Likely due to DM/HTN.  Cr slightly better today.  2.7 to 2.6.    2) Proteinuria: U P/C ratio 9 g/g. No M spike seen on immunofixation studies.    3) HTN: Not controlled.  Eventually ACEi as outpt. Increase metoprolol for now.    4) Anemia: HGB 9.2. Iron stores OK.    5) 2HPT: .  Goal is  for CKD 4. Vit D only 4.      Suggest:    Increase metoprolol to 50 mg BID  Ergocalciferol 50 K units weekly for 12 weeks.    She needs to be seen in [facility]/ nephrology clinic after discharge.    OK for discharge from my standpoint.      Jose Antonio Jenkins MD  [facility] - Nephrology  [phone]    Interval History     Really would like to go home.  Her neighbor is yelling at her over the phone because of care of her cat.  No SOB.  No CP.  Pt denies f/c/n/v.  No cp/sob/cough.  No dysuria/hematuria/abd or flank pain.  No dizziness, lightheadedness, headaches, or focal neuro sx.      Physical Exam   Temp: 98.1  F (36.7  C) Temp src: Oral BP: 171/78   Heart Rate: 82 Resp: 18 SpO2: 99 % O2 Device: None (Room air)    Vitals:    10/05/18 0646 10/06/18 1000 10/07/18 0700   Weight: 90.6 kg (199 lb 12.8 oz) 85.7 kg (189 lb) 89.4 kg (197 lb)     Vital Signs with Ranges  Temp:  [98.1  F (36.7  C)-98.7  F (37.1  C)] 98.1  F (36.7  C)  Heart Rate:  [76-84] 82  Resp:  [15-18] 18  BP: (150-171)/(62-78) 171/78  SpO2:  [96 %-99 %] 99 %  I/O last 3 completed shifts:  In: 960 [P.O.:960]  Out: 1450 [Urine:1450]    GENERAL APPEARANCE: pleasant, NAD, a & o  HEENT:  Eyes/ears/nose/neck grossly normal  RESP: lungs cta b c good efforts, no crackles, rhonchi or wheezes  CV: RRR, nl S1/S2, no m/r/g   ABDOMEN: o/s/nt/nd, bs present  EXTREMITIES/SKIN: no rashes/lesions; 1+ BLE edema    Medications     Warfarin Therapy Reminder         amLODIPine  10 mg Oral Daily     atorvastatin  20 mg Oral QPM     bumetanide  0.5 mg Oral Daily     enoxaparin   1 mg/kg Subcutaneous Q24H     insulin aspart   Subcutaneous TID AC     insulin aspart  1-10 Units Subcutaneous TID AC     insulin aspart  1-7 Units Subcutaneous At Bedtime     insulin glargine  15 Units Subcutaneous QAM AC     metoprolol tartrate  50 mg Oral BID     warfarin  10 mg Oral ONCE at 18:00       Data   BMP  Recent Labs  Lab 10/08/18  0652 10/07/18  0617 10/06/18  0609 10/05/18  0655    144 143 139   POTASSIUM 5.0 5.0 5.1 4.8   CHLORIDE 111* 112* 108 106   GILL 8.2* 8.1* 8.2* 8.2*   CO2 25 24 27 27   BUN 53* 50* 48* 45*   CR 2.62* 2.71* 2.70* 2.68*   * 105* 154* 297*     [email or web address](phos:4)  CBC)  Recent Labs  Lab 10/08/18  0652 10/07/18  0617 10/05/18  0655 10/04/18  1730   WBC 5.9  --  6.4 9.1   HGB 9.2*  --  9.7* 10.2*   HCT 30.1*  --  31.5* 32.5*   MCV 87  --  86 84    229 265 257       Recent Labs  Lab 10/04/18  1611   AST 26   ALT 18   ALKPHOS 167*   BILITOTAL 0.3       Recent Labs  Lab 10/08/18  0652   INR 1.20*     No results found for: D2VIT, D3VIT, DTOT    Recent Labs  Lab 10/08/18  0652 10/06/18  0609   HGB 9.2*  --    HCT 30.1*  --    MCV 87  --    IRON  --  33*   IRONSAT  --  19 FEB  --  170*   MITRA  --  165       Recent Labs  Lab 10/06/18  0609   PTHI 248*       Attestation:   I have reviewed today's relevant vital signs, notes, medications, labs and imaging.

## 2018-10-08 NOTE — PROGRESS NOTES
Pt to discharge home. All discharge instructions explained and medications were reviewed with patient. All questions/concerns were answered. All patient belongings sent home with pt on discharge.

## 2018-10-09 ENCOUNTER — TELEPHONE (OUTPATIENT)
Dept: PHARMACY | Facility: OTHER | Age: 58
End: 2018-10-09

## 2018-10-09 LAB — ANA SER QL IF: NEGATIVE

## 2018-10-09 NOTE — TELEPHONE ENCOUNTER
MTM referral from: Transitions of Care (recent hospital discharge or ED visit)    MTM referral outreach attempt #1 on October 9, 2018 at 9:40 AM      Outcome: Patient is not interested at this time because she is not a [facility] patient, will route to MTM Pharmacist/Provider as an FYI. Thank you for the referral.     Carmine Collins, MTM Coordinator

## 2018-11-02 ENCOUNTER — HOSPITAL ENCOUNTER (INPATIENT)
Facility: CLINIC | Age: 58
LOS: 3 days | Discharge: HOME | End: 2018-11-05
Attending: EMERGENCY MEDICINE | Admitting: INTERNAL MEDICINE
Payer: MEDICAID

## 2018-11-02 ENCOUNTER — APPOINTMENT (OUTPATIENT)
Dept: MRI IMAGING | Facility: CLINIC | Age: 58
End: 2018-11-02
Attending: PHYSICIAN ASSISTANT
Payer: MEDICAID

## 2018-11-02 DIAGNOSIS — I82.4Z9 ACUTE DEEP VEIN THROMBOSIS (DVT) OF DISTAL VEIN OF LOWER EXTREMITY, UNSPECIFIED LATERALITY (H): ICD-10-CM

## 2018-11-02 DIAGNOSIS — Z79.4 UNCONTROLLED TYPE 2 DIABETES MELLITUS WITH HYPERGLYCEMIA, WITH LONG-TERM CURRENT USE OF INSULIN (H): ICD-10-CM

## 2018-11-02 DIAGNOSIS — I82.4Y9 DEEP VEIN THROMBOSIS (DVT) OF PROXIMAL LOWER EXTREMITY, UNSPECIFIED CHRONICITY, UNSPECIFIED LATERALITY (H): Primary | ICD-10-CM

## 2018-11-02 DIAGNOSIS — S91.109A OPEN WOUND OF TOE, INITIAL ENCOUNTER: ICD-10-CM

## 2018-11-02 DIAGNOSIS — E11.65 UNCONTROLLED TYPE 2 DIABETES MELLITUS WITH HYPERGLYCEMIA, WITH LONG-TERM CURRENT USE OF INSULIN (H): ICD-10-CM

## 2018-11-02 PROBLEM — L97.529 ULCER OF TOE OF LEFT FOOT (H): Status: ACTIVE | Noted: 2018-11-02

## 2018-11-02 PROBLEM — L97.529: Status: ACTIVE | Noted: 2018-11-02

## 2018-11-02 LAB
ANION GAP SERPL CALCULATED.3IONS-SCNC: 8 MMOL/L (ref 3–14)
BASOPHILS # BLD AUTO: 0 10E9/L (ref 0–0.2)
BASOPHILS NFR BLD AUTO: 0.4 %
BUN SERPL-MCNC: 44 MG/DL (ref 7–30)
CALCIUM SERPL-MCNC: 8.5 MG/DL (ref 8.5–10.1)
CHLORIDE SERPL-SCNC: 96 MMOL/L (ref 94–109)
CO2 SERPL-SCNC: 25 MMOL/L (ref 20–32)
CREAT SERPL-MCNC: 2.63 MG/DL (ref 0.52–1.04)
CRP SERPL-MCNC: 23.2 MG/L (ref 0–8)
DIFFERENTIAL METHOD BLD: ABNORMAL
EOSINOPHIL # BLD AUTO: 0.1 10E9/L (ref 0–0.7)
EOSINOPHIL NFR BLD AUTO: 1.1 %
ERYTHROCYTE [DISTWIDTH] IN BLOOD BY AUTOMATED COUNT: 13.9 % (ref 10–15)
ERYTHROCYTE [SEDIMENTATION RATE] IN BLOOD BY WESTERGREN METHOD: >140 MM/H (ref 0–30)
GFR SERPL CREATININE-BSD FRML MDRD: 19 ML/MIN/1.7M2
GLUCOSE BLDC GLUCOMTR-MCNC: 253 MG/DL (ref 70–99)
GLUCOSE BLDC GLUCOMTR-MCNC: 377 MG/DL (ref 70–99)
GLUCOSE BLDC GLUCOMTR-MCNC: 400 MG/DL (ref 70–99)
GLUCOSE BLDC GLUCOMTR-MCNC: 458 MG/DL (ref 70–99)
GLUCOSE SERPL-MCNC: 516 MG/DL (ref 70–99)
HCT VFR BLD AUTO: 31.9 % (ref 35–47)
HGB BLD-MCNC: 10.5 G/DL (ref 11.7–15.7)
IMM GRANULOCYTES # BLD: 0.1 10E9/L (ref 0–0.4)
IMM GRANULOCYTES NFR BLD: 0.5 %
INR PPP: 1.05 (ref 0.86–1.14)
LMWH PPP CHRO-ACNC: 0.33 IU/ML
LYMPHOCYTES # BLD AUTO: 1.1 10E9/L (ref 0.8–5.3)
LYMPHOCYTES NFR BLD AUTO: 10.6 %
MCH RBC QN AUTO: 26.5 PG (ref 26.5–33)
MCHC RBC AUTO-ENTMCNC: 32.9 G/DL (ref 31.5–36.5)
MCV RBC AUTO: 81 FL (ref 78–100)
MONOCYTES # BLD AUTO: 0.7 10E9/L (ref 0–1.3)
MONOCYTES NFR BLD AUTO: 6.6 %
NEUTROPHILS # BLD AUTO: 8.3 10E9/L (ref 1.6–8.3)
NEUTROPHILS NFR BLD AUTO: 80.8 %
NRBC # BLD AUTO: 0 10*3/UL
NRBC BLD AUTO-RTO: 0 /100
PLATELET # BLD AUTO: 267 10E9/L (ref 150–450)
POTASSIUM SERPL-SCNC: 4.3 MMOL/L (ref 3.4–5.3)
RBC # BLD AUTO: 3.96 10E12/L (ref 3.8–5.2)
SODIUM SERPL-SCNC: 129 MMOL/L (ref 133–144)
WBC # BLD AUTO: 10.3 10E9/L (ref 4–11)

## 2018-11-02 PROCEDURE — 96361 HYDRATE IV INFUSION ADD-ON: CPT

## 2018-11-02 PROCEDURE — 99285 EMERGENCY DEPT VISIT HI MDM: CPT | Mod: 25

## 2018-11-02 PROCEDURE — 85610 PROTHROMBIN TIME: CPT | Performed by: EMERGENCY MEDICINE

## 2018-11-02 PROCEDURE — 12000000 ZZH R&B MED SURG/OB

## 2018-11-02 PROCEDURE — 99207 ZZC CDG-CHARGE REQUIRED MANUAL ENTRY: CPT | Performed by: INTERNAL MEDICINE

## 2018-11-02 PROCEDURE — 85520 HEPARIN ASSAY: CPT | Performed by: INTERNAL MEDICINE

## 2018-11-02 PROCEDURE — 00000146 ZZHCL STATISTIC GLUCOSE BY METER IP

## 2018-11-02 PROCEDURE — 36415 COLL VENOUS BLD VENIPUNCTURE: CPT | Performed by: EMERGENCY MEDICINE

## 2018-11-02 PROCEDURE — 99223 1ST HOSP IP/OBS HIGH 75: CPT | Performed by: INTERNAL MEDICINE

## 2018-11-02 PROCEDURE — 99207 ZZC APP CREDIT; MD BILLING SHARED VISIT: CPT | Performed by: PHYSICIAN ASSISTANT

## 2018-11-02 PROCEDURE — 25000128 H RX IP 250 OP 636: Performed by: PHYSICIAN ASSISTANT

## 2018-11-02 PROCEDURE — 25000131 ZZH RX MED GY IP 250 OP 636 PS 637: Performed by: INTERNAL MEDICINE

## 2018-11-02 PROCEDURE — 80048 BASIC METABOLIC PNL TOTAL CA: CPT | Performed by: EMERGENCY MEDICINE

## 2018-11-02 PROCEDURE — 36415 COLL VENOUS BLD VENIPUNCTURE: CPT | Performed by: INTERNAL MEDICINE

## 2018-11-02 PROCEDURE — 25000132 ZZH RX MED GY IP 250 OP 250 PS 637: Performed by: PHYSICIAN ASSISTANT

## 2018-11-02 PROCEDURE — 73718 MRI LOWER EXTREMITY W/O DYE: CPT | Mod: LT

## 2018-11-02 PROCEDURE — 86140 C-REACTIVE PROTEIN: CPT | Performed by: EMERGENCY MEDICINE

## 2018-11-02 PROCEDURE — 25000128 H RX IP 250 OP 636: Performed by: EMERGENCY MEDICINE

## 2018-11-02 PROCEDURE — 85025 COMPLETE CBC W/AUTO DIFF WBC: CPT | Performed by: EMERGENCY MEDICINE

## 2018-11-02 PROCEDURE — 96366 THER/PROPH/DIAG IV INF ADDON: CPT

## 2018-11-02 PROCEDURE — 99253 IP/OBS CNSLTJ NEW/EST LOW 45: CPT | Mod: 57 | Performed by: PODIATRIST

## 2018-11-02 PROCEDURE — 96365 THER/PROPH/DIAG IV INF INIT: CPT

## 2018-11-02 PROCEDURE — 85652 RBC SED RATE AUTOMATED: CPT | Performed by: EMERGENCY MEDICINE

## 2018-11-02 RX ORDER — ATORVASTATIN CALCIUM 20 MG/1
20 TABLET, FILM COATED ORAL EVERY EVENING
Status: DISCONTINUED | OUTPATIENT
Start: 2018-11-02 | End: 2018-11-05 | Stop reason: HOSPADM

## 2018-11-02 RX ORDER — SODIUM CHLORIDE 9 MG/ML
1000 INJECTION, SOLUTION INTRAVENOUS CONTINUOUS
Status: DISCONTINUED | OUTPATIENT
Start: 2018-11-02 | End: 2018-11-02

## 2018-11-02 RX ORDER — POLYETHYLENE GLYCOL 3350 17 G/17G
17 POWDER, FOR SOLUTION ORAL DAILY PRN
Status: DISCONTINUED | OUTPATIENT
Start: 2018-11-02 | End: 2018-11-05 | Stop reason: HOSPADM

## 2018-11-02 RX ORDER — BUMETANIDE 0.5 MG/1
0.5 TABLET ORAL DAILY
Status: DISCONTINUED | OUTPATIENT
Start: 2018-11-02 | End: 2018-11-03

## 2018-11-02 RX ORDER — NALOXONE HYDROCHLORIDE 0.4 MG/ML
.1-.4 INJECTION, SOLUTION INTRAMUSCULAR; INTRAVENOUS; SUBCUTANEOUS
Status: DISCONTINUED | OUTPATIENT
Start: 2018-11-02 | End: 2018-11-03

## 2018-11-02 RX ORDER — AMOXICILLIN 250 MG
2 CAPSULE ORAL 2 TIMES DAILY PRN
Status: DISCONTINUED | OUTPATIENT
Start: 2018-11-02 | End: 2018-11-05 | Stop reason: HOSPADM

## 2018-11-02 RX ORDER — ONDANSETRON 4 MG/1
4 TABLET, ORALLY DISINTEGRATING ORAL EVERY 6 HOURS PRN
Status: DISCONTINUED | OUTPATIENT
Start: 2018-11-02 | End: 2018-11-05 | Stop reason: HOSPADM

## 2018-11-02 RX ORDER — AMLODIPINE BESYLATE 10 MG/1
10 TABLET ORAL DAILY
Status: DISCONTINUED | OUTPATIENT
Start: 2018-11-02 | End: 2018-11-05 | Stop reason: HOSPADM

## 2018-11-02 RX ORDER — CEFAZOLIN SODIUM 1 G/50ML
1250 SOLUTION INTRAVENOUS ONCE
Status: COMPLETED | OUTPATIENT
Start: 2018-11-02 | End: 2018-11-02

## 2018-11-02 RX ORDER — DEXTROSE MONOHYDRATE 25 G/50ML
25-50 INJECTION, SOLUTION INTRAVENOUS
Status: DISCONTINUED | OUTPATIENT
Start: 2018-11-02 | End: 2018-11-05 | Stop reason: HOSPADM

## 2018-11-02 RX ORDER — OXYCODONE HYDROCHLORIDE 5 MG/1
5-10 TABLET ORAL
Status: DISCONTINUED | OUTPATIENT
Start: 2018-11-02 | End: 2018-11-05 | Stop reason: HOSPADM

## 2018-11-02 RX ORDER — AMOXICILLIN 250 MG
1 CAPSULE ORAL 2 TIMES DAILY PRN
Status: DISCONTINUED | OUTPATIENT
Start: 2018-11-02 | End: 2018-11-05 | Stop reason: HOSPADM

## 2018-11-02 RX ORDER — ONDANSETRON 2 MG/ML
4 INJECTION INTRAMUSCULAR; INTRAVENOUS EVERY 6 HOURS PRN
Status: DISCONTINUED | OUTPATIENT
Start: 2018-11-02 | End: 2018-11-05 | Stop reason: HOSPADM

## 2018-11-02 RX ORDER — SODIUM CHLORIDE 9 MG/ML
INJECTION, SOLUTION INTRAVENOUS CONTINUOUS
Status: DISCONTINUED | OUTPATIENT
Start: 2018-11-03 | End: 2018-11-03

## 2018-11-02 RX ORDER — METOPROLOL TARTRATE 50 MG
50 TABLET ORAL 2 TIMES DAILY
Status: DISCONTINUED | OUTPATIENT
Start: 2018-11-02 | End: 2018-11-05 | Stop reason: HOSPADM

## 2018-11-02 RX ORDER — NICOTINE POLACRILEX 4 MG
15-30 LOZENGE BUCCAL
Status: DISCONTINUED | OUTPATIENT
Start: 2018-11-02 | End: 2018-11-05 | Stop reason: HOSPADM

## 2018-11-02 RX ORDER — LIDOCAINE 40 MG/G
CREAM TOPICAL
Status: DISCONTINUED | OUTPATIENT
Start: 2018-11-02 | End: 2018-11-05 | Stop reason: HOSPADM

## 2018-11-02 RX ORDER — ACETAMINOPHEN 325 MG/1
650 TABLET ORAL EVERY 4 HOURS PRN
Status: DISCONTINUED | OUTPATIENT
Start: 2018-11-02 | End: 2018-11-05 | Stop reason: HOSPADM

## 2018-11-02 RX ADMIN — BUMETANIDE 0.5 MG: 0.5 TABLET ORAL at 15:06

## 2018-11-02 RX ADMIN — AMLODIPINE BESYLATE 10 MG: 10 TABLET ORAL at 14:42

## 2018-11-02 RX ADMIN — HEPARIN SODIUM 1250 UNITS/HR: 10000 INJECTION, SOLUTION INTRAVENOUS at 14:47

## 2018-11-02 RX ADMIN — TAZOBACTAM SODIUM AND PIPERACILLIN SODIUM 2.25 G: 250; 2 INJECTION, SOLUTION INTRAVENOUS at 17:47

## 2018-11-02 RX ADMIN — INSULIN HUMAN 20 UNITS: 100 INJECTION, SUSPENSION SUBCUTANEOUS at 14:43

## 2018-11-02 RX ADMIN — TAZOBACTAM SODIUM AND PIPERACILLIN SODIUM 3.38 G: 375; 3 INJECTION, SOLUTION INTRAVENOUS at 11:03

## 2018-11-02 RX ADMIN — METOPROLOL TARTRATE 50 MG: 50 TABLET, FILM COATED ORAL at 20:03

## 2018-11-02 RX ADMIN — ATORVASTATIN CALCIUM 20 MG: 20 TABLET, FILM COATED ORAL at 20:03

## 2018-11-02 RX ADMIN — SODIUM CHLORIDE 1000 ML: 9 INJECTION, SOLUTION INTRAVENOUS at 10:24

## 2018-11-02 RX ADMIN — VANCOMYCIN HYDROCHLORIDE 1250 MG: 5 INJECTION, POWDER, LYOPHILIZED, FOR SOLUTION INTRAVENOUS at 12:06

## 2018-11-02 RX ADMIN — Medication 5000 UNITS: at 14:48

## 2018-11-02 ASSESSMENT — ENCOUNTER SYMPTOMS
FEVER: 0
SHORTNESS OF BREATH: 0
VOMITING: 0
CHILLS: 0
NUMBNESS: 1
NAUSEA: 0
DIARRHEA: 0
WOUND: 1

## 2018-11-02 ASSESSMENT — ACTIVITIES OF DAILY LIVING (ADL)
ADLS_ACUITY_SCORE: 12
ADLS_ACUITY_SCORE: 12

## 2018-11-02 NOTE — LETTER
"      Reason for your hospital stay      You were admitted for an infected third toe.  You were treated with IV antibiotics and surgery (partial toe amputation).  At this point you will continue treatment with oral antibiotics and it will be imperative that you achieve good blood sugar control to prevent this from worsening/becoming infected again.     As discussed, you were insistent on discharging home today though I have concerns about your kidney function in the setting of a rising creatinine which indicates worsening kidney function.  I did recommend that you remain here in the hospital for monitoring and treatment but I understand that you feel it is more important to get out of the hospital to manage other responsibilities.      It is imperative that you follow-up in clinic within the next 2 days to have this reevaluated and consider any medication dose adjustments.  Worsening kidney function puts you at risk for confusion, severe electrolyte abnormalities which could be dangerous for your heart or even cause death.     Also, you have been restarted on coumadin after stopping this a few days prior to admission and will need to repeat \"bridging\" treatment with lovenox again for your DVT.  Please have an INR checked within 2 days as discussed as it is unclear exactly what dose of Coumadin you will need                   Key Recommendations: pt admitted with left toe ulcer and elevated DEVEN. Pt was seen by care coordination for elevated DEVEN and A1C of 11.6. Pt states she has a glucometer and testing supplies. She does not have insurance at the moment and therefore has been noncompliant with her medications as she can not afford them. Pt stated she then gets depressed and stops testing her blood glucose because she doesn't have any insulin or medications to take. Pt has been seen by SW and was given resources to help her get signed up with health insurance. CM offered pt Calorie Blaise diet guide book to help with " food choices but pt politely declines as she states she got one the last time she was here. CM encourage pt to follow up with podiatry in 1 week has recommended and to call and establish care again at [facility] clinic with Dr. Brigido London.     Please follow up with her to get her primary care appointment scheduled and assistance with acquiring health insurance.     Shefali June    AVS/Discharge Summary is the source of truth; this is a helpful guide for improved communication of patient story

## 2018-11-02 NOTE — LETTER
Transition Communication Hand-off for Care Transitions to Next Level of Care Provider    Name: Roxy Beaulieu  : 1960  MRN #: [id number]  Primary Care Provider: Dwain London  Primary Care MD Name: Brigido London  Primary Clinic: [facility]  [address]  [place] [address]  Primary Care Clinic Name:  [place]  Reason for Hospitalization:  Open wound of toe, initial encounter [S91.109A]  Admit Date/Time: 2018  9:38 AM  Discharge Date: 18  Payor Source: No coverage found.      Readmission Assessment Measure (DEVEN) Risk Score/category: ELEVATED           Reason for Communication Hand-off Referral: Difficulty understanding plan of care  Non-adherence to plan of care related to:  Other no insurance.     Discharge Plan:  Discharge Plan:       Most Recent Value    Concerns To Be Addressed financial/insurance concerns           Concern for non-adherence with plan of care:   YES  Discharge Needs Assessment:  Needs       Most Recent Value    Transportation Available car, family or friend will provide    # of Referrals Placed by CTS Specialty Providers, Scheduled Follow-up appointments, Financial Services    Other Resources -- [[employer] for [employer] application assistance]          Already enrolled in Tele-monitoring program and name of program:  na  Follow-up specialty is recommended: Yes    Follow-up plan:  No future appointments.    Any outstanding tests or procedures:                 Reason for your hospital stay      You were admitted for an infected third toe.  You were treated with IV antibiotics and surgery (partial toe amputation).  At this point you will continue treatment with oral antibiotics and it will be imperative that you achieve good blood sugar control to prevent this from worsening/becoming infected again.     As discussed, you were insistent on discharging home today though I have concerns about your kidney function in the setting of a rising creatinine which indicates  "worsening kidney function.  I did recommend that you remain here in the hospital for monitoring and treatment but I understand that you feel it is more important to get out of the hospital to manage other responsibilities.      It is imperative that you follow-up in clinic within the next 2 days to have this reevaluated and consider any medication dose adjustments.  Worsening kidney function puts you at risk for confusion, severe electrolyte abnormalities which could be dangerous for your heart or even cause death.     Also, you have been restarted on coumadin after stopping this a few days prior to admission and will need to repeat \"bridging\" treatment with lovenox again for your DVT.  Please have an INR checked within 2 days as discussed as it is unclear exactly what dose of Coumadin you will need           Diet       Follow this diet upon discharge: Orders Placed This Encounter       Advance Diet as Tolerated: Regular Diet Adult; 2098-5503 Calories: Moderate Consistent CHO (4-6 CHO units/meal)            Wound care and dressings      Instructions to care for your wound at home:  Keep foot and dressing dry. Will changes dressing at first office visit. Bag up if you shower.                     Follow-up Appointments      Follow-up and recommended labs and tests       Patient to follow up with Dr. Fofana in 1 week from discharge from the hospital.      For APPOINTMENTS: [phone].  For questions or concerns: call: [phone]            Follow-up and recommended labs and tests       Follow up with primary care provider, Physician within 2 days for hospital follow- up.  The following labs/tests are recommended: basic metabolic panel to recheck your kidney function and an INR to evaluate coumadin dosing.                    Key Recommendations: pt admitted with left toe ulcer and elevated DEVEN. Pt was seen by care coordination for elevated DEVEN and A1C of 11.6. Pt states she has a glucometer and testing supplies. She " does not have insurance at the moment and therefore has been noncompliant with her medications as she can not afford them. Pt stated she then gets depressed and stops testing her blood glucose because she doesn't have any insulin or medications to take. Pt has been seen by SW and was given resources to help her get signed up with health insurance. CM offered pt Calorie Blaise diet guide book to help with food choices but pt politely declines as she states she got one the last time she was here. CM encourage pt to follow up with podiatry in 1 week has recommended and to call and establish care again at [facility] clinic with Dr. Brigido London.     Please follow up with her to get her primary care appointment scheduled and assistance with acquiring health insurance as pt has declined for us to assist her.       Shefali June    AVS/Discharge Summary is the source of truth; this is a helpful guide for improved communication of patient story

## 2018-11-02 NOTE — ED PROVIDER NOTES
History     Chief Complaint:  Toe Growth    HPI   Roxy Beaulieu is a 58 year old female on aspirin with a history of diabetes and diabetic neuropathy, gangrene of her left first and second toes requiring amputation April 2017 who presents to the emergency department today with toe growth. The growth has been present for about a week on the right 3rd toe. She presents today because the blister on the top of the toe opened up and she does not want it to get infected. Patient reports some thick, bloody drainage. She reports no pain but that toe is normally pretty numb from her neuropathy. She denies fevers, chills, nausea, vomiting, diarrhea. She denies shortness of breath. She notes that she stopped taking her Coumadin 4 days ago and her insulin about a week ago, because she cannot afford them. She was recently admitted in October for shortness of breath and leg swelling with DVT diagnosis and presumed PE. She was admitted in April 2017 for the toe amputations, seen by podiatry.     Allergies:  No Known Drug Allergies     Medications:    Norvasc  Lipitor   Bumex  Novolog   Lantus   Lopressor   Coumadin - she started taking aspirin instead about 4 days ago    Past Medical History:    Diabetes  Gangrene of toe   High cholesterol  Hypertension  Obesity  Left foot infection  Personal history of tobacco use   Type 2 diabetes     Past Surgical History:    Amputate toes  Open reduction fibula     Family History:    History reviewed. No pertinent family history.     Social History:  The patient was alone.  Smoking Status: Former  Smokeless Tobacco: Never  Alcohol Use: No    Marital Status:  Single     Review of Systems   Constitutional: Negative for chills and fever.   Respiratory: Negative for shortness of breath.    Gastrointestinal: Negative for diarrhea, nausea and vomiting.   Skin: Positive for wound (left third toe growth).   Neurological: Positive for numbness.   All other systems reviewed and are  negative.    Physical Exam     Patient Vitals for the past 24 hrs:   BP Temp Heart Rate Resp SpO2 Weight   11/02/18 1100 194/85 - - - 99 % -   11/02/18 1053 - - - - 98 % -   11/02/18 1051 184/85 - - - - -   11/02/18 1049 - - - - - 83.5 kg (184 lb 3 oz)   11/02/18 0943 - 98.4  F (36.9  C) - - - -   11/02/18 0942 185/88 - 73 18 94 % -      Physical Exam  VS: Reviewed per above  HENT: Mucous membranes moist  EYES: sclera anicteric  CV: Rate as noted, regular rhythm.   RESP: Effort normal. Breath sounds are normal bilaterally.  NEURO: Alert, moving all extremities  MSK: Patient's second left toe has been amputated.  The toe now adjacent to the first toe has swelling and erythema extending to the base of the toe.  There is a blood clot versus other growth at the distal aspect of the toe.  No other lower extremity swelling or edema or redness.  SKIN: Warm and dry              Emergency Department Course   Imaging:   MR Left foot  Pending  Report per radiology      Laboratory:  Laboratory findings were communicated with the patient who voiced understanding of the findings.  INR: 1.05  CBC: AWNL (WBC 10.3, HGB 10.5(L), )  BMP: 516(HH) Glucose, 44(H) BUN, 19(L) GFR o/w WNL (Creatinine 2.63(L))   CRP: 23.2(H)  Erythrocyte sedimentation rate: >140(H)     Recent Labs  Lab 11/02/18  1026 11/02/18  1003   *  --    BGM  --  458*      Interventions:  1024: 0.9% NaCl 1L IV Bolus   1103: Zosyn 3.375 g IV     Emergency Department Course:  Nursing notes and vitals reviewed.  0950: I performed an exam of the patient as documented above.   IV was inserted and blood was drawn for laboratory testing, results above.  1027:I spoke with Dr. Fofana of the Podiatry service regarding patient's presentation, findings, and plan of care.   The patient was sent for a MR Left Foot while in the emergency department, results above.   1038: Findings and plan explained to the Patient who consents to admission. Discussed the patient with  Alf accepting for Dr. Chaparro, who will admit the patient to a Med/Surg bed for further monitoring, evaluation, and treatment.   I personally reviewed the laboratory and imaging results with the Patient and answered all related questions prior to admission.   Impression & Plan    Medical Decision Making:  Patient presents to the ER with left toe wound.  I reviewed the patient's chart and she has a history of prior left toe osteomyelitis requiring amputation.  Furthermore patient has uncontrolled diabetes and is not taking the insulin prescribed her due to financial reasons.  She does have hyperglycemia but no elevated anion gap or acidemia to suggest DKA.  Additionally she is not taking the warfarin prescribed to her for recent DVT due to financial constraints as well.  Fortunately she has no new swelling in the leg or shortness of breath to suggest worsening DVT or PE.  I am concerned that there is evidence of at least cellulitis versus abscess of that left middle toe.  Given history of toe amputations, I discussed the case with podiatry who recommended IV vancomycin and Zosyn and MRI.  Patient was admitted to the hospital for further podiatry evaluation and antibiotics.  MRI was pending upon admission.    Diagnosis:    ICD-10-CM    1. Open wound of toe, initial encounter S91.109A Basic metabolic panel     CRP inflammation     Erythrocyte sedimentation rate auto     INR       Disposition:  Admitted to Med/Surg    Scribe Disclosure:  Stephany LY, am serving as a scribe at 9:47 AM on 11/2/2018 to document services personally performed by Chaz Silvestre MD based on my observations and the provider's statements to me.    11/2/2018   [facility] EMERGENCY DEPARTMENT       Chaz Silvestre MD  11/02/18 3248

## 2018-11-02 NOTE — ED NOTES
[facility]  ED Nurse Handoff Report    Roxy Beaulieu is a 58 year old female   ED Chief complaint: Toe Growth  . ED Diagnosis:   Final diagnoses:   Open wound of toe, initial encounter     Allergies: No Known Allergies    Code Status: Full Code  Activity level - Baseline/Home:  Independent. Activity Level - Current:   Independent. Lift room needed: No. Bariatric: No   Needed: No   Isolation: No. Infection: Not Applicable.     Vital Signs:   Vitals:    11/02/18 1051 11/02/18 1053 11/02/18 1100 11/02/18 1130   BP: 184/85  194/85 184/88   Resp:       Temp:       SpO2:  98% 99% 97%   Weight:           Cardiac Rhythm:  ,      Pain level:    Patient confused: No. Patient Falls Risk: Yes.   Elimination Status: Has voided   Patient Report - Initial Complaint:Patient presents with a dark colored growth on her left foot on the tip of the 2nd digit, has been there for about 3 days and thought it would have gone away but has not, patient has a HX of diabetes and neuropathy of BL LE    . Focused Assessment:    Patient has been non compliant with her medications to which she states she can not afford,  was contacted and she stated that she has spoken with this patient before and that she has set her up with our financial services to help cover her medical expenses but patient did not follow through.  worked stated that she will come and speak with her again and what she can work to help the patient get her medicaitons NE    09:50 Musculoskeletal Musculoskeletal - Musculoskeletal WDL:  WDL except Musculoskeletal Comment: Patient has dark growth that appears to be blood X3 days, states no pain, has HX of neurapathy from diabetes, growth is on left foot on the second digit     Tests Performed: Labs, MRI ordered but not yet complete ETA for MRI is 1300. Abnormal Results:   Labs Ordered and Resulted from Time of ED Arrival Up to the Time of Departure from the ED   CBC WITH  PLATELETS DIFFERENTIAL - Abnormal; Notable for the following:        Result Value    Hemoglobin 10.5 (*)     Hematocrit 31.9 (*)     All other components within normal limits   BASIC METABOLIC PANEL - Abnormal; Notable for the following:     Sodium 129 (*)     Glucose 516 (*)     Urea Nitrogen 44 (*)     Creatinine 2.63 (*)     GFR Estimate 19 (*)     GFR Estimate If Black 22 (*)     All other components within normal limits   CRP INFLAMMATION - Abnormal; Notable for the following:     CRP Inflammation 23.2 (*)     All other components within normal limits   ERYTHROCYTE SEDIMENTATION RATE AUTO - Abnormal; Notable for the following:     Sed Rate >140 (*)     All other components within normal limits   GLUCOSE BY METER - Abnormal; Notable for the following:     Glucose 458 (*)     All other components within normal limits   INR   .   Treatments provided: Fluid, Zosyn  Family Comments: Patient has contacted sister but is not currently present  OBS brochure/video discussed/provided to patient:  No  ED Medications:   Medications   0.9% sodium chloride BOLUS (0 mLs Intravenous Stopped 11/2/18 1135)     Followed by   sodium chloride 0.9% infusion (not administered)   piperacillin-tazobactam (ZOSYN) infusion 3.375 g (0 g Intravenous Stopped 11/2/18 1135)     Drips infusing:  No  For the majority of the shift, the patient's behavior Green. Interventions performed were N/A.     Severe Sepsis OR Septic Shock Diagnosis Present: No      ED Nurse Name/Phone Number: Marlon Burrell,   11:01 AM      RECEIVING UNIT ED HANDOFF REVIEW    Above ED Nurse Handoff Report was reviewed: Yes  Reviewed by: Maile Knott on November 2, 2018 at 1:26 PM

## 2018-11-02 NOTE — PLAN OF CARE
Problem: Patient Care Overview  Goal: Plan of Care/Patient Progress Review  Outcome: No Change  Pt arrived from ED at 1345; up with SBA-cane, A & O  HTN at 163/76  Wound to L 2nd (truly 3rd) toe, black- betadine, dry gauze placed  Awaiting MRI  IV zosyn  IV heparin drip initiated   Podiatry consulted

## 2018-11-02 NOTE — ED TRIAGE NOTES
Patient presents with a dark colored growth on her left foot on the tip of the 2nd digit, has been there for about 3 days and thought it would have gone away but has not, patient has a HX of diabetes and neuropathy of BL LE

## 2018-11-02 NOTE — PROGRESS NOTES
PA Attestation:     Physician Attestation   I, Gurjit Chaparro, saw and evaluated Roxy Beaulieu as part of a shared visit.  I have reviewed and discussed with the advanced practice provider their history, physical and plan.    I personally reviewed the vital signs, medications, labs and imaging.    My key history or physical exam findings: Pleasant and nontoxic-appearing 58-year-old woman recently admitted on 10/4 through 10/8 for acute left lower extremity DVT with probable PE and hypoxia at that time (not verified both to avoid contrast and because it was not felt to change the plan of care) who has been off essentially all medications recently due to financial concerns including Coumadin and insulin who presents with left third toe erythema and swelling found to have cellulitis with concern for deeper infection.    Key management decisions made by me:     1.  Third toe cellulitis, suspected osteomyelitis: Await MRI, plan on podiatry consult.  She was given a dose of vancomycin in the ER which given her kidney dysfunction will likely be therapeutic for at least 24 hours.  We will hold off on re-dosing that now to avoid further nephrotoxicity.  Continue Zosyn, pharmacy to adjust based on renal function.    2.  Uncontrolled insulin dependent diabetes: Due to insulin noncompliance, also could be worse due to acute infection though I suspect her infection is in large part due to uncontrolled diabetes and med noncompliance.  --Restart home insulin mix  -We will change to every 4 hours sliding scale so that she gets an extra dose overnight and since she will likely be n.p.o. after midnight    3.  Financial/social concerns: She does not have insurance and does not seem yet to be in the process of pursuing this.  Will consult social work to help expedite this.    4.  Recent diagnosis of DVT and presumed PE: Heparin drip for now in part for the rapid onset and short half-life in case surgery is needed.   Generally would plan to turn this off at least 3 hours prior to any potential surgery though some surgeons prefer 6 hours.    Gurjit Chaparro  Date of Service (when I saw the patient): 11/02/18

## 2018-11-02 NOTE — PHARMACY-ADMISSION MEDICATION HISTORY
Admission medication history interview status for this patient is complete. See [facility] admission navigator for allergy information, prior to admission medications and immunization status.     Medication history interview source(s):Patient  Medication history resources (including written lists, pill bottles, clinic record):None  Primary pharmacy:[facility]    Changes made to PTA medication list:  Added: Relion 70/30  Deleted: novolog 1-10 units TID with meals, Novolog 1-7 units at bedtime, novolog 3 units before meals, Lantus 15 units at bedtime (all prescribed on last admission)  Changed: none    Actions taken by pharmacist (provider contacted, etc):Called [facility] for strengths     Additional medication history information:  Please note that the patient ran out of all prescriptions and stopped taking them 4 days ago. Patient was prescribed to start novolog and Lantus after last admission but once they ran out, she wanted to go back to the Relion 70/30 mix she used to do, however she has not picked that up yet.     Medication reconciliation/reorder completed by provider prior to medication history? No    Do you take OTC medications (eg tylenol, ibuprofen, fish oil, eye/ear drops, etc)? N(Y/N)    For patients on insulin therapy: Y (Y/N)  Lantus/levemir/NPH/Mix 70/30 dose:   (Y) (see Med list for doses)   Sliding scale Novolog see note   Any Barriers to therapy - Be specific :  cost of medications, comfortable with giving injections (if applicable), comfortable and confident with current diabetes regimen: Y _Patient states that she can not afford insulin right now__    Time spent in this activity: 15 min    Prior to Admission medications    Medication Sig Last Dose Taking? Auth Provider   Insulin NPH Isophane & Regular (RELION 70/30 SC) Inject 10-25 Units Subcutaneous every evening (inject 10 units of BG is < 200, give 20 units if BG between 200 and 300, give 25 units if BG > 300)  at ran out  Unknown, Entered By History    amLODIPine (NORVASC) 10 MG tablet Take 1 tablet (10 mg) by mouth daily  at ran out  Zully Wise MD   atorvastatin (LIPITOR) 20 MG tablet Take 1 tablet (20 mg) by mouth every evening  at never started  Zully Wise MD   bumetanide (BUMEX) 0.5 MG tablet Take 1 tablet (0.5 mg) by mouth daily  at ran out  Zully Wise MD   Ergocalciferol (VITAMIN D) 78527 units CAPS Take 50,000 Units by mouth every 7 days  at never filled  Zully Wise MD   Insulin NPH Isophane & Regular (RELION 70/30 SC) Inject 20 Units Subcutaneous every morning  at ran out  Unknown, Entered By History   metoprolol tartrate (LOPRESSOR) 50 MG tablet Take 1 tablet (50 mg) by mouth 2 times daily   at ran out  Zully Wise MD   warfarin (COUMADIN) 2 MG tablet Q 6 PM  Take 10 mg today & get INR in AM 10/9 for further dosing.  at [facility] out  Zully Wise MD

## 2018-11-02 NOTE — CONSULTS
"PATIENT HISTORY:  Roxy Beaulieu is a 58 year old female who was admitted for left foot ulcer concern for deeper infection. .      I was asked to see Roxy Beaulieu  by Dr.Dr. Milner for left toe ulcer.    She was seen at bedside. Notes that it started as a blood blister a week ago and today became red and \"mushy\". No pain due to neuropathy.      Review of Systems:  Patient denies fever, chills, rash,  stiffness, limping, weakness, heart burn, blood in stool, chest pain with activity, calf pain when walking, shortness of breath with activity, chronic cough, easy bleeding/bruising, swelling of ankles, excessive thirst, fatigue, depression, anxiety.  Patient admits to numbness, wound, .     PAST MEDICAL HISTORY:   Past Medical History:   Diagnosis Date     Diabetes (H)      Gangrene of toe (H) 4/29/2017     High cholesterol      Hypertension      Obesity 4/29/2017     Personal history of tobacco use, presenting hazards to health 4/29/2017     Uncontrolled type 2 diabetes mellitus with hyperglycemia, with long-term current use of insulin (H) 4/29/2017        PAST SURGICAL HISTORY:   Past Surgical History:   Procedure Laterality Date     AMPUTATE TOE(S) Left 5/1/2017    Procedure: AMPUTATE TOE(S);  Amputate first and second left toes;  Surgeon: Verna Fofana DPM, Podiatry/Foot and Ankle Surgery;  Location:  OR     OPEN REDUCTION INTERNAL FIXATION FIBULA Left 3/16/2018    Procedure: OPEN REDUCTION INTERNAL FIXATION FIBULA;  Open reduction and internal fixation of displaced left lateral malleolar fracture;  Surgeon: Sumanth Wen MD;  Location:  OR        MEDICATIONS:   Current Facility-Administered Medications:      acetaminophen (TYLENOL) tablet 650 mg, 650 mg, Oral, Q4H PRN, Lore Milner PA-C     amLODIPine (NORVASC) tablet 10 mg, 10 mg, Oral, Daily, Lore Milner PA-C, 10 mg at 11/02/18 1442     atorvastatin (LIPITOR) tablet 20 mg, 20 mg, Oral, QPM, Lore Milner PA-C     " bumetanide (BUMEX) tablet 0.5 mg, 0.5 mg, Oral, Daily, Lore Milner PA-C, 0.5 mg at 11/02/18 1506     glucose gel 15-30 g, 15-30 g, Oral, Q15 Min PRN **OR** dextrose 50 % injection 25-50 mL, 25-50 mL, Intravenous, Q15 Min PRN **OR** glucagon injection 1 mg, 1 mg, Subcutaneous, Q15 Min PRN, Lore Milner PA-C     heparin infusion 25,000 units in 0.45% NaCl 250 mL, 1,250 Units/hr, Intravenous, Continuous, Lore Milner PA-C, Last Rate: 12.5 mL/hr at 11/02/18 1447, 1,250 Units/hr at 11/02/18 1447     [START ON 11/3/2018] influenza recomb quadrivalent PF (FLUBLOK) injection 0.5 mL, 0.5 mL, Intramuscular, Prior to discharge, Gurjit Chaparro MD     insulin aspart (NovoLOG) inj (RAPID ACTING), 1-6 Units, Subcutaneous, Q4H, Gurjit Chaparro MD     insulin isophane & regular (HumuLIN MIX 70/30 PEN , NovoLIN MIX 70/30 PEN) injection 20 Units, 20 Units, Subcutaneous, BID AC, Gurjit Chaparro MD, 20 Units at 11/02/18 1443     lidocaine (LMX4) cream, , Topical, Q1H PRN, Lore Milner PA-C     lidocaine 1 % 1 mL, 1 mL, Other, Q1H PRN, Lore Milner PA-C     melatonin tablet 1 mg, 1 mg, Oral, At Bedtime PRN, Lore Milner PA-C     metoprolol tartrate (LOPRESSOR) tablet 50 mg, 50 mg, Oral, BID, Lore Milner PA-C     naloxone (NARCAN) injection 0.1-0.4 mg, 0.1-0.4 mg, Intravenous, Q2 Min PRN, Lore Milner PA-C     ondansetron (ZOFRAN-ODT) ODT tab 4 mg, 4 mg, Oral, Q6H PRN **OR** ondansetron (ZOFRAN) injection 4 mg, 4 mg, Intravenous, Q6H PRN, Lore Milner PA-C     oxyCODONE IR (ROXICODONE) tablet 5-10 mg, 5-10 mg, Oral, Q3H PRN, Lore Milner PA-C     piperacillin-tazobactam (ZOSYN) infusion 2.25 g, 2.25 g, Intravenous, Q6H, Lore Milner PA-C     polyethylene glycol (MIRALAX/GLYCOLAX) Packet 17 g, 17 g, Oral, Daily PRN, Lore Milner PA-C     senna-docusate (SENOKOT-S;PERICOLACE) 8.6-50 MG per tablet 1  "tablet, 1 tablet, Oral, BID PRN **OR** senna-docusate (SENOKOT-S;PERICOLACE) 8.6-50 MG per tablet 2 tablet, 2 tablet, Oral, BID PRN, Lore Milner PA-C     sodium chloride (PF) 0.9% PF flush 3 mL, 3 mL, Intracatheter, Q1H PRN, Lore Milner PA-C     sodium chloride (PF) 0.9% PF flush 3 mL, 3 mL, Intracatheter, Q8H, Lore Milner PA-C     [START ON 11/3/2018] sodium chloride 0.9% infusion, , Intravenous, Continuous, Gurjit Chaparro MD     ALLERGIES:  No Known Allergies     SOCIAL HISTORY:   Social History     Social History     Marital status: Single     Spouse name: N/A     Number of children: N/A     Years of education: N/A     Occupational History     Not on file.     Social History Main Topics     Smoking status: Former Smoker     Quit date: 7/25/2016     Smokeless tobacco: Never Used     Alcohol use No     Drug use: No     Sexual activity: Not on file     Other Topics Concern     Not on file     Social History Narrative        FAMILY HISTORY: No family history on file.     EXAM:Vitals: /76 (BP Location: Left arm)  Temp 98.5  F (36.9  C) (Oral)  Resp 18  Ht 1.727 m (5' 8\")  Wt 81.2 kg (179 lb 1.6 oz)  SpO2 98%  BMI 27.23 kg/m2  BMI= Body mass index is 27.23 kg/(m^2).    LABS:    WBC   Date Value Ref Range Status   11/02/2018 10.3 4.0 - 11.0 10e9/L Final     HA1C: 11.6 (11/2018)    INR: 1.05    General appearance: Patient is alert and fully cooperative with history & exam.  No sign of distress is noted during the visit.      Psychiatric: Affect is pleasant & appropriate.  Patient appears motivated to improve health.       Respiratory: Breathing is regular & unlabored while sitting.      HEENT: Hearing is intact to spoken word.  Speech is clear.  No gross evidence of visual impairment that would impact ambulation.       Dermatologic: full thickness ulceration to the distal left 3rd toe. Bone exposed. Redness to the toe and dorsal foot.      Vascular: DP & PT pulses are " faintly palpable bilaterally.   edema and varicosities noted.  CFT and skin temperature is normal to both lower extremities.       Neurologic: Lower extremity sensation is dminished to both feet.     Musculoskeletal: Patient is ambulatory without assistive device or brace. Partial left great toe amputation and left 2nd toe amputation. .      IMAGING: mri left foot: I personally reviewed images.  Increased signal intensity to distal phalanx left 3rd toe with abscess noted.        ASSESSMENT: 58 yr old diabetic female on dialysis with ulcer to left 3rd toe, osteomyelitis and cellulitis of left foot.      PLAN:  Reviewed patient's chart in epic.  -Reviewed mri results with patient.   -recommend at least partial amputation of the left 3rd toe. Discussed worsening infection if no surgical intervention.   -patient on for 8am tomorrow.   -NPO after midnight.  -Please hold heparin dose 3 hours before surgery.     Verna Fofana DPM, Podiatry/Foot and Ankle Surgery    4:45 PM

## 2018-11-02 NOTE — IP AVS SNAPSHOT
MRN:[id number]                      After Visit Summary   11/2/2018    Roxy Beaulieu    MRN: [id number]           Thank you!     Thank you for choosing [facility] for your care. Our goal is always to provide you with excellent care. Hearing back from our patients is one way we can continue to improve our services. Please take a few minutes to complete the written survey that you may receive in the mail after you visit. If you would like to speak to someone directly about your visit please contact Patient Relations at [phone]. Thank you!          Patient Information     Date Of Birth          1960        Designated Caregiver       Most Recent Value    Caregiver    Will someone help with your care after discharge? no      About your hospital stay     You were admitted on:  November 2, 2018 You last received care in the:  [place] [address] [facility]    You were discharged on:  November 5, 2018        Reason for your hospital stay       You were admitted for an infected third toe.  You were treated with IV antibiotics and surgery (partial toe amputation).  At this point you will continue treatment with oral antibiotics and it will be imperative that you achieve good blood sugar control to prevent this from worsening/becoming infected again.    As discussed, you were insistent on discharging home today though I have concerns about your kidney function in the setting of a rising creatinine which indicates worsening kidney function.  I did recommend that you remain here in the hospital for monitoring and treatment but I understand that you feel it is more important to get out of the hospital to manage other responsibilities.      It is imperative that you follow-up in clinic within the next 2 days to have this reevaluated and consider any medication dose adjustments.  Worsening kidney function puts you at risk for confusion, severe electrolyte abnormalities which could  "be dangerous for your heart or even cause death.    Also, you have been restarted on coumadin after stopping this a few days prior to admission and will need to repeat \"bridging\" treatment with lovenox again for your DVT.  Please have an INR checked within 2 days as discussed as it is unclear exactly what dose of Coumadin you will need.                  Who to Call     For medical emergencies, please call 911.  For non-urgent questions about your medical care, please call your primary care provider or clinic, [phone]  For questions related to your surgery, please call your surgery clinic        Attending Provider     Provider Specialty    Chaz Silvestre MD Emergency Medicine    Gurjit Chaparro MD Internal Medicine       Primary Care Provider Office Phone # Fax #    Dwain [place] [phone]      After Care Instructions     Activity       Your activity upon discharge:  Minimal weight bearing in post op shoe            Diet       Follow this diet upon discharge: Orders Placed This Encounter      Advance Diet as Tolerated: Regular Diet Adult; 3976-2318 Calories: Moderate Consistent CHO (4-6 CHO units/meal)            Wound care and dressings       Instructions to care for your wound at home:  Keep foot and dressing dry. Will changes dressing at first office visit. Bag up if you shower.                  Follow-up Appointments     Follow-up and recommended labs and tests        Patient to follow up with Dr. Fofana in 1 week from discharge from the hospital.     For APPOINTMENTS: [phone].  For questions or concerns: call: [phone]            Follow-up and recommended labs and tests        Follow up with primary care provider, Physician within 2 days for hospital follow- up.  The following labs/tests are recommended: basic metabolic panel to recheck your kidney function and an INR to evaluate coumadin dosing.                  Warfarin Instruction     You have started taking a " "medicine called warfarin. This is a blood-thinning medicine (anticoagulant). It helps prevent and treat blood clots.      Before leaving the hospital, make sure you know how much to take and how long to take it.      You will need regular blood tests to make sure your blood is clotting safely. It is very important to see your doctor for regular blood tests.    Talk to your doctor before taking any new medicine (this includes over-the-counter drugs and herbal products). Many medicines can interact with warfarin. This may cause more bleeding or too much clotting.     Eating a lot of vitamin K--found in green, leafy vegetables--can change the way warfarin works in your body. Do NOT avoid these foods. Instead, try to eat the same amount each day.     Bleeding is the most common side-effect of warfarin. You may notice bleeding gums, a bloody nose, bruises and bleeding longer when you cut yourself. See a doctor at once if:   o You cough up blood  o You find blood in your stool (poop)  o You have a deep cut, or a cut that bleeds longer than 10 minutes   o You have a bad cut, hard fall, accident or hit your head (go to urgent care or the emergency room).    For women who can get pregnant: This medicine can harm an unborn baby. Be very careful not to get pregnant while taking this medicine. If you think you might be pregnant, call your doctor right away.    For more information, read \"Guide to Warfarin Therapy,  the booklet you received in the hospital.        Pending Results     Date and Time Order Name Status Description    11/3/2018 0831 Surgical pathology exam In process     11/3/2018 0830 Anaerobic bacterial culture Preliminary             Statement of Approval     Ordered          11/05/18 0957  I have reviewed and agree with all the recommendations and orders detailed in this document.  EFFECTIVE NOW     Approved and electronically signed by:  Gurjit Chaparro MD             Admission Information     Date " "& Time Provider Department Dept. Phone    2018 Gurjit Chaparro MD [place] [address] [facility] [phone]      Your Vitals Were     Blood Pressure Temperature Respirations Height Weight Pulse Oximetry    172/61 (BP Location: Left arm) 98  F (36.7  C) 20 1.727 m (5' 8\") 86.8 kg (191 lb 4.8 oz) 97%    BMI (Body Mass Index)                   29.09 kg/m2           [employer] Information     [employer] lets you send messages to your doctor, view your test results, renew your prescriptions, schedule appointments and more. To sign up, go to [email or web address] . Click on \"Log in\" on the left side of the screen, which will take you to the Welcome page. Then click on \"Sign up Now\" on the right side of the page.     You will be asked to enter the access code listed below, as well as some personal information. Please follow the directions to create your username and password.     Your access code is: [email or web address]  Expires: 2019  4:57 PM     Your access code will  in 90 days. If you need help or a new code, please call your [place] clinic or [phone].        Care EveryWhere ID     This is your Care EveryWhere ID. This could be used by other organizations to access your [place] medical records  [id number]        Equal Access to Services     [facility]: Hadii dary justiceo Sopatrick, waaxda luqadaha, qaybta kaalmada adecarminayada, harshil dow . So [facility] [phone].    ATENCIÓN: Si habla español, tiene a cramer disposición servicios gratuitos de asistencia lingüística. Llame al [phone].    We comply with applicable federal civil rights laws and [place] laws. We do not discriminate on the basis of race, color, national origin, age, disability, sex, sexual orientation, or gender identity.               Review of your medicines      START taking        Dose / Directions    amoxicillin-clavulanate 500-125 MG per tablet   Commonly known as:  AUGMENTIN "   Indication:  Infection of the Skin and/or Related Soft Tissue   Used for:  Open wound of toe, initial encounter        Dose:  1 tablet   Take 1 tablet by mouth every 12 hours for 19 doses Starting the evening of discharge   Quantity:  19 tablet   Refills:  0       enoxaparin 100 MG/ML injection   Commonly known as:  LOVENOX   Used for:  Deep vein thrombosis (DVT) of proximal lower extremity, unspecified chronicity, unspecified laterality (H)        Dose:  80 mg   Start taking on:  11/6/2018   Inject 0.8 mLs (80 mg) Subcutaneous every 24 hours Until your INR is >2   Quantity:  3 Syringe   Refills:  0       insulin aspart 100 UNIT/ML injection   Commonly known as:  NovoLOG PEN   Used for:  Uncontrolled type 2 diabetes mellitus with hyperglycemia, with long-term current use of insulin (H)        Correction scale insulin: Three times daily with meals: -199: 2U, -249: 3 U, -299: 4 U, -349: 5 U, BG >350: 6 u   Quantity:  21 mL   Refills:  0       insulin isophane & regular susp   Commonly known as:  HumuLIN MIX 70/30 PEN , NovoLIN MIX 70/30 PEN   Used for:  Uncontrolled type 2 diabetes mellitus with hyperglycemia, with long-term current use of insulin (H)   Replaces:  RELION 70/30 SC        Dose:  20 Units   Inject 20 Units Subcutaneous 2 times daily (before meals)   Quantity:  21 mL   Refills:  0         CONTINUE these medicines which may have CHANGED, or have new prescriptions. If we are uncertain of the size of tablets/capsules you have at home, strength may be listed as something that might have changed.        Dose / Directions    warfarin 5 MG tablet   Commonly known as:  COUMADIN   This may have changed:    - medication strength  - how much to take  - how to take this  - when to take this  - additional instructions   Used for:  Acute deep vein thrombosis (DVT) of distal vein of lower extremity, unspecified laterality (H)        Dose:  5 mg   Start taking on:  11/6/2018   Take 1 tablet (5  mg) by mouth daily Q 6 PM get INR in AM 11/7/18 for further dosing and med refill   Quantity:  10 tablet   Refills:  0         CONTINUE these medicines which have NOT CHANGED        Dose / Directions    amLODIPine 10 MG tablet   Commonly known as:  NORVASC   Used for:  Hypertension, unspecified type        Dose:  10 mg   Take 1 tablet (10 mg) by mouth daily   Quantity:  30 tablet   Refills:  0       atorvastatin 20 MG tablet   Commonly known as:  LIPITOR   Used for:  Dyslipidemia        Dose:  20 mg   Take 1 tablet (20 mg) by mouth every evening   Quantity:  30 tablet   Refills:  0       bumetanide 0.5 MG tablet   Commonly known as:  BUMEX   Used for:  Hypertension, unspecified type        Dose:  0.5 mg   Take 1 tablet (0.5 mg) by mouth daily   Quantity:  30 tablet   Refills:  0       DRISDOL 91267 units Caps   Used for:  Stage 3 chronic kidney disease (H)        Dose:  90060 Units   Take 50,000 Units by mouth every 7 days   Quantity:  30 capsule   Refills:  0       metoprolol tartrate 50 MG tablet   Commonly known as:  LOPRESSOR   Used for:  Hypertension, unspecified type        Dose:  50 mg   Take 1 tablet (50 mg) by mouth 2 times daily   Quantity:  60 tablet   Refills:  0         STOP taking     RELION 70/30 SC   Replaced by:  insulin isophane & regular susp                Where to get your medicines      These medications were sent to [facility] [address] [address] [address]     Phone:  [phone]     amoxicillin-clavulanate 500-125 MG per tablet    enoxaparin 100 MG/ML injection    insulin aspart 100 UNIT/ML injection    insulin isophane & regular susp         Some of these will need a paper prescription and others can be bought over the counter. Ask your nurse if you have questions.     Bring a paper prescription for each of these medications     warfarin 5 MG tablet                Protect others around you: Learn how to safely use,  store and throw away your medicines at www.disposemymeds.org.        ANTIBIOTIC INSTRUCTION     You've Been Prescribed an Antibiotic - Now What?  Your healthcare team thinks that you or your loved one might have an infection. Some infections can be treated with antibiotics, which are powerful, life-saving drugs. Like all medications, antibiotics have side effects and should only be used when necessary. There are some important things you should know about your antibiotic treatment.      Your healthcare team may run tests before you start taking an antibiotic.    Your team may take samples (e.g., from your blood, urine or other areas) to run tests to look for bacteria. These test can be important to determine if you need an antibiotic at all and, if you do, which antibiotic will work best.      Within a few days, your healthcare team might change or even stop your antibiotic.    Your team may start you on an antibiotic while they are working to find out what is making you sick.    Your team might change your antibiotic because test results show that a different antibiotic would be better to treat your infection.    In some cases, once your team has more information, they learn that you do not need an antibiotic at all. They may find out that you don't have an infection, or that the antibiotic you're taking won't work against your infection. For example, an infection caused by a virus can't be treated with antibiotics. Staying on an antibiotic when you don't need it is more likely to be harmful than helpful.      You may experience side effects from your antibiotic.    Like all medications, antibiotics have side effects. Some of these can be serious.    Let you healthcare team know if you have any known allergies when you are admitted to the hospital.    One significant side effect of nearly all antibiotics is the risk of severe and sometimes deadly diarrhea caused by Clostridium difficile (C. Difficile). This occurs  when a person takes antibiotics because some good germs are destroyed. Antibiotic use allows C. diificile to take over, putting patients at high risk for this serious infection.    As a patient or caregiver, it is important to understand your or your loved one's antibiotic treatment. It is especially important for caregivers to speak up when patients can't speak for themselves. Here are some important questions to ask your healthcare team.    What infection is this antibiotic treating and how do you know I have that infection?    What side effects might occur from this antibiotic?    How long will I need to take this antibiotic?    Is it safe to take this antibiotic with other medications or supplements (e.g., vitamins) that I am taking?     Are there any special directions I need to know about taking this antibiotic? For example, should I take it with food?    How will I be monitored to know whether my infection is responding to the antibiotic?    What tests may help to make sure the right antibiotic is prescribed for me?      Information provided by:  www.cdc.gov/getsmart  U.S. Department of Health and Human Services  Centers for disease Control and Prevention  National Center for Emerging and Zoonotic Infectious Diseases  Division of Healthcare Quality Promotion             Medication List: This is a list of all your medications and when to take them. Check marks below indicate your daily home schedule. Keep this list as a reference.      Medications           Morning Afternoon Evening Bedtime As Needed    amLODIPine 10 MG tablet   Commonly known as:  NORVASC   Take 1 tablet (10 mg) by mouth daily   Last time this was given:  10 mg on 11/5/2018  8:39 AM   Next Dose Due:  11/6:AM                                   amoxicillin-clavulanate 500-125 MG per tablet   Commonly known as:  AUGMENTIN   Take 1 tablet by mouth every 12 hours for 19 doses Starting the evening of discharge   Last time this was given:  1 tablet  on 11/5/2018 12:10 PM   Next Dose Due:  11/5: 10:00 PM                                      atorvastatin 20 MG tablet   Commonly known as:  LIPITOR   Take 1 tablet (20 mg) by mouth every evening   Last time this was given:  20 mg on 11/4/2018  9:32 PM   Next Dose Due:  11/5: evening                                   bumetanide 0.5 MG tablet   Commonly known as:  BUMEX   Take 1 tablet (0.5 mg) by mouth daily   Last time this was given:  0.5 mg on 11/2/2018  3:06 PM   Next Dose Due:  11/6:AM                                   DRISDOL 99532 units Caps   Take 50,000 Units by mouth every 7 days   Next Dose Due:  Resume home schedule                                 enoxaparin 100 MG/ML injection   Commonly known as:  LOVENOX   Inject 0.8 mLs (80 mg) Subcutaneous every 24 hours Until your INR is >2   Start taking on:  11/6/2018   Last time this was given:  90 mg on 11/4/2018  5:37 PM   Next Dose Due:  11/6: evening                                   insulin aspart 100 UNIT/ML injection   Commonly known as:  NovoLOG PEN   Correction scale insulin: Three times daily with meals: -199: 2U, -249: 3 U, -299: 4 U, -349: 5 U, BG >350: 6 u   Last time this was given:  3 Units on 11/5/2018 12:13 PM   Next Dose Due:  11/5: evening                                         insulin isophane & regular susp   Commonly known as:  HumuLIN MIX 70/30 PEN , NovoLIN MIX 70/30 PEN   Inject 20 Units Subcutaneous 2 times daily (before meals)   Last time this was given:  24 Units on 11/5/2018  8:41 AM   Next Dose Due:  11/5: 6:00PM, before meals                                      metoprolol tartrate 50 MG tablet   Commonly known as:  LOPRESSOR   Take 1 tablet (50 mg) by mouth 2 times daily   Last time this was given:  50 mg on 11/5/2018  8:39 AM   Next Dose Due:  11/5: 9:00pm                                      warfarin 5 MG tablet   Commonly known as:  COUMADIN   Take 1 tablet (5 mg) by mouth daily Q 6 PM get INR in AM  11/7/18 for further dosing and med refill   Start taking on:  11/6/2018   Last time this was given:  5 mg on 11/4/2018  5:36 PM   Next Dose Due:  11/6: AM

## 2018-11-02 NOTE — ED NOTES
Patient has been non compliant with her medications to which she states she can not afford,  was contacted and she stated that she has spoken with this patient before and that she has set her up with our financial services to help cover her medical expenses but patient did not follow through.  worked stated that she will come and speak with her again and what she can work to help the patient get her medicaitons

## 2018-11-02 NOTE — IP AVS SNAPSHOT
` ` Patient Information     Patient Name Sex Roxy Palomino ([id number]) Female 1960       Room Bed    5077-      Patient Demographics     Address Phone    [address] [place] [address] [phone] (Home)  none (Work)  [phone] (Mobile) *Preferred*      Patient Ethnicity & Race     Ethnic Group Patient Race    American White      Emergency Contact(s)     Name Relation Home Work Mobile    KANNAN ZUNIGA [phone]      Documents on File        Status Date Received Description       Documents for the Patient    Affiliate Privacy placeholder   phase3    Consent for Services - Hospital/Clinic Received () 09/16/15     Consent for EHR Access Received 09/16/15     Privacy Notice - [place] Received 09/16/15     Insurance Card       External Medication Information Consent       Patient ID Received 18 [place] DL exp 19    [facility] Specified Other       Consent for Services/Privacy Notice - Hospital/Clinic Received () 16     Care Everywhere Prospective Auth Received 03/15/18     Patient ID Received 03/15/18     Consent for Services/Privacy Notice - Hospital/Clinic Received 03/15/18     Consent for Services - Hospital and Clinic Received 18     HIE Auth Received 18     HIM AIRAM Authorization  18        Documents for the Encounter    Photograph       Photograph       CMS IM for Patient Signature         Admission Information     Attending Provider Admitting Provider Admission Type Admission Date/Time    Gurjit Chaparro MD Foehrenbacher, Matthew Henry, MD Emergency 18  0938    Discharge Date Hospital Service Auth/Cert Status Service Area     General Medicine [facility]    Unit Room/Bed Admission Status        5 MEDICAL SURGICAL  Admission (Confirmed)       Admission     Complaint    Ulcer of toe of left foot (H), unknown      Hospital Account     Name Acct ID Class Status  Primary Coverage    Christofer Roxy L [id number] Inpatient Open None            Guarantor Account (for Hospital Account #[id number])     Name Relation to Pt Service Area Active? Acct Type    Roxy Beaulieu Self FCS Yes Personal/Family    Address Phone          [address]  [place] [address] [phone](H)  none(O)              Coverage Information (for Hospital Account #[id number])     Not on file

## 2018-11-02 NOTE — IP AVS SNAPSHOT
[place] [address] [facility]    [address]    [facility] [address]    Phone:  [phone]    Fax:  [phone]                                       After Visit Summary   11/2/2018    Roxy Beaulieu    MRN: [id number]           After Visit Summary Signature Page     I have received my discharge instructions, and my questions have been answered. I have discussed any challenges I see with this plan with the nurse or doctor.    ..........................................................................................................................................  Patient/Patient Representative Signature      ..........................................................................................................................................  Patient Representative Print Name and Relationship to Patient    ..................................................               ................................................  Date                                   Time    ..........................................................................................................................................  Reviewed by Signature/Title    ...................................................              ..............................................  Date                                               Time          22EPIC Rev 08/18

## 2018-11-02 NOTE — PROGRESS NOTES
signout given to Dr. Gamble of the Hospitalist service at [facility] who graciously accepted the patient.    Gurjit Chaparro MD

## 2018-11-03 ENCOUNTER — SURGERY (OUTPATIENT)
Age: 58
End: 2018-11-03

## 2018-11-03 ENCOUNTER — ANESTHESIA EVENT (OUTPATIENT)
Dept: SURGERY | Facility: CLINIC | Age: 58
End: 2018-11-03
Payer: MEDICAID

## 2018-11-03 ENCOUNTER — ANESTHESIA (OUTPATIENT)
Dept: SURGERY | Facility: CLINIC | Age: 58
End: 2018-11-03
Payer: MEDICAID

## 2018-11-03 ENCOUNTER — APPOINTMENT (OUTPATIENT)
Dept: GENERAL RADIOLOGY | Facility: CLINIC | Age: 58
End: 2018-11-03
Attending: PODIATRIST
Payer: MEDICAID

## 2018-11-03 LAB
ANION GAP SERPL CALCULATED.3IONS-SCNC: 6 MMOL/L (ref 3–14)
BASOPHILS # BLD AUTO: 0 10E9/L (ref 0–0.2)
BASOPHILS NFR BLD AUTO: 0.5 %
BUN SERPL-MCNC: 44 MG/DL (ref 7–30)
CALCIUM SERPL-MCNC: 8 MG/DL (ref 8.5–10.1)
CHLORIDE SERPL-SCNC: 106 MMOL/L (ref 94–109)
CO2 SERPL-SCNC: 26 MMOL/L (ref 20–32)
CREAT SERPL-MCNC: 2.78 MG/DL (ref 0.52–1.04)
DIFFERENTIAL METHOD BLD: ABNORMAL
EOSINOPHIL # BLD AUTO: 0.2 10E9/L (ref 0–0.7)
EOSINOPHIL NFR BLD AUTO: 2.4 %
ERYTHROCYTE [DISTWIDTH] IN BLOOD BY AUTOMATED COUNT: 14 % (ref 10–15)
GFR SERPL CREATININE-BSD FRML MDRD: 17 ML/MIN/1.7M2
GLUCOSE BLDC GLUCOMTR-MCNC: 190 MG/DL (ref 70–99)
GLUCOSE BLDC GLUCOMTR-MCNC: 220 MG/DL (ref 70–99)
GLUCOSE BLDC GLUCOMTR-MCNC: 235 MG/DL (ref 70–99)
GLUCOSE BLDC GLUCOMTR-MCNC: 241 MG/DL (ref 70–99)
GLUCOSE BLDC GLUCOMTR-MCNC: 259 MG/DL (ref 70–99)
GLUCOSE BLDC GLUCOMTR-MCNC: 311 MG/DL (ref 70–99)
GLUCOSE BLDC GLUCOMTR-MCNC: 372 MG/DL (ref 70–99)
GLUCOSE BLDC GLUCOMTR-MCNC: 406 MG/DL (ref 70–99)
GLUCOSE SERPL-MCNC: 263 MG/DL (ref 70–99)
GRAM STN SPEC: ABNORMAL
GRAM STN SPEC: ABNORMAL
HCT VFR BLD AUTO: 29.5 % (ref 35–47)
HGB BLD-MCNC: 9.5 G/DL (ref 11.7–15.7)
IMM GRANULOCYTES # BLD: 0 10E9/L (ref 0–0.4)
IMM GRANULOCYTES NFR BLD: 0.4 %
LMWH PPP CHRO-ACNC: 0.2 IU/ML
LYMPHOCYTES # BLD AUTO: 1.5 10E9/L (ref 0.8–5.3)
LYMPHOCYTES NFR BLD AUTO: 18.9 %
Lab: ABNORMAL
MCH RBC QN AUTO: 26.5 PG (ref 26.5–33)
MCHC RBC AUTO-ENTMCNC: 32.2 G/DL (ref 31.5–36.5)
MCV RBC AUTO: 82 FL (ref 78–100)
MONOCYTES # BLD AUTO: 0.5 10E9/L (ref 0–1.3)
MONOCYTES NFR BLD AUTO: 6.1 %
NEUTROPHILS # BLD AUTO: 5.6 10E9/L (ref 1.6–8.3)
NEUTROPHILS NFR BLD AUTO: 71.7 %
NRBC # BLD AUTO: 0 10*3/UL
NRBC BLD AUTO-RTO: 0 /100
PLATELET # BLD AUTO: 270 10E9/L (ref 150–450)
POTASSIUM SERPL-SCNC: 3.9 MMOL/L (ref 3.4–5.3)
RBC # BLD AUTO: 3.58 10E12/L (ref 3.8–5.2)
SODIUM SERPL-SCNC: 138 MMOL/L (ref 133–144)
SPECIMEN SOURCE: ABNORMAL
WBC # BLD AUTO: 7.8 10E9/L (ref 4–11)

## 2018-11-03 PROCEDURE — 25000566 ZZH SEVOFLURANE, EA 15 MIN: Performed by: PODIATRIST

## 2018-11-03 PROCEDURE — 25000131 ZZH RX MED GY IP 250 OP 636 PS 637: Performed by: INTERNAL MEDICINE

## 2018-11-03 PROCEDURE — 25000125 ZZHC RX 250: Performed by: NURSE ANESTHETIST, CERTIFIED REGISTERED

## 2018-11-03 PROCEDURE — 85520 HEPARIN ASSAY: CPT | Performed by: INTERNAL MEDICINE

## 2018-11-03 PROCEDURE — 0Y6U0Z2 DETACHMENT AT LEFT 3RD TOE, MID, OPEN APPROACH: ICD-10-PCS | Performed by: PODIATRIST

## 2018-11-03 PROCEDURE — 36415 COLL VENOUS BLD VENIPUNCTURE: CPT | Performed by: PHYSICIAN ASSISTANT

## 2018-11-03 PROCEDURE — 25000128 H RX IP 250 OP 636: Performed by: PHYSICIAN ASSISTANT

## 2018-11-03 PROCEDURE — 25000128 H RX IP 250 OP 636: Performed by: PODIATRIST

## 2018-11-03 PROCEDURE — 00000146 ZZHCL STATISTIC GLUCOSE BY METER IP

## 2018-11-03 PROCEDURE — 87205 SMEAR GRAM STAIN: CPT | Performed by: PODIATRIST

## 2018-11-03 PROCEDURE — 88311 DECALCIFY TISSUE: CPT | Performed by: PODIATRIST

## 2018-11-03 PROCEDURE — 25000128 H RX IP 250 OP 636: Performed by: ANESTHESIOLOGY

## 2018-11-03 PROCEDURE — 88311 DECALCIFY TISSUE: CPT | Mod: 26 | Performed by: PODIATRIST

## 2018-11-03 PROCEDURE — 27210794 ZZH OR GENERAL SUPPLY STERILE: Performed by: PODIATRIST

## 2018-11-03 PROCEDURE — 36000058 ZZH SURGERY LEVEL 3 EA 15 ADDTL MIN: Performed by: PODIATRIST

## 2018-11-03 PROCEDURE — 37000009 ZZH ANESTHESIA TECHNICAL FEE, EACH ADDTL 15 MIN: Performed by: PODIATRIST

## 2018-11-03 PROCEDURE — 88305 TISSUE EXAM BY PATHOLOGIST: CPT | Mod: 26 | Performed by: PODIATRIST

## 2018-11-03 PROCEDURE — 36000056 ZZH SURGERY LEVEL 3 1ST 30 MIN: Performed by: PODIATRIST

## 2018-11-03 PROCEDURE — 25000125 ZZHC RX 250: Performed by: PHYSICIAN ASSISTANT

## 2018-11-03 PROCEDURE — 80048 BASIC METABOLIC PNL TOTAL CA: CPT | Performed by: PHYSICIAN ASSISTANT

## 2018-11-03 PROCEDURE — 87075 CULTR BACTERIA EXCEPT BLOOD: CPT | Performed by: PODIATRIST

## 2018-11-03 PROCEDURE — 25000128 H RX IP 250 OP 636: Performed by: INTERNAL MEDICINE

## 2018-11-03 PROCEDURE — 88305 TISSUE EXAM BY PATHOLOGIST: CPT | Performed by: PODIATRIST

## 2018-11-03 PROCEDURE — 25000128 H RX IP 250 OP 636: Performed by: NURSE ANESTHETIST, CERTIFIED REGISTERED

## 2018-11-03 PROCEDURE — 25000131 ZZH RX MED GY IP 250 OP 636 PS 637: Performed by: ANESTHESIOLOGY

## 2018-11-03 PROCEDURE — 40000986 XR FOOT PORT LT 3 VW: Mod: LT

## 2018-11-03 PROCEDURE — 36415 COLL VENOUS BLD VENIPUNCTURE: CPT | Performed by: INTERNAL MEDICINE

## 2018-11-03 PROCEDURE — 85025 COMPLETE CBC W/AUTO DIFF WBC: CPT | Performed by: PHYSICIAN ASSISTANT

## 2018-11-03 PROCEDURE — 40000306 ZZH STATISTIC PRE PROC ASSESS II: Performed by: PODIATRIST

## 2018-11-03 PROCEDURE — 25000132 ZZH RX MED GY IP 250 OP 250 PS 637: Performed by: PHYSICIAN ASSISTANT

## 2018-11-03 PROCEDURE — 71000012 ZZH RECOVERY PHASE 1 LEVEL 1 FIRST HR: Performed by: PODIATRIST

## 2018-11-03 PROCEDURE — 87070 CULTURE OTHR SPECIMN AEROBIC: CPT | Performed by: PODIATRIST

## 2018-11-03 PROCEDURE — 25000125 ZZHC RX 250: Performed by: PODIATRIST

## 2018-11-03 PROCEDURE — 99233 SBSQ HOSP IP/OBS HIGH 50: CPT | Performed by: INTERNAL MEDICINE

## 2018-11-03 PROCEDURE — 12000007 ZZH R&B INTERMEDIATE

## 2018-11-03 PROCEDURE — 37000008 ZZH ANESTHESIA TECHNICAL FEE, 1ST 30 MIN: Performed by: PODIATRIST

## 2018-11-03 PROCEDURE — 28825 PARTIAL AMPUTATION OF TOE: CPT | Mod: T2 | Performed by: PODIATRIST

## 2018-11-03 RX ORDER — SODIUM CHLORIDE, SODIUM LACTATE, POTASSIUM CHLORIDE, CALCIUM CHLORIDE 600; 310; 30; 20 MG/100ML; MG/100ML; MG/100ML; MG/100ML
INJECTION, SOLUTION INTRAVENOUS CONTINUOUS PRN
Status: DISCONTINUED | OUTPATIENT
Start: 2018-11-03 | End: 2018-11-03

## 2018-11-03 RX ORDER — LIDOCAINE 40 MG/G
CREAM TOPICAL
Status: DISCONTINUED | OUTPATIENT
Start: 2018-11-03 | End: 2018-11-03

## 2018-11-03 RX ORDER — HYDROMORPHONE HYDROCHLORIDE 1 MG/ML
.3-.5 INJECTION, SOLUTION INTRAMUSCULAR; INTRAVENOUS; SUBCUTANEOUS EVERY 5 MIN PRN
Status: DISCONTINUED | OUTPATIENT
Start: 2018-11-03 | End: 2018-11-03 | Stop reason: HOSPADM

## 2018-11-03 RX ORDER — GLYCOPYRROLATE 0.2 MG/ML
INJECTION, SOLUTION INTRAMUSCULAR; INTRAVENOUS PRN
Status: DISCONTINUED | OUTPATIENT
Start: 2018-11-03 | End: 2018-11-03

## 2018-11-03 RX ORDER — SODIUM CHLORIDE, SODIUM LACTATE, POTASSIUM CHLORIDE, CALCIUM CHLORIDE 600; 310; 30; 20 MG/100ML; MG/100ML; MG/100ML; MG/100ML
INJECTION, SOLUTION INTRAVENOUS CONTINUOUS
Status: DISCONTINUED | OUTPATIENT
Start: 2018-11-03 | End: 2018-11-03 | Stop reason: HOSPADM

## 2018-11-03 RX ORDER — ONDANSETRON 4 MG/1
4 TABLET, ORALLY DISINTEGRATING ORAL EVERY 30 MIN PRN
Status: DISCONTINUED | OUTPATIENT
Start: 2018-11-03 | End: 2018-11-03 | Stop reason: HOSPADM

## 2018-11-03 RX ORDER — MEPERIDINE HYDROCHLORIDE 50 MG/ML
12.5 INJECTION INTRAMUSCULAR; INTRAVENOUS; SUBCUTANEOUS EVERY 5 MIN PRN
Status: DISCONTINUED | OUTPATIENT
Start: 2018-11-03 | End: 2018-11-03 | Stop reason: HOSPADM

## 2018-11-03 RX ORDER — ALBUTEROL SULFATE 0.83 MG/ML
2.5 SOLUTION RESPIRATORY (INHALATION) EVERY 4 HOURS PRN
Status: DISCONTINUED | OUTPATIENT
Start: 2018-11-03 | End: 2018-11-03 | Stop reason: HOSPADM

## 2018-11-03 RX ORDER — FENTANYL CITRATE 50 UG/ML
25-50 INJECTION, SOLUTION INTRAMUSCULAR; INTRAVENOUS
Status: DISCONTINUED | OUTPATIENT
Start: 2018-11-03 | End: 2018-11-03 | Stop reason: HOSPADM

## 2018-11-03 RX ORDER — ONDANSETRON 2 MG/ML
4 INJECTION INTRAMUSCULAR; INTRAVENOUS EVERY 30 MIN PRN
Status: DISCONTINUED | OUTPATIENT
Start: 2018-11-03 | End: 2018-11-03 | Stop reason: HOSPADM

## 2018-11-03 RX ORDER — PROPOFOL 10 MG/ML
INJECTION, EMULSION INTRAVENOUS PRN
Status: DISCONTINUED | OUTPATIENT
Start: 2018-11-03 | End: 2018-11-03

## 2018-11-03 RX ORDER — SODIUM CHLORIDE, SODIUM LACTATE, POTASSIUM CHLORIDE, CALCIUM CHLORIDE 600; 310; 30; 20 MG/100ML; MG/100ML; MG/100ML; MG/100ML
INJECTION, SOLUTION INTRAVENOUS CONTINUOUS
Status: DISCONTINUED | OUTPATIENT
Start: 2018-11-03 | End: 2018-11-03

## 2018-11-03 RX ORDER — NALOXONE HYDROCHLORIDE 0.4 MG/ML
.1-.4 INJECTION, SOLUTION INTRAMUSCULAR; INTRAVENOUS; SUBCUTANEOUS
Status: DISCONTINUED | OUTPATIENT
Start: 2018-11-03 | End: 2018-11-03

## 2018-11-03 RX ORDER — DIAZEPAM 10 MG/2ML
2.5 INJECTION, SOLUTION INTRAMUSCULAR; INTRAVENOUS
Status: DISCONTINUED | OUTPATIENT
Start: 2018-11-03 | End: 2018-11-03 | Stop reason: HOSPADM

## 2018-11-03 RX ORDER — NALOXONE HYDROCHLORIDE 0.4 MG/ML
.1-.4 INJECTION, SOLUTION INTRAMUSCULAR; INTRAVENOUS; SUBCUTANEOUS
Status: DISCONTINUED | OUTPATIENT
Start: 2018-11-03 | End: 2018-11-05 | Stop reason: HOSPADM

## 2018-11-03 RX ORDER — LIDOCAINE 40 MG/G
CREAM TOPICAL
Status: DISCONTINUED | OUTPATIENT
Start: 2018-11-03 | End: 2018-11-05 | Stop reason: HOSPADM

## 2018-11-03 RX ORDER — LABETALOL HYDROCHLORIDE 5 MG/ML
10 INJECTION, SOLUTION INTRAVENOUS
Status: DISCONTINUED | OUTPATIENT
Start: 2018-11-03 | End: 2018-11-03 | Stop reason: HOSPADM

## 2018-11-03 RX ORDER — SODIUM CHLORIDE, SODIUM LACTATE, POTASSIUM CHLORIDE, CALCIUM CHLORIDE 600; 310; 30; 20 MG/100ML; MG/100ML; MG/100ML; MG/100ML
INJECTION, SOLUTION INTRAVENOUS CONTINUOUS
Status: DISCONTINUED | OUTPATIENT
Start: 2018-11-03 | End: 2018-11-04

## 2018-11-03 RX ORDER — FENTANYL CITRATE 50 UG/ML
INJECTION, SOLUTION INTRAMUSCULAR; INTRAVENOUS PRN
Status: DISCONTINUED | OUTPATIENT
Start: 2018-11-03 | End: 2018-11-03

## 2018-11-03 RX ORDER — DIMENHYDRINATE 50 MG/ML
25 INJECTION, SOLUTION INTRAMUSCULAR; INTRAVENOUS
Status: DISCONTINUED | OUTPATIENT
Start: 2018-11-03 | End: 2018-11-03 | Stop reason: HOSPADM

## 2018-11-03 RX ORDER — BUPIVACAINE HYDROCHLORIDE 5 MG/ML
INJECTION, SOLUTION EPIDURAL; INTRACAUDAL PRN
Status: DISCONTINUED | OUTPATIENT
Start: 2018-11-03 | End: 2018-11-03 | Stop reason: HOSPADM

## 2018-11-03 RX ADMIN — LIDOCAINE HYDROCHLORIDE 50 MG: 10 INJECTION, SOLUTION INFILTRATION; PERINEURAL at 08:11

## 2018-11-03 RX ADMIN — TAZOBACTAM SODIUM AND PIPERACILLIN SODIUM 2.25 G: 250; 2 INJECTION, SOLUTION INTRAVENOUS at 00:38

## 2018-11-03 RX ADMIN — TAZOBACTAM SODIUM AND PIPERACILLIN SODIUM 2.25 G: 250; 2 INJECTION, SOLUTION INTRAVENOUS at 06:10

## 2018-11-03 RX ADMIN — TAZOBACTAM SODIUM AND PIPERACILLIN SODIUM 2.25 G: 250; 2 INJECTION, SOLUTION INTRAVENOUS at 12:34

## 2018-11-03 RX ADMIN — SODIUM CHLORIDE, POTASSIUM CHLORIDE, SODIUM LACTATE AND CALCIUM CHLORIDE: 600; 310; 30; 20 INJECTION, SOLUTION INTRAVENOUS at 11:35

## 2018-11-03 RX ADMIN — SODIUM CHLORIDE, POTASSIUM CHLORIDE, SODIUM LACTATE AND CALCIUM CHLORIDE: 600; 310; 30; 20 INJECTION, SOLUTION INTRAVENOUS at 08:05

## 2018-11-03 RX ADMIN — ACETAMINOPHEN 650 MG: 325 TABLET, FILM COATED ORAL at 16:34

## 2018-11-03 RX ADMIN — ONDANSETRON 4 MG: 2 INJECTION INTRAMUSCULAR; INTRAVENOUS at 08:11

## 2018-11-03 RX ADMIN — INSULIN HUMAN 16 UNITS: 100 INJECTION, SUSPENSION SUBCUTANEOUS at 17:37

## 2018-11-03 RX ADMIN — INSULIN HUMAN 3 UNITS: 100 INJECTION, SOLUTION PARENTERAL at 08:25

## 2018-11-03 RX ADMIN — GLYCOPYRROLATE 0.2 MG: 0.2 INJECTION, SOLUTION INTRAMUSCULAR; INTRAVENOUS at 08:11

## 2018-11-03 RX ADMIN — BUPIVACAINE HYDROCHLORIDE 10 ML: 5 INJECTION, SOLUTION EPIDURAL; INTRACAUDAL at 08:44

## 2018-11-03 RX ADMIN — SODIUM CHLORIDE, POTASSIUM CHLORIDE, SODIUM LACTATE AND CALCIUM CHLORIDE: 600; 310; 30; 20 INJECTION, SOLUTION INTRAVENOUS at 10:12

## 2018-11-03 RX ADMIN — METOPROLOL TARTRATE 50 MG: 50 TABLET, FILM COATED ORAL at 21:12

## 2018-11-03 RX ADMIN — SODIUM CHLORIDE, POTASSIUM CHLORIDE, SODIUM LACTATE AND CALCIUM CHLORIDE: 600; 310; 30; 20 INJECTION, SOLUTION INTRAVENOUS at 16:30

## 2018-11-03 RX ADMIN — FENTANYL CITRATE 100 MCG: 50 INJECTION, SOLUTION INTRAMUSCULAR; INTRAVENOUS at 08:11

## 2018-11-03 RX ADMIN — TAZOBACTAM SODIUM AND PIPERACILLIN SODIUM 2.25 G: 250; 2 INJECTION, SOLUTION INTRAVENOUS at 17:35

## 2018-11-03 RX ADMIN — ATORVASTATIN CALCIUM 20 MG: 20 TABLET, FILM COATED ORAL at 21:13

## 2018-11-03 RX ADMIN — PROPOFOL 200 MG: 10 INJECTION, EMULSION INTRAVENOUS at 08:11

## 2018-11-03 RX ADMIN — HEPARIN SODIUM 1250 UNITS/HR: 10000 INJECTION, SOLUTION INTRAVENOUS at 16:27

## 2018-11-03 RX ADMIN — MIDAZOLAM 2 MG: 1 INJECTION INTRAMUSCULAR; INTRAVENOUS at 08:05

## 2018-11-03 RX ADMIN — GENTAMICIN SULFATE 300 ML: 40 INJECTION, SOLUTION INTRAMUSCULAR; INTRAVENOUS at 08:44

## 2018-11-03 RX ADMIN — SODIUM CHLORIDE: 9 INJECTION, SOLUTION INTRAVENOUS at 06:15

## 2018-11-03 RX ADMIN — METOPROLOL TARTRATE 50 MG: 50 TABLET, FILM COATED ORAL at 07:35

## 2018-11-03 ASSESSMENT — ACTIVITIES OF DAILY LIVING (ADL)
ADLS_ACUITY_SCORE: 12
ADLS_ACUITY_SCORE: 11
ADLS_ACUITY_SCORE: 12

## 2018-11-03 NOTE — PLAN OF CARE
Problem: Patient Care Overview  Goal: Plan of Care/Patient Progress Review  Outcome: No Change  Alert and oriented, SBA.   VSS, 97% on RA.   LS clear, denies SOB, denies cough.   BS active, no flatus, denies nausea.   Skin WDL except LLE surgical site.  Went down this AM for left 3rd toe amputation.  Adaptic, gauze, kerlix, and ace wrap in place. Surgery to change tomorrow.   Minimal weight bearing on left foot, walker used for support.  Denies pain.   Plan to restart Heparin drip this afternoon.    before lunch, sliding scale given. Will resume NovoLin mix insulin w/ supper tonight.   Surgery, Nutrition, and SW following.  Will continue to monitor.

## 2018-11-03 NOTE — PLAN OF CARE
Problem: Patient Care Overview  Goal: Plan of Care/Patient Progress Review  Ambulatory Status:  Pt up independently  VS:  vss  Pain:  Denies pain  Resp: LS clear  GI:  denies nausea.  NPO since midnight.  BS active.  Passing flatus.  Last BM 11/2  Skin:  Wound on 3rd toe of left foot  Tx:  Zosyn  Labs:  Glucose 259 and   Consults:  podiatry and social work  Disposition:  tbd     OR scheduled for 0800.  Patient has been NPO since midnight and heparin was stopped at 0400 in preparation for surgery.

## 2018-11-03 NOTE — BRIEF OP NOTE
[facility] Brief Operative Note    Pre-operative diagnosis: Left 3rd toe abscess and osteomyelitis   Post-operative diagnosis same   Procedure: Procedure(s):  Partial Left 3rd toe amputation   Surgeon(s): Surgeon(s) and Role:     * Verna Fofana DPM, Podiatry/Foot and Ankle Surgery - Primary   Estimated blood loss: 1 mL    Specimens:   ID Type Source Tests Collected by Time Destination   1 : left third toe cultures Wound Toe ANAEROBIC BACTERIAL CULTURE, GRAM STAIN, WOUND CULTURE AEROBIC BACTERIAL Verna Fofana DPM, Podiatry/Foot and Ankle Surgery 11/3/2018  8:29 AM    A : left third toe Tissue Toe SURGICAL PATHOLOGY EXAM Verna Fofana DPM, Podiatry/Foot and Ankle Surgery 11/3/2018  8:30 AM       Findings: See op note

## 2018-11-03 NOTE — PROGRESS NOTES
[facility]  Hospitalist Progress Note  Gurjit Chaparro MD 11/03/18    Reason for Stay (Diagnosis): Infected foot wound         Assessment and Plan:      Summary of Stay: Roxy Beaulieu is a 58-year-old woman recently admitted 11/2/18 for left third toe infection with uncontrolled hyperglycemia.  Notably, she was recently admitted on 10/4 through 10/8 for acute left lower extremity DVT with probable PE with hypoxia at that time (not verified both to avoid contrast and because it was not felt to change the plan of care) who has been off essentially all medications recently due to financial concerns (including Coumadin and insulin) who presented here again 11/2/18 with left third toe erythema and swelling found to have cellulitis with concern for deeper infection.   She was started on treatment with vancomycin and Zosyn in the ER.  Zosyn has been continued the vancomycin likely therapeutic for the first couple of days in the setting of CKD.  She was started on a heparin drip here to resume treatment for her DVT/PE which she had self discontinued at home.    MRI was obtained and showed prominent third distal anterior phalangeal joint effusion felt potentially to include an infected joint as well as local cellulitis.  She was taken for incision and drainage  with partial left third toe amputation on 11/3.    At this point the plan will be to await culture results, resume heparin and Coumadin as able and once culture data are available consult with infectious disease regarding antibiotic plan.       Key management decisions made by me:      1.  Third toe cellulitis, suspected osteomyelitis: As above.  --Continue Zosyn for now  --Await cultures  --Anticipate ID consult pending further culture data    2.  Uncontrolled insulin dependent diabetes: Due to insulin noncompliance, also could be worse due to acute infection though I suspect her infection is in large part due to uncontrolled diabetes  "and med noncompliance.  --Restart home prescribed insulin mix albeit at slightly reduced doses as she was n.p.o. last night.  --Sliding scale insulin  --Social work consult regarding financial concerns.     3.  Financial/social concerns: She does not have insurance and does not seem yet to be in the process of pursuing this.  Will consult social work to help expedite this.     4.  Recent diagnosis of DVT and presumed PE: Heparin drip for now in part for the rapid onset and short half-life in case repeat surgery is needed.    Will restart this without boluses postop based on risks and benefits.  Monitor hemoglobin and monitor for overt signs of bleeding but her small surgical wound at this point presents a lower risk of exsanguination compared with the very real threat of ongoing untreated DVT/PE.    5.  CKD: Suspect progression of chronic disease related to medication noncompliance and uncontrolled hyperglycemia.  Avoid nephrotoxins for now.  Creatinine appears to recently have been in the 2.6-2.8 range.    6.  Pseudohyponatremia: Resolved with insulin and IV fluids.    DVT Prophylaxis: Heparin  Code Status: Full Code  Discharge Dispo/Date: At least 2 more days        Interval History (Subjective):      Status post partial third toe amputation today  Blood sugars trending downward but still elevated  Denies pain  Discussed heparin plan with pharmacy, will restart without boluses this afternoon given risks versus benefits.  I did discuss this with the patient and her family as well.  Need to monitor closely for bleeding                    Physical Exam:      Last Vital Signs:  /60 (BP Location: Right arm)  Temp 96.9  F (36.1  C) (Oral)  Resp 16  Ht 1.727 m (5' 8\")  Wt 81.2 kg (179 lb 1.6 oz)  SpO2 97%  BMI 27.23 kg/m2      Intake/Output Summary (Last 24 hours) at 11/03/18 1143  Last data filed at 11/03/18 0920   Gross per 24 hour   Intake             1740 ml   Output                1 ml   Net             " 1739 ml       General: Alert, awake, no acute distress.  HEENT: NC/AT, eyes anicteric, external occular movements intact, face symmetric.  Dentition WNL, MM moist.  Cardiac: RRR, S1, S2.    Pulmonary: Normal chest rise, normal work of breathing.  Lungs CTA BL  Abdomen: soft, non-tender, non-distended.  Bowel Sounds Present.  No guarding.  Extremities: Left foot wrapped from toes all the way to near her knee postoperatively.  No apparent deformities.  Warm, well perfused.  Skin: no rashes or lesions noted.  Warm and Dry.  Neuro: No focal deficits noted.  Speech clear.  Coordination and strength grossly normal.  Psych: Appropriate affect.         Medications:      All current medications were reviewed with changes reflected in problem list.         Data:      All new lab and imaging data was reviewed.   Labs:    Recent Labs  Lab 11/03/18  0701      POTASSIUM 3.9   CHLORIDE 106   CO2 26   ANIONGAP 6   *   BUN 44*   CR 2.78*   GFRESTIMATED 17*   GFRESTBLACK 21*   GILL 8.0*       Recent Labs  Lab 11/03/18  0701   WBC 7.8   HGB 9.5*   HCT 29.5*   MCV 82         Imaging:   Recent Results (from the past 48 hour(s))   MR Foot Left w/o Contrast    Narrative    MR FOOT LEFT WITHOUT CONTRAST November 2, 2018 5:05 PM     HISTORY: Concern for left toe cellulitis versus abscess versus  osteomyelitis.      TECHNIQUE: Multiplanar, multisequence without contrast. Contrast could  not be used due to GFR of 19.    COMPARISON: 4/29/2017.    FINDINGS: Changes of great toe amputation at the level of the proximal  phalanx. Changes of second toe amputation at the MTP joint level.  There are no findings suspicious for osteomyelitis of the great toe.  There is a mildly complex fluid collection around the third DIP joint,  greatest dorsally. This communicates with the joint and is felt to  represent prominent joint effusion. Septic arthritis cannot be  excluded. There is edema of the third distal, middle, proximal  phalanges.  There is could be reactive in nature, but osteomyelitis  cannot be excluded. Recommend clinical correlation. If there is a  nearby third toe ulcer, then the findings would be suspicious for  osteomyelitis and DIP septic arthritis. There is nonspecific soft  tissue edema along the foot. This could relate to reactive or  dependent edema, injury, or cellulitis. At least some of this  presumably represents cellulitis in light of the history. There are  scattered degenerative changes. Hazy asymmetric edema of the fourth  metatarsal, suspicious for stress reaction (the patient did not  describe a recent injury in order to suspect bone contusion). Mild  fluid within the 1st-2nd, 2nd-3rd, intermetatarsal bursae.       Impression    IMPRESSION:  1. Prominent third distal interphalangeal joint effusion. This is  nonspecific and septic arthritis cannot be excluded. Edema of all  three third toe phalanges. This could be reactive in nature, but  osteomyelitis cannot be excluded. Clinical correlation is recommended.  2. Cellulitis.  3. Fourth metatarsal stress reaction.   X-ray lt Foot 3 vw port: In PACU    Narrative    FOOT PORTABLE LEFT THREE VIEWS November 3, 2018 9:12 AM     HISTORY: Postoperative state.    COMPARISON: 7/6/2018.    FINDINGS: Again there is amputation of the first and second toes.  Fibular plate-screw fixation, as well as changes of syndesmotic  fixation. Plantar calcaneal spurring. I suspect a healing fracture of  the fourth proximal phalanx.      Impression    IMPRESSION: Changes of third toe amputation at the level of the  proximal interphalangeal joint.         Gurjit Chaparro MD.

## 2018-11-03 NOTE — CONSULTS
"CLINICAL NUTRITION SERVICES  -  ASSESSMENT NOTE      Malnutrition: Patient does not meet malnutrition criteria at this time        REASON FOR ASSESSMENT  Roxy Beaulieu is a 58 year old female seen by Registered Dietitian for Admission Nutrition Risk Screen - Unintentional weight loss of 10# or more in past 2 months.    NUTRITION HISTORY  - Information obtained from patient and chart.    - Patient with a h/o DMII (on insulin PTA).  Admitted 2/2 concern for osteo in L 3rd toe.    - Has received previous diet education relating to DMII nutrition therapy with most recent by KAITLIN ZUÑIGA on 10/08/2018.  - Patient reports diabetic diet at home and verbalizes she keeps her \"carbs low so I don't have to use more insulin\".   - Describes an overall slow decline in appetite and wt loss over a period of years.    - Meals TID is baseline.  - NKFA.       CURRENT NUTRITION ORDERS  Diet Order:     Regular    Current Intake/Tolerance:  100% intakes since admission.  NPO this AM for procedure as below, able to have lunch this afternoon.       PHYSICAL FINDINGS  Observed  See below   Obtained from Chart/Interdisciplinary Team  Partil L 3rd toe amputation this AM    ANTHROPOMETRICS  Height: 5' 8\"  Weight: 81.4 kg (179#)  Body mass index is 27.23 kg/(m^2).  Weight Status:  Overweight BMI 25-29.9  IBW: 63.6 kg (140#)  % IBW: 128%  Weight History:  Wt Readings from Last 10 Encounters:   11/02/18 81.2 kg (179 lb 1.6 oz)   10/07/18 89.4 kg (197 lb)   07/06/18 83.9 kg (185 lb)   03/16/18 83.5 kg (184 lb)   04/29/17 96.3 kg (212 lb 3.2 oz)   09/25/16 93 kg (205 lb)   - Patient reports a 20# wt loss w/in a period of 1-2 years.  Suspect wt of 197# from 10/07/2018 is outlier/inaccurate.  Also with wt of 184# upon admit which may be more accurate compared to 179#, will need to continue monitoring.      LABS  Labs reviewed:  Lab Results   Component Value Date    A1C 11.6 10/04/2018    A1C >16.0 03/16/2018    A1C >16.0 03/15/2018    A1C 12.3 " 04/28/2017       MEDICATIONS  Medications reviewed:  - Bumex  - Insulin regimen reviewed      ASSESSED NUTRITION NEEDS PER APPROVED PRACTICE GUIDELINES:    Dosing Weight 68.1 kg - adjusted weight   Estimated Energy Needs: 4871-1508 kcals (25-30 Kcal/Kg)  Justification: maintenance  Estimated Protein Needs:  grams protein (1.2-1.5 g pro/Kg)  Justification: post-op and preservation of lean body mass  Estimated Fluid Needs: >1 mL/kcal   Justification: maintenance    MALNUTRITION:  % Weight Loss:  Weight loss does not meet criteria for malnutrition   % Intake:  Decreased intake does not meet criteria for malnutrition   Subcutaneous Fat Loss:  None observed  Muscle Loss:  Temporal region mild depletion and Dorsal hand region mild depletion  Fluid Retention:  None noted    Malnutrition Diagnosis: Patient does not meet two of the above criteria necessary for diagnosing malnutrition    NUTRITION DIAGNOSIS:  Predicted inadequate nutrient intake (protein) related to increased needs post-op, potential for decline.      NUTRITION INTERVENTIONS  Recommendations / Nutrition Prescription  Change to high CHO diet order.  Protein focus.      Implementation  Nutrition education: Provided education on importance of protein, reviewed sources.  Reviewed optimal BG control for wound healing.      Nutrition Goals  Patient to consistently consume at least 75% of meals TID.      MONITORING AND EVALUATION:  Progress towards goals will be monitored and evaluated per protocol and Practice Guidelines        Marifer Mcbride RD, LD  Clinical Dietitian  3rd floor/ICU: [phone]  All other floors: [phone]  Weekend/holiday: [phone]

## 2018-11-03 NOTE — ANESTHESIA CARE TRANSFER NOTE
Patient: Roxy Beaulieu    Procedure(s):  Partial Left 3rd toe amputation    Diagnosis: unknown  Diagnosis Additional Information: No value filed.    Anesthesia Type:   General, LMA     Note:  Airway :Face Mask  Patient transferred to:PACU  Handoff Report: Identifed the Patient, Identified the Reponsible Provider, Reviewed the pertinent medical history, Discussed the surgical course, Reviewed Intra-OP anesthesia mangement and issues during anesthesia, Set expectations for post-procedure period and Allowed opportunity for questions and acknowledgement of understanding      Vitals: (Last set prior to Anesthesia Care Transfer)    CRNA VITALS  11/3/2018 0814 - 11/3/2018 0845      11/3/2018             Resp Rate (observed): (!)  1                Electronically Signed By: RIK Callaway CRNA  November 3, 2018  8:45 AM

## 2018-11-03 NOTE — PLAN OF CARE
Problem: Patient Care Overview  Goal: Plan of Care/Patient Progress Review  Outcome: No Change  Pt A/O x4, Independent  in room, VSS, hx of  DM high  and 253, on Novolog, denies pain, surgery on third toe tomorrow 11/3/18 @0800. On Hep drip@ 12.5 ml/hr, Reg diet, NPO midnight. On zosyn. Dressing dry, clean and intact changed by podiatrist. Hep drip stop @ 0400  11/3/18 per MD.

## 2018-11-03 NOTE — ANESTHESIA POSTPROCEDURE EVALUATION
Patient: Roxy Beaulieu    Procedure(s):  Partial Left 3rd toe amputation    Diagnosis:unknown  Diagnosis Additional Information: Left 3rd toe abscess and osteomyelitis  Dr. Fofana    Anesthesia Type:  General, LMA    Note:  Anesthesia Post Evaluation    Patient location during evaluation: PACU  Patient participation: Able to fully participate in evaluation  Level of consciousness: awake  Pain management: adequate  Airway patency: patent  Cardiovascular status: acceptable  Respiratory status: acceptable  Hydration status: acceptable  PONV: none             Last vitals:  Vitals:    11/03/18 0840 11/03/18 0845 11/03/18 0850   BP: 130/66 131/67 138/70   Resp: 16 19 15   Temp:  97.6  F (36.4  C)    SpO2: 100% 100% 100%         Electronically Signed By: Gaetano Hedrick MD  November 3, 2018  9:01 AM

## 2018-11-03 NOTE — ANESTHESIA PREPROCEDURE EVALUATION
PAC NOTE:       ANESTHESIA PRE EVALUATION:  Anesthesia Evaluation     . Pt has had prior anesthetic. Type: General    No history of anesthetic complications          ROS/MED HX    ENT/Pulmonary:  - neg pulmonary ROS     Neurologic:  - neg neurologic ROS     Cardiovascular:     (+) Dyslipidemia, hypertension----. : . . . :. .       METS/Exercise Tolerance:     Hematologic: Comments: Only temporarily   Pt has hx of DVT    (+) History of blood clots pt is anticoagulated, -      Musculoskeletal:   (+) , , other musculoskeletal- non healing toe ulcer      GI/Hepatic:  - neg GI/hepatic ROS       Renal/Genitourinary: Comment: Doubling of creatinine in past few months    (+) chronic renal disease, type: CRI, Pt does not require dialysis, Pt has no history of transplant,       Endo: Comment: Poor compliance leading to her recent health decline    (+) type I DM, Using insulin Diabetic complications: nephropathy, .      Psychiatric:  - neg psychiatric ROS       Infectious Disease:   (+) Other Infectious Disease non healing foot ulcer      Malignancy:      - no malignancy   Other:    - neg other ROS                 Physical Exam  Normal systems: cardiovascular, pulmonary and dental    Airway   Mallampati: II  TM distance: >3 FB  Neck ROM: full    Dental     Cardiovascular   Rhythm and rate: regular and normal      Pulmonary    breath sounds clear to auscultation    Other findings: Lab Test        11/02/18     11/02/18     10/08/18     10/07/18      --          10/05/18                       1045          1026          0652          0617           --           0655          WBC           --          10.3         5.9           --           --          6.4           HGB           --          10.5*        9.2*          --           --          9.7*          MCV           --          81           87            --           --          86            PLT           --          267          276          229           --          265            INR          1.05          --          1.20*        1.14           < >        1.09           < > = values in this interval not displayed.                  Lab Test        11/02/18     10/08/18     10/07/18                       1026          0652          0617          NA           129*         143          144           POTASSIUM    4.3          5.0          5.0           CHLORIDE     96           111*         112*          CO2          25           25           24            BUN          44*          53*          50*           CR           2.63*        2.62*        2.71*         ANIONGAP     8            7            8             GILL          8.5          8.2*         8.1*          GLC          516*         135*         105*                          Anesthesia Plan      History & Physical Review  History and physical reviewed and following examination; no interval change.    ASA Status:  3 emergent.    NPO Status:  > 8 hours    Plan for General and LMA with Intravenous induction. Maintenance will be Balanced.    PONV prophylaxis:  Ondansetron (or other 5HT-3)       Postoperative Care  Postoperative pain management:  IV analgesics, Oral pain medications and Peripheral nerve block (Single Shot).      Consents  Anesthetic plan, risks, benefits and alternatives discussed with:  Patient.  Use of blood products discussed: No .   .                            .

## 2018-11-04 LAB
ANION GAP SERPL CALCULATED.3IONS-SCNC: 4 MMOL/L (ref 3–14)
BUN SERPL-MCNC: 42 MG/DL (ref 7–30)
CALCIUM SERPL-MCNC: 8.1 MG/DL (ref 8.5–10.1)
CHLORIDE SERPL-SCNC: 108 MMOL/L (ref 94–109)
CO2 SERPL-SCNC: 26 MMOL/L (ref 20–32)
CREAT SERPL-MCNC: 3.07 MG/DL (ref 0.52–1.04)
ERYTHROCYTE [DISTWIDTH] IN BLOOD BY AUTOMATED COUNT: 14.2 % (ref 10–15)
GFR SERPL CREATININE-BSD FRML MDRD: 15 ML/MIN/1.7M2
GLUCOSE BLDC GLUCOMTR-MCNC: 139 MG/DL (ref 70–99)
GLUCOSE BLDC GLUCOMTR-MCNC: 183 MG/DL (ref 70–99)
GLUCOSE BLDC GLUCOMTR-MCNC: 186 MG/DL (ref 70–99)
GLUCOSE BLDC GLUCOMTR-MCNC: 301 MG/DL (ref 70–99)
GLUCOSE BLDC GLUCOMTR-MCNC: 333 MG/DL (ref 70–99)
GLUCOSE SERPL-MCNC: 353 MG/DL (ref 70–99)
HCT VFR BLD AUTO: 28 % (ref 35–47)
HGB BLD-MCNC: 8.9 G/DL (ref 11.7–15.7)
LMWH PPP CHRO-ACNC: 0.35 IU/ML
MCH RBC QN AUTO: 26.6 PG (ref 26.5–33)
MCHC RBC AUTO-ENTMCNC: 31.8 G/DL (ref 31.5–36.5)
MCV RBC AUTO: 84 FL (ref 78–100)
PLATELET # BLD AUTO: 221 10E9/L (ref 150–450)
POTASSIUM SERPL-SCNC: 4.7 MMOL/L (ref 3.4–5.3)
RBC # BLD AUTO: 3.34 10E12/L (ref 3.8–5.2)
SODIUM SERPL-SCNC: 138 MMOL/L (ref 133–144)
WBC # BLD AUTO: 8.1 10E9/L (ref 4–11)

## 2018-11-04 PROCEDURE — 00000146 ZZHCL STATISTIC GLUCOSE BY METER IP

## 2018-11-04 PROCEDURE — 25000132 ZZH RX MED GY IP 250 OP 250 PS 637: Performed by: INTERNAL MEDICINE

## 2018-11-04 PROCEDURE — 85520 HEPARIN ASSAY: CPT | Performed by: PODIATRIST

## 2018-11-04 PROCEDURE — 99233 SBSQ HOSP IP/OBS HIGH 50: CPT | Performed by: INTERNAL MEDICINE

## 2018-11-04 PROCEDURE — 36415 COLL VENOUS BLD VENIPUNCTURE: CPT | Performed by: PODIATRIST

## 2018-11-04 PROCEDURE — 80048 BASIC METABOLIC PNL TOTAL CA: CPT | Performed by: PODIATRIST

## 2018-11-04 PROCEDURE — L3260 AMBULATORY SURGICAL BOOT EAC: HCPCS

## 2018-11-04 PROCEDURE — 25000128 H RX IP 250 OP 636: Performed by: INTERNAL MEDICINE

## 2018-11-04 PROCEDURE — 85027 COMPLETE CBC AUTOMATED: CPT | Performed by: PODIATRIST

## 2018-11-04 PROCEDURE — 25000132 ZZH RX MED GY IP 250 OP 250 PS 637: Performed by: PHYSICIAN ASSISTANT

## 2018-11-04 PROCEDURE — 25000128 H RX IP 250 OP 636: Performed by: PHYSICIAN ASSISTANT

## 2018-11-04 PROCEDURE — 12000000 ZZH R&B MED SURG/OB

## 2018-11-04 RX ORDER — WARFARIN SODIUM 5 MG/1
5 TABLET ORAL
Status: COMPLETED | OUTPATIENT
Start: 2018-11-04 | End: 2018-11-04

## 2018-11-04 RX ADMIN — TAZOBACTAM SODIUM AND PIPERACILLIN SODIUM 2.25 G: 250; 2 INJECTION, SOLUTION INTRAVENOUS at 17:44

## 2018-11-04 RX ADMIN — TAZOBACTAM SODIUM AND PIPERACILLIN SODIUM 2.25 G: 250; 2 INJECTION, SOLUTION INTRAVENOUS at 23:51

## 2018-11-04 RX ADMIN — TAZOBACTAM SODIUM AND PIPERACILLIN SODIUM 2.25 G: 250; 2 INJECTION, SOLUTION INTRAVENOUS at 00:45

## 2018-11-04 RX ADMIN — ENOXAPARIN SODIUM 90 MG: 100 INJECTION SUBCUTANEOUS at 17:37

## 2018-11-04 RX ADMIN — ATORVASTATIN CALCIUM 20 MG: 20 TABLET, FILM COATED ORAL at 21:32

## 2018-11-04 RX ADMIN — METOPROLOL TARTRATE 50 MG: 50 TABLET, FILM COATED ORAL at 21:32

## 2018-11-04 RX ADMIN — TAZOBACTAM SODIUM AND PIPERACILLIN SODIUM 2.25 G: 250; 2 INJECTION, SOLUTION INTRAVENOUS at 11:32

## 2018-11-04 RX ADMIN — WARFARIN SODIUM 5 MG: 5 TABLET ORAL at 17:36

## 2018-11-04 RX ADMIN — METOPROLOL TARTRATE 50 MG: 50 TABLET, FILM COATED ORAL at 08:01

## 2018-11-04 RX ADMIN — AMLODIPINE BESYLATE 10 MG: 10 TABLET ORAL at 08:01

## 2018-11-04 RX ADMIN — HEPARIN SODIUM 1400 UNITS/HR: 10000 INJECTION, SOLUTION INTRAVENOUS at 09:51

## 2018-11-04 RX ADMIN — TAZOBACTAM SODIUM AND PIPERACILLIN SODIUM 2.25 G: 250; 2 INJECTION, SOLUTION INTRAVENOUS at 05:49

## 2018-11-04 ASSESSMENT — ACTIVITIES OF DAILY LIVING (ADL)
ADLS_ACUITY_SCORE: 11

## 2018-11-04 NOTE — PHARMACY-ANTICOAGULATION SERVICE
Clinical Pharmacy - Warfarin Dosing Consult     Pharmacy has been consulted to manage this patient s warfarin therapy.  Indication: DVT/PE Prophylaxis  Therapy Goal: INR 2-3  Warfarin Prior to Admission: Yes  Warfarin PTA Regimen: 5-10 mg daily per INR, has not taken for a few days because of lack of supply  Significant drug interactions: Tylenol, Lipitor, heparin  Recent documented change in oral intake/nutrition: Unknown    INR   Date Value Ref Range Status   11/02/2018 1.05 0.86 - 1.14 Final   10/08/2018 1.20 (H) 0.86 - 1.14 Final       Recommend warfarin 5 mg today.  Pharmacy will monitor Roxy L Christofer daily and order warfarin doses to achieve specified goal.      Please contact pharmacy as soon as possible if the warfarin needs to be held for a procedure or if the warfarin goals change.

## 2018-11-04 NOTE — PLAN OF CARE
Problem: Patient Care Overview  Goal: Plan of Care/Patient Progress Review  Ambulatory Status:  Pt up independently, heel touch weight bearing on Left  VS:  vss  Pain:  Denies pain  Resp: LS clear  GI:  denies nausea.   Mod Cho diet.  BS active.  Passing flatus.  Last BM 11/3.  Skin:  Dressing on left foot CDI  Tx:  zosyn  Labs:   Consults:  podiatry  Disposition:  tbd

## 2018-11-04 NOTE — PROGRESS NOTES
Cross cover page regarding hyperglycemia.  Review chart.  Patient blood sugar was 406 and received 5 units aspart 30 minutes ago.  Now 372.  Typically she takes 10-25 of NPH in the evening pending with blood sugars.  Received 16 units of NPH and regular combination this evening.  -Additional 5 units aspart now, if not eating overnight will further drift down  -To consistent carbohydrate moderate diet

## 2018-11-04 NOTE — PROGRESS NOTES
[facility]  Hospitalist Progress Note  Gurjit Chaparro MD 11/04/2018    Reason for Stay (Diagnosis): Infected foot wound         Assessment and Plan:      Summary of Stay: Roxy Beaulieu is a 58-year-old woman recently admitted 11/2/18 for left third toe infection with uncontrolled hyperglycemia.  Notably, she was recently admitted on 10/4 through 10/8 for acute left lower extremity DVT with probable PE with hypoxia at that time (not verified both to avoid contrast and because it was not felt to change the plan of care) who has been off essentially all medications recently due to financial concerns (including Coumadin and insulin) who presented here again 11/2/18 with left third toe erythema and swelling found to have cellulitis with concern for deeper infection.   She was started on treatment with vancomycin and Zosyn in the ER.  Zosyn has been continued.  She was started on a heparin drip here to resume treatment for her DVT/PE which she had self discontinued at home.    MRI was obtained and showed prominent third distal anterior phalangeal joint effusion felt potentially to include an infected joint as well as local cellulitis.  She was taken for incision and drainage  with partial left third toe amputation on 11/3.    At this point the plan will be to await culture results, resumed heparin and plan to restart Coumadin.     Key management decisions made by me:      1.  Third toe cellulitis, suspected osteomyelitis: As above.  --Continue Zosyn for now  --Await cultures  --As per ID ok to change to augmentin PO upon discharge, infection felt to be removed.    2.  Uncontrolled insulin dependent diabetes: Due to insulin noncompliance, also could be worse due to acute infection though I suspect her infection is in large part due to uncontrolled diabetes and med noncompliance.  --Restart home prescribed insulin mix, increase to 20 U BID now as well as ssi.  --Sliding scale insulin  --Social  "work consult regarding financial concerns.     3.  Financial/social concerns: She does not have insurance and does not seem yet to be in the process of pursuing this.  Will consult social work to help expedite this.     4.  Recent diagnosis of DVT and presumed PE: Heparin drip for now in part for the rapid onset and short half-life in case bleeding issues develop.   OK from my standpoint to restart coumadin tonight as well though would hold this if we are notified of any repeat surgery.  Monitor hemoglobin and monitor for overt signs of bleeding but her small surgical wound at this point presents a lower risk of exsanguination compared with the very real threat of ongoing untreated DVT/PE.    5.  CKD: Suspect progression of chronic disease related to medication noncompliance and uncontrolled hyperglycemia.  Avoid nephrotoxins for now.  Creatinine appears to recently have been in the 2.6-2.8 range, slightly higher now at 3.07.  Vancomycin was stopped upon admission to avoid nephrotoxicity.  Recheck.    6.  Pseudohyponatremia: Resolved with insulin and IV fluids.    DVT Prophylaxis: Heparin  Code Status: Full Code  Discharge Dispo/Date: At least 2 more days        Interval History (Subjective):      Hyperglycemic last night, increasing insulin regimen  Stopping fluids as she has been urinating frequently  Cr slightly higher  Starting coumadin                    Physical Exam:      Last Vital Signs:  /70 (BP Location: Left arm)  Temp 98  F (36.7  C) (Oral)  Resp 18  Ht 1.727 m (5' 8\")  Wt 86.3 kg (190 lb 4.8 oz)  SpO2 98%  BMI 28.94 kg/m2      Intake/Output Summary (Last 24 hours) at 11/03/18 1143  Last data filed at 11/03/18 0913   Gross per 24 hour   Intake             1740 ml   Output                1 ml   Net             1739 ml       General: Alert, awake, no acute distress.  HEENT: NC/AT, eyes anicteric, external occular movements intact, face symmetric.  Dentition WNL, MM moist.  Cardiac: RRR, S1, " S2.    Pulmonary: Normal chest rise, normal work of breathing.  Lungs CTA BL  Abdomen: soft, non-tender, non-distended.  Bowel Sounds Present.  No guarding.  Extremities: Left foot wrapped from toes all the way to near her knee postoperatively.  No apparent deformities.  Warm, well perfused.  Skin: no rashes or lesions noted.  Warm and Dry.  Neuro: No focal deficits noted.  Speech clear.  Coordination and strength grossly normal.  Psych: Appropriate affect.         Medications:      All current medications were reviewed with changes reflected in problem list.         Data:      All new lab and imaging data was reviewed.   Labs:    Recent Labs  Lab 11/04/18  0652      POTASSIUM 4.7   CHLORIDE 108   CO2 26   ANIONGAP 4   *   BUN 42*   CR 3.07*   GFRESTIMATED 15*   GFRESTBLACK 19*   GILL 8.1*       Recent Labs  Lab 11/04/18  0652   WBC 8.1   HGB 8.9*   HCT 28.0*   MCV 84         Imaging:   Recent Results (from the past 48 hour(s))   MR Foot Left w/o Contrast    Narrative    MR FOOT LEFT WITHOUT CONTRAST November 2, 2018 5:05 PM     HISTORY: Concern for left toe cellulitis versus abscess versus  osteomyelitis.      TECHNIQUE: Multiplanar, multisequence without contrast. Contrast could  not be used due to GFR of 19.    COMPARISON: 4/29/2017.    FINDINGS: Changes of great toe amputation at the level of the proximal  phalanx. Changes of second toe amputation at the MTP joint level.  There are no findings suspicious for osteomyelitis of the great toe.  There is a mildly complex fluid collection around the third DIP joint,  greatest dorsally. This communicates with the joint and is felt to  represent prominent joint effusion. Septic arthritis cannot be  excluded. There is edema of the third distal, middle, proximal  phalanges. There is could be reactive in nature, but osteomyelitis  cannot be excluded. Recommend clinical correlation. If there is a  nearby third toe ulcer, then the findings would be  suspicious for  osteomyelitis and DIP septic arthritis. There is nonspecific soft  tissue edema along the foot. This could relate to reactive or  dependent edema, injury, or cellulitis. At least some of this  presumably represents cellulitis in light of the history. There are  scattered degenerative changes. Hazy asymmetric edema of the fourth  metatarsal, suspicious for stress reaction (the patient did not  describe a recent injury in order to suspect bone contusion). Mild  fluid within the 1st-2nd, 2nd-3rd, intermetatarsal bursae.       Impression    IMPRESSION:  1. Prominent third distal interphalangeal joint effusion. This is  nonspecific and septic arthritis cannot be excluded. Edema of all  three third toe phalanges. This could be reactive in nature, but  osteomyelitis cannot be excluded. Clinical correlation is recommended.  2. Cellulitis.  3. Fourth metatarsal stress reaction.   X-ray lt Foot 3 vw port: In PACU    Narrative    FOOT PORTABLE LEFT THREE VIEWS November 3, 2018 9:12 AM     HISTORY: Postoperative state.    COMPARISON: 7/6/2018.    FINDINGS: Again there is amputation of the first and second toes.  Fibular plate-screw fixation, as well as changes of syndesmotic  fixation. Plantar calcaneal spurring. I suspect a healing fracture of  the fourth proximal phalanx.      Impression    IMPRESSION: Changes of third toe amputation at the level of the  proximal interphalangeal joint.         Gurjit Chaparro MD.

## 2018-11-04 NOTE — PROVIDER NOTIFICATION
2156: Admitting MD pgd: Patient Blood sugar 406, given Novolog 5 units. Blood sugar 372 recheck. Can we get new orders?  Thanks

## 2018-11-04 NOTE — PROGRESS NOTES
"Podiatry / Foot and Ankle Surgery Progress Note    November 4, 2018    Subject: Patient was seen at bedside.  No pain. No concerns today.     Objective:  Vitals: /70 (BP Location: Left arm)  Temp 98  F (36.7  C) (Oral)  Resp 18  Ht 1.727 m (5' 8\")  Wt 86.3 kg (190 lb 4.8 oz)  SpO2 98%  BMI 28.94 kg/m2  BMI= Body mass index is 28.94 kg/(m^2).     WBC   Date Value Ref Range Status   11/04/2018 8.1 4.0 - 11.0 10e9/L Final       General:  Patient is alert and orientated.  NAD  Dressing is c/d/i. Sutures intact. Skin is well approximated. Redness is resolving. A little duskiness to the plantar skin incision.     Imaging: left foot xray - Changes of third toe amputation at the level of the proximal interphalangeal joint.    Cultures:  Gram positive cocci     Assessment: 58 yr old diabetic female s/p partial left 3rd toe amputation.     Plan:  -POD #1  -dressing changed. All bone infection removed. No further surgery planned.   -minimal weight bearing on left foot in post op shoe.   -Keep foot and dressing dry.   -Okay to be discharged from podiatry standpoint on oral antibiotics - Augmenting 875mg bid for 10 days.   -Patient to follow in 1 week from discharge with me in clinic. Orders and recs placed.  -Podiatry will sign off. Please call with questions or concerns.     Verna Fofana DPM, Podiatry/Foot and Ankle Surgery  11:26 AM    "

## 2018-11-04 NOTE — CONSULTS
Care Transition Initial Assessment -   Reason For Consult: financial concerns, medication concerns  Met with: Patient    Active Problems:    Toe ulcer, left, with unspecified severity (H)    Ulcer of toe of left foot (H)       DATA  Lives With: alone  Living Arrangements: house  Description of Support System: Supportive  Who is your support system?: Parent(s) (Brother in law, friends)  Support Assessment: Lacks adequate emotional support. Pt reports she talks to her mother and brother in law, but does not share a lot of her struggles with family due to judgements. She also has a couple close friends whom she shares with but most people she knows do not know the extend of her struggles.   Identified issues/concerns regarding health management: Pt reports she often has to choose between buying food or her medications. She does not have insurance and is likely unable to afford her medications.      Resources List: [place], [place], and Insurance Tax Credit income limits, cost sharing information for [place] and [place]are  Other Resources:  [employer] for M[place]ure application assistance                                Fare for All- discount food packages                                SNAP application for 60+ and [facility] Financial Benefits information                                Prescription Assistance Program information  Quality Of Family Relationships: helpful   Transportation Available: her car or family or friend will provide if she is unable to drive herself.     ASSESSMENT  Cognitive Status:  awake, alert and oriented  Concerns to be addressed:  Pt appeared eager to meet with  and be connected to resources. She apologized often for unloading her burdens on writer, despite reassurance that it was okay.  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 Writer discussed 3 options pt could look into for insurance: Medical Assistance, M[place]are, and Tax Credits through M[place]ure application process. Pt  indicated she makes a little over $16,000 a year. Writer provided pt with print out of the income limits for the 3 programs, and encouraged her to call [employer] which Is an organization that specializes in helping individuals enroll in insurance through [employer]. They can help guide her to which programs she qualifies for. Pt declined a paper application stating she can do it online with assistance.  Pt also reports working part time at [employer] which allows her a steadily income to be able to pay all of her bills for the month, she is concerned that she will not be able to work that job for a while and may not be able to make ends meet. Writer provided pt with [facility] Financial Benefit information as well as the SNAP for ages 60+ application. Pt reports she uses [employer] 2 times a month, which helps her obtain food at a low cost. Writer provided with her additional resource of Fare For Fall, similar to [employer].  Pt explains that when she goes to fill her medications she brings all her prescriptions to [facility], tells the pharmacist how much money she has that month to spend on prescriptions and then the pharmacist looks into generics, discounts, etc and comes up with the best options for pt. Writer stressed importance of taking care of her self, given her continued issues with medical concerns. Pt verbalized agreement.  She talked about her cat being a good source of support for her as well. Pt hopes she can make forward movement with insurance and not end up in the hospital again. She was thankful for all the resources provided.      PLAN  Financial costs for the patient includes: lacks insurance, hospital stay will be out of pocket. Pt hopefully will be able to get 30 days of medications filled by hosptial.  Patient given options and choices for discharge: plans to discharge home.  Patient/family is agreeable to the plan?  Yes.  Patient Goals and Preferences: return home, apply for insurance.    Patient anticipates discharging to:  Home.

## 2018-11-04 NOTE — CONSULTS
Care Transition Initial Assessment - RN    Reason For Consult: discharge planning   Met with: Patient.  DATA   Active Problems:    Toe ulcer, left, with unspecified severity (H)    Ulcer of toe of left foot (H)       Cognitive Status: awake, alert and oriented.  [facility]: [facility]  Primary Care MD Name: Brigido London  Contact information and PCP information verified: Yes, pt was established with  Dr. Brigido London but hasn't followed up with that clinic  Lives With: alone  Living Arrangements: house  Quality Of Family Relationships: helpful  Description of Support System: Supportive   Who is your support system?: Parent(s) (Brother in law, friends)   Support Assessment: Lacks adequate emotional support   Insurance concerns: Insurance questions referred to Financial Services  ASSESSMENT  Patient currently receives the following services:  none        Identified issues/concerns regarding health management: pt admitted with left toe ulcer and elevated DEVEN. Pt was seen by care coordination for elevated DEVEN and A1C of 11.6. Pt states she has a glucometer and testing supplies. She does not have insurance at the moment and therefore has been noncompliant with her medications as she can not afford them. Pt stated she then gets depressed and stops testing her blood glucose because she doesn't have any insulin or medications to take. Pt has been seen by SW and was given resources to help her get signed up with health insurance. CM offered pt [employer] diet guide book to help with food choices but pt politely declines as she states she got one the last time she was here. CM encourage pt to follow up with podiatry in 1 week has recommended and to call and establish care again at  [facility] with Dr. Brigido London.     PLAN  Financial costs for the patient include meds, hospital stay, follow up appointments .  Patient anticipates discharging to home .  Resources List: Other (Comments) ([place], M[place]are, and Tax  Credit assest limits)  Other Resources:  ([employer] for MNSure application assistance)  Patient anticipates needs for home equipment: No  Plan/Disposition: Home   Appointments: pt states she will schedule at discharge, although somewhat doubtful as she has no insurance.      Care  (CTS) will continue to follow as needed.    Shefali June RN, BSN CTS  Care Coordinator  [phone]

## 2018-11-04 NOTE — PLAN OF CARE
Problem: Patient Care Overview  Goal: Plan of Care/Patient Progress Review  Outcome: No Change  Alert and oriented, independent.  VSS, 98% on RA, denies pain.  LS clear, denies SOB, denies cough.  BS active x4, passing flatus, denies nausea.  LLE dressing change done by surgery, orthotics shoe ordered, IV zosyn, blood cultures negative.   INR 1.05, Heparin drip stopped, plan to start Lovenox bridge and coumadin tonight.  IV fluids discontinued.  BGs 333, 301.  NovoLin mix insuline and sliding scale doses increased for tonight.  Plan for discharge tomorrow.   Will continue to monitor.

## 2018-11-04 NOTE — PLAN OF CARE
Problem: Patient Care Overview  Goal: Plan of Care/Patient Progress Review  Outcome: Improving  Patient Postop #0 from third left toe partial amputee. A/O X4, VSS tmax 100.2 tylenol given down to 98.6. SBA with partial wt bearing on left ankle.  Denies pain, dressing clean, dry and intact.  On hep drip 12.5/hr.Sats 96% RA, refused capnography.  BGs remain elevated - 311, 406 & 372, given scheduled sliding scale insulin and Humulin/Novolin in addition to 1x order of 5u. Changed to  MODCHO diet.  Will continue to monitor

## 2018-11-04 NOTE — PROGRESS NOTES
BG still poorly controlled.  Increase to 24 U 70/30 BID and change from medium to high sliding scale insulin.    Also, as hopefully she is nearing discharge will plan to change from heparin drip to lovenox for bridging.  Reduce dose to 1 mg/kg Q24H in the setting of CKD.      Gurjit Chaparro MD

## 2018-11-05 VITALS
DIASTOLIC BLOOD PRESSURE: 61 MMHG | TEMPERATURE: 98 F | WEIGHT: 191.3 LBS | RESPIRATION RATE: 20 BRPM | SYSTOLIC BLOOD PRESSURE: 172 MMHG | BODY MASS INDEX: 28.99 KG/M2 | OXYGEN SATURATION: 97 % | HEIGHT: 68 IN

## 2018-11-05 LAB
ANION GAP SERPL CALCULATED.3IONS-SCNC: 4 MMOL/L (ref 3–14)
BACTERIA SPEC CULT: NORMAL
BUN SERPL-MCNC: 42 MG/DL (ref 7–30)
CALCIUM SERPL-MCNC: 8.4 MG/DL (ref 8.5–10.1)
CHLORIDE SERPL-SCNC: 111 MMOL/L (ref 94–109)
CO2 SERPL-SCNC: 26 MMOL/L (ref 20–32)
CREAT SERPL-MCNC: 3.16 MG/DL (ref 0.52–1.04)
GFR SERPL CREATININE-BSD FRML MDRD: 15 ML/MIN/1.7M2
GLUCOSE BLDC GLUCOMTR-MCNC: 169 MG/DL (ref 70–99)
GLUCOSE BLDC GLUCOMTR-MCNC: 197 MG/DL (ref 70–99)
GLUCOSE BLDC GLUCOMTR-MCNC: 93 MG/DL (ref 70–99)
GLUCOSE SERPL-MCNC: 171 MG/DL (ref 70–99)
INR PPP: 1.06 (ref 0.86–1.14)
Lab: NORMAL
PLATELET # BLD AUTO: 257 10E9/L (ref 150–450)
POTASSIUM SERPL-SCNC: 4.8 MMOL/L (ref 3.4–5.3)
SODIUM SERPL-SCNC: 141 MMOL/L (ref 133–144)
SPECIMEN SOURCE: NORMAL

## 2018-11-05 PROCEDURE — 85610 PROTHROMBIN TIME: CPT | Performed by: PODIATRIST

## 2018-11-05 PROCEDURE — 36415 COLL VENOUS BLD VENIPUNCTURE: CPT | Performed by: PODIATRIST

## 2018-11-05 PROCEDURE — 90682 RIV4 VACC RECOMBINANT DNA IM: CPT | Performed by: INTERNAL MEDICINE

## 2018-11-05 PROCEDURE — 25000132 ZZH RX MED GY IP 250 OP 250 PS 637: Performed by: INTERNAL MEDICINE

## 2018-11-05 PROCEDURE — 80048 BASIC METABOLIC PNL TOTAL CA: CPT | Performed by: PODIATRIST

## 2018-11-05 PROCEDURE — 85049 AUTOMATED PLATELET COUNT: CPT | Performed by: PODIATRIST

## 2018-11-05 PROCEDURE — 25000132 ZZH RX MED GY IP 250 OP 250 PS 637: Performed by: PHYSICIAN ASSISTANT

## 2018-11-05 PROCEDURE — 25000128 H RX IP 250 OP 636: Performed by: INTERNAL MEDICINE

## 2018-11-05 PROCEDURE — 00000146 ZZHCL STATISTIC GLUCOSE BY METER IP

## 2018-11-05 PROCEDURE — 25000128 H RX IP 250 OP 636: Performed by: PHYSICIAN ASSISTANT

## 2018-11-05 PROCEDURE — 99239 HOSP IP/OBS DSCHRG MGMT >30: CPT | Performed by: INTERNAL MEDICINE

## 2018-11-05 RX ORDER — WARFARIN SODIUM 5 MG/1
5 TABLET ORAL DAILY
Qty: 10 TABLET | Refills: 0 | Status: SHIPPED | OUTPATIENT
Start: 2018-11-06 | End: 2018-11-05

## 2018-11-05 RX ORDER — WARFARIN SODIUM 5 MG/1
5 TABLET ORAL DAILY
Qty: 10 TABLET | Refills: 0 | Status: SHIPPED | OUTPATIENT
Start: 2018-11-06 | End: 2018-12-17

## 2018-11-05 RX ORDER — AMOXICILLIN AND CLAVULANATE POTASSIUM 500; 125 MG/1; MG/1
1 TABLET, FILM COATED ORAL EVERY 12 HOURS
Qty: 19 TABLET | Refills: 0 | Status: SHIPPED | OUTPATIENT
Start: 2018-11-05 | End: 2018-11-15

## 2018-11-05 RX ORDER — WARFARIN SODIUM 10 MG/1
10 TABLET ORAL ONCE
Status: COMPLETED | OUTPATIENT
Start: 2018-11-05 | End: 2018-11-05

## 2018-11-05 RX ORDER — AMOXICILLIN AND CLAVULANATE POTASSIUM 500; 125 MG/1; MG/1
1 TABLET, FILM COATED ORAL EVERY 12 HOURS SCHEDULED
Status: DISCONTINUED | OUTPATIENT
Start: 2018-11-05 | End: 2018-11-05 | Stop reason: HOSPADM

## 2018-11-05 RX ADMIN — ENOXAPARIN SODIUM 80 MG: 80 INJECTION SUBCUTANEOUS at 14:39

## 2018-11-05 RX ADMIN — TAZOBACTAM SODIUM AND PIPERACILLIN SODIUM 2.25 G: 250; 2 INJECTION, SOLUTION INTRAVENOUS at 06:09

## 2018-11-05 RX ADMIN — METOPROLOL TARTRATE 50 MG: 50 TABLET, FILM COATED ORAL at 08:39

## 2018-11-05 RX ADMIN — AMLODIPINE BESYLATE 10 MG: 10 TABLET ORAL at 08:39

## 2018-11-05 RX ADMIN — INFLUENZA A VIRUS A/MICHIGAN/45/2015 (H1N1) RECOMBINANT HEMAGGLUTININ ANTIGEN, INFLUENZA A VIRUS A/SINGAPORE/INFIMH-16-0019/2016 (H3N2) RECOMBINANT HEMAGGLUTININ ANTIGEN, INFLUENZA B VIRUS B/MARYLAND/15/2016 RECOMBINANT HEMAGGLUTININ ANTIGEN, AND INFLUENZA B VIRUS B/PHUKET/3073/2013 RECOMBINANT HEMAGGLUTININ ANTIGEN 0.5 ML: 45; 45; 45; 45 INJECTION INTRAMUSCULAR at 12:15

## 2018-11-05 RX ADMIN — AMOXICILLIN AND CLAVULANATE POTASSIUM 1 TABLET: 500; 125 TABLET, FILM COATED ORAL at 12:10

## 2018-11-05 RX ADMIN — WARFARIN SODIUM 10 MG: 10 TABLET ORAL at 14:39

## 2018-11-05 ASSESSMENT — ACTIVITIES OF DAILY LIVING (ADL)
ADLS_ACUITY_SCORE: 11

## 2018-11-05 NOTE — PROGRESS NOTES
DEVEN ELEVATED.  Pt prefers to make her own f/u appt with Podiatry Dr Fofana and primary Dr Lita [facility].  She also prefers to deal with getting insurance.  She is aware financial advisors are available for support.  Will give hand off to Clinic RN CTS.  Jennifer RN CTS [phone]

## 2018-11-05 NOTE — PHARMACY - DISCHARGE MEDICATION RECONCILIATION AND EDUCATION
Roxy Beaulieu     1960      female    [id number]                                [id number]    Allergy: Review of patient's allergies indicates no known allergies.    RX: Discharge Medication Consult by Pharmacist  EDUCATION:   Discharge Medication List   Roxy Beaulieu   Home Medication Instructions PILAR:[id number]    Printed on:11/05/18 1792   Medication Information                      amLODIPine (NORVASC) 10 MG tablet  Take 1 tablet (10 mg) by mouth daily             amoxicillin-clavulanate (AUGMENTIN) 500-125 MG per tablet  Take 1 tablet by mouth every 12 hours for 19 doses Starting the evening of discharge             atorvastatin (LIPITOR) 20 MG tablet  Take 1 tablet (20 mg) by mouth every evening             bumetanide (BUMEX) 0.5 MG tablet  Take 1 tablet (0.5 mg) by mouth daily             enoxaparin (LOVENOX) 100 MG/ML injection  Inject 0.8 mLs (80 mg) Subcutaneous every 24 hours Until your INR is >2             Ergocalciferol (VITAMIN D) 07201 units CAPS  Take 50,000 Units by mouth every 7 days             insulin aspart (NOVOLOG PEN) 100 UNIT/ML injection  Correction scale insulin: Three times daily with meals: -199: 2U, -249: 3 U, -299: 4 U, -349: 5 U, BG >350: 6 u             insulin isophane & regular (HUMULIN MIX 70/30 PEN , NOVOLIN MIX 70/30 PEN) susp  Inject 20 Units Subcutaneous 2 times daily (before meals)             metoprolol tartrate (LOPRESSOR) 50 MG tablet  Take 1 tablet (50 mg) by mouth 2 times daily             warfarin (COUMADIN) 5 MG tablet  Take 1 tablet (5 mg) by mouth daily Q 6 PM  get INR in AM 11/7/18 for further dosing and med refill                 Patient was informed to STOP taking the following HOME medications:   Relion    Patient was informed to start taking the following NEW medications:   Amoxicillin-clavulanate, enoxaparin, insulin aspart, insulin Humulin mix 70/30    Patient was informed about the following CHANGED medications:    warfarin    Patient was educated on the following for each discharge medication:  Rationale for therapy  Duration of treatment  Dosing and or monitoring drug levels  Common side effects  Importance of compliance  Drug/food interactions  Missed doses  Self monitoring parameters    OUTCOMES:  Patient verbalized understanding. Reinforced importance of medication compliance.     Left written materials and instructions (Clinical notes from [facility]).  IMPORTANT FOLLOW UP NOTES:   Check INR in 2 days.

## 2018-11-05 NOTE — PLAN OF CARE
Problem: Patient Care Overview  Goal: Plan of Care/Patient Progress Review  Outcome: Adequate for Discharge Date Met: 11/05/18  Pt to D/C to home.  Pt provided with d/c instructions, including new medications, when medications were last given, and when to take them again.  Pt also informed to f/u with PCP in 7 days, Dr. Fofana within 7 days and clinic within 2 days.  Pt verbalized understanding of all d/c and f/u instructions.  All questions were answered at this time.  Copy of paperwork sent with pt.  Medications sent with pt.  Friend to provide transport.  All personal belongings sent with pt.

## 2018-11-05 NOTE — PLAN OF CARE
Problem: Patient Care Overview  Goal: Plan of Care/Patient Progress Review  Outcome: Therapy, progress toward functional goals as expected  Patient A/O x4 ,independent in room, partial weight on LE, use of orthotic shoe, elevated /71 on metoprolol, tmax 99.1, IS encouraged, denies pain. Started on Lovenox, education provided with demonstration, pt self inject with direction. Dressing LLE dry, clean, and intact. , 139 on insulin mix and Novolog, MODCHO diet. SW following.   Plan: Discharge home tomorrow with Lovenox.

## 2018-11-05 NOTE — PROGRESS NOTES
S: Pt. seen at [facility]. Female. Dr. Chaparro. RX:  shoe. DX: Ulceration of toe left.  O: Pt. had surgery on left 2nd toe on 11-3-18.   A: Today I F/D a  relief shoe size MD. I did not relieve any part of the foot today. Pt's foot was wrapped with thick bandages and Pt. could not tell me where relief should be placed. I explained to Pt. on how to put a relief in the shoe and Pt. stated that she understood how to do this and she had experience with this product in the past.  P: Pt. to be seen as needed.    This note has been electronically signed by AURORA Brown.

## 2018-11-05 NOTE — PHARMACY-ANTICOAGULATION SERVICE
Clinical Pharmacy- Warfarin Discharge Note    This patient is currently on warfarin for the treatment of DVT/PE.  INR Goal= 2-3.  Warfarin PTA Regimen: 5-10 mg daily per INR, has not taken for a few days because of lack of supply    Anticoagulation Dose History     Recent Dosing and Labs Latest Ref Rng & Units 10/5/2018 10/6/2018 10/7/2018 10/8/2018 11/2/2018 11/4/2018 11/5/2018    Warfarin 5 mg - 5 mg - - - - 5 mg 10mg ordered    Warfarin 7.5 mg - - 7.5 mg - - - - -    Warfarin 10 mg - - - 10 mg - - - -    INR 0.86 - 1.14 1.09 1.03 1.14 1.20(H) 1.05 - 1.06         Ref. Range 11/5/2018 06:11   Creatinine Latest Ref Range: 0.52 - 1.04 mg/dL 3.16 (H)     CrCl:  21.8 mL/min.  ___________________________    Agree with discharge plan below, also discussed with MD.    enoxaparin 100 MG/ML injection   Commonly known as: LOVENOX   Used for: Deep vein thrombosis (DVT) of proximal lower extremity, unspecified chronicity, unspecified laterality (H)        Dose: 80 mg   Start taking on: 11/6/2018   Inject 0.8 mLs (80 mg) Subcutaneous every 24 hours Until your INR is >2   Quantity: 3 Syringe        Warfarin   Start taking on: 11/6/2018 Take 1 tablet (5 mg) by mouth daily Q 6 PM get INR in AM 11/7/18 for further dosing and med refill. Quantity: 10 tablet.

## 2018-11-05 NOTE — PROGRESS NOTES
Update: The patient is insistent on discharging home today as she feels she has responsibilities that must be attended to.  I did recommend she remain hospitalized given her gradually rising renal function but in the setting will provide discharge medications to help her be successful at home to the best of our ability.  --Discharge meds including renally dose Lovenox and also Coumadin ordered  --Would give afternoon dose of Coumadin and Lovenox prior to discharge  --As per podiatry will discharge on oral Augmentin.  --Full note to follow.

## 2018-11-05 NOTE — PROGRESS NOTES
Transition Communication Hand-off for Care Transitions to Next Level of Care Provider    Name: Roxy Beaulieu  : 1960  MRN #: [id number]  Primary Care Provider: Dwain London  Primary Care MD Name: Brigido London  Primary Clinic: [facility]  [address]  [place] [address]  Primary Care Clinic Name:  [facility]  Reason for Hospitalization:  Open wound of toe, initial encounter [S91.109A]  Admit Date/Time: 2018  9:38 AM  Discharge Date: 18  Payor Source: No coverage found.      Readmission Assessment Measure (DEVEN) Risk Score/category: ELEVATED           Reason for Communication Hand-off Referral: Difficulty understanding plan of care  Non-adherence to plan of care related to:  Other no insurance.     Discharge Plan:  Discharge Plan:       Most Recent Value    Concerns To Be Addressed financial/insurance concerns           Concern for non-adherence with plan of care:   YES  Discharge Needs Assessment:  Needs       Most Recent Value    Transportation Available car, family or friend will provide    # of Referrals Placed by CTS Specialty Providers, Scheduled Follow-up appointments, Financial Services    Other Resources -- [[place] [place] for [employer] application assistance]          Already enrolled in Tele-monitoring program and name of program:  na  Follow-up specialty is recommended: Yes    Follow-up plan:  No future appointments.    Any outstanding tests or procedures:                 Reason for your hospital stay      You were admitted for an infected third toe.  You were treated with IV antibiotics and surgery (partial toe amputation).  At this point you will continue treatment with oral antibiotics and it will be imperative that you achieve good blood sugar control to prevent this from worsening/becoming infected again.     As discussed, you were insistent on discharging home today though I have concerns about your kidney function in the setting of a rising creatinine which indicates  "worsening kidney function.  I did recommend that you remain here in the hospital for monitoring and treatment but I understand that you feel it is more important to get out of the hospital to manage other responsibilities.      It is imperative that you follow-up in clinic within the next 2 days to have this reevaluated and consider any medication dose adjustments.  Worsening kidney function puts you at risk for confusion, severe electrolyte abnormalities which could be dangerous for your heart or even cause death.     Also, you have been restarted on coumadin after stopping this a few days prior to admission and will need to repeat \"bridging\" treatment with lovenox again for your DVT.  Please have an INR checked within 2 days as discussed as it is unclear exactly what dose of Coumadin you will need           Diet       Follow this diet upon discharge: Orders Placed This Encounter       Advance Diet as Tolerated: Regular Diet Adult; 1597-8214 Calories: Moderate Consistent CHO (4-6 CHO units/meal)            Wound care and dressings      Instructions to care for your wound at home:  Keep foot and dressing dry. Will changes dressing at first office visit. Bag up if you shower.                     Follow-up Appointments      Follow-up and recommended labs and tests       Patient to follow up with Dr. Fofana in 1 week from discharge from the hospital.      For APPOINTMENTS: [phone].  For questions or concerns: call: [phone]            Follow-up and recommended labs and tests       Follow up with primary care provider, Physician within 2 days for hospital follow- up.  The following labs/tests are recommended: basic metabolic panel to recheck your kidney function and an INR to evaluate coumadin dosing.                    Key Recommendations: pt admitted with left toe ulcer and elevated DEVEN. Pt was seen by care coordination for elevated DEVEN and A1C of 11.6. Pt states she has a glucometer and testing supplies. She " does not have insurance at the moment and therefore has been noncompliant with her medications as she can not afford them. Pt stated she then gets depressed and stops testing her blood glucose because she doesn't have any insulin or medications to take. Pt has been seen by SW and was given resources to help her get signed up with health insurance. CM offered pt [employer] diet guide book to help with food choices but pt politely declines as she states she got one the last time she was here. CM encourage pt to follow up with podiatry in 1 week has recommended and to call and establish care again at [facility] clinic with Dr. Brigido London.     Please follow up with her to get her primary care appointment scheduled and assistance with acquiring health insurance as pt has declined for us to assist her.       Shefali June    AVS/Discharge Summary is the source of truth; this is a helpful guide for improved communication of patient story

## 2018-11-05 NOTE — PLAN OF CARE
Problem: Patient Care Overview  Goal: Plan of Care/Patient Progress Review  Ambulatory Status:  Pt up independently with cane using heel touch weight bearing on Left, using orthopedic shoe.    VS:  vss  Pain:  Denies pain  Resp: LS clear  GI:  denies nausea.   Mod Cho diet.  BS active.  Passing flatus.  Last BM 11/4  Skin:  Dressing on left foot CDI  Tx:  zosyn  Labs: BG 93, apple juice given  Consults:  podiatry  Disposition:  Home today

## 2018-11-05 NOTE — DISCHARGE SUMMARY
[facility]  Discharge Summary  Name: Roxy Beaulieu    MRN: [id number]  YOB: 1960    Age: 58 year old  Date of Discharge:  11/5/2018  Date of Admission: 11/2/2018  Primary Care Provider: Dwain London  Discharge Physician:  David Chaparro MD  Discharging Service:  Hospitalist      Hospital Course/Discharge Diagnoses:   Roxy Beaulieu is a 58-year-old woman recently admitted 11/2/18 for left third toe infection with uncontrolled hyperglycemia.  Notably, she was recently admitted on 10/4 through 10/8 for acute left lower extremity DVT with probable PE with hypoxia at that time (not verified both to avoid contrast and because it was not felt to change the plan of care) who has been off essentially all medications recently due to financial concerns (including Coumadin and insulin) who presented here again 11/2/18 with left third toe erythema and swelling found to have cellulitis with concern for deeper infection.   She was started on treatment with vancomycin and Zosyn in the ER.  Zosyn has been continued.  She was started on a heparin drip here to resume treatment for her DVT/PE which she had previously self discontinued at home.     MRI was obtained and showed prominent third distal anterior phalangeal joint effusion felt potentially to include an infected joint as well as local cellulitis.  She was taken for incision and drainage after heparin drip was stopped and managed with partial left third toe amputation on 11/3.     As per podiatry it was felt that her infection was essentially completely removed with amputation and they recommended oral antibiotics moving forward.  The patient does have chronic kidney disease and her creatinine has been gradually trending up which I discussed with her.  I advised that she remain hospitalized for ongoing monitoring of that issue given the complex nature of her other medical problems including DVT/PE requiring anticoagulation,  recent infection and very labile blood sugars but she refuses to remain admitted any further.  She is adamant that she discharged home today against my advice that she stay here in the hospital but I will be prescribing medications to help her to the best of my ability.  Will be renally dosing Lovenox as a bridge for Coumadin with the stated understanding that she will follow-up in clinic within 2 days to both recheck kidney function and her INR.  She will discharge home with 10 days of oral Augmentin as per podiatry's recommendation as well.  I have advised that she seek out health insurance and have requested social work/ consultation here to help with that process.          1.  Third toe cellulitis, suspected osteomyelitis: As above.  --Felt to be treated with partial amputation.  Change to oral antibiotics as per podiatry.     2.  Uncontrolled insulin dependent diabetes: Due to insulin noncompliance, also could be worse due to acute infection though I suspect her infection is in large part due to uncontrolled diabetes and med noncompliance.  --Restart home prescribed insulin mix, increase to 20 U BID now as well as ssi.  I did provide refills upon discharge and our pharmacy liaison help to find the most inexpensive reasonable options for her based on her lack of insurance.  --Social work consult regarding financial concerns.      3.  Financial/social concerns: She does not have insurance and does not seem yet to be in the process of pursuing this.  I did consult social work to help expedite this.      4.  Recent diagnosis of DVT and presumed PE approximately 3 weeks prior to admission:  She was initially started on heparin drip here in part for the rapid onset and short half-life in case bleeding issues develop.  She did not manifest any significant bleeding issues from her wound and has been transitioned to renally dose Lovenox as well as Coumadin which has been dosed by her pharmacist.  At this  point I talked with her repeatedly about following up within 2 days to recheck an INR in clinic and discussed how labile renal function could impact Lovenox dosing and that she needs to readdress that within 2 days with her primary care clinic as well.    5.  CKD: Suspect progression of chronic disease related to medication noncompliance and uncontrolled hyperglycemia the could have a component of ATN from acute infection versus antibiotics.  Avoid nephrotoxins for now.  Creatinine appears to recently have been in the 2.6-2.8 range, slightly higher now at 3.16 with a gradual trend up which is concerning as noted above.  She declines to stay here for any further workup or monitoring. Vancomycin was stopped upon admission to avoid nephrotoxicity.  Recheck in clinic at very short-term follow-up.     6.  Pseudohyponatremia: Resolved with insulin and IV fluids.      Discharge Disposition:  Discharged to home     Allergies:  No Known Allergies     Discharge Medications:        Review of your medicines      START taking       Dose / Directions    amoxicillin-clavulanate 500-125 MG per tablet   Commonly known as:  AUGMENTIN   Indication:  Infection of the Skin and/or Related Soft Tissue   Used for:  Open wound of toe, initial encounter        Dose:  1 tablet   Take 1 tablet by mouth every 12 hours for 19 doses Starting the evening of discharge   Quantity:  19 tablet   Refills:  0       enoxaparin 100 MG/ML injection   Commonly known as:  LOVENOX   Used for:  Deep vein thrombosis (DVT) of proximal lower extremity, unspecified chronicity, unspecified laterality (H)        Dose:  80 mg   Start taking on:  11/6/2018   Inject 0.8 mLs (80 mg) Subcutaneous every 24 hours Until your INR is >2   Quantity:  3 Syringe   Refills:  0       insulin aspart 100 UNIT/ML injection   Commonly known as:  NovoLOG PEN   Used for:  Uncontrolled type 2 diabetes mellitus with hyperglycemia, with long-term current use of insulin (H)         Correction scale insulin: Three times daily with meals: -199: 2U, -249: 3 U, -299: 4 U, -349: 5 U, BG >350: 6 u   Quantity:  21 mL   Refills:  0       insulin isophane & regular susp   Commonly known as:  HumuLIN MIX 70/30 PEN , NovoLIN MIX 70/30 PEN   Used for:  Uncontrolled type 2 diabetes mellitus with hyperglycemia, with long-term current use of insulin (H)   Replaces:  RELION 70/30 SC        Dose:  20 Units   Inject 20 Units Subcutaneous 2 times daily (before meals)   Quantity:  21 mL   Refills:  0         CONTINUE these medicines which may have CHANGED, or have new prescriptions. If we are uncertain of the size of tablets/capsules you have at home, strength may be listed as something that might have changed.       Dose / Directions    warfarin 5 MG tablet   Commonly known as:  COUMADIN   This may have changed:    - medication strength  - how much to take  - how to take this  - when to take this  - additional instructions   Used for:  Acute deep vein thrombosis (DVT) of distal vein of lower extremity, unspecified laterality (H)        Dose:  5 mg   Start taking on:  11/6/2018   Take 1 tablet (5 mg) by mouth daily Q 6 PM get INR in AM 11/7/18 for further dosing and med refill   Quantity:  10 tablet   Refills:  0         CONTINUE these medicines which have NOT CHANGED       Dose / Directions    amLODIPine 10 MG tablet   Commonly known as:  NORVASC   Used for:  Hypertension, unspecified type        Dose:  10 mg   Take 1 tablet (10 mg) by mouth daily   Quantity:  30 tablet   Refills:  0       atorvastatin 20 MG tablet   Commonly known as:  LIPITOR   Used for:  Dyslipidemia        Dose:  20 mg   Take 1 tablet (20 mg) by mouth every evening   Quantity:  30 tablet   Refills:  0       bumetanide 0.5 MG tablet   Commonly known as:  BUMEX   Used for:  Hypertension, unspecified type        Dose:  0.5 mg   Take 1 tablet (0.5 mg) by mouth daily   Quantity:  30 tablet   Refills:  0       DRISDOL 28097  "units Caps   Used for:  Stage 3 chronic kidney disease (H)        Dose:  75563 Units   Take 50,000 Units by mouth every 7 days   Quantity:  30 capsule   Refills:  0       metoprolol tartrate 50 MG tablet   Commonly known as:  LOPRESSOR   Used for:  Hypertension, unspecified type        Dose:  50 mg   Take 1 tablet (50 mg) by mouth 2 times daily   Quantity:  60 tablet   Refills:  0         STOP taking          RELION 70/30 SC   Replaced by:  insulin isophane & regular susp                Where to get your medicines      These medications were sent to [place], [place] - [address] [facility]  [address] [facility] [address]     Phone:  [phone]      amoxicillin-clavulanate 500-125 MG per tablet     enoxaparin 100 MG/ML injection     insulin aspart 100 UNIT/ML injection     insulin isophane & regular susp         Some of these will need a paper prescription and others can be bought over the counter. Ask your nurse if you have questions.     Bring a paper prescription for each of these medications      warfarin 5 MG tablet             Condition on Discharge:  Discharge condition: Stable       Code status on discharge: Full Code     History of Illness:  See detailed admission note for full details.    Physical Exam:  Vital signs:  Temp: 98  F (36.7  C) Temp src: Oral BP: 172/61   Heart Rate: 85 Resp: 20 SpO2: 97 % O2 Device: None (Room air)   Height: 172.7 cm (5' 8\") Weight: 86.8 kg (191 lb 4.8 oz)  Estimated body mass index is 29.09 kg/(m^2) as calculated from the following:    Height as of this encounter: 1.727 m (5' 8\").    Weight as of this encounter: 86.8 kg (191 lb 4.8 oz).    Wt Readings from Last 1 Encounters:   11/05/18 86.8 kg (191 lb 4.8 oz)     General: Alert, awake, no acute distress.  HEENT: NC/AT, eyes anicteric, external occular movements intact, face symmetric.  Dentition WNL, MM moist.  Cardiac: RRR, S1, S2.  No murmurs appreciated.  Pulmonary: Normal chest " rise, normal work of breathing.  Lungs CTA BL  Abdomen: soft, non-tender, non-distended.  Bowel Sounds Present.  No guarding.  Extremities: left foot cleanly wrapped, see podiatry note re: detailed exam.  Otherwise no deformities.  Warm, well perfused.  Skin: no rashes or lesions noted.  Warm and Dry.  Neuro: No focal deficits noted.  Speech clear.  Coordination and strength grossly normal.  Psych: Appropriate affect.    Procedures other than Imaging:  Partial amputation left third toe     Imaging:  Results for orders placed or performed during the hospital encounter of 11/02/18   MR Foot Left w/o Contrast    Narrative    MR FOOT LEFT WITHOUT CONTRAST November 2, 2018 5:05 PM     HISTORY: Concern for left toe cellulitis versus abscess versus  osteomyelitis.      TECHNIQUE: Multiplanar, multisequence without contrast. Contrast could  not be used due to GFR of 19.    COMPARISON: 4/29/2017.    FINDINGS: Changes of great toe amputation at the level of the proximal  phalanx. Changes of second toe amputation at the MTP joint level.  There are no findings suspicious for osteomyelitis of the great toe.  There is a mildly complex fluid collection around the third DIP joint,  greatest dorsally. This communicates with the joint and is felt to  represent prominent joint effusion. Septic arthritis cannot be  excluded. There is edema of the third distal, middle, proximal  phalanges. There is could be reactive in nature, but osteomyelitis  cannot be excluded. Recommend clinical correlation. If there is a  nearby third toe ulcer, then the findings would be suspicious for  osteomyelitis and DIP septic arthritis. There is nonspecific soft  tissue edema along the foot. This could relate to reactive or  dependent edema, injury, or cellulitis. At least some of this  presumably represents cellulitis in light of the history. There are  scattered degenerative changes. Hazy asymmetric edema of the fourth  metatarsal, suspicious for stress  reaction (the patient did not  describe a recent injury in order to suspect bone contusion). Mild  fluid within the 1st-2nd, 2nd-3rd, intermetatarsal bursae.       Impression    IMPRESSION:  1. Prominent third distal interphalangeal joint effusion. This is  nonspecific and septic arthritis cannot be excluded. Edema of all  three third toe phalanges. This could be reactive in nature, but  osteomyelitis cannot be excluded. Clinical correlation is recommended.  2. Cellulitis.  3. Fourth metatarsal stress reaction.    LITZY JONES MD   X-ray lt Foot 3 vw port: In PACU    Narrative    FOOT PORTABLE LEFT THREE VIEWS November 3, 2018 9:12 AM     HISTORY: Postoperative state.    COMPARISON: 7/6/2018.    FINDINGS: Again there is amputation of the first and second toes.  Fibular plate-screw fixation, as well as changes of syndesmotic  fixation. Plantar calcaneal spurring. I suspect a healing fracture of  the fourth proximal phalanx.      Impression    IMPRESSION: Changes of third toe amputation at the level of the  proximal interphalangeal joint.    LITZY JONES MD        Consultations:  Consultation during this admission received from podiatry.       Recent Lab Results:    Recent Labs  Lab 11/05/18  0611 11/04/18  0652 11/03/18  0701 11/02/18  1026   WBC  --  8.1 7.8 10.3   HGB  --  8.9* 9.5* 10.5*   HCT  --  28.0* 29.5* 31.9*   MCV  --  84 82 81    221 270 267          Lab Results   Component Value Date     11/05/2018     11/04/2018     11/03/2018    Lab Results   Component Value Date    CHLORIDE 111 11/05/2018    CHLORIDE 108 11/04/2018    CHLORIDE 106 11/03/2018    Lab Results   Component Value Date    BUN 42 11/05/2018    BUN 42 11/04/2018    BUN 44 11/03/2018      Lab Results   Component Value Date    POTASSIUM 4.8 11/05/2018    POTASSIUM 4.7 11/04/2018    POTASSIUM 3.9 11/03/2018    Lab Results   Component Value Date    CO2 26 11/05/2018    CO2 26 11/04/2018    CO2 26 11/03/2018    Lab Results    Component Value Date    CR 3.16 11/05/2018    CR 3.07 11/04/2018    CR 2.78 11/03/2018          Recent Labs  Lab 11/03/18  0829   CULT Moderate growthNormal skin diego  Culture negative monitoring continues          Pending Results:    Unresulted Labs Ordered in the Past 30 Days of this Admission     Date and Time Order Name Status Description    11/3/2018 0831 Surgical pathology exam In process     11/3/2018 0830 Anaerobic bacterial culture Preliminary            Discharge Instructions and Follow-Up:     Discharge Procedure Orders  Activity   Order Comments: Your activity upon discharge:  Minimal weight bearing in post op shoe   Order Specific Question Answer Comments   Is discharge order? Yes      Wound care and dressings   Order Comments: Instructions to care for your wound at home:  Keep foot and dressing dry. Will changes dressing at first office visit. Bag up if you shower.     Follow-up and recommended labs and tests    Order Comments: Patient to follow up with Dr. Fofana in 1 week from discharge from the hospital.    For APPOINTMENTS: [phone].  For questions or concerns: call: [phone]     Follow-up and recommended labs and tests    Order Comments: Follow up with primary care provider, Physician within 2 days for hospital follow- up.  The following labs/tests are recommended: basic metabolic panel to recheck your kidney function and an INR to evaluate coumadin dosing.     Reason for your hospital stay   Order Comments: You were admitted for an infected third toe.  You were treated with IV antibiotics and surgery (partial toe amputation).  At this point you will continue treatment with oral antibiotics and it will be imperative that you achieve good blood sugar control to prevent this from worsening/becoming infected again.    As discussed, you were insistent on discharging home today though I have concerns about your kidney function in the setting of a rising creatinine which indicates worsening  "kidney function.  I did recommend that you remain here in the hospital for monitoring and treatment but I understand that you feel it is more important to get out of the hospital to manage other responsibilities.     It is imperative that you follow-up in clinic within the next 2 days to have this reevaluated and consider any medication dose adjustments.  Worsening kidney function puts you at risk for confusion, severe electrolyte abnormalities which could be dangerous for your heart or even cause death.    Also, you have been restarted on coumadin after stopping this a few days prior to admission and will need to repeat \"bridging\" treatment with lovenox again for your DVT.  Please have an INR checked within 2 days as discussed as it is unclear exactly what dose of Coumadin you will need.     Diet   Order Comments: Follow this diet upon discharge: Orders Placed This Encounter     Advance Diet as Tolerated: Regular Diet Adult; 5719-2609 Calories: Moderate Consistent CHO (4-6 CHO units/meal)   Order Specific Question Answer Comments   Is discharge order? Yes          Total time spent in face to face contact with the patient and coordinating discharge was:  55 Minutes.        "

## 2018-11-06 NOTE — OP NOTE
Procedure Date: 11/03/2018      DATE OF SURGERY 11/3/2018.      SURGEON:  Verna Fofana DPM      PREOPERATIVE DIAGNOSES:   1.  Diabetic, with neuropathy.   2.  Ulceration, left third toe.   3.  Abscess, cellulitis, left third toe.     4.  Osteomyelitis, left third toe.      POSTOPERATIVE DIAGNOSES:     1.  Diabetic, with neuropathy.   2.  Ulceration, left third toe.   3.  Abscess, cellulitis, left third toe.     4.  Osteomyelitis, left third toe.      PROCEDURES:  Partial left third toe amputation.      ANESTHESIA:  General, with local.      HEMOSTASIS:  None.      ESTIMATED BLOOD LOSS: 1 mL.      SPECIMENS:  Distal left third toe for pathology and swab cultures.        MATERIALS:  None.      INDICATIONS:  Ms. Beaulieu is a 58-year-old diabetic female who presented to the hospital with increasing redness to the left third toe.  On physical exam, she has ulceration of the distal aspect of the left third toe that probes to bone.  There is redness to the third toe up the foot, and a fluctuant area noted at the distal aspect of the left third toe as well.  The patient does have faintly palpable pulses.  She has had a previous partial left great toe amputation and a left second toe amputation.  An MRI was obtained, which showed osteomyelitis in the distal aspect of the left third toe, along with abscess formation.  It was discussed with the patient to go in and surgically remove the infected bone and drain the abscess.  Risks, benefits, and complications were discussed with the patient, and she wishes to proceed with surgery.      DESCRIPTION OF PROCEDURE:  The patient was brought to the operating room, placed on the operating table in the supine position.  Anesthesia was administered, and local was injected.  The foot was prepped and draped using sterile technique.  Attention was directed to the distal aspect of the left third toe.  A fishmouth incision was made at the middle aspect of the left third toe with a #15 blade,  full thickness through skin down to bone.  The skin was sharply dissected off of the middle phalanx and the distal toe was disarticulated at the proximal interphalangeal joint.  There were copious amounts of purulent drainage that was noted, and this was swabbed and sent for culture.  The distal aspect of the third toe that was removed was sent for pathology.  The wound was flushed with copious amounts of double antibiotic solution, and minimal bleeding was noted.  The skin edges were reapproximated using 4-0 Prolene.  The patient's foot was placed in a dry sterile dressing.        The patient tolerated the procedure and anesthesia well, and was transferred to recovery with vital signs stable and vascular status intact.  The patient will be minimal weightbearing in a postop shoe.  We will follow up with the patient and assess the foot tomorrow.  I discussed that she may need another I&D if the redness does not resolve.         AMRITA CASSIDY DPM             D: 2018   T: 2018   MT: ALLISON      Name:     ONDINA DENIS   MRN:      [id number]        Account:        [id number]   :      1960           Procedure Date: 2018      Document: [id number]

## 2018-11-07 LAB — COPATH REPORT: NORMAL

## 2018-11-10 LAB
BACTERIA SPEC CULT: NORMAL
Lab: NORMAL
SPECIMEN SOURCE: NORMAL

## 2018-12-11 ENCOUNTER — TELEPHONE (OUTPATIENT)
Dept: FAMILY MEDICINE | Facility: CLINIC | Age: 58
End: 2018-12-11

## 2018-12-11 NOTE — TELEPHONE ENCOUNTER
Tried to call the patient but he did not answer so I left a message to have him call the clinic back, wanted to see how he was doing since he has been discharged.

## 2018-12-17 ENCOUNTER — APPOINTMENT (OUTPATIENT)
Dept: GENERAL RADIOLOGY | Facility: CLINIC | Age: 58
End: 2018-12-17
Attending: EMERGENCY MEDICINE
Payer: MEDICAID

## 2018-12-17 ENCOUNTER — HOSPITAL ENCOUNTER (INPATIENT)
Facility: CLINIC | Age: 58
LOS: 2 days | Discharge: HOME | End: 2018-12-19
Attending: EMERGENCY MEDICINE | Admitting: INTERNAL MEDICINE
Payer: MEDICAID

## 2018-12-17 ENCOUNTER — APPOINTMENT (OUTPATIENT)
Dept: ULTRASOUND IMAGING | Facility: CLINIC | Age: 58
End: 2018-12-17
Attending: INTERNAL MEDICINE
Payer: MEDICAID

## 2018-12-17 ENCOUNTER — APPOINTMENT (OUTPATIENT)
Dept: OCCUPATIONAL THERAPY | Facility: CLINIC | Age: 58
End: 2018-12-17
Attending: PHYSICIAN ASSISTANT
Payer: MEDICAID

## 2018-12-17 ENCOUNTER — APPOINTMENT (OUTPATIENT)
Dept: CARDIOLOGY | Facility: CLINIC | Age: 58
End: 2018-12-17
Payer: MEDICAID

## 2018-12-17 DIAGNOSIS — I26.99 OTHER PULMONARY EMBOLISM WITHOUT ACUTE COR PULMONALE, UNSPECIFIED CHRONICITY (H): ICD-10-CM

## 2018-12-17 DIAGNOSIS — I11.0 CARDIOMYOPATHY DUE TO HYPERTENSION, WITH HEART FAILURE (H): ICD-10-CM

## 2018-12-17 DIAGNOSIS — L97.521 SKIN ULCER OF TOE OF LEFT FOOT, LIMITED TO BREAKDOWN OF SKIN (H): Primary | ICD-10-CM

## 2018-12-17 DIAGNOSIS — I82.90 VTE (VENOUS THROMBOEMBOLISM): ICD-10-CM

## 2018-12-17 DIAGNOSIS — Z79.4 UNCONTROLLED TYPE 2 DIABETES MELLITUS WITH HYPERGLYCEMIA, WITH LONG-TERM CURRENT USE OF INSULIN (H): ICD-10-CM

## 2018-12-17 DIAGNOSIS — I50.43 ACUTE ON CHRONIC COMBINED SYSTOLIC AND DIASTOLIC HEART FAILURE (H): ICD-10-CM

## 2018-12-17 DIAGNOSIS — Z91.148 NONCOMPLIANCE WITH MEDICATION REGIMEN: ICD-10-CM

## 2018-12-17 DIAGNOSIS — I50.9 CONGESTIVE HEART FAILURE, UNSPECIFIED HF CHRONICITY, UNSPECIFIED HEART FAILURE TYPE (H): ICD-10-CM

## 2018-12-17 DIAGNOSIS — E78.5 DYSLIPIDEMIA: ICD-10-CM

## 2018-12-17 DIAGNOSIS — N18.4 ANEMIA OF CHRONIC RENAL FAILURE, STAGE 4 (SEVERE) (H): ICD-10-CM

## 2018-12-17 DIAGNOSIS — D63.1 ANEMIA OF CHRONIC RENAL FAILURE, STAGE 4 (SEVERE) (H): ICD-10-CM

## 2018-12-17 DIAGNOSIS — I43 CARDIOMYOPATHY DUE TO HYPERTENSION, WITH HEART FAILURE (H): ICD-10-CM

## 2018-12-17 DIAGNOSIS — E11.65 UNCONTROLLED TYPE 2 DIABETES MELLITUS WITH HYPERGLYCEMIA, WITH LONG-TERM CURRENT USE OF INSULIN (H): ICD-10-CM

## 2018-12-17 DIAGNOSIS — I10 HYPERTENSION, UNSPECIFIED TYPE: ICD-10-CM

## 2018-12-17 DIAGNOSIS — N18.4 CKD (CHRONIC KIDNEY DISEASE) STAGE 4, GFR 15-29 ML/MIN (H): ICD-10-CM

## 2018-12-17 DIAGNOSIS — R06.02 SHORTNESS OF BREATH: ICD-10-CM

## 2018-12-17 LAB
ANION GAP SERPL CALCULATED.3IONS-SCNC: 6 MMOL/L (ref 3–14)
BASOPHILS # BLD AUTO: 0.1 10E9/L (ref 0–0.2)
BASOPHILS NFR BLD AUTO: 0.6 %
BUN SERPL-MCNC: 37 MG/DL (ref 7–30)
CALCIUM SERPL-MCNC: 8.4 MG/DL (ref 8.5–10.1)
CHLORIDE SERPL-SCNC: 106 MMOL/L (ref 94–109)
CO2 SERPL-SCNC: 29 MMOL/L (ref 20–32)
CREAT SERPL-MCNC: 2.59 MG/DL (ref 0.52–1.04)
DIFFERENTIAL METHOD BLD: ABNORMAL
EOSINOPHIL # BLD AUTO: 0.2 10E9/L (ref 0–0.7)
EOSINOPHIL NFR BLD AUTO: 2 %
ERYTHROCYTE [DISTWIDTH] IN BLOOD BY AUTOMATED COUNT: 15 % (ref 10–15)
GFR SERPL CREATININE-BSD FRML MDRD: 19 ML/MIN/1.7M2
GLUCOSE BLDC GLUCOMTR-MCNC: 160 MG/DL (ref 70–99)
GLUCOSE BLDC GLUCOMTR-MCNC: 168 MG/DL (ref 70–99)
GLUCOSE BLDC GLUCOMTR-MCNC: 178 MG/DL (ref 70–99)
GLUCOSE BLDC GLUCOMTR-MCNC: 216 MG/DL (ref 70–99)
GLUCOSE SERPL-MCNC: 178 MG/DL (ref 70–99)
HCT VFR BLD AUTO: 31.3 % (ref 35–47)
HGB BLD-MCNC: 9.7 G/DL (ref 11.7–15.7)
IMM GRANULOCYTES # BLD: 0 10E9/L (ref 0–0.4)
IMM GRANULOCYTES NFR BLD: 0.4 %
INR PPP: 1 (ref 0.86–1.14)
INTERPRETATION ECG - MUSE: NORMAL
LACTATE BLD-SCNC: 0.9 MMOL/L (ref 0.7–2)
LYMPHOCYTES # BLD AUTO: 0.9 10E9/L (ref 0.8–5.3)
LYMPHOCYTES NFR BLD AUTO: 11 %
MAGNESIUM SERPL-MCNC: 2 MG/DL (ref 1.6–2.3)
MCH RBC QN AUTO: 26.8 PG (ref 26.5–33)
MCHC RBC AUTO-ENTMCNC: 31 G/DL (ref 31.5–36.5)
MCV RBC AUTO: 87 FL (ref 78–100)
MONOCYTES # BLD AUTO: 0.4 10E9/L (ref 0–1.3)
MONOCYTES NFR BLD AUTO: 4.8 %
NEUTROPHILS # BLD AUTO: 6.8 10E9/L (ref 1.6–8.3)
NEUTROPHILS NFR BLD AUTO: 81.2 %
NRBC # BLD AUTO: 0 10*3/UL
NRBC BLD AUTO-RTO: 0 /100
NT-PROBNP SERPL-MCNC: ABNORMAL PG/ML (ref 0–900)
PLATELET # BLD AUTO: 266 10E9/L (ref 150–450)
POTASSIUM SERPL-SCNC: 4.8 MMOL/L (ref 3.4–5.3)
RBC # BLD AUTO: 3.62 10E12/L (ref 3.8–5.2)
SODIUM SERPL-SCNC: 141 MMOL/L (ref 133–144)
TROPONIN I BLD-MCNC: 0.02 UG/L (ref 0–0.08)
TSH SERPL DL<=0.005 MIU/L-ACNC: 1.42 MU/L (ref 0.4–4)
WBC # BLD AUTO: 8.4 10E9/L (ref 4–11)

## 2018-12-17 PROCEDURE — 12000007 ZZH R&B INTERMEDIATE

## 2018-12-17 PROCEDURE — 40000133 ZZH STATISTIC OT WARD VISIT

## 2018-12-17 PROCEDURE — 83880 ASSAY OF NATRIURETIC PEPTIDE: CPT | Performed by: EMERGENCY MEDICINE

## 2018-12-17 PROCEDURE — 84484 ASSAY OF TROPONIN QUANT: CPT

## 2018-12-17 PROCEDURE — 80048 BASIC METABOLIC PNL TOTAL CA: CPT | Performed by: EMERGENCY MEDICINE

## 2018-12-17 PROCEDURE — 96375 TX/PRO/DX INJ NEW DRUG ADDON: CPT

## 2018-12-17 PROCEDURE — 93321 DOPPLER ECHO F-UP/LMTD STD: CPT | Mod: 26 | Performed by: INTERNAL MEDICINE

## 2018-12-17 PROCEDURE — 25500064 ZZH RX 255 OP 636: Performed by: EMERGENCY MEDICINE

## 2018-12-17 PROCEDURE — 93880 EXTRACRANIAL BILAT STUDY: CPT

## 2018-12-17 PROCEDURE — 97165 OT EVAL LOW COMPLEX 30 MIN: CPT | Mod: GO

## 2018-12-17 PROCEDURE — 93005 ELECTROCARDIOGRAM TRACING: CPT

## 2018-12-17 PROCEDURE — 85025 COMPLETE CBC W/AUTO DIFF WBC: CPT | Performed by: EMERGENCY MEDICINE

## 2018-12-17 PROCEDURE — 83735 ASSAY OF MAGNESIUM: CPT | Performed by: EMERGENCY MEDICINE

## 2018-12-17 PROCEDURE — 25000132 ZZH RX MED GY IP 250 OP 250 PS 637: Performed by: EMERGENCY MEDICINE

## 2018-12-17 PROCEDURE — 93308 TTE F-UP OR LMTD: CPT | Mod: 26 | Performed by: INTERNAL MEDICINE

## 2018-12-17 PROCEDURE — 97110 THERAPEUTIC EXERCISES: CPT | Mod: GO

## 2018-12-17 PROCEDURE — 83605 ASSAY OF LACTIC ACID: CPT | Performed by: EMERGENCY MEDICINE

## 2018-12-17 PROCEDURE — 83735 ASSAY OF MAGNESIUM: CPT | Performed by: PHYSICIAN ASSISTANT

## 2018-12-17 PROCEDURE — 99285 EMERGENCY DEPT VISIT HI MDM: CPT | Mod: 25

## 2018-12-17 PROCEDURE — 36415 COLL VENOUS BLD VENIPUNCTURE: CPT | Performed by: EMERGENCY MEDICINE

## 2018-12-17 PROCEDURE — 85610 PROTHROMBIN TIME: CPT | Performed by: EMERGENCY MEDICINE

## 2018-12-17 PROCEDURE — 99222 1ST HOSP IP/OBS MODERATE 55: CPT | Performed by: INTERNAL MEDICINE

## 2018-12-17 PROCEDURE — 84443 ASSAY THYROID STIM HORMONE: CPT | Performed by: EMERGENCY MEDICINE

## 2018-12-17 PROCEDURE — 25000132 ZZH RX MED GY IP 250 OP 250 PS 637: Performed by: INTERNAL MEDICINE

## 2018-12-17 PROCEDURE — 87040 BLOOD CULTURE FOR BACTERIA: CPT | Performed by: EMERGENCY MEDICINE

## 2018-12-17 PROCEDURE — 96374 THER/PROPH/DIAG INJ IV PUSH: CPT

## 2018-12-17 PROCEDURE — 25000132 ZZH RX MED GY IP 250 OP 250 PS 637: Performed by: PHYSICIAN ASSISTANT

## 2018-12-17 PROCEDURE — 71046 X-RAY EXAM CHEST 2 VIEWS: CPT

## 2018-12-17 PROCEDURE — 96372 THER/PROPH/DIAG INJ SC/IM: CPT

## 2018-12-17 PROCEDURE — 99223 1ST HOSP IP/OBS HIGH 75: CPT | Mod: AI | Performed by: INTERNAL MEDICINE

## 2018-12-17 PROCEDURE — 00000146 ZZHCL STATISTIC GLUCOSE BY METER IP

## 2018-12-17 PROCEDURE — 25000128 H RX IP 250 OP 636: Performed by: PHYSICIAN ASSISTANT

## 2018-12-17 PROCEDURE — 93325 DOPPLER ECHO COLOR FLOW MAPG: CPT | Mod: 26 | Performed by: INTERNAL MEDICINE

## 2018-12-17 PROCEDURE — 25000128 H RX IP 250 OP 636: Performed by: EMERGENCY MEDICINE

## 2018-12-17 PROCEDURE — 36415 COLL VENOUS BLD VENIPUNCTURE: CPT

## 2018-12-17 PROCEDURE — 25000131 ZZH RX MED GY IP 250 OP 636 PS 637: Performed by: PHYSICIAN ASSISTANT

## 2018-12-17 PROCEDURE — 25000125 ZZHC RX 250: Performed by: EMERGENCY MEDICINE

## 2018-12-17 PROCEDURE — 40000264 ECHOCARDIOGRAM LIMITED

## 2018-12-17 RX ORDER — DEXTROSE MONOHYDRATE 25 G/50ML
25-50 INJECTION, SOLUTION INTRAVENOUS
Status: DISCONTINUED | OUTPATIENT
Start: 2018-12-17 | End: 2018-12-18

## 2018-12-17 RX ORDER — BUMETANIDE 1 MG/1
1 TABLET ORAL DAILY
Status: ON HOLD | COMMUNITY
End: 2018-12-19

## 2018-12-17 RX ORDER — IPRATROPIUM BROMIDE AND ALBUTEROL SULFATE 2.5; .5 MG/3ML; MG/3ML
3 SOLUTION RESPIRATORY (INHALATION) ONCE
Status: COMPLETED | OUTPATIENT
Start: 2018-12-17 | End: 2018-12-17

## 2018-12-17 RX ORDER — ASPIRIN 81 MG/1
324 TABLET, CHEWABLE ORAL ONCE
Status: COMPLETED | OUTPATIENT
Start: 2018-12-17 | End: 2018-12-17

## 2018-12-17 RX ORDER — AMLODIPINE BESYLATE 10 MG/1
10 TABLET ORAL DAILY
Status: DISCONTINUED | OUTPATIENT
Start: 2018-12-17 | End: 2018-12-17

## 2018-12-17 RX ORDER — LABETALOL HYDROCHLORIDE 5 MG/ML
10 INJECTION, SOLUTION INTRAVENOUS ONCE
Status: COMPLETED | OUTPATIENT
Start: 2018-12-17 | End: 2018-12-17

## 2018-12-17 RX ORDER — HYDRALAZINE HYDROCHLORIDE 20 MG/ML
10 INJECTION INTRAMUSCULAR; INTRAVENOUS EVERY 4 HOURS PRN
Status: DISCONTINUED | OUTPATIENT
Start: 2018-12-17 | End: 2018-12-19 | Stop reason: HOSPADM

## 2018-12-17 RX ORDER — NALOXONE HYDROCHLORIDE 0.4 MG/ML
.1-.4 INJECTION, SOLUTION INTRAMUSCULAR; INTRAVENOUS; SUBCUTANEOUS
Status: DISCONTINUED | OUTPATIENT
Start: 2018-12-17 | End: 2018-12-19 | Stop reason: HOSPADM

## 2018-12-17 RX ORDER — ONDANSETRON 4 MG/1
4 TABLET, ORALLY DISINTEGRATING ORAL EVERY 6 HOURS PRN
Status: DISCONTINUED | OUTPATIENT
Start: 2018-12-17 | End: 2018-12-19 | Stop reason: HOSPADM

## 2018-12-17 RX ORDER — AMOXICILLIN 250 MG
2 CAPSULE ORAL 2 TIMES DAILY PRN
Status: DISCONTINUED | OUTPATIENT
Start: 2018-12-17 | End: 2018-12-19 | Stop reason: HOSPADM

## 2018-12-17 RX ORDER — FUROSEMIDE 10 MG/ML
40 INJECTION INTRAMUSCULAR; INTRAVENOUS ONCE
Status: COMPLETED | OUTPATIENT
Start: 2018-12-17 | End: 2018-12-17

## 2018-12-17 RX ORDER — HYDRALAZINE HYDROCHLORIDE 50 MG/1
50 TABLET, FILM COATED ORAL EVERY 8 HOURS SCHEDULED
Status: DISCONTINUED | OUTPATIENT
Start: 2018-12-17 | End: 2018-12-19 | Stop reason: HOSPADM

## 2018-12-17 RX ORDER — WARFARIN SODIUM 5 MG/1
5 TABLET ORAL DAILY
Status: ON HOLD | COMMUNITY
End: 2018-12-19

## 2018-12-17 RX ORDER — ACETAMINOPHEN 325 MG/1
650 TABLET ORAL EVERY 4 HOURS PRN
Status: DISCONTINUED | OUTPATIENT
Start: 2018-12-17 | End: 2018-12-19 | Stop reason: HOSPADM

## 2018-12-17 RX ORDER — FUROSEMIDE 10 MG/ML
40 INJECTION INTRAMUSCULAR; INTRAVENOUS EVERY 12 HOURS
Status: DISCONTINUED | OUTPATIENT
Start: 2018-12-18 | End: 2018-12-18 | Stop reason: ALTCHOICE

## 2018-12-17 RX ORDER — CARVEDILOL 25 MG/1
25 TABLET ORAL 2 TIMES DAILY WITH MEALS
Status: ON HOLD | COMMUNITY
End: 2018-12-19

## 2018-12-17 RX ORDER — CARVEDILOL 25 MG/1
25 TABLET ORAL 2 TIMES DAILY WITH MEALS
Status: DISCONTINUED | OUTPATIENT
Start: 2018-12-17 | End: 2018-12-18

## 2018-12-17 RX ORDER — PROCHLORPERAZINE 25 MG
25 SUPPOSITORY, RECTAL RECTAL EVERY 12 HOURS PRN
Status: DISCONTINUED | OUTPATIENT
Start: 2018-12-17 | End: 2018-12-19 | Stop reason: HOSPADM

## 2018-12-17 RX ORDER — POLYETHYLENE GLYCOL 3350 17 G/17G
17 POWDER, FOR SOLUTION ORAL DAILY PRN
Status: DISCONTINUED | OUTPATIENT
Start: 2018-12-17 | End: 2018-12-19 | Stop reason: HOSPADM

## 2018-12-17 RX ORDER — WARFARIN SODIUM 7.5 MG/1
7.5 TABLET ORAL
Status: COMPLETED | OUTPATIENT
Start: 2018-12-17 | End: 2018-12-17

## 2018-12-17 RX ORDER — ATORVASTATIN CALCIUM 20 MG/1
20 TABLET, FILM COATED ORAL EVERY EVENING
Status: DISCONTINUED | OUTPATIENT
Start: 2018-12-17 | End: 2018-12-19 | Stop reason: HOSPADM

## 2018-12-17 RX ORDER — NITROGLYCERIN 80 MG/1
1 PATCH TRANSDERMAL ONCE
Status: COMPLETED | OUTPATIENT
Start: 2018-12-17 | End: 2018-12-17

## 2018-12-17 RX ORDER — ONDANSETRON 2 MG/ML
4 INJECTION INTRAMUSCULAR; INTRAVENOUS EVERY 6 HOURS PRN
Status: DISCONTINUED | OUTPATIENT
Start: 2018-12-17 | End: 2018-12-19 | Stop reason: HOSPADM

## 2018-12-17 RX ORDER — NICOTINE POLACRILEX 4 MG
15-30 LOZENGE BUCCAL
Status: DISCONTINUED | OUTPATIENT
Start: 2018-12-17 | End: 2018-12-18

## 2018-12-17 RX ORDER — AMOXICILLIN 250 MG
1 CAPSULE ORAL 2 TIMES DAILY PRN
Status: DISCONTINUED | OUTPATIENT
Start: 2018-12-17 | End: 2018-12-19 | Stop reason: HOSPADM

## 2018-12-17 RX ORDER — PROCHLORPERAZINE MALEATE 5 MG
10 TABLET ORAL EVERY 6 HOURS PRN
Status: DISCONTINUED | OUTPATIENT
Start: 2018-12-17 | End: 2018-12-19 | Stop reason: HOSPADM

## 2018-12-17 RX ADMIN — INSULIN HUMAN 20 UNITS: 100 INJECTION, SUSPENSION SUBCUTANEOUS at 18:04

## 2018-12-17 RX ADMIN — HUMAN ALBUMIN MICROSPHERES AND PERFLUTREN 5 ML: 10; .22 INJECTION, SOLUTION INTRAVENOUS at 12:17

## 2018-12-17 RX ADMIN — INSULIN ASPART 1 UNITS: 100 INJECTION, SOLUTION INTRAVENOUS; SUBCUTANEOUS at 22:20

## 2018-12-17 RX ADMIN — INSULIN ASPART 1 UNITS: 100 INJECTION, SOLUTION INTRAVENOUS; SUBCUTANEOUS at 18:04

## 2018-12-17 RX ADMIN — ASPIRIN 81 MG 324 MG: 81 TABLET ORAL at 10:55

## 2018-12-17 RX ADMIN — AMLODIPINE BESYLATE 10 MG: 10 TABLET ORAL at 16:15

## 2018-12-17 RX ADMIN — ENOXAPARIN SODIUM 90 MG: 100 INJECTION SUBCUTANEOUS at 13:33

## 2018-12-17 RX ADMIN — LABETALOL HYDROCHLORIDE 10 MG: 5 INJECTION INTRAVENOUS at 13:43

## 2018-12-17 RX ADMIN — ATORVASTATIN CALCIUM 20 MG: 20 TABLET, FILM COATED ORAL at 22:20

## 2018-12-17 RX ADMIN — FUROSEMIDE 40 MG: 10 INJECTION, SOLUTION INTRAMUSCULAR; INTRAVENOUS at 16:15

## 2018-12-17 RX ADMIN — CARVEDILOL 25 MG: 25 TABLET, FILM COATED ORAL at 17:40

## 2018-12-17 RX ADMIN — NITROGLYCERIN 1 PATCH: 0.4 PATCH TRANSDERMAL at 12:00

## 2018-12-17 RX ADMIN — IPRATROPIUM BROMIDE AND ALBUTEROL SULFATE 3 ML: .5; 3 SOLUTION RESPIRATORY (INHALATION) at 10:55

## 2018-12-17 RX ADMIN — WARFARIN SODIUM 7.5 MG: 7.5 TABLET ORAL at 17:40

## 2018-12-17 RX ADMIN — HYDRALAZINE HYDROCHLORIDE 50 MG: 50 TABLET, FILM COATED ORAL at 17:40

## 2018-12-17 RX ADMIN — FUROSEMIDE 40 MG: 10 INJECTION, SOLUTION INTRAMUSCULAR; INTRAVENOUS at 12:01

## 2018-12-17 RX ADMIN — HYDRALAZINE HYDROCHLORIDE 50 MG: 50 TABLET, FILM COATED ORAL at 22:20

## 2018-12-17 ASSESSMENT — ACTIVITIES OF DAILY LIVING (ADL)
ADLS_ACUITY_SCORE: 15
ADLS_ACUITY_SCORE: 15
PREVIOUS_RESPONSIBILITIES: MEAL PREP;HOUSEKEEPING;LAUNDRY;MEDICATION MANAGEMENT;FINANCES;DRIVING

## 2018-12-17 ASSESSMENT — ENCOUNTER SYMPTOMS
RHINORRHEA: 1
FEVER: 0
SHORTNESS OF BREATH: 1
COUGH: 0

## 2018-12-17 ASSESSMENT — MIFFLIN-ST. JEOR: SCORE: 1561.98

## 2018-12-17 NOTE — H&P
[facility]    History and Physical - Hospitalist Service       Date of Admission:  12/17/2018    Assessment & Plan   Roxy Beaulieu is a 58 year old female with a past medical history of recently diagnosed left lower extremity DVT with likely pulmonary embolism (10/2018), uncontrolled diabetes mellitus type 2, recent third toe cellulitis with suspected osteomyelitis status post partial amputation (11/2018), CKD (Cr recent baseline 2.6-2.8), hypertension, hyperlipidemia admitted on 12/17/2018 for shortness of breath and found to have new diagnosis of congestive heart failure.    1. Systolic congestive heart failure, new diagnosis of cardiomyopathy: Patient reports orthopnea, LEVIN, and increased lower extremity swelling progressively worsening over the weekend. CXR shows evidence of cardiomegaly and bilateral pleural effusions and BNP [place] at [address]. ECG shows NSR without ischemic findings troponin not elevated; patient has not been experiencing chest pain. Patient has been noncompliant with most of her medications lately because of financial constraints. Also recently diagnosed with likely PE and has been off anticoagulation for 3 days so that may be adding some additional strain from a cardiac perspective. Echocardiogram obtained while patient was in ER shows EF 40-45%, mild global hypokinesia of the LV and moderate to large pleural effusion, and 1-2+ MR compared to echo 10/8/2018.  -Telemetry  -Give additional 40 mg IV lasix this afternoon with further diuresis beyond that deferred to cardiology and rounding hospitalist  -Hold PTA po Bumex  -Resume PTA prescribed carvedilol 25 mg BID and atorvastatin 20 mg HS  -No ACEI ordered at this time given renal insufficiency  -Incentive spirometry  -Cardiology consult    2. Recently diagnosed left lower extremity DVT and presumed PE with subtherapeutic INR: Patient was hospitalized 10/2018 for DVT and presumed PE; initially was started  on heparin drip and did not manifest any significant bleeding issues during that time.  She was transitioned to renally dosed Lovenox as well as Coumadin.  Since that time she has been having noncompliance issues with her anticoagulation mainly related to financial issues. Most recently hospitalized at [facility] this month for shortness of breath related to her previously diagnosed PE, and since she had been noncompliant with anticoagulation she was started on heparin but had an adverse drug reaction of rash at the infusion site concerning for possible HIT.  Workup for HIT was done there; assay was positive but FARHAT was negative. She left that facility AMA. She ran out of her Coumadin 3 days ago and INR today is subtherapeutic at 1.00.    -Telemetry   -Regarding anticoagulation in the setting of chronic renal insufficiency and recent adverse reaction to heparin, opted to start patient on renally dosed once daily Lovenox injections (1 mg/kg). She cannot afford NOACs.  -Pharmacy to dose coumadin  -Patient is not hypoxic on admission, prn O2 available  -Prn tylenol    3.  Uncontrolled insulin-dependent diabetes: Unclear amount of medication compliance.  A1c in 10/2018 was 11.6.  -Glucose checks prior to meals and at bedtime  -Continue PTA Humulin mix 70/30 20 units BID   -Prn moderate sliding scale insulin    4. Recent third toe cellulitis that was treated as suspected osteomyelitis: Status post partial amputation of the third toe.  Podiatry recommended oral antibiotics and the patient completed a 10-day course of Augmentin. Wounds appear to be healing well on foot.    5.  CKD: Likely progression of chronic disease related to medication noncompliance and uncontrolled hyperglycemia.  Creatinine baseline recently has been in the 2.6-2.8 range.  -Avoid nephrotoxins    6. Hypertensive urgency: Likely related to medical noncompliance. Restart PTA prescribed amlodipine 10 mg daily. Prn hydralazine available.  Holding PTA prescribed Bumex as per #1.    7. HLD: Continue PTA atorvastatin 20 mg HS.    8. Financial/social concerns: Does not have insurance. Social work consulted.     Diet: Combination Diet 4580-4604 Calories: Moderate Consistent CHO (4-6 CHO units/meal); 2 gm NA Diet; No Caffeine for 24 hours (Once tests completed, may have caffeine)    DVT Prophylaxis: Warfarin and Lovenox injection 1 mg/kg once daily  Ventura Catheter: not present  Code Status: Full Code      Disposition Plan   Expected discharge: 2 - 3 days .  Entered: Mary Jo Tyson PA-C 12/17/2018, 4:00 PM     The patient's care was discussed with the Attending Physician, Dr. Ocampo, and the patient.    Mary Jo Tyson PA-C  [facility]    ______________________________________________________________________    Chief Complaint   Shortenss of breath    History is obtained from the patient    History of Present Illness   Roxy Beaulieu is a 58 year old female who has been hospitalized 3 times in the past 3 months prior to this admission.  In 10/2018 she was admitted for left lower extremity DVT and presumed pulmonary embolism as she also presented with hypoxia and shortness of breath (not for 5 to avoid contrast exposure and as it would not change her plan of care).  She had a history of a prior PE in 2001 and stopped her warfarin on her own a few years ago.  She was resumed on warfarin with Lovenox for bridging.  It was recommended that she stay on lifelong warfarin unless there is a future contraindication given her recurrence of PE.  No clear cause is identified for her hypercoagulable state, and it was recommended on discharge that she get a colonoscopy and see GYN for cancer screening with mammogram as she is not up-to-date in terms of her cancer screening for her age.  She was again admitted on 11/2018 for left third toe infection with uncontrolled hyperglycemia.  It was noted at that time that she had essentially been off of all  of her medications for a while due to financial constraints, including her Coumadin and insulin.  She was treated for left third toe cellulitis with concern for deeper infection.  She was also started on a heparin drip for her DVT and presumed PE as she had previously self discontinued anticoagulation at home.  She tolerated the heparin drip without evidence of bleeding at her toe wound site at that time.  She underwent partial third toe amputation for suspected osteomyelitis and per recommendations from podiatry was placed on p.o. Augmentin on discharge.  She discharged from the hospital fairly abruptly after that hospital stay, prior to when the hospitalist recommended that she leave.  She was then admitted on 12/3/2018 for acute hypoxic respiratory failure related to her presumed PE.  She was initially placed on BiPAP but quickly transitioned to nasal cannula.  It was noted at that time that she was again nonadherent with warfarin therapy.  She was initially started on heparin drip but then shortly thereafter developed a petechial rash at the infusion site.  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  She was switched to an argatroban drip and warfarin was started on 12/6/2018.  She received 5 full days of combined argatroban and warfarin therapy for anticoagulation, but laboratory monitoring did not demonstrate a therapeutic anticoagulant effect by 12/10/2018.  The patient left [facility] at that point.  Following leaving that hospital, her FARHAT testing came back negative, ultimately arguing against HIT diagnosis.  The patient reports that since that time she had been compliant with taking warfarin 5 mg daily, but she ran out of her warfarin 3 days ago.  Over the weekend she started to feel more short of breath with activity, and then today when she got up to make herself some coffee she felt extremely short of breath with any activity.  She was able to  return to her baseline breathing with sitting and resting.  She also noticed feeling short of breath if she would lay flat.  She reports that she has had lower extremity swelling since her hospitalization earlier this month.  She denies chest pain, palpitations, nausea, vomiting, abdominal pain, diaphoresis, diarrhea, fever, chills, or cough.  She is not experiencing any wheezing.  She denies paroxysmal nocturnal dyspnea.    In the ER, temp 98.5, heart rate 93, blood pressure 129/98, respiratory rate 20, SPO2 94% on room air.  Basic metabolic panel demonstrating chronic renal insufficiency with creatinine 2.59, BUN was elevated at 37.  Blood glucose 178.  BNP significantly elevated at 15,210.  Troponin 0.02.  TSH 1.42.  CBC with differential demonstrated white count of 8.4, hemoglobin 9.7, and platelet count 266.  INR subtherapeutic at 1.00.  EKG showed normal sinus rhythm with occasional PVCs.  Echocardiogram demonstrated EF of 40-45% with mild global hypokinesia of the left ventricle and 1-2+ MR.  Chest x-ray showed probable mild congestive heart failure with borderline cardiomegaly, small bilateral pleural effusions with bibasilar atelectasis, and mild prominence of the central pulmonary vasculature.  Blood cultures x2 drawn in ER.    Review of Systems    The 10 point Review of Systems is negative other than noted in the HPI.    Past Medical History    I have reviewed this patient's medical history and updated it with pertinent information if needed.   Past Medical History:   Diagnosis Date     Diabetes (H)      Gangrene of toe (H) 4/29/2017     High cholesterol      Hypertension      Obesity 4/29/2017     Personal history of tobacco use, presenting hazards to health 4/29/2017     Uncontrolled type 2 diabetes mellitus with hyperglycemia, with long-term current use of insulin (H) 4/29/2017       Past Surgical History   I have reviewed this patient's surgical history and updated it with pertinent information if  needed.  Past Surgical History:   Procedure Laterality Date     AMPUTATE TOE(S) Left 2017    Procedure: AMPUTATE TOE(S);  Amputate first and second left toes;  Surgeon: Verna Fofana DPM, Podiatry/Foot and Ankle Surgery;  Location: [facility] OR     AMPUTATE TOE(S) Left 11/3/2018    Procedure: Partial left 3rd toe amputation;  Surgeon: Verna Fofana DPM, Podiatry/Foot and Ankle Surgery;  Location: [facility] OR     OPEN REDUCTION INTERNAL FIXATION FIBULA Left 3/16/2018    Procedure: OPEN REDUCTION INTERNAL FIXATION FIBULA;  Open reduction and internal fixation of displaced left lateral malleolar fracture;  Surgeon: Sumanth Wen MD;  Location: [facility] OR       Social History   I have reviewed this patient's social history and updated it with pertinent information if needed.  Social History     Tobacco Use     Smoking status: Former Smoker     Last attempt to quit: 2016     Years since quittin.3     Smokeless tobacco: Never Used   Substance Use Topics     Alcohol use: No     Drug use: No       Family History   I have reviewed this patient's family history and updated it with pertinent information if needed.       Prior to Admission Medications   Prior to Admission Medications   Prescriptions Last Dose Informant Patient Reported? Taking?   amLODIPine (NORVASC) 10 MG tablet More than a month at Unknown time  No No   Sig: Take 1 tablet (10 mg) by mouth daily   atorvastatin (LIPITOR) 20 MG tablet More than a month at Unknown time  No No   Sig: Take 1 tablet (20 mg) by mouth every evening   bumetanide (BUMEX) 1 MG tablet 2018 at Unknown time  Yes Yes   Sig: Take 1 mg by mouth daily   carvedilol (COREG) 25 MG tablet 2018 at Unknown time  Yes Yes   Sig: Take 25 mg by mouth 2 times daily (with meals)   insulin aspart (NOVOLOG PEN) 100 UNIT/ML injection   No Yes   Sig: Correction scale insulin: Three times daily with meals: -199: 2U, -249: 3 U, -299: 4 U, -349: 5 U, BG >350: 6 u   insulin  isophane & regular (HUMULIN MIX 70/30 PEN , NOVOLIN MIX 70/30 PEN) susp 12/16/2018 at Unknown time  No Yes   Sig: Inject 20 Units Subcutaneous 2 times daily (before meals)   warfarin (COUMADIN) 5 MG tablet 12/14/2018 at Unknown time  Yes Yes   Sig: Take 5 mg by mouth daily      Facility-Administered Medications: None     Allergies   Allergies   Allergen Reactions     Heparin        Physical Exam   Vital Signs: Temp: 98.5  F (36.9  C) Temp src: Oral BP: 159/67 Pulse: 93 Heart Rate: 80 Resp: 22 SpO2: 95 % O2 Device: None (Room air)    Weight: 208 lbs 0 oz    GENERAL:  Comfortable.  PSYCH: pleasant, oriented, No acute distress.  HEENT:  Atraumatic, normocephalic. PERRLA. Normal conjunctiva, normal hearing, and oropharynx is normal.  NECK:  Supple, no neck vein distention, adenopathy or bruits, normal thyroid.  HEART:  Normal S1, S2 with no murmur, no pericardial rub, gallops or S3 or S4.  LUNGS:  Clear to auscultation, normal respiratory effort. No wheezing, rales or ronchi.  GI:  Soft, no hepatosplenomegaly, normal bowel sounds. Non-tender, non distended.   EXTREMITIES:  Left foot cleanly wrapped. Wounds related to recent partial amputation of third toe on left lower extremity dry and clean. 2+ pitting edema bilaterally. +2 pulses bilateral and equal.  SKIN:  2+ pitting lower extremity edema. Dry to touch, No rash, wound or ulcerations.  NEUROLOGIC:  CN 2-12 intact, BL 5/5 symmetric upper and lower extremity strength, sensation is intact with no focal deficits.     Data   Data reviewed today: I reviewed all medications, new labs and imaging results over the last 24 hours. I personally reviewed     Results for orders placed or performed during the hospital encounter of 12/17/18   XR Chest 2 Views    Narrative    CHEST TWO VIEWS   12/17/2018 11:14 AM    HISTORY: Shortness of breath.    COMPARISON: 10/4/2018    FINDINGS: There is borderline cardiomegaly. There are small bilateral  pleural effusions with bibasilar  atelectasis. There is also mild  prominence of the central pulmonary vasculature. No pneumothorax is  seen. No other abnormality or change is demonstrated.      Impression    IMPRESSION: Probable mild congestive heart failure.     JAJA CURRAN MD   CBC with platelets differential   Result Value Ref Range    WBC 8.4 4.0 - 11.0 10e9/L    RBC Count 3.62 (L) 3.8 - 5.2 10e12/L    Hemoglobin 9.7 (L) 11.7 - 15.7 g/dL    Hematocrit 31.3 (L) 35.0 - 47.0 %    MCV 87 78 - 100 fl    MCH 26.8 26.5 - 33.0 pg    MCHC 31.0 (L) 31.5 - 36.5 g/dL    RDW 15.0 10.0 - 15.0 %    Platelet Count 266 150 - 450 10e9/L    Diff Method Automated Method     % Neutrophils 81.2 %    % Lymphocytes 11.0 %    % Monocytes 4.8 %    % Eosinophils 2.0 %    % Basophils 0.6 %    % Immature Granulocytes 0.4 %    Nucleated RBCs 0 0 /100    Absolute Neutrophil 6.8 1.6 - 8.3 10e9/L    Absolute Lymphocytes 0.9 0.8 - 5.3 10e9/L    Absolute Monocytes 0.4 0.0 - 1.3 10e9/L    Absolute Eosinophils 0.2 0.0 - 0.7 10e9/L    Absolute Basophils 0.1 0.0 - 0.2 10e9/L    Abs Immature Granulocytes 0.0 0 - 0.4 10e9/L    Absolute Nucleated RBC 0.0    INR   Result Value Ref Range    INR 1.00 0.86 - 1.14   Basic metabolic panel   Result Value Ref Range    Sodium 141 133 - 144 mmol/L    Potassium 4.8 3.4 - 5.3 mmol/L    Chloride 106 94 - 109 mmol/L    Carbon Dioxide 29 20 - 32 mmol/L    Anion Gap 6 3 - 14 mmol/L    Glucose 178 (H) 70 - 99 mg/dL    Urea Nitrogen 37 (H) 7 - 30 mg/dL    Creatinine 2.59 (H) 0.52 - 1.04 mg/dL    GFR Estimate 19 (L) >60 mL/min/1.7m2    GFR Estimate If Black 23 (L) >60 mL/min/1.7m2    Calcium 8.4 (L) 8.5 - 10.1 mg/dL   Nt probnp inpatient (BNP)   Result Value Ref Range    N-Terminal Pro BNP Inpatient 15,210 (H) 0 - 900 pg/mL   Lactic acid whole blood   Result Value Ref Range    Lactic Acid 0.9 0.7 - 2.0 mmol/L   Magnesium   Result Value Ref Range    Magnesium 2.0 1.6 - 2.3 mg/dL   TSH with free T4 reflex   Result Value Ref Range    TSH 1.42 0.40 -  4.00 mU/L   Glucose by meter   Result Value Ref Range    Glucose 160 (H) 70 - 99 mg/dL   EKG 12 lead   Result Value Ref Range    Interpretation ECG Click View Image link to view waveform and result    Troponin POCT   Result Value Ref Range    Troponin I 0.02 0.00 - 0.08 ug/L   Echo Limited    Narrative    [id number]  [id number]  [id number]  [id number]^BENJAMIN^TWILA^JEREMÍAS           [facility]  Echocardiography Laboratory  [address] [place] [address]        Name: ONDINA DENIS  MRN: [id number]  : 1960  Study Date: 2018 11:51 AM  Age: 58 yrs  Gender: Female  Patient Location: [facility]  Reason For Study: Other, Please Specify in CommentsPulmonary Embolism  Ordering Physician: TWILA ALEXANDER  Referring Physician: DANIEL MOULTON  Performed By: Jeffry Islas RDCS     BSA: 2.0 m2  Height: 68 in  Weight: 200 lb  HR: 93  BP: 129/98 mmHg  _____________________________________________________________________________  __        Procedure  Limited Portable Echo Adult. Contrast Optison.  _____________________________________________________________________________  __        Interpretation Summary     1. The left ventricle is normal in size. The visual ejection fraction is  estimated at 40-45%. There is mild global hypokinesia of the left ventricle.  2. The right ventricle is normal in structure, function and size.  3. There is moderate (2+) mitral regurgitation.  4. Moderate to large pleural effusions.     Echo from 10-8-18 showed EF 50%, 1-2+ MR.  _____________________________________________________________________________  __        Left Ventricle  The left ventricle is normal in size. There is normal left ventricular wall  thickness. The visual ejection fraction is estimated at 40-45%. There is mild  global hypokinesia of the left ventricle.     Right Ventricle  The right ventricle is normal in structure, function and size.     Mitral Valve  There is moderate (2+) mitral  regurgitation.     Tricuspid Valve  No tricuspid regurgitation. Right ventricular systolic pressure could not be  approximated due to inadequate tricuspid regurgitation.        Aortic Valve  The aortic valve is normal in structure and function.     Pulmonic Valve  The pulmonic valve is normal in structure and function.     Vessels  Normal ascending, transverse (arch), and descending aorta. The IVC is normal  in size and reactivity with respiration, suggesting normal central venous  pressure.     Pericardium  Trivial pericardial effusion.     Rhythm  Sinus rhythm was noted.     _____________________________________________________________________________  __                             Report approved by: Shital Olivarez 12/17/2018 01:03 PM                       _____________________________________________________________________________  __      Blood culture ONE site   Result Value Ref Range    Specimen Description Blood Right Arm     Special Requests Aerobic and anaerobic bottles received     Culture Micro PENDING    Blood culture ONE site   Result Value Ref Range    Specimen Description Blood Left Arm     Special Requests Aerobic and anaerobic bottles received     Culture Micro No growth after 2 hours

## 2018-12-17 NOTE — PLAN OF CARE
Discharge Planner OT   Patient plan for discharge: home Wednesday   Current status: order received, eval completed, tx initiated. Pt admitted for SOB likely CHF exacerbation. Pt lives in [facility] alone with 1 step to enter and 12 steps to bed/bath. Pt reports IND with all ADL/IADL's including driving. Pt uses cane at baseline.   Educated pt on role of CR in IP setting. OT educated on CHF diagnosis including home exercise program, signs of intolerance, warning signs and when to seek medical help, reviewed stoplight tool, low sodium diet, and risk factor education. appears receptive but would benefit from continued education. Pt ambulated with SBA and cane x 3 minutes. Tolerates fair, but does report brief period of SOB, O2 sats at 95%. BP hypertensive at rest but stable with activity. Pt declined stairs this date due to fatigue.   Barriers to return to prior living situation: none anticipated   Recommendations for discharge: home   Rationale for recommendations: pt demonstrates understanding of CHF diagnosis and is receptive to education. Pt able to mobilize with SBA and cane with stable CV response. Anticipate pt will be safe to discharge home when medically appropriate.        Entered by: Gabrielle Hansen 12/17/2018 3:19 PM

## 2018-12-17 NOTE — LETTER
Transition Communication Hand-off for Care Transitions to Next Level of Care Provider    Name: Roxy Beaulieu  : 1960  MRN #: [id number]  Primary Care Provider: Dwain London  Primary Care MD Name: Dr London  Primary Clinic: [facility]  [address]  [place] [address]  Primary Care Clinic Name: [facility]  Reason for Hospitalization:  Shortness of breath [R06.02]  Noncompliance with medication regimen [Z91.14]  Other pulmonary embolism without acute cor pulmonale, unspecified chronicity (H) [I26.99]  Congestive heart failure, unspecified HF chronicity, unspecified heart failure type (H) [I50.9]  Admit Date/Time: 2018 10:09 AM  Discharge Date: 18  Payor Source: No coverage found.      Readmission Assessment Measure (DEVEN) Risk Score/category: elevated           Reason for Communication Hand-off Referral: Admission diagnoses: CHF    Discharge Plan: home       Concern for non-adherence with plan of care:   Y/N yes  Discharge Needs Assessment:  Needs      Most Recent Value   Equipment Currently Used at Home  cane, straight          Already enrolled in Tele-monitoring program and name of program:  none  Follow-up specialty is recommended: Yes    Follow-up plan:  No future appointments.    Any outstanding tests or procedures:        Radiology & Cardiology Orders     Future Labs/Procedures Complete By Expires    Echocardiogram Complete  2019 (Approximate) 2019    Process Instructions:    Administration of IV contrast will be tailored to this examination per the appropriate written protocol listed in the Echocardiography department Protocol Book, or by the supervising Cardiologist. This may result in an order change.    Use of contrast is at the discretion of the supervising Cardiologist.    Scheduling Instructions:    Please call your clinic of choice to schedule this procedure:    [facility]:   [phone]  [facility]:   [phone]  [facility]:  [phone]  [facility]): [phone]  [facility]:   [phone]  [facility]:  [phone]  [facility]): [phone]  [facility] [phone]    Comments:    Administration of IV contrast will be tailored to this examination per the appropriate written protocol listed in the Echocardiography department Protocol Book, or by the supervising Cardiologist. This may result in an order change.    Use of contrast is at the discretion of the supervising Cardiologist.        Referrals     Future Labs/Procedures    Follow-Up with Cardiac Advanced Practice Provider     Home care nursing referral     Comments:    RN skilled nursing visit. RN to assess home safety and to help medication management.  RN to provide lab draws as needed and diabetic foot wound monitoring and management.    Your provider has ordered home care nursing services. If you have not been contacted within 2 days of your discharge please call the inpatient department phone number at [phone] .            Bryant Recommendations:   [facility]/ following for CHF diagnosis, elevated DEVEN, readmission and financial issues. She states she was recently admitted at [facility]. Per chart review, she was there from 12/3-12/10 for sob, dvt/pe, chf and left AMA. Pt is now readmitted with BNP 54597 and EF 40-45%. She is also being followed by Cardiology and Nutrition. She is being treated with IV lasix and has had a lexiscan stress test. We discussed CHF diagnosis. Pt states this is a new diagnosis for her. When asked about the status of her insurance, she states she filled out the Plur[employer] application at [facility] and it was sent in by their SW. She does not know the status of her insurance or if she qualified. financial counseling following in hospital. Financial counseling states her [place] sure jolie was faxed to the [facility] on . Pt will need to follow up with [facility] on insurance verification. CHF  education reviewed with pt We will fill any new discharge meds and send with pt until insurance is verified. We discussed daily weights and monitoring weight gain. New scale was given to pt for home use. Signs of CHF exacerbation reviewed. She does not anticipate any discharge needs. OT recommends home with OP CR. Importance of following up on insurance verification, filling meds and taking them as prescribed, low Na diet, and following up with her PCP were again stressed. Pt states she will make her own f/u appt. She will need continued CHF education and reinforcement.    She is recommended to follow up with [facility] regarding her foot wounds in 2 weeks. She is also recommended to follow up with [facility] regarding her kidney disease. Pt states she will make these appts when her insurance is verified. She will need reminders to do this or referrals from PCP directly. She will need close clinic monitoring on all of the above.        Goldie Pedraza    AVS/Discharge Summary is the source of truth; this is a helpful guide for improved communication of patient story

## 2018-12-17 NOTE — PHARMACY-ANTICOAGULATION SERVICE
Clinical Pharmacy - Warfarin Dosing Consult     Pharmacy has been consulted to manage this patient s warfarin therapy.  Indication: DVT/ PE Treatment  Therapy Goal: INR 2-3  Warfarin Prior to Admission: Yes  Warfarin PTA Regimen: 5 mg daily  Significant drug interactions: Lovenox   Dose Comments: last dose on Friday, 12/14    INR   Date Value Ref Range Status   12/17/2018 1.00 0.86 - 1.14 Final   11/05/2018 1.06 0.86 - 1.14 Final       Recommend warfarin 7.5 mg today.  Pharmacy will monitor Roxy L Christofer daily and order warfarin doses to achieve specified goal.      Please contact pharmacy as soon as possible if the warfarin needs to be held for a procedure or if the warfarin goals change.

## 2018-12-17 NOTE — PROGRESS NOTES
Full H&P to follow    Patient is a 58-year-old female with multiple recent hospitalizations and history of medical noncompliance who presents with shortness of breath.  She was hospitalized in 10/2018 for a left lower extremity DVT and likely pulmonary embolism as the patient had hypoxia but no CT imaging was done to verify in order to avoid contrast and because it would not change the plan of care.  Patient was hospitalized in 11/2018 for osteomyelitis of her left third toe status post toe amputation.  It was noted at that time that she was not taking Coumadin because of financial difficulties.  She insisted on discharging home against hospitalist advice during that 11/2018 admission and was sent on Coumadin and Lovenox injections.  She was then hospitalized at [facility] for shortness of breath and was resumed on anticoagulation.  Apparently they had an issue with starting IV heparin and there was concern for HIT, as the initial screening came back positive but confirmatory testing ended up being negative for HIT.  She left that hospital AMA.  She ran out of her Coumadin on Friday and was planning on seeing her PCP today, but she ended up getting short of breath with any activity this morning and decided to present to the ER instead.    In the ER, hypertensive but not tachycardic or documented tachypnea and not hypoxic.  Lab workup showed significant elevation of BNP and INR is subtherapeutic at 1.00.  Her creatinine is at her recent baseline in the 2 range.  Chest x-ray shows evidence of cardiomegaly and bilateral mild pleural effusions.  Her shortness of breath improved after IV Lasix.  She is comfortable at rest.  She was also given 90 mg of subQ Lovenox by the ER provider given her recent abnormal rash associated with heparin administration at [facility].  Echocardiogram obtained in the ER shows an drop in her ejection fraction to 40-45% since 10/2018.    A/P: CHF exacerbation likely secondary to urgent  hypertension with medical noncompliance; recently diagnosed left lower extremity DVT with presumed associated pulmonary embolism    -Anticoagulation options are more difficult given her renal insufficiency and recent adverse reaction to heparin; hospitalist physician will discuss with patient  - monitor on telemetry  -Lasix 40 mg IV at 5 PM  -Resume previously prescribed carvedilol, no ACE inhibitor at this time given renal insufficiency  -IV hydralazine as needed  -Lasix 40 mg at 5 PM today  -Cardiology consult given echo findings  Resume PTA 70/30 insulin regimen and will have sliding scale insulin available as needed

## 2018-12-17 NOTE — PLAN OF CARE
Pt admitted to room 354 at 1430.  Pt is alert and oriented x4, pleasant.  No complaints of SOB or chest pressure.  Pt has a nitroglycerin patch on anterior chest.  BPs are elevated, will continue to monitor.  Tele: SR on admission.  CHF teaching started: 2gm Na diet was educated on.

## 2018-12-17 NOTE — PROGRESS NOTES
12/17/18 1455   Quick Adds   Type of Visit Initial Occupational Therapy Evaluation   Living Environment   Lives With alone   Living Arrangements house  (town house)   Home Accessibility stairs to enter home;stairs within home   Living Environment Comment 1 step inside, 12 steps to bed/bath   Self-Care   Usual Activity Tolerance moderate   Current Activity Tolerance moderate   Equipment Currently Used at Home cane, straight   Activity/Exercise/Self-Care Comment uses cane at baseline   Functional Level   Ambulation 1-->assistive equipment   Transferring 1-->assistive equipment   Toileting 0-->independent   Bathing 0-->independent   Dressing 0-->independent   Eating 0-->independent   Communication 0-->understands/communicates without difficulty   Swallowing 0-->swallows foods/liquids without difficulty   Cognition 0 - no cognition issues reported   Fall history within last six months no   Which of the above functional risks had a recent onset or change? none   Prior Functional Level Comment drives at baseline   General Information   Onset of Illness/Injury or Date of Surgery - Date 12/17/18   Referring Physician Mary Jo Tyson PA-C   Patient/Family Goals Statement home    Additional Occupational Profile Info/Pertinent History of Current Problem presented with SOB, likely CHF exacerbationn.    Precautions/Limitations no known precautions/limitations   Cognitive Status Examination   Orientation orientation to person, place and time   Level of Consciousness alert   Follows Commands (Cognition) WNL   Memory intact   Visual Perception   Visual Perception Wears glasses   Pain Assessment   Patient Currently in Pain No   Balance   Balance Comments SBA with straight cane for mobility    Instrumental Activities of Daily Living (IADL)   Previous Responsibilities meal prep;housekeeping;laundry;medication management;finances;driving   Activities of Daily Living Analysis   Impairments Contributing to Impaired Activities of  "Daily Living (need for monitored ex)   General Therapy Interventions   Planned Therapy Interventions home program guidelines;progressive activity/exercise;risk factor education   Clinical Impression   Criteria for Skilled Therapeutic Interventions Met yes, treatment indicated   OT Diagnosis need for exercise    Influenced by the following impairments dec activity tolerance   Assessment of Occupational Performance 1-3 Performance Deficits   Identified Performance Deficits exercise   Clinical Decision Making (Complexity) Low complexity   Therapy Frequency daily   Predicted Duration of Therapy Intervention (days/wks) 1 more tx    Anticipated Discharge Disposition Home   Risks and Benefits of Treatment have been explained. Yes   Patient, Family & other staff in agreement with plan of care Yes   [facility] TM \"6 Clicks\"   2016, Trustees of [facility], under license to [employer].  All rights reserved.   6 Clicks Short Forms Daily Activity Inpatient Short Form   [facility]  \"6 Clicks\" Daily Activity Inpatient Short Form   1. Putting on and taking off regular lower body clothing? 4 - None   2. Bathing (including washing, rinsing, drying)? 4 - None   3. Toileting, which includes using toilet, bedpan or urinal? 4 - None   4. Putting on and taking off regular upper body clothing? 4 - None   5. Taking care of personal grooming such as brushing teeth? 4 - None   6. Eating meals? 4 - None   Daily Activity Raw Score (Score out of 24.Lower scores equate to lower levels of function) 24   Total Evaluation Time   Total Evaluation Time (Minutes) 8     "

## 2018-12-17 NOTE — ED PROVIDER NOTES
History     Chief Complaint:  Shortness of Breath    HPI   Roxy Beaulieu is a 58 year old female who presents to the emergency department today for evaluation of shortness of breath. Per chart review, the patient was diagnosed with PE in October of this year. She was also admitted in early December for a toe infection, which the patient reports has been healing well. At that time she was started on Coumadin. However, she notes that she ran out of this two days ago.     The patient explains that today she developed shortness of breath with exertion accompanied by a chest pressure that are both alleviated when at rest. She notes an intermittent runny nose and bilateral leg swelling that has persisted since her admission. The patient denies a cough, fever, chest pain, or history of asthma or emphysema.     Cardiac/PE/DVT Risk Factors:  History of hypertension - positive  History of hyperlipidemia - positive  History of diabetes - positive  History of smoking - positive  Personal history of PE/DVT - positive  Other immobilizations - positive: recent hospital admission    Allergies:  No Known Drug Allergies     Medications:    Norvasc  Lipitor  Bumex  Lovenox  Novolog  Humulin  Lopressor  Coumadin    Past Medical History:    Gangrene of toe  Type II diabetes  High cholesterol  Hypertension  Obesity    Past Surgical History:    Amputate toe x2  ORIF fibula    Family History:    Family history reviewed. No pertinent family history.    Social History:  Smoking Status: Former Smoker  Smokeless Tobacco: Never Used  Alcohol Use: Negative  Drug Use: Negative  Marital Status:  Single      Review of Systems   Constitutional: Negative for fever.   HENT: Positive for rhinorrhea.    Respiratory: Positive for shortness of breath. Negative for cough.         Chest pressure   Cardiovascular: Positive for leg swelling. Negative for chest pain.   All other systems reviewed and are negative.      Physical Exam     Patient Vitals  for the past 24 hrs:   BP Temp Temp src Pulse Heart Rate Resp SpO2 Weight   12/17/18 1240 -- -- -- -- -- -- -- 92.1 kg (203 lb)   12/17/18 1210 -- -- -- -- 92 24 90 % --   12/17/18 1205 -- -- -- -- 91 23 91 % --   12/17/18 1200 (!) 215/100 -- -- -- 91 27 91 % --   12/17/18 1155 -- -- -- -- 93 21 -- --   12/17/18 1150 -- -- -- -- 86 15 94 % --   12/17/18 1025 -- -- -- -- -- -- 96 % --   12/17/18 1008 (!) 129/98 98.5  F (36.9  C) Oral 93 93 20 94 % --     Physical Exam  General: The patient is alert, in mild respiratory distress with pursed lip breathing.    HENT: Mucous membranes moist.    Cardiovascular: Regular rate and rhythm. Good pulses in all four extremities. Normal capillary refill and skin turgor.     Respiratory: Lungs not clear. Decreased breath sounds. Tachypneic. No nasal flaring. No retractions. No wheezing, no crackles.    Gastrointestinal: Abdomen soft. No guarding, no rebound. No palpable hernias.     Musculoskeletal: No gross deformity. Bilateral lower extremity swelling.     Skin: No rashes or petechiae. Absent second toe of left foot with ulceration of middle toe left foot.     Neurologic: The patient is alert and oriented x3. GCS 15. No testable cranial nerve deficit. Follows commands with clear and appropriate speech. Gives appropriate answers. Good strength in all extremities. No gross neurologic deficit. Gross sensation intact. Pupils are round and reactive. No meningismus.     Lymphatic: No cervical adenopathy. No lower extremity swelling.    Psychiatric: The patient is non-tearful.    Emergency Department Course     ECG:  ECG taken at 1018, ECG read at 1021  Sinus rhythm with premature ventricular complexes or fusion complexes  Poor R wave progression. Similar compared to EKG dated 10/4/18  Rate 93 bpm. ID interval 152 ms. QRS duration 88 ms. QT/QTc 360/447 ms. P-R-T axes 54 4 64.    Imaging:  Radiology findings were communicated with the patient who voiced understanding of the  findings.    XR Chest 2 Views  Probable mild congestive heart failure.   JAJA CURRAN MD  Reading per radiology    Laboratory:  Laboratory findings were communicated with the patient who voiced understanding of the findings.    Blood Culture: pending x2  CBC: WBC 8.4, HGB 9.7,   BMP: Glucose 178, Urea 37, Creatinine 2.59, GFR Estimate 19, calcium 8.4 o/w WNL  Lactic Acid (Resulted: 1114): 0.9   BNP: 68061  INR: 1.00  Troponin (Collected: 1047): 0.02     Interventions:  1055 Aspirin 324 mg Oral  1055 Duoneb 3 ml Neb  1200 Nitroglycerine 1 ptach Transdermal  1201 Lasix 40 mg IV  1217 Optison 5 ml IV  1333 Lovenox 90 mg IV  1343 Labetalol 10 mg IV    Emergency Department Course:    1011 Nursing notes and vitals reviewed.    1013 I performed an exam of the patient as documented above.     1018 EKG obtained as noted above.    1041 IV was inserted and blood was drawn for laboratory testing, results above.    1110 The patient was sent for a chest xray while in the emergency department, results above.     1207 I spoke with Mary Jo Tyson PA-C of the hospitalist service from [facility] regarding patient's presentation, findings, and plan of care.    1418 I personally reviewed the laboratory and imaging results with the patient and answered all related questions prior to admission.    Impression & Plan      Medical Decision Making:  Roxy Beaulieu is a 58 year old female who presents for shortness of breath. The patient was recently hospitalized in October for PE/DVT. She said she did not take the medication at home. She was admitted to the hospital in early December for osteomyelitis of her toe. At that time she was started back on heparin and coumadin. She has been off this for several days. She reportedly was told that she had a reaction in the hospital and got lovenox at that time. She has been off her medications and complains of shortness of breath. Her pursed lip breathing was quite bad. I was  concerned that she could need intervention such as TPA. I ordered an emergent stat echo. She improved with nebs and Lovenox. This is probably multifactorial, but there are clear signs of CHF in this patient. She was started on lasix and nitroglycerin. She was improved and admitted to the hospital for diuresis and treatment of her PE. Her echo did show global hypokinesis, but there were no signs of an elevated troponin and no signs of STEMI.     Critical Care Time is 40 minutes excluding procedures.     Diagnosis:    ICD-10-CM    1. Shortness of breath R06.02 Blood culture ONE site   2. Congestive heart failure, unspecified HF chronicity, unspecified heart failure type (H) I50.9    3. Other pulmonary embolism without acute cor pulmonale, unspecified chronicity (H) I26.99    4. Noncompliance with medication regimen Z91.14      Disposition:   The patient is admitted into the care of Dr. Ocampo.    Discharge Medications:     Review of your medicines      UNREVIEWED medicines. Ask your doctor about these medicines      Dose / Directions   amLODIPine 10 MG tablet  Commonly known as:  NORVASC  Used for:  Hypertension, unspecified type      Dose:  10 mg  Take 1 tablet (10 mg) by mouth daily  Quantity:  30 tablet  Refills:  0     atorvastatin 20 MG tablet  Commonly known as:  LIPITOR  Used for:  Dyslipidemia      Dose:  20 mg  Take 1 tablet (20 mg) by mouth every evening  Quantity:  30 tablet  Refills:  0     bumetanide 0.5 MG tablet  Commonly known as:  BUMEX  Used for:  Hypertension, unspecified type      Dose:  0.5 mg  Take 1 tablet (0.5 mg) by mouth daily  Quantity:  30 tablet  Refills:  0     enoxaparin 100 MG/ML syringe  Commonly known as:  LOVENOX  Used for:  Deep vein thrombosis (DVT) of proximal lower extremity, unspecified chronicity, unspecified laterality (H)      Dose:  80 mg  Inject 0.8 mLs (80 mg) Subcutaneous every 24 hours Until your INR is >2  Quantity:  3 Syringe  Refills:  0     insulin aspart 100 UNIT/ML  pen  Commonly known as:  NovoLOG PEN  Used for:  Uncontrolled type 2 diabetes mellitus with hyperglycemia, with long-term current use of insulin (H)      Correction scale insulin: Three times daily with meals: -199: 2U, -249: 3 U, -299: 4 U, -349: 5 U, BG >350: 6 u  Quantity:  21 mL  Refills:  0     insulin isophane & regular susp  Commonly known as:  HumuLIN MIX 70/30 PEN , NovoLIN MIX 70/30 PEN  Used for:  Uncontrolled type 2 diabetes mellitus with hyperglycemia, with long-term current use of insulin (H)      Dose:  20 Units  Inject 20 Units Subcutaneous 2 times daily (before meals)  Quantity:  21 mL  Refills:  0     metoprolol tartrate 50 MG tablet  Commonly known as:  LOPRESSOR  Used for:  Hypertension, unspecified type      Dose:  50 mg  Take 1 tablet (50 mg) by mouth 2 times daily  Quantity:  60 tablet  Refills:  0     vitamin D2 25238 units capsule  Used for:  Stage 3 chronic kidney disease (H)      Dose:  11350 Units  Take 50,000 Units by mouth every 7 days  Quantity:  30 capsule  Refills:  0     warfarin 5 MG tablet  Commonly known as:  COUMADIN  Used for:  Acute deep vein thrombosis (DVT) of distal vein of lower extremity, unspecified laterality (H)      Dose:  5 mg  Take 1 tablet (5 mg) by mouth daily Q 6 PM  get INR in AM 11/7/18 for further dosing and med refill  Quantity:  10 tablet  Refills:  0          Scribe Disclosure:  Lorenza LY, am serving as a scribe at 10:23 AM on 12/17/2018 to document services personally performed by Mukesh Latham MD based on my observations and the provider's statements to me.    [facility] EMERGENCY DEPARTMENT       Mukesh Latham MD  12/18/18 0673

## 2018-12-17 NOTE — ED TRIAGE NOTES
Patient presents with complaints of SOB which started yesterday. Patient does have a PE and is on coumadin for the but did not take the last two doses due to cost reasons. Patient denies any chest pain. ABC intact without need for intervention at this time.

## 2018-12-17 NOTE — PHARMACY-ADMISSION MEDICATION HISTORY
Admission medication history interview status for this patient is complete. See [facility] admission navigator for allergy information, prior to admission medications and immunization status.     Medication history interview source(s):Patient  Medication history resources (including written lists, pill bottles, clinic record): [facility] Everywhere  Primary pharmacy:[facility]    Changes made to PTA medication list:  Added: carvedilol  Deleted: metoprolol, lovenox, ergocalciferol  Changed: bumex    Actions taken by pharmacist (provider contacted, etc):None     Additional medication history information:Pt ran out of atorvastatin and amlodipine and has not been taking them - only taking what was sent home with from [facility] and insulin.    Medication reconciliation/reorder completed by provider prior to medication history? No    Do you take OTC medications (eg tylenol, ibuprofen, fish oil, eye/ear drops, etc)? N(Y/N)    For patients on insulin therapy: Y (Y/N)  Lantus/levemir/NPH/Mix 70/30 dose: Y  (Y/N) (see Med list for doses)   Sliding scale Novolog Y  If Yes, do you have a baseline novolog pre-meal dose: N units with meals  Patients eat three meals a day:   Y/N    How many episodes of hypoglycemia do you have per week: ___once in a while____  How many missed doses do you have per week: _pretty good about taking_____  How many times do you check your blood glucose per day: __2_____  Do you have a Continuous glucose monitor (CGM)   Y/N (remind pt that not approved for hospital use)   Any Barriers to therapy - Be specific :  cost of medications, comfortable with giving injections (if applicable), comfortable and confident with current diabetes regimen: Y/N ______________      Prior to Admission medications    Medication Sig Last Dose Taking? Auth Provider   bumetanide (BUMEX) 1 MG tablet Take 1 mg by mouth daily 12/14/2018 at Unknown time Yes Unknown, Entered By History   carvedilol (COREG) 25 MG tablet Take 25 mg by  mouth 2 times daily (with meals) 12/14/2018 at Unknown time Yes Unknown, Entered By History   insulin aspart (NOVOLOG PEN) 100 UNIT/ML injection Correction scale insulin: Three times daily with meals: -199: 2U, -249: 3 U, -299: 4 U, -349: 5 U, BG >350: 6 u  Yes Gurjit Chaparro MD   insulin isophane & regular (HUMULIN MIX 70/30 PEN , NOVOLIN MIX 70/30 PEN) susp Inject 20 Units Subcutaneous 2 times daily (before meals) 12/16/2018 at Unknown time Yes Gurjit Chaparro MD   warfarin (COUMADIN) 5 MG tablet Take 5 mg by mouth daily 12/14/2018 at Unknown time Yes Unknown, Entered By History   amLODIPine (NORVASC) 10 MG tablet Take 1 tablet (10 mg) by mouth daily More than a month at Unknown time  Zully Wise MD   atorvastatin (LIPITOR) 20 MG tablet Take 1 tablet (20 mg) by mouth every evening More than a month at Unknown time  Zully Wise MD

## 2018-12-17 NOTE — ED NOTES
[facility]  ED Nurse Handoff Report    Roxy Beaulieu is a 58 year old female   ED Chief complaint: Shortness of Breath  . ED Diagnosis:   Final diagnoses:   Shortness of breath   Congestive heart failure, unspecified HF chronicity, unspecified heart failure type (H)   Other pulmonary embolism without acute cor pulmonale, unspecified chronicity (H)   Noncompliance with medication regimen     Allergies:   Allergies   Allergen Reactions     Heparin        Code Status: prior  Activity level - Baseline/Home:  Stand with Assist. Activity Level - Current:   Stand with Assist. Lift room needed: No. Bariatric: No   Needed: No   Isolation: No. Infection: Not Applicable.     Vital Signs:   Vitals:    12/17/18 1155 12/17/18 1200 12/17/18 1205 12/17/18 1210   BP:  (!) 215/100     Pulse:       Resp: 21 27 23 24   Temp:       TempSrc:       SpO2:  91% 91% 90%       Cardiac Rhythm:  ,      Pain level:    Patient confused: No. Patient Falls Risk: Yes.   Elimination Status: Has voided   Patient Report - Initial Complaint: SOB. Focused Assessment:     Patient presents with complaints of SOB which started yesterday. Patient does have a PE and is on coumadin for the but did not take the last two doses due to cost reasons. Patient denies any chest pain. ABC intact without need for intervention at this time.    Pt with diminished breath sounds throughout, denies cough, denies CP.  Tests Performed: ECHO, x-ray, labs. Abnormal Results:   Labs Ordered and Resulted from Time of ED Arrival Up to the Time of Departure from the ED   CBC WITH PLATELETS DIFFERENTIAL - Abnormal; Notable for the following components:       Result Value    RBC Count 3.62 (*)     Hemoglobin 9.7 (*)     Hematocrit 31.3 (*)     MCHC 31.0 (*)     All other components within normal limits   BASIC METABOLIC PANEL - Abnormal; Notable for the following components:    Glucose 178 (*)     Urea Nitrogen 37 (*)     Creatinine 2.59 (*)     GFR  Estimate 19 (*)     GFR Estimate If Black 23 (*)     Calcium 8.4 (*)     All other components within normal limits   NT PROBNP INPATIENT - Abnormal; Notable for the following components:    N-Terminal Pro BNP Inpatient 15,210 (*)     All other components within normal limits   INR   LACTIC ACID WHOLE BLOOD   CARDIAC CONTINUOUS MONITORING   PULSE OXIMETRY NURSING   PERIPHERAL IV CATHETER   PATIENT CARE ORDER   VITAL SIGNS   ISTAT TROPONIN NURSING POCT   MEASURE WEIGHT   NOTIFY PHYSICIAN   NOTIFY PHYSICIAN   TROPONIN POCT   BLOOD CULTURE   BLOOD CULTURE     XR Chest 2 Views   Final Result   IMPRESSION: Probable mild congestive heart failure.       JAJA CURRAN MD        Treatments provided: lasix, meds per MAR  Family Comments: N/A  OBS brochure/video discussed/provided to patient:  No  ED Medications:   Medications   enoxaparin (LOVENOX) injection 87 mg (not administered)   sodium chloride (PF) 0.9% PF flush 10 mL (not administered)   perflutren diluted 1mL to 2mL with saline (OPTISON) diluted injection 3 mL (not administered)   aspirin (ASA) chewable tablet 324 mg (324 mg Oral Given 12/17/18 1055)   ipratropium - albuterol 0.5 mg/2.5 mg/3 mL (DUONEB) neb solution 3 mL (3 mLs Nebulization Given 12/17/18 1055)   furosemide (LASIX) injection 40 mg (40 mg Intravenous Given 12/17/18 1201)   nitroGLYcerin (NITRODUR) 0.4 MG/HR 24 hr patch 1 patch (1 patch Transdermal Given 12/17/18 1200)     Drips infusing:  No  For the majority of the shift, the patient's behavior Green. Interventions performed were rounding.     Severe Sepsis OR Septic Shock Diagnosis Present: No      ED Nurse Name/Phone Number: Mila Elyssa,   12:17 PM  RECEIVING UNIT ED HANDOFF REVIEW    Above ED Nurse Handoff Report was reviewed: Yes  Reviewed by: Irena Perdomo on December 17, 2018 at 1:52 PM

## 2018-12-18 ENCOUNTER — APPOINTMENT (OUTPATIENT)
Dept: NUCLEAR MEDICINE | Facility: CLINIC | Age: 58
End: 2018-12-18
Attending: INTERNAL MEDICINE
Payer: MEDICAID

## 2018-12-18 ENCOUNTER — APPOINTMENT (OUTPATIENT)
Dept: OCCUPATIONAL THERAPY | Facility: CLINIC | Age: 58
End: 2018-12-18
Attending: PHYSICIAN ASSISTANT
Payer: MEDICAID

## 2018-12-18 LAB
ALBUMIN SERPL-MCNC: 2.3 G/DL (ref 3.4–5)
ANION GAP SERPL CALCULATED.3IONS-SCNC: 8 MMOL/L (ref 3–14)
BASOPHILS # BLD AUTO: 0 10E9/L (ref 0–0.2)
BASOPHILS NFR BLD AUTO: 0.5 %
BUN SERPL-MCNC: 40 MG/DL (ref 7–30)
CALCIUM SERPL-MCNC: 8.2 MG/DL (ref 8.5–10.1)
CHLORIDE SERPL-SCNC: 108 MMOL/L (ref 94–109)
CO2 SERPL-SCNC: 27 MMOL/L (ref 20–32)
CREAT SERPL-MCNC: 2.86 MG/DL (ref 0.52–1.04)
CRP SERPL-MCNC: <2.9 MG/L (ref 0–8)
DIFFERENTIAL METHOD BLD: ABNORMAL
EOSINOPHIL # BLD AUTO: 0.2 10E9/L (ref 0–0.7)
EOSINOPHIL NFR BLD AUTO: 2.7 %
ERYTHROCYTE [DISTWIDTH] IN BLOOD BY AUTOMATED COUNT: 15.4 % (ref 10–15)
GFR SERPL CREATININE-BSD FRML MDRD: 17 ML/MIN/1.7M2
GLUCOSE BLDC GLUCOMTR-MCNC: 101 MG/DL (ref 70–99)
GLUCOSE BLDC GLUCOMTR-MCNC: 106 MG/DL (ref 70–99)
GLUCOSE BLDC GLUCOMTR-MCNC: 115 MG/DL (ref 70–99)
GLUCOSE BLDC GLUCOMTR-MCNC: 122 MG/DL (ref 70–99)
GLUCOSE BLDC GLUCOMTR-MCNC: 143 MG/DL (ref 70–99)
GLUCOSE BLDC GLUCOMTR-MCNC: 277 MG/DL (ref 70–99)
GLUCOSE SERPL-MCNC: 104 MG/DL (ref 70–99)
HCT VFR BLD AUTO: 26.8 % (ref 35–47)
HGB BLD-MCNC: 8.1 G/DL (ref 11.7–15.7)
IMM GRANULOCYTES # BLD: 0 10E9/L (ref 0–0.4)
IMM GRANULOCYTES NFR BLD: 0.5 %
INR PPP: 1.21 (ref 0.86–1.14)
LYMPHOCYTES # BLD AUTO: 1.4 10E9/L (ref 0.8–5.3)
LYMPHOCYTES NFR BLD AUTO: 17.1 %
MCH RBC QN AUTO: 26 PG (ref 26.5–33)
MCHC RBC AUTO-ENTMCNC: 30.2 G/DL (ref 31.5–36.5)
MCV RBC AUTO: 86 FL (ref 78–100)
MONOCYTES # BLD AUTO: 0.6 10E9/L (ref 0–1.3)
MONOCYTES NFR BLD AUTO: 7.1 %
NEUTROPHILS # BLD AUTO: 5.9 10E9/L (ref 1.6–8.3)
NEUTROPHILS NFR BLD AUTO: 72.1 %
NRBC # BLD AUTO: 0 10*3/UL
NRBC BLD AUTO-RTO: 0 /100
PLATELET # BLD AUTO: 253 10E9/L (ref 150–450)
POTASSIUM SERPL-SCNC: 4.2 MMOL/L (ref 3.4–5.3)
PROCALCITONIN SERPL-MCNC: 0.09 NG/ML
PROT SERPL-MCNC: 5.8 G/DL (ref 6.8–8.8)
RBC # BLD AUTO: 3.11 10E12/L (ref 3.8–5.2)
SODIUM SERPL-SCNC: 143 MMOL/L (ref 133–144)
WBC # BLD AUTO: 8.2 10E9/L (ref 4–11)

## 2018-12-18 PROCEDURE — 84145 PROCALCITONIN (PCT): CPT | Performed by: PHYSICIAN ASSISTANT

## 2018-12-18 PROCEDURE — 86140 C-REACTIVE PROTEIN: CPT | Performed by: PHYSICIAN ASSISTANT

## 2018-12-18 PROCEDURE — 97530 THERAPEUTIC ACTIVITIES: CPT | Mod: GO | Performed by: REHABILITATION PRACTITIONER

## 2018-12-18 PROCEDURE — 40000133 ZZH STATISTIC OT WARD VISIT: Performed by: REHABILITATION PRACTITIONER

## 2018-12-18 PROCEDURE — 25000132 ZZH RX MED GY IP 250 OP 250 PS 637: Performed by: PHYSICIAN ASSISTANT

## 2018-12-18 PROCEDURE — 25000132 ZZH RX MED GY IP 250 OP 250 PS 637: Performed by: INTERNAL MEDICINE

## 2018-12-18 PROCEDURE — 80048 BASIC METABOLIC PNL TOTAL CA: CPT | Performed by: PHYSICIAN ASSISTANT

## 2018-12-18 PROCEDURE — 99232 SBSQ HOSP IP/OBS MODERATE 35: CPT | Mod: 25 | Performed by: INTERNAL MEDICINE

## 2018-12-18 PROCEDURE — 12000007 ZZH R&B INTERMEDIATE

## 2018-12-18 PROCEDURE — 78452 HT MUSCLE IMAGE SPECT MULT: CPT | Mod: 26 | Performed by: INTERNAL MEDICINE

## 2018-12-18 PROCEDURE — 99233 SBSQ HOSP IP/OBS HIGH 50: CPT | Performed by: INTERNAL MEDICINE

## 2018-12-18 PROCEDURE — 25000128 H RX IP 250 OP 636

## 2018-12-18 PROCEDURE — 34300033 ZZH RX 343: Performed by: INTERNAL MEDICINE

## 2018-12-18 PROCEDURE — 78452 HT MUSCLE IMAGE SPECT MULT: CPT

## 2018-12-18 PROCEDURE — 36415 COLL VENOUS BLD VENIPUNCTURE: CPT | Performed by: PHYSICIAN ASSISTANT

## 2018-12-18 PROCEDURE — 00000146 ZZHCL STATISTIC GLUCOSE BY METER IP

## 2018-12-18 PROCEDURE — 82040 ASSAY OF SERUM ALBUMIN: CPT | Performed by: PHYSICIAN ASSISTANT

## 2018-12-18 PROCEDURE — A9502 TC99M TETROFOSMIN: HCPCS | Performed by: INTERNAL MEDICINE

## 2018-12-18 PROCEDURE — 93018 CV STRESS TEST I&R ONLY: CPT | Performed by: INTERNAL MEDICINE

## 2018-12-18 PROCEDURE — 93017 CV STRESS TEST TRACING ONLY: CPT

## 2018-12-18 PROCEDURE — 90682 RIV4 VACC RECOMBINANT DNA IM: CPT | Performed by: INTERNAL MEDICINE

## 2018-12-18 PROCEDURE — 93016 CV STRESS TEST SUPVJ ONLY: CPT | Performed by: INTERNAL MEDICINE

## 2018-12-18 PROCEDURE — G0463 HOSPITAL OUTPT CLINIC VISIT: HCPCS

## 2018-12-18 PROCEDURE — 25000128 H RX IP 250 OP 636: Performed by: INTERNAL MEDICINE

## 2018-12-18 PROCEDURE — 86140 C-REACTIVE PROTEIN: CPT | Performed by: INTERNAL MEDICINE

## 2018-12-18 PROCEDURE — 84155 ASSAY OF PROTEIN SERUM: CPT | Performed by: PHYSICIAN ASSISTANT

## 2018-12-18 PROCEDURE — 85610 PROTHROMBIN TIME: CPT | Performed by: PHYSICIAN ASSISTANT

## 2018-12-18 PROCEDURE — 85025 COMPLETE CBC W/AUTO DIFF WBC: CPT | Performed by: PHYSICIAN ASSISTANT

## 2018-12-18 PROCEDURE — 97535 SELF CARE MNGMENT TRAINING: CPT | Mod: GO | Performed by: REHABILITATION PRACTITIONER

## 2018-12-18 RX ORDER — NICOTINE POLACRILEX 4 MG
15-30 LOZENGE BUCCAL
Status: DISCONTINUED | OUTPATIENT
Start: 2018-12-18 | End: 2018-12-19 | Stop reason: HOSPADM

## 2018-12-18 RX ORDER — DEXTROSE MONOHYDRATE 25 G/50ML
25-50 INJECTION, SOLUTION INTRAVENOUS
Status: DISCONTINUED | OUTPATIENT
Start: 2018-12-18 | End: 2018-12-19 | Stop reason: HOSPADM

## 2018-12-18 RX ORDER — REGADENOSON 0.08 MG/ML
INJECTION, SOLUTION INTRAVENOUS
Status: COMPLETED
Start: 2018-12-18 | End: 2018-12-18

## 2018-12-18 RX ORDER — TORSEMIDE 20 MG/1
20 TABLET ORAL DAILY
Status: DISCONTINUED | OUTPATIENT
Start: 2018-12-19 | End: 2018-12-19 | Stop reason: HOSPADM

## 2018-12-18 RX ORDER — REGADENOSON 0.08 MG/ML
0.4 INJECTION, SOLUTION INTRAVENOUS ONCE
Status: COMPLETED | OUTPATIENT
Start: 2018-12-18 | End: 2018-12-18

## 2018-12-18 RX ORDER — UREA 8.5 G/85G
CREAM TOPICAL DAILY
Status: DISCONTINUED | OUTPATIENT
Start: 2018-12-18 | End: 2018-12-19 | Stop reason: HOSPADM

## 2018-12-18 RX ORDER — CARVEDILOL 25 MG/1
50 TABLET ORAL 2 TIMES DAILY WITH MEALS
Status: DISCONTINUED | OUTPATIENT
Start: 2018-12-18 | End: 2018-12-19 | Stop reason: HOSPADM

## 2018-12-18 RX ORDER — AMINOPHYLLINE 25 MG/ML
50-100 INJECTION, SOLUTION INTRAVENOUS
Status: DISCONTINUED | OUTPATIENT
Start: 2018-12-18 | End: 2018-12-19 | Stop reason: HOSPADM

## 2018-12-18 RX ORDER — ACYCLOVIR 200 MG/1
0-1 CAPSULE ORAL
Status: DISCONTINUED | OUTPATIENT
Start: 2018-12-18 | End: 2018-12-19 | Stop reason: HOSPADM

## 2018-12-18 RX ORDER — WARFARIN SODIUM 5 MG/1
5 TABLET ORAL
Status: COMPLETED | OUTPATIENT
Start: 2018-12-18 | End: 2018-12-18

## 2018-12-18 RX ORDER — ALBUTEROL SULFATE 90 UG/1
2 AEROSOL, METERED RESPIRATORY (INHALATION) EVERY 5 MIN PRN
Status: DISCONTINUED | OUTPATIENT
Start: 2018-12-18 | End: 2018-12-19 | Stop reason: HOSPADM

## 2018-12-18 RX ADMIN — HYDRALAZINE HYDROCHLORIDE 50 MG: 50 TABLET, FILM COATED ORAL at 15:17

## 2018-12-18 RX ADMIN — UREA: 8.5 CREAM TOPICAL at 19:04

## 2018-12-18 RX ADMIN — TETROFOSMIN 11 MCI.: 1.38 INJECTION, POWDER, LYOPHILIZED, FOR SOLUTION INTRAVENOUS at 10:28

## 2018-12-18 RX ADMIN — FUROSEMIDE 40 MG: 10 INJECTION, SOLUTION INTRAMUSCULAR; INTRAVENOUS at 07:46

## 2018-12-18 RX ADMIN — ENOXAPARIN SODIUM 90 MG: 100 INJECTION SUBCUTANEOUS at 15:17

## 2018-12-18 RX ADMIN — TETROFOSMIN 33 MCI.: 1.38 INJECTION, POWDER, LYOPHILIZED, FOR SOLUTION INTRAVENOUS at 11:42

## 2018-12-18 RX ADMIN — REGADENOSON 0.4 MG: 0.08 INJECTION, SOLUTION INTRAVENOUS at 11:57

## 2018-12-18 RX ADMIN — HYDRALAZINE HYDROCHLORIDE 50 MG: 50 TABLET, FILM COATED ORAL at 06:35

## 2018-12-18 RX ADMIN — INSULIN HUMAN 20 UNITS: 100 INJECTION, SUSPENSION SUBCUTANEOUS at 19:01

## 2018-12-18 RX ADMIN — WARFARIN SODIUM 5 MG: 5 TABLET ORAL at 19:01

## 2018-12-18 RX ADMIN — INSULIN ASPART 1 UNITS: 100 INJECTION, SOLUTION INTRAVENOUS; SUBCUTANEOUS at 11:09

## 2018-12-18 RX ADMIN — CARVEDILOL 25 MG: 25 TABLET, FILM COATED ORAL at 08:45

## 2018-12-18 RX ADMIN — INFLUENZA A VIRUS A/MICHIGAN/45/2015 (H1N1) RECOMBINANT HEMAGGLUTININ ANTIGEN, INFLUENZA A VIRUS A/SINGAPORE/INFIMH-16-0019/2016 (H3N2) RECOMBINANT HEMAGGLUTININ ANTIGEN, INFLUENZA B VIRUS B/MARYLAND/15/2016 RECOMBINANT HEMAGGLUTININ ANTIGEN, AND INFLUENZA B VIRUS B/PHUKET/3073/2013 RECOMBINANT HEMAGGLUTININ ANTIGEN 0.5 ML: 45; 45; 45; 45 INJECTION INTRAMUSCULAR at 15:18

## 2018-12-18 RX ADMIN — CARVEDILOL 50 MG: 25 TABLET, FILM COATED ORAL at 18:58

## 2018-12-18 RX ADMIN — HYDRALAZINE HYDROCHLORIDE 50 MG: 50 TABLET, FILM COATED ORAL at 21:44

## 2018-12-18 RX ADMIN — ATORVASTATIN CALCIUM 20 MG: 20 TABLET, FILM COATED ORAL at 19:01

## 2018-12-18 ASSESSMENT — ACTIVITIES OF DAILY LIVING (ADL)
ADLS_ACUITY_SCORE: 15
ADLS_ACUITY_SCORE: 15
ADLS_ACUITY_SCORE: 14
ADLS_ACUITY_SCORE: 15
ADLS_ACUITY_SCORE: 15
ADLS_ACUITY_SCORE: 14

## 2018-12-18 ASSESSMENT — MIFFLIN-ST. JEOR: SCORE: 1556.99

## 2018-12-18 NOTE — CONSULTS
"CLINICAL NUTRITION SERVICES  -  ASSESSMENT NOTE    Malnutrition:   None     REASON FOR ASSESSMENT  Roxy Beaulieu is a 58 year old female seen by Registered Dietitian for Provider Order - Nutrition Education - Congestive Heart Failure (CHF) - Dietitian to instruct patient on 2 gram sodium diet    NUTRITION HISTORY  - Information obtained from patient, EMR.   - Admissions in Oct (for PE), early Dec (toe infection).   - Presents with increasing SOB with exertion accompanied by chest pressure.   - H/o gangrene of toe, Type2 DM, high cholesterol, HTN, obesity.   - lives alone in town home.   - Pt follows a regular diet, attempts to follow DM guidelines.   - Diet recall: two meals daily. She cooks for herself.   - Meals consist of meat (90% lean beef, occasionally chicken) or eggs, with a starch and veg, or sometimes just a veg.   - Uses Minute Rice (unseasoned), adds her own seasoning.    - Occasionally gets corned beef or rotisserie chicken, but does not rely on these processed meats for meals.  - Rarely snacks, unless she has leftovers to get rid of.   - She feels that she has a good handle on her prescribed DM diet, though offered little information when writer attempted to delve further.     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 Verbalized \"hidden\" sodium in many processed foods, canned vegetables, etc.     - Previous diet education: education for DM and renal diets 10/2018, DM diet in 3/2018.   - NKFA    CURRENT NUTRITION ORDERS  Diet Order:     2000 mg Sodium and Moderate Consistent Carbohydrate     Current Intake/Tolerance:  100% intake recorded for one meal since admission. No issues with appetite reported.     PHYSICAL FINDINGS  Observed  Mild temporal depletion   Obtained from Chart/Interdisciplinary Team  No BM yet    ANTHROPOMETRICS  Height: 5' 8\"  Weight: 92.1 kg - lowest of admission  Body mass index is 30.9 kg/m .  Weight Status:  Obesity Grade I " BMI 30-34.9  IBW: 63.6 kg  % IBW: 145%  Weight History: No weight loss indicated. Currently up with fluids.   Wt Readings from Last 10 Encounters:   12/18/18 93.8 kg (206 lb 14.4 oz)   11/05/18 86.8 kg (191 lb 4.8 oz)   10/07/18 89.4 kg (197 lb)   07/06/18 83.9 kg (185 lb)   03/16/18 83.5 kg (184 lb)   04/29/17 96.3 kg (212 lb 3.2 oz)   09/25/16 93 kg (205 lb)       LABS  Labs reviewed  Recent Labs   Lab 12/18/18  0720 12/18/18  0620 12/18/18  0610 12/18/18  0159 12/17/18  2217 12/17/18  1839 12/17/18  1730  12/17/18  1041   GLC  --  104*  --   --   --   --   --   --  178*   *  --  101* 106* 178* 216* 168*   < >  --     < > = values in this interval not displayed.     Lab Results   Component Value Date    A1C 11.6 10/04/2018    A1C >16.0 03/16/2018    A1C >16.0 03/15/2018    A1C 12.3 04/28/2017     No new A1c this admission     MEDICATIONS  Medications reviewed  Lasix 40 mg BID  MSSI + Humulin/Novolin Pen    ASSESSED NUTRITION NEEDS PER APPROVED PRACTICE GUIDELINES:  Dosing Weight 70.7 kg - Adj DW  Estimated Energy Needs: 6047-5259 kcals (20-25 Kcal/Kg)  Justification: obese  Estimated Protein Needs:  grams protein (1.2-1.5 g pro/Kg)  Justification: preservation of lean body mass  Estimated Fluid Needs: >1 mL/kcal  Justification: maintenance    MALNUTRITION:  % Weight Loss:  None noted  % Intake:  Decreased intake does not meet criteria for malnutrition   Subcutaneous Fat Loss:  None observed  Muscle Loss:  Temporal region mild depletion  Fluid Retention:  Mild 2+ - weak indicator, weight up with fluid. Likely not masking weight loss.     Malnutrition Diagnosis: Patient does not meet two of the above criteria necessary for diagnosing malnutrition    NUTRITION DIAGNOSIS:  Predicted inadequate nutrient intake related to potential for decreased intakes with shortness of breath, new 2g diet restriction on top of ModCho.       NUTRITION INTERVENTIONS  Recommendations / Nutrition Prescription  Continue diet  per MD  --> moderate consistent CHO, 2g Na    Additional 2g Na education as needed.       Implementation  Nutrition education: Provided education on 2g Na diet    Assessed learning needs, learning preferences, and willingness to learn    Nutrition Education (Content):  a) Provided handout   a. Low sodium nutrition therapy   b) Discussed   a. New diet restriction 2g Na, recommendation for amount of sodium at meals.   b. Foods high in salt: canned, processed foods, treated meats  c. Use of fresh ingredients if possible    Nutrition Education (Application):  a) Discussed eating habits and recommended alternative food choices  b) Avoid too much Corned Beef/rotisserie chicken  c) Add own seasoning to cooking  d) Try to find low-salt canned veggies     Patient verbalizes understanding of diet by providing information she already knows regarding salt restriction    Anticipate fair-good compliance. Pt with good baseline knowledge.     Diet Education - refer to Education Flowsheet    Collaboration and Referral of Nutrition care: RD attendance at rounds      Nutrition Goals  Pt to tolerate at least 75% meals TID. .   Pt to verbalize 3 foods high in sodium.       MONITORING AND EVALUATION:  Progress towards goals will be monitored and evaluated per protocol and Practice Guidelines        Danica Rubio RD, LD  3rd floor/ICU: [phone]  All other floors: [phone]  Weekend/holiday: [phone]  Office: [phone]

## 2018-12-18 NOTE — PROGRESS NOTES
[facility]  Cardiology Progress Note    Elliott Hall MD   12/18/2018          Assessment and Plan:   Roxy Beaulieu is a 58 year old woman with past medical history significant for diabetes mellitus since 2001, hypertension, hypercholesterolemia, recent DVT and pulmonary embolus in October of this year, chronic renal failure, who has had a problem with medical compliance due to financial issues.  She has no family history of coronary disease.  She now was admitted with congestive heart failure.  She reports worsening dyspnea on exertion, peripheral edema orthopnea and PND over the last couple of weeks.  She has no chest pain syndrome.  She has had no palpitations lightheadedness dizziness syncope or near syncope.  Of note patient's blood pressure on admission was 215/100.      N-terminal proBNP is 15,210.  Echocardiogram demonstrates an ejection fraction of 40-45% without focal wall motion abnormalities..  EKG demonstrates normal sinus rhythm with occasional PVC.  There is question of an old anteroseptal myocardial infarction due to poor R wave progression.  Fasting lipid profile from October was total cholesterol 200 with an HDL of 41 and LDL of 123 and triglycerides of 179.     Review of care everywhere shows she had a dobutamine stress echo in 2014 through [facility] that appeared to be normal.    Cardiomyopathy.  I suspect this is most likely a hypertensive cardiomyopathy.  Lexiscan demonstrates no evidence of ischemia.      Acute on chronic systolic and diastolic heart failure.  Again I suspect this is primarily due to her cardiomyopathy and poorly controlled hypertension.  Her renal insufficiency is probably also contributing to fluid retention.   I will change her to oral diuretics starting in the morning with torsemide 20 daily.  I will discontinue her IV Lasix. Her creatinine does not allow us to use Spironolactone at this time.  Her weight is down 0.5 kg.     Peripheral  "edema.  I suspect the amlodipine is contributing.  She will need chronic diuretic therapy.  TSH is normal.  I would recheck total protein and albumin.  IVC was described as normal.  Pulmonary pressures could not be estimated.     DVT/pulmonary embolus.  Echocardiogram does not appear to demonstrate any cor pulmonale at this time.  She probably should complete 3-6 months of warfarin therapy.  Pulmonary pressures could not be estimated by echocardiogram.  IVC was described as normal     Bilateral carotid bruits.    Ultrasound demonstrates a 50-69% right carotid stenosis.  Less than 50% on the left.    Hypertension.  Blood pressure is still high.  I will increase carvedilol to 50 twice daily.    Acute on chronic renal failure.  Creatinine is up a bit with diuresis.  I will slow down.               Interval History:   Feeling better.  She states she is taken the deepest breath she taken in quite some time and it feels good.  Peripheral edema also improved              Medications:       Continuing ACE inhibitor/ARB/ARNI from home medication list OR ACE inhibitor/ARB order already placed during this visit       - MEDICATION INSTRUCTIONS -       - MEDICATION INSTRUCTIONS -       Warfarin Therapy Reminder         atorvastatin  20 mg Oral QPM     carvedilol  25 mg Oral BID w/meals     enoxaparin  1 mg/kg Subcutaneous Q24H     furosemide  40 mg Intravenous Q12H     hydrALAZINE  50 mg Oral Q8H WILLIAMS     influenza vaccine adult (product based on age)  0.5 mL Intramuscular Prior to discharge     insulin aspart  1-10 Units Subcutaneous TID AC     insulin aspart  1-7 Units Subcutaneous At Bedtime     insulin isophane & regular  20 Units Subcutaneous BID AC     urea   Topical Daily     warfarin  5 mg Oral ONCE at 18:00                   Physical Exam:   Blood pressure 136/60, pulse 93, temperature 98.6  F (37  C), temperature source Oral, resp. rate 20, height 1.727 m (5' 8\"), weight 93.8 kg (206 lb 14.4 oz), SpO2 (!) 87 %, not " currently breastfeeding.  Vitals:    12/17/18 1240 12/17/18 1424 12/18/18 0442   Weight: 92.1 kg (203 lb) 94.3 kg (208 lb) 93.8 kg (206 lb 14.4 oz)     Vital Signs with Ranges  Temp:  [97.2  F (36.2  C)-98.6  F (37  C)] 98.6  F (37  C)  Heart Rate:  [] 102  Resp:  [16-20] 20  BP: (105-191)/(42-83) 136/60  SpO2:  [87 %-95 %] 87 %  I/O's Last 24 hours  I/O last 3 completed shifts:  In: 640 [P.O.:640]  Out: 625 [Urine:625]       General:  Patient comfortable, in no apparent distress.  Awake, alert, oriented x3.  Neck:  No JVD, bilateral carotid bruits.  Lungs:  Clear to auscultation bilaterally.  Cardiac:  Regular rate and rhythm, 2/6 holosystolic murmur best heard at the apex radiating to the axilla s.  Abdomen:  Soft, nontender.  Extremities: 1+ bilateral edema.  2+ pulses.  Skin:  Warm, dry.  Neurologic:  No focal deficits.         Data:        Recent Labs   Lab Test 12/18/18  0620 12/17/18  1041 11/05/18  0611  10/04/18  1611    141 141   < > 136   POTASSIUM 4.2 4.8 4.8   < > 5.0   CHLORIDE 108 106 111*   < > 104   CO2 27 29 26   < > 27   BUN 40* 37* 42*   < > 45*   CR 2.86* 2.59* 3.16*   < > 2.48*   ANIONGAP 8 6 4   < > 5   GILL 8.2* 8.4* 8.4*   < > 8.3*   * 178* 171*   < > 273*   ALBUMIN  --   --   --   --  2.7*   PROTTOTAL  --   --   --   --  7.0   GFRESTIMATED 17* 19* 15*   < > 20*   GFRESTBLACK 20* 23* 18*   < > 24*   NTBNPI  --  15,210*  --   --  7,580*    < > = values in this interval not displayed.     Recent Labs   Lab Test 12/18/18  0620 12/17/18  1041 11/04/18  0652   HGB 8.1* 9.7* 8.9*     Recent Labs   Lab Test 12/17/18  1041   TSH 1.42     Recent Labs   Lab Test 12/17/18  1047 10/04/18  1611   TROPI  --  0.029   TROPONIN 0.02  --      No lab results found.    Invalid input(s): [id number]  Recent Labs   Lab Test 10/05/18  0655   CHOL 200*   HDL 41*   *   TRIG 179*

## 2018-12-18 NOTE — CONSULTS
Care Transition Initial Assessment - RN        Met with: Patient.  DATA   Active Problems:    Congestive heart failure (CHF) (H)       Cognitive Status: alert and oriented.  Primary Care Clinic Name: [facility]  Primary Care MD Name: Dr London  Contact information and PCP information verified: Yes  Lives With: alone   Living Arrangements: house(town house)                 Insurance concerns: Insurance questions referred to Financial Services  ASSESSMENT  Patient currently receives the following services:  none        Identified issues/concerns regarding health management: compliance issues with medications, managing health and insurance issues    CTS/SW following for CHF diagnosis, elevated DEVEN, readmission and financial issues. Met with pt at bedside to discuss plan of care associated with above. Pt states she lives at home alone and does not currently have any in home services. She states she was recently admitted at [facility]. Per chart review, she was there from 12/3-12/10 for sob, dvt/pe, chf and left AMA. Pt is now readmitted with BNP 59325 and EF 40-45%. She is also being followed by Cardiology and Nutrition. We discussed CHF diagnosis. Pt states this is a new diagnosis for her but she feels it started at her last hospitalization as she had increase in weight and LE swelling. When asked about the status of her insurance, she states she filled out the [place] application at [facility] and it was sent in by their SW. She does not know the status of her insurance or if she qualified. Referral to financial counseling sent and they will follow up with pt on her insurance status. CHF education reviewed with pt. She states she has had it many times now in this hospitalization but was receptive. She follows a low carb diet at home and monitors her blood sugars twice daily. She has all the glucometer and supplies she needs at home. We discussed importance of low Na diet on the heart and taking her meds as  prescribed. We discussed it being her responsibility to fill her meds and take them as prescribed if she wants to improve her heart and stay out of the hospital. She is agreeable with this. Any new discharge meds should be filled at the discharge pharmacy and sent with pt until insurance is verified. We discussed daily weights and monitoring weight gain. New scale was given to pt for home use. Signs of CHF exacerbation reviewed. She does not anticipate any discharge needs. OT recommends home with OP CR. Importance of following up on insurance verification, filling meds and taking them as prescribed, low Na diet, and following up with her PCP were again stressed.    PLAN  Patient/family is agreeable to the plan?  Yes  Patient anticipates discharging to home .        Patient anticipates needs for home equipment: No  Plan/Disposition: Home   Appointments: Pt states she will make her own follow up appt      Care  (CTS) will continue to follow as needed. Handoff will be sent to cts for PCP on discharge. Will cont to follow for discharge plan of care. SW updated.    Goldie Pedraza RN CTS  Care Transitions  [phone]

## 2018-12-18 NOTE — PLAN OF CARE
Heart Failure Care Pathway  GOALS TO BE MET BEFORE DISCHARGE:    1. Decrease congestion and/or edema with diuretic therapy to achieve near      optimal volume status.            Dyspnea improved:  Yes            Edema improved:     Yes per patient        Net I/O and Weights since admission:          11/18 1500 - 12/18 1459  In: 640 [P.O.:640]  Out: 625 [Urine:625]  Net: 15            Vitals:    12/17/18 1240 12/17/18 1424 12/18/18 0442   Weight: 92.1 kg (203 lb) 94.3 kg (208 lb) 93.8 kg (206 lb 14.4 oz)       2.  O2 sats > 92% on RA or at prior home O2 therapy level.          Current oxygenation status:       SpO2: (!) 87 %  While ambulating, 94% while sitting.        O2 Device: None (Room air),            Able to wean O2 this shift to keep sats > 92%:  Yes       Does patient use Home O2? No    3.  Tolerates ambulation and mobility near baseline: No, please explain: Pt ambulated with cardiac rehab and 02 sats fell to 87% RA.         How many times did the patient ambulate with nursing staff this shift? 1    Please review the Heart Failure Care Pathway for additional HF goal outcomes.    Gracie Weir RN  12/18/2018      Pt a/o x4 ambulating with cane independently. Up to BR and hallway with cardiac rehab. Per cardiac rehab pt 02 sats dropped to 87% while abmulating. Pt has no SOB, LS diminished. [facility] nurse for LE wounds. Pt refuses for this writer to do full  skin assessment. Pt went down for lexiscan at 11:15.

## 2018-12-18 NOTE — PROGRESS NOTES
[facility]  Hospitalist Progress Note  Sekou Shell MD 12/18/2018    Reason for Stay (Diagnosis): volume overload, uncontrolled HTN         Assessment and Plan:      Summary of Stay: Roxy Beaulieu is a 58 year old female came to attention on 12/17/2018 with dyspnea and and was found to have severe HTN that peaked at 215/100 in the ED.      Problem List:   1. Decompensated Systolic CHF.  New onset cardiomyopathy, likely due to chronically uncontrolled HTN, which is itself due to medication non-compliance. Sent for Lexiscan today, interpreted as neg for acute ischemia.   2. Acute on chronic renal failure. Baseline creat likely about 2.75-2.9 with current value in that range. Urinary output has been poor with very cautious diuresis.  (Pt seems to have significant peripheral edema, but minimal dyspnea at rest.)  3. Hypertensive urgency. Now, with the addition of Hydralazine, BP is reasonabally controlled, though it seems most likely that the patient's prior severe HTN was due to not being on ANY of her prescribed antihypertensives.   4. IDDM with neuropathy and hx retinopathy.   5. Recent VTE.   6. Chronic left foot wounds s/p amputation distal phalanx, third digit.  No obvious evidence of superficial infection at this time.  Markers of infection are negative.    7. Carotid stenosis, right 50-69%, based on ultrasound.      PLAN:  1.  Cont with antihypertensive regimen including carvedilol 50 mg p.o. twice daily, hydralazine 50 mg 3 times daily, Amlodipine 10 mg p.o. Daily.  No need for fluid removal on an urgent basis. Will monitor kidney function and consider further diuresis v outpatient follow up.   2.  Anticipate no further ischemic evaluation.  3.  Discussed medication compliance with the patient at some length.  4.  Resume Lovenox at therapeutic dose (1mg/kg q 24 h).  Will resume warfarin as well.  5.  Follow-up with nephrology on discharge.    6.  Anticipate need for wound care, poss home  "RN follow-up as an outpatient.  Encouraged pt also to have follow up with podiatry.  7.  Labs include repeat of protein/creat ratio, CRP, Procalcitonin. (these show no compelling evidence for infection)    DVT Prophylaxis: Enoxaparin (Lovenox) SQ  Code Status: Full Code  Discharge Dispo: home  Estimated Disch Date / # of Days until Disch: likely tomorrow.        Interval History (Subjective):      Chart reviewed, pt interviewed.    Pt with rather complex history has run into complications of poor HTN and DM control (due to med non-compliance) recently. She has previously dealt with diabetic foot ulcers with complications, including osteomyelitis. She lives alone, but has family members who live close.                   Physical Exam:      Last Vital Signs:  /71 (BP Location: Left arm)   Pulse 93   Temp 98.1  F (36.7  C) (Oral)   Resp 16   Ht 1.727 m (5' 8\")   Wt 93.8 kg (206 lb 14.4 oz)   SpO2 94%   BMI 31.46 kg/m      I/O last 3 completed shifts:  In: 640 [P.O.:640]  Out: 625 [Urine:625]    Constitutional: Awake, alert, cooperative, no apparent distress   Respiratory: Clear to auscultation bilaterally, no crackles or wheezing   Cardiovascular: Regular rate and rhythm, normal S1 and S2, and no murmur noted   Abdomen: Normal bowel sounds, soft, non-distended, non-tender   Skin: No rashes, no cyanosis, dry to touch   Neuro: Alert and oriented x3, no weakness, numbness, memory loss   Extremities: Soft pitting edema to the knees bilat. Foot deformities: right with poss charcot foot and left with amputated toes and healing chronic sores. No acute infection, though mild erythema is noted over the left foot.   Other(s):        All other systems: Negative          Medications:      All current medications were reviewed with changes reflected in problem list.         Data:      All new lab and imaging data was reviewed.   Labs/Imaging:  Results for orders placed or performed during the hospital encounter of " 12/17/18 (from the past 24 hour(s))   Glucose by meter   Result Value Ref Range    Glucose 178 (H) 70 - 99 mg/dL   Glucose by meter   Result Value Ref Range    Glucose 106 (H) 70 - 99 mg/dL   Glucose by meter   Result Value Ref Range    Glucose 101 (H) 70 - 99 mg/dL   Basic metabolic panel   Result Value Ref Range    Sodium 143 133 - 144 mmol/L    Potassium 4.2 3.4 - 5.3 mmol/L    Chloride 108 94 - 109 mmol/L    Carbon Dioxide 27 20 - 32 mmol/L    Anion Gap 8 3 - 14 mmol/L    Glucose 104 (H) 70 - 99 mg/dL    Urea Nitrogen 40 (H) 7 - 30 mg/dL    Creatinine 2.86 (H) 0.52 - 1.04 mg/dL    GFR Estimate 17 (L) >60 mL/min/1.7m2    GFR Estimate If Black 20 (L) >60 mL/min/1.7m2    Calcium 8.2 (L) 8.5 - 10.1 mg/dL   CBC with platelets differential   Result Value Ref Range    WBC 8.2 4.0 - 11.0 10e9/L    RBC Count 3.11 (L) 3.8 - 5.2 10e12/L    Hemoglobin 8.1 (L) 11.7 - 15.7 g/dL    Hematocrit 26.8 (L) 35.0 - 47.0 %    MCV 86 78 - 100 fl    MCH 26.0 (L) 26.5 - 33.0 pg    MCHC 30.2 (L) 31.5 - 36.5 g/dL    RDW 15.4 (H) 10.0 - 15.0 %    Platelet Count 253 150 - 450 10e9/L    Diff Method Automated Method     % Neutrophils 72.1 %    % Lymphocytes 17.1 %    % Monocytes 7.1 %    % Eosinophils 2.7 %    % Basophils 0.5 %    % Immature Granulocytes 0.5 %    Nucleated RBCs 0 0 /100    Absolute Neutrophil 5.9 1.6 - 8.3 10e9/L    Absolute Lymphocytes 1.4 0.8 - 5.3 10e9/L    Absolute Monocytes 0.6 0.0 - 1.3 10e9/L    Absolute Eosinophils 0.2 0.0 - 0.7 10e9/L    Absolute Basophils 0.0 0.0 - 0.2 10e9/L    Abs Immature Granulocytes 0.0 0 - 0.4 10e9/L    Absolute Nucleated RBC 0.0    INR   Result Value Ref Range    INR 1.21 (H) 0.86 - 1.14   CRP inflammation   Result Value Ref Range    CRP Inflammation <2.9 0.0 - 8.0 mg/L   Procalcitonin   Result Value Ref Range    Procalcitonin 0.09 ng/ml   Albumin level   Result Value Ref Range    Albumin 2.3 (L) 3.4 - 5.0 g/dL   Protein total   Result Value Ref Range    Protein Total 5.8 (L) 6.8 - 8.8 g/dL    Glucose by meter   Result Value Ref Range    Glucose 115 (H) 70 - 99 mg/dL   Glucose by meter   Result Value Ref Range    Glucose 143 (H) 70 - 99 mg/dL   NM MPI w Lexiscan    Narrative    GATED MYOCARDIAL PERFUSION SCINTIGRAPHY WITH INTRAVENOUS PHARMACOLOGIC  VASODILATATION LEXISCAN -ONE DAY STUDY     12/18/2018 1:04 PM  ONDINA DENIS  58 years  Female  1960.    Indication/Clinical History: Congestive heart failure    Impression  1.  Myocardial perfusion imaging using single isotope technique  demonstrated technically difficult study without evidence for  myocardial ischemia.   2. Gated images demonstrated borderline global hypokinesis with a  calculated ejection fraction of 51%.    3. There is no prior nuclear study available for comparison.    Procedure  Pharmacologic stress testing was performed with Lexiscan at a rate of  0.08 mg/ml rapid bolus injection, for 15 seconds, 0.4 mg/5ml  intravenously. Low-level exercise was not performed along with the  vasodilator infusion.  The heart rate was 91 at baseline and dajuan to  93 beats per minute during the Lexiscan infusion. The rest blood  pressure was 123/52 mmHg and was 114/48 mm Hg during Lexiscan  infusion. The patient experienced shortness of breath  during the  test.    Myocardial perfusion imaging was performed at rest, approximately 45  minutes after the injection intravenously of 11.0 mCi of Tc-99m  Myoview. At peak pharmacologic effect, 10-20 seconds after Lexiscan,   the patient was injected intravenously with 33.0 mCi of  Tc-99m  Myoview. The post-stress tomographic imaging was performed  approximately 60 minutes after stress.    EKG Findings  The resting EKG demonstrated sinus with inferolateral ST depression.  The stress EKG demonstrated no significant worsening of the  inferolateral ST depression with Lexiscan infusion.    Tomographic Findings  Overall, the study quality is 4 . On the stress images, there is  relatively uniform tracer uptake  noted. On the rest images, there is  relatively uniform tracer uptake noted . Gated images demonstrated  borderline LV hypokinesis. The left ventricular ejection fraction was  calculated to be 51%. TID was 1.09.    ARPIT HARDIN MD   Glucose by meter   Result Value Ref Range    Glucose 277 (H) 70 - 99 mg/dL

## 2018-12-18 NOTE — CONSULTS
[facility]  Cardiology Consultation     Date of Admission:  12/17/2018    Primary Care Physician   Dwain London    Reason for Consult   Reason for consult: I was asked to evaluate this patient for congestive heart failure    History of Present Illness   Roxy Beaulieu is a 58 year old woman with past medical history significant for diabetes mellitus since 2001, hypertension, hypercholesterolemia, recent DVT and pulmonary embolus in October of this year, chronic renal failure, who has had a problem with medical compliance due to financial issues.  She has no family history of coronary disease.  She now was admitted with congestive heart failure.  She reports worsening dyspnea on exertion, peripheral edema orthopnea and PND over the last couple of weeks.  She has no chest pain syndrome.  She has had no palpitations lightheadedness dizziness syncope or near syncope.  Of note patient's blood pressure on admission was 215/100.     N-terminal proBNP is 15,210.  Echocardiogram demonstrates an ejection fraction of 40-45% without focal wall motion abnormalities..  EKG demonstrates normal sinus rhythm with occasional PVC.  There is question of an old anteroseptal myocardial infarction due to poor R wave progression.  Fasting lipid profile from October was total cholesterol 200 with an HDL of 41 and LDL of 123 and triglycerides of 179.    Review of care everywhere shows she had a dobutamine stress echo in 2014 through [facility], [place], operated by [facility] that appeared to be normal.    Assessment & Plan   Roxy Beaulieu is a 58 year old female who was admitted on 12/17/2018 with congestive heart failure.    Cardiomyopathy.  I suspect this is most likely a hypertensive cardiomyopathy, although she does have multiple risk factors for coronary disease and I would obtain a Lexiscan to evaluate further.  Her creatinine prohibits a coronary angiogram at this time.    Acute on chronic systolic and diastolic heart failure.  Again I  suspect this is primarily due to her cardiomyopathy and poorly controlled hypertension.  Her renal insufficiency is probably also contributing to fluid retention.  I would continue diuresing with Lasix as tolerated watching her renal function.  Her creatinine does not allow us to use Spironolactone at this time.    Peripheral edema.  I suspect the amlodipine is contributing.  She will need chronic diuretic therapy.  TSH is normal.  I would recheck total protein and albumin.  IVC was described as normal.  Pulmonary pressures could not be estimated.    DVT/pulmonary embolus.  Echocardiogram does not appear to demonstrate any cor pulmonale at this time.  She probably should complete 3-6 months of warfarin therapy.  Pulmonary pressures could not be estimated by echocardiogram.  IVC was described as normal    Bilateral carotid bruits.  I will order an ultrasound    Elliott Hall MD    Patient Active Problem List   Diagnosis     Left foot infection     Uncontrolled type 2 diabetes mellitus with hyperglycemia, with long-term current use of insulin (H)     Gangrene of toe (H)     Obesity     Personal history of tobacco use, presenting hazards to health     Fracture of fibula, distal, left, closed     Ankle fracture, left     Shortness of breath     Toe ulcer, left, with unspecified severity (H)     Ulcer of toe of left foot (H)     Congestive heart failure (CHF) (H)       Past Medical History   I have reviewed this patient's medical history and updated it with pertinent information if needed.   Past Medical History:   Diagnosis Date     Diabetes (H)      Gangrene of toe (H) 4/29/2017     High cholesterol      Hypertension      Obesity 4/29/2017     Personal history of tobacco use, presenting hazards to health 4/29/2017     Uncontrolled type 2 diabetes mellitus with hyperglycemia, with long-term current use of insulin (H) 4/29/2017       Past Surgical History   I have reviewed this patient's surgical history and  updated it with pertinent information if needed.  Past Surgical History:   Procedure Laterality Date     AMPUTATE TOE(S) Left 5/1/2017    Procedure: AMPUTATE TOE(S);  Amputate first and second left toes;  Surgeon: Verna Fofana DPM, Podiatry/Foot and Ankle Surgery;  Location: [facility] OR     AMPUTATE TOE(S) Left 11/3/2018    Procedure: Partial left 3rd toe amputation;  Surgeon: Verna Fofana DPM, Podiatry/Foot and Ankle Surgery;  Location: [facility] OR     OPEN REDUCTION INTERNAL FIXATION FIBULA Left 3/16/2018    Procedure: OPEN REDUCTION INTERNAL FIXATION FIBULA;  Open reduction and internal fixation of displaced left lateral malleolar fracture;  Surgeon: Sumanth Wen MD;  Location: [facility] OR       Prior to Admission Medications   Prior to Admission Medications   Prescriptions Last Dose Informant Patient Reported? Taking?   amLODIPine (NORVASC) 10 MG tablet More than a month at Unknown time  No No   Sig: Take 1 tablet (10 mg) by mouth daily   atorvastatin (LIPITOR) 20 MG tablet More than a month at Unknown time  No No   Sig: Take 1 tablet (20 mg) by mouth every evening   bumetanide (BUMEX) 1 MG tablet 12/14/2018 at Unknown time  Yes Yes   Sig: Take 1 mg by mouth daily   carvedilol (COREG) 25 MG tablet 12/14/2018 at Unknown time  Yes Yes   Sig: Take 25 mg by mouth 2 times daily (with meals)   insulin aspart (NOVOLOG PEN) 100 UNIT/ML injection   No Yes   Sig: Correction scale insulin: Three times daily with meals: -199: 2U, -249: 3 U, -299: 4 U, -349: 5 U, BG >350: 6 u   insulin isophane & regular (HUMULIN MIX 70/30 PEN , NOVOLIN MIX 70/30 PEN) susp 12/16/2018 at Unknown time  No Yes   Sig: Inject 20 Units Subcutaneous 2 times daily (before meals)   warfarin (COUMADIN) 5 MG tablet 12/14/2018 at Unknown time  Yes Yes   Sig: Take 5 mg by mouth daily      Facility-Administered Medications: None     Current Facility-Administered Medications   Medication Dose Route Frequency     atorvastatin  20 mg Oral  "QPM     carvedilol  25 mg Oral BID w/meals     furosemide  40 mg Intravenous Q12H     hydrALAZINE  50 mg Oral Q8H WILLIAMS     insulin aspart  1-6 Units Subcutaneous Q4H     insulin isophane & regular  20 Units Subcutaneous BID AC     Current Facility-Administered Medications   Medication Last Rate     Continuing ACE inhibitor/ARB/ARNI from home medication list OR ACE inhibitor/ARB order already placed during this visit       - MEDICATION INSTRUCTIONS -       - MEDICATION INSTRUCTIONS -       Warfarin Therapy Reminder       Allergies   Allergies   Allergen Reactions     Heparin        Social History    reports that she quit smoking about 2 years ago. she has never used smokeless tobacco. She reports that she does not drink alcohol or use drugs.    Family History   No family history on file.    Review of Systems   The comprehensive 10 point Review of Systems is negative other than noted in the HPI or here.     Physical Exam   Vital Signs with Ranges  Temp:  [97.2  F (36.2  C)-98.5  F (36.9  C)] 97.2  F (36.2  C)  Pulse:  [93] 93  Heart Rate:  [80-93] 92  Resp:  [15-37] 20  BP: (129-215)/() 191/79  SpO2:  [88 %-96 %] 94 %  Wt Readings from Last 4 Encounters:   12/17/18 94.3 kg (208 lb)   11/05/18 86.8 kg (191 lb 4.8 oz)   10/07/18 89.4 kg (197 lb)   07/06/18 83.9 kg (185 lb)     No intake/output data recorded.      Vitals: /79 (BP Location: Left arm)   Pulse 93   Temp 97.2  F (36.2  C) (Oral)   Resp 20   Ht 1.727 m (5' 8\")   Wt 94.3 kg (208 lb)   SpO2 94%   BMI 31.63 kg/m      Patient is comfortable and in no apparent distress. Awake, alert and oriented ×3  Pupils are equal round and reactive.  Sclerae anicteric conjunctiva is noninjected  Neck is supple there are bilateral carotid bruits.  She has jugular venous distention to 8 cm sitting upright.  Chest is clear to auscultation.  There is no kyphosis or scoliosis  Cardiac exam reveals a regular rate and rhythm I hear no murmur, rub or gallop.  Abdomen " is soft nontender with normoactive bowel sounds.  Extremities 2+ edema left greater than right.  There are 2+ pulses.  Neurologic exam is nonfocal.  Skin is warm and dry.      No lab results found in last 7 days.    Invalid input(s): TROPONINIES    Recent Labs   Lab 12/17/18  1041   WBC 8.4   HGB 9.7*   MCV 87      INR 1.00      POTASSIUM 4.8   CHLORIDE 106   CO2 29   BUN 37*   CR 2.59*   GFRESTIMATED 19*   GFRESTBLACK 23*   ANIONGAP 6   GILL 8.4*   *     Recent Labs   Lab Test 10/05/18  0655   CHOL 200*   HDL 41*   *   TRIG 179*     Recent Labs   Lab 12/17/18  1041   WBC 8.4   HGB 9.7*   HCT 31.3*   MCV 87        No results for input(s): PH, PHV, PO2, PO2V, SAT, PCO2, PCO2V, HCO3, HCO3V in the last 168 hours.  Recent Labs   Lab 12/17/18  1041   NTBNPI 15,210*     No results for input(s): DD in the last 168 hours.  No results for input(s): SED, CRP in the last 168 hours.  Recent Labs   Lab 12/17/18  1041        Recent Labs   Lab 12/17/18  1041   TSH 1.42     No results for input(s): COLOR, APPEARANCE, URINEGLC, URINEBILI, URINEKETONE, SG, UBLD, URINEPH, PROTEIN, UROBILINOGEN, NITRITE, LEUKEST, RBCU, WBCU in the last 168 hours.    Imaging:  Recent Results (from the past 48 hour(s))   XR Chest 2 Views    Narrative    CHEST TWO VIEWS   12/17/2018 11:14 AM    HISTORY: Shortness of breath.    COMPARISON: 10/4/2018    FINDINGS: There is borderline cardiomegaly. There are small bilateral  pleural effusions with bibasilar atelectasis. There is also mild  prominence of the central pulmonary vasculature. No pneumothorax is  seen. No other abnormality or change is demonstrated.      Impression    IMPRESSION: Probable mild congestive heart failure.     JAJA CURRAN MD       Echo:  Recent Results (from the past 4320 hour(s))   Echocardiogram Limited    Narrative    [id number]  [facility]  [id number]  [id number]^BRIGIDO^AMY^AGRAWAL           [facility]  Echocardiography  Laboratory  [address] [place] [address]        Name: ONDINA DENIS  MRN: [id number]  : 1960  Study Date: 10/08/2018 07:14 AM  Age: 58 yrs  Gender: Female  Patient Location: [facility]  Reason For Study: Pericardial Effusion  Ordering Physician: AMY FOFANA  Referring Physician: [facility], [place]  Performed By: Lashell Lara RDCS     BSA: 2.0 m2  Height: 68 in  Weight: 197 lb  HR: 81  BP: 160/74 mmHg  _____________________________________________________________________________  __        Procedure  Limited Portable Echo Adult.  _____________________________________________________________________________  __        Interpretation Summary     The left ventricle is normal in size.  Left ventricular systolic function is mildly reduced.  The visual ejection fraction is estimated at 50%.  The right ventricle is normal size.  The right ventricular systolic function is normal.  Trivial to small pericardial effusion  _____________________________________________________________________________  __        Left Ventricle  The left ventricle is normal in size. There is normal left ventricular wall  thickness. Left ventricular systolic function is mildly reduced. The visual  ejection fraction is estimated at 50%. Left ventricular diastolic function is  indeterminate. There is mild global hypokinesia of the left ventricle.     Right Ventricle  The right ventricle is normal size. The right ventricular systolic function is  normal.     Atria  Normal left atrial size. Right atrial size is normal.     Mitral Valve  The mitral valve leaflets are mildly thickened. There is mild to moderate (1-  2+) mitral regurgitation.        Tricuspid Valve  Normal tricuspid valve. There is trace tricuspid regurgitation.     Aortic Valve  The aortic valve is trileaflet. No aortic regurgitation is present.     Pulmonic Valve  The pulmonic valve is not well visualized. There is no pulmonic valvular  regurgitation.      Vessels  The IVC is mildly diated.     Pericardium  There are no echocardiographic indications of cardiac tamponade. Trivial to  small pericardial effusion.        Rhythm  The rhythm was sinus bradycardia.  _____________________________________________________________________________  __  MMode/2D Measurements & Calculations  LA Volume (BP): 71.0 ml     LA Volume Index (BP): 35.0 ml/m2        Doppler Measurements & Calculations  MV max PG: 10.5 mmHg  MV mean P.0 mmHg  MV V2 VTI: 37.0 cm  MV P1/2t max priscila: 163.6 cm/sec  MV P1/2t: 105.3 msec  MVA(P1/2t): 2.1 cm2  MV dec slope: 454.9 cm/sec2  MR ERO: 0.09 cm2  MR volume: 17.3 ml              _____________________________________________________________________________  __        Report approved by: Shital Betts 10/08/2018 02:43 PM      ECHO COMPLETE WITH OPTISON    Narrative    [id number]  [id number]  [id number]  [id number]^SEAN^ANA^LARRY           [facility]  Echocardiography Laboratory  [address] [place] [address]        Name: ONDINA DENIS  MRN: [id number]  : 1960  Study Date: 10/05/2018 08:35 AM  Age: 58 yrs  Gender: Female  Patient Location: [facility]  Reason For Study: SOB  Ordering Physician: ANA ESTRADA  Referring Physician: [facility], [place]  Performed By: Lashell Lara RDCS     BSA: 2.0 m2  Height: 68 in  Weight: 200 lb  HR: 97  BP: 139/75 mmHg  _____________________________________________________________________________  __        Procedure  Complete Portable Echo Adult. Contrast Optison.  _____________________________________________________________________________  __        Interpretation Summary     The left ventricle is normal in size. There is mild concentric left  ventricular hypertrophy. Left ventricular systolic function is low normal. The  visual ejection fraction is estimated at 50-55%. Grade II or moderate  diastolic dysfunction. Diastolic Doppler findings (E/E' ratio and/or  other  parameters) suggest left ventricular filling pressures are increased. No  regional wall motion abnormalities noted. There is no thrombus seen in the  left ventricle.  The right ventricle is normal size. The right ventricular systolic function is  normal.  There is mild to moderate (1-2+) mitral regurgitation.  Small pericardial effusion. There is evidence of mitral respiratory flow  variation, but no other signs of hemodynamic compromise secondary to the  effusion are present. Moderate left pleural effusion  No previous study for comparison.  Recommend repeat limited echo in 3-5 days for reassessment of pericardial  effusion size.  _____________________________________________________________________________  __        Left Ventricle  The left ventricle is normal in size. There is mild concentric left  ventricular hypertrophy. Left ventricular systolic function is low normal. The  visual ejection fraction is estimated at 50-55%. Grade II or moderate  diastolic dysfunction. Diastolic Doppler findings (E/E' ratio and/or other  parameters) suggest left ventricular filling pressures are increased. No  regional wall motion abnormalities noted. There is no thrombus seen in the  left ventricle.     Right Ventricle  The right ventricle is normal size. The right ventricular systolic function is  normal.     Atria  Normal left atrial size. Right atrial size is normal. There is no color  Doppler evidence of an atrial shunt.     Mitral Valve  The mitral valve leaflets are mildly thickened. There is mild mitral annular  calcification. There is mild to moderate (1-2+) mitral regurgitation.        Tricuspid Valve  There is trace tricuspid regurgitation. Right ventricular systolic pressure  could not be approximated due to inadequate tricuspid regurgitation.     Aortic Valve  There is mild trileaflet aortic sclerosis. No aortic regurgitation is present.  No aortic stenosis is present.     Pulmonic Valve  There is no pulmonic  valvular stenosis.     Vessels  The aortic root is normal size. Normal size ascending aorta. The IVC is normal  in size and reactivity with respiration, suggesting normal central venous  pressure.     Pericardium  Small pericardial effusion. There is evidence of mitral respiratory flow  variation, but no other signs of hemodynamic compromise secondary to the  effusion are present. Moderate left pleural effusion.        Rhythm  Sinus rhythm was noted.  _____________________________________________________________________________  __  MMode/2D Measurements & Calculations  IVSd: 1.1 cm     LVIDd: 5.3 cm  LVIDs: 3.7 cm  LVPWd: 1.3 cm  FS: 30.1 %  LV mass(C)d: 253.5 grams  LV mass(C)dI: 124.0 grams/m2  Ao root diam: 3.2 cm  LA dimension: 4.3 cm  asc Aorta Diam: 3.3 cm  LA/Ao: 1.3  LVOT diam: 2.2 cm  LVOT area: 3.9 cm2  LA Volume (BP): 67.0 ml  LA Volume Index (BP): 32.8 ml/m2  RWT: 0.49           Doppler Measurements & Calculations  MV E max priscila: 134.5 cm/sec  MV A max priscila: 104.9 cm/sec  MV E/A: 1.3  MV max P.5 mmHg  MV mean PG: 3.1 mmHg  MV V2 VTI: 38.3 cm  MV P1/2t max priscila: 155.4 cm/sec  MV P1/2t: 88.0 msec  MVA(P1/2t): 2.5 cm2  MV dec slope: 517.0 cm/sec2  MV dec time: 0.19 sec  Ao V2 max: 139.5 cm/sec  Ao max P.0 mmHg  MR ERO: 0.10 cm2  MR volume: 16.9 ml  PA acc time: 0.12 sec  E/E' av.7  Lateral E/e': 18.4  Medial E/e': 20.9              _____________________________________________________________________________  __        Report approved by: Shital Castillo 10/05/2018 10:20 AM

## 2018-12-18 NOTE — PLAN OF CARE
Heart Failure Care Pathway  GOALS TO BE MET BEFORE DISCHARGE:    1. Decrease congestion and/or edema with diuretic therapy to achieve near      optimal volume status.            Dyspnea improved:  Yes            Edema improved:     No, please explain: bilateral LE edema        Net I/O and Weights since admission          11/17 2300 - 12/17 2259  In: 240 [P.O.:240]  Out: -   Net: 240            Vitals:    12/17/18 1240 12/17/18 1424   Weight: 92.1 kg (203 lb) 94.3 kg (208 lb)       2.  O2 sats > 92% on RA or at prior home O2 therapy level.          Current oxygenation status:       SpO2: 93 %         O2 Device: None (Room air),            Able to wean O2 this shift to keep sats > 92%:  Yes       Does patient use Home O2? No    3.  Tolerates ambulation and mobility near baseline: Yes        How many times did the patient ambulate with nursing staff this shift? 3    Please review the Heart Failure Care Pathway for additional HF goal outcomes.    Autumn Bautista RN  12/17/2018     BP elevated, recheck after meds 105/42. A&O, US carotid pending, Lexiscan for tomorrow, Mod cho, no caff diet, tolerating well, denies pain. Pt did complain of dizziness, BG was 216. She stated she thinks her BP dropped too fast after her meds. Since has resolved. SBA in room, Wounds on bilateral feet/toes, see flowsheet for details. WOC consulting, IV Lasix, Tele SR, RA, cardiology following. Continue POC.     Offered education on Coumadin and Lexiscan for pt but she refused.

## 2018-12-18 NOTE — PROGRESS NOTES
Pre-procedure:  Are you having any pain or shortness of breath (prior to starting)? n  Initial vital signs: /52, HR 90, RR 20  Allergies reviewed: y   Rhythm: Sinus  Medications taken within 48 hours of procedure:    Last Caffeine:   Lung sounds: CTA, no wheezing, crackles or rtx  Health History (COPD, Asthma, etc): n           Procedure: Lexiscan  Reaction/symptoms after receiving Jennifer injection: Shortness of breath  Intensity of Pain:   Rhythm:   1. Vital Signs:/48, HR 93, RR 25  2. Vital Signs:/50, HR 90, RR 25     Reversal agent: N/A    Post:   Resolution of symptoms?: YES  Vital signs: /51, HR 88, RR 20  Vital signs: /51, HR 88, RR 20  Rhythm:   Walk: YES  Comment:   Return to Radiology

## 2018-12-18 NOTE — PLAN OF CARE
Discharge Planner OT   Patient plan for discharge: home  Current status: Patient reports she has come to a realization that she is in CHF and was open to further education, reinforcemetn of  education and addressed questions, concerns and moving forwards, as well as outpatietn CR. Patietnt agreeable to walk in [place], limited tolerance to about 3 min.  BP at rest 166/68, after exercise 136/60, HR at rest, 90, after exercise 102- cardiologist and RN  is aware. O2 on RA at rest 94%, after exercise mid 80's.   Barriers to return to prior living situation: lives alone, limited activity tolerance  Recommendations for discharge: home with outpatient cardiac rehab  Rationale for recommendations: OP CR recommended to advance activity, further A with education and lifestyle modification.       Entered by: Goldie Dalal 12/18/2018 9:54 AM

## 2018-12-18 NOTE — PLAN OF CARE
Patient was assessed and it was determined that she needed stand-by assistance for ambulating and transfers due to an episode of dizziness earlier last shift. Patient is refusing assistance and the bed/chair alarm. Staff re-explained the purpose of the assistance, the alarms, and the concern for safety due to dizziness. Patient still refuses and stated she will call when she needs help.

## 2018-12-18 NOTE — PROGRESS NOTES
[facility]  WO Nurse Inpatient Wound Assessment     Assessment of wound(s) on pt's:   Left Foot        Data:   Patient History:      per MD note(s): 58 year old female with a past medical history of recently diagnosed left lower extremity DVT with likely pulmonary embolism (10/2018), uncontrolled diabetes mellitus type 2, recent third toe cellulitis with suspected osteomyelitis status post partial amputation (11/2018), CKD (Cr recent baseline 2.6-2.8), hypertension, hyperlipidemia admitted on 12/17/2018 for shortness of breath and found to have new diagnosis of congestive heart failure.       Current Diet / Nutrition:       Orders Placed This Encounter        Combination Diet 7518-7251 Calories: Moderate Consistent CHO (4-6 CHO units/meal); 2 gm NA Diet; No Caffeine for 24 hours (Once tests completed, may have caffeine)           Clarence Assessment and sub scores:   No Data Recorded     Mattress:  Standard , Atmos Air mattress    Labs:     Recent Labs   Lab Test 12/18/18  0620  10/04/18  1730 10/04/18  1611   ALBUMIN  --   --   --  2.7*   HGB 8.1*   < > 10.2*  --    RBC 3.11*   < > 3.86  --    WBC 8.2   < > 9.1  --       < > 257  --    INR 1.21*   < >  --  0.92   A1C  --   --  11.6*  --    CRP <2.9   < >  --   --     < > = values in this interval not displayed.          Wound Assessment (location #1):   Left foot  Wound History:  See above; recent left 3rd toe partial amp    Specific Dimensions (length x width x depth, in cm):       3rd toe tip: dry dark stable scab 1x 1.5cm    Medial foot: dry dark stable scab 1.5x 4.5cm    Lateral foot and pinky toe: dry dark stable scab that wraps around toe and moves along laterally/ plantar    Periwound Skin: edema 2-3+, erosion of epidermis, exfoliating calloused skin,      Odor: none    Pain:  Insensate to mid calf ,           Intervention:     Patient's chart evaluated.      Wound(s) was assessed    Wound Care: was done:  Per POC    Orders   "Written    Supplies  ordered: Atrac tain, gathered, placed at the bedside and discussed with RN    Discussed plan of care with Patient, Nurse and Physician          Assessment:       BLE edema; scattered dry patchy skin that pt reports \"comes and goes\". She applies a generic lotion. Will plan to order Atrac tain to improve condition.   Bilateral foot edema and neuropathy, left foot multiple stable dry scabs, edema 2-3+; suspect pt is providing poor care for herself with little follow up, poor glucose control, no orthotics with Charcot changes.          Plan:     Nursing to notify the Provider(s) and re-consult the WO Nurse if wound(s) deteriorate(s) or if the wound care plan needs reevaluation.    Plan of care for wound located on BLE and Left foot: Daily    1. Wash with soap and water    2. Apply AtracTain urea to BLE and Sween to feet     3. Swab all dry scabs with Betadine and air dry; socks    4. Float heels off bed on pillows/ blanket    WO Nurse will return: weekly            "

## 2018-12-18 NOTE — PROVIDER NOTIFICATION
Manda CHAUDHARY @ [id number]: Pt was just admitted last night for CHF exacerbation and is scheduled for CINDY scan this AM. She is wanting to wait on the IV Lasix dose until after the stress test so she doesn't have to go to the bathroom during the test. Not sure this if this is ok given her history, please advise.

## 2018-12-18 NOTE — PLAN OF CARE
Pt uncooperative with cares, refuses full skin check, refuses bed alarms and stand-by assistance, does not have a good understanding of her disease processes, but refuses any type of further education, Tele SR with a 2.1 pause and 4-beat run of VTach VSS on RA, q4hr blood sugar checks, no coverage given, right PIV SL, wounds on feet have scabbing and are open to air, BLLE edema present, plan to have CINDY scan today and WOC consult, Cardiology, SW, and Cardiac Rehab following, discharge TBD, continue with current POC.     Heart Failure Care Pathway  GOALS TO BE MET BEFORE DISCHARGE:    1. Decrease congestion and/or edema with diuretic therapy to achieve near      optimal volume status.            Dyspnea improved:  Yes            Edema improved:     No, please explain: BLLE edema         Net I/O and Weights since admission:          11/18 1500 - 12/18 1459  In: 640 [P.O.:640]  Out: 625 [Urine:625]  Net: 15            Vitals:    12/17/18 1240 12/17/18 1424 12/18/18 0442   Weight: 92.1 kg (203 lb) 94.3 kg (208 lb) 93.8 kg (206 lb 14.4 oz)       2.  O2 sats > 92% on RA or at prior home O2 therapy level.          Current oxygenation status:       SpO2: 93 %         O2 Device: None (Room air),            Able to wean O2 this shift to keep sats > 92%: NA       Does patient use Home O2? No    3.  Tolerates ambulation and mobility near baseline: Yes        How many times did the patient ambulate with nursing staff this shift? 0    Please review the Heart Failure Care Pathway for additional HF goal outcomes.    Yaneth De Jesus RN  12/18/2018

## 2018-12-18 NOTE — CONSULTS
"PT Name: Paul Villalobos Sr.  MR #: [id number]    Physician Query Form - Heart  Condition Clarification     Arturo Taylor RN CDS    Contact Information: [phone]  This form is a permanent document in the medical record.     Query Date: January 4, 2018    By submitting this query, we are merely seeking further clarification of documentation. Please utilize your independent clinical judgment when addressing the question(s) below.    The medical record contains the following   Indicators     Supporting Clinical Findings Location in Medical Record   X BNP BNP  667 Lab 12/25   X EF EF 30 Lab 12/26   X Radiology findings Mild cardiomegaly and findings suggestive of pulmonary edema with bilateral pleural effusions. CXR 12/25   X Echo Results Concentric hypertrophy.     2 - Moderately depressed left ventricular systolic function (EF 30-35%).     3 - Mildly to moderately depressed right ventricular systolic function .     4 - Severe left atrial enlargement.     5 - Normally functioning aortic and mitral valve mechanical prostheses.     6 - Pulmonary hypertension. The estimated PA systolic pressure is 55 mmHg Echo 12/26    "Ascites" documented     X "SOB" or "LEIVA" documented Positive for cough and shortness of breath    Orthopnea at baseline (sleeps on 5 pillows at night) and PND somewhat worse but stable H&P 12/25   X "Hypoxia" documented Acute on chronic respiratory failure with hypoxia   H&P 12/25      X Heart Failure documented Acute on chronic congestive heart failure    Acute on chronic congestive heart failure    CHF exacerbation (EF 40% and Dd) ED MD Note 12/26      H&P 12/25   X "Edema" documented Mild cardiomegaly and findings suggestive of pulmonary edema with bilateral pleural effusions. CXR 12/25   X Diuretics/Meds Will continue to monitor and likely transition to PO lasix in pm likely 80mg  Re-started home metolazone    X Treatment: Patient given duonebs, 40mg steroids, and 60mg IV Lasix.     Holding beta blocker in " [facility] referral received from [facility].     Chart reviewed. Spoke to patient. [place] expressed [place] [facility] [facility] [place] own appointment as her insurance is pending.   Spoke to Care Coordinator Goldie Kim RN and updated her. CC to give patient [facility] contact information.     We look forward to seeing Roxy in clinic.     Neisha Melara RN  [facility] Care Coordinator  [facility]  [phone]      [facility]: Cardiomyopathy, Optimization, Rehabilitation, Education   The [facility] is a heart failure specialty clinic within the [facility] where you will work with your cardiologist, nurse practitioners, physician assistants and registered nurses who specialize in heart failure care. They are dedicated to helping patients with heart failure to carefully adjust medications, receive education, and learn who and when to call if symptoms develop. They specialize in helping you better understand your condition, slow the progression of your disease, improve the length and quality of your life, help you detect future heart problems before they become life threatening, and avoid hospitalizations.   acute setting, though COPD more likely as patient at baseline dry weight and orthopnea  Patient also on lasix 80 MWF with 40mg in between and metolazone 2.5mg QD  Given 60 IV lasix in ED  Gave 80 IV once H&P  12/25   X Other:  Diminished breath sounds in the right lung field, scattered mild inspiratory wheezing in the left lung field, well healed scar on right flank from    Orthopnea at baseline (sleeps on 5 pillows at night) and PND somewhat worse but stable    Patient claims he misses doses of lasix/metalolazone claiming he takes medicines once every 2-3 days.    Bibasilar insp crackles, diffuse end-exp wheezing     H&P 12/25       Provider, please specify diagnosis or diagnoses associated with above clinical findings.                               [  ] Acute on Chronic Systolic Heart Failure ( EF < 40)*  [  ] Other Type of Heart Failure (please specify type): _________________________  [  ] Heart Failure Ruled Out  [  ] Other (please specify): ___acute on chronic combined heart failure__________  [  ] Clinically Undetermined            *American Heart Association                                                                                                          Please document in your progress notes daily for the duration of treatment until resolved and include in your discharge summary.

## 2018-12-19 ENCOUNTER — APPOINTMENT (OUTPATIENT)
Dept: OCCUPATIONAL THERAPY | Facility: CLINIC | Age: 58
End: 2018-12-19
Payer: MEDICAID

## 2018-12-19 VITALS
HEART RATE: 89 BPM | SYSTOLIC BLOOD PRESSURE: 162 MMHG | TEMPERATURE: 98.5 F | OXYGEN SATURATION: 95 % | BODY MASS INDEX: 31.22 KG/M2 | RESPIRATION RATE: 16 BRPM | WEIGHT: 206 LBS | HEIGHT: 68 IN | DIASTOLIC BLOOD PRESSURE: 67 MMHG

## 2018-12-19 PROBLEM — N18.4 CKD (CHRONIC KIDNEY DISEASE) STAGE 4, GFR 15-29 ML/MIN (H): Status: ACTIVE | Noted: 2018-12-19

## 2018-12-19 PROBLEM — I11.9 HYPERTENSIVE CARDIOMYOPATHY (H): Status: ACTIVE | Noted: 2018-12-19

## 2018-12-19 PROBLEM — N18.4 ANEMIA OF CHRONIC RENAL FAILURE, STAGE 4 (SEVERE) (H): Status: ACTIVE | Noted: 2018-12-19

## 2018-12-19 PROBLEM — I43 HYPERTENSIVE CARDIOMYOPATHY (H): Status: ACTIVE | Noted: 2018-12-19

## 2018-12-19 LAB
ALBUMIN SERPL-MCNC: 2.2 G/DL (ref 3.4–5)
ALP SERPL-CCNC: 91 U/L (ref 40–150)
ALT SERPL W P-5'-P-CCNC: 10 U/L (ref 0–50)
ANION GAP SERPL CALCULATED.3IONS-SCNC: 8 MMOL/L (ref 3–14)
AST SERPL W P-5'-P-CCNC: 9 U/L (ref 0–45)
BILIRUB SERPL-MCNC: 0.3 MG/DL (ref 0.2–1.3)
BUN SERPL-MCNC: 37 MG/DL (ref 7–30)
CALCIUM SERPL-MCNC: 8 MG/DL (ref 8.5–10.1)
CHLORIDE SERPL-SCNC: 105 MMOL/L (ref 94–109)
CO2 SERPL-SCNC: 28 MMOL/L (ref 20–32)
CREAT SERPL-MCNC: 2.82 MG/DL (ref 0.52–1.04)
CREAT UR-MCNC: 69 MG/DL
ERYTHROCYTE [DISTWIDTH] IN BLOOD BY AUTOMATED COUNT: 15.6 % (ref 10–15)
GFR SERPL CREATININE-BSD FRML MDRD: 17 ML/MIN/{1.73_M2}
GLUCOSE BLDC GLUCOMTR-MCNC: 100 MG/DL (ref 70–99)
GLUCOSE BLDC GLUCOMTR-MCNC: 142 MG/DL (ref 70–99)
GLUCOSE BLDC GLUCOMTR-MCNC: 164 MG/DL (ref 70–99)
GLUCOSE BLDC GLUCOMTR-MCNC: 61 MG/DL (ref 70–99)
GLUCOSE BLDC GLUCOMTR-MCNC: 64 MG/DL (ref 70–99)
GLUCOSE BLDC GLUCOMTR-MCNC: 79 MG/DL (ref 70–99)
GLUCOSE SERPL-MCNC: 141 MG/DL (ref 70–99)
HCT VFR BLD AUTO: 27 % (ref 35–47)
HGB BLD-MCNC: 8.2 G/DL (ref 11.7–15.7)
INR PPP: 1.24 (ref 0.86–1.14)
MCH RBC QN AUTO: 26.1 PG (ref 26.5–33)
MCHC RBC AUTO-ENTMCNC: 30.4 G/DL (ref 31.5–36.5)
MCV RBC AUTO: 86 FL (ref 78–100)
PLATELET # BLD AUTO: 240 10E9/L (ref 150–450)
POTASSIUM SERPL-SCNC: 4.4 MMOL/L (ref 3.4–5.3)
PROT SERPL-MCNC: 5.7 G/DL (ref 6.8–8.8)
PROT UR-MCNC: 4.24 G/L
PROT/CREAT 24H UR: 6.15 G/G CR (ref 0–0.2)
RBC # BLD AUTO: 3.14 10E12/L (ref 3.8–5.2)
RETICS # AUTO: 80.4 10E9/L (ref 25–95)
RETICS/RBC NFR AUTO: 2.6 % (ref 0.5–2)
SODIUM SERPL-SCNC: 141 MMOL/L (ref 133–144)
WBC # BLD AUTO: 7.7 10E9/L (ref 4–11)

## 2018-12-19 PROCEDURE — 25000132 ZZH RX MED GY IP 250 OP 250 PS 637: Performed by: INTERNAL MEDICINE

## 2018-12-19 PROCEDURE — 84156 ASSAY OF PROTEIN URINE: CPT | Performed by: INTERNAL MEDICINE

## 2018-12-19 PROCEDURE — 85610 PROTHROMBIN TIME: CPT | Performed by: PHYSICIAN ASSISTANT

## 2018-12-19 PROCEDURE — 99239 HOSP IP/OBS DSCHRG MGMT >30: CPT | Performed by: INTERNAL MEDICINE

## 2018-12-19 PROCEDURE — 85027 COMPLETE CBC AUTOMATED: CPT | Performed by: PHYSICIAN ASSISTANT

## 2018-12-19 PROCEDURE — 80053 COMPREHEN METABOLIC PANEL: CPT | Performed by: PHYSICIAN ASSISTANT

## 2018-12-19 PROCEDURE — 40000133 ZZH STATISTIC OT WARD VISIT: Performed by: REHABILITATION PRACTITIONER

## 2018-12-19 PROCEDURE — 25000128 H RX IP 250 OP 636: Performed by: INTERNAL MEDICINE

## 2018-12-19 PROCEDURE — 97110 THERAPEUTIC EXERCISES: CPT | Mod: GO | Performed by: REHABILITATION PRACTITIONER

## 2018-12-19 PROCEDURE — 97530 THERAPEUTIC ACTIVITIES: CPT | Mod: GO | Performed by: REHABILITATION PRACTITIONER

## 2018-12-19 PROCEDURE — 00000146 ZZHCL STATISTIC GLUCOSE BY METER IP

## 2018-12-19 PROCEDURE — 36415 COLL VENOUS BLD VENIPUNCTURE: CPT | Performed by: PHYSICIAN ASSISTANT

## 2018-12-19 PROCEDURE — 85045 AUTOMATED RETICULOCYTE COUNT: CPT | Performed by: PHYSICIAN ASSISTANT

## 2018-12-19 PROCEDURE — 99232 SBSQ HOSP IP/OBS MODERATE 35: CPT | Performed by: INTERNAL MEDICINE

## 2018-12-19 RX ORDER — ATORVASTATIN CALCIUM 20 MG/1
20 TABLET, FILM COATED ORAL EVERY EVENING
Qty: 30 TABLET | Refills: 0 | Status: SHIPPED | OUTPATIENT
Start: 2018-12-19 | End: 2019-05-27

## 2018-12-19 RX ORDER — AMLODIPINE BESYLATE 10 MG/1
5 TABLET ORAL DAILY
Qty: 15 TABLET | Refills: 0 | Status: SHIPPED | OUTPATIENT
Start: 2018-12-19 | End: 2019-05-27

## 2018-12-19 RX ORDER — HYDRALAZINE HYDROCHLORIDE 50 MG/1
50 TABLET, FILM COATED ORAL 3 TIMES DAILY
Qty: 90 TABLET | Refills: 0 | Status: ON HOLD | OUTPATIENT
Start: 2018-12-19 | End: 2019-11-07

## 2018-12-19 RX ORDER — HYDRALAZINE HYDROCHLORIDE 50 MG/1
50 TABLET, FILM COATED ORAL EVERY 8 HOURS
Qty: 90 TABLET | Refills: 0 | Status: SHIPPED | OUTPATIENT
Start: 2018-12-19 | End: 2018-12-19

## 2018-12-19 RX ORDER — CARVEDILOL 25 MG/1
50 TABLET ORAL 2 TIMES DAILY WITH MEALS
Qty: 120 TABLET | Refills: 0 | Status: ON HOLD | OUTPATIENT
Start: 2018-12-19 | End: 2019-11-07

## 2018-12-19 RX ORDER — UREA 8.5 G/85G
CREAM TOPICAL DAILY
Qty: 240 G | Refills: 0 | Status: ON HOLD | OUTPATIENT
Start: 2018-12-20 | End: 2019-06-05

## 2018-12-19 RX ORDER — WARFARIN SODIUM 5 MG/1
5 TABLET ORAL DAILY
Qty: 30 TABLET | Refills: 0 | Status: ON HOLD | OUTPATIENT
Start: 2018-12-19 | End: 2019-06-05

## 2018-12-19 RX ORDER — TORSEMIDE 20 MG/1
20 TABLET ORAL DAILY
Qty: 30 TABLET | Refills: 0 | Status: SHIPPED | OUTPATIENT
Start: 2018-12-20 | End: 2019-05-27

## 2018-12-19 RX ADMIN — TORSEMIDE 20 MG: 20 TABLET ORAL at 08:05

## 2018-12-19 RX ADMIN — HYDRALAZINE HYDROCHLORIDE 50 MG: 50 TABLET, FILM COATED ORAL at 06:19

## 2018-12-19 RX ADMIN — HYDRALAZINE HYDROCHLORIDE 50 MG: 50 TABLET, FILM COATED ORAL at 14:00

## 2018-12-19 RX ADMIN — CARVEDILOL 50 MG: 25 TABLET, FILM COATED ORAL at 08:05

## 2018-12-19 RX ADMIN — UREA: 8.5 CREAM TOPICAL at 08:14

## 2018-12-19 RX ADMIN — ENOXAPARIN SODIUM 90 MG: 100 INJECTION SUBCUTANEOUS at 14:00

## 2018-12-19 ASSESSMENT — ACTIVITIES OF DAILY LIVING (ADL)
ADLS_ACUITY_SCORE: 14
ADLS_ACUITY_SCORE: 15
ADLS_ACUITY_SCORE: 15
ADLS_ACUITY_SCORE: 14
ADLS_ACUITY_SCORE: 14

## 2018-12-19 ASSESSMENT — MIFFLIN-ST. JEOR: SCORE: 1552.91

## 2018-12-19 NOTE — PLAN OF CARE
Heart Failure Care Pathway  GOALS TO BE MET BEFORE DISCHARGE:    1. Decrease congestion and/or edema with diuretic therapy to achieve near      optimal volume status.            Dyspnea improved:  Yes            Edema improved:     Yes        Net I/O and Weights since admission:          11/19 1500 - 12/19 1459  In: 1120 [P.O.:1120]  Out: 1525 [Urine:1525]  Net: -405            Vitals:    12/17/18 1240 12/17/18 1424 12/18/18 0442 12/19/18 0616   Weight: 92.1 kg (203 lb) 94.3 kg (208 lb) 93.8 kg (206 lb 14.4 oz) 93.4 kg (206 lb)       2.  O2 sats > 92% on RA or at prior home O2 therapy level.          Current oxygenation status:       SpO2: 90 %(ambulating in [place])         O2 Device: None (Room air),            Able to wean O2 this shift to keep sats > 92%:  Yes       Does patient use Home O2? No    3.  Tolerates ambulation and mobility near baseline: Yes        How many times did the patient ambulate with nursing staff this shift? 2    Please review the Heart Failure Care Pathway for additional HF goal outcomes.    This writer went over S/S of worsening  CHF. Pt has scale given by care coordinator and she has sheet to way herself daily first thing when getting up. Pt understood to call MD if she has a weight gain of 2 lbs in a day or 5 lbs in a week. She was instructed on low sodium diet and taking her medications as prescribed. Pt understood all of the above. All questions answered.,     Gracie Weir RN  12/19/2018

## 2018-12-19 NOTE — DISCHARGE INSTRUCTIONS
What is Heart Failure?  The heart is a muscle that pumps oxygen-rich blood to all parts of the body. When you have heart failure, the heart is not able to pump as well as it should. Blood and fluid may back up into the lungs, and some parts of the body don t get enough oxygen-rich blood to work normally. These problems lead to the symptoms you feel.  When you have heart failure  With heart failure, not enough oxygen-rich blood leaves the heart with each beat. There are 2 types of heart failure. Both affect the ventricles  ability to pump blood. You may have 1 or both types.       Systolic heart failure. The heart muscle becomes weak and enlarged. It can t pump enough oxygen-rich blood forward to the rest of the body when the ventricles contract. The measurement of how much blood your heart pushes out when it beats is called ejection fraction. In systolic heart failure, the ejection fraction is lower than normal. This can cause blood to back up into the lungs and cause shortness of breath and eventually ankle swelling (edema).  This is also called heart failure with reduced ejection fraction, or  HFrEF.    Diastolic heart failure. The heart muscle becomes stiff. It doesn t relax normally between contractions, which keeps the ventricles from filling with blood. Ejection fraction is often in the normal range. This can still lead to the backup of blood into the body and affect the organs such as the liver. This is also called heart failure with preserved ejection fraction or HFpEF.     Recognizing heart failure symptoms  When you have heart failure, you need to pay close attention to your body and how you feel, every single day. That way, if a problem occurs, you can get help before it becomes too severe. You'll need to watch for changes in your symptoms. As long as symptoms stay about the same from one day to the next, your heart failure is stable. But if symptoms start to get worse, it's time to take action.  Signs  and symptoms of worsening heart failure include:    Rapid weight gain    Shortness of breath    Swelling (edema)    Fatigue  How heart failure affects your body  When the heart doesn't pump enough blood, hormones (body chemicals) are sent to increase the amount of work the heart does. Some hormones make the heart grow larger. Others tell the heart to pump faster. As a result, the heart may pump more blood at first, but it can't keep up with the ongoing demands. So, the heart muscle becomes even more weak. Over time, even less blood is pumped through the heart. This leads to problems throughout the body as organs began to feel the effects of a long-term lack of oxygen. Eventually, if untreated, this can cause problems with your lungs, liver, kidneys, and loss of elasticity in the skin, and skin changes in the lower legs. A weak heart itself can eventually cause a severe decline in health and possible death if left untreated.  What is ejection fraction?  Ejection fraction (EF) measures how much blood the heart pumps out (ejects). This is measured to help diagnose heart failure. A healthy heart pumps at least half of the blood from the ventricles with each beat. This means a normal ejection fraction is around 50% to 70%. Your doctor will calculate ejection fraction from an echocardiogram.     My ejection fraction     Date: ______________________  Ejection fraction: _____________  Test used: __________________  Date Last Reviewed: 3/15/2016    0595-3108 [employer] [address] [place] [address]. All rights reserved. This information is not intended as a substitute for professional medical care. Always follow your healthcare professional's instructions.      Your [place] sure jolie was faxed on 12/5, you will need to follow up with the county to verify your insurance status    Weigh yourself daily on your new scale and record your weights.     Follow up appts:  1. Please call to set up appt with   Lita with in a week of discharge. Call [phone] for appt. Bring your discharge paperwork with you to your appt. It is recommended that you have foot wound care or home care for these wounds.  2. Call to set up [facility] appt when your insurance is verified. They will assist you in managing your heart failure.  Neisha Melara RN  [facility] Care Coordinator  [facility]  [phone]  3. You will need to follow up with [facility] for your foot wounds as soon as your insurance is verified.  4. You will need to follow up with [facility] for your kidney disease as soon as your insurance is verified.

## 2018-12-19 NOTE — PLAN OF CARE
Discharge Planner OT   Patient plan for discharge: home, anticipating today  Current status: Patient reporting she is leaving today, denied further questions regarding education and agreed to climb stairs only today. SBA for functional mobility in room, with hurry-cane, CGA, patient climb and descended 12 steps without rest breaks. Patient reported this had become very difficult to complete at home and feels she can manage stairs independently once home as needed. VS at rest: BP; 131/56, HR: 84, after stair climb: BP: 125/37, HR: 89. RN was updated in hypotensive response, patient was asymptomatic.   Barriers to return to prior living situation: lives alone, limited activity tolerance  Recommendations for discharge: home OP CR  Rationale for recommendations: patient has met 2/3 goals, continues to exhibit poor activity tolerance and would benefit from OP CR to advance activity, further A with education and lifestyle modification.       Entered by: Goldie Dalal 12/19/2018 11:25 AM

## 2018-12-19 NOTE — PHARMACY-ANTICOAGULATION SERVICE
Clinical Pharmacy- Warfarin Discharge Note  This patient is currently on warfarin for the treatment of DVT/PE.  INR Goal= 2-3  Expected length of therapy 3-6 months.    Warfarin PTA Regimen: 5 mg daily      Anticoagulation Dose History     Recent Dosing and Labs Latest Ref Rng & Units 10/8/2018 11/2/2018 11/4/2018 11/5/2018 12/17/2018 12/18/2018 12/19/2018    Warfarin 5 mg - - - 5 mg - - 5 mg -    Warfarin 7.5 mg - - - - - 7.5 mg - -    Warfarin 10 mg - - - - 10 mg - - -    INR 0.86 - 1.14 1.20(H) 1.05 - 1.06 1.00 1.21(H) 1.24(H)          Vitamin K doses administered during the last 7 days: none  FFP administered during the last 7 days: none    Agree with discharge orders to resume warfarin 5mg daily with INR check in 5-7 days.

## 2018-12-19 NOTE — DISCHARGE SUMMARY
[facility] Discharge Summary    Roxy Beaulieu MRN# [id number]   Age: 58 year old YOB: 1960     Date of Admission:  12/17/2018  Date of Discharge::  12/19/2018  Admitting Physician:  Rony Ocampo MD  Discharge Physician:  Sekou Shell MD     Home clinic: [facility]          Admission Diagnoses:   Shortness of breath [R06.02]  Noncompliance with medication regimen [Z91.14]  Other pulmonary embolism without acute cor pulmonale, unspecified chronicity (H) [I26.99]  Congestive heart failure, unspecified HF chronicity, unspecified heart failure type (H) [I50.9]          Discharge Diagnosis:   Principal Problem:    Acute on chronic combined systolic and diastolic heart failure (H)  Active Problems:    Uncontrolled type 2 diabetes mellitus with hyperglycemia, with long-term current use of insulin (H)    Ulcer of toe of left foot (H)    Hypertensive cardiomyopathy (H)    Anemia of chronic renal failure, stage 4 (severe) (H)    CKD (chronic kidney disease) stage 4, GFR 15-29 ml/min (H)    VTE (venous thromboembolism)            Procedures:   Chest x-ray  Carotid ultrasound, bilateral  Echocardiogram       Impression  1.  Myocardial perfusion imaging using single isotope technique  demonstrated technically difficult study without evidence for  myocardial ischemia.   2. Gated images demonstrated borderline global hypokinesis with a  calculated ejection fraction of 51%.    3. There is no prior nuclear study available for comparison.  Lexiscan stress test       Interpretation Summary  1. The left ventricle is normal in size. The visual ejection fraction is  estimated at 40-45%. There is mild global hypokinesia of the left ventricle.  2. The right ventricle is normal in structure, function and size.  3. There is moderate (2+) mitral regurgitation.  4. Moderate to large pleural effusions.          Medications Prior to Admission:     Medications Prior to Admission    Medication Sig Dispense Refill Last Dose     amLODIPine (NORVASC) 10 MG tablet Take 1 tablet (10 mg) by mouth daily 30 tablet 0 More than a month at Unknown time     [DISCONTINUED] bumetanide (BUMEX) 1 MG tablet Take 1 mg by mouth daily   12/14/2018 at Unknown time     [DISCONTINUED] carvedilol (COREG) 25 MG tablet Take 25 mg by mouth 2 times daily (with meals)   12/14/2018 at Unknown time     [DISCONTINUED] insulin aspart (NOVOLOG PEN) 100 UNIT/ML injection Correction scale insulin: Three times daily with meals: -199: 2U, -249: 3 U, -299: 4 U, -349: 5 U, BG >350: 6 u 21 mL 0              Discharge Medications:     Current Discharge Medication List      START taking these medications    Details   hydrALAZINE (APRESOLINE) 50 MG tablet Take 1 tablet (50 mg) by mouth 3 times daily  Qty: 90 tablet, Refills: 0    Associated Diagnoses: Cardiomyopathy due to hypertension, with heart failure (H)      povidone-iodine scrub sponge (BETADINE SWABSTICK) 10 % swab Apply to foot wounds daily after washing and drying feet  Qty: 60 each, Refills: 0    Associated Diagnoses: Skin ulcer of toe of left foot, limited to breakdown of skin (H)      torsemide (DEMADEX) 20 MG tablet Take 1 tablet (20 mg) by mouth daily  Qty: 30 tablet, Refills: 0    Associated Diagnoses: Cardiomyopathy due to hypertension, with heart failure (H); Acute on chronic combined systolic and diastolic heart failure (H)      urea (CARMOL) 10 % external cream Apply topically daily  Qty: 240 g, Refills: 0    Associated Diagnoses: Skin ulcer of toe of left foot, limited to breakdown of skin (H)         CONTINUE these medications which have CHANGED    Details   atorvastatin (LIPITOR) 20 MG tablet Take 1 tablet (20 mg) by mouth every evening  Qty: 30 tablet, Refills: 0    Associated Diagnoses: Dyslipidemia      carvedilol (COREG) 25 MG tablet Take 2 tablets (50 mg) by mouth 2 times daily (with meals)  Qty: 120 tablet, Refills: 0    Associated  Diagnoses: Cardiomyopathy due to hypertension, with heart failure (H); Acute on chronic combined systolic and diastolic heart failure (H)      insulin isophane & regular (HUMULIN MIX 70/30 PEN , NOVOLIN MIX 70/30 PEN) susp Take 16 units in the am and take 12 units in the evening.    Associated Diagnoses: Uncontrolled type 2 diabetes mellitus with hyperglycemia, with long-term current use of insulin (H)      warfarin (COUMADIN) 5 MG tablet Take 1 tablet (5 mg) by mouth daily  Qty: 30 tablet, Refills: 0    Associated Diagnoses: VTE (venous thromboembolism)         CONTINUE these medications which have NOT CHANGED    Details   amLODIPine (NORVASC) 10 MG tablet Take 1 tablet (10 mg) by mouth daily  Qty: 30 tablet, Refills: 0    Associated Diagnoses: Hypertension, unspecified type         STOP taking these medications       bumetanide (BUMEX) 1 MG tablet Comments:   Reason for Stopping:         insulin aspart (NOVOLOG PEN) 100 UNIT/ML injection Comments:   Reason for Stopping:                     Consultations:   Consultation during this admission received from cardiology          Hospital Course:   Roxy Beaulieu is a 58 year old female came to attention on 12/17/2018 with dyspnea and and was found to have severe HTN that peaked at 215/100 in the ED.     The patient's prior medical history is notable for chronic kidney disease with a baseline creatinine between 2.7 and 2.9 with documented nephrotic range proteinuria.  Her baseline issues include diabetes and hypertension which have been inadequately controlled over several years probably due in part to medication noncompliance.  She suffered a DVT with suspected pulmonary embolism in October and, in addition she had been hospitalized with a diagnosis of third toe of the left foot osteomyelitis for which she required partial amputation of that toe early in November.    Initial concerns this hospitalization focused on heart failure and blood pressure control.  Ms.  "Christofer underwent echocardiogram that showed reduced left ventricular ejection fraction at 40-45% with moderate mitral regurgitation and moderate to large pleural effusions.  She seemed to not have much increase in her urine output with diuretics but improved significantly with blood pressure control.    Due to the new finding of evident decreased left ventricular function, she underwent stress testing with Lexiscan study that showed no focal ischemia.  No intervention was undertaken.  Notably the patient also underwent carotid ultrasound due to the finding of bilateral carotid bruits and nonsignificant stenosis of the right ICA was noted.    The wound care nurse did meet with the patient and evaluate her left foot lesions.  Bilaterally, it is noted that the patient has pitting edema which apparently is fairly new for her.  She also has foot deformities consistent with possible Charcot foot (right greater than left).  I did some lab tests that were reassuring vis-à-vis evidence of deeper infection.  The patient did not undergo MRI though this was contemplated.  She needs close follow-up with podiatry.  I have also asked for the patient to have a home health nurse to help manage and monitor the wounds.    Think the patient's primary issue however is her uncontrolled hypertension along with poorly controlled diabetes.  These certainly are thought to be due to medication noncompliance and I note that the patient has also been noncompliant with follow-up with with physicians.  We spent a lot of time talking about this and I strongly encouraged her to follow closely with nephrology in particular because she seems to be having rather brisk and progressive worsening of her kidney function.    Unfortunately, the patient's medical therapy is limited somewhat by her lack of insurance.  I hope that with this hospitalization, the patient will find renewed focus to continue with \"routine maintenance therapy\".     Problem List: " "  1. Decompensated Systolic CHF.  New onset cardiomyopathy, likely due to chronically uncontrolled HTN, which is itself due to medication non-compliance. Sent for Lexiscan today, interpreted as neg for acute ischemia.   2. Acute on chronic renal failure. Baseline creat likely about 2.75-2.9 with current value in that range. Urinary output has been poor with very cautious diuresis.  (Pt seems to have significant peripheral edema, but minimal dyspnea at rest.)  3. Hypertensive urgency. Now, with the addition of Hydralazine, BP is reasonabally controlled, though it seems most likely that the patient's prior severe HTN was due to not being on ANY of her prescribed antihypertensives.   4. IDDM with neuropathy and hx retinopathy.   5. Recent VTE.   6. Chronic left foot wounds s/p amputation distal phalanx, third digit.  No obvious evidence of superficial infection at this time.  Markers of infection are negative.    7. Carotid stenosis, right 50-69%, based on ultrasound.    BP (!) 125/37 (BP Location: Left arm)   Pulse 89   Temp 98.1  F (36.7  C) (Oral)   Resp 16   Ht 1.727 m (5' 8\")   Wt 93.4 kg (206 lb)   SpO2 90%   BMI 31.32 kg/m    On the day of discharge, Ms. Beaulieu is alert, coherent and in no apparent distress breathing room air.  HEENT: Nonfocal.  Neck: No lymphadenopathy or masses.  Chest: Clear to auscultation in all fields.  Heart: Regular rate and rhythm without rubs murmurs or gallops.  Abdomen: Soft, nontender nondistended without appreciable hepatosplenomegaly.  Extremities: Bilateral ankle edema pitting to the knees.          Discharge Instructions and Follow-Up:   Discharge diet: 2g salt   Discharge activity: Activity as tolerated   Discharge follow-up:  Unfortunately, the patient's follow-up is limited due to insurance lapse.  She was instructed to follow-up in the next 5-7 days for a recheck of her INR and basic metabolic panel.       In view of the patient's difficulty getting into medical " follow-up, I indicated to her that nephrology is my preferred first option.  She clearly has proteinuria with declining kidney function.  If were unable to get this under control by managing her diabetes and hypertension more closely, I am concerned that she will progress to end-stage renal disease.       I did ask for home health care to help with the patient's wound healing as well noting that her diabetic neuropathy makes the left foot ulcers quite high risk.           Discharge Disposition:   Discharged to home      Attestation:  I have reviewed today's vital signs, notes, medications, labs and imaging.  Total time: 45 minutes    Sekou Shell MD

## 2018-12-19 NOTE — PLAN OF CARE
Heart Failure Care Pathway  GOALS TO BE MET BEFORE DISCHARGE:    1. Decrease congestion and/or edema with diuretic therapy to achieve near      optimal volume status.            Dyspnea improved:  yes             Edema improved:     Yes, legs elevated        Net I/O and Weights since admission:          11/19 0700 - 12/19 0659  In: 1120 [P.O.:1120]  Out: 625 [Urine:625]  Net: 495            Vitals:    12/17/18 1240 12/17/18 1424 12/18/18 0442   Weight: 92.1 kg (203 lb) 94.3 kg (208 lb) 93.8 kg (206 lb 14.4 oz)       2.  O2 sats > 92% on RA or at prior home O2 therapy level.          Current oxygenation status:       SpO2: 96 %         O2 Device: None (Room air),            Able to wean O2 this shift to keep sats > 92%:  Yes       Does patient use Home O2? No    3.  Tolerates ambulation and mobility near baseline: Yes. Ambulates independently in room with cane        How many times did the patient ambulate with nursing staff this shift? 2    Please review the Heart Failure Care Pathway for additional HF goal outcomes.    Patient alert and oriented x 4. VSS. Denies pain. Lung sounds clear. +3 edema to BLE. Wounds  to legs intact, scabbed and YENY. SR on telemetry. BG 61,64,79,100  Toya CALDERON Che RN  12/19/2018

## 2018-12-19 NOTE — PLAN OF CARE
Heart Failure Care Pathway  GOALS TO BE MET BEFORE DISCHARGE:    1. Decrease congestion and/or edema with diuretic therapy to achieve near      optimal volume status.            Dyspnea improved:  Yes            Edema improved:     Yes        Net I/O and Weights since admission:          11/18 2300 - 12/18 2259  In: 880 [P.O.:880]  Out: 625 [Urine:625]  Net: 255            Vitals:    12/17/18 1240 12/17/18 1424 12/18/18 0442   Weight: 92.1 kg (203 lb) 94.3 kg (208 lb) 93.8 kg (206 lb 14.4 oz)       2.  O2 sats > 92% on RA or at prior home O2 therapy level.          Current oxygenation status:       SpO2: 95 %         O2 Device: None (Room air),            Able to wean O2 this shift to keep sats > 92%:  Yes       Does patient use Home O2? No    3.  Tolerates ambulation and mobility near baseline: Yes        How many times did the patient ambulate with nursing staff this shift? 1 in halls    Please review the Heart Failure Care Pathway for additional HF goal outcomes.    Autumn Bautista RN  12/18/2018        BP improving, Ind in room with use of cane,  and 122.  Mod cho, 2 gram NA diet, wound care done on feet/legs. PIV SL, Tele SR with GEOFF's. Lexiscan completed, EF 51% with no signs of ischemia per cardiology. Denies pain, dizziness or SOB, Planning to discharge back home to [facility] home.

## 2018-12-19 NOTE — PHARMACY - DISCHARGE MEDICATION RECONCILIATION AND EDUCATION
Discharge medication review for this patient is complete. Face-to-face medication education was provided by the pharmacist.  See [facility] for allergy information and immunization status.   Pharmacist assisted with medication reconciliation of discharge medications with PTA medications.    Patient was informed to STOP taking the following HOME medications:   Bumex, insulin correction scale    Patient was informed to START taking the following NEW medications:   Hydralazine, torsemide (and Betadine and urea)    Patient was informed to make the following changes to prior to admission medications:  Coreg inc to 50 mg bid;  Novolog 70/30 to 16 units AM and 12 units PM    Patient was educated on the following for each discharge medication:   Rationale for therapy  Duration of treatment  Dosing and or monitoring drug levels  Common side effects  Importance of compliance  Drug/food interactions  Missed doses  Self monitoring parameters    Left written materials and instructions (Clinical notes from [facility]) for new medications: Yes    OUTCOMES: Patient verbalized understanding      Discharge Medication List     Review of your medicines      START taking      Dose / Directions   hydrALAZINE 50 MG tablet  Commonly known as:  APRESOLINE      Dose:  50 mg  Take 1 tablet (50 mg) by mouth 3 times daily  Quantity:  90 tablet  Refills:  0     povidone-iodine scrub sponge 10 % swab  Commonly known as:  BETADINE SWABSTICK      Apply to foot wounds daily after washing and drying feet  Quantity:  60 each  Refills:  0     torsemide 20 MG tablet  Commonly known as:  DEMADEX      Dose:  20 mg  Start taking on:  12/20/2018  Take 1 tablet (20 mg) by mouth daily  Quantity:  30 tablet  Refills:  0     urea 10 % external cream  Commonly known as:  CARMOL      Start taking on:  12/20/2018  Apply topically daily  Quantity:  240 g  Refills:  0        CONTINUE these medicines which may have CHANGED, or have new prescriptions. If we are uncertain of the  size of tablets/capsules you have at home, strength may be listed as something that might have changed.      Dose / Directions   carvedilol 25 MG tablet  Commonly known as:  COREG  This may have changed:  how much to take      Dose:  50 mg  Take 2 tablets (50 mg) by mouth 2 times daily (with meals)  Quantity:  120 tablet  Refills:  0     insulin isophane & regular susp  Commonly known as:  HumuLIN MIX 70/30 PEN , NovoLIN MIX 70/30 PEN  This may have changed:      how much to take    how to take this    when to take this    additional instructions      Take 16 units in the am and take 12 units in the evening.  Refills:  0        CONTINUE these medicines which have NOT CHANGED      Dose / Directions   amLODIPine 10 MG tablet  Commonly known as:  NORVASC  Used for:  Hypertension, unspecified type      Dose:  10 mg  Take 1 tablet (10 mg) by mouth daily  Quantity:  30 tablet  Refills:  0     atorvastatin 20 MG tablet  Commonly known as:  LIPITOR  Used for:  Dyslipidemia      Dose:  20 mg  Take 1 tablet (20 mg) by mouth every evening  Quantity:  30 tablet  Refills:  0     warfarin 5 MG tablet  Commonly known as:  COUMADIN      Dose:  5 mg  Take 1 tablet (5 mg) by mouth daily  Quantity:  30 tablet  Refills:  0        STOP taking    bumetanide 1 MG tablet  Commonly known as:  BUMEX        insulin aspart 100 UNIT/ML pen  Commonly known as:  NovoLOG PEN              Where to get your medicines      These medications were sent to [facility] [id number] [facility] [id number]    Phone:  [phone]     atorvastatin 20 MG tablet    carvedilol 25 MG tablet    hydrALAZINE 50 MG tablet    povidone-iodine scrub sponge 10 % swab    torsemide 20 MG tablet    urea 10 % external cream    warfarin 5 MG tablet

## 2018-12-19 NOTE — PLAN OF CARE
VSS, A/OX4, reviewed discharge paperwork w/ pt, pt verbalized understanding, pt off floor at 1625 to personal vehicle, pt discharged home via personal vehicle.

## 2018-12-19 NOTE — PROGRESS NOTES
[facility]  Cardiology Progress Note    Elliott Hall MD   12/19/2018          Assessment and Plan:   Roxy Beaulieu is a 58 year old woman with past medical history significant for diabetes mellitus since 2001, hypertension, hypercholesterolemia, recent DVT and pulmonary embolus in October of this year, chronic renal failure, who has had a problem with medical compliance due to financial issues.  She has no family history of coronary disease.  She now was admitted with congestive heart failure.  She reports worsening dyspnea on exertion, peripheral edema orthopnea and PND over the last couple of weeks.  She has no chest pain syndrome.  She has had no palpitations lightheadedness dizziness syncope or near syncope.  Of note patient's blood pressure on admission was 215/100.      N-terminal proBNP is 15,210.  Echocardiogram demonstrates an ejection fraction of 40-45% without focal wall motion abnormalities..  EKG demonstrates normal sinus rhythm with occasional PVC.  There is question of an old anteroseptal myocardial infarction due to poor R wave progression.  Fasting lipid profile from October was total cholesterol 200 with an HDL of 41 and LDL of 123 and triglycerides of 179.     Review of care everywhere shows she had a dobutamine stress echo in 2014 through [facility] that appeared to be normal.    Cardiomyopathy.  I suspect this is most likely a hypertensive cardiomyopathy.  Lexiscan demonstrates no evidence of ischemia.      Acute on chronic systolic and diastolic heart failure.  Again I suspect this is primarily due to her cardiomyopathy and poorly controlled hypertension.  Her renal insufficiency is probably also contributing to fluid retention.   I will change her to oral diuretics starting in the morning with torsemide 20 daily.  I will discontinue her IV Lasix. Her creatinine does not allow us to use Spironolactone at this time.  Her weight is down 0.5 kg.     Peripheral  "edema.  I suspect the amlodipine is contributing.  She will need chronic diuretic therapy.  TSH is normal.  I would recheck total protein and albumin.  IVC was described as normal.  Pulmonary pressures could not be estimated.     DVT/pulmonary embolus.  Echocardiogram does not appear to demonstrate any cor pulmonale at this time.  She probably should complete 3-6 months of warfarin therapy.  Pulmonary pressures could not be estimated by echocardiogram.  IVC was described as normal     Bilateral carotid bruits.    Ultrasound demonstrates a 50-69% right carotid stenosis.  Less than 50% on the left.    Hypertension.  Blood pressure is still high.  I have increased carvedilol to 50 twice daily. Restart amlodipine at 5 mgs/daily    Acute on chronic renal failure.  Creatinine is up a bit with diuresis.  I will slow down.    Follow up with DEVI with BMP 1-2 weeks. Follow up echo in 4 weeks.               Interval History:   Feeling better.  She states she is taken the deepest breath she taken in quite some time and it feels good.  Peripheral edema also improved              Medications:       Continuing ACE inhibitor/ARB/ARNI from home medication list OR ACE inhibitor/ARB order already placed during this visit       - MEDICATION INSTRUCTIONS -       - MEDICATION INSTRUCTIONS -       ACE/ARB/ARNI NOT PRESCRIBED       Warfarin Therapy Reminder         atorvastatin  20 mg Oral QPM     carvedilol  50 mg Oral BID w/meals     enoxaparin  1 mg/kg Subcutaneous Q24H     hydrALAZINE  50 mg Oral Q8H WILLIAMS     insulin aspart  1-10 Units Subcutaneous TID AC     insulin aspart  1-7 Units Subcutaneous At Bedtime     insulin isophane & regular  20 Units Subcutaneous BID AC     torsemide  20 mg Oral Daily     urea   Topical Daily                   Physical Exam:   Blood pressure 162/72, pulse 89, temperature 98.1  F (36.7  C), temperature source Oral, resp. rate 16, height 1.727 m (5' 8\"), weight 93.4 kg (206 lb), SpO2 90 %, not currently " breastfeeding.  Vitals:    12/17/18 1424 12/18/18 0442 12/19/18 0616   Weight: 94.3 kg (208 lb) 93.8 kg (206 lb 14.4 oz) 93.4 kg (206 lb)     Vital Signs with Ranges  Temp:  [97.9  F (36.6  C)-98.4  F (36.9  C)] 98.1  F (36.7  C)  Pulse:  [89] 89  Heart Rate:  [72-90] 89  Resp:  [16-18] 16  BP: (125-162)/(37-76) 162/72  SpO2:  [90 %-97 %] 90 %  I/O's Last 24 hours  I/O last 3 completed shifts:  In: 480 [P.O.:480]  Out: 400 [Urine:400]       General:  Patient comfortable, in no apparent distress.  Awake, alert, oriented x3.  Neck:  No JVD, bilateral carotid bruits.  Lungs:  Clear to auscultation bilaterally.  Cardiac:  Regular rate and rhythm, 2/6 holosystolic murmur best heard at the apex radiating to the axilla s.  Abdomen:  Soft, nontender.  Extremities: 1+ bilateral edema.  2+ pulses.  Skin:  Warm, dry.  Neurologic:  No focal deficits.         Data:        Recent Labs   Lab Test 12/19/18  0631 12/18/18  0620 12/17/18  1041  10/04/18  1611    143 141   < > 136   POTASSIUM 4.4 4.2 4.8   < > 5.0   CHLORIDE 105 108 106   < > 104   CO2 28 27 29   < > 27   BUN 37* 40* 37*   < > 45*   CR 2.82* 2.86* 2.59*   < > 2.48*   ANIONGAP 8 8 6   < > 5   GILL 8.0* 8.2* 8.4*   < > 8.3*   * 104* 178*   < > 273*   ALBUMIN 2.2* 2.3*  --   --  2.7*   PROTTOTAL 5.7* 5.8*  --   --  7.0   GFRESTIMATED 17* 17* 19*   < > 20*   GFRESTBLACK 20* 20* 23*   < > 24*   NTBNPI  --   --  15,210*  --  7,580*    < > = values in this interval not displayed.     Recent Labs   Lab Test 12/19/18  0631 12/18/18  0620 12/17/18  1041   HGB 8.2* 8.1* 9.7*     Recent Labs   Lab Test 12/17/18  1041   TSH 1.42     Recent Labs   Lab Test 12/17/18  1047 10/04/18  1611   TROPI  --  0.029   TROPONIN 0.02  --      No lab results found.    Invalid input(s): [id number]  Recent Labs   Lab Test 10/05/18  0655   CHOL 200*   HDL 41*   *   TRIG 179*

## 2018-12-20 ENCOUNTER — CARE COORDINATION (OUTPATIENT)
Dept: CARDIOLOGY | Facility: CLINIC | Age: 58
End: 2018-12-20

## 2018-12-20 NOTE — PROGRESS NOTES
Patient was evaluated by cardiology while inpatient for acute on chronic heart failure, EF 40-45%, possible old AWMI.  Called patient to discuss any post hospital d/c questions, review discharge medication, and confirm f/u appts.     RN left a message to call RN back

## 2018-12-20 NOTE — PROGRESS NOTES
Transition Communication Hand-off for Care Transitions to Next Level of Care Provider    Name: Roxy Beaulieu  : 1960  MRN #: [id number]  Primary Care Provider: Dwain London  Primary Care MD Name: Dr Lonodn  Primary Clinic: [facility]  [email or web address]  [place] [address]  Primary Care Clinic Name: [facility]  Reason for Hospitalization:  Shortness of breath [R06.02]  Noncompliance with medication regimen [Z91.14]  Other pulmonary embolism without acute cor pulmonale, unspecified chronicity (H) [I26.99]  Congestive heart failure, unspecified HF chronicity, unspecified heart failure type (H) [I50.9]  Admit Date/Time: 2018 10:09 AM  Discharge Date: 18  Payor Source: No coverage found.      Readmission Assessment Measure (DEVEN) Risk Score/category: elevated           Reason for Communication Hand-off Referral: Admission diagnoses: CHF    Discharge Plan: home       Concern for non-adherence with plan of care:   Y/N yes  Discharge Needs Assessment:  Needs      Most Recent Value   Equipment Currently Used at Home  cane, straight          Already enrolled in Tele-monitoring program and name of program:  none  Follow-up specialty is recommended: Yes    Follow-up plan:  No future appointments.    Any outstanding tests or procedures:        Radiology & Cardiology Orders     Future Labs/Procedures Complete By Expires    Echocardiogram Complete  2019 (Approximate) 2019    Process Instructions:    Administration of IV contrast will be tailored to this examination per the appropriate written protocol listed in the Echocardiography department Protocol Book, or by the supervising Cardiologist. This may result in an order change.    Use of contrast is at the discretion of the supervising Cardiologist.    Scheduling Instructions:    Please call your clinic of choice to schedule this procedure:    [facility]:   [phone]  [facility]:   [phone]  [facility]:  [phone]  [facility] ([place]): [phone]  [facility]:   [phone]  [facility]:  [phone]  [facility]): [phone]  [facility] [phone]    Comments:    Administration of IV contrast will be tailored to this examination per the appropriate written protocol listed in the Echocardiography department Protocol Book, or by the supervising Cardiologist. This may result in an order change.    Use of contrast is at the discretion of the supervising Cardiologist.        Referrals     Future Labs/Procedures    Follow-Up with Cardiac Advanced Practice Provider     Home care nursing referral     Comments:    RN skilled nursing visit. RN to assess home safety and to help medication management.  RN to provide lab draws as needed and diabetic foot wound monitoring and management.    Your provider has ordered home care nursing services. If you have not been contacted within 2 days of your discharge please call the inpatient department phone number at [phone] .            Bryant Recommendations:   [facility]/ following for CHF diagnosis, elevated DEVEN, readmission and financial issues. She states she was recently admitted at [facility]. Per chart review, she was there from 12/3-12/10 for sob, dvt/pe, chf and left AMA. Pt is now readmitted with BNP 09275 and EF 40-45%. She is also being followed by Cardiology and Nutrition. She is being treated with IV lasix and has had a lexiscan stress test. We discussed CHF diagnosis. Pt states this is a new diagnosis for her. When asked about the status of her insurance, she states she filled out the [employer] application at [facility] and it was sent in by their SW. She does not know the status of her insurance or if she qualified. financial counseling following in hospital. Financial counseling states her [place] sure jolie was faxed to the [facility] on . Pt will need to follow up with [facility] on insurance verification. CHF  education reviewed with pt We will fill any new discharge meds and send with pt until insurance is verified. We discussed daily weights and monitoring weight gain. New scale was given to pt for home use. Signs of CHF exacerbation reviewed. She does not anticipate any discharge needs. OT recommends home with OP CR. Importance of following up on insurance verification, filling meds and taking them as prescribed, low Na diet, and following up with her PCP were again stressed. Pt states she will make her own f/u appt. She will need continued CHF education and reinforcement.    She is recommended to follow up with [facility] regarding her foot wounds in 2 weeks. She is also recommended to follow up with [facility] regarding her kidney disease. Pt states she will make these appts when her insurance is verified. She will need reminders to do this or referrals from PCP directly. She will need close clinic monitoring on all of the above.        Goldie Pedraza    AVS/Discharge Summary is the source of truth; this is a helpful guide for improved communication of patient story

## 2018-12-20 NOTE — PLAN OF CARE
Occupational Therapy and Cardiac Rehab Discharge Summary    Reason for therapy discharge:    Discharged to home with outpatient therapy.    Progress towards therapy goal(s). See goals on Care Plan in [facility] electronic health record for goal details.  Goals partially met.  Barriers to achieving goals:   discharge from facility.    Therapy recommendation(s):    Continued therapy is recommended.  Rationale/Recommendations:  LEYDA CESAR.

## 2018-12-23 LAB
BACTERIA SPEC CULT: NO GROWTH
BACTERIA SPEC CULT: NO GROWTH
Lab: NORMAL
Lab: NORMAL
SPECIMEN SOURCE: NORMAL
SPECIMEN SOURCE: NORMAL

## 2019-05-27 ENCOUNTER — APPOINTMENT (OUTPATIENT)
Dept: ULTRASOUND IMAGING | Facility: CLINIC | Age: 59
End: 2019-05-27
Attending: EMERGENCY MEDICINE
Payer: MEDICAID

## 2019-05-27 ENCOUNTER — HOSPITAL ENCOUNTER (INPATIENT)
Facility: CLINIC | Age: 59
LOS: 9 days | Discharge: HOME | End: 2019-06-05
Attending: EMERGENCY MEDICINE | Admitting: INTERNAL MEDICINE
Payer: MEDICAID

## 2019-05-27 DIAGNOSIS — N18.6 ESRD (END STAGE RENAL DISEASE) ON DIALYSIS (H): ICD-10-CM

## 2019-05-27 DIAGNOSIS — L97.521 SKIN ULCER OF TOE OF LEFT FOOT, LIMITED TO BREAKDOWN OF SKIN (H): ICD-10-CM

## 2019-05-27 DIAGNOSIS — I82.90 VTE (VENOUS THROMBOEMBOLISM): ICD-10-CM

## 2019-05-27 DIAGNOSIS — Z99.2 ESRD (END STAGE RENAL DISEASE) ON DIALYSIS (H): ICD-10-CM

## 2019-05-27 DIAGNOSIS — I26.99 OTHER PULMONARY EMBOLISM WITHOUT ACUTE COR PULMONALE, UNSPECIFIED CHRONICITY (H): ICD-10-CM

## 2019-05-27 DIAGNOSIS — E11.65 UNCONTROLLED TYPE 2 DIABETES MELLITUS WITH HYPERGLYCEMIA, WITH LONG-TERM CURRENT USE OF INSULIN (H): Primary | ICD-10-CM

## 2019-05-27 DIAGNOSIS — R09.02 HYPOXIA: ICD-10-CM

## 2019-05-27 DIAGNOSIS — I50.23 ACUTE ON CHRONIC SYSTOLIC CONGESTIVE HEART FAILURE (H): ICD-10-CM

## 2019-05-27 DIAGNOSIS — N17.9 ACUTE RENAL FAILURE, UNSPECIFIED ACUTE RENAL FAILURE TYPE (H): ICD-10-CM

## 2019-05-27 DIAGNOSIS — Z79.4 UNCONTROLLED TYPE 2 DIABETES MELLITUS WITH HYPERGLYCEMIA, WITH LONG-TERM CURRENT USE OF INSULIN (H): Primary | ICD-10-CM

## 2019-05-27 PROBLEM — I50.9 CHF (CONGESTIVE HEART FAILURE) (H): Status: ACTIVE | Noted: 2019-05-27

## 2019-05-27 LAB
ALBUMIN SERPL-MCNC: 2.9 G/DL (ref 3.4–5)
ALP SERPL-CCNC: 155 U/L (ref 40–150)
ALT SERPL W P-5'-P-CCNC: 56 U/L (ref 0–50)
ANION GAP SERPL CALCULATED.3IONS-SCNC: 7 MMOL/L (ref 3–14)
APTT PPP: 28 SEC (ref 22–37)
AST SERPL W P-5'-P-CCNC: 38 U/L (ref 0–45)
BASOPHILS # BLD AUTO: 0 10E9/L (ref 0–0.2)
BASOPHILS NFR BLD AUTO: 0.5 %
BILIRUB SERPL-MCNC: 0.3 MG/DL (ref 0.2–1.3)
BUN SERPL-MCNC: 64 MG/DL (ref 7–30)
CALCIUM SERPL-MCNC: 7.8 MG/DL (ref 8.5–10.1)
CHLORIDE SERPL-SCNC: 110 MMOL/L (ref 94–109)
CO2 BLDCOV-SCNC: 22 MMOL/L (ref 21–28)
CO2 SERPL-SCNC: 24 MMOL/L (ref 20–32)
CREAT SERPL-MCNC: 3.79 MG/DL (ref 0.52–1.04)
DIFFERENTIAL METHOD BLD: ABNORMAL
EOSINOPHIL # BLD AUTO: 0.2 10E9/L (ref 0–0.7)
EOSINOPHIL NFR BLD AUTO: 3.4 %
ERYTHROCYTE [DISTWIDTH] IN BLOOD BY AUTOMATED COUNT: 15 % (ref 10–15)
GFR SERPL CREATININE-BSD FRML MDRD: 12 ML/MIN/{1.73_M2}
GLUCOSE SERPL-MCNC: 203 MG/DL (ref 70–99)
HCT VFR BLD AUTO: 27.6 % (ref 35–47)
HGB BLD-MCNC: 8.4 G/DL (ref 11.7–15.7)
IMM GRANULOCYTES # BLD: 0.1 10E9/L (ref 0–0.4)
IMM GRANULOCYTES NFR BLD: 0.8 %
INR PPP: 1.04 (ref 0.86–1.14)
LACTATE BLD-SCNC: 0.5 MMOL/L (ref 0.7–2.1)
LYMPHOCYTES # BLD AUTO: 0.6 10E9/L (ref 0.8–5.3)
LYMPHOCYTES NFR BLD AUTO: 8.6 %
MCH RBC QN AUTO: 26.3 PG (ref 26.5–33)
MCHC RBC AUTO-ENTMCNC: 30.4 G/DL (ref 31.5–36.5)
MCV RBC AUTO: 86 FL (ref 78–100)
MONOCYTES # BLD AUTO: 0.4 10E9/L (ref 0–1.3)
MONOCYTES NFR BLD AUTO: 6.3 %
NEUTROPHILS # BLD AUTO: 5.3 10E9/L (ref 1.6–8.3)
NEUTROPHILS NFR BLD AUTO: 80.4 %
NRBC # BLD AUTO: 0 10*3/UL
NRBC BLD AUTO-RTO: 0 /100
NT-PROBNP SERPL-MCNC: ABNORMAL PG/ML (ref 0–900)
PCO2 BLDV: 42 MM HG (ref 40–50)
PH BLDV: 7.33 PH (ref 7.32–7.43)
PLATELET # BLD AUTO: 226 10E9/L (ref 150–450)
PO2 BLDV: 30 MM HG (ref 25–47)
POTASSIUM SERPL-SCNC: 4.6 MMOL/L (ref 3.4–5.3)
PROT SERPL-MCNC: 6.9 G/DL (ref 6.8–8.8)
RBC # BLD AUTO: 3.2 10E12/L (ref 3.8–5.2)
SAO2 % BLDV FROM PO2: 53 %
SODIUM SERPL-SCNC: 141 MMOL/L (ref 133–144)
TROPONIN I SERPL-MCNC: 0.03 UG/L (ref 0–0.04)
WBC # BLD AUTO: 6.5 10E9/L (ref 4–11)

## 2019-05-27 PROCEDURE — 82803 BLOOD GASES ANY COMBINATION: CPT

## 2019-05-27 PROCEDURE — 12000000 ZZH R&B MED SURG/OB

## 2019-05-27 PROCEDURE — 99223 1ST HOSP IP/OBS HIGH 75: CPT | Mod: AI | Performed by: INTERNAL MEDICINE

## 2019-05-27 PROCEDURE — 96365 THER/PROPH/DIAG IV INF INIT: CPT

## 2019-05-27 PROCEDURE — 83880 ASSAY OF NATRIURETIC PEPTIDE: CPT | Performed by: EMERGENCY MEDICINE

## 2019-05-27 PROCEDURE — 93970 EXTREMITY STUDY: CPT

## 2019-05-27 PROCEDURE — 25000128 H RX IP 250 OP 636: Performed by: EMERGENCY MEDICINE

## 2019-05-27 PROCEDURE — 85610 PROTHROMBIN TIME: CPT | Performed by: EMERGENCY MEDICINE

## 2019-05-27 PROCEDURE — 80053 COMPREHEN METABOLIC PANEL: CPT | Performed by: EMERGENCY MEDICINE

## 2019-05-27 PROCEDURE — 93005 ELECTROCARDIOGRAM TRACING: CPT

## 2019-05-27 PROCEDURE — 83605 ASSAY OF LACTIC ACID: CPT

## 2019-05-27 PROCEDURE — 85025 COMPLETE CBC W/AUTO DIFF WBC: CPT | Performed by: EMERGENCY MEDICINE

## 2019-05-27 PROCEDURE — 85730 THROMBOPLASTIN TIME PARTIAL: CPT | Performed by: EMERGENCY MEDICINE

## 2019-05-27 PROCEDURE — 96375 TX/PRO/DX INJ NEW DRUG ADDON: CPT

## 2019-05-27 PROCEDURE — 84484 ASSAY OF TROPONIN QUANT: CPT | Performed by: EMERGENCY MEDICINE

## 2019-05-27 PROCEDURE — 99285 EMERGENCY DEPT VISIT HI MDM: CPT | Mod: 25

## 2019-05-27 RX ORDER — HYDRALAZINE HYDROCHLORIDE 50 MG/1
50 TABLET, FILM COATED ORAL 3 TIMES DAILY
Status: DISCONTINUED | OUTPATIENT
Start: 2019-05-27 | End: 2019-06-05 | Stop reason: HOSPADM

## 2019-05-27 RX ORDER — ONDANSETRON 2 MG/ML
4 INJECTION INTRAMUSCULAR; INTRAVENOUS EVERY 6 HOURS PRN
Status: DISCONTINUED | OUTPATIENT
Start: 2019-05-27 | End: 2019-06-05 | Stop reason: HOSPADM

## 2019-05-27 RX ORDER — GLIPIZIDE 10 MG/1
10 TABLET ORAL
COMMUNITY
End: 2019-05-27

## 2019-05-27 RX ORDER — AMOXICILLIN 250 MG
2 CAPSULE ORAL 2 TIMES DAILY PRN
Status: DISCONTINUED | OUTPATIENT
Start: 2019-05-27 | End: 2019-06-05 | Stop reason: HOSPADM

## 2019-05-27 RX ORDER — DEXTROSE MONOHYDRATE 25 G/50ML
25-50 INJECTION, SOLUTION INTRAVENOUS
Status: DISCONTINUED | OUTPATIENT
Start: 2019-05-27 | End: 2019-06-05

## 2019-05-27 RX ORDER — NALOXONE HYDROCHLORIDE 0.4 MG/ML
.1-.4 INJECTION, SOLUTION INTRAMUSCULAR; INTRAVENOUS; SUBCUTANEOUS
Status: DISCONTINUED | OUTPATIENT
Start: 2019-05-27 | End: 2019-06-05 | Stop reason: HOSPADM

## 2019-05-27 RX ORDER — CARVEDILOL 25 MG/1
50 TABLET ORAL 2 TIMES DAILY WITH MEALS
Status: DISCONTINUED | OUTPATIENT
Start: 2019-05-27 | End: 2019-06-05 | Stop reason: HOSPADM

## 2019-05-27 RX ORDER — AMOXICILLIN 250 MG
1 CAPSULE ORAL 2 TIMES DAILY PRN
Status: DISCONTINUED | OUTPATIENT
Start: 2019-05-27 | End: 2019-06-05 | Stop reason: HOSPADM

## 2019-05-27 RX ORDER — AMLODIPINE BESYLATE 5 MG/1
5 TABLET ORAL DAILY
Status: DISCONTINUED | OUTPATIENT
Start: 2019-05-28 | End: 2019-06-05 | Stop reason: HOSPADM

## 2019-05-27 RX ORDER — FUROSEMIDE 10 MG/ML
40 INJECTION INTRAMUSCULAR; INTRAVENOUS EVERY 8 HOURS
Status: COMPLETED | OUTPATIENT
Start: 2019-05-28 | End: 2019-05-28

## 2019-05-27 RX ORDER — ONDANSETRON 4 MG/1
4 TABLET, ORALLY DISINTEGRATING ORAL EVERY 6 HOURS PRN
Status: DISCONTINUED | OUTPATIENT
Start: 2019-05-27 | End: 2019-06-05 | Stop reason: HOSPADM

## 2019-05-27 RX ORDER — NICOTINE POLACRILEX 4 MG
15-30 LOZENGE BUCCAL
Status: DISCONTINUED | OUTPATIENT
Start: 2019-05-27 | End: 2019-06-05

## 2019-05-27 RX ORDER — ACETAMINOPHEN 325 MG/1
650 TABLET ORAL EVERY 4 HOURS PRN
Status: DISCONTINUED | OUTPATIENT
Start: 2019-05-27 | End: 2019-06-05 | Stop reason: HOSPADM

## 2019-05-27 RX ORDER — AMLODIPINE BESYLATE 10 MG/1
10 TABLET ORAL AT BEDTIME
Status: ON HOLD | COMMUNITY
End: 2023-01-01

## 2019-05-27 RX ORDER — FUROSEMIDE 10 MG/ML
60 INJECTION INTRAMUSCULAR; INTRAVENOUS ONCE
Status: COMPLETED | OUTPATIENT
Start: 2019-05-27 | End: 2019-05-27

## 2019-05-27 RX ORDER — HYDRALAZINE HYDROCHLORIDE 20 MG/ML
10 INJECTION INTRAMUSCULAR; INTRAVENOUS EVERY 4 HOURS PRN
Status: DISCONTINUED | OUTPATIENT
Start: 2019-05-27 | End: 2019-06-05 | Stop reason: HOSPADM

## 2019-05-27 RX ADMIN — ARGATROBAN 1 MCG/KG/MIN: 50 INJECTION INTRAVENOUS at 22:43

## 2019-05-27 RX ADMIN — FUROSEMIDE 60 MG: 10 INJECTION, SOLUTION INTRAVENOUS at 21:22

## 2019-05-27 ASSESSMENT — MIFFLIN-ST. JEOR
SCORE: 1452.65
SCORE: 1615.96

## 2019-05-27 ASSESSMENT — ENCOUNTER SYMPTOMS
SHORTNESS OF BREATH: 1
FEVER: 0

## 2019-05-27 NOTE — LETTER
Dialysis Intake Checklist      Your Name: Goldie Pedraza Contact Phone Number: [phone]     Fax Number and Email: [email or web address] Hospital/Practice: [facility]     Patient Name: Roxy Beaulieu   Referring Nephrologist: Blas Guillen Facility(s) or Zip Code: [place]     Patient Started Date of Dialysis: 6/4      Estimated Outpatient Start Date of Dialysis: 6/6 or 6/7   Treatment Duration & Frequency: three times a week     Preferred Schedule: Monday, Wednesday  and Friday     Is the patient employed? No   Is there a working shift we can accommodate?  Yes   Is patient flexible? Yes Facility: [place], [place]       Modality:   In-Center Hemo   Diagnosis:   End Stage Renal Disease (ESRD - Stage 5 Chronic Kidney Disease)     Access Type(s):   CVC    If only a CVC, is there an active AV Access Plan (e.g., Vessel Mapping, Surgeon Consult, etc.)?:            Where will this patient be discharged to? Home     Is this patient trach or vent dependent? No     Does this patient have an L-VAD or Life Vest? No     Does this patient have outpatient dialysis history? No     Does this patient receive continuous medication via infusion pumps? No     Daily Care - Activity Management/Activity assistance needed?   Activity Management: activity adjusted per tolerance   Activity Assistance Provided: assistance, stand-by   Assistive Device Utilized: cane      Can this patient sit in a standard chair to dialyze? Yes     Require treatment in a bed?  No     Is the patient HEP B positive? No     Can this patient sign their own legal consents? Yes     Other special needs? none       Allergies:   Allergies   Allergen Reactions     Heparin Other (See Comments)     HIT-serotonin assay negative. NOT HIT        Medication List:   Current Facility-Administered Medications   Medication     0.9% sodium chloride BOLUS     0.9% sodium chloride TABLE SOLN     0.9% sodium chloride TABLE SOLN     acetaminophen (TYLENOL)  tablet 650 mg     albumin human 25 % injection 50 mL     alteplase (CATHFLO ACTIVASE) injection 2-4 mg     amLODIPine (NORVASC) tablet 5 mg     calcium acetate (PHOSLO) capsule 667 mg     carvedilol (COREG) tablet 50 mg     ceFAZolin (ANCEF) intermittent infusion 2 g in 100 mL dextrose PRE-MIX     ceFAZolin-dextrose (ANCEF) 2-4 GM/100ML-% infusion     glucose gel 15-30 g    Or     dextrose 50 % injection 25-50 mL    Or     glucagon injection 1 mg     glucose gel 15-30 g    Or     dextrose 50 % injection 25-50 mL    Or     glucagon injection 1 mg     fentaNYL (PF) (SUBLIMAZE) 100 MCG/2ML injection     fentaNYL (PF) (SUBLIMAZE) injection 25-50 mcg     flumazenil (ROMAZICON) injection 0.2 mg     furosemide (LASIX) tablet 40 mg     heparin (porcine) 1000 UNIT/ML injection     heparin (PRESSURE BAG) 2 unit/mL in 0.9% NaCl 1,000 Units     heparin (PRESSURE BAG) 2 unit/mL in 0.9% NaCl 1,000 Units     HEPARIN 10,000 UNITS IN 1000 ML NS ADULT IR 10 units/mL infusion SOLN     heparin 10,000 units in 1000 mL NS- for Angiograms     heparin 10,000 units in 1000 mL NS- for Angiograms     heparin 10,000 units/10 mL infusion (DIALYSIS USE)     heparin 1000 unit/mL DIALYSIS Cath Care     heparin 1000 unit/mL DIALYSIS Cath Care     HOLD MEDICATION     HOLD: heparin IV 4 hours pre-procedure     hydrALAZINE (APRESOLINE) injection 10 mg     hydrALAZINE (APRESOLINE) tablet 50 mg     insulin aspart (NovoLOG) inj (RAPID ACTING)     insulin aspart (NovoLOG) inj (RAPID ACTING)     insulin aspart (NovoLOG) inj (RAPID ACTING)     insulin aspart (NovoLOG) inj (RAPID ACTING)     insulin aspart (NovoLOG) inj (RAPID ACTING)     insulin isophane human (HumuLIN N PEN) injection 10 Units     insulin isophane human (HumuLIN N PEN) injection 6 Units     lidocaine (PF) (XYLOCAINE) 1 % injection     lidocaine 1 % 1-30 mL     lidocaine 1% with EPINEPHrine 1:100,000 injection 1-30 mL     Medication Instructions     melatonin tablet 1 mg     midazolam  (VERSED) 1 MG/ML injection     midazolam (VERSED) injection 0.5-2 mg     naloxone (NARCAN) injection 0.1-0.4 mg     naloxone (NARCAN) injection 0.1-0.4 mg     ondansetron (ZOFRAN-ODT) ODT tab 4 mg    Or     ondansetron (ZOFRAN) injection 4 mg     Patient is already receiving anticoagulation with heparin, enoxaparin (LOVENOX), warfarin (COUMADIN)  or other anticoagulant medication     senna-docusate (SENOKOT-S/PERICOLACE) 8.6-50 MG per tablet 1 tablet    Or     senna-docusate (SENOKOT-S/PERICOLACE) 8.6-50 MG per tablet 2 tablet     sodium chloride (PF) 0.9% PF flush 10 mL     Warfarin Therapy Reminder (Check START DATE - warfarin may be starting in the FUTURE)          HEP Panel:  Hep B Antigen (HBsAG):   Lab Results   Component Value Date    HEPBANG Nonreactive 06/03/2019     Hep B Surface Antibody (HBsAb):   Lab Results   Component Value Date    AUSAB 0.59 06/03/2019     Hep B Total Core Antibody: No results found for: HBCAB     Chest X-Ray:   Results for orders placed during the hospital encounter of 05/27/19   XR KNEE RT 3 VW    Narrative XR KNEE RT 3 VW 6/1/2019 10:03 AM    COMPARISON: None.    HISTORY: RIGHT lateral patellar pain.      Impression IMPRESSION: Tricompartmental RIGHT knee osteoarthritis without  significant joint space loss. No fractures are seen. No knee effusion.    CLARE COLEY MD        PPD:  M TUBERCULOSIS RESULT: No results found for: TBRSLT  M TUBERCULOSIS ANTIGEN VALUE: No results found for: TBAGN

## 2019-05-28 ENCOUNTER — APPOINTMENT (OUTPATIENT)
Dept: CARDIOLOGY | Facility: CLINIC | Age: 59
End: 2019-05-28
Attending: INTERNAL MEDICINE
Payer: MEDICAID

## 2019-05-28 ENCOUNTER — APPOINTMENT (OUTPATIENT)
Dept: GENERAL RADIOLOGY | Facility: CLINIC | Age: 59
End: 2019-05-28
Attending: INTERNAL MEDICINE
Payer: MEDICAID

## 2019-05-28 LAB
ANION GAP SERPL CALCULATED.3IONS-SCNC: 6 MMOL/L (ref 3–14)
APTT PPP: 35 SEC (ref 22–37)
BUN SERPL-MCNC: 65 MG/DL (ref 7–30)
CALCIUM SERPL-MCNC: 7.7 MG/DL (ref 8.5–10.1)
CHLORIDE SERPL-SCNC: 112 MMOL/L (ref 94–109)
CO2 SERPL-SCNC: 25 MMOL/L (ref 20–32)
CREAT SERPL-MCNC: 3.97 MG/DL (ref 0.52–1.04)
ERYTHROCYTE [DISTWIDTH] IN BLOOD BY AUTOMATED COUNT: 15 % (ref 10–15)
ERYTHROCYTE [DISTWIDTH] IN BLOOD BY AUTOMATED COUNT: 15.2 % (ref 10–15)
FERRITIN SERPL-MCNC: 107 NG/ML (ref 8–252)
GFR SERPL CREATININE-BSD FRML MDRD: 11 ML/MIN/{1.73_M2}
GLUCOSE BLDC GLUCOMTR-MCNC: 149 MG/DL (ref 70–99)
GLUCOSE BLDC GLUCOMTR-MCNC: 150 MG/DL (ref 70–99)
GLUCOSE BLDC GLUCOMTR-MCNC: 157 MG/DL (ref 70–99)
GLUCOSE BLDC GLUCOMTR-MCNC: 166 MG/DL (ref 70–99)
GLUCOSE BLDC GLUCOMTR-MCNC: 169 MG/DL (ref 70–99)
GLUCOSE BLDC GLUCOMTR-MCNC: 199 MG/DL (ref 70–99)
GLUCOSE BLDC GLUCOMTR-MCNC: 97 MG/DL (ref 70–99)
GLUCOSE SERPL-MCNC: 124 MG/DL (ref 70–99)
HBA1C MFR BLD: 10.5 % (ref 0–5.6)
HCT VFR BLD AUTO: 23.8 % (ref 35–47)
HCT VFR BLD AUTO: 24.5 % (ref 35–47)
HGB BLD-MCNC: 7 G/DL (ref 11.7–15.7)
HGB BLD-MCNC: 7.4 G/DL (ref 11.7–15.7)
INR PPP: 1.23 (ref 0.86–1.14)
INTERPRETATION ECG - MUSE: NORMAL
IRON SATN MFR SERPL: 17 % (ref 15–46)
IRON SERPL-MCNC: 32 UG/DL (ref 35–180)
LMWH PPP CHRO-ACNC: 0.37 IU/ML
MCH RBC QN AUTO: 25.9 PG (ref 26.5–33)
MCH RBC QN AUTO: 26.2 PG (ref 26.5–33)
MCHC RBC AUTO-ENTMCNC: 29.4 G/DL (ref 31.5–36.5)
MCHC RBC AUTO-ENTMCNC: 30.2 G/DL (ref 31.5–36.5)
MCV RBC AUTO: 87 FL (ref 78–100)
MCV RBC AUTO: 88 FL (ref 78–100)
PLATELET # BLD AUTO: 185 10E9/L (ref 150–450)
PLATELET # BLD AUTO: 192 10E9/L (ref 150–450)
POTASSIUM SERPL-SCNC: 4.4 MMOL/L (ref 3.4–5.3)
RBC # BLD AUTO: 2.7 10E12/L (ref 3.8–5.2)
RBC # BLD AUTO: 2.82 10E12/L (ref 3.8–5.2)
RETICS # AUTO: 62.1 10E9/L (ref 25–95)
RETICS/RBC NFR AUTO: 2.2 % (ref 0.5–2)
SODIUM SERPL-SCNC: 143 MMOL/L (ref 133–144)
TIBC SERPL-MCNC: 192 UG/DL (ref 240–430)
WBC # BLD AUTO: 5.4 10E9/L (ref 4–11)
WBC # BLD AUTO: 6.7 10E9/L (ref 4–11)

## 2019-05-28 PROCEDURE — 85610 PROTHROMBIN TIME: CPT | Performed by: INTERNAL MEDICINE

## 2019-05-28 PROCEDURE — 00000146 ZZHCL STATISTIC GLUCOSE BY METER IP

## 2019-05-28 PROCEDURE — 83036 HEMOGLOBIN GLYCOSYLATED A1C: CPT | Performed by: INTERNAL MEDICINE

## 2019-05-28 PROCEDURE — 12000000 ZZH R&B MED SURG/OB

## 2019-05-28 PROCEDURE — 93306 TTE W/DOPPLER COMPLETE: CPT | Mod: 26 | Performed by: INTERNAL MEDICINE

## 2019-05-28 PROCEDURE — 83540 ASSAY OF IRON: CPT | Performed by: HOSPITALIST

## 2019-05-28 PROCEDURE — 25000128 H RX IP 250 OP 636: Performed by: INTERNAL MEDICINE

## 2019-05-28 PROCEDURE — 80048 BASIC METABOLIC PNL TOTAL CA: CPT | Performed by: INTERNAL MEDICINE

## 2019-05-28 PROCEDURE — 85730 THROMBOPLASTIN TIME PARTIAL: CPT | Performed by: EMERGENCY MEDICINE

## 2019-05-28 PROCEDURE — 83550 IRON BINDING TEST: CPT | Performed by: HOSPITALIST

## 2019-05-28 PROCEDURE — 40000264 ECHOCARDIOGRAM COMPLETE

## 2019-05-28 PROCEDURE — 85027 COMPLETE CBC AUTOMATED: CPT | Performed by: INTERNAL MEDICINE

## 2019-05-28 PROCEDURE — 36415 COLL VENOUS BLD VENIPUNCTURE: CPT | Performed by: EMERGENCY MEDICINE

## 2019-05-28 PROCEDURE — 82728 ASSAY OF FERRITIN: CPT | Performed by: HOSPITALIST

## 2019-05-28 PROCEDURE — 36415 COLL VENOUS BLD VENIPUNCTURE: CPT | Performed by: HOSPITALIST

## 2019-05-28 PROCEDURE — 71046 X-RAY EXAM CHEST 2 VIEWS: CPT

## 2019-05-28 PROCEDURE — 99233 SBSQ HOSP IP/OBS HIGH 50: CPT | Performed by: HOSPITALIST

## 2019-05-28 PROCEDURE — 25000131 ZZH RX MED GY IP 250 OP 636 PS 637: Performed by: INTERNAL MEDICINE

## 2019-05-28 PROCEDURE — 25000132 ZZH RX MED GY IP 250 OP 250 PS 637: Performed by: INTERNAL MEDICINE

## 2019-05-28 PROCEDURE — 85045 AUTOMATED RETICULOCYTE COUNT: CPT | Performed by: HOSPITALIST

## 2019-05-28 PROCEDURE — 25500064 ZZH RX 255 OP 636: Performed by: INTERNAL MEDICINE

## 2019-05-28 PROCEDURE — 85520 HEPARIN ASSAY: CPT | Performed by: INTERNAL MEDICINE

## 2019-05-28 PROCEDURE — 36415 COLL VENOUS BLD VENIPUNCTURE: CPT | Performed by: INTERNAL MEDICINE

## 2019-05-28 RX ORDER — WARFARIN SODIUM 5 MG/1
5 TABLET ORAL ONCE
Status: COMPLETED | OUTPATIENT
Start: 2019-05-28 | End: 2019-05-28

## 2019-05-28 RX ORDER — WARFARIN SODIUM 2.5 MG/1
2.5 TABLET ORAL
Status: COMPLETED | OUTPATIENT
Start: 2019-05-28 | End: 2019-05-28

## 2019-05-28 RX ADMIN — FUROSEMIDE 40 MG: 10 INJECTION, SOLUTION INTRAVENOUS at 00:26

## 2019-05-28 RX ADMIN — HUMAN ALBUMIN MICROSPHERES AND PERFLUTREN 3 ML: 10; .22 INJECTION, SOLUTION INTRAVENOUS at 08:02

## 2019-05-28 RX ADMIN — CARVEDILOL 50 MG: 25 TABLET, FILM COATED ORAL at 17:54

## 2019-05-28 RX ADMIN — Medication 3100 UNITS: at 02:10

## 2019-05-28 RX ADMIN — INSULIN HUMAN 16 UNITS: 100 INJECTION, SUSPENSION SUBCUTANEOUS at 00:44

## 2019-05-28 RX ADMIN — FUROSEMIDE 40 MG: 10 INJECTION, SOLUTION INTRAVENOUS at 08:15

## 2019-05-28 RX ADMIN — WARFARIN SODIUM 5 MG: 5 TABLET ORAL at 05:32

## 2019-05-28 RX ADMIN — AMLODIPINE BESYLATE 5 MG: 5 TABLET ORAL at 08:15

## 2019-05-28 RX ADMIN — INSULIN HUMAN 10 UNITS: 100 INJECTION, SUSPENSION SUBCUTANEOUS at 22:44

## 2019-05-28 RX ADMIN — WARFARIN SODIUM 2.5 MG: 2.5 TABLET ORAL at 17:54

## 2019-05-28 RX ADMIN — HEPARIN SODIUM 1400 UNITS/HR: 10000 INJECTION, SOLUTION INTRAVENOUS at 16:34

## 2019-05-28 RX ADMIN — HYDRALAZINE HYDROCHLORIDE 50 MG: 50 TABLET, FILM COATED ORAL at 08:15

## 2019-05-28 RX ADMIN — HYDRALAZINE HYDROCHLORIDE 50 MG: 50 TABLET, FILM COATED ORAL at 16:14

## 2019-05-28 RX ADMIN — HYDRALAZINE HYDROCHLORIDE 50 MG: 50 TABLET, FILM COATED ORAL at 22:53

## 2019-05-28 RX ADMIN — FUROSEMIDE 40 MG: 10 INJECTION, SOLUTION INTRAVENOUS at 16:14

## 2019-05-28 RX ADMIN — CARVEDILOL 50 MG: 25 TABLET, FILM COATED ORAL at 00:26

## 2019-05-28 RX ADMIN — HYDRALAZINE HYDROCHLORIDE 50 MG: 50 TABLET, FILM COATED ORAL at 00:26

## 2019-05-28 RX ADMIN — HEPARIN SODIUM 1400 UNITS/HR: 10000 INJECTION, SOLUTION INTRAVENOUS at 02:08

## 2019-05-28 RX ADMIN — CARVEDILOL 50 MG: 25 TABLET, FILM COATED ORAL at 08:15

## 2019-05-28 ASSESSMENT — ACTIVITIES OF DAILY LIVING (ADL)
ADLS_ACUITY_SCORE: 14
ADLS_ACUITY_SCORE: 14
ADLS_ACUITY_SCORE: 15
ADLS_ACUITY_SCORE: 16
ADLS_ACUITY_SCORE: 14
ADLS_ACUITY_SCORE: 16

## 2019-05-28 ASSESSMENT — MIFFLIN-ST. JEOR: SCORE: 1611.42

## 2019-05-28 NOTE — H&P
[facility]  Hospitalist Admission Note  Name: Roxy Beaulieu    MRN: [id number]  YOB: 1960    Age: 59 year old  Date of admission: 5/27/2019  Primary care provider: Dwain London            Assessment and Plan:   Roxy Beaulieu is a 59 year old female long-standing known history of noncompliance with medications, dietary and lifestyle restrictions and modifications, prior VTE's, known congestive heart failure, hypertension, CKD, diabetes mellitus type 2, complications of diabetes with left foot ulcerations, hypertensive cardiomyopathy, systolic and diastolic CHF who presented in the emergency room earlier today due to increasing bilateral leg swelling and shortness of breath over the course of at least several days duration.     1.  Acute respiratory failure with hypoxia, shortness of breath likely multifactorial  -Acute on chronic systolic and diastolic congestive heart failure in exacerbation  -High suspicion for recurrent pulmonary embolism    2.  Positive for nonocclusive left DVT  3.  Unfortunately long-standing history of noncompliance with medications, follow-up care, dietary restrictions.  4.  Hypertensive cardiomyopathy  5.  Uncontrolled diabetes mellitus insulin requiring  6.  Chronic kidney disease with worsening creatinine  7.  Dyslipidemia    Admit as inpatient.  He remain high risk for clinical deterioration.  We will continue heart failure management with strict intake and output, fluid restrictions, daily weights.  Oxygen supplementation as needed.  IV diuresis for now as patient also has component of cardiorenal syndrome in the setting of this congestive heart failure and worsening CKD.  Close monitoring of kidney function.  Low threshold for nephrology service evaluation if with worsening creatinine and optimization of her diuretic therapy.  Chest x-ray ordered by me and pending.  She should follow-up with core clinic as previously directed and  discussion.  Resumption of her Coreg, hydralazine.  Check echocardiogram.  We will treat her with presumptive PE as well in the setting of prior history of VTE's, now with nonocclusive left DVT and presented with subtherapeutic INR likely has not been taking any of her prior known anticoagulants.  -Cannot tolerate CT scan right now with decreasing creatinine  -May pursue VQ scan but nevertheless treatment approach will be the same.    Previously noted to have allergies to heparin but I believe that was erroneous.  This was already discussed and documented on last hospitalization.  She even tolerated Lovenox at that time as inpatient and outpatient.  There was no documentation of any thrombocytopenia.  Prior documentation of negative serotonin assay in place.  Right now we will provide her with intravenous heparin drip while bridging her with warfarin as Lovenox is contraindicated with creatinine clearance of less than 30.  If she has insurance coverage now then may consider switching her oral anticoagulants to Novell oral anticoagulants if creatinine clearance permitting and if it is not cost prohibitive for her.  Glucose monitoring, resumption of her insulin NPH and insulin sliding scale.  Social service evaluation for discharge planning.    Code status: Full code  Admit to inpatient  Prophylaxis: On IV heparin  Disposition: Likely back to her prior home arrangements in the next 2 to 3 days.          Chief Complaint:   Increasing leg swelling, shortness of breath       Source of Information:   Patient with fair reliability  Discussion with ED physician  Review of E chart records         History of Present Illness:   Roxy Beaulieu is a 59 year old female long-standing known history of noncompliance with medications, dietary and lifestyle restrictions and modifications, prior VTE's, known congestive heart failure, hypertension, CKD, diabetes mellitus type 2, complications of diabetes with left foot ulcerations,  hypertensive cardiomyopathy, systolic and diastolic CHF who presented in the emergency room earlier today due to increasing bilateral leg swelling and shortness of breath over the course of at least several days duration.   Unfortunately it appears that patient has been not taking her medications and presented here with subtherapeutic INR.  She was found with hypoxia and was requiring oxygen supplementation.  Chest x-ray findings currently pending.  She was given with intravenous Lasix for suspicion of underlying CHF and possibly recurrent venous thromboembolic events.   Initially she was ordered with argatroban due to possible allergies to heparin.   I reviewed her chart on numerous hospitalization and back in December there was some documentation that she was positive for HIT screen, no prior history of thrombocytopenia and subsequent confirmatory test with serotonin assay showed negative for HIT.  She tolerated numerous hospitalizations with Lovenox bridging with warfarin.              Past Medical History:     Past Medical History:   Diagnosis Date     Diabetes (H)      Gangrene of toe (H) 4/29/2017     High cholesterol      Hypertension      Obesity 4/29/2017     PE (pulmonary thromboembolism) (H)      Personal history of tobacco use, presenting hazards to health 4/29/2017     Uncontrolled type 2 diabetes mellitus with hyperglycemia, with long-term current use of insulin (H) 4/29/2017             Past Surgical History:     Past Surgical History:   Procedure Laterality Date     AMPUTATE TOE(S) Left 5/1/2017    Procedure: AMPUTATE TOE(S);  Amputate first and second left toes;  Surgeon: Verna Fofana DPM, Podiatry/Foot and Ankle Surgery;  Location: [facility] OR     AMPUTATE TOE(S) Left 11/3/2018    Procedure: Partial left 3rd toe amputation;  Surgeon: Verna Fofana DPM, Podiatry/Foot and Ankle Surgery;  Location: [facility] OR     OPEN REDUCTION INTERNAL FIXATION FIBULA Left 3/16/2018    Procedure: OPEN REDUCTION INTERNAL  "FIXATION FIBULA;  Open reduction and internal fixation of displaced left lateral malleolar fracture;  Surgeon: Sumanth Wen MD;  Location: [facility] OR             Social History:     Social History     Tobacco Use     Smoking status: Former Smoker     Last attempt to quit: 2016     Years since quittin.8     Smokeless tobacco: Never Used   Substance Use Topics     Alcohol use: No             Family History:   Family history was fully reviewed and non-contributory in this case.         Allergies:     Allergies   Allergen Reactions     Heparin Other (See Comments)     HIT-serotonin assay negative. NOT HIT   Again she has no HIT.          Medications:     Prior to Admission medications    Medication Sig Last Dose Taking? Auth Provider   amLODIPine (NORVASC) 5 MG tablet Take 5 mg by mouth daily 2019 at Unknown time Yes Unknown, Entered By History   carvedilol (COREG) 25 MG tablet Take 2 tablets (50 mg) by mouth 2 times daily (with meals) 2019 at x1 Yes Sekou Shell MD   hydrALAZINE (APRESOLINE) 50 MG tablet Take 1 tablet (50 mg) by mouth 3 times daily 2019 at x2 Yes Sekou Shell MD   insulin isophane & regular (HUMULIN MIX 70/30 PEN , NOVOLIN MIX 70/30 PEN) susp Inject 15-20 Units Subcutaneous At Bedtime 2019 at Unknown time Yes Unknown, Entered By History   warfarin (COUMADIN) 5 MG tablet Take 1 tablet (5 mg) by mouth daily 2019 at Unknown time Yes Sekou Shell MD             Review of Systems:   A Comprehensive greater than 10 system review of systems was carried out.  Pertinent positives and negatives are noted above.  Otherwise negative for contributory information.           Physical Exam:   Blood pressure 179/78, pulse 78, temperature 98.3  F (36.8  C), temperature source Oral, resp. rate 26, height 1.727 m (5' 8\"), weight 83.9 kg (185 lb), SpO2 100 %, not currently breastfeeding.  Wt Readings from Last 1 Encounters:   19 83.9 kg (185 lb)     Exam:  GENERAL: No " apparent distress. Awake, alert, and fully oriented.  HEENT: Normocephalic, atraumatic. Extraocular movements intact.  CARDIOVASCULAR: Regular rate and rhythm without murmurs or rubs. No JVD, +2 pitting edema bilateral lower extremities  PULMONARY: Fair air entry, bilateral basal crackles  ABDOMINAL: Soft, non-tender, non-distended. Bowel sounds normoactive. No hepatosplenomegaly.  EXTREMITIES: No cyanosis or clubbing.  NEUROLOGICAL: CN 2-12 grossly intact, awake and alert x3, spontaneous and coherent speech. no focal neurological deficits.  DERMATOLOGICAL: No rash, ulcer, ecchymoses, jaundice.  Psych: not agitation, not combative, pleasant mood           Data:   EKG:    Normal sinus rhythm at 92 bpm, anterior infarct, multiple artifacts  Imaging:  Results for orders placed or performed during the hospital encounter of 05/27/19   US Lower Extremity Venous Duplex Bilateral    Narrative    ULTRASOUND LOWER EXTREMITY VENOUS DUPLEX BILATERAL  5/27/2019 10:31 PM    HISTORY:  Leg swelling, history of deep vein thrombosis, evaluate for  DVT.    TECHNIQUE:  Venous Doppler US including color flow and Doppler  waveform analysis.    FINDINGS:  Nonocclusive thrombus was noted within the left common  femoral vein, left mid femoral vein, and left popliteal vein. No deep  vein thrombus was noted within the right lower extremity.      Impression    IMPRESSION:  1. Positive scan for left lower extremity deep venous thrombosis  involving the common femoral, mid femoral, and popliteal veins.  2. No evidence of right lower extremity deep venous thrombosis.    RICHARD PARRA MD       Labs:  No results for input(s): CULT in the last 168 hours.  Recent Labs   Lab 05/27/19 2010   NTBNPI 12,801*     Recent Labs   Lab 05/27/19 2010   WBC 6.5   HGB 8.4*   HCT 27.6*   MCV 86        Recent Labs   Lab 05/27/19 2010      POTASSIUM 4.6   CHLORIDE 110*   CO2 24   ANIONGAP 7   *   BUN 64*   CR 3.79*   GFRESTIMATED 12*    GFRESTBLACK 14*   GILL 7.8*   PROTTOTAL 6.9   ALBUMIN 2.9*   BILITOTAL 0.3   ALKPHOS 155*   AST 38   ALT 56*     No results for input(s): SED, CRP in the last 168 hours.  Recent Labs   Lab 05/27/19 2010   *     Recent Labs   Lab 05/27/19 2010   INR 1.04     Recent Labs   Lab 05/27/19 2010   TROPI 0.027     No results for input(s): COLOR, APPEARANCE, URINEGLC, URINEBILI, URINEKETONE, SG, UBLD, URINEPH, PROTEIN, UROBILINOGEN, NITRITE, LEUKEST, RBCU, WBCU in the last 168 hours.

## 2019-05-28 NOTE — PROGRESS NOTES
[facility]    Hospitalist Progress Note    Date of Service (when I saw the patient): 05/28/2019  Provider:  Nakul Montes MD   Text Page  7am - 6PM       Assessment & Plan   Roxy Beaulieu is a 59 year old female who  has a past medical history of Diabetes, Gangrene of toe, High cholesterol, Hypertension, Obesity, PE (pulmonary thromboembolism), smoking, CKD stage IV, Cardiomyopathy, heart failure, type 2 diabetes mellitus with long-term current use of insulin. She was admitted on 5/27/2019 with worsening shortness of breath, leg swelling, hypoxemia, elevated NT BNP and nonocclusive left lower extremity DVT.    1.  Nonocclusive left lower extremity DVT likely complicated with PE.  Patient has well-documented history of DVT/PE.  She is currently on warfarin, on admission her INR is normal.  Historically she has tested with subtherapeutic reading in the past raising concern for noncompliance.  She was once suspicious for HIT but it was ruled out by proper testing.  She has tolerated heparin therapy in the past.  She has been admitted on heparin drip, Warfarin resumed.  Today's INR is 1.23.  Her poor kidney function precludes for CTA with PE protocol.  She is clinically and hemodynamically stable, much better symptomatically.    2.  Acute hypoxemic respiratory failure.  Suspect main contributor factor is #1, other possible contributors are: Anemia, decompensated heart failure and volume overload with interstitial edema and pleural effusion.  Improving with current treatment.  Continue on oxygen supplementation as needed.    3.  Acute decompensation of chronic hypertensive cardiomyopathy.  Likely from noncompliance with diet, medication and DVT/PE.  Chronic anemia also.  -Continue cardiac diet with sodium restriction, strict POPEYE's, daily weight, cardiac telemetry, O2 supplement per NC, IV diuretic, ask for help from nephrology.  -Echocardiogram pending performance.    4.  Acute on chronic kidney  disease is stage IV.  Worsening from all of the above.  Nephrology consult requested to help with diuresis.    5.  Type 2 diabetes mellitus on insulin.  A1c 10.5.  On treatment with NPH 70/30,  20 units at bedtime.  - Moderate CHO  - NPH 70/30 total of 16 units at bedtime.  - Insulin sliding scale.    6.  Essential hypertension.  -Amlodipine 5 mg 1 tablet daily.  - Carvedilol 50 mg twice daily.  - Hydralazine 50 mg 3 times daily.    7.  Hyperlipidemia.  No statin listed in her home medication.  -Check lipid panel fasting.    8.  Anemia of chronic disease.  Current hemoglobin 7.0.  - Anemia study.    - Transfuse if hemoglobin <7.0 or becoming very symptomatic.    DVT Prophylaxis: Heparin gtt and Warfarin  Code Status: Full Code    Disposition: Expected discharge in 2- 3 days once compensated and INR at goal.    Interval History   She feels better today, no chest pain or shortness of breath.  Appetite is improving.  No other concerns at the moment of my visit.    -Data reviewed today: I reviewed all new labs and imaging results over the last 24 hours. I personally reviewed the EKG tracing showing NSR.    Physical Exam   Temp: 97.9  F (36.6  C) Temp src: Oral BP: 163/67 Pulse: 78 Heart Rate: 76 Resp: 18 SpO2: 96 % O2 Device: Nasal cannula Oxygen Delivery: 3 LPM  Vitals:    05/27/19 1957 05/27/19 2314 05/28/19 0316   Weight: 83.9 kg (185 lb) 101.8 kg (224 lb 8 oz) 101.4 kg (223 lb 8 oz)     Vital Signs with Ranges  Temp:  [97.4  F (36.3  C)-98.3  F (36.8  C)] 97.9  F (36.6  C)  Pulse:  [78-92] 78  Heart Rate:  [71-84] 76  Resp:  [18-27] 18  BP: (136-191)/(55-91) 163/67  SpO2:  [83 %-100 %] 96 %  I/O last 3 completed shifts:  In: 374.13 [P.O.:320; I.V.:54.13]  Out: 1450 [Urine:1450]    GEN:  Alert, oriented x 3, appears comfortable, NAD.  HEENT:  Normocephalic/atraumatic, no scleral icterus, no nasal discharge, mouth moist.  CV:  Regular rate and rhythm, no murmur or JVD.  S1 + S2 noted, no S3 or S4.  LUNGS:  Clear to  auscultation bilaterally without rales/rhonchi/wheezing/retractions.  Symmetric chest rise on inhalation noted.  ABD:  Active bowel sounds, soft, non-tender/non-distended.  No rebound/guarding/rigidity.  EXT: Bilateral lower extremity edema 3+, easy pitting, no cyanosis.  No joint synovitis noted.  SKIN:  Dry to touch, no exanthems noted in the visualized areas.       Medications     HEParin 1,400 Units/hr (05/28/19 0208)     - MEDICATION INSTRUCTIONS -       Warfarin Therapy Reminder         amLODIPine  5 mg Oral Daily     carvedilol  50 mg Oral BID w/meals     furosemide  40 mg Intravenous Q8H     hydrALAZINE  50 mg Oral TID     insulin aspart  1-7 Units Subcutaneous TID AC     insulin aspart  1-5 Units Subcutaneous At Bedtime     insulin isophane & regular  16 Units Subcutaneous At Bedtime       Data   Recent Labs   Lab 05/28/19  0713 05/28/19  0001 05/27/19 2010   WBC 5.4 6.7 6.5   HGB 7.0* 7.4* 8.4*   MCV 88 87 86    192 226   INR 1.23*  --  1.04     --  141   POTASSIUM 4.4  --  4.6   CHLORIDE 112*  --  110*   CO2 25  --  24   BUN 65*  --  64*   CR 3.97*  --  3.79*   ANIONGAP 6  --  7   GILL 7.7*  --  7.8*   *  --  203*   ALBUMIN  --   --  2.9*   PROTTOTAL  --   --  6.9   BILITOTAL  --   --  0.3   ALKPHOS  --   --  155*   ALT  --   --  56*   AST  --   --  38   TROPI  --   --  0.027       Recent Results (from the past 24 hour(s))   US Lower Extremity Venous Duplex Bilateral    Narrative    ULTRASOUND LOWER EXTREMITY VENOUS DUPLEX BILATERAL  5/27/2019 10:31 PM    HISTORY:  Leg swelling, history of deep vein thrombosis, evaluate for  DVT.    TECHNIQUE:  Venous Doppler US including color flow and Doppler  waveform analysis.    FINDINGS:  Nonocclusive thrombus was noted within the left common  femoral vein, left mid femoral vein, and left popliteal vein. No deep  vein thrombus was noted within the right lower extremity.      Impression    IMPRESSION:  1. Positive scan for left lower extremity  deep venous thrombosis  involving the common femoral, mid femoral, and popliteal veins.  2. No evidence of right lower extremity deep venous thrombosis.    RICHARD PARRA MD   XR Chest 2 Views    Narrative    XR CHEST 2 VW 5/28/2019 5:11 AM    HISTORY: Shortness of breath, leg swelling.    COMPARISON: 12/17/2018, 10/4/2018.    FINDINGS: Small bilateral pleural effusions. Upper lungs are clear. No  pneumothorax. Stable heart size.      Impression    IMPRESSION: Small bilateral pleural effusions, similar to previous.    LISA LUKE MD       Disclaimer: This note consists of symbols derived from keyboarding, dictation and/or voice recognition software. As a result, there may be errors in the script that have gone undetected. Please consider this when interpreting information found in this chart.

## 2019-05-28 NOTE — PROGRESS NOTES
Brief sw note:    Sw is aware of consult order for discharge planning.  CC met with the pt and there have been no sw needs identified at this time.  Sw will continue to follow along with CC for any discharge needs that may arise.

## 2019-05-28 NOTE — PHARMACY-ADMISSION MEDICATION HISTORY
Admission medication history interview status for this patient is complete. See [facility] admission navigator for allergy information, prior to admission medications and immunization status.     Medication history interview source(s):Patient  Medication history resources (including written lists, pill bottles, clinic record): med bottles  Primary pharmacy: [facility]     Patient states she takes warfarin 5 mg daily.  Of note she brought in bag of meds that did not include warfarin or insulin.  Fill history on-line also does not list warfarin or insulin so unknown if she is being truthful.    Changes made to PTA medication list:  Added:   Deleted: Glipizide  Changed: Insulin 70/30 dose to 15-20 units at bedtime (for BG < 200 takes 15 units, BG > 200 takes 20 units)    Actions taken by pharmacist (provider contacted, etc):None     Additional medication history information:None    Medication reconciliation/reorder completed by provider prior to medication history? No    For patients on insulin therapy: Yes   Lantus/levemir/NPH/Mix 70/30 dose:  _____   in AM/PM  or twice daily   Sliding scale Novolog Y/N  If Yes, do you have a baseline novolog pre-meal dose:  ______units with meals   Patients eat three meals a day:   Y/N     Any Barriers to therapy:  cost of medications/comfortable with giving injections (if applicable)/ comfortable and confident with current diabetes regimen       Prior to Admission medications    Medication Sig Last Dose Taking? Auth Provider   amLODIPine (NORVASC) 5 MG tablet Take 5 mg by mouth daily 5/27/2019 at Unknown time Yes Unknown, Entered By History   carvedilol (COREG) 25 MG tablet Take 2 tablets (50 mg) by mouth 2 times daily (with meals) 5/27/2019 at x1 Yes Sekou Shell MD   hydrALAZINE (APRESOLINE) 50 MG tablet Take 1 tablet (50 mg) by mouth 3 times daily 5/27/2019 at x2 Yes Sekou Shell MD   insulin isophane & regular (HUMULIN MIX 70/30 PEN , NOVOLIN MIX 70/30 PEN) susp Inject 15-20  Units Subcutaneous At Bedtime 5/26/2019 at Unknown time Yes Unknown, Entered By History   warfarin (COUMADIN) 5 MG tablet Take 1 tablet (5 mg) by mouth daily 5/26/2019 at Unknown time Yes Sekou Shell MD

## 2019-05-28 NOTE — PLAN OF CARE
OT/CR: Per chart review, pt admission with SOB, new LLE DVT likely complicated with PE. Pt currently within 24 hours of heparin administration, most appropriate to hold OT/CR mobilization until post 24 hours for optimal anticoagulation. Will re-schedule.

## 2019-05-28 NOTE — ED NOTES
RECEIVING UNIT ED HANDOFF REVIEW    Above ED Nurse Handoff Report was reviewed: Yes  Reviewed by: Sharno Ferguson on May 27, 2019 at 10:44 PM   [facility]  ED Nurse Handoff Report    Roxy Beaulieu is a 59 year old female   ED Chief complaint: Shortness of Breath  . ED Diagnosis:   Final diagnoses:   Acute on chronic systolic congestive heart failure (H)   Acute renal failure, unspecified acute renal failure type (H)   Hypoxia   Possible pulmonary embolism without acute cor pulmonale, unspecified chronicity (H)     Allergies:   Allergies   Allergen Reactions     Heparin Other (See Comments)     Heparin Induced Thrombocytopenia       Code Status: Full Code  Activity level - Baseline/Home:  Independent. Activity Level - Current:   Stand with Assist. Lift room needed: No. Bariatric: No   Needed: No   Isolation: No. Infection: Not Applicable.     Vital Signs:   Vitals:    05/27/19 2100 05/27/19 2102 05/27/19 2120 05/27/19 2121   BP:   179/78    Pulse:   81    Resp: 27 27 26   Temp:       TempSrc:       SpO2: 100% 100%  100%   Weight:       Height:           Cardiac Rhythm:  ,      Pain level:    Patient confused: No. Patient Falls Risk: Yes.   Elimination Status: Has voided   Patient Report - Initial Complaint: SOB. Focused Assessment:    Tests Performed: Labs, US. Abnormal Results:   Labs Ordered and Resulted from Time of ED Arrival Up to the Time of Departure from the ED   CBC WITH PLATELETS DIFFERENTIAL - Abnormal; Notable for the following components:       Result Value    RBC Count 3.20 (*)     Hemoglobin 8.4 (*)     Hematocrit 27.6 (*)     MCH 26.3 (*)     MCHC 30.4 (*)     Absolute Lymphocytes 0.6 (*)     All other components within normal limits   BASIC METABOLIC PANEL - Abnormal; Notable for the following components:    Chloride 110 (*)     Glucose 203 (*)     Urea Nitrogen 64 (*)     Creatinine 3.79 (*)     GFR Estimate 12 (*)     GFR Estimate If Black 14 (*)     Calcium 7.8 (*)      All other components within normal limits   NT PROBNP INPATIENT - Abnormal; Notable for the following components:    N-Terminal Pro BNP Inpatient 12,801 (*)     All other components within normal limits   COMPREHENSIVE PANEL ADDITION - Abnormal; Notable for the following components:    ALT 56 (*)     Alkaline Phosphatase 155 (*)     Albumin 2.9 (*)     All other components within normal limits   ISTAT  GASES LACTATE RHODA POCT - Abnormal; Notable for the following components:    Lactic Acid 0.5 (*)     All other components within normal limits   INR   PARTIAL THROMBOPLASTIN TIME   TROPONIN I   PERIPHERAL IV CATHETER   ISTAT CG4 GASES LACTATE RHODA NURSING POCT   MEASURE WEIGHT     US Lower Extremity Venous Duplex Bilateral    (Results Pending)       .   Treatments provided: See MAR  Family Comments:   OBS brochure/video discussed/provided to patient:  N/A  ED Medications:   Medications   argatroban (ACOVA) 1 mg/mL infusion (1 mcg/kg/min × 83.9 kg Intravenous New Bag 5/27/19 2235)   furosemide (LASIX) injection 60 mg (60 mg Intravenous Given 5/27/19 2122)     Drips infusing:  Yes  For the majority of the shift, the patient's behavior Green. Interventions performed were NA.     Severe Sepsis OR Septic Shock Diagnosis Present: No      ED Nurse Name/Phone Number: Lizette Malagon,   10:37 PM

## 2019-05-28 NOTE — PHARMACY
Anticoagulation coverage check.  Patient has [place] Medicaid.    Xarelto: $3/mo  Eliquis: Not covered.  Pradaxa: $3/mo.    LEAH Betancourt, Pharmacy Technician/Liaison, Discharge Pharmacy [phone]

## 2019-05-28 NOTE — ED TRIAGE NOTES
Started feeling SOB about 3 days ago. Hx of PE and on coumadin for that. Denies fever. Any activity makes SOB worse. Worse laying down. Doesn't wear oxygen at home. 80% on RA upon arrival.

## 2019-05-28 NOTE — CONSULTS
Care Transition Initial Assessment - RN        Met with: Patient.  DATA   Active Problems:    CHF (congestive heart failure) (H)       Cognitive Status: alert and oriented.  Primary Care Clinic Name: [facility]     Contact information and PCP information verified: Yes  Lives With: alone      Quality of Family Relationships: supportive  Description of Support System: Supportive   Who is your support system?: Sibling(s)   Support Assessment: Adequate social supports   Insurance concerns: No Insurance issues identified  ASSESSMENT  Patient currently receives the following services:  none        Identified issues/concerns regarding health management: compliance with diet, meds, follow up appts    CTS following for CHF diagnosis. Pt was admitted with shortness of breath and BNP 51546. She has LLE DVT. Met with pt at bedside to discuss plan of care with CHF. Pt states she has had CHF education in the past. She has the CHF education booklet at her bedside. Pt is known to CTS from prior admission. Pt has known noncompliance with diet, meds, showing up for appts. Pt states she lives at home alone. She does not have any home services or home oxygen. She has a cane and is independent with all cares and activities. She still drives. We discussed low Na diet and she states she does not cook with salt or add salt. She also follows a diabetic diet and counts her carbs. We discussed weighing herself daily at home as pt was given a scale last admission. She states she does not weigh herself. We reviewed weight monitoring and symptoms of CHF exacerbation. She admits that she is not good about following what is recommended. She manages her own medications and denies questions with this. She says she has a sister, a couple friends and neighbors who help her when needed.    She states she no longer goes to [facility] in [place] because she did not like her doctor there. She is switching to [facility] in [place]. [facility]  offered to help her establish care and make follow up but pt declines stating she can make her own appts.     PLAN  Patient anticipates discharging to home .        Patient anticipates needs for home equipment: no  Plan/Disposition: Home   Appointments: pt want to make her own follow up      Care  (CTS) will continue to follow as needed. Will cont to follow for discharge plan.    Goldie Pedraza RN CTS  Care Transitions  [phone]

## 2019-05-28 NOTE — PLAN OF CARE
Vs slightly elevated at times, on B\P meds. 3L O2, dyspnea on ambulating. Afebrile. Elevated BNP on admission. On IV lasix. On 1800 FR. CR elevated. Nephrology consult added. Echo done today shows EF of 45-50%. BLE edema, ultrasound BLE done. New DVT on L leg. Hx of PE. On Hep gtt, INR 1.23. Pt received coumadin this AM. Pharm checking coverage for discharge. Keyona Vo RN on 5/28/2019 at 1:12 PM

## 2019-05-28 NOTE — ED PROVIDER NOTES
History     Chief Complaint:  Shortness of Breath    HPI   Roxy Beaulieu is a 59 year old female on Coumadin with a history of PE, HTN, HLD, CHF w/ reduced EF and DM2 who presents to the emergency department today for evaluation of shortness of breath. She has had increased bilateral leg swelling for the past few days. She states she was fine 2 weeks ago and didn't become short of breath until 3 days ago. Shortness of breath worse lying down; 80% ORA on arrival. Her PE was several years ago. No fever. States she is compliant with medications.     Allergies:  Heparin    Medications:    amLODIPine (NORVASC) 10 MG tablet  atorvastatin (LIPITOR) 20 MG tablet  carvedilol (COREG) 25 MG tablet  hydrALAZINE (APRESOLINE) 50 MG tablet  insulin isophane & regular (HUMULIN MIX 70/30 PEN , NOVOLIN MIX 70/30 PEN) susp  torsemide (DEMADEX) 20 MG tablet  warfarin (COUMADIN) 5 MG tablet    Past Medical History:     High cholesterol   Hypertension   Obesity   Personal history of tobacco use, presenting hazards to health   Uncontrolled type 2 diabetes mellitus with hyperglycemia, with long-term current use of insulin     Past Surgical History:    History reviewed. No pertinent surgical history.    Family History:    History reviewed. No pertinent family history.    Social History:  The patient was accompanied to the ED by nobody.  Smoking Status: Former Smoker  Smokeless Tobacco: Never Used  Alcohol Use: Negative   Marital Status:  Single     Review of Systems   Constitutional: Negative for fever.   Respiratory: Positive for shortness of breath.    Cardiovascular: Positive for leg swelling.   All other systems reviewed and are negative.    Physical Exam     Patient Vitals for the past 24 hrs:   BP Temp Temp src Pulse Heart Rate Resp SpO2 Height Weight   05/27/19 2121 -- -- -- -- 80 26 100 % -- --   05/27/19 2120 179/78 -- -- 81 -- -- -- -- --   05/27/19 2102 -- -- -- -- 84 27 100 % -- --   05/27/19 2100 -- -- -- -- 82 27 100 %  "-- --   05/27/19 2007 -- -- -- -- -- -- 99 % -- --   05/27/19 2004 (!) 191/91 -- -- -- -- -- -- -- --   05/27/19 2000 (!) 191/91 -- -- 92 -- -- 96 % -- --   05/27/19 1957 -- 98.3  F (36.8  C) Oral 88 -- 24 -- 1.727 m (5' 8\") 83.9 kg (185 lb)   05/27/19 1955 -- -- -- -- -- -- (!) 83 % -- --      Physical Exam  General: Patient is alert and interactive when I enter the room  Head:  The scalp, face, and head appear normal  Eyes:  The pupils are equal, round, and reactive to light    Conjunctivae and sclerae are normal  ENT:    External acoustic canals are normal    The oropharynx is normal without erythema.     Uvula is in the midline  Neck:  Normal range of motion  CV:  Regular rate. S1/S2. Systolic murmur.  Crackles skilled nursing up in the back. Mild respiratory distress.  Resp:  Lungs are clear without wheezes or rales. No distress  GI:  Abdomen is soft, no rigidity, guarding, or rebound    No distension. No tenderness to palpation in any quadrant.     MS:  Normal tone. Joints grossly normal without effusions.     Bilateral leg swelling, symmetrical.    Normal motor assessment of all extremities.  Skin:  No rash or lesions noted. Normal capillary refill noted  Neuro: Speech is normal and fluent. Face is symmetric.     Moving all extremities well.   Psych:  Awake. Alert.  Normal affect.  Appropriate interactions.  Lymph: No anterior cervical lymphadenopathy noted    Emergency Department Course     ECG:  ECG taken at 1959, ECG read at 2000  Normal sinus rhythm  Possible anterior infarct, age undetermined  Rate 92 bpm. MN interval 170 ms. QRS duration 92 ms. QT/QTc 370/457 ms. P-R-T axes 53 0 57.    Imaging:  Radiology findings were communicated with the patient who voiced understanding of the findings.    US Lower Extremity Venous Duplex Bilateral  1. Positive scan for left lower extremity deep venous thrombosis  involving the common femoral, mid femoral, and popliteal veins.  2. No evidence of right lower extremity deep " venous thrombosis.  Reading per radiology    Laboratory:  Laboratory findings were communicated with the patient who voiced understanding of the findings.    ISTAT gases Venous: Lactic acid 0.5  CBC: WBC 6.5, HGB 8.4,   BMP: Chloride 110, Glucose 203, Urea N 64, Creatinine 3.79, GFR 12, Calcium 7.8, o/w WNL  CMP addition: ALT 56, AlkPhos 155, Albumin 2.9  Troponin I: 0.027  PTT: 28  Nt ProBNP: 12,801  INR: 1.04    Interventions:  2122 Lasix 60 mg IV  2256 Argatroban 1mg/mL IV drip infusion    Emergency Department Course:    1955 Nursing notes and vitals reviewed.    2000 I performed an exam of the patient as documented above.     2045 Patient was rechecked and updated.     2058 IV was inserted and blood was drawn for laboratory testing, results above.     2147 I spoke with Dr. Morgan of the Hospitalist service regarding patient's presentation, findings, and plan of care.     2200 I personally reviewed the lab and imaging results with the patient and answered all related questions prior to admission.    Impression & Plan      Medical Decision Making:  Roxy Beaulieu is a 59 year old female who presents to the emergency department today for evaluation of dyspnea.  She has hypoxia upon arrival which corrects before I arrive with NC O2.    A broad differential was of course considered.  She states she is taking Coumadin but her INR is low (1.04).  I am worried she has pulmonary embolism vs CHF exacerbation vs both.  I have difficulty doing a  CT her with a creatinine over 3.5 at this point and she understands that I cannot tell for certain at this point if she has clot burden and how much. I don't think a V/Q tonight will help me as already anticoagulating her.      I will start an argatroban drip as she has a history of heparin-induced thrombocytopenia.  She has evidence of acute heart failure with crackles in her lungs and leg swelling so I will start Lasix although I will have to watch her creatinine  and see if she is able to produce urine from this. I have a reason for her hypoxia that is not PE. She has had elevated bnp in the past and has a hx of CHF.  She has a positive ultrasound study of the leg but this is non occlusive thrombus; no indication for catheter directed tPA necessarily without any signs of phlegmasia.      Doubt ACS and normal troponin w/ days of symptoms.     The patient now has insurance and hopefully she can start following up and taking better care of her health.  We long discussion about this. She will be admitted to the hospitalist service for further intervention.    Diagnosis:    ICD-10-CM    1. Acute on chronic systolic congestive heart failure (H) I50.23 CANCELED: Partial thromboplastin time   2. Acute renal failure, unspecified acute renal failure type (H) N17.9    3. Hypoxia R09.02    4. Possible pulmonary embolism without acute cor pulmonale, unspecified chronicity (H) I26.99      Disposition:   Admission    Scribe Disclosure:  I, Gael Desai, am serving as a scribe at 8:05 PM on 5/27/2019 to document services personally performed by Adolph Padilla MD based on my observations and the provider's statements to me.    [facility] EMERGENCY DEPARTMENT       Adolph Padilla MD  05/27/19 8858       Adolph Padilla MD  05/27/19 2694

## 2019-05-28 NOTE — CONSULTS
Nephrology Initial Consult  May 28, 2019      Roxy Beaulieu MRN:[id number] YOB: 1960  Date of Admission:5/27/2019  Primary care provider: Dwain London  Requesting physician: Lauri Hernandez MD    ASSESSMENT AND RECOMMENDATIONS:   1 CKD IV/ V with nephrotic range proteinuria  - 2/2 diabetic/ hypertensive nephropathy   Slightly worse with current presentation with CHF, ? PE   Worse this morning with 100 mg IV lasix overnight /diuresis    2 LE DVT/ ? PE - on heparin drip   3 CHF , LE edema, b/l pleural effusion.   4 Severe anemia - possibly related to advanced CKD.  5 Uncontrolled DM - per primary team.   6 HTN - resume PTA meds. Should improve with diuresis.     Recc -   -No indication for dialysis yet but seems fairly close. Does not have prior renal follow up or much understanding re; dialysis ,although she knew that her kidney function was pretty bad.   - Okay with current dose of Lasix. Will see response and adjust it.   - Check iron panel . If iron sufficient, will likely place on CARTER. PRBC per primary team.   -check phos. Will start binders if high.     Enrico Vaughn MD  [facility] - Nephrology   [phone]        REASON FOR CONSULT: CKD, vol overload    HISTORY OF PRESENT ILLNESS:  59YF with hx of T2Dm, HPL ,HTN, Obesity , PT, toe amputation for gangrene, proteinuric CKD IV, hypertensive cardiomyopathy admitted on 5/27 with worsening b/l leg swelling, dyspnea in setting of medication non-compliance, subtherapeutic INR  and was found to have Lt LE DVT with a high suspiction for PE ( on heparin), b/l small pleural effusion on CXR ( currently on Lasix 40mg TID IV with modest diuresis) .     Renal Hx -   ,Advanced CKD with baseline S.Cr of ~ 2.8 , and longstanding nephrotic range proteinuria upto 9 gm with diabetic/ hypertensive nephroapathy. US form 10/18 shows increased echogenicity of b/l cortex, otherwise normal US.   SPEP/ UPEP -ve in 10/18 along with negative SARAH.    UA traditionally significant for high grade albuminuria , otherwise bland.   Admitted with Cr of 3.7 and trending up.      12/19/2018 06:31 5/27/2019 20:10 5/28/2019 07:13   Creatinine 2.82 (H) 3.79 (H) 3.97 (H)   GFR Estimate 17 (L) 12 (L) 11 (L)     Echo 12/18 with EF of 40-45% and normal wall thickness ; normal RV. Results from today pending.   On eval , she continues on heparin drip. Reports her breathing is much better. Endorses sudden increase in LE edema, first on lt leg followed by rt leg. Mild orthopnea which has improved. Denies chest pain.       PAST MEDICAL HISTORY:  Reviewed with patient on 05/28/2019 and is as listed in HPI       MEDICATIONS:  PTA Meds  Prior to Admission medications    Medication Sig Last Dose Taking? Auth Provider   amLODIPine (NORVASC) 5 MG tablet Take 5 mg by mouth daily 5/27/2019 at Unknown time Yes Unknown, Entered By History   carvedilol (COREG) 25 MG tablet Take 2 tablets (50 mg) by mouth 2 times daily (with meals) 5/27/2019 at x1 Yes Sekou Shell MD   hydrALAZINE (APRESOLINE) 50 MG tablet Take 1 tablet (50 mg) by mouth 3 times daily 5/27/2019 at x2 Yes Sekou Shell MD   insulin isophane & regular (HUMULIN MIX 70/30 PEN , NOVOLIN MIX 70/30 PEN) susp Inject 15-20 Units Subcutaneous At Bedtime 5/26/2019 at Unknown time Yes Unknown, Entered By History   warfarin (COUMADIN) 5 MG tablet Take 1 tablet (5 mg) by mouth daily 5/26/2019 at Unknown time Yes Sekou Shell MD      Current Meds    amLODIPine  5 mg Oral Daily     carvedilol  50 mg Oral BID w/meals     furosemide  40 mg Intravenous Q8H     hydrALAZINE  50 mg Oral TID     insulin aspart  1-7 Units Subcutaneous TID AC     insulin aspart  1-5 Units Subcutaneous At Bedtime     insulin isophane & regular  16 Units Subcutaneous At Bedtime     Infusion Meds    HEParin 1,400 Units/hr (05/28/19 0208)     - MEDICATION INSTRUCTIONS -       Warfarin Therapy Reminder         ALLERGIES:    Allergies   Allergen Reactions      "Heparin Other (See Comments)     HIT-serotonin assay negative. NOT HIT       REVIEW OF SYSTEMS:  A comprehensive of systems was negative except as noted above.    SOCIAL HISTORY:   Single. Lives in [place]. Former smoker, quit 4 yrs ago. No alcohol   FAMILY MEDICAL HISTORY:   No FH of kid dis    PHYSICAL EXAM:   Temp  Av.8  F (36.6  C)  Min: 97.4  F (36.3  C)  Max: 98.3  F (36.8  C)      Pulse  Av.8  Min: 78  Max: 92 Resp  Av.1  Min: 18  Max: 27  SpO2  Av.5 %  Min: 83 %  Max: 100 %       /60 (BP Location: Left arm)   Pulse 78   Temp 97.9  F (36.6  C) (Oral)   Resp 18   Ht 1.702 m (5' 7\")   Wt 101.4 kg (223 lb 8 oz)   SpO2 96%   BMI 35.01 kg/m     Date 19 07 - 19 0659   Shift 5524-2780 0796-2108 9028-0700 24 Hour Total   INTAKE   Shift Total(mL/kg)       OUTPUT   Urine 700   700   Shift Total(mL/kg) 700(6.9)   700(6.9)   Weight (kg) 101.38 101.38 101.38 101.38      Admit Weight: 83.9 kg (185 lb)     GENERAL APPEARANCE: no distress,  awake  EYES: no scleral icterus, pupils equal  HENT: NC/AT,  mouth  without ulcers or lesions  Lymphatics: no cervical or supraclavicular LAD  Endo: no moon facies, no goiter  Pulmonary: diminished breath sounds at bases  CV: irregular, HSM at LLSE   - Edema++  GI: soft, nontender, normal bowel sounds,   MS: no evidence of inflammation in joints, no muscle tenderness  : no leon  SKIN: no rash, warm, dry, no cyanosis  NEURO: face symmetric, no asterixis     LABS:   CMP  Recent Labs   Lab 19  0713 19    141   POTASSIUM 4.4 4.6   CHLORIDE 112* 110*   CO2 25 24   ANIONGAP 6 7   * 203*   BUN 65* 64*   CR 3.97* 3.79*   GFRESTIMATED 11* 12*   GFRESTBLACK 13* 14*   GILL 7.7* 7.8*   PROTTOTAL  --  6.9   ALBUMIN  --  2.9*   BILITOTAL  --  0.3   ALKPHOS  --  155*   AST  --  38   ALT  --  56*     CBC  Recent Labs   Lab 19  0713 19  0001 19   HGB 7.0* 7.4* 8.4*   WBC 5.4 6.7 6.5   RBC 2.70* 2.82* " 3.20*   HCT 23.8* 24.5* 27.6*   MCV 88 87 86   MCH 25.9* 26.2* 26.3*   MCHC 29.4* 30.2* 30.4*   RDW 15.0 15.2* 15.0    192 226     INR  Recent Labs   Lab 05/28/19  0713 05/28/19  0001 05/27/19 2010   INR 1.23*  --  1.04   PTT  --  35 28     ABGNo lab results found in last 7 days.   URINE STUDIES  Recent Labs   Lab Test 10/05/18  1155 03/15/18  1442   COLOR Straw Yellow   APPEARANCE Clear Clear   URINEGLC 150* >499*   URINEBILI Negative Negative   URINEKETONE Negative 5*   SG 1.012 1.024   UBLD Small* Small*   URINEPH 6.0 6.0   PROTEIN >499* >499*   NITRITE Negative Negative   LEUKEST Trace* Negative   RBCU 1 1   WBCU 4 1     Recent Labs   Lab Test 12/19/18  0825 10/05/18  1155   UTPG 6.15* 9.00*     PTH  Recent Labs   Lab Test 10/06/18  0609   PTHI 248*     IRON STUDIES  Recent Labs   Lab Test 10/06/18  0609   IRON 33*   *   IRONSAT 19   MITRA 165       IMAGING:  Personally reviewed CXR , b/l small pleural effusions.     Enrico Vaughn MD

## 2019-05-28 NOTE — PLAN OF CARE
Patient admitted this shift with c/o shortness of breath. BNP 06090. Alert and oriented x 4 however forgetfulness noticed throughout night. Ambulates with SBA. Tolerated RA for a while however became lightheaded and O2 sats in mid 80's after amublating to bathroom. Placed back on 3Lpm with O2 sats in mid 90's. CXR done, results pending. LE US showed LLE DVT. Heparin gtt infusing and coumadin started. IV lasix given with ok diuresis after. Patient rested in chair tonight does not like to sleep in hospital beds. Educated on repositioning in chair often to avoid pressure ulcers, buttocks remained WDL with no redness noted throughout night.

## 2019-05-28 NOTE — PHARMACY-ANTICOAGULATION SERVICE
Clinical Pharmacy - Warfarin Dosing Consult     Pharmacy has been consulted to manage this patient s warfarin therapy.  Indication: DVT/ PE Treatment  Therapy Goal: INR 2-3  Warfarin Prior to Admission: Yes  Warfarin PTA Regimen: 5mg daily, but apparently non-compliant  Recent documented change in oral intake/nutrition: Unknown    INR   Date Value Ref Range Status   05/27/2019 1.04 0.86 - 1.14 Final   12/19/2018 1.24 (H) 0.86 - 1.14 Final       Recommend warfarin 5mg overnight [overnight admission]. Currently bridging with Heparin infusion. Pharmacy will monitor Roxy Zhangx daily and order warfarin doses to achieve specified goal.      Please contact pharmacy as soon as possible if the warfarin needs to be held for a procedure or if the warfarin goals change.

## 2019-05-29 ENCOUNTER — APPOINTMENT (OUTPATIENT)
Dept: OCCUPATIONAL THERAPY | Facility: CLINIC | Age: 59
End: 2019-05-29
Attending: INTERNAL MEDICINE
Payer: MEDICAID

## 2019-05-29 LAB
ALBUMIN SERPL-MCNC: 2.5 G/DL (ref 3.4–5)
ANION GAP SERPL CALCULATED.3IONS-SCNC: 6 MMOL/L (ref 3–14)
BUN SERPL-MCNC: 61 MG/DL (ref 7–30)
CALCIUM SERPL-MCNC: 7.9 MG/DL (ref 8.5–10.1)
CHLORIDE SERPL-SCNC: 110 MMOL/L (ref 94–109)
CHOLEST SERPL-MCNC: 175 MG/DL
CO2 SERPL-SCNC: 26 MMOL/L (ref 20–32)
CREAT SERPL-MCNC: 4.26 MG/DL (ref 0.52–1.04)
GFR SERPL CREATININE-BSD FRML MDRD: 11 ML/MIN/{1.73_M2}
GLUCOSE BLDC GLUCOMTR-MCNC: 116 MG/DL (ref 70–99)
GLUCOSE BLDC GLUCOMTR-MCNC: 150 MG/DL (ref 70–99)
GLUCOSE BLDC GLUCOMTR-MCNC: 185 MG/DL (ref 70–99)
GLUCOSE BLDC GLUCOMTR-MCNC: 238 MG/DL (ref 70–99)
GLUCOSE SERPL-MCNC: 115 MG/DL (ref 70–99)
HDLC SERPL-MCNC: 41 MG/DL
INR PPP: 1.15 (ref 0.86–1.14)
LDLC SERPL CALC-MCNC: 106 MG/DL
LMWH PPP CHRO-ACNC: 0.43 IU/ML
NONHDLC SERPL-MCNC: 134 MG/DL
PHOSPHATE SERPL-MCNC: 6.4 MG/DL (ref 2.5–4.5)
POTASSIUM SERPL-SCNC: 4.9 MMOL/L (ref 3.4–5.3)
SODIUM SERPL-SCNC: 142 MMOL/L (ref 133–144)
TRIGL SERPL-MCNC: 142 MG/DL

## 2019-05-29 PROCEDURE — 97165 OT EVAL LOW COMPLEX 30 MIN: CPT | Mod: GO

## 2019-05-29 PROCEDURE — 80069 RENAL FUNCTION PANEL: CPT | Performed by: INTERNAL MEDICINE

## 2019-05-29 PROCEDURE — 25000131 ZZH RX MED GY IP 250 OP 636 PS 637: Performed by: INTERNAL MEDICINE

## 2019-05-29 PROCEDURE — 99233 SBSQ HOSP IP/OBS HIGH 50: CPT | Performed by: HOSPITALIST

## 2019-05-29 PROCEDURE — 00000146 ZZHCL STATISTIC GLUCOSE BY METER IP

## 2019-05-29 PROCEDURE — 12000000 ZZH R&B MED SURG/OB

## 2019-05-29 PROCEDURE — 80061 LIPID PANEL: CPT | Performed by: INTERNAL MEDICINE

## 2019-05-29 PROCEDURE — 25000132 ZZH RX MED GY IP 250 OP 250 PS 637: Performed by: INTERNAL MEDICINE

## 2019-05-29 PROCEDURE — 97110 THERAPEUTIC EXERCISES: CPT | Mod: GO

## 2019-05-29 PROCEDURE — 85520 HEPARIN ASSAY: CPT | Performed by: INTERNAL MEDICINE

## 2019-05-29 PROCEDURE — 36415 COLL VENOUS BLD VENIPUNCTURE: CPT | Performed by: INTERNAL MEDICINE

## 2019-05-29 PROCEDURE — 25000128 H RX IP 250 OP 636: Performed by: INTERNAL MEDICINE

## 2019-05-29 PROCEDURE — 97535 SELF CARE MNGMENT TRAINING: CPT | Mod: GO

## 2019-05-29 PROCEDURE — 85610 PROTHROMBIN TIME: CPT | Performed by: INTERNAL MEDICINE

## 2019-05-29 PROCEDURE — 25800030 ZZH RX IP 258 OP 636: Performed by: INTERNAL MEDICINE

## 2019-05-29 RX ORDER — CALCIUM ACETATE 667 MG/1
667 CAPSULE ORAL
Status: DISCONTINUED | OUTPATIENT
Start: 2019-05-29 | End: 2019-06-05 | Stop reason: HOSPADM

## 2019-05-29 RX ORDER — WARFARIN SODIUM 7.5 MG/1
7.5 TABLET ORAL
Status: COMPLETED | OUTPATIENT
Start: 2019-05-29 | End: 2019-05-29

## 2019-05-29 RX ADMIN — CALCIUM ACETATE 667 MG: 667 CAPSULE ORAL at 18:57

## 2019-05-29 RX ADMIN — HYDRALAZINE HYDROCHLORIDE 50 MG: 50 TABLET, FILM COATED ORAL at 22:26

## 2019-05-29 RX ADMIN — HYDRALAZINE HYDROCHLORIDE 50 MG: 50 TABLET, FILM COATED ORAL at 08:20

## 2019-05-29 RX ADMIN — HEPARIN SODIUM 1400 UNITS/HR: 10000 INJECTION, SOLUTION INTRAVENOUS at 12:24

## 2019-05-29 RX ADMIN — CALCIUM ACETATE 667 MG: 667 CAPSULE ORAL at 13:57

## 2019-05-29 RX ADMIN — INSULIN ASPART 1 UNITS: 100 INJECTION, SOLUTION INTRAVENOUS; SUBCUTANEOUS at 22:27

## 2019-05-29 RX ADMIN — CARVEDILOL 50 MG: 25 TABLET, FILM COATED ORAL at 18:57

## 2019-05-29 RX ADMIN — WARFARIN SODIUM 7.5 MG: 7.5 TABLET ORAL at 18:57

## 2019-05-29 RX ADMIN — INSULIN HUMAN 16 UNITS: 100 INJECTION, SUSPENSION SUBCUTANEOUS at 22:25

## 2019-05-29 RX ADMIN — HYDRALAZINE HYDROCHLORIDE 50 MG: 50 TABLET, FILM COATED ORAL at 15:55

## 2019-05-29 RX ADMIN — IRON SUCROSE 200 MG: 20 INJECTION, SOLUTION INTRAVENOUS at 16:07

## 2019-05-29 RX ADMIN — AMLODIPINE BESYLATE 5 MG: 5 TABLET ORAL at 08:20

## 2019-05-29 RX ADMIN — CARVEDILOL 50 MG: 25 TABLET, FILM COATED ORAL at 08:20

## 2019-05-29 ASSESSMENT — MIFFLIN-ST. JEOR: SCORE: 1602.35

## 2019-05-29 ASSESSMENT — ACTIVITIES OF DAILY LIVING (ADL)
ADLS_ACUITY_SCORE: 15
ADLS_ACUITY_SCORE: 16
PREVIOUS_RESPONSIBILITIES: MEAL PREP;HOUSEKEEPING;LAUNDRY;MEDICATION MANAGEMENT;FINANCES;DRIVING;WORK
ADLS_ACUITY_SCORE: 15
ADLS_ACUITY_SCORE: 15

## 2019-05-29 NOTE — CONSULTS
CLINICAL NUTRITION SERVICES  -  ASSESSMENT NOTE    Malnutrition: does not meet criteria at this time. At risk      REASON FOR ASSESSMENT  Roxy Beaulieu is a 59 year old female seen by Registered Dietitian for Provider Order - Nutrition Education - Congestive Heart Failure (CHF) - Dietitian to instruct patient on 2 gram sodium diet    History of DM2, gangrene of toe, high cholesterol, HTN, obesity, PE, smoking, CKD IV, cardiomyopathy, CH. Presented with shortness of breath, leg swelling, hypoxemia, elevated BNP, DVT    NUTRITION HISTORY  Information obtained from patient, EMR.   Food allergies/intolerances: NKFA  Patient is on a 2g Na diet, and a diabetic diet at home. She actually verbalized confusion, because she was once told to eat <1100 mg Na daily, but also had education for 5656-9810 mg.   - She notes that it was a difficult transition, but now if she eats something salty she is able to taste it, which does not appeal to her.   - Typical food/fluid intake PTA: She actually was eating only 1 meal/day for some time, she thinks her appetite was reduced due to a med she was put on a few months ago. Lin does clarify that she was attempting to consume large meals during this time, in order to get enough energy.   - Usually she eats 2 meals daily.     Breakfast: HB or poached eggs and toast  Lunch: none  Dinner: meat, potatoes. Or a salad (may purchase from grocery or restaurant). She does not use much processed foods, tries to limit condiments, and has been increasing intake of fruit/veg.   No fast food.   Fluids: drinks up to 2 cans diet coke daily    Living situation: independently  She does her own shopping and meal prep.  Previous education and adherence: feels she is doing better, but still eats a high sodium meal or snack on occasion, she can tell because it will be difficult to breath for 2-3 days afterward. She wonders if there are tips for coming back from a high salt meal.   - Over the last few  "months, may have intermittently been meeting <75% baseline intakes given reduced appetite.   - Barriers: med interaction causing decreased appetite? SOB.     CURRENT NUTRITION ORDERS  Diet Order:     2000 mg Sodium and 1800 mL FR     Current Intake/Tolerance:  Eating 100% of meals. This morning tolerated fruit and yogurt with good appetite. She states that she is not on her home meds here (or at least all of them) so her appetite is coming back to normal.     NUTRITION FOCUSED PHYSICAL ASSESSMENT (NFPA)  Completed:  Yes Visual assessment only         Observed:    Muscle wasting (refer to documentation in Malnutrition section)    Obtained from Chart/Interdisciplinary Team:  No BM this admission   Nephrology following - no indication for dialysis yet (5/28)  2+ mild edema LEs    ANTHROPOMETRICS  Height: 5' 7\"  Weight: 221 lbs 8 oz (100.5 kg)   Body mass index is 34.69 kg/m .  Weight Status:  Obesity Grade I BMI 30-34.9  IBW: 61.4 kg  % IBW: 164%  Weight History: currently fluid up. Pt does report a possible 10# weight loss in the past few months, due to decreased oral intakes though unable to confirm.  Wt Readings from Last 10 Encounters:   05/29/19 100.5 kg (221 lb 8 oz)   12/19/18 93.4 kg (206 lb)   11/05/18 86.8 kg (191 lb 4.8 oz)   10/07/18 89.4 kg (197 lb)   07/06/18 83.9 kg (185 lb)   03/16/18 83.5 kg (184 lb)   04/29/17 96.3 kg (212 lb 3.2 oz)   09/25/16 93 kg (205 lb)       LABS  Labs reviewed  Recent Labs   Lab Test 05/29/19  0622 05/28/19  0713 05/27/19 2010 12/19/18  0631 12/18/18  0620   POTASSIUM 4.9 4.4 4.6 4.4 4.2     Recent Labs   Lab Test 05/29/19  0622 10/06/18  0609 03/15/18  2041   PHOS 6.4* 4.7* 3.2     Recent Labs   Lab Test 12/17/18  1041 03/15/18  2041 05/03/17  0725 05/02/17  0657 04/30/17  0613   MAG 2.0 2.1 1.5* 1.7 1.6     Recent Labs   Lab Test 05/29/19  0622 05/28/19  0713 05/27/19 2010 12/19/18  0631 12/18/18  0620    143 141 141 143     Recent Labs   Lab Test 05/29/19  0622 " 05/28/19  0713 05/27/19 2010 12/19/18  0631 12/18/18  0620   CR 4.26* 3.97* 3.79* 2.82* 2.86*     Recent Labs   Lab 05/29/19  0622 05/28/19  0713 05/27/19 2010   * 124* 203*     Lab Results   Component Value Date    A1C 10.5 05/28/2019    A1C 11.6 10/04/2018    A1C >16.0 03/16/2018    A1C >16.0 03/15/2018    A1C 12.3 04/28/2017         MEDICATIONS  Medications reviewed  MSSI    ASSESSED NUTRITION NEEDS PER APPROVED PRACTICE GUIDELINES:  Dosing Weight 71.2 kg - adjusted   Estimated Energy Needs: 0046-5571 kcals (25-30 Kcal/Kg)  Justification: maintenance  Estimated Protein Needs:  grams protein (1.2-1.5 g pro/Kg, or at least 1 g/kg)  Justification:  preservation of lean body mass with regard to kidney function   Estimated Fluid Needs: Per Provider (1800 mL FR)    MALNUTRITION:  % Weight Loss:  Unable to fully assess  % Intake:  Decreased intake does not meet criteria for malnutrition   Subcutaneous Fat Loss:  None observed  Muscle Loss:  Temporal region mild depletion and Dorsal hand region mild depletion  Fluid Retention:  Mild 2+ edema    Malnutrition Diagnosis: Unable to determine due to difficulty deciphering weight trending. Pt is at risk.     NUTRITION DIAGNOSIS:  Predicted inadequate nutrient intake related to reported reduced appetite with prescribed med, appetite much improved with admission eating 100% and potential for decline.    NUTRITION INTERVENTIONS  Recommendations / Nutrition Prescription  Continue low sodium diet and FR per MD    Consider need for high protein supplements if intakes decline.       Implementation  Nutrition education: Provided education on 2g Na    Assessed learning needs, learning preferences, and willingness to learn    Nutrition Education (Content):  a) Provided handout   a. Low sodium nutrition therapy  b) Discussed   a. Recommended daily allowance  b. Eating small/freq meals  c. Avoiding high salt processed and fast foods    Nutrition Education  (Application):  a) Discussed eating habits and recommended alternative food choices  b) Small/freq meals     Patient verbalizes understanding of diet    Anticipate fair-good compliance    Diet Education - refer to Education Flowsheet      Collaboration and Referral of Nutrition care: attendance at IDT rounds.       Nutrition Goals  Pt to consume at least 75% meals BID- TId  Pt to verbalize at least 2 foods high in Sodium       MONITORING AND EVALUATION:  Progress towards goals will be monitored and evaluated per protocol and Practice Guidelines    Danica Rubio RD, LD  3rd floor/ICU: [phone]  All other floors: [phone]  Weekend/holiday: [phone]  Office: [phone]

## 2019-05-29 NOTE — PLAN OF CARE
OT/CR: Order received, eval completed and treatment initiated. Pt is a 59 year old female admitted with SOB, LE swelling, hypoxemia, elevated NT BNP and nonocclusive left lower extremity DVT; CHF. [place], has been staying on main level due to SOB recently, however laundry is located on upper level, tub/shower (upstairs), has been sponge bathing at kitchen, standard height toilet. Pt reports indep in all ADLs, IADLs and mobility tasks with use of cane or 4ww in community. 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    Discharge Planner OT   Patient plan for discharge: Home  Current status: Pt completed sit > stand from bedside chair with SBA, ambulated to/from BR with supervision. Pt completed toilet transfer and toileting tasks with supervision, grooming in stance with supervision. O2 sats 95% on 2LPM at rest, pt requesting to trial O2 off with activity, decreased to 85% on RA with activity, quickly improved to >90% with 2LPM re-donned and education for PLB technique. Pt ambulated in hallway ~175 ft with SBA, IV pole utilized for steadying support, no balance deficits identified. No SOB noted/reported. Education provided on CHF including stoplight tool, sign/symptom monitoring, low sodium diet, EC/WS and lifestyle modifications, pt engaged in conversation and verbalized understanding.  Barriers to return to prior living situation: Stairs, O2 needs, decreased functional activity tolerance  Recommendations for discharge: Home  Rationale for recommendations: Recommend [facility] clinic follow-up to assist with CHF mgmt. Will continue to follow while IP for OT/CR to maximize safety and indep in all ADLs/IADLs and mobility tasks through improved functional activity tolerance.        Entered by: Komal Thompson 05/29/2019 11:43 AM

## 2019-05-29 NOTE — PROGRESS NOTES
05/29/19 1105   Quick Adds   Type of Visit Initial Occupational Therapy Evaluation   Living Environment   Lives With alone   [place]   Home Accessibility stairs to enter home;stairs within home   Number of Stairs, Main Entrance 1   Transportation Anticipated car, drives self;family or friend will provide   Living Environment Comment Pt lives alone in 2 level [facility], 1 step to enter, has been staying on main level due to SOB recently, however laundry is located on upper level, tub/shower (upstairs), has been sponge bathing at kitchen, standard height toilet   Self-Care   Usual Activity Tolerance good   Current Activity Tolerance moderate   Regular Exercise No   Equipment Currently Used at Home cane, straight   Functional Level   Ambulation 1-->assistive equipment   Transferring 1-->assistive equipment   Toileting 0-->independent   Bathing 0-->independent   Dressing 0-->independent   Eating 0-->independent   Communication 0-->understands/communicates without difficulty   Swallowing 0-->swallows foods/liquids without difficulty   Cognition 0 - no cognition issues reported   Fall history within last six months no   Which of the above functional risks had a recent onset or change? transferring;toileting;bathing;dressing   Prior Functional Level Comment Pt reports indep in all ADLs, IADLs and mobility tasks with use of cane or 4ww in community. 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    General Information   Onset of Illness/Injury or Date of Surgery - Date 05/27/19   Referring Physician Lauri Hernandez MD   Patient/Family Goals Statement Pt's goal is to d/c home   Additional Occupational Profile Info/Pertinent History of Current Problem Per chart: Pt is a 59 year old female admitted with SOB, LE swelling, hypoxemia, elevated NT BNP and nonocclusive left lower extremity DVT; CHF.    Precautions/Limitations fall precautions;oxygen therapy device and L/min    Visual Perception   Visual Perception Wears glasses  (reading)   Sensory Examination   Sensory Comments Pt denies numbness/tingling in BUEs   Pain Assessment   Patient Currently in Pain No   Range of Motion (ROM)   ROM Comment WFL   Strength   Strength Comments WFL   Hand Strength   Hand Strength Comments WFL   Transfer Skill: Bed to Chair/Chair to Bed   Level of [place]: Bed to Chair stand-by assist   Physical Assist/Nonphysical Assist: Bed to Chair 1 person assist   Transfer Skill: Sit to Stand   Level of [place]: Sit/Stand stand-by assist   Physical Assist/Nonphysical Assist: Sit/Stand 1 person assist   Transfer Skill: Toilet Transfer   Level of [place]: Toilet stand-by assist   Physical Assist/Nonphysical Assist: Toilet supervision;1 person assist   Balance   Balance Comments No balance deficits identified   Upper Body Dressing   Level of [place]: Dress Upper Body stand-by assist   Physical Assist/Nonphysical Assist: Dress Upper Body 1 person assist   Lower Body Dressing   Level of [place]: Dress Lower Body contact guard   Physical Assist/Nonphysical Assist: Dress Lower Body 1 person assist   Toileting   Level of [place]: Toilet stand-by assist   Physical Assist/Nonphysical Assist: Toilet supervision;1 person assist   Grooming   Level of [place]: Grooming stand-by assist   Physical Assist/Nonphysical Assist: Grooming supervision;1 person assist   Instrumental Activities of Daily Living (IADL)   Previous Responsibilities meal prep;housekeeping;laundry;medication management;finances;driving;work   Activities of Daily Living Analysis   Impairments Contributing to Impaired Activities of Daily Living strength decreased   ADL Comments Pt presents to OT below baseline level of functioning in all areas of ADLs including bathing, dressing, grooming and toileting   General Therapy Interventions   Planned Therapy Interventions ADL retraining;IADL retraining;strengthening;transfer  "training;home program guidelines;progressive activity/exercise;risk factor education   Clinical Impression   Criteria for Skilled Therapeutic Interventions Met yes, treatment indicated   OT Diagnosis Impaired ADLs, IADLs and mobility tasks   Influenced by the following impairments Decreased functional activity tolerance, O2 needs, LE edema   Assessment of Occupational Performance 5 or more Performance Deficits   Identified Performance Deficits Bathing, dressing, grooming, toileting, homemaking, transfers   Clinical Decision Making (Complexity) Low complexity   Therapy Frequency daily   Predicted Duration of Therapy Intervention (days/wks) 4 days   Anticipated Discharge Disposition Home with Assist   Risks and Benefits of Treatment have been explained. Yes   Patient, Family & other staff in agreement with plan of care Yes   Clinical Impression Comments Pt would benefit from skilled OT/CR to maximize safety and indep in all ADLs, IADLs and mobility tasks due to current deficits impacting function    [facility] AM-PAC  \"6 Clicks\" Daily Activity Inpatient Short Form   1. Putting on and taking off regular lower body clothing? 3 - A Little   2. Bathing (including washing, rinsing, drying)? 3 - A Little   3. Toileting, which includes using toilet, bedpan or urinal? 3 - A Little   4. Putting on and taking off regular upper body clothing? 4 - None   5. Taking care of personal grooming such as brushing teeth? 4 - None   6. Eating meals? 4 - None   Daily Activity Raw Score (Score out of 24.Lower scores equate to lower levels of function) 21   Total Evaluation Time   Total Evaluation Time (Minutes) 10     "

## 2019-05-29 NOTE — PLAN OF CARE
Orientation:A & O x4 but forgetful  VS: stable, afebrile.  LS: Clear bilat  GI: 2G Na+ diet  :voiding w/o complications.  Skin: Blanchable redness on bottom  Activity:up w\ 1   Pain: denies  Lines: Hep gtt in periph  Plan: continue anticoagulation therapy     Pt VSS. Ambulated with therapy, tolerated well. Started calcium carb & Iron this shift. BG coverage with meals. Nephrology following.   Keyona Vo RN on 5/29/2019 at 3:01 PM  Heart Failure Care Pathway  GOALS TO BE MET BEFORE DISCHARGE:    1. Decrease congestion and/or edema with diuretic therapy to achieve near      optimal volume status.            Dyspnea improved:  Yes            Edema improved:     Yes        Net I/O and Weights since admission:          04/29 2300 - 05/29 2259  In: 2105.23 [P.O.:1725; I.V.:380.23]  Out: 3575 [Urine:3575]  Net: -1469.77            Vitals:    05/27/19 1957 05/27/19 2314 05/28/19 0316 05/29/19 0608   Weight: 83.9 kg (185 lb) 101.8 kg (224 lb 8 oz) 101.4 kg (223 lb 8 oz) 100.5 kg (221 lb 8 oz)       2.  O2 sats > 92% on RA or at prior home O2 therapy level.          Current oxygenation status:       SpO2: 97 %         O2 Device: None (Room air),  Oxygen Delivery: 2 LPM         Able to wean O2 this shift to keep sats > 92%:  No, please explain: still on 2L O2       Does patient use Home O2? No    3.  Tolerates ambulation and mobility near baseline: Yes        How many times did the patient ambulate with nursing staff this shift? 1    Please review the Heart Failure Care Pathway for additional HF goal outcomes.    Keyona Vo RN  5/29/2019

## 2019-05-29 NOTE — PROGRESS NOTES
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 PE  Now worse with diuresis , upto 4.2       2 LE DVT/ ? PE - on heparin drip . No RV strain on Echo.   3 CHF , LE edema, b/l pleural effusion. Severe LVH with diastolic dysfunction.  4 Severe anemia - possibly related to advanced CKD. Iron stores on low side as well with sat of 17% and Ferritin of 107, low iron  5 Uncontrolled DM - per primary team.   6 HTN - resume PTA meds. Should improve with diuresis.      Recc -   -No indication for dialysis yet but seems fairly close. Does not have prior renal follow up or much understanding re; dialysis ,although she knew that her kidney function was pretty bad.   - Hold further doses of lasix today . Will reassess in AM and decide on further doses.   - Will give Iron sucrose 200 mg X 3 doses while in hospital . Check daily H/H . PRBC per primary team. Will give Darbopoietin after couple doses of iron.   - Start on phos binder - Phoslo 1 tab TID with meals.        Enrico Vaughn MD  [facility] - Nephrology   [phone]           Interval History :   Seen/ examined.   Diuresed well . Better symptomatically with improvement in edema and breathing but with worsening S.Cr       Review of Systems:   A 4 point review of systems was negative except as noted above.  Notably: good appetite. no nausea or vomiting or diarrhea.  no confusion,  no fever or chills    Physical Exam:   I/O last 3 completed shifts:  In: 1491.1 [P.O.:1165; I.V.:326.1]  Out: 2125 [Urine:2125]    GENERAL APPEARANCE: no distress,  awake  EYES: no scleral icterus, pupils equal  HENT: NC/AT,  mouth  without ulcers or lesions  Lymphatics: no cervical or supraclavicular LAD  Endo: no moon facies, no goiter  Pulmonary: diminished breath sounds at bases  CV:  irregular, HSM at LLSE   - Edema++  GI: soft, nontender, normal bowel sounds,   MS: no evidence of inflammation in joints, no muscle tenderness  : no leon  SKIN: no rash, warm, dry, no cyanosis  NEURO: face symmetric, no asterixis         Labs:   All labs reviewed by me  Electrolytes/Renal -   Recent Labs   Lab Test 05/29/19  0622 05/28/19  0713 05/27/19 2010 12/17/18  1041  10/06/18  0609  03/15/18  2041  05/03/17  0725    143 141   < > 141   < > 143   < > 135   < > 140   POTASSIUM 4.9 4.4 4.6   < > 4.8   < > 5.1   < > 4.1   < > 4.6   CHLORIDE 110* 112* 110*   < > 106   < > 108   < > 100   < > 109   CO2 26 25 24   < > 29   < > 27   < > 29   < > 27   BUN 61* 65* 64*   < > 37*   < > 48*   < > 50*   < > 16   CR 4.26* 3.97* 3.79*   < > 2.59*   < > 2.70*   < > 1.46*   < > 1.06*   * 124* 203*   < > 178*   < > 154*   < > 207*   < > 161*   GILL 7.9* 7.7* 7.8*   < > 8.4*   < > 8.2*   < > 8.7   < > 8.7   MAG  --   --   --   --  2.0  --   --   --  2.1  --  1.5*   PHOS 6.4*  --   --   --   --   --  4.7*  --  3.2  --   --     < > = values in this interval not displayed.       CBC -   Recent Labs   Lab Test 05/28/19  0713 05/28/19  0001 05/27/19 2010   WBC 5.4 6.7 6.5   HGB 7.0* 7.4* 8.4*    192 226       LFTs -   Recent Labs   Lab Test 05/29/19  0622 05/27/19 2010 12/19/18  0631 12/18/18  0620 10/04/18  1611   ALKPHOS  --  155* 91  --  167*   BILITOTAL  --  0.3 0.3  --  0.3   ALT  --  56* 10  --  18   AST  --  38 9  --  26   PROTTOTAL  --  6.9 5.7* 5.8* 7.0   ALBUMIN 2.5* 2.9* 2.2* 2.3* 2.7*       Iron Panel -   Recent Labs   Lab Test 05/28/19  1157 10/06/18  0609   IRON 32* 33*   IRONSAT 17 19   MITRA 107 165         Current Medications:    amLODIPine  5 mg Oral Daily     carvedilol  50 mg Oral BID w/meals     hydrALAZINE  50 mg Oral TID     insulin aspart  1-7 Units Subcutaneous TID AC     insulin aspart  1-5 Units Subcutaneous At Bedtime     insulin isophane & regular  16 Units Subcutaneous At Bedtime      warfarin  7.5 mg Oral ONCE at 18:00       HEParin 1,400 Units/hr (05/29/19 2259)     - MEDICATION INSTRUCTIONS -       Warfarin Therapy Reminder       Enrico Vaughn MD

## 2019-05-29 NOTE — PLAN OF CARE
Pain: No c/o pain  LOC: alert and oriented  Mobility: SBA with cane  Lungs: LEVIN. 99% 3L  Tele: SR  GI: 2gm sodium diet 1800cc fluid restrictions  : voiding without difficulty.  Cont IV lasix per orders  IV: PIV infusing heparin drip  Other: Continue IV lasix, heparin drip, insulin, blood sugar checks, coumadin per orders.        Heart Failure Care Pathway  GOALS TO BE MET BEFORE DISCHARGE:    1. Decrease congestion and/or edema with diuretic therapy to achieve near      optimal volume status.            Dyspnea improved:  Yes            Edema improved:   Yes.        Net I/O and Weights since admission:          04/28 2300 - 05/28 2259  In: 1769.13 [P.O.:1485; I.V.:284.13]  Out: 2875 [Urine:2875]  Net: -1105.87            Vitals:    05/27/19 1957 05/27/19 2314 05/28/19 0316   Weight: 83.9 kg (185 lb) 101.8 kg (224 lb 8 oz) 101.4 kg (223 lb 8 oz)       2.  O2 sats > 92% on RA or at prior home O2 therapy level.          Current oxygenation status:       SpO2: 99 %         O2 Device: Nasal cannula,  Oxygen Delivery: 3 LPM         Able to wean O2 this shift to keep sats > 92%:  Yes. Turned down to 2L at end of shift       Does patient use Home O2? No    3.  Tolerates ambulation and mobility near baseline: No        How many times did the patient ambulate with nursing staff this shift? Several times, pivot to commode    Please review the Heart Failure Care Pathway for additional HF goal outcomes.    Treasure Parmar RN  5/28/2019

## 2019-05-29 NOTE — PLAN OF CARE
Heart Failure Care Pathway  GOALS TO BE MET BEFORE DISCHARGE:    1. Decrease congestion and/or edema with diuretic therapy to achieve near      optimal volume status.            Dyspnea improved:  Yes            Edema improved:     Yes        Net I/O and Weights since admission:          04/29 0700 - 05/29 0659  In: 1865.23 [P.O.:1485; I.V.:380.23]  Out: 3175 [Urine:3175]  Net: -1309.77            Vitals:    05/27/19 1957 05/27/19 2314 05/28/19 0316   Weight: 83.9 kg (185 lb) 101.8 kg (224 lb 8 oz) 101.4 kg (223 lb 8 oz)         2.  O2 sats > 92% on RA or at prior home O2 therapy level.          Current oxygenation status:       SpO2: 98 %         O2 Device: Nasal cannula,  Oxygen Delivery: 2 LPM         Able to wean O2 this shift to keep sats > 92%:  No, please explain: Continues on 2L.        Does patient use Home O2? No    3.  Tolerates ambulation and mobility near baseline: No.        How many times did the patient ambulate with nursing staff this shift? 1    Please review the Heart Failure Care Pathway for additional HF goal outcomes.    A&Ox4. VSS. 98% sats on 2L. Slept in chair throughout night. . Denies pain. Tele sinus rhythm.     Johanny Miller RN  5/29/2019

## 2019-05-30 ENCOUNTER — APPOINTMENT (OUTPATIENT)
Dept: OCCUPATIONAL THERAPY | Facility: CLINIC | Age: 59
End: 2019-05-30
Payer: MEDICAID

## 2019-05-30 LAB
ALBUMIN SERPL-MCNC: 2.4 G/DL (ref 3.4–5)
ANION GAP SERPL CALCULATED.3IONS-SCNC: 7 MMOL/L (ref 3–14)
BASOPHILS # BLD AUTO: 0 10E9/L (ref 0–0.2)
BASOPHILS NFR BLD AUTO: 0.5 %
BUN SERPL-MCNC: 61 MG/DL (ref 7–30)
CALCIUM SERPL-MCNC: 7.8 MG/DL (ref 8.5–10.1)
CHLORIDE SERPL-SCNC: 110 MMOL/L (ref 94–109)
CO2 SERPL-SCNC: 25 MMOL/L (ref 20–32)
CREAT SERPL-MCNC: 4.27 MG/DL (ref 0.52–1.04)
DIFFERENTIAL METHOD BLD: ABNORMAL
EOSINOPHIL # BLD AUTO: 0.3 10E9/L (ref 0–0.7)
EOSINOPHIL NFR BLD AUTO: 4.1 %
ERYTHROCYTE [DISTWIDTH] IN BLOOD BY AUTOMATED COUNT: 14.6 % (ref 10–15)
GFR SERPL CREATININE-BSD FRML MDRD: 11 ML/MIN/{1.73_M2}
GLUCOSE BLDC GLUCOMTR-MCNC: 109 MG/DL (ref 70–99)
GLUCOSE BLDC GLUCOMTR-MCNC: 129 MG/DL (ref 70–99)
GLUCOSE BLDC GLUCOMTR-MCNC: 152 MG/DL (ref 70–99)
GLUCOSE BLDC GLUCOMTR-MCNC: 169 MG/DL (ref 70–99)
GLUCOSE SERPL-MCNC: 95 MG/DL (ref 70–99)
HCT VFR BLD AUTO: 24.9 % (ref 35–47)
HGB BLD-MCNC: 7.4 G/DL (ref 11.7–15.7)
IMM GRANULOCYTES # BLD: 0 10E9/L (ref 0–0.4)
IMM GRANULOCYTES NFR BLD: 0.5 %
INR PPP: 1.14 (ref 0.86–1.14)
LMWH PPP CHRO-ACNC: 0.41 IU/ML
LYMPHOCYTES # BLD AUTO: 0.6 10E9/L (ref 0.8–5.3)
LYMPHOCYTES NFR BLD AUTO: 10.1 %
MCH RBC QN AUTO: 26.1 PG (ref 26.5–33)
MCHC RBC AUTO-ENTMCNC: 29.7 G/DL (ref 31.5–36.5)
MCV RBC AUTO: 88 FL (ref 78–100)
MONOCYTES # BLD AUTO: 0.6 10E9/L (ref 0–1.3)
MONOCYTES NFR BLD AUTO: 10.3 %
NEUTROPHILS # BLD AUTO: 4.6 10E9/L (ref 1.6–8.3)
NEUTROPHILS NFR BLD AUTO: 74.5 %
NRBC # BLD AUTO: 0 10*3/UL
NRBC BLD AUTO-RTO: 0 /100
PHOSPHATE SERPL-MCNC: 6.2 MG/DL (ref 2.5–4.5)
PLATELET # BLD AUTO: 184 10E9/L (ref 150–450)
POTASSIUM SERPL-SCNC: 5.1 MMOL/L (ref 3.4–5.3)
RBC # BLD AUTO: 2.84 10E12/L (ref 3.8–5.2)
SODIUM SERPL-SCNC: 142 MMOL/L (ref 133–144)
WBC # BLD AUTO: 6.1 10E9/L (ref 4–11)

## 2019-05-30 PROCEDURE — 80069 RENAL FUNCTION PANEL: CPT | Performed by: HOSPITALIST

## 2019-05-30 PROCEDURE — 85610 PROTHROMBIN TIME: CPT | Performed by: HOSPITALIST

## 2019-05-30 PROCEDURE — 25000132 ZZH RX MED GY IP 250 OP 250 PS 637: Performed by: HOSPITALIST

## 2019-05-30 PROCEDURE — 25000132 ZZH RX MED GY IP 250 OP 250 PS 637: Performed by: INTERNAL MEDICINE

## 2019-05-30 PROCEDURE — 25000128 H RX IP 250 OP 636: Performed by: INTERNAL MEDICINE

## 2019-05-30 PROCEDURE — 85027 COMPLETE CBC AUTOMATED: CPT | Performed by: HOSPITALIST

## 2019-05-30 PROCEDURE — 97535 SELF CARE MNGMENT TRAINING: CPT | Mod: GO

## 2019-05-30 PROCEDURE — 85520 HEPARIN ASSAY: CPT | Performed by: HOSPITALIST

## 2019-05-30 PROCEDURE — 25800030 ZZH RX IP 258 OP 636: Performed by: INTERNAL MEDICINE

## 2019-05-30 PROCEDURE — 85025 COMPLETE CBC W/AUTO DIFF WBC: CPT | Performed by: HOSPITALIST

## 2019-05-30 PROCEDURE — 12000000 ZZH R&B MED SURG/OB

## 2019-05-30 PROCEDURE — 99233 SBSQ HOSP IP/OBS HIGH 50: CPT | Performed by: HOSPITALIST

## 2019-05-30 PROCEDURE — P9047 ALBUMIN (HUMAN), 25%, 50ML: HCPCS | Performed by: INTERNAL MEDICINE

## 2019-05-30 PROCEDURE — 25000131 ZZH RX MED GY IP 250 OP 636 PS 637: Performed by: HOSPITALIST

## 2019-05-30 PROCEDURE — 00000146 ZZHCL STATISTIC GLUCOSE BY METER IP

## 2019-05-30 PROCEDURE — 36415 COLL VENOUS BLD VENIPUNCTURE: CPT | Performed by: HOSPITALIST

## 2019-05-30 RX ORDER — ALBUMIN (HUMAN) 12.5 G/50ML
25 SOLUTION INTRAVENOUS 2 TIMES DAILY
Status: DISCONTINUED | OUTPATIENT
Start: 2019-05-30 | End: 2019-06-03

## 2019-05-30 RX ORDER — WARFARIN SODIUM 10 MG/1
10 TABLET ORAL
Status: COMPLETED | OUTPATIENT
Start: 2019-05-30 | End: 2019-05-30

## 2019-05-30 RX ORDER — FUROSEMIDE 10 MG/ML
40 INJECTION INTRAMUSCULAR; INTRAVENOUS 2 TIMES DAILY
Status: DISCONTINUED | OUTPATIENT
Start: 2019-05-30 | End: 2019-06-02

## 2019-05-30 RX ADMIN — CALCIUM ACETATE 667 MG: 667 CAPSULE ORAL at 13:06

## 2019-05-30 RX ADMIN — FUROSEMIDE 40 MG: 10 INJECTION, SOLUTION INTRAVENOUS at 13:12

## 2019-05-30 RX ADMIN — FUROSEMIDE 40 MG: 10 INJECTION, SOLUTION INTRAVENOUS at 21:45

## 2019-05-30 RX ADMIN — CALCIUM ACETATE 667 MG: 667 CAPSULE ORAL at 08:55

## 2019-05-30 RX ADMIN — CALCIUM ACETATE 667 MG: 667 CAPSULE ORAL at 18:29

## 2019-05-30 RX ADMIN — HEPARIN SODIUM 1400 UNITS/HR: 10000 INJECTION, SOLUTION INTRAVENOUS at 06:26

## 2019-05-30 RX ADMIN — IRON SUCROSE 200 MG: 20 INJECTION, SOLUTION INTRAVENOUS at 08:51

## 2019-05-30 RX ADMIN — HYDRALAZINE HYDROCHLORIDE 50 MG: 50 TABLET, FILM COATED ORAL at 16:10

## 2019-05-30 RX ADMIN — CARVEDILOL 50 MG: 25 TABLET, FILM COATED ORAL at 18:29

## 2019-05-30 RX ADMIN — ALBUMIN HUMAN 25 G: 0.25 SOLUTION INTRAVENOUS at 21:47

## 2019-05-30 RX ADMIN — HYDRALAZINE HYDROCHLORIDE 50 MG: 50 TABLET, FILM COATED ORAL at 08:55

## 2019-05-30 RX ADMIN — HYDRALAZINE HYDROCHLORIDE 50 MG: 50 TABLET, FILM COATED ORAL at 21:47

## 2019-05-30 RX ADMIN — CARVEDILOL 50 MG: 25 TABLET, FILM COATED ORAL at 08:55

## 2019-05-30 RX ADMIN — ALBUMIN HUMAN 25 G: 0.25 SOLUTION INTRAVENOUS at 13:10

## 2019-05-30 RX ADMIN — AMLODIPINE BESYLATE 5 MG: 5 TABLET ORAL at 08:55

## 2019-05-30 RX ADMIN — INSULIN HUMAN 10 UNITS: 100 INJECTION, SUSPENSION SUBCUTANEOUS at 18:42

## 2019-05-30 RX ADMIN — WARFARIN SODIUM 10 MG: 10 TABLET ORAL at 18:29

## 2019-05-30 ASSESSMENT — ACTIVITIES OF DAILY LIVING (ADL)
ADLS_ACUITY_SCORE: 14
ADLS_ACUITY_SCORE: 16
ADLS_ACUITY_SCORE: 16
ADLS_ACUITY_SCORE: 15

## 2019-05-30 NOTE — PLAN OF CARE
"Heart Failure Care Pathway  GOALS TO BE MET BEFORE DISCHARGE:    1. Decrease congestion and/or edema with diuretic therapy to achieve near      optimal volume status.            Dyspnea improved:  Yes            Edema improved:     No, please explain: Bilateral LE        Net I/O and Weights since admission:          04/29 2300 - 05/29 2259  In: 2295.23 [P.O.:1915; I.V.:380.23]  Out: 4275 [Urine:4275]  Net: -1979.77            Vitals:    05/27/19 1957 05/27/19 2314 05/28/19 0316 05/29/19 0608   Weight: 83.9 kg (185 lb) 101.8 kg (224 lb 8 oz) 101.4 kg (223 lb 8 oz) 100.5 kg (221 lb 8 oz)       2.  O2 sats > 92% on RA or at prior home O2 therapy level.          Current oxygenation status:       SpO2: 93 %         O2 Device: None (Room air),  Oxygen Delivery: 2 LPM         Able to wean O2 this shift to keep sats > 92%:  Yes       Does patient use Home O2? No    3.  Tolerates ambulation and mobility near baseline: Yes        How many times did the patient ambulate with nursing staff this shift?  ambulated to bathroom    Please review the Heart Failure Care Pathway for additional HF goal outcomes.    Gwendolyn Escamilla RN  5/29/2019     Pt is A&Ox4, up standby with cane. Denies cp, n/v, lightheaded, dizziness. Heparin infusing 14ml/hr. Weaned off oxygen sating at 92-93%. Edema BLE. , 238. Tele SR with peaked T waves, Cardiology, nephrology following. Pt did state she felt a little depressed this afternoon with the information she was given by the nephrologies today, was offered to have  or hospital minor visit but denied, stating \"I just need time to process all this information.\"  INR 1.15 - 7.5 mg warfarin given. Continue with POC.   "

## 2019-05-30 NOTE — PROGRESS NOTES
[facility]    Hospitalist Progress Note    Date of Service (when I saw the patient): 05/30/2019  Provider:  Nakul Montes MD   Text Page  7am - 6PM       Assessment & Plan   Roxy Beaulieu is a 59 year old female who  has a past medical history of Diabetes, Gangrene of toe, High cholesterol, Hypertension, Obesity, PE (pulmonary thromboembolism), smoking, CKD stage IV, Cardiomyopathy, heart failure, type 2 diabetes mellitus with long-term current use of insulin. She was admitted on 5/27/2019 with worsening shortness of breath, leg swelling, hypoxemia, elevated NT BNP and nonocclusive left lower extremity DVT.    1.  Nonocclusive left lower extremity DVT likely complicated with PE.  Patient has well-documented history of DVT/PE.  She is currently on warfarin, on admission her INR is normal.  Historically she has tested with subtherapeutic reading in the past raising concern for noncompliance.  She was once suspicious for HIT but it was ruled out by proper testing.  She has tolerated heparin therapy in the past.  She has been admitted on heparin drip, Warfarin resumed.  Today's INR is 1.14 which is baffling. Pharmacy on board.  Her poor kidney function precludes for CTA with PE protocol.  She is clinically and hemodynamically stable, much better symptomatically.    2.  Acute hypoxemic respiratory failure.  Suspect main contributor factor is #1, other possible contributors are: Anemia, decompensated heart failure and volume overload with interstitial edema and pleural effusion.  Improved with current treatment.  NO oxygen supplementation is needed.    3.  Acute decompensation of chronic hypertensive cardiomyopathy.  Likely from noncompliance with diet, medication and DVT/PE.  Chronic anemia also.  -Continue cardiac diet with sodium restriction, strict POPEYE's, daily weight, cardiac telemetry, O2 supplement per NC, IV diuretic on hold by nephrology recommendation. Input appreciated.  -Echocardiogram  shows.  LVEF 45 - 50%  Severe LVH.  Grade III or advanced diastolic dysfunction.    4.  Acute on chronic kidney disease is stage IV-V  Worsening from all of the above. Significant proteinuria. Nephrology consult appreciated.   - diuretics on hold.  -Follow-up BMP    5.  Type 2 diabetes mellitus on insulin.  A1c 10.5.  On treatment with NPH 70/30,  (20 units at bedtime was wrongfully tracking down on reconciliation), she is apparently on 10 units twice daily.  - Moderate CHO  - Start NPH 6 - 10, prandial insulin counting carb 1 unit per carb unit.  Hold on NPH 70/30 formulation because patient has a very erratic pattern of eating.  - Insulin sliding scale.    6.  Essential hypertension.  -Amlodipine 5 mg 1 tablet daily.  - Carvedilol 50 mg twice daily.  - Hydralazine 50 mg 3 times daily.    7.  Hyperlipidemia. No statin listed in her home medication.  -Abnormal lipid panel fasting with low HDLc and LDLc    8.  Acute on chronic iron deficiency anemia. Current hemoglobin 7.0.  - Anemia study compatible with iron deficiency.    - Transfuse if hemoglobin <7.0 or becoming very symptomatic.  - On Venofer IV per nephrology plan.    DVT Prophylaxis: Heparin gtt and Warfarin  Code Status: Full Code    Disposition: Expected discharge in 2- 3 days once compensated and INR at goal.    Interval History   She feels fine, asymptomatic.  She is breathing on room air.  She tolerated walking on the hallway.  No chest pain or shortness of breath.  Appetite is unpredictable.       -Data reviewed today: I reviewed all new labs and imaging results over the last 24 hours. I personally reviewed the EKG tracing showing NSR.    Physical Exam   Temp: 97.5  F (36.4  C) Temp src: Oral BP: 143/64   Heart Rate: 70 Resp: 18 SpO2: 95 % O2 Device: None (Room air) Oxygen Delivery: 2 LPM  Vitals:    05/27/19 2314 05/28/19 0316 05/29/19 0608   Weight: 101.8 kg (224 lb 8 oz) 101.4 kg (223 lb 8 oz) 100.5 kg (221 lb 8 oz)     Vital Signs with Ranges  Temp:   [97.5  F (36.4  C)-98.3  F (36.8  C)] 97.5  F (36.4  C)  Heart Rate:  [70-81] 70  Resp:  [16-18] 18  BP: (134-157)/(61-64) 143/64  SpO2:  [92 %-97 %] 95 %  I/O last 3 completed shifts:  In: 430 [P.O.:430]  Out: 1150 [Urine:1150]    GEN:  Alert, oriented x 3, appears comfortable, NAD.  HEENT:  Normocephalic/atraumatic, no scleral icterus, no nasal discharge, mouth moist.  CV:  Regular rate and rhythm, no murmur or JVD.  S1 + S2 noted, no S3 or S4.  LUNGS:  Clear to auscultation bilaterally without rales/rhonchi/wheezing/retractions.  Symmetric chest rise on inhalation noted.  ABD:  Active bowel sounds, soft, non-tender/non-distended.  No rebound/guarding/rigidity.  EXT: Bilateral lower extremity edema 3+, easy pitting, no cyanosis.  No joint synovitis noted.  SKIN:  Dry to touch, no exanthems noted in the visualized areas.       Medications     HEParin 1,400 Units/hr (05/30/19 0912)     - MEDICATION INSTRUCTIONS -       Warfarin Therapy Reminder         amLODIPine  5 mg Oral Daily     calcium acetate  667 mg Oral TID w/meals     carvedilol  50 mg Oral BID w/meals     hydrALAZINE  50 mg Oral TID     insulin aspart  1-7 Units Subcutaneous TID AC     insulin aspart  1-5 Units Subcutaneous At Bedtime     insulin isophane & regular  16 Units Subcutaneous At Bedtime     iron sucrose (VENOFER) intermittent infusion  200 mg Intravenous Daily     warfarin  10 mg Oral ONCE at 18:00       Data   Recent Labs   Lab 05/30/19  0607 05/29/19  0622 05/28/19  0713 05/28/19  0001 05/27/19 2010   WBC 6.1  --  5.4 6.7 6.5   HGB 7.4*  --  7.0* 7.4* 8.4*   MCV 88  --  88 87 86     --  185 192 226   INR 1.14 1.15* 1.23*  --  1.04    142 143  --  141   POTASSIUM 5.1 4.9 4.4  --  4.6   CHLORIDE 110* 110* 112*  --  110*   CO2 25 26 25  --  24   BUN 61* 61* 65*  --  64*   CR 4.27* 4.26* 3.97*  --  3.79*   ANIONGAP 7 6 6  --  7   GILL 7.8* 7.9* 7.7*  --  7.8*   GLC 95 115* 124*  --  203*   ALBUMIN 2.4* 2.5*  --   --  2.9*    PROTTOTAL  --   --   --   --  6.9   BILITOTAL  --   --   --   --  0.3   ALKPHOS  --   --   --   --  155*   ALT  --   --   --   --  56*   AST  --   --   --   --  38   TROPI  --   --   --   --  0.027       No results found for this or any previous visit (from the past 24 hour(s)).    Disclaimer: This note consists of symbols derived from keyboarding, dictation and/or voice recognition software. As a result, there may be errors in the script that have gone undetected. Please consider this when interpreting information found in this chart.

## 2019-05-30 NOTE — PLAN OF CARE
Heart Failure Care Pathway  GOALS TO BE MET BEFORE DISCHARGE:    1. Decrease congestion and/or edema with diuretic therapy to achieve near      optimal volume status.            Dyspnea improved:  Yes            Edema improved:     No, please explain: +3 BLE        Net I/O and Weights since admission:          04/30 2300 - 05/30 2259  In: 2875.23 [P.O.:2495; I.V.:380.23]  Out: 5925 [Urine:5925]  Net: -3049.77            Vitals:    05/27/19 1957 05/27/19 2314 05/28/19 0316 05/29/19 0608   Weight: 83.9 kg (185 lb) 101.8 kg (224 lb 8 oz) 101.4 kg (223 lb 8 oz) 100.5 kg (221 lb 8 oz)       2.  O2 sats > 92% on RA or at prior home O2 therapy level.          Current oxygenation status:       SpO2: 94 %         O2 Device: None (Room air),            Able to wean O2 this shift to keep sats > 92%:  Yes       Does patient use Home O2? No    3.  Tolerates ambulation and mobility near baseline: No, weakness to LE.        How many times did the patient ambulate with nursing staff this shift? 1    Please review the Heart Failure Care Pathway for additional HF goal outcomes.    Gwendolyn Escamilla RN  5/30/2019       Pt is A&Ox4, up standby with cane. Denies cp, n/v, lightheaded, dizziness. Heparin infusing 14ml/hr. Edema BLE. , 127. Tele SR with peaked T waves, Cardiology, nephrology following. INR 1.14- 10 mg warfarin given. Albumin 2.4 and replaced per orders along with IV lasix. Ambulated in [place] x 1. Continue with POC.

## 2019-05-30 NOTE — PROVIDER NOTIFICATION
Paged Dr. Montes - Clarify order for Humulin P insulin. Did you want to order the Humulin MIX 70/30? Thanks    pharmacy was stating in note that Humulin Mix 70/30 to be changed to 10 and 20 unitis. Dr. Shirley clarified that- NPH 70/30 discontinued and will start HumuLIN P 6 units in morning with breakfast and 10 units with supper.

## 2019-05-30 NOTE — PLAN OF CARE
Please see flowsheets for detailed vital signs and assessments.   Neuro: A&O  Vital Signs: stable  Pain: denies  O2: mid 90s RA  Tele: SR peaked Ts  Lungs: WDL  GI: WDL  : voiding w/o difficulty    Skin: Bruising, +3 BLE  Activity: assist 1  IVF: hep gtt 14 ml/hour  Notable labs: INR 1.15, EF 45-50, Mag 2.0  Protocols: NA  Consults: nephrology  Plan: coreg, hydralazine, IV Fe  Discharge: 2-3 days

## 2019-05-30 NOTE — PROGRESS NOTES
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 PE  Worse with diuresis , upto 4.2. Stable today . Last dose of Lasix yesterday AM.        2 LE DVT/ ? PE - on heparin drip . No RV strain on Echo.   3 CHF , LE edema, b/l pleural effusion. Severe LVH with severe diastolic dysfunction.  4 Severe anemia - possibly related to advanced CKD. Iron stores on low side as well with sat of 17% and Ferritin of 107, low iron  5 Uncontrolled DM - per primary team.   6 HTN - Controlled.      Recc -   -No indication for dialysis yet but seems fairly close. Does not have prior renal follow up or much understanding re; dialysis ,although she knew that her kidney function was pretty bad.   - Resume lasix 40mg IV BID with albumin 25 gm with each dose.   -  Iron sucrose 200 mg X 3 doses while in hospital - second dose today. . Check daily H/H . PRBC per primary team. Avoid aranesp given concern for acute DVT and PE.   - On phos binder - Phoslo 1 tab TID with meals.        Enrico Vaughn MD  [facility] - Nephrology   [phone]           Interval History :   Seen/ examined.   Got one dose of lasix yesterday . UOP 1150 ml. Wt down by 2 lbs . BUN/ Cr stable.   Reports slight improvement in edema but frustrated that it is not coming down faster. No dyspnea.       Review of Systems:   A 4 point review of systems was negative except as noted above.  Notably: good appetite. no nausea or vomiting or diarrhea.  no confusion,  no fever or chills    Physical Exam:   I/O last 3 completed shifts:  In: 430 [P.O.:430]  Out: 1150 [Urine:1150]    GENERAL APPEARANCE: no distress,  awake  EYES: no scleral icterus, pupils equal  HENT: NC/AT,  mouth  without ulcers or lesions  Lymphatics: no cervical or supraclavicular LAD  Endo: no moon facies, no  goiter  Pulmonary: diminished breath sounds at bases  CV: irregular, HSM at LLSE   - Edema++  GI: soft, nontender, normal bowel sounds,   MS: no evidence of inflammation in joints, no muscle tenderness  : no leon  SKIN: no rash, warm, dry, no cyanosis  NEURO: face symmetric, no asterixis         Labs:   All labs reviewed by me  Electrolytes/Renal -   Recent Labs   Lab Test 05/30/19  0607 05/29/19  0622 05/28/19  0713  12/17/18  1041  10/06/18  0609  03/15/18  2041  05/03/17  0725    142 143   < > 141   < > 143   < > 135   < > 140   POTASSIUM 5.1 4.9 4.4   < > 4.8   < > 5.1   < > 4.1   < > 4.6   CHLORIDE 110* 110* 112*   < > 106   < > 108   < > 100   < > 109   CO2 25 26 25   < > 29   < > 27   < > 29   < > 27   BUN 61* 61* 65*   < > 37*   < > 48*   < > 50*   < > 16   CR 4.27* 4.26* 3.97*   < > 2.59*   < > 2.70*   < > 1.46*   < > 1.06*   GLC 95 115* 124*   < > 178*   < > 154*   < > 207*   < > 161*   GILL 7.8* 7.9* 7.7*   < > 8.4*   < > 8.2*   < > 8.7   < > 8.7   MAG  --   --   --   --  2.0  --   --   --  2.1  --  1.5*   PHOS 6.2* 6.4*  --   --   --   --  4.7*  --  3.2   < >  --     < > = values in this interval not displayed.       CBC -   Recent Labs   Lab Test 05/30/19  0607 05/28/19  0713 05/28/19  0001   WBC 6.1 5.4 6.7   HGB 7.4* 7.0* 7.4*    185 192       LFTs -   Recent Labs   Lab Test 05/30/19  0607 05/29/19  0622 05/27/19 2010 12/19/18  0631 12/18/18  0620 10/04/18  1611   ALKPHOS  --   --  155* 91  --  167*   BILITOTAL  --   --  0.3 0.3  --  0.3   ALT  --   --  56* 10  --  18   AST  --   --  38 9  --  26   PROTTOTAL  --   --  6.9 5.7* 5.8* 7.0   ALBUMIN 2.4* 2.5* 2.9* 2.2* 2.3* 2.7*       Iron Panel -   Recent Labs   Lab Test 05/28/19  1157 10/06/18  0609   IRON 32* 33*   IRONSAT 17 19   MITRA 107 165         Current Medications:    amLODIPine  5 mg Oral Daily     calcium acetate  667 mg Oral TID w/meals     carvedilol  50 mg Oral BID w/meals     hydrALAZINE  50 mg Oral TID     [START ON  5/31/2019] insulin aspart   Subcutaneous QAM AC     insulin aspart   Subcutaneous Daily with lunch     insulin aspart   Subcutaneous Daily with supper     insulin aspart  1-7 Units Subcutaneous TID AC     insulin aspart  1-5 Units Subcutaneous At Bedtime     insulin isophane human  10 Units Subcutaneous Daily with supper     [START ON 5/31/2019] insulin isophane human  6 Units Subcutaneous QAM AC     iron sucrose (VENOFER) intermittent infusion  200 mg Intravenous Daily     warfarin  10 mg Oral ONCE at 18:00       HEParin 1,400 Units/hr (05/30/19 0912)     - MEDICATION INSTRUCTIONS -       Warfarin Therapy Reminder       Enirco Vaughn MD

## 2019-05-30 NOTE — CONSULTS
[facility] Referral    A [facility] referral was received from Dr. Hernandez.  Patient is not currently established in the [facility] and has not been seen by one of our cardiologists outpatient. Reviewed Care Coordinator notes that patient declined offer of the Care Coordinator to schedule a PCP office visit. Would recommend patient make an appointment to establish with a cardiologist if indicated at discharge. Scheduling can be reached at [phone].     Rajani CORDERO, RN, CHFN   [facility] Care Coordinator  [facility]--[place], [place]  Ph. [phone]    [facility]: Cardiomyopathy, Optimization, Rehabilitation, Education   [facility] is a heart failure specialty clinic within the [facility] where you will work with specialized nurse practioners, physician assistants, doctors and registered nurses. They are dedicated to helping patients with heart failure to carefully adjust medications, receive education, and learn who and when to call if symptoms develop. They specialize in helping you better understand your condition, slow the progression of your disease, improve the length and quality of your life, help you detect future heart problems before they become life threatening, and avoid hospitalizations.

## 2019-05-30 NOTE — PLAN OF CARE
Discharge Planner OT   Patient plan for discharge: Home  Current status: Pt completed transfers/mobility in room, to BR with use of IV pole for steadying support and SBA. Pt completed toilet transfer and toileting tasks with supervision. Pt ambulated ~20 ft in hallway, sudden onset of report of dizziness, did not resolve with standing rest break, returned to room. BP decreased to 111/41, HR 75, O2 sats 88% on RA post activity (/58, HR 70, O2 sats 94% at rest). RN updated. Pt unable to recall education provided during yesterday's session regarding CHF, re-education provided on sign/symptom monitoring, stoplight tool, lifestyle modifications, EC/WS, and low sodium diet. Will continue ongoing education.   Barriers to return to prior living situation: Stairs, O2 needs, decreased functional activity tolerance  Recommendations for discharge: Home  Rationale for recommendations: Recommend CORE clinic follow-up to assist with CHF mgmt. Will continue to follow while IP for OT/CR to maximize safety and indep in all ADLs/IADLs and mobility tasks through improved functional activity tolerance.        Entered by: Komal Thompson 05/30/2019 10:15 AM

## 2019-05-30 NOTE — PHARMACY-ADMISSION MEDICATION HISTORY
Diabetes regimen clarification:    Change to Humulin mix 70/30  10 units subcutaneous daily with breakfast  20 units subcutaneous daily with dinner

## 2019-05-31 ENCOUNTER — APPOINTMENT (OUTPATIENT)
Dept: OCCUPATIONAL THERAPY | Facility: CLINIC | Age: 59
End: 2019-05-31
Payer: MEDICAID

## 2019-05-31 LAB
ALBUMIN SERPL-MCNC: 3.2 G/DL (ref 3.4–5)
ANION GAP SERPL CALCULATED.3IONS-SCNC: 6 MMOL/L (ref 3–14)
BUN SERPL-MCNC: 59 MG/DL (ref 7–30)
CALCIUM SERPL-MCNC: 8.5 MG/DL (ref 8.5–10.1)
CHLORIDE SERPL-SCNC: 109 MMOL/L (ref 94–109)
CO2 SERPL-SCNC: 25 MMOL/L (ref 20–32)
CREAT SERPL-MCNC: 4.27 MG/DL (ref 0.52–1.04)
GFR SERPL CREATININE-BSD FRML MDRD: 11 ML/MIN/{1.73_M2}
GLUCOSE BLDC GLUCOMTR-MCNC: 110 MG/DL (ref 70–99)
GLUCOSE BLDC GLUCOMTR-MCNC: 111 MG/DL (ref 70–99)
GLUCOSE BLDC GLUCOMTR-MCNC: 111 MG/DL (ref 70–99)
GLUCOSE BLDC GLUCOMTR-MCNC: 77 MG/DL (ref 70–99)
GLUCOSE BLDC GLUCOMTR-MCNC: 96 MG/DL (ref 70–99)
GLUCOSE SERPL-MCNC: 114 MG/DL (ref 70–99)
HGB BLD-MCNC: 7.3 G/DL (ref 11.7–15.7)
INR PPP: 1.2 (ref 0.86–1.14)
LMWH PPP CHRO-ACNC: 0.38 IU/ML
PHOSPHATE SERPL-MCNC: 5.9 MG/DL (ref 2.5–4.5)
POTASSIUM SERPL-SCNC: 5.4 MMOL/L (ref 3.4–5.3)
SODIUM SERPL-SCNC: 140 MMOL/L (ref 133–144)

## 2019-05-31 PROCEDURE — 99233 SBSQ HOSP IP/OBS HIGH 50: CPT | Performed by: HOSPITALIST

## 2019-05-31 PROCEDURE — 85610 PROTHROMBIN TIME: CPT | Performed by: HOSPITALIST

## 2019-05-31 PROCEDURE — 36415 COLL VENOUS BLD VENIPUNCTURE: CPT | Performed by: HOSPITALIST

## 2019-05-31 PROCEDURE — 25800030 ZZH RX IP 258 OP 636: Performed by: INTERNAL MEDICINE

## 2019-05-31 PROCEDURE — 25000132 ZZH RX MED GY IP 250 OP 250 PS 637: Performed by: INTERNAL MEDICINE

## 2019-05-31 PROCEDURE — 12000000 ZZH R&B MED SURG/OB

## 2019-05-31 PROCEDURE — P9047 ALBUMIN (HUMAN), 25%, 50ML: HCPCS | Performed by: INTERNAL MEDICINE

## 2019-05-31 PROCEDURE — 25000132 ZZH RX MED GY IP 250 OP 250 PS 637: Performed by: HOSPITALIST

## 2019-05-31 PROCEDURE — 97535 SELF CARE MNGMENT TRAINING: CPT | Mod: GO

## 2019-05-31 PROCEDURE — 85018 HEMOGLOBIN: CPT | Performed by: HOSPITALIST

## 2019-05-31 PROCEDURE — 25000128 H RX IP 250 OP 636: Performed by: INTERNAL MEDICINE

## 2019-05-31 PROCEDURE — 00000146 ZZHCL STATISTIC GLUCOSE BY METER IP

## 2019-05-31 PROCEDURE — 85520 HEPARIN ASSAY: CPT | Performed by: HOSPITALIST

## 2019-05-31 PROCEDURE — 80069 RENAL FUNCTION PANEL: CPT | Performed by: INTERNAL MEDICINE

## 2019-05-31 PROCEDURE — 80069 RENAL FUNCTION PANEL: CPT | Performed by: HOSPITALIST

## 2019-05-31 RX ORDER — WARFARIN SODIUM 10 MG/1
10 TABLET ORAL
Status: COMPLETED | OUTPATIENT
Start: 2019-05-31 | End: 2019-05-31

## 2019-05-31 RX ADMIN — HEPARIN SODIUM 1400 UNITS/HR: 10000 INJECTION, SOLUTION INTRAVENOUS at 00:25

## 2019-05-31 RX ADMIN — HYDRALAZINE HYDROCHLORIDE 50 MG: 50 TABLET, FILM COATED ORAL at 21:31

## 2019-05-31 RX ADMIN — FUROSEMIDE 40 MG: 10 INJECTION, SOLUTION INTRAVENOUS at 08:25

## 2019-05-31 RX ADMIN — FUROSEMIDE 40 MG: 10 INJECTION, SOLUTION INTRAVENOUS at 20:34

## 2019-05-31 RX ADMIN — WARFARIN SODIUM 10 MG: 10 TABLET ORAL at 18:10

## 2019-05-31 RX ADMIN — HEPARIN SODIUM 1400 UNITS/HR: 10000 INJECTION, SOLUTION INTRAVENOUS at 15:13

## 2019-05-31 RX ADMIN — AMLODIPINE BESYLATE 5 MG: 5 TABLET ORAL at 08:10

## 2019-05-31 RX ADMIN — ALBUMIN HUMAN 25 G: 0.25 SOLUTION INTRAVENOUS at 20:34

## 2019-05-31 RX ADMIN — CALCIUM ACETATE 667 MG: 667 CAPSULE ORAL at 12:41

## 2019-05-31 RX ADMIN — INSULIN HUMAN 10 UNITS: 100 INJECTION, SUSPENSION SUBCUTANEOUS at 18:10

## 2019-05-31 RX ADMIN — IRON SUCROSE 200 MG: 20 INJECTION, SOLUTION INTRAVENOUS at 10:20

## 2019-05-31 RX ADMIN — CALCIUM ACETATE 667 MG: 667 CAPSULE ORAL at 08:10

## 2019-05-31 RX ADMIN — HYDRALAZINE HYDROCHLORIDE 50 MG: 50 TABLET, FILM COATED ORAL at 15:48

## 2019-05-31 RX ADMIN — CARVEDILOL 50 MG: 25 TABLET, FILM COATED ORAL at 18:10

## 2019-05-31 RX ADMIN — CALCIUM ACETATE 667 MG: 667 CAPSULE ORAL at 18:09

## 2019-05-31 RX ADMIN — ALBUMIN HUMAN 25 G: 0.25 SOLUTION INTRAVENOUS at 08:25

## 2019-05-31 RX ADMIN — HYDRALAZINE HYDROCHLORIDE 50 MG: 50 TABLET, FILM COATED ORAL at 08:10

## 2019-05-31 RX ADMIN — CARVEDILOL 50 MG: 25 TABLET, FILM COATED ORAL at 08:10

## 2019-05-31 ASSESSMENT — MIFFLIN-ST. JEOR: SCORE: 1590.55

## 2019-05-31 ASSESSMENT — ACTIVITIES OF DAILY LIVING (ADL)
ADLS_ACUITY_SCORE: 14

## 2019-05-31 NOTE — PLAN OF CARE
Heart Failure Care Pathway  GOALS TO BE MET BEFORE DISCHARGE:    1. Decrease congestion and/or edema with diuretic therapy to achieve near      optimal volume status.            Dyspnea improved:  Yes            Edema improved:     No, please explain: no change         Net I/O and Weights since admission:          05/01 1500 - 05/31 1459  In: 3503.23 [P.O.:3123; I.V.:380.23]  Out: 7325 [Urine:7325]  Net: -3821.77            Vitals:    05/27/19 1957 05/27/19 2314 05/28/19 0316 05/29/19 0608   Weight: 83.9 kg (185 lb) 101.8 kg (224 lb 8 oz) 101.4 kg (223 lb 8 oz) 100.5 kg (221 lb 8 oz)    05/31/19 0500   Weight: 99.3 kg (218 lb 14.4 oz)         2.  O2 sats > 92% on RA or at prior home O2 therapy level.          Current oxygenation status:       SpO2: 92 %         O2 Device: None (Room air),            Able to wean O2 this shift to keep sats > 92%:  Yes       Does patient use Home O2? No    3.  Tolerates ambulation and mobility near baseline: Yes        How many times did the patient ambulate with nursing staff this shift? 3    Please review the Heart Failure Care Pathway for additional HF goal outcomes.    Yves Harmon RN  5/31/2019

## 2019-05-31 NOTE — PROGRESS NOTES
Nephrology Progress Note  05/31/2019         Assessment & Recommendations:   ASSESSMENT AND RECOMMENDATIONS:   1 CKD IV/ V with nephrotic range proteinuria  - 2/2 diabetic/ hypertensive nephropathy   Presented with S.Cr of 3.7 in setting of CHF, ? PE  TOlerated diuresis with IV lasix + albumin yesterday with good response. Stable azotemia.Wt down 3 lbs.         2 LE DVT/ ? PE - on heparin drip . No RV strain on Echo.   3 CHF , LE edema, b/l pleural effusion. Severe LVH with severe diastolic dysfunction.  4 Severe anemia - possibly related to advanced CKD. Iron stores on low side as well with sat of 17% and Ferritin of 107, low iron  5 Uncontrolled DM - per primary team.   6 HTN - Controlled.   7 Hyperkaemia      Recc -   -No indication for dialysis yet but seems fairly close. Does not have prior renal follow up or much understanding re; dialysis ,although she knew that her kidney function was pretty bad.   - Continue lasix 40mg IV BID with albumin 25 gm with each dose.  Possibly transition to oral diuretics in next 1-2 day , with plan to discharge on oral diuretics early next week.   -  Iron sucrose 200 mg X 3 doses while in hospital - third dose today. . Check daily H/H . PRBC per primary team. Avoid aranesp given concern for acute DVT and PE.   - On phos binder - Phoslo 1 tab TID with meals.   - Renal /low K diet.   - Pt needs close f/u with nephrology and start preparation for dialysis. She want to follow with our group on discharge . F/u 1-2 wks with NP/PA  at [facility].       Enrico Vaughn MD  [facility] - Nephrology   [phone]           Interval History :   Seen/ examined.   TOlerated diuresis with IV lasix 40 mg BD  + albumin yesterday wth good response. Stable azotemia.Wt down 3 lbs.    Feels edema is coming down. Breathing is stable. Denies any new complaints.       Review of Systems:   A 4 point review of systems was negative except as noted above.  Notably: good appetite. no nausea or  vomiting or diarrhea.  no confusion,  no fever or chills    Physical Exam:   I/O last 3 completed shifts:  In: 1180 [P.O.:1180]  Out: 2600 [Urine:2600]    GENERAL APPEARANCE: no distress,  awake  EYES: no scleral icterus, pupils equal  HENT: NC/AT,  mouth  without ulcers or lesions  Lymphatics: no cervical or supraclavicular LAD  Endo: no moon facies, no goiter  Pulmonary: diminished breath sounds at bases  CV: irregular, HSM at LLSE   - Edema++  GI: soft, nontender, normal bowel sounds,   MS: no evidence of inflammation in joints, no muscle tenderness  : no leon  SKIN: no rash, warm, dry, no cyanosis  NEURO: face symmetric, no asterixis         Labs:   All labs reviewed by me  Electrolytes/Renal -   Recent Labs   Lab Test 05/31/19  0641 05/30/19  0607 05/29/19  0622  12/17/18  1041  03/15/18  2041  05/03/17  0725    142 142   < > 141   < > 135   < > 140   POTASSIUM 5.4* 5.1 4.9   < > 4.8   < > 4.1   < > 4.6   CHLORIDE 109 110* 110*   < > 106   < > 100   < > 109   CO2 25 25 26   < > 29   < > 29   < > 27   BUN 59* 61* 61*   < > 37*   < > 50*   < > 16   CR 4.27* 4.27* 4.26*   < > 2.59*   < > 1.46*   < > 1.06*   * 95 115*   < > 178*   < > 207*   < > 161*   GILL 8.5 7.8* 7.9*   < > 8.4*   < > 8.7   < > 8.7   MAG  --   --   --   --  2.0  --  2.1  --  1.5*   PHOS 5.9* 6.2* 6.4*  --   --    < > 3.2  --   --     < > = values in this interval not displayed.       CBC -   Recent Labs   Lab Test 05/30/19  0607 05/28/19  0713 05/28/19  0001   WBC 6.1 5.4 6.7   HGB 7.4* 7.0* 7.4*    185 192       LFTs -   Recent Labs   Lab Test 05/31/19  0641 05/30/19  0607 05/29/19  0622 05/27/19 2010 12/19/18  0631 12/18/18  0620 10/04/18  1611   ALKPHOS  --   --   --  155* 91  --  167*   BILITOTAL  --   --   --  0.3 0.3  --  0.3   ALT  --   --   --  56* 10  --  18   AST  --   --   --  38 9  --  26   PROTTOTAL  --   --   --  6.9 5.7* 5.8* 7.0   ALBUMIN 3.2* 2.4* 2.5* 2.9* 2.2* 2.3* 2.7*       Iron Panel -   Recent Labs    Lab Test 05/28/19  1157 10/06/18  0609   IRON 32* 33*   IRONSAT 17 19   MITRA 107 165         Current Medications:    albumin human  25 g Intravenous BID     amLODIPine  5 mg Oral Daily     calcium acetate  667 mg Oral TID w/meals     carvedilol  50 mg Oral BID w/meals     furosemide  40 mg Intravenous BID     hydrALAZINE  50 mg Oral TID     insulin aspart   Subcutaneous QAM AC     insulin aspart   Subcutaneous Daily with lunch     insulin aspart   Subcutaneous Daily with supper     insulin aspart  1-7 Units Subcutaneous TID AC     insulin aspart  1-5 Units Subcutaneous At Bedtime     insulin isophane human  10 Units Subcutaneous Daily with supper     insulin isophane human  6 Units Subcutaneous QAM AC     warfarin  10 mg Oral ONCE at 18:00       HEParin 1,400 Units/hr (05/31/19 0904)     - MEDICATION INSTRUCTIONS -       Warfarin Therapy Reminder       Enrico Vaughn MD

## 2019-05-31 NOTE — PLAN OF CARE
VS: WDL  Orientation: WDL  Tele: Sr with peaked T waves  Glucose checks: QId, 152 and 111 overnight  Activity: SBA with cane or IV pole  Diet: 2g NA, 2800 ml fluid restriction  GI: WDL  : WDL  Respiratory: WDL  IV: heparin @ 14  Plan: continue treatment with IV lasix, albumin, and heparin.  Discharge to home when medically stable

## 2019-05-31 NOTE — PLAN OF CARE
Discharge Planner OT   Patient plan for discharge: Home  Current status: Pt completed sit > stand from chair with SBA, required multiple attempts to achieve stand due to RLE pain. Pt ambulated to/from BR with use of IV pole for steadying support and SBA, completed toilet transfer/toileting tasks with supervision. Upon completion of toileting, pt declined further mobility due to c/o 8/10 pain in R knee. Pt reports new onset knee pain (has not had for past 2 days while working with this OT). RN updated on pt's report of pain. Pt denies fall.    Barriers to return to prior living situation: Stairs, O2 needs, decreased functional activity tolerance  Recommendations for discharge: Home  Rationale for recommendations: Recommend CORE clinic follow-up to assist with CHF mgmt. Will continue to follow while IP for OT/CR to maximize safety and indep in all ADLs/IADLs and mobility tasks through improved functional activity tolerance. Pt has been limited past 2 days due to BP/dizziness and today by new onset of knee pain.        Entered by: Komal Thompson 05/31/2019 12:08 PM

## 2019-05-31 NOTE — PROGRESS NOTES
[facility]    Hospitalist Progress Note    Date of Service (when I saw the patient): 05/31/2019  Provider:  Nakul Montes MD   Text Page  7am - 6PM       Assessment & Plan   Roxy Beaulieu is a 59 year old female who  has a past medical history of Diabetes, Gangrene of toe, High cholesterol, Hypertension, Obesity, PE (pulmonary thromboembolism), smoking, CKD stage IV, Cardiomyopathy, heart failure, type 2 diabetes mellitus with long-term current use of insulin. She was admitted on 5/27/2019 with worsening shortness of breath, leg swelling, hypoxemia, elevated NT BNP and nonocclusive left lower extremity DVT.    1.  Nonocclusive left lower extremity DVT likely complicated with PE.  Patient has well-documented history of DVT/PE.  She is currently on warfarin, on admission her INR is normal.  Historically she has tested with subtherapeutic reading in the past raising concern for noncompliance.  She was once suspicious for HIT but it was ruled out by proper testing.  She has tolerated heparin therapy in the past.  She has been admitted on heparin drip, Warfarin resumed.  Today's INR is 1.20. Pharmacy on board.  Her poor kidney function precludes for CTA with PE protocol.  She is clinically and hemodynamically stable, much better symptomatically.    2.  Acute hypoxemic respiratory failure.  Suspect main contributor factor is #1, other possible contributors are: Anemia, decompensated heart failure and volume overload with interstitial edema and pleural effusion.  Improved with current treatment.  NO oxygen supplementation is needed.    3.  Acute decompensation of chronic hypertensive cardiomyopathy.  Likely from noncompliance with diet, medication and DVT/PE.  Chronic anemia also.  -Continue cardiac diet with sodium restriction, strict POPEYE's, daily weight, cardiac telemetry, O2 supplement per NC, IV diuretic on hold by nephrology recommendation. Input appreciated.  -Echocardiogram shows.  LVEF 45 -  "50%  Severe LVH.  Grade III or advanced diastolic dysfunction.    4.  Acute on chronic kidney disease is stage IV-V  Worsening from all of the above. Significant proteinuria. Nephrology consult appreciated.   - diuretics on hold.  -Follow-up BMP    5.  Type 2 diabetes mellitus on insulin.  A1c 10.5.  On treatment with NPH 70/30,  (20 units at bedtime was wrongfully tracked down on reconciliation), she is apparently on 10 units twice daily.  - Moderate CHO  - Start NPH  6 - 10, prandial insulin counting carb 1 unit per carb unit.  Hold on NPH 70/30 formulation because patient has a very erratic pattern of eating.  - Insulin sliding scale.    6.  Essential hypertension.  -Amlodipine 5 mg 1 tablet daily.  - Carvedilol 50 mg twice daily.  - Hydralazine 50 mg 3 times daily.    7.  Hyperlipidemia. No statin listed in her home medication.  -Abnormal lipid panel fasting with low HDLc and LDLc    8.  Acute on chronic iron deficiency anemia. Current hemoglobin 7.0.  - Anemia study compatible with iron deficiency.    - Transfuse if hemoglobin <7.0 or becoming very symptomatic.  - On Venofer IV per nephrology plan.    DVT Prophylaxis: Heparin gtt and Warfarin  Code Status: Full Code    Disposition: Expected discharge in 2- 3 days once compensated and INR at goal.    Interval History   She is asymptomatic. On room air.  She had some dizziness yst walking on the hallway that she attributes to \"weak legs\".  No chest pain or shortness of breath.  Appetite is unpredictable.       -Data reviewed today: I reviewed all new labs and imaging results over the last 24 hours. I personally reviewed the EKG tracing showing NSR in the monitor  w/HR 71.    Physical Exam   Temp: 98.6  F (37  C) Temp src: Oral BP: 148/63 Pulse: 71 Heart Rate: 81 Resp: 16 SpO2: 92 % O2 Device: None (Room air)    Vitals:    05/28/19 0316 05/29/19 0608 05/31/19 0500   Weight: 101.4 kg (223 lb 8 oz) 100.5 kg (221 lb 8 oz) 99.3 kg (218 lb 14.4 oz)     Vital Signs with " Ranges  Temp:  [95.5  F (35.3  C)-98.6  F (37  C)] 98.6  F (37  C)  Pulse:  [71-74] 71  Heart Rate:  [] 81  Resp:  [16-20] 16  BP: (138-151)/(54-90) 148/63  SpO2:  [91 %-94 %] 92 %  I/O last 3 completed shifts:  In: 1180 [P.O.:1180]  Out: 2600 [Urine:2600]    GEN:  Alert, oriented x 3, appears comfortable, NAD.  HEENT:  Normocephalic/atraumatic, no scleral icterus, no nasal discharge, mouth moist.  CV:  Regular rate and rhythm, no murmur or JVD.  S1 + S2 noted, no S3 or S4.  LUNGS:  Clear to auscultation bilaterally without rales/rhonchi/wheezing/retractions.  Symmetric chest rise on inhalation noted.  ABD:  Active bowel sounds, soft, non-tender/non-distended.  No rebound/guarding/rigidity.  EXT: Bilateral lower extremity edema 3+, easy pitting, no cyanosis.  No joint synovitis noted.  SKIN:  Dry to touch, no exanthems noted in the visualized areas.       Medications     HEParin 1,400 Units/hr (05/31/19 0904)     - MEDICATION INSTRUCTIONS -       Warfarin Therapy Reminder         albumin human  25 g Intravenous BID     amLODIPine  5 mg Oral Daily     calcium acetate  667 mg Oral TID w/meals     carvedilol  50 mg Oral BID w/meals     furosemide  40 mg Intravenous BID     hydrALAZINE  50 mg Oral TID     insulin aspart   Subcutaneous QAM AC     insulin aspart   Subcutaneous Daily with lunch     insulin aspart   Subcutaneous Daily with supper     insulin aspart  1-7 Units Subcutaneous TID AC     insulin aspart  1-5 Units Subcutaneous At Bedtime     insulin isophane human  10 Units Subcutaneous Daily with supper     insulin isophane human  6 Units Subcutaneous QAM AC     warfarin  10 mg Oral ONCE at 18:00       Data   Recent Labs   Lab 05/31/19  0641 05/30/19  0607 05/29/19  0622 05/28/19  0713 05/28/19  0001 05/27/19 2010   WBC  --  6.1  --  5.4 6.7 6.5   HGB  --  7.4*  --  7.0* 7.4* 8.4*   MCV  --  88  --  88 87 86   PLT  --  184  --  185 192 226   INR 1.20* 1.14 1.15* 1.23*  --  1.04    142 142 143  --   141   POTASSIUM 5.4* 5.1 4.9 4.4  --  4.6   CHLORIDE 109 110* 110* 112*  --  110*   CO2 25 25 26 25  --  24   BUN 59* 61* 61* 65*  --  64*   CR 4.27* 4.27* 4.26* 3.97*  --  3.79*   ANIONGAP 6 7 6 6  --  7   GILL 8.5 7.8* 7.9* 7.7*  --  7.8*   * 95 115* 124*  --  203*   ALBUMIN 3.2* 2.4* 2.5*  --   --  2.9*   PROTTOTAL  --   --   --   --   --  6.9   BILITOTAL  --   --   --   --   --  0.3   ALKPHOS  --   --   --   --   --  155*   ALT  --   --   --   --   --  56*   AST  --   --   --   --   --  38   TROPI  --   --   --   --   --  0.027       No results found for this or any previous visit (from the past 24 hour(s)).    Disclaimer: This note consists of symbols derived from keyboarding, dictation and/or voice recognition software. As a result, there may be errors in the script that have gone undetected. Please consider this when interpreting information found in this chart.

## 2019-05-31 NOTE — PLAN OF CARE
Vss. A&OX4. Instructed to call for assist up oob. PT has not been calling. Will place alarm. Heparin gtt infusing at 14 unit(s) /hour no change today. IV accidentally pulled out from right arm. Pressure applied and hep gtt continued on iv in left arm. Cr up. Pt is making urine. On iv lasix. Tele sr. Bg stable.

## 2019-05-31 NOTE — PLAN OF CARE
Heart Failure Care Pathway  GOALS TO BE MET BEFORE DISCHARGE:    1. Decrease congestion and/or edema with diuretic therapy to achieve near      optimal volume status.            Dyspnea improved:  Yes            Edema improved:     No, please explain: +3 BLE         Net I/O and Weights since admission:          05/01 2300 - 05/31 2259  In: 3995.23 [P.O.:3615; I.V.:380.23]  Out: 7625 [Urine:7625]  Net: -3629.77            Vitals:    05/27/19 1957 05/27/19 2314 05/28/19 0316 05/29/19 0608   Weight: 83.9 kg (185 lb) 101.8 kg (224 lb 8 oz) 101.4 kg (223 lb 8 oz) 100.5 kg (221 lb 8 oz)    05/31/19 0500   Weight: 99.3 kg (218 lb 14.4 oz)         2.  O2 sats > 92% on RA or at prior home O2 therapy level.          Current oxygenation status:       SpO2: 94 %         O2 Device: None (Room air),            Able to wean O2 this shift to keep sats > 92%:  Yes       Does patient use Home O2? No    3.  Tolerates ambulation and mobility near baseline: No, please explain: get weak quickly after ambulating         How many times did the patient ambulate with nursing staff this shift? 0    Please review the Heart Failure Care Pathway for additional HF goal outcomes.    Gwendolyn Escamilla RN  5/31/2019        Pt is A&Ox4, up standby with cane. Denies cp, n/v, lightheaded, dizziness. Heparin infusing 14ml/hr. Edema BLE. BG 77. Tele SR with peaked T waves, Cardiology, nephrology following. INR 1.20- 10 mg warfarin given. Albumin 3.2 and replaced per orders along with IV lasix. Adequate output. FR 1800ml. Continue with POC.

## 2019-06-01 ENCOUNTER — APPOINTMENT (OUTPATIENT)
Dept: GENERAL RADIOLOGY | Facility: CLINIC | Age: 59
End: 2019-06-01
Attending: HOSPITALIST
Payer: MEDICAID

## 2019-06-01 ENCOUNTER — APPOINTMENT (OUTPATIENT)
Dept: OCCUPATIONAL THERAPY | Facility: CLINIC | Age: 59
End: 2019-06-01
Payer: MEDICAID

## 2019-06-01 LAB
ALBUMIN SERPL-MCNC: 3.5 G/DL (ref 3.4–5)
ANION GAP SERPL CALCULATED.3IONS-SCNC: 6 MMOL/L (ref 3–14)
BUN SERPL-MCNC: 58 MG/DL (ref 7–30)
CALCIUM SERPL-MCNC: 8.4 MG/DL (ref 8.5–10.1)
CHLORIDE SERPL-SCNC: 109 MMOL/L (ref 94–109)
CO2 SERPL-SCNC: 25 MMOL/L (ref 20–32)
CREAT SERPL-MCNC: 4.73 MG/DL (ref 0.52–1.04)
FACT X ACT/NOR PPP CHRO: 49 % (ref 70–130)
GFR SERPL CREATININE-BSD FRML MDRD: 9 ML/MIN/{1.73_M2}
GLUCOSE BLDC GLUCOMTR-MCNC: 140 MG/DL (ref 70–99)
GLUCOSE BLDC GLUCOMTR-MCNC: 143 MG/DL (ref 70–99)
GLUCOSE BLDC GLUCOMTR-MCNC: 146 MG/DL (ref 70–99)
GLUCOSE BLDC GLUCOMTR-MCNC: 69 MG/DL (ref 70–99)
GLUCOSE BLDC GLUCOMTR-MCNC: 96 MG/DL (ref 70–99)
GLUCOSE SERPL-MCNC: 114 MG/DL (ref 70–99)
HGB BLD-MCNC: 7.1 G/DL (ref 11.7–15.7)
INR PPP: 1.32 (ref 0.86–1.14)
LMWH PPP CHRO-ACNC: 0.31 IU/ML
PHOSPHATE SERPL-MCNC: 5.2 MG/DL (ref 2.5–4.5)
PHOSPHATE SERPL-MCNC: 5.6 MG/DL (ref 2.5–4.5)
POTASSIUM SERPL-SCNC: 5.6 MMOL/L (ref 3.4–5.3)
POTASSIUM SERPL-SCNC: 5.7 MMOL/L (ref 3.4–5.3)
PTH-INTACT SERPL-MCNC: 210 PG/ML (ref 18–80)
SODIUM SERPL-SCNC: 140 MMOL/L (ref 133–144)

## 2019-06-01 PROCEDURE — 25000132 ZZH RX MED GY IP 250 OP 250 PS 637: Performed by: INTERNAL MEDICINE

## 2019-06-01 PROCEDURE — 36415 COLL VENOUS BLD VENIPUNCTURE: CPT | Performed by: INTERNAL MEDICINE

## 2019-06-01 PROCEDURE — 84132 ASSAY OF SERUM POTASSIUM: CPT | Performed by: INTERNAL MEDICINE

## 2019-06-01 PROCEDURE — 12000000 ZZH R&B MED SURG/OB

## 2019-06-01 PROCEDURE — 00000146 ZZHCL STATISTIC GLUCOSE BY METER IP

## 2019-06-01 PROCEDURE — 85018 HEMOGLOBIN: CPT | Performed by: HOSPITALIST

## 2019-06-01 PROCEDURE — 84100 ASSAY OF PHOSPHORUS: CPT | Performed by: INTERNAL MEDICINE

## 2019-06-01 PROCEDURE — 85260 CLOT FACTOR X STUART-POWER: CPT | Performed by: HOSPITALIST

## 2019-06-01 PROCEDURE — 73562 X-RAY EXAM OF KNEE 3: CPT | Mod: RT

## 2019-06-01 PROCEDURE — 25000128 H RX IP 250 OP 636: Performed by: INTERNAL MEDICINE

## 2019-06-01 PROCEDURE — 80069 RENAL FUNCTION PANEL: CPT | Performed by: HOSPITALIST

## 2019-06-01 PROCEDURE — 82306 VITAMIN D 25 HYDROXY: CPT | Performed by: INTERNAL MEDICINE

## 2019-06-01 PROCEDURE — 83970 ASSAY OF PARATHORMONE: CPT | Performed by: INTERNAL MEDICINE

## 2019-06-01 PROCEDURE — 85520 HEPARIN ASSAY: CPT | Performed by: HOSPITALIST

## 2019-06-01 PROCEDURE — 36415 COLL VENOUS BLD VENIPUNCTURE: CPT | Performed by: HOSPITALIST

## 2019-06-01 PROCEDURE — 97530 THERAPEUTIC ACTIVITIES: CPT | Mod: GO | Performed by: REHABILITATION PRACTITIONER

## 2019-06-01 PROCEDURE — P9047 ALBUMIN (HUMAN), 25%, 50ML: HCPCS | Performed by: INTERNAL MEDICINE

## 2019-06-01 PROCEDURE — 99233 SBSQ HOSP IP/OBS HIGH 50: CPT | Performed by: HOSPITALIST

## 2019-06-01 PROCEDURE — 85610 PROTHROMBIN TIME: CPT | Performed by: HOSPITALIST

## 2019-06-01 RX ADMIN — WARFARIN SODIUM 12.5 MG: 10 TABLET ORAL at 17:49

## 2019-06-01 RX ADMIN — CALCIUM ACETATE 667 MG: 667 CAPSULE ORAL at 13:00

## 2019-06-01 RX ADMIN — FUROSEMIDE 40 MG: 10 INJECTION, SOLUTION INTRAVENOUS at 21:11

## 2019-06-01 RX ADMIN — ACETAMINOPHEN 650 MG: 325 TABLET, FILM COATED ORAL at 13:44

## 2019-06-01 RX ADMIN — HYDRALAZINE HYDROCHLORIDE 50 MG: 50 TABLET, FILM COATED ORAL at 15:48

## 2019-06-01 RX ADMIN — FUROSEMIDE 40 MG: 10 INJECTION, SOLUTION INTRAVENOUS at 08:43

## 2019-06-01 RX ADMIN — CALCIUM ACETATE 667 MG: 667 CAPSULE ORAL at 17:49

## 2019-06-01 RX ADMIN — ALBUMIN HUMAN 25 G: 0.25 SOLUTION INTRAVENOUS at 21:17

## 2019-06-01 RX ADMIN — CALCIUM ACETATE 667 MG: 667 CAPSULE ORAL at 07:42

## 2019-06-01 RX ADMIN — ACETAMINOPHEN 650 MG: 325 TABLET, FILM COATED ORAL at 08:53

## 2019-06-01 RX ADMIN — AMLODIPINE BESYLATE 5 MG: 5 TABLET ORAL at 07:42

## 2019-06-01 RX ADMIN — CARVEDILOL 50 MG: 25 TABLET, FILM COATED ORAL at 07:42

## 2019-06-01 RX ADMIN — CARVEDILOL 50 MG: 25 TABLET, FILM COATED ORAL at 17:49

## 2019-06-01 RX ADMIN — HEPARIN SODIUM 1400 UNITS/HR: 10000 INJECTION, SOLUTION INTRAVENOUS at 09:33

## 2019-06-01 RX ADMIN — HYDRALAZINE HYDROCHLORIDE 50 MG: 50 TABLET, FILM COATED ORAL at 21:11

## 2019-06-01 RX ADMIN — HYDRALAZINE HYDROCHLORIDE 50 MG: 50 TABLET, FILM COATED ORAL at 07:42

## 2019-06-01 RX ADMIN — INSULIN HUMAN 10 UNITS: 100 INJECTION, SUSPENSION SUBCUTANEOUS at 17:50

## 2019-06-01 RX ADMIN — ALBUMIN HUMAN 25 G: 0.25 SOLUTION INTRAVENOUS at 08:43

## 2019-06-01 ASSESSMENT — ACTIVITIES OF DAILY LIVING (ADL)
ADLS_ACUITY_SCORE: 14
ADLS_ACUITY_SCORE: 15
ADLS_ACUITY_SCORE: 14

## 2019-06-01 ASSESSMENT — MIFFLIN-ST. JEOR: SCORE: 1576.04

## 2019-06-01 NOTE — PLAN OF CARE
Assumed care at 1115. Pt xray resulted. Per pt, just sitting in chair w/bilateral legs elevated, she has no pain unless adjusting leg. Ice to R Knee. Tylenol for R knee pain - per pt, feels better ambulating, hurts getting up/down. Glucoses 114 & 140. K+ 5.7 to 5.6 this afternoon. Phos 5.2. Renal diet. PO intake good. Fluid intake - pt intake up to 898ml (1800 fluid restriction). Voiding. BM+ today. Tele NSR/Heparin infusing 1400u/hr. Lungs clear. Alarms on at all times.

## 2019-06-01 NOTE — PROGRESS NOTES
Renal Medicine Progress Note                                Roxy Beaulieu MRN# [id number]   Age: 59 year old YOB: 1960   Date of Admission: 5/27/2019 Hospital LOS: 5                  Assessment/Plan:     59YF with hx of T2Dm, HPL ,HTN, Obesity , PT, toe amputation for gangrene, proteinuric CKD IV, hypertensive cardiomyopathy admitted on 5/27 with worsening b/l leg swelling, dyspnea in setting of medication non-compliance    1.  Chronic progressive kidney disease   -diabetic nephropathy   -A1C > 10%  2.  Nephrotic range proteinuria   -PCR > 4 gm/g   -previous negative SPEP and UPEP  3.  ? Acute component v progression  4.  DM  5.  HTN   -LVH   -diastolic dysfunction  6.  Anemia   -IV Fe  7.  Hyperkalemia  8.  DVT  9.  Secondary hyperparathyroidism     Continue current diuretic   Repeat K   Low K diet  PTH and vitamin D  levels     Seems dialysis will be required as soon as 06/03/19  Discussed with patient      Interval History:     Affect flat.  Limited insight into current situation  Labs and IO reviewed  K noted    Discussed rational for dialysis with patient    ROS:     GENERAL: NAD, No fever,chills  R: NEGATIVE for significant cough or SOB  CV: NEGATIVE for chest pain, palpitations  EXT: no change edema  ROS otherwise negative    Medications and Allergies:     Reviewed    Physical Exam:     Vitals were reviewed  Patient Vitals for the past 8 hrs:   BP Temp Temp src Heart Rate Resp SpO2 Weight   06/01/19 1130 129/57 -- -- 69 18 92 % --   06/01/19 0742 142/54 97.9  F (36.6  C) Oral 65 20 93 % --   06/01/19 0548 -- -- -- -- -- -- 97.8 kg (215 lb 11.2 oz)     I/O last 3 completed shifts:  In: 1137 [P.O.:640; I.V.:497]  Out: 3000 [Urine:3000]    Vitals:    05/27/19 2314 05/28/19 0316 05/29/19 0608 05/31/19 0500   Weight: 101.8 kg (224 lb 8 oz) 101.4 kg (223 lb 8 oz) 100.5 kg (221 lb 8 oz) 99.3 kg (218 lb 14.4 oz)    06/01/19 0548   Weight: 97.8 kg (215 lb 11.2 oz)       GENERAL: awake, alert,  follows  HEENT: NC/AT, PERRLA, EOMI, non icteric, pharynx moist without lesion  RESP:  clear anteriorly  CV: RRR, normal S1 S2  ABDOMEN: soft, nontender, no HSM or masses and bowel sounds normal  MS: no clubbing, cyanosis   SKIN: clear without significant rashes or lesions  NEURO: speech normal and cranial nerves 2-12 intact  PSYCH: affect normal/bright  EXT: 2 plus edema    Data:     Recent Labs   Lab 06/01/19  0646 05/31/19  0641 05/30/19  0607 05/29/19  0622    140 142 142   POTASSIUM 5.7* 5.4* 5.1 4.9   CHLORIDE 109 109 110* 110*   CO2 25 25 25 26   ANIONGAP 6 6 7 6   * 114* 95 115*   BUN 58* 59* 61* 61*   CR 4.73* 4.27* 4.27* 4.26*   GFRESTIMATED 9* 11* 11* 11*   GFRESTBLACK 11* 12* 12* 12*   GILL 8.4* 8.5 7.8* 7.9*         Recent Labs   Lab Test 06/01/19  0646 05/31/19  0641 05/30/19  0607 05/29/19  0622 05/28/19  0713 05/27/19 2010 12/19/18  0631 12/18/18  0620 12/17/18  1041 11/05/18  0611   CR 4.73* 4.27* 4.27* 4.26* 3.97* 3.79* 2.82* 2.86* 2.59* 3.16*     Recent Labs   Lab 06/01/19  0646 05/31/19  0641 05/30/19  0607 05/29/19  0622 05/28/19  0713   POTASSIUM 5.7* 5.4* 5.1 4.9 4.4     Recent Labs   Lab 06/01/19  0646 05/31/19  0641 05/30/19  0607 05/29/19  0622   ALBUMIN 3.5 3.2* 2.4* 2.5*     Recent Labs   Lab 06/01/19  0646 05/31/19  1457 05/31/19  0641 05/30/19  0607 05/29/19  0622 05/28/19  0713   PHOS 5.6*  --  5.9* 6.2* 6.4*  --    HGB 7.1* 7.3*  --  7.4*  --  7.0*       Recent Labs   Lab 05/28/19  1157   IRON 32*   IRONSAT 17 *   MITRA 107     Recent Labs   Lab Test 12/19/18  0825 10/05/18  1155   UTPG 6.15* 9.00*         G Placido Brandon    [facility] - Nephrology  [phone]

## 2019-06-01 NOTE — PLAN OF CARE
Discharge Planner OT   Patient plan for discharge: home  Current status: Patient agreeable to functional mobility in [place]. CGA, fww to stand from recliner, ambulated ~100 feet with encouragement needed to extended walk by ~20 feet. Patient declined further activity due to fatigue. At rest BP: 133/53, HR 74, O2 sats on RA 94%, after walk /44, HR 76, O2 sats on RA 89%. BP taken immediately following initial one with reading of 124/48. Patient denied symptoms. Patient was educated in importance of walking in [place] with RN staff to advance activity, white board was updated. RN updated and status and walking recommendations.   Barriers to return to prior living situation: Stairs (although patient reports she no longer uses upper level and have not been climbing stairs), O2 needs, decreased functional activity tolerance, pain in knee  Recommendations for discharge: Home  Rationale for recommendations: Recommend CORE clinic follow-up to assist with CHF mgmt. Will continue to follow while IP for OT/CR to maximize safety and indep in all ADLs/IADLs and mobility tasks through improved functional activity tolerance. Pt has been limited past 2 days due to BP/dizziness and today by new onset of knee pain.        Entered by: Goldie Dalal 06/01/2019 2:33 PM

## 2019-06-01 NOTE — PLAN OF CARE
Heart Failure Care Pathway  GOALS TO BE MET BEFORE DISCHARGE:    1. Decrease congestion and/or edema with diuretic therapy to achieve near      optimal volume status.            Dyspnea improved:  Yes            Edema improved:     No, please explain: no changes         Net I/O and Weights since admission:          05/01 2300 - 05/31 2259  In: 4612.23 [P.O.:3735; I.V.:877.23]  Out: 8825 [Urine:8825]  Net: -4212.77            Vitals:    05/27/19 1957 05/27/19 2314 05/28/19 0316 05/29/19 0608   Weight: 83.9 kg (185 lb) 101.8 kg (224 lb 8 oz) 101.4 kg (223 lb 8 oz) 100.5 kg (221 lb 8 oz)    05/31/19 0500   Weight: 99.3 kg (218 lb 14.4 oz)         2.  O2 sats > 92% on RA or at prior home O2 therapy level.          Current oxygenation status:       SpO2: 95 %         O2 Device: None (Room air),            Able to wean O2 this shift to keep sats > 92%:  N?A        Does patient use Home O2? No    3.  Tolerates ambulation and mobility near baseline: Yes        How many times did the patient ambulate with nursing staff this shift? 1    Please review the Heart Failure Care Pathway for additional HF goal outcomes.    Assumed care at 1930, VSS, up with SBA, Lasix IV, voiding well, denies pain, heparin gtt@14ml/hr, compliant with FR, will continue with POC.      CARSON LYONS RN  5/31/2019

## 2019-06-01 NOTE — PLAN OF CARE
VSS. A/O x 4, forgetful at times.   Voiding spontaneously.    No oral fluids overnight.   Hep gtt running. Slept in recliner all night.  Voids adequately and spontaneously.    Lower extremity edema, +3.

## 2019-06-01 NOTE — PROGRESS NOTES
[facility]    Hospitalist Progress Note    Date of Service (when I saw the patient): 06/01/2019  Provider:  Nakul Montes MD   Text Page  7am - 6PM       Assessment & Plan   Roxy Beaulieu is a 59 year old female who  has a past medical history of Diabetes, Gangrene of toe, High cholesterol, Hypertension, Obesity, PE (pulmonary thromboembolism), smoking, CKD stage IV, Cardiomyopathy, heart failure, type 2 diabetes mellitus with long-term current use of insulin. She was admitted on 5/27/2019 with worsening shortness of breath, leg swelling, hypoxemia, elevated NT BNP and nonocclusive left lower extremity DVT.    1.  Nonocclusive left lower extremity DVT likely complicated with PE.  Patient has well-documented history of DVT/PE.  She is currently on warfarin, on admission her INR is normal.  Historically she has tested with subtherapeutic reading in the past raising concern for noncompliance.  She was once suspicious for HIT but it was ruled out by proper testing.  She has tolerated heparin therapy in the past.  She has been admitted on heparin drip, Warfarin resumed.  Today's INR is 1.32 --> 1.20. Pharmacy on board.  Her poor kidney function precludes for CTA with PE protocol.  She is clinically and hemodynamically stable, asymptomatic.    2.  Acute hypoxemic respiratory failure.  Suspect main contributor factor is #1, other possible contributors are: Anemia, decompensated heart failure and volume overload with interstitial edema and pleural effusion.  Resolved with current treatment.  NO oxygen supplementation is needed.    3.  Acute decompensation of systolic and diastolic HF in the setting of chronic hypertensive cardiomyopathy.  Likely from noncompliance with diet, medication and DVT/PE.  Chronic anemia also.  -Continue cardiac diet with sodium restriction, strict POPEYE's, daily weight, cardiac telemetry, O2 supplement per NC, IV diuretic on hold by nephrology recommendation. Input  appreciated.  -Echocardiogram shows.  LVEF 45 - 50%  Severe LVH.  Grade III or advanced diastolic dysfunction.    4.  Acute on chronic kidney disease is stage IV-V  Worsening from all of the above with higher K today. Significant proteinuria. Nephrology consult appreciated.   -Lasix 40 mg BID.   --Albumin, Phoslo and Hydralazine  -Follow-up BMP    5.  Type 2 diabetes mellitus on insulin.  A1c 10.5.  On treatment with NPH 70/30,  (20 units at bedtime was wrongfully tracked down on reconciliation), she is apparently on 10 units twice daily.  - Moderate CHO  - Start NPH  6 - 10, prandial insulin counting carb 1 unit per carb unit.  Hold on NPH 70/30 formulation because patient has a very erratic pattern of eating.  - Insulin sliding scale.    6.  Essential hypertension.  -Amlodipine 5 mg 1 tablet daily.  - Carvedilol 50 mg twice daily.  - Hydralazine 50 mg 3 times daily.    7.  Hyperlipidemia. No statin listed in her home medication.  -Abnormal lipid panel fasting with low HDLc and LDLc    8.  Acute on chronic iron deficiency anemia. Current hemoglobin 7.1.  - Anemia study compatible with iron deficiency.    - Transfuse if hemoglobin <7.0 or becoming very symptomatic.  - On Venofer IV per nephrology plan.    9. New onset of R knee pain. Elicited on palpation lateral aspect of patella, limited ROM.  XR 3v requested    DVT Prophylaxis: Heparin gtt and Warfarin  Code Status: Full Code    Disposition: Expected discharge in 2- 3 days once compensated and INR at goal.    Interval History   She is asymptomatic on room air.  She has some new onset of right knee pain that precludes walking on the hallway, no documented trauma. No chest pain or shortness of breath.  Appetite is unpredictable.       -Data reviewed today: I reviewed all new labs and imaging results over the last 24 hours. I personally reviewed the EKG tracing showing NSR in the monitor  w/HR 71.    Physical Exam   Temp: 97.9  F (36.6  C) Temp src: Oral BP: 142/54  Pulse: 71 Heart Rate: 65 Resp: 20 SpO2: 93 % O2 Device: None (Room air)    Vitals:    05/29/19 0608 05/31/19 0500 06/01/19 0548   Weight: 100.5 kg (221 lb 8 oz) 99.3 kg (218 lb 14.4 oz) 97.8 kg (215 lb 11.2 oz)     Vital Signs with Ranges  Temp:  [97  F (36.1  C)-98.8  F (37.1  C)] 97.9  F (36.6  C)  Pulse:  [71-74] 71  Heart Rate:  [65-81] 65  Resp:  [16-20] 20  BP: (112-155)/(40-90) 142/54  SpO2:  [91 %-95 %] 93 %  I/O last 3 completed shifts:  In: 1137 [P.O.:640; I.V.:497]  Out: 3000 [Urine:3000]    GEN:  Alert, oriented x 3, appears comfortable, NAD.  HEENT:  Normocephalic/atraumatic, no scleral icterus, no nasal discharge, mouth moist.  CV:  Regular rate and rhythm, no murmur or JVD.  S1 + S2 noted, no S3 or S4.  LUNGS:  Clear to auscultation bilaterally without rales/rhonchi/wheezing/retractions.  Symmetric chest rise on inhalation noted.  ABD:  Active bowel sounds, soft, non-tender/non-distended.  No rebound/guarding/rigidity.  EXT: Bilateral lower extremity edema 3+, easy pitting, no cyanosis.  No joint synovitis noted.  SKIN:  Dry to touch, no exanthems noted in the visualized areas.       Medications     HEParin 1,400 Units/hr (05/31/19 0003)     - MEDICATION INSTRUCTIONS -       Warfarin Therapy Reminder         albumin human  25 g Intravenous BID     amLODIPine  5 mg Oral Daily     calcium acetate  667 mg Oral TID w/meals     carvedilol  50 mg Oral BID w/meals     furosemide  40 mg Intravenous BID     hydrALAZINE  50 mg Oral TID     insulin aspart   Subcutaneous QAM AC     insulin aspart   Subcutaneous Daily with lunch     insulin aspart   Subcutaneous Daily with supper     insulin aspart  1-7 Units Subcutaneous TID AC     insulin aspart  1-5 Units Subcutaneous At Bedtime     insulin isophane human  10 Units Subcutaneous Daily with supper     insulin isophane human  6 Units Subcutaneous QAM AC       Data   Recent Labs   Lab 06/01/19  0646 05/31/19  1457 05/31/19  0641 05/30/19  0607  05/28/19  0713  05/28/19 0001 05/27/19 2010   WBC  --   --   --  6.1  --  5.4 6.7 6.5   HGB  --  7.3*  --  7.4*  --  7.0* 7.4* 8.4*   MCV  --   --   --  88  --  88 87 86   PLT  --   --   --  184  --  185 192 226   INR 1.32*  --  1.20* 1.14   < > 1.23*  --  1.04     --  140 142   < > 143  --  141   POTASSIUM 5.7*  --  5.4* 5.1   < > 4.4  --  4.6   CHLORIDE 109  --  109 110*   < > 112*  --  110*   CO2 25  --  25 25   < > 25  --  24   BUN 58*  --  59* 61*   < > 65*  --  64*   CR 4.73*  --  4.27* 4.27*   < > 3.97*  --  3.79*   ANIONGAP 6  --  6 7   < > 6  --  7   GILL 8.4*  --  8.5 7.8*   < > 7.7*  --  7.8*   *  --  114* 95   < > 124*  --  203*   ALBUMIN 3.5  --  3.2* 2.4*   < >  --   --  2.9*   PROTTOTAL  --   --   --   --   --   --   --  6.9   BILITOTAL  --   --   --   --   --   --   --  0.3   ALKPHOS  --   --   --   --   --   --   --  155*   ALT  --   --   --   --   --   --   --  56*   AST  --   --   --   --   --   --   --  38   TROPI  --   --   --   --   --   --   --  0.027    < > = values in this interval not displayed.       No results found for this or any previous visit (from the past 24 hour(s)).    Disclaimer: This note consists of symbols derived from keyboarding, dictation and/or voice recognition software. As a result, there may be errors in the script that have gone undetected. Please consider this when interpreting information found in this chart.

## 2019-06-02 ENCOUNTER — APPOINTMENT (OUTPATIENT)
Dept: OCCUPATIONAL THERAPY | Facility: CLINIC | Age: 59
End: 2019-06-02
Payer: MEDICAID

## 2019-06-02 LAB
ANION GAP SERPL CALCULATED.3IONS-SCNC: 4 MMOL/L (ref 3–14)
BUN SERPL-MCNC: 60 MG/DL (ref 7–30)
CALCIUM SERPL-MCNC: 8.4 MG/DL (ref 8.5–10.1)
CHLORIDE SERPL-SCNC: 108 MMOL/L (ref 94–109)
CO2 SERPL-SCNC: 27 MMOL/L (ref 20–32)
CREAT SERPL-MCNC: 4.82 MG/DL (ref 0.52–1.04)
ERYTHROCYTE [DISTWIDTH] IN BLOOD BY AUTOMATED COUNT: 15.2 % (ref 10–15)
GFR SERPL CREATININE-BSD FRML MDRD: 9 ML/MIN/{1.73_M2}
GLUCOSE BLDC GLUCOMTR-MCNC: 131 MG/DL (ref 70–99)
GLUCOSE BLDC GLUCOMTR-MCNC: 133 MG/DL (ref 70–99)
GLUCOSE BLDC GLUCOMTR-MCNC: 147 MG/DL (ref 70–99)
GLUCOSE BLDC GLUCOMTR-MCNC: 162 MG/DL (ref 70–99)
GLUCOSE SERPL-MCNC: 140 MG/DL (ref 70–99)
HCT VFR BLD AUTO: 24.1 % (ref 35–47)
HGB BLD-MCNC: 7.2 G/DL (ref 11.7–15.7)
INR PPP: 1.54 (ref 0.86–1.14)
LMWH PPP CHRO-ACNC: 0.25 IU/ML
LMWH PPP CHRO-ACNC: 0.37 IU/ML
MCH RBC QN AUTO: 26.8 PG (ref 26.5–33)
MCHC RBC AUTO-ENTMCNC: 29.9 G/DL (ref 31.5–36.5)
MCV RBC AUTO: 90 FL (ref 78–100)
PLATELET # BLD AUTO: 166 10E9/L (ref 150–450)
POTASSIUM SERPL-SCNC: 5.3 MMOL/L (ref 3.4–5.3)
RBC # BLD AUTO: 2.69 10E12/L (ref 3.8–5.2)
SODIUM SERPL-SCNC: 139 MMOL/L (ref 133–144)
WBC # BLD AUTO: 4.8 10E9/L (ref 4–11)

## 2019-06-02 PROCEDURE — 97535 SELF CARE MNGMENT TRAINING: CPT | Mod: GO | Performed by: REHABILITATION PRACTITIONER

## 2019-06-02 PROCEDURE — 12000000 ZZH R&B MED SURG/OB

## 2019-06-02 PROCEDURE — 25000128 H RX IP 250 OP 636: Performed by: INTERNAL MEDICINE

## 2019-06-02 PROCEDURE — 85610 PROTHROMBIN TIME: CPT | Performed by: HOSPITALIST

## 2019-06-02 PROCEDURE — 85520 HEPARIN ASSAY: CPT | Performed by: INTERNAL MEDICINE

## 2019-06-02 PROCEDURE — 82306 VITAMIN D 25 HYDROXY: CPT | Performed by: INTERNAL MEDICINE

## 2019-06-02 PROCEDURE — 85520 HEPARIN ASSAY: CPT | Performed by: HOSPITALIST

## 2019-06-02 PROCEDURE — 99233 SBSQ HOSP IP/OBS HIGH 50: CPT | Performed by: INTERNAL MEDICINE

## 2019-06-02 PROCEDURE — 25000132 ZZH RX MED GY IP 250 OP 250 PS 637: Performed by: INTERNAL MEDICINE

## 2019-06-02 PROCEDURE — 36415 COLL VENOUS BLD VENIPUNCTURE: CPT | Performed by: HOSPITALIST

## 2019-06-02 PROCEDURE — 36415 COLL VENOUS BLD VENIPUNCTURE: CPT | Performed by: INTERNAL MEDICINE

## 2019-06-02 PROCEDURE — 80048 BASIC METABOLIC PNL TOTAL CA: CPT | Performed by: HOSPITALIST

## 2019-06-02 PROCEDURE — 00000146 ZZHCL STATISTIC GLUCOSE BY METER IP

## 2019-06-02 PROCEDURE — 85027 COMPLETE CBC AUTOMATED: CPT | Performed by: HOSPITALIST

## 2019-06-02 PROCEDURE — P9047 ALBUMIN (HUMAN), 25%, 50ML: HCPCS | Performed by: INTERNAL MEDICINE

## 2019-06-02 RX ORDER — FUROSEMIDE 40 MG
40 TABLET ORAL
Status: DISCONTINUED | OUTPATIENT
Start: 2019-06-03 | End: 2019-06-04

## 2019-06-02 RX ADMIN — INSULIN HUMAN 10 UNITS: 100 INJECTION, SUSPENSION SUBCUTANEOUS at 17:08

## 2019-06-02 RX ADMIN — HYDRALAZINE HYDROCHLORIDE 50 MG: 50 TABLET, FILM COATED ORAL at 15:44

## 2019-06-02 RX ADMIN — CARVEDILOL 50 MG: 25 TABLET, FILM COATED ORAL at 17:05

## 2019-06-02 RX ADMIN — CALCIUM ACETATE 667 MG: 667 CAPSULE ORAL at 07:43

## 2019-06-02 RX ADMIN — ACETAMINOPHEN 650 MG: 325 TABLET, FILM COATED ORAL at 07:42

## 2019-06-02 RX ADMIN — WARFARIN SODIUM 12.5 MG: 10 TABLET ORAL at 17:05

## 2019-06-02 RX ADMIN — CALCIUM ACETATE 667 MG: 667 CAPSULE ORAL at 12:45

## 2019-06-02 RX ADMIN — CARVEDILOL 50 MG: 25 TABLET, FILM COATED ORAL at 07:44

## 2019-06-02 RX ADMIN — ALBUMIN HUMAN 25 G: 0.25 SOLUTION INTRAVENOUS at 09:04

## 2019-06-02 RX ADMIN — HYDRALAZINE HYDROCHLORIDE 50 MG: 50 TABLET, FILM COATED ORAL at 21:22

## 2019-06-02 RX ADMIN — AMLODIPINE BESYLATE 5 MG: 5 TABLET ORAL at 07:43

## 2019-06-02 RX ADMIN — CALCIUM ACETATE 667 MG: 667 CAPSULE ORAL at 17:05

## 2019-06-02 RX ADMIN — HEPARIN SODIUM 1500 UNITS/HR: 10000 INJECTION, SOLUTION INTRAVENOUS at 19:14

## 2019-06-02 RX ADMIN — HEPARIN SODIUM 1400 UNITS/HR: 10000 INJECTION, SOLUTION INTRAVENOUS at 01:59

## 2019-06-02 RX ADMIN — FUROSEMIDE 40 MG: 10 INJECTION, SOLUTION INTRAVENOUS at 09:04

## 2019-06-02 RX ADMIN — Medication 2300 UNITS: at 07:14

## 2019-06-02 RX ADMIN — HYDRALAZINE HYDROCHLORIDE 50 MG: 50 TABLET, FILM COATED ORAL at 07:43

## 2019-06-02 RX ADMIN — ALBUMIN HUMAN 25 G: 0.25 SOLUTION INTRAVENOUS at 20:02

## 2019-06-02 ASSESSMENT — ACTIVITIES OF DAILY LIVING (ADL)
ADLS_ACUITY_SCORE: 15
ADLS_ACUITY_SCORE: 13
ADLS_ACUITY_SCORE: 15
ADLS_ACUITY_SCORE: 13

## 2019-06-02 ASSESSMENT — MIFFLIN-ST. JEOR: SCORE: 1566.97

## 2019-06-02 NOTE — PLAN OF CARE
.Heart Failure Care Pathway  GOALS TO BE MET BEFORE DISCHARGE:    1. Decrease congestion and/or edema with diuretic therapy to achieve near      optimal volume status.            Dyspnea improved:  Yes            Edema improved:     No, please explain: 3+ to LE         Net I/O and Weights since admission:          05/03 0700 - 06/02 0659  In: 5916.43 [P.O.:4895; I.V.:1021.43]  Out: 00997 [Urine:10737]  Net: -6308.57            Vitals:    05/27/19 1957 05/27/19 2314 05/28/19 0316 05/29/19 0608   Weight: 83.9 kg (185 lb) 101.8 kg (224 lb 8 oz) 101.4 kg (223 lb 8 oz) 100.5 kg (221 lb 8 oz)    05/31/19 0500 06/01/19 0548   Weight: 99.3 kg (218 lb 14.4 oz) 97.8 kg (215 lb 11.2 oz)         2.  O2 sats > 92% on RA or at prior home O2 therapy level.          Current oxygenation status:       SpO2: 91 %         O2 Device: None (Room air),            Able to wean O2 this shift to keep sats > 92%:  Yes       Does patient use Home O2? No    3.  Tolerates ambulation and mobility near baseline: Yes        How many times did the patient ambulate with nursing staff this shift? 1    Please review the Heart Failure Care Pathway for additional HF goal outcomes.    Kenisha Calhoun RN  6/2/2019     Pt a/o x4, up with SBA.  On Coumadin, Albumin and lasix.  Hep gtt AT 14.  Probable HD on Monday.  Tele SR.  Denies pain.  Pt can make needs known, will continue POC.

## 2019-06-02 NOTE — PLAN OF CARE
Heart Failure Care Pathway  GOALS TO BE MET BEFORE DISCHARGE:    1. Decrease congestion and/or edema with diuretic therapy to achieve near      optimal volume status.            Dyspnea improved:  Yes            Edema improved:     No        Net I/O and Weights since admission:          05/03 1500 - 06/02 1459  In: 6852.43 [P.O.:5615; I.V.:1237.43]  Out: 18348 [Urine:12817]  Net: -6472.57            Vitals:    05/27/19 1957 05/27/19 2314 05/28/19 0316 05/29/19 0608   Weight: 83.9 kg (185 lb) 101.8 kg (224 lb 8 oz) 101.4 kg (223 lb 8 oz) 100.5 kg (221 lb 8 oz)    05/31/19 0500 06/01/19 0548 06/02/19 0548   Weight: 99.3 kg (218 lb 14.4 oz) 97.8 kg (215 lb 11.2 oz) 96.9 kg (213 lb 11.2 oz)         2.  O2 sats > 92% on RA or at prior home O2 therapy level.          Current oxygenation status:       SpO2: 92 %         O2 Device: None (Room air),             3.  Tolerates ambulation and mobility near baseline: No, please explain: see PT notes. Tylenol provided for R knee discomfort.          How many times did the patient ambulate with nursing staff this shift? 1 w/nsg, 1 w/PT    Please review the Heart Failure Care Pathway for additional HF goal outcomes.    Laisha Pitts RN  6/2/2019     Fluid restriction - 816 ml in/500 ml out this shift. IV lasix/albumin in a.m. New IV placed. Neph following. Pt desires to see Neph in clinic prior to starting dialysis.Tele NSR. Hgb 7.1, K+ decreased to 5.3 today.

## 2019-06-02 NOTE — PLAN OF CARE
Heart Failure Care Pathway  GOALS TO BE MET BEFORE DISCHARGE:    1. Decrease congestion and/or edema with diuretic therapy to achieve near      optimal volume status.            Dyspnea improved:  Yes            Edema improved:     No, please explain: no changes        Net I/O and Weights since admission:          05/02 2300 - 06/01 2259  In: 5916.43 [P.O.:4895; I.V.:1021.43]  Out: 19651 [Urine:77964]  Net: -5108.57            Vitals:    05/27/19 1957 05/27/19 2314 05/28/19 0316 05/29/19 0608   Weight: 83.9 kg (185 lb) 101.8 kg (224 lb 8 oz) 101.4 kg (223 lb 8 oz) 100.5 kg (221 lb 8 oz)    05/31/19 0500 06/01/19 0548   Weight: 99.3 kg (218 lb 14.4 oz) 97.8 kg (215 lb 11.2 oz)         2.  O2 sats > 92% on RA or at prior home O2 therapy level.          Current oxygenation status:       SpO2: 94 %         O2 Device: None (Room air),            Able to wean O2 this shift to keep sats > 92%:  Yes       Does patient use Home O2? No    3.  Tolerates ambulation and mobility near baseline: Yes        How many times did the patient ambulate with nursing staff this shift? 1    Please review the Heart Failure Care Pathway for additional HF goal outcomes.      A&Ox4, VSS, up with A-1, ,146, compliant with 1800cc FR, Nephro following, Cr 1.32, Tele SR, will continue with POC.  CARSON LYONS RN  6/1/2019

## 2019-06-02 NOTE — PROGRESS NOTES
[facility]  Hospitalist Progress Note  Sekou Shell MD 06/02/2019    Reason for Stay (Diagnosis): weakness and shortness of breath         Assessment and Plan:      Summary of Stay: Roxy Beaulieu is a 59 year old female who came to attention on 5/27/2019 with shortness of breath.      Prior medical history is notable for poorly controlled type 2 diabetes and essential hypertension both contributed to her current chronic kidney disease.  She also has a hypertensive cardiomyopathy with both depressed LV function and advanced diastolic dysfunction.    Ms. Beaulieu was admitted to a telemetry bed and was started on IV diuresis.  Due to concern for the patient's apparent hypoxia with subtherapeutic INR and nonocclusive left DVT, (she is anticoagulated for recurrent pulmonary embolism) she was immediately started on a heparin drip and warfarin was restarted.      Overall, the patient's kidney function has worsened throughout the hospital stay and this prompted discussion about initiation of hemodialysis.     At the time of my initial introduction of the patient, June 2, 2019, the nurses indicated to me the patient seems to be quite confused and unreliable.  I talked to the patient's daughter by telephone who confirmed that this has been a concern of family members for a couple of years now.    Problem List:   1. Acute hypoxic resp failure.  Appears improved.   2. CKD stage V (GFR < 15) with nephrotic range proteinuria due to IDDM. Worsening creatinine with diuresis, now receiving lasix with diuresis.   3. Anasarca, likely multifactorial with hypoalbuminemia and chronic systolic heart failure. LVEF 45-50%, severe LVH, advanced Diastolic Dysfunction.   4. Non-occlusive LLE DVT with suspected PE. INR not therapeutic as yet.   5. IDDM.  6. Essential HTN.  7. Dyslipidemia, acute on chronic anemia with iron deficiency, CKD, ACD.  8. Apparent confusion.  The patient appears to have refused occupational  "therapy evaluation today.    PLAN:  1.  Await Nephrology plan for initiation of HD.   2.  SW consultation for dispo planning.     DVT Prophylaxis: heparin until INR is in therapeutic range on Warfarin.  Code Status: Full Code and needs to be further addressed  Discharge Dispo: TBD  Estimated Disch Date / # of Days until Disch: TBD. Pt likely will be here through the next several days with initiation of HD.        Interval History (Subjective):      Chart reviewed, pt interviewed.    Pt first asked me if she could be discharged today. Seems to not understand that HD would require a lot more work to get her prepared.      No cough, fatigue improved.  Denies SOB, but not up much. Still edematous.    I called daughter due to RN concern that the patient may not have good insight into her medical issues and my suspicion that she may not be reliable for independence. When I spoke with sister by phone today, she expressed similar concern.                   Physical Exam:      Last Vital Signs:  /50 (BP Location: Right arm)   Pulse 71   Temp 98.3  F (36.8  C) (Oral)   Resp 16   Ht 1.702 m (5' 7\")   Wt 96.9 kg (213 lb 11.2 oz)   SpO2 91%   BMI 33.47 kg/m      I/O last 3 completed shifts:  In: 1424.2 [P.O.:1280; I.V.:144.2]  Out: 2500 [Urine:2500]    Constitutional: Awake, alert, cooperative, no apparent distress   Respiratory: Clear to auscultation bilaterally, no crackles or wheezing   Cardiovascular: Regular rate and rhythm, normal S1 and S2, and no murmur noted   Abdomen: Normal bowel sounds, soft, non-distended, non-tender   Skin: No rashes, no cyanosis, dry to touch   Neuro: Alert and appears appropriate but with very poor insight, suggesting problems with memory.    Extremities: Deep pitting edema   Other(s):        All other systems: Negative          Medications:      All current medications were reviewed with changes reflected in problem list.         Data:      All new lab and imaging data was reviewed. "   Labs/Imaging:  Results for orders placed or performed during the hospital encounter of 05/27/19 (from the past 24 hour(s))   Glucose by meter   Result Value Ref Range    Glucose 143 (H) 70 - 99 mg/dL   Glucose by meter   Result Value Ref Range    Glucose 146 (H) 70 - 99 mg/dL   Glucose by meter   Result Value Ref Range    Glucose 131 (H) 70 - 99 mg/dL   CBC with platelets   Result Value Ref Range    WBC 4.8 4.0 - 11.0 10e9/L    RBC Count 2.69 (L) 3.8 - 5.2 10e12/L    Hemoglobin 7.2 (L) 11.7 - 15.7 g/dL    Hematocrit 24.1 (L) 35.0 - 47.0 %    MCV 90 78 - 100 fl    MCH 26.8 26.5 - 33.0 pg    MCHC 29.9 (L) 31.5 - 36.5 g/dL    RDW 15.2 (H) 10.0 - 15.0 %    Platelet Count 166 150 - 450 10e9/L   Heparin Xa (10a) Level   Result Value Ref Range    Heparin 10A Level 0.25 IU/mL   INR   Result Value Ref Range    INR 1.54 (H) 0.86 - 1.14   Basic metabolic panel   Result Value Ref Range    Sodium 139 133 - 144 mmol/L    Potassium 5.3 3.4 - 5.3 mmol/L    Chloride 108 94 - 109 mmol/L    Carbon Dioxide 27 20 - 32 mmol/L    Anion Gap 4 3 - 14 mmol/L    Glucose 140 (H) 70 - 99 mg/dL    Urea Nitrogen 60 (H) 7 - 30 mg/dL    Creatinine 4.82 (H) 0.52 - 1.04 mg/dL    GFR Estimate 9 (L) >60 mL/min/[1.73_m2]    GFR Estimate If Black 11 (L) >60 mL/min/[1.73_m2]    Calcium 8.4 (L) 8.5 - 10.1 mg/dL   Glucose by meter   Result Value Ref Range    Glucose 133 (H) 70 - 99 mg/dL   Heparin 10a Level   Result Value Ref Range    Heparin 10A Level 0.37 IU/mL

## 2019-06-02 NOTE — PLAN OF CARE
Discharge Planner OT   Patient plan for discharge: home  Current status: RN staff notified OT of potential cognitive concerns. Patient was approached for cognitive screen. Patient became very defensive, visibly upset at suggestion, making limited eye contact with OT, stating she now she feels she can't trust OT. Increased time spent counseling on reasoning behind request to complete, implications on discharge, what would occur moving forward. This was gone through multiple times with patient as she asked on several occasions to explain why OT has requested it. Patient expressed multiple times she was not crazy, OT reassured patient that was not suggested and patient had a choice. Patient offered time to think through conversation and she was agreeable to OT returning tomorrow to see patient decided. MD and RN were updated on interaction and patient response.  Barriers to return to prior living situation: Stairs (although patient reports she no longer uses upper level and have not been climbing stairs), decreased functional activity tolerance, pain in knee, questionable cognition  Recommendations for discharge: Home  Rationale for recommendations: Recommend [facility] clinic follow-up to assist with CHF mgmt. Will continue to follow while IP for OT/CR to maximize safety and indep in all ADLs/IADLs and mobility tasks through improved functional activity tolerance.        Entered by: Goldie Dalal 06/02/2019 11:14 AM

## 2019-06-02 NOTE — PROGRESS NOTES
Renal Medicine Progress Note                                Roxy Beaulieu MRN# [id number]   Age: 59 year old YOB: 1960   Date of Admission: 5/27/2019 Hospital LOS: 6                  Assessment/Plan:     59YF with hx of T2Dm, HPL ,HTN, Obesity , PT, toe amputation for gangrene, proteinuric CKD IV, hypertensive cardiomyopathy admitted on 5/27 with worsening b/l leg swelling, dyspnea in setting of medication non-compliance    1.  Chronic progressive kidney disease   -diabetic nephropathy   -A1C > 10%  2.  Nephrotic range proteinuria   -PCR > 4 gm/g   -previous negative SPEP and UPEP  3.  ? Acute component v progression  4.  DM  5.  HTN   -LVH   -diastolic dysfunction  6.  Anemia   -IV Fe  7.  Hyperkalemia  8.  DVT  9.  Secondary hyperparathyroidism       K not problematic today  Patient prefers to follow-up in our office prior to starting dialysis    Stop IVF diuretic  Begin PO in am    Follow up Renal NP 2 to 4 weeks ([phone])  Follow labs  Add vitamin d  D level ( history of deficiency)    Interval History:     Creatinine again up slightly  Edema same    ? Aggressive diuresis    No uremic symptoms  IO and UO reviewed    ROS:     GENERAL: NAD, No fever,chills  R: NEGATIVE for significant cough or SOB  CV: NEGATIVE for chest pain, palpitations  EXT: no change edema  ROS otherwise negative    Medications and Allergies:     Reviewed    Physical Exam:     Vitals were reviewed  Patient Vitals for the past 8 hrs:   BP Temp Temp src Heart Rate Resp SpO2 Weight   06/02/19 0734 164/75 97.3  F (36.3  C) Oral 72 20 92 % --   06/02/19 0548 -- -- -- -- -- -- 96.9 kg (213 lb 11.2 oz)     I/O last 3 completed shifts:  In: 1424.2 [P.O.:1280; I.V.:144.2]  Out: 2500 [Urine:2500]    Vitals:    05/28/19 0316 05/29/19 0608 05/31/19 0500 06/01/19 0548   Weight: 101.4 kg (223 lb 8 oz) 100.5 kg (221 lb 8 oz) 99.3 kg (218 lb 14.4 oz) 97.8 kg (215 lb 11.2 oz)    06/02/19 0548   Weight: 96.9 kg (213 lb 11.2 oz)        GENERAL: awake, alert, follows  HEENT: NC/AT, PERRLA, EOMI, non icteric, pharynx moist without lesion  RESP:  clear anteriorly  CV: RRR, normal S1 S2  ABDOMEN: soft, nontender, no HSM or masses and bowel sounds normal  MS: no clubbing, cyanosis   SKIN: clear without significant rashes or lesions  NEURO: speech normal and cranial nerves 2-12 intact  PSYCH: affect normal/bright  EXT: 2 plus edema    Data:     Recent Labs   Lab 06/02/19  0607 06/01/19  1302 06/01/19  0646 05/31/19  0641 05/30/19  0607     --  140 140 142   POTASSIUM 5.3 5.6* 5.7* 5.4* 5.1   CHLORIDE 108  --  109 109 110*   CO2 27  --  25 25 25   ANIONGAP 4  --  6 6 7   *  --  114* 114* 95   BUN 60*  --  58* 59* 61*   CR 4.82*  --  4.73* 4.27* 4.27*   GFRESTIMATED 9*  --  9* 11* 11*   GFRESTBLACK 11*  --  11* 12* 12*   GILL 8.4*  --  8.4* 8.5 7.8*         Recent Labs   Lab Test 06/02/19  0607 06/01/19  0646 05/31/19  0641 05/30/19  0607 05/29/19  0622 05/28/19  0713 05/27/19 2010 12/19/18  0631 12/18/18  0620 12/17/18  1041   CR 4.82* 4.73* 4.27* 4.27* 4.26* 3.97* 3.79* 2.82* 2.86* 2.59*     Recent Labs   Lab 06/02/19  0607 06/01/19  1302 06/01/19  0646 05/31/19  0641 05/30/19  0607   POTASSIUM 5.3 5.6* 5.7* 5.4* 5.1     Recent Labs   Lab 06/01/19  0646 05/31/19  0641 05/30/19  0607 05/29/19  0622   ALBUMIN 3.5 3.2* 2.4* 2.5*     Recent Labs   Lab 06/02/19  0607 06/01/19  1302 06/01/19  0646 05/31/19  1457 05/31/19  0641 05/30/19  0607   PHOS  --  5.2* 5.6*  --  5.9* 6.2*   HGB 7.2*  --  7.1* 7.3*  --  7.4*       Recent Labs   Lab 05/28/19  1157   IRON 32*   IRONSAT 17 *   MITRA 107     Recent Labs   Lab Test 12/19/18  0825 10/05/18  1155   UTPG 6.15* 9.00*         G Placido Brandon    [facility] - Nephrology  [phone]

## 2019-06-03 ENCOUNTER — APPOINTMENT (OUTPATIENT)
Dept: OCCUPATIONAL THERAPY | Facility: CLINIC | Age: 59
End: 2019-06-03
Payer: MEDICAID

## 2019-06-03 LAB
ANION GAP SERPL CALCULATED.3IONS-SCNC: 7 MMOL/L (ref 3–14)
BUN SERPL-MCNC: 63 MG/DL (ref 7–30)
CALCIUM SERPL-MCNC: 8.7 MG/DL (ref 8.5–10.1)
CHLORIDE SERPL-SCNC: 108 MMOL/L (ref 94–109)
CO2 SERPL-SCNC: 26 MMOL/L (ref 20–32)
CREAT SERPL-MCNC: 5.02 MG/DL (ref 0.52–1.04)
DEPRECATED CALCIDIOL+CALCIFEROL SERPL-MC: 5 UG/L (ref 20–75)
DEPRECATED CALCIDIOL+CALCIFEROL SERPL-MC: 5 UG/L (ref 20–75)
FACT X ACT/NOR PPP CHRO: 38 % (ref 70–130)
GFR SERPL CREATININE-BSD FRML MDRD: 9 ML/MIN/{1.73_M2}
GLUCOSE BLDC GLUCOMTR-MCNC: 111 MG/DL (ref 70–99)
GLUCOSE BLDC GLUCOMTR-MCNC: 134 MG/DL (ref 70–99)
GLUCOSE BLDC GLUCOMTR-MCNC: 146 MG/DL (ref 70–99)
GLUCOSE BLDC GLUCOMTR-MCNC: 154 MG/DL (ref 70–99)
GLUCOSE BLDC GLUCOMTR-MCNC: 155 MG/DL (ref 70–99)
GLUCOSE SERPL-MCNC: 125 MG/DL (ref 70–99)
HBV SURFACE AB SERPL IA-ACNC: 0.59 M[IU]/ML
HBV SURFACE AG SERPL QL IA: NONREACTIVE
INR PPP: 1.94 (ref 0.86–1.14)
LMWH PPP CHRO-ACNC: 0.31 IU/ML
POTASSIUM SERPL-SCNC: 5.5 MMOL/L (ref 3.4–5.3)
SODIUM SERPL-SCNC: 141 MMOL/L (ref 133–144)

## 2019-06-03 PROCEDURE — P9047 ALBUMIN (HUMAN), 25%, 50ML: HCPCS | Performed by: INTERNAL MEDICINE

## 2019-06-03 PROCEDURE — 99233 SBSQ HOSP IP/OBS HIGH 50: CPT | Performed by: INTERNAL MEDICINE

## 2019-06-03 PROCEDURE — 36415 COLL VENOUS BLD VENIPUNCTURE: CPT | Performed by: HOSPITALIST

## 2019-06-03 PROCEDURE — 85610 PROTHROMBIN TIME: CPT | Performed by: HOSPITALIST

## 2019-06-03 PROCEDURE — 25000132 ZZH RX MED GY IP 250 OP 250 PS 637: Performed by: INTERNAL MEDICINE

## 2019-06-03 PROCEDURE — G0499 HEPB SCREEN HIGH RISK INDIV: HCPCS | Performed by: INTERNAL MEDICINE

## 2019-06-03 PROCEDURE — 12000000 ZZH R&B MED SURG/OB

## 2019-06-03 PROCEDURE — 80048 BASIC METABOLIC PNL TOTAL CA: CPT | Performed by: HOSPITALIST

## 2019-06-03 PROCEDURE — 86706 HEP B SURFACE ANTIBODY: CPT | Performed by: INTERNAL MEDICINE

## 2019-06-03 PROCEDURE — 85520 HEPARIN ASSAY: CPT | Performed by: HOSPITALIST

## 2019-06-03 PROCEDURE — 00000146 ZZHCL STATISTIC GLUCOSE BY METER IP

## 2019-06-03 PROCEDURE — 36415 COLL VENOUS BLD VENIPUNCTURE: CPT | Performed by: INTERNAL MEDICINE

## 2019-06-03 PROCEDURE — 25000128 H RX IP 250 OP 636: Performed by: INTERNAL MEDICINE

## 2019-06-03 PROCEDURE — 97110 THERAPEUTIC EXERCISES: CPT | Mod: GO

## 2019-06-03 PROCEDURE — 85260 CLOT FACTOR X STUART-POWER: CPT | Performed by: HOSPITALIST

## 2019-06-03 RX ORDER — HEPARIN SODIUM 200 [USP'U]/100ML
500 INJECTION, SOLUTION INTRAVENOUS CONTINUOUS PRN
Status: DISCONTINUED | OUTPATIENT
Start: 2019-06-03 | End: 2019-06-04

## 2019-06-03 RX ORDER — HEPARIN SODIUM 1000 [USP'U]/ML
500 INJECTION, SOLUTION INTRAVENOUS; SUBCUTANEOUS
Status: COMPLETED | OUTPATIENT
Start: 2019-06-03 | End: 2019-06-04

## 2019-06-03 RX ORDER — ALBUMIN (HUMAN) 12.5 G/50ML
50 SOLUTION INTRAVENOUS
Status: DISCONTINUED | OUTPATIENT
Start: 2019-06-03 | End: 2019-06-04

## 2019-06-03 RX ORDER — HEPARIN SODIUM 1000 [USP'U]/ML
500 INJECTION, SOLUTION INTRAVENOUS; SUBCUTANEOUS CONTINUOUS
Status: DISCONTINUED | OUTPATIENT
Start: 2019-06-03 | End: 2019-06-04

## 2019-06-03 RX ORDER — DOXERCALCIFEROL 4 UG/2ML
4 INJECTION INTRAVENOUS
Status: COMPLETED | OUTPATIENT
Start: 2019-06-03 | End: 2019-06-04

## 2019-06-03 RX ADMIN — CALCIUM ACETATE 667 MG: 667 CAPSULE ORAL at 13:11

## 2019-06-03 RX ADMIN — FUROSEMIDE 40 MG: 40 TABLET ORAL at 16:09

## 2019-06-03 RX ADMIN — FUROSEMIDE 40 MG: 40 TABLET ORAL at 08:41

## 2019-06-03 RX ADMIN — AMLODIPINE BESYLATE 5 MG: 5 TABLET ORAL at 08:40

## 2019-06-03 RX ADMIN — INSULIN HUMAN 10 UNITS: 100 INJECTION, SUSPENSION SUBCUTANEOUS at 18:01

## 2019-06-03 RX ADMIN — CALCIUM ACETATE 667 MG: 667 CAPSULE ORAL at 08:40

## 2019-06-03 RX ADMIN — CARVEDILOL 50 MG: 25 TABLET, FILM COATED ORAL at 08:40

## 2019-06-03 RX ADMIN — ALBUMIN HUMAN 25 G: 0.25 SOLUTION INTRAVENOUS at 08:41

## 2019-06-03 RX ADMIN — CARVEDILOL 50 MG: 25 TABLET, FILM COATED ORAL at 17:45

## 2019-06-03 RX ADMIN — HYDRALAZINE HYDROCHLORIDE 50 MG: 50 TABLET, FILM COATED ORAL at 08:40

## 2019-06-03 RX ADMIN — HYDRALAZINE HYDROCHLORIDE 50 MG: 50 TABLET, FILM COATED ORAL at 16:09

## 2019-06-03 RX ADMIN — HYDRALAZINE HYDROCHLORIDE 50 MG: 50 TABLET, FILM COATED ORAL at 22:01

## 2019-06-03 RX ADMIN — CALCIUM ACETATE 667 MG: 667 CAPSULE ORAL at 17:45

## 2019-06-03 RX ADMIN — HEPARIN SODIUM 1500 UNITS/HR: 10000 INJECTION, SOLUTION INTRAVENOUS at 12:09

## 2019-06-03 ASSESSMENT — ACTIVITIES OF DAILY LIVING (ADL)
ADLS_ACUITY_SCORE: 14
ADLS_ACUITY_SCORE: 15
ADLS_ACUITY_SCORE: 14

## 2019-06-03 ASSESSMENT — MIFFLIN-ST. JEOR: SCORE: 1562.88

## 2019-06-03 NOTE — PLAN OF CARE
Heart Failure Care Pathway  GOALS TO BE MET BEFORE DISCHARGE:    1. Decrease congestion and/or edema with diuretic therapy to achieve near      optimal volume status.            Dyspnea improved:  Yes            Edema improved:     No, please explain: +2/+3 BLE edema         Net I/O and Weights since admission:          05/04 0700 - 06/03 0659  In: 7294.43 [P.O.:5975; I.V.:1319.43]  Out: 65449 [Urine:77206]  Net: -7280.57            Vitals:    05/27/19 1957 05/27/19 2314 05/28/19 0316 05/29/19 0608   Weight: 83.9 kg (185 lb) 101.8 kg (224 lb 8 oz) 101.4 kg (223 lb 8 oz) 100.5 kg (221 lb 8 oz)    05/31/19 0500 06/01/19 0548 06/02/19 0548 06/03/19 0504   Weight: 99.3 kg (218 lb 14.4 oz) 97.8 kg (215 lb 11.2 oz) 96.9 kg (213 lb 11.2 oz) 96.5 kg (212 lb 12.8 oz)         2.  O2 sats > 92% on RA or at prior home O2 therapy level.          Current oxygenation status:       SpO2: 93 %         O2 Device: None (Room air),            Able to wean O2 this shift to keep sats > 92%:  NA        Does patient use Home O2? No    3.  Tolerates ambulation and mobility near baseline: Yes        How many times did the patient ambulate with nursing staff this shift? 1    Please review the Heart Failure Care Pathway for additional HF goal outcomes.    A&O. VSS. Denies pain.1800 ml fluid restriction. Tele SR. . Heparin gtt @ 1500 unit(s)/hr. Up SBA.  Miranda Owens RN  6/3/2019

## 2019-06-03 NOTE — PROGRESS NOTES
Assessment and Plan:   End-stage renal disease: Due to diabetic nephropathy.  She also has nephrotic range proteinuria.  Her chronic kidney disease is complicated by anemia, hyperkalemia and secondary hyperparathyroidism.  The SPEP and UPEP are negative.    Today her potassium is 5.5, bicarbonate 26, creatinine 5.02.  Recent phosphorus was elevated at 5.2 with a calcium of 8.7.  His PTH is mildly elevated at 210.    She has been getting IV albumin and oral Lasix.  She is on a renal diet with 1800 mL/day fluid restriction. I/O 1378/2500. Wt down about 6 kg since adm.     I will arrange for her to get dialysis 3 times a week at the [facility] dialysis unit.  She lives in [place].  I had a long discussion with her about dialysis in general and chronic kidney disease.  She seems quite alert and intelligent and able to follow our discussion.  We will arrange for CBC to be placed tomorrow and for dialysis to follow.            Interval History:   Diabetes  Hypertension      Peripheral vascular disease status post toe amputation  Anemia           Review of Systems:   She denies shortness of breath, chest pain, abdominal pain.  She has a good appetite without nausea or vomiting.  She denies pruritus.  She notes persistent edema but feels it is much better than previously.          Medications:       albumin human  25 g Intravenous BID     amLODIPine  5 mg Oral Daily     calcium acetate  667 mg Oral TID w/meals     carvedilol  50 mg Oral BID w/meals     furosemide  40 mg Oral BID     hydrALAZINE  50 mg Oral TID     insulin aspart   Subcutaneous QAM AC     insulin aspart   Subcutaneous Daily with lunch     insulin aspart   Subcutaneous Daily with supper     insulin aspart  1-7 Units Subcutaneous TID AC     insulin aspart  1-5 Units Subcutaneous At Bedtime     insulin isophane human  10 Units Subcutaneous Daily with supper     insulin isophane human  6 Units Subcutaneous QAM AC       HEParin 1,500 Units/hr (06/03/19  0856)     - MEDICATION INSTRUCTIONS -       Warfarin Therapy Reminder       Current active medications and PTA medications reviewed, see medication list for details.            Physical Exam:   Vitals were reviewed  Patient Vitals for the past 24 hrs:   BP Temp Temp src Heart Rate Resp SpO2 Weight   19 0747 127/45 97.4  F (36.3  C) Oral 75 20 93 % --   19 0730 147/49 -- -- -- -- -- --   19 0504 -- -- -- -- -- -- 96.5 kg (212 lb 12.8 oz)   19 2231 154/61 98.3  F (36.8  C) Oral 74 18 93 % --   19 2122 142/53 -- -- -- -- -- --   19 1458 148/55 97.3  F (36.3  C) Oral 72 20 94 % --       Temp:  [97.3  F (36.3  C)-98.3  F (36.8  C)] 97.4  F (36.3  C)  Heart Rate:  [72-75] 75  Resp:  [18-20] 20  BP: (127-154)/(45-61) 127/45  SpO2:  [93 %-94 %] 93 %    Temperatures:  Current - Temp: 97.4  F (36.3  C); Max - Temp  Av.7  F (36.5  C)  Min: 97.3  F (36.3  C)  Max: 98.3  F (36.8  C)  Respiration range: Resp  Av.3  Min: 18  Max: 20  Pulse range: No data recorded  Blood pressure range: Systolic (24hrs), Av , Min:127 , Max:154   ; Diastolic (24hrs), Av, Min:45, Max:61    Pulse oximetry range: SpO2  Av.3 %  Min: 93 %  Max: 94 %    I/O last 3 completed shifts:  In: 1533 [P.O.:1080; I.V.:453]  Out: 1750 [Urine:1750]      Intake/Output Summary (Last 24 hours) at 6/3/2019 1003  Last data filed at 6/3/2019 0956  Gross per 24 hour   Intake 1452 ml   Output 1750 ml   Net -298 ml       Alert and sitting up in her chair.  She has a cane nearby.    Skin is negative.    Lungs show clear breath sounds.    Cardiac exam shows regular rate and rhythm normal S1-S2 no murmur rub or gallop.  Lower extremities show 1-2+ edema       Weights (last 365 days)     Date/Time Weight Who   19 0504 96.5 kg (212 lb 12.8 oz) KA   19 0548 96.9 kg (213 lb 11.2 oz) BB   19 0548 97.8 kg (215 lb 11.2 oz) YW   19 0500 99.3 kg (218 lb 14.4 oz) MB   19 0608 100.5 kg (221 lb 8 oz)  TM   05/28/19 0316 101.4 kg (223 lb 8 oz) KW   05/27/19 2314 101.8 kg (224 lb 8 oz)          Wt Readings from Last 4 Encounters:   06/03/19 96.5 kg (212 lb 12.8 oz)   12/19/18 93.4 kg (206 lb)   11/05/18 86.8 kg (191 lb 4.8 oz)   10/07/18 89.4 kg (197 lb)          Data:          Lab Results   Component Value Date     06/03/2019     06/02/2019     06/01/2019    Lab Results   Component Value Date    CHLORIDE 108 06/03/2019    CHLORIDE 108 06/02/2019    CHLORIDE 109 06/01/2019      Lab Results   Component Value Date    BUN 63 06/03/2019    BUN 60 06/02/2019    BUN 58 06/01/2019      Lab Results   Component Value Date    POTASSIUM 5.5 06/03/2019    POTASSIUM 5.3 06/02/2019    POTASSIUM 5.6 06/01/2019    Lab Results   Component Value Date    CO2 26 06/03/2019    CO2 27 06/02/2019    CO2 25 06/01/2019    Lab Results   Component Value Date    CR 5.02 06/03/2019    CR 4.82 06/02/2019    CR 4.73 06/01/2019        Recent Labs   Lab Test 06/02/19  0607 06/01/19  0646 05/31/19  1457 05/30/19  0607 05/28/19  0713   WBC 4.8  --   --  6.1 5.4   HGB 7.2* 7.1* 7.3* 7.4* 7.0*   HCT 24.1*  --   --  24.9* 23.8*   MCV 90  --   --  88 88     --   --  184 185     Recent Labs   Lab Test 05/27/19 2010 12/19/18  0631 10/04/18  1611   AST 38 9 26   ALT 56* 10 18   ALKPHOS 155* 91 167*   BILITOTAL 0.3 0.3 0.3       Recent Labs   Lab Test 12/17/18  1041 03/15/18  2041 05/03/17  0725   MAG 2.0 2.1 1.5*     Recent Labs   Lab Test 06/01/19  1302 06/01/19  0646 05/31/19  0641   PHOS 5.2* 5.6* 5.9*     Recent Labs   Lab Test 06/03/19  0652 06/02/19  0607 06/01/19  0646   GILL 8.7 8.4* 8.4*     Lab Results   Component Value Date    GILL 8.7 06/03/2019     Lab Results   Component Value Date    WBC 4.8 06/02/2019    HGB 7.2 (L) 06/02/2019    HCT 24.1 (L) 06/02/2019    MCV 90 06/02/2019     06/02/2019     Lab Results   Component Value Date     06/03/2019    POTASSIUM 5.5 (H) 06/03/2019    CHLORIDE 108 06/03/2019     CO2 26 06/03/2019     (H) 06/03/2019     Lab Results   Component Value Date    BUN 63 (H) 06/03/2019    CR 5.02 (H) 06/03/2019     Lab Results   Component Value Date    MAG 2.0 12/17/2018     Lab Results   Component Value Date    PHOS 5.2 (H) 06/01/2019       Creatinine   Date Value Ref Range Status   06/03/2019 5.02 (H) 0.52 - 1.04 mg/dL Final   06/02/2019 4.82 (H) 0.52 - 1.04 mg/dL Final   06/01/2019 4.73 (H) 0.52 - 1.04 mg/dL Final   05/31/2019 4.27 (H) 0.52 - 1.04 mg/dL Final   05/30/2019 4.27 (H) 0.52 - 1.04 mg/dL Final   05/29/2019 4.26 (H) 0.52 - 1.04 mg/dL Final       Attestation:  I have reviewed today's vital signs, notes, medications, labs and imaging.     Adolfo Odonnell MD

## 2019-06-03 NOTE — PLAN OF CARE
Heart Failure Care Pathway  GOALS TO BE MET BEFORE DISCHARGE:    1. Decrease congestion and/or edema with diuretic therapy to achieve near      optimal volume status.            Dyspnea improved:  Yes             Edema improved:     No, please explain: no change         Net I/O and Weights since admission:          05/03 2300 - 06/02 2259  In: 7294.43 [P.O.:5975; I.V.:1319.43]  Out: 12077 [Urine:65049]  Net: -6430.57            Vitals:    05/27/19 1957 05/27/19 2314 05/28/19 0316 05/29/19 0608   Weight: 83.9 kg (185 lb) 101.8 kg (224 lb 8 oz) 101.4 kg (223 lb 8 oz) 100.5 kg (221 lb 8 oz)    05/31/19 0500 06/01/19 0548 06/02/19 0548   Weight: 99.3 kg (218 lb 14.4 oz) 97.8 kg (215 lb 11.2 oz) 96.9 kg (213 lb 11.2 oz)         2.  O2 sats > 92% on RA or at prior home O2 therapy level.          Current oxygenation status:       SpO2: 94 %         O2 Device: None (Room air),            Able to wean O2 this shift to keep sats > 92%:  n/a        Does patient use Home O2? No    3.  Tolerates ambulation and mobility near baseline: Yes        How many times did the patient ambulate with nursing staff this shift? 1    Please review the Heart Failure Care Pathway for additional HF goal outcomes.    VSS, denies pain, up with SBA, continues on Heparin gtt@15ml/hr, INR 1.54, on Coumadin, will start oral Lasix tomorrow, continues on Albumin IV,  , 147, Nephrogy  following, will continue with POC.    CARSON LYONS RN  6/2/2019

## 2019-06-03 NOTE — PLAN OF CARE
Heart Failure Care Pathway  GOALS TO BE MET BEFORE DISCHARGE:    1. Decrease congestion and/or edema with diuretic therapy to achieve near      optimal volume status.            Dyspnea improved:  Yes            Edema improved:     No, please explain: +2 edema bilat legs, +3 edema bilat ankles and feet         Net I/O and Weights since admission:          05/04 1500 - 06/03 1459  In: 7868.43 [P.O.:6295; I.V.:1573.43]  Out: 66994 [Urine:40562]  Net: -6706.57            Vitals:    05/27/19 1957 05/27/19 2314 05/28/19 0316 05/29/19 0608   Weight: 83.9 kg (185 lb) 101.8 kg (224 lb 8 oz) 101.4 kg (223 lb 8 oz) 100.5 kg (221 lb 8 oz)    05/31/19 0500 06/01/19 0548 06/02/19 0548 06/03/19 0504   Weight: 99.3 kg (218 lb 14.4 oz) 97.8 kg (215 lb 11.2 oz) 96.9 kg (213 lb 11.2 oz) 96.5 kg (212 lb 12.8 oz)         2.  O2 sats > 92% on RA or at prior home O2 therapy level.          Current oxygenation status:       SpO2: 93 %         O2 Device: None (Room air),            Able to wean O2 this shift to keep sats > 92%:  Yes       Does patient use Home O2? No    3.  Tolerates ambulation and mobility near baseline: Yes        How many times did the patient ambulate with nursing staff this shift? 1    Please review the Heart Failure Care Pathway for additional HF goal outcomes.    VSS. Denies pain. Denies SOB. Lungs clear. +2/+3 edema BLE. Given PO lasix, heparin gtt infusing. Tolerating renal diet, fluid restriction followed. Voiding. Tele - SR. Plan for IR CVC placement tomorrow AM and dialysis to follow per nephrology. Will continue to monitor.    Sarah Lizama RN  6/3/2019

## 2019-06-03 NOTE — PLAN OF CARE
Discharge Planner OT   Patient plan for discharge: home    Current status: Pt ambulated in hallway x 200 ft with SBA and one seated rest break. Pt using IV pole for UE support during ambulation. No LOB noted, pt reports pain in R knee at baseline. Tolerates fair, hypotensive response to exercise but asymptomatic, see vitals flowsheet. Declined cognitive screen.    Barriers to return to prior living situation: decreased functional activity tolerance, pain in knee, questionable cognition    Recommendations for discharge: Home    Rationale for recommendations: Recommend [facility] clinic follow-up to assist with CHF mgmt. Will continue to follow while IP for OT/CR to maximize safety and indep in all ADLs/IADLs and mobility tasks through improved functional activity tolerance.        Entered by: Gabrielle Hansen 06/03/2019 7:49 AM

## 2019-06-03 NOTE — PROGRESS NOTES
[facility]  Hospitalist Progress Note  Sekou Shell MD 06/03/2019    Reason for Stay (Diagnosis): weakness and shortness of breath         Assessment and Plan:      Summary of Stay: Roxy Beaulieu is a 59 year old female who came to attention on 5/27/2019 with shortness of breath.      Prior medical history is notable for poorly controlled type 2 diabetes and essential hypertension both contributed to her current chronic kidney disease.  She also has a hypertensive cardiomyopathy with both depressed LV function and advanced diastolic dysfunction.    Ms. Beaulieu was admitted to a telemetry bed and was started on IV diuresis.  Due to concern for the patient's apparent hypoxia with subtherapeutic INR and nonocclusive left DVT, (she is anticoagulated for recurrent pulmonary embolism) she was immediately started on a heparin drip and warfarin was restarted.      Overall, the patient's kidney function has worsened throughout the hospital stay and this prompted discussion about initiation of hemodialysis.     At the time of my initial introduction of the patient, June 2, 2019, the nurses indicated to me the patient seems to be quite confused and unreliable.  I talked to the patient's daughter by telephone who confirmed that this has been a concern of family members for a couple of years now.  Nevertheless, she now appears comfortable with initiation of Dialysis.     Problem List:   1. Acute hypoxic resp failure.  Resolved.  2. CKD stage V (GFR < 15) with nephrotic range proteinuria due to IDDM. Worsening creatinine with nephrotic range proteinuria.  3. Anasarca, likely multifactorial with hypoalbuminemia and chronic systolic heart failure. LVEF 45-50%, severe LVH, advanced Diastolic Dysfunction.   4. Non-occlusive LLE DVT with suspected PE. INR not therapeutic as yet.   5. IDDM. Control is quite good on NPH BID and sliding scale Insulin. Will need to readdress previously reported home regimen for  "discharge.   6. Essential HTN.  7. Dyslipidemia, acute on chronic anemia with iron deficiency, CKD, ACD.  8. Apparent confusion.  Unclear status, OT finds no needs at this time.    PLAN:  1.  Placement of tunneled catheter tomorrow followed by initiation of HD.  2.  SW consultation for dispo planning.     DVT Prophylaxis: heparin until INR is in therapeutic range on Warfarin.  Code Status: Full Code   Discharge Dispo: home expected  Estimated Disch Date / # of Days until Disch: later Wednesday in order to permit for initiation of HD.        Interval History (Subjective):      Case discussed with Dr. Odonnell, patient and her sister, Yuki, and the patient. All are in agreement with plan to go forward with starting HD.  Pt is planning to return to Dr. Viramontes at [facility] in [place]. Needs additional attention to her diabetic care.                   Physical Exam:      Last Vital Signs:  /48   Pulse 71   Temp 98.6  F (37  C) (Oral)   Resp 20   Ht 1.702 m (5' 7\")   Wt 96.5 kg (212 lb 12.8 oz)   SpO2 93%   BMI 33.33 kg/m      I/O last 3 completed shifts:  In: 1351 [P.O.:880; I.V.:471]  Out: 1750 [Urine:1750]    Constitutional: Awake, alert, cooperative, no apparent distress   Respiratory: Clear to auscultation bilaterally, no crackles or wheezing   Cardiovascular: Regular rate and rhythm, normal S1 and S2, and no murmur noted   Abdomen: Normal bowel sounds, soft, non-distended, non-tender   Skin: No rashes, no cyanosis, dry to touch   Neuro: Alert and appears appropriate but with very poor insight, suggesting problems with memory.    Extremities: Deep pitting edema   Other(s):        All other systems: Negative          Medications:      All current medications were reviewed with changes reflected in problem list.         Data:      All new lab and imaging data was reviewed.   Labs/Imaging:  Results for orders placed or performed during the hospital encounter of 05/27/19 (from the past 24 hour(s)) " "  Glucose by meter   Result Value Ref Range    Glucose 147 (H) 70 - 99 mg/dL   Glucose by meter   Result Value Ref Range    Glucose 111 (H) 70 - 99 mg/dL   Heparin Xa (10a) Level   Result Value Ref Range    Heparin 10A Level 0.31 IU/mL   INR   Result Value Ref Range    INR 1.94 (H) 0.86 - 1.14   Factor 10 chromogenic   Result Value Ref Range    Chromogenic Factor 10 38 (L) 70 - 130 %   Basic metabolic panel   Result Value Ref Range    Sodium 141 133 - 144 mmol/L    Potassium 5.5 (H) 3.4 - 5.3 mmol/L    Chloride 108 94 - 109 mmol/L    Carbon Dioxide 26 20 - 32 mmol/L    Anion Gap 7 3 - 14 mmol/L    Glucose 125 (H) 70 - 99 mg/dL    Urea Nitrogen 63 (H) 7 - 30 mg/dL    Creatinine 5.02 (H) 0.52 - 1.04 mg/dL    GFR Estimate 9 (L) >60 mL/min/[1.73_m2]    GFR Estimate If Black 10 (L) >60 mL/min/[1.73_m2]    Calcium 8.7 8.5 - 10.1 mg/dL   Glucose by meter   Result Value Ref Range    Glucose 134 (H) 70 - 99 mg/dL   Nutrition Services Adult IP Consult    Narrative    Gisela Schroeder, RD, LD     6/3/2019 12:17 PM  CLINICAL NUTRITION SERVICES - REASSESSMENT NOTE + Consult:   Patient/family request - patient seeking education regarding   renal diet  Future/Additional Recommendations:   Continue renal diet as ordered - protein and micronutrient   liberalization/adjustments per nephrology. Renal MVI, Vitamin D   to be addressed at dialysis  Fluid restriction at discharge per MD   Malnutrition: Patient does not meet two of the criteria necessary   for diagnosing malnutrition     EVALUATION OF PROGRESS TOWARD GOALS/NEW FINDINGS   Progress towards goals will be monitored and evaluated per   protocol and Practice Guidelines    Diet order: renal diet (5/31), 1800 mL fluid restriction  Per flow sheet review, 100% intake for documented meals  Drinks diet soda - \"I can cut it out\"  Unsure of what to order - following low sodium but difficult to   find foods that fit within other restricted nutrients. Likes   protein options - see " previous RD note for full diet history   HD planned to start later this week       I/O last 3 completed shifts:    In: 1533 [P.O.:1080; I.V.:453]    Out: 1750 [Urine:1750]    Weight: 96 kg - fluid up since admit    Meds: phoslo TID, Lasix 40 mg TID, insulin    Labs:   Recent Labs   Lab Test 06/03/19  0652 06/02/19  0607 06/01/19  1302 06/01/19  0646 05/31/19  0641   POTASSIUM 5.5* 5.3 5.6* 5.7* 5.4*     Recent Labs   Lab Test 06/01/19  1302 06/01/19  0646 05/31/19  0641 05/30/19  0607 05/29/19  0622   PHOS 5.2* 5.6* 5.9* 6.2* 6.4*     Recent Labs   Lab Test 12/17/18  1041 03/15/18  2041 05/03/17  0725 05/02/17  0657 04/30/17  0613   MAG 2.0 2.1 1.5* 1.7 1.6     Recent Labs   Lab Test 06/03/19  0652 06/02/19  0607 06/01/19  0646 05/31/19  0641 05/30/19  0607    139 140 140 142     Recent Labs   Lab Test 06/03/19  0652 06/02/19  0607 06/01/19  0646 05/31/19  0641 05/30/19  0607   CR 5.02* 4.82* 4.73* 4.27* 4.27*     Recent Labs   Lab 06/03/19  0652 06/02/19  0607 06/01/19  0646 05/31/19  0641 05/30/19  0607 05/29/19  0622 05/28/19  0713 05/27/19  2010   * 140* 114* 114* 95 115* 124* 203*     Lab Results   Component Value Date    A1C 10.5 05/28/2019    A1C 11.6 10/04/2018    A1C >16.0 03/16/2018    A1C >16.0 03/15/2018    A1C 12.3 04/28/2017       ASSESSED NUTRITION NEEDS (PER APPROVED PRACTICE GUIDELINES; DW:   71.2 kg AdjBW):  Estimated Energy Needs: 2185-9394 kcals (25-30 Kcal/Kg)  Justification: maintenance  Estimated Protein Needs: 71-85 grams protein (1-1.2 g pro/Kg)  Justification:  preservation of lean body mass with regard to   kidney function - will increase with initiation of HD  Estimated Fluid Needs: Per Provider (1800 mL FR)    Previous Goals:   Pt to consume at least 75% meals BID- TId  Evaluation: Met  Pt to verbalize at least 2 foods high in Sodium   Evaluation: Met    Previous Nutrition Diagnosis:   Predicted inadequate nutrient intake related to reported reduced   appetite with  prescribed med, appetite much improved with   admission eating 100% and potential for decline.  Evaluation: Updated below to reflect current problem    MALNUTRITION (Assessed 6/3)  % Weight Loss:  Fluid up  % Intake:  Decreased intake does not meet criteria for   malnutrition   Subcutaneous Fat Loss:  None observed  Muscle Loss:  Temporal region mild depletion and Dorsal hand   region mild depletion  Fluid Retention:  +2-3 BLE edema (not a strong second indicator   given CKD)     Malnutrition Diagnosis: Patient does not meet two of the above   criteria necessary for diagnosing malnutrition    CURRENT NUTRITION DIAGNOSIS  Food and nutrition-related knowledge deficit related to new renal   diet restrictions as evidenced by initiation of HD in <1 week, pt   request for education    INTERVENTIONSRecommendations / Nutrition Prescription  Continue renal diet as ordered - protein and micronutrient   liberalization/adjustments per nephrology. Renal MVI, Vitamin D   to be addressed at dialysis  Fluid restriction at discharge per MD    Implementation  Nutrition education:   ? Nutrition Education (Content):  a) Provided handouts:  a. Tips for low sodium diet  b. Controlling K Intake  c. Controlling P04 intake  d. Low K and P04 foods and drinks  e. Fast facts for dialysis diet  b) Discussed rational for renal and dialysis diet, individual   restrictions and foods allowed/not allowed, label reading,   choosing alternate foods; follow up with HD RD as appropriate for   further guidance and support with liberalization per MD   discretion pending kidney function  c) Reiterated sodium restriction reviewed previously   ? Nutrition Education (Application):  a) Discussed eating habits and recommended alternative food   choices - low P04 and K foods, low Na food options, high   biological value protein option at each meal and snack once HD   initiated  b) Deferred fluid restriction to MD  ? Patient verbalizes understanding of diet by  teaching back three   foods allowable on current diet restrictions.  ? Anticipate fair compliance, follow up with OP dialysis RD  ? Diet Education - refer to Education Flowsheet    Collaboration and Referral of care: Discussed patient during   interdisciplinary care rounds this morning    Goals  Patient to consume >/=75% of meals TID while adhering to renal   diet restrictions    MONITORING AND EVALUATION:  Progress towards goals will be monitored and evaluated per   protocol and Practice Guidelines      Gisela Schroeder, MEAGHAN, LD, CNSC  Pager - 3rd floor/ICU: [phone]  Pager - All other floors: [phone]  Pager - Weekend/holiday: [phone]  Office: [phone]   Glucose by meter   Result Value Ref Range    Glucose 154 (H) 70 - 99 mg/dL   Hepatitis B Surface Antibody   Result Value Ref Range    Hepatitis B Surface Antibody 0.59 <8.00 m[IU]/mL   Hepatitis B surface antigen   Result Value Ref Range    Hep B Surface Agn Nonreactive NR^Nonreactive   Glucose by meter   Result Value Ref Range    Glucose 155 (H) 70 - 99 mg/dL

## 2019-06-03 NOTE — CONSULTS
"CLINICAL NUTRITION SERVICES - REASSESSMENT NOTE + Consult: Patient/family request - patient seeking education regarding renal diet    Future/Additional Recommendations:   Continue renal diet as ordered - protein and micronutrient liberalization/adjustments per nephrology. Renal MVI, Vitamin D to be addressed at dialysis  Fluid restriction at discharge per MD   Malnutrition: Patient does not meet two of the criteria necessary for diagnosing malnutrition     EVALUATION OF PROGRESS TOWARD GOALS/NEW FINDINGS   Progress towards goals will be monitored and evaluated per protocol and Practice Guidelines    Diet order: renal diet (5/31), 1800 mL fluid restriction  Per flow sheet review, 100% intake for documented meals  Drinks diet soda - \"I can cut it out\"  Unsure of what to order - following low sodium but difficult to find foods that fit within other restricted nutrients. Likes protein options - see previous RD note for full diet history   HD planned to start later this week       I/O last 3 completed shifts:    In: 1533 [P.O.:1080; I.V.:453]    Out: 1750 [Urine:1750]    Weight: 96 kg - fluid up since admit    Meds: phoslo TID, Lasix 40 mg TID, insulin    Labs:   Recent Labs   Lab Test 06/03/19  0652 06/02/19  0607 06/01/19  1302 06/01/19  0646 05/31/19  0641   POTASSIUM 5.5* 5.3 5.6* 5.7* 5.4*     Recent Labs   Lab Test 06/01/19  1302 06/01/19  0646 05/31/19  0641 05/30/19  0607 05/29/19  0622   PHOS 5.2* 5.6* 5.9* 6.2* 6.4*     Recent Labs   Lab Test 12/17/18  1041 03/15/18  2041 05/03/17  0725 05/02/17  0657 04/30/17  0613   MAG 2.0 2.1 1.5* 1.7 1.6     Recent Labs   Lab Test 06/03/19  0652 06/02/19  0607 06/01/19  0646 05/31/19  0641 05/30/19  0607    139 140 140 142     Recent Labs   Lab Test 06/03/19  0652 06/02/19  0607 06/01/19  0646 05/31/19  0641 05/30/19  0607   CR 5.02* 4.82* 4.73* 4.27* 4.27*     Recent Labs   Lab 06/03/19  0652 06/02/19  0607 06/01/19  0646 05/31/19  0641 05/30/19  0607 05/29/19  0622 " 05/28/19  0713 05/27/19 2010   * 140* 114* 114* 95 115* 124* 203*     Lab Results   Component Value Date    A1C 10.5 05/28/2019    A1C 11.6 10/04/2018    A1C >16.0 03/16/2018    A1C >16.0 03/15/2018    A1C 12.3 04/28/2017       ASSESSED NUTRITION NEEDS (PER APPROVED PRACTICE GUIDELINES; DW: 71.2 kg AdjBW):  Estimated Energy Needs: 3736-5978 kcals (25-30 Kcal/Kg)  Justification: maintenance  Estimated Protein Needs: 71-85 grams protein (1-1.2 g pro/Kg)  Justification:  preservation of lean body mass with regard to kidney function - will increase with initiation of HD  Estimated Fluid Needs: Per Provider (1800 mL FR)    Previous Goals:   Pt to consume at least 75% meals BID- TId  Evaluation: Met  Pt to verbalize at least 2 foods high in Sodium   Evaluation: Met    Previous Nutrition Diagnosis:   Predicted inadequate nutrient intake related to reported reduced appetite with prescribed med, appetite much improved with admission eating 100% and potential for decline.  Evaluation: Updated below to reflect current problem    MALNUTRITION (Assessed 6/3)  % Weight Loss:  Fluid up  % Intake:  Decreased intake does not meet criteria for malnutrition   Subcutaneous Fat Loss:  None observed  Muscle Loss:  Temporal region mild depletion and Dorsal hand region mild depletion  Fluid Retention:  +2-3 BLE edema (not a strong second indicator given CKD)     Malnutrition Diagnosis: Patient does not meet two of the above criteria necessary for diagnosing malnutrition    CURRENT NUTRITION DIAGNOSIS  Food and nutrition-related knowledge deficit related to new renal diet restrictions as evidenced by initiation of HD in <1 week, pt request for education    INTERVENTIONS  Recommendations / Nutrition Prescription  Continue renal diet as ordered - protein and micronutrient liberalization/adjustments per nephrology. Renal MVI, Vitamin D to be addressed at dialysis  Fluid restriction at discharge per MD    Implementation  Nutrition  education:     Nutrition Education (Content):  a) Provided handouts:  a. Tips for low sodium diet  b. Controlling K Intake  c. Controlling P04 intake  d. Low K and P04 foods and drinks  e. Fast facts for dialysis diet  b) Discussed rational for renal and dialysis diet, individual restrictions and foods allowed/not allowed, label reading, choosing alternate foods; follow up with HD RD as appropriate for further guidance and support with liberalization per MD discretion pending kidney function  c) Reiterated sodium restriction reviewed previously     Nutrition Education (Application):  a) Discussed eating habits and recommended alternative food choices - low P04 and K foods, low Na food options, high biological value protein option at each meal and snack once HD initiated  b) Deferred fluid restriction to MD    Patient verbalizes understanding of diet by teaching back three foods allowable on current diet restrictions.    Anticipate fair compliance, follow up with OP dialysis RD    Diet Education - refer to Education Flowsheet    Collaboration and Referral of care: Discussed patient during interdisciplinary care rounds this morning    Goals  Patient to consume >/=75% of meals TID while adhering to renal diet restrictions    MONITORING AND EVALUATION:  Progress towards goals will be monitored and evaluated per protocol and Practice Guidelines      Gisela Schroeder, MEAGHAN, LD, CNSC  Pager - 3rd floor/ICU: [phone]  Pager - All other floors: [phone]  Pager - Weekend/holiday: [phone]  Office: [phone]

## 2019-06-03 NOTE — PROGRESS NOTES
SW aware of SW consult. SW reviewed PT/OT/MD notes. SW left VM with call back number for pts sister, Yuki.  Juliet Horvath, VINCENT, LICSW    Addendum: SW received VM from pts sister Yuki reporting that she will be unable to answer her phone and will be coming into the hospital later this afternoon.  2:44 PM

## 2019-06-04 ENCOUNTER — APPOINTMENT (OUTPATIENT)
Dept: INTERVENTIONAL RADIOLOGY/VASCULAR | Facility: CLINIC | Age: 59
End: 2019-06-04
Attending: INTERNAL MEDICINE
Payer: MEDICAID

## 2019-06-04 LAB
APTT PPP: 43 SEC (ref 22–37)
GLUCOSE BLDC GLUCOMTR-MCNC: 130 MG/DL (ref 70–99)
GLUCOSE BLDC GLUCOMTR-MCNC: 138 MG/DL (ref 70–99)
GLUCOSE BLDC GLUCOMTR-MCNC: 171 MG/DL (ref 70–99)
GLUCOSE BLDC GLUCOMTR-MCNC: 185 MG/DL (ref 70–99)
GLUCOSE BLDC GLUCOMTR-MCNC: 97 MG/DL (ref 70–99)
INR PPP: 1.9 (ref 0.86–1.14)
LMWH PPP CHRO-ACNC: <0.1 IU/ML

## 2019-06-04 PROCEDURE — 25000132 ZZH RX MED GY IP 250 OP 250 PS 637: Performed by: INTERNAL MEDICINE

## 2019-06-04 PROCEDURE — 25000128 H RX IP 250 OP 636: Performed by: INTERNAL MEDICINE

## 2019-06-04 PROCEDURE — 25000125 ZZHC RX 250: Performed by: RADIOLOGY

## 2019-06-04 PROCEDURE — 02HV33Z INSERTION OF INFUSION DEVICE INTO SUPERIOR VENA CAVA, PERCUTANEOUS APPROACH: ICD-10-PCS | Performed by: RADIOLOGY

## 2019-06-04 PROCEDURE — 85520 HEPARIN ASSAY: CPT | Performed by: HOSPITALIST

## 2019-06-04 PROCEDURE — 36558 INSERT TUNNELED CV CATH: CPT

## 2019-06-04 PROCEDURE — 0JH63XZ INSERTION OF TUNNELED VASCULAR ACCESS DEVICE INTO CHEST SUBCUTANEOUS TISSUE AND FASCIA, PERCUTANEOUS APPROACH: ICD-10-PCS | Performed by: RADIOLOGY

## 2019-06-04 PROCEDURE — 25000128 H RX IP 250 OP 636: Performed by: RADIOLOGY

## 2019-06-04 PROCEDURE — 77001 FLUOROGUIDE FOR VEIN DEVICE: CPT

## 2019-06-04 PROCEDURE — 76937 US GUIDE VASCULAR ACCESS: CPT

## 2019-06-04 PROCEDURE — 85730 THROMBOPLASTIN TIME PARTIAL: CPT | Performed by: HOSPITALIST

## 2019-06-04 PROCEDURE — 25800030 ZZH RX IP 258 OP 636: Performed by: RADIOLOGY

## 2019-06-04 PROCEDURE — C1769 GUIDE WIRE: HCPCS

## 2019-06-04 PROCEDURE — 85610 PROTHROMBIN TIME: CPT | Performed by: HOSPITALIST

## 2019-06-04 PROCEDURE — 12000000 ZZH R&B MED SURG/OB

## 2019-06-04 PROCEDURE — 99232 SBSQ HOSP IP/OBS MODERATE 35: CPT | Performed by: INTERNAL MEDICINE

## 2019-06-04 PROCEDURE — 00000146 ZZHCL STATISTIC GLUCOSE BY METER IP

## 2019-06-04 PROCEDURE — 90937 HEMODIALYSIS REPEATED EVAL: CPT

## 2019-06-04 PROCEDURE — 5A1D70Z PERFORMANCE OF URINARY FILTRATION, INTERMITTENT, LESS THAN 6 HOURS PER DAY: ICD-10-PCS | Performed by: INTERNAL MEDICINE

## 2019-06-04 PROCEDURE — 99152 MOD SED SAME PHYS/QHP 5/>YRS: CPT

## 2019-06-04 PROCEDURE — 63400005 ZZH RX 634: Performed by: INTERNAL MEDICINE

## 2019-06-04 PROCEDURE — 27210908 ZZH NEEDLE CR4

## 2019-06-04 PROCEDURE — 27210904 ZZH KIT CR6

## 2019-06-04 PROCEDURE — C1750 CATH, HEMODIALYSIS,LONG-TERM: HCPCS

## 2019-06-04 PROCEDURE — 27210820 ZZH WOUND GLUE CR3

## 2019-06-04 PROCEDURE — 36415 COLL VENOUS BLD VENIPUNCTURE: CPT | Performed by: HOSPITALIST

## 2019-06-04 RX ORDER — HEPARIN SODIUM 1000 [USP'U]/ML
500-6000 INJECTION, SOLUTION INTRAVENOUS; SUBCUTANEOUS
Status: COMPLETED | OUTPATIENT
Start: 2019-06-04 | End: 2019-06-04

## 2019-06-04 RX ORDER — FLUMAZENIL 0.1 MG/ML
0.2 INJECTION, SOLUTION INTRAVENOUS
Status: DISCONTINUED | OUTPATIENT
Start: 2019-06-04 | End: 2019-06-04

## 2019-06-04 RX ORDER — NICOTINE POLACRILEX 4 MG
15-30 LOZENGE BUCCAL
Status: CANCELLED | OUTPATIENT
Start: 2019-06-04

## 2019-06-04 RX ORDER — CEFAZOLIN SODIUM 2 G/100ML
INJECTION, SOLUTION INTRAVENOUS
Status: DISCONTINUED
Start: 2019-06-04 | End: 2019-06-05 | Stop reason: HOSPADM

## 2019-06-04 RX ORDER — NALOXONE HYDROCHLORIDE 0.4 MG/ML
.1-.4 INJECTION, SOLUTION INTRAMUSCULAR; INTRAVENOUS; SUBCUTANEOUS
Status: DISCONTINUED | OUTPATIENT
Start: 2019-06-04 | End: 2019-06-04

## 2019-06-04 RX ORDER — HEPARIN SODIUM 1000 [USP'U]/ML
INJECTION, SOLUTION INTRAVENOUS; SUBCUTANEOUS
Status: DISCONTINUED
Start: 2019-06-04 | End: 2019-06-05 | Stop reason: HOSPADM

## 2019-06-04 RX ORDER — DEXTROSE MONOHYDRATE 25 G/50ML
25-50 INJECTION, SOLUTION INTRAVENOUS
Status: DISCONTINUED | OUTPATIENT
Start: 2019-06-04 | End: 2019-06-05 | Stop reason: HOSPADM

## 2019-06-04 RX ORDER — NICOTINE POLACRILEX 4 MG
15-30 LOZENGE BUCCAL
Status: DISCONTINUED | OUTPATIENT
Start: 2019-06-04 | End: 2019-06-05 | Stop reason: HOSPADM

## 2019-06-04 RX ORDER — CEFAZOLIN SODIUM 2 G/100ML
2 INJECTION, SOLUTION INTRAVENOUS
Status: COMPLETED | OUTPATIENT
Start: 2019-06-04 | End: 2019-06-04

## 2019-06-04 RX ORDER — LIDOCAINE HYDROCHLORIDE 10 MG/ML
INJECTION, SOLUTION EPIDURAL; INFILTRATION; INTRACAUDAL; PERINEURAL
Status: DISCONTINUED
Start: 2019-06-04 | End: 2019-06-05 | Stop reason: HOSPADM

## 2019-06-04 RX ORDER — LIDOCAINE HYDROCHLORIDE AND EPINEPHRINE 10; 10 MG/ML; UG/ML
INJECTION, SOLUTION INFILTRATION; PERINEURAL
Status: DISCONTINUED
Start: 2019-06-04 | End: 2019-06-04 | Stop reason: WASHOUT

## 2019-06-04 RX ORDER — FENTANYL CITRATE 50 UG/ML
INJECTION, SOLUTION INTRAMUSCULAR; INTRAVENOUS
Status: DISCONTINUED
Start: 2019-06-04 | End: 2019-06-05 | Stop reason: HOSPADM

## 2019-06-04 RX ORDER — HEPARIN SODIUM 200 [USP'U]/100ML
500 INJECTION, SOLUTION INTRAVENOUS CONTINUOUS PRN
Status: DISCONTINUED | OUTPATIENT
Start: 2019-06-04 | End: 2019-06-04

## 2019-06-04 RX ORDER — LIDOCAINE HYDROCHLORIDE AND EPINEPHRINE 10; 10 MG/ML; UG/ML
1-30 INJECTION, SOLUTION INFILTRATION; PERINEURAL
Status: DISCONTINUED | OUTPATIENT
Start: 2019-06-04 | End: 2019-06-04

## 2019-06-04 RX ORDER — CEFAZOLIN SODIUM 2 G/100ML
2 INJECTION, SOLUTION INTRAVENOUS
Status: DISCONTINUED | OUTPATIENT
Start: 2019-06-04 | End: 2019-06-04

## 2019-06-04 RX ORDER — FENTANYL CITRATE 50 UG/ML
25-50 INJECTION, SOLUTION INTRAMUSCULAR; INTRAVENOUS EVERY 5 MIN PRN
Status: DISCONTINUED | OUTPATIENT
Start: 2019-06-04 | End: 2019-06-05 | Stop reason: HOSPADM

## 2019-06-04 RX ORDER — DEXTROSE MONOHYDRATE 25 G/50ML
25-50 INJECTION, SOLUTION INTRAVENOUS
Status: CANCELLED | OUTPATIENT
Start: 2019-06-04

## 2019-06-04 RX ORDER — LIDOCAINE HYDROCHLORIDE 10 MG/ML
1-30 INJECTION, SOLUTION EPIDURAL; INFILTRATION; INTRACAUDAL; PERINEURAL
Status: COMPLETED | OUTPATIENT
Start: 2019-06-04 | End: 2019-06-04

## 2019-06-04 RX ADMIN — DOXERCALCIFEROL 4 MCG: 4 INJECTION, SOLUTION INTRAVENOUS at 11:21

## 2019-06-04 RX ADMIN — FUROSEMIDE 40 MG: 40 TABLET ORAL at 14:17

## 2019-06-04 RX ADMIN — FENTANYL CITRATE 50 MCG: 50 INJECTION, SOLUTION INTRAMUSCULAR; INTRAVENOUS at 09:48

## 2019-06-04 RX ADMIN — ACETAMINOPHEN 650 MG: 325 TABLET, FILM COATED ORAL at 14:28

## 2019-06-04 RX ADMIN — SODIUM CHLORIDE 250 ML: 9 INJECTION, SOLUTION INTRAVENOUS at 11:20

## 2019-06-04 RX ADMIN — CEFAZOLIN SODIUM 2 G: 2 INJECTION, SOLUTION INTRAVENOUS at 09:32

## 2019-06-04 RX ADMIN — SODIUM CHLORIDE 300 ML: 9 INJECTION, SOLUTION INTRAVENOUS at 11:20

## 2019-06-04 RX ADMIN — HYDRALAZINE HYDROCHLORIDE 50 MG: 50 TABLET, FILM COATED ORAL at 21:51

## 2019-06-04 RX ADMIN — HEPARIN SODIUM 500 UNITS: 1000 INJECTION INTRAVENOUS; SUBCUTANEOUS at 11:19

## 2019-06-04 RX ADMIN — LIDOCAINE HYDROCHLORIDE 16 ML: 10 INJECTION, SOLUTION EPIDURAL; INFILTRATION; INTRACAUDAL; PERINEURAL at 09:57

## 2019-06-04 RX ADMIN — HEPARIN SODIUM 10000 UNITS: 10000 INJECTION INTRAVENOUS; SUBCUTANEOUS at 09:17

## 2019-06-04 RX ADMIN — CARVEDILOL 50 MG: 25 TABLET, FILM COATED ORAL at 17:47

## 2019-06-04 RX ADMIN — CALCIUM ACETATE 667 MG: 667 CAPSULE ORAL at 14:17

## 2019-06-04 RX ADMIN — MIDAZOLAM HYDROCHLORIDE 1 MG: 1 INJECTION, SOLUTION INTRAMUSCULAR; INTRAVENOUS at 09:47

## 2019-06-04 RX ADMIN — CARVEDILOL 50 MG: 25 TABLET, FILM COATED ORAL at 14:17

## 2019-06-04 RX ADMIN — HEPARIN SODIUM 6000 UNITS: 1000 INJECTION INTRAVENOUS; SUBCUTANEOUS at 09:58

## 2019-06-04 RX ADMIN — HYDRALAZINE HYDROCHLORIDE 50 MG: 50 TABLET, FILM COATED ORAL at 14:18

## 2019-06-04 RX ADMIN — IRON SUCROSE 100 MG: 20 INJECTION, SOLUTION INTRAVENOUS at 11:21

## 2019-06-04 RX ADMIN — AMLODIPINE BESYLATE 5 MG: 5 TABLET ORAL at 14:18

## 2019-06-04 RX ADMIN — WARFARIN SODIUM 12.5 MG: 10 TABLET ORAL at 17:47

## 2019-06-04 RX ADMIN — INSULIN HUMAN 10 UNITS: 100 INJECTION, SUSPENSION SUBCUTANEOUS at 17:49

## 2019-06-04 RX ADMIN — HEPARIN SODIUM 1500 UNITS/HR: 10000 INJECTION, SOLUTION INTRAVENOUS at 03:14

## 2019-06-04 RX ADMIN — EPOETIN ALFA 6000 UNITS: 3000 SOLUTION INTRAVENOUS; SUBCUTANEOUS at 11:21

## 2019-06-04 RX ADMIN — CALCIUM ACETATE 667 MG: 667 CAPSULE ORAL at 17:47

## 2019-06-04 ASSESSMENT — ACTIVITIES OF DAILY LIVING (ADL)
ADLS_ACUITY_SCORE: 14

## 2019-06-04 ASSESSMENT — MIFFLIN-ST. JEOR: SCORE: 1561.07

## 2019-06-04 NOTE — PROGRESS NOTES
Assessment and Plan:   End-stage renal disease: Initiating dialysis today after placement of tunneled dialysis catheter.  She will run again tomorrow and then we will arrange for outpatient dialysis.  She can run at the [facility] dialysis unit and I will follow her there.    Dialysis today with new CVC. She will get EPO and Hectorol during the run today.  She will also get Venofer.  Dialysis in am then ok for discharge.             Interval History:   Diabetes  Hypertension: She is being treated with amlodipine, carvedilol, Lasix, hydralazine.     Peripheral vascular disease status post toe amputation  Anemia                 Review of Systems:   Somnolent after her interventional radiology procedure.          Medications:       sodium chloride 0.9%  250 mL Intravenous Once in dialysis     sodium chloride 0.9%  300 mL Hemodialysis Machine Once     amLODIPine  5 mg Oral Daily     calcium acetate  667 mg Oral TID w/meals     carvedilol  50 mg Oral BID w/meals     ceFAZolin  2 g Intravenous Pre-Op/Pre-procedure x 1 dose     ceFAZolin-dextrose         doxercalciferol  4 mcg Intravenous Once in dialysis     epoetin fletcher (EPOGEN,PROCRIT) inj ESRD  6,000 Units Intravenous Once in dialysis     fentaNYL (PF)         furosemide  40 mg Oral BID     heparin (porcine)         heparin (porcine)  500 Units Hemodialysis Machine Once in dialysis     heparin 10,000 units in 1000 mL NS         heparin  3 mL Intracatheter During Hemodialysis (from stock)     heparin  3 mL Intracatheter During Hemodialysis (from stock)     hydrALAZINE  50 mg Oral TID     insulin aspart   Subcutaneous QAM AC     insulin aspart   Subcutaneous Daily with lunch     insulin aspart   Subcutaneous Daily with supper     insulin aspart  1-7 Units Subcutaneous TID AC     insulin aspart  1-5 Units Subcutaneous At Bedtime     insulin isophane human  10 Units Subcutaneous Daily with supper     insulin isophane human  6 Units Subcutaneous QAM AC     iron  sucrose  100 mg Intravenous Once in dialysis     lidocaine (PF)         midazolam           sodium chloride 0.9 %       sodium chloride 0.9 %       heparin (PRESSURE BAG) 2 unit/mL in 0.9% NaCl       heparin (PRESSURE BAG) 2 unit/mL in 0.9% NaCl       heparin 10,000 units in 1000 mL NS       heparin 10,000 units in 1000 mL NS       heparin (porcine)       - MEDICATION INSTRUCTIONS -       - MEDICATION INSTRUCTIONS -       Warfarin Therapy Reminder       Current active medications and PTA medications reviewed, see medication list for details.            Physical Exam:   Vitals were reviewed  Patient Vitals for the past 24 hrs:   BP Temp Temp src Heart Rate Resp SpO2 Weight   19 1025 153/72 98.4  F (36.9  C) Oral 77 16 -- --   19 0949 175/81 -- -- 74 16 96 % --   19 0932 179/79 -- -- 75 11 98 % --   19 0721 157/69 97.6  F (36.4  C) Oral 72 20 91 % --   19 0606 -- -- -- -- -- -- 96.3 kg (212 lb 6.4 oz)   19 2358 150/60 97.4  F (36.3  C) Oral 71 20 95 % --   19 2159 148/64 -- -- 71 -- -- --   19 1742 143/48 -- -- 75 -- -- --   19 1532 148/58 98.6  F (37  C) Oral 74 20 93 % --       Temp:  [97.4  F (36.3  C)-98.6  F (37  C)] 98.4  F (36.9  C)  Heart Rate:  [71-77] 77  Resp:  [11-20] 16  BP: (143-179)/(48-81) 153/72  FiO2 (%):  [31 %] 31 %  SpO2:  [91 %-98 %] 96 %    Temperatures:  Current - Temp: 98.4  F (36.9  C); Max - Temp  Av  F (36.7  C)  Min: 97.4  F (36.3  C)  Max: 98.6  F (37  C)  Respiration range: Resp  Av.2  Min: 11  Max: 20  Pulse range: No data recorded  Blood pressure range: Systolic (24hrs), Av , Min:143 , Max:179   ; Diastolic (24hrs), Av, Min:48, Max:81    Pulse oximetry range: SpO2  Av.6 %  Min: 91 %  Max: 98 %    I/O last 3 completed shifts:  In: 1216.7 [P.O.:920; I.V.:296.7]  Out:  [Urine:]      Intake/Output Summary (Last 24 hours) at 2019 1101  Last data filed at 2019 0731  Gross per 24 hour   Intake 917.7  ml   Output 2050 ml   Net -1132.3 ml       Resting comfortably in bed  Right CVC without redness or drainage.       Wt Readings from Last 4 Encounters:   06/04/19 96.3 kg (212 lb 6.4 oz)   12/19/18 93.4 kg (206 lb)   11/05/18 86.8 kg (191 lb 4.8 oz)   10/07/18 89.4 kg (197 lb)          Data:          Lab Results   Component Value Date     06/03/2019     06/02/2019     06/01/2019        Lab Results   Component Value Date    CHLORIDE 108 06/03/2019    CHLORIDE 108 06/02/2019    CHLORIDE 109 06/01/2019      Lab Results   Component Value Date    BUN 63 06/03/2019    BUN 60 06/02/2019    BUN 58 06/01/2019      Lab Results   Component Value Date    POTASSIUM 5.5 06/03/2019    POTASSIUM 5.3 06/02/2019    POTASSIUM 5.6 06/01/2019    Lab Results   Component Value Date    CO2 26 06/03/2019    CO2 27 06/02/2019    CO2 25 06/01/2019    Lab Results   Component Value Date    CR 5.02 06/03/2019    CR 4.82 06/02/2019    CR 4.73 06/01/2019        Recent Labs   Lab Test 06/02/19  0607 06/01/19  0646 05/31/19  1457 05/30/19  0607 05/28/19  0713   WBC 4.8  --   --  6.1 5.4   HGB 7.2* 7.1* 7.3* 7.4* 7.0*   HCT 24.1*  --   --  24.9* 23.8*   MCV 90  --   --  88 88     --   --  184 185     Recent Labs   Lab Test 05/27/19 2010 12/19/18  0631 10/04/18  1611   AST 38 9 26   ALT 56* 10 18   ALKPHOS 155* 91 167*   BILITOTAL 0.3 0.3 0.3       Recent Labs   Lab Test 12/17/18  1041 03/15/18  2041 05/03/17  0725   MAG 2.0 2.1 1.5*     Recent Labs   Lab Test 06/01/19  1302 06/01/19  0646 05/31/19  0641   PHOS 5.2* 5.6* 5.9*     Recent Labs   Lab Test 06/03/19  0652 06/02/19  0607 06/01/19  0646   GILL 8.7 8.4* 8.4*       Lab Results   Component Value Date    GILL 8.7 06/03/2019     Lab Results   Component Value Date    WBC 4.8 06/02/2019    HGB 7.2 (L) 06/02/2019    HCT 24.1 (L) 06/02/2019    MCV 90 06/02/2019     06/02/2019     Lab Results   Component Value Date     06/03/2019    POTASSIUM 5.5 (H) 06/03/2019     CHLORIDE 108 06/03/2019    CO2 26 06/03/2019     (H) 06/03/2019     Lab Results   Component Value Date    BUN 63 (H) 06/03/2019    CR 5.02 (H) 06/03/2019     Lab Results   Component Value Date    MAG 2.0 12/17/2018     Lab Results   Component Value Date    PHOS 5.2 (H) 06/01/2019       Creatinine   Date Value Ref Range Status   06/03/2019 5.02 (H) 0.52 - 1.04 mg/dL Final   06/02/2019 4.82 (H) 0.52 - 1.04 mg/dL Final   06/01/2019 4.73 (H) 0.52 - 1.04 mg/dL Final   05/31/2019 4.27 (H) 0.52 - 1.04 mg/dL Final   05/30/2019 4.27 (H) 0.52 - 1.04 mg/dL Final   05/29/2019 4.26 (H) 0.52 - 1.04 mg/dL Final       Attestation:  I have reviewed today's vital signs, notes, medications, labs and imaging.  Seen on dialysis.     Adolfo Odonnell MD

## 2019-06-04 NOTE — PROGRESS NOTES
[facility]  Hospitalist Progress Note  Sekou Shell MD 06/04/2019    Reason for Stay (Diagnosis): weakness and shortness of breath         Assessment and Plan:      Summary of Stay: Roxy Beaulieu is a 59 year old female who came to attention on 5/27/2019 with shortness of breath.      Prior medical history is notable for poorly controlled type 2 diabetes and essential hypertension both contributed to her current chronic kidney disease.  She also has a hypertensive cardiomyopathy with both depressed LV function and advanced diastolic dysfunction.    Ms. Beaulieu was admitted to a telemetry bed and was started on IV diuresis.  Due to concern for the patient's apparent hypoxia with subtherapeutic INR and nonocclusive left DVT, (she is anticoagulated for recurrent pulmonary embolism) she was immediately started on a heparin drip and warfarin was restarted.      Overall, the patient's kidney function has worsened throughout the hospital stay and this prompted discussion about initiation of hemodialysis.     At the time of my initial introduction of the patient, June 2, 2019, the nurses indicated to me the patient seems to be quite confused and unreliable.  I talked to the patient's daughter by telephone who confirmed that this has been a concern of family members for a couple of years now.      As of today, 6/4, Ms. Beaulieu had a CVC placed without complication and underwent her initial HD run. She now feels somewhat week, but overall not bad.     Problem List:   1. Acute hypoxic resp failure.  Resolved.  2. CKD stage V (GFR < 15) with nephrotic range proteinuria due to IDDM. Worsening creatinine with nephrotic range proteinuria.  Initiated HD 6/4.   3. Anasarca, likely multifactorial with hypoalbuminemia and chronic systolic heart failure. LVEF 45-50%, severe LVH, advanced Diastolic Dysfunction.   4. Non-occlusive LLE DVT with suspected PE. INR not therapeutic as yet.   5. IDDM. Control is quite  "good on NPH BID and sliding scale Insulin. Will need to readdress previously reported home regimen for discharge.   6. Essential HTN.  Will likely need adjustment of BP meds.   7. Dyslipidemia, acute on chronic anemia with iron deficiency, CKD, ACD.  8. Apparent confusion.  Unclear status, OT finds no needs at this time.    PLAN:  1.  Parameters to hold antihypertensives ordered today.   2.  Anticipate repeat HD tomorrow with plan for discharge after that.     DVT Prophylaxis: heparin until INR is in therapeutic range on Warfarin.  Code Status: Full Code   Discharge Dispo: home expected  Estimated Disch Date / # of Days until Disch: later Wednesday in order to permit for initiation of HD.        Interval History (Subjective):      Overall, did well with complex day. Now feeling OK for HD and discharge tomorrow.                  Physical Exam:      Last Vital Signs:  BP (!) 125/38 (BP Location: Right arm)   Pulse 71   Temp 99.8  F (37.7  C) (Oral)   Resp 18   Ht 1.702 m (5' 7\")   Wt 96.3 kg (212 lb 6.4 oz)   SpO2 99%   BMI 33.27 kg/m      I/O last 3 completed shifts:  In: 582.7 [P.O.:520; I.V.:62.7]  Out: 3300 [Urine:1550; Other:1750]    Constitutional: Awake, alert, cooperative, no apparent distress   Respiratory: Clear to auscultation bilaterally, no crackles or wheezing   Cardiovascular: Regular rate and rhythm, normal S1 and S2, and no murmur noted   Abdomen: Normal bowel sounds, soft, non-distended, non-tender   Skin: No rashes, no cyanosis, dry to touch   Neuro: Alert and appears appropriate.   Extremities: chronic edema   Other(s):        All other systems: Negative          Medications:      All current medications were reviewed with changes reflected in problem list.         Data:      All new lab and imaging data was reviewed.   Labs/Imaging:  Results for orders placed or performed during the hospital encounter of 05/27/19 (from the past 24 hour(s))   Care Coordinator IP Consult    Narrative    " Goldie Pedraza, RN     6/4/2019  2:39 PM  Care Coordination:  CTS consult placed for arranging HD and follow up with Dr Viramontes.   Call placed to [facility] in [place] to schedule follow up   with Dr Viramontes for next week. Dr Viramontes has a closed practice and pt has   not seen her in the past. They are unable to schedule pt with Dr Viramontes. They have scheduled follow up for Monday 6/10 at 1:40 with Dr Briseno with diabetic care and new HD.    Pt noted to have CVC placed today and started HD today 6/4. Per   Nephrology, pt will run at [facility] on MWF. She will be ready   to start HD on Friday 6/6 after another run tomorrow. Referral   information sent to [facility]. Awaiting return call.    Goldie Pedraza RN CTS   Glucose by meter   Result Value Ref Range    Glucose 155 (H) 70 - 99 mg/dL   Glucose by meter   Result Value Ref Range    Glucose 146 (H) 70 - 99 mg/dL   Glucose by meter   Result Value Ref Range    Glucose 130 (H) 70 - 99 mg/dL   Glucose by meter   Result Value Ref Range    Glucose 138 (H) 70 - 99 mg/dL   Heparin Xa (10a) Level   Result Value Ref Range    Heparin 10A Level <0.10 IU/mL   INR   Result Value Ref Range    INR 1.90 (H) 0.86 - 1.14   Partial thromboplastin time   Result Value Ref Range    PTT 43 (H) 22 - 37 sec   IR CVC Tunnel Placement > 5 Yrs of Age    Narrative    INTERVENTIONAL RADIOLOGY CVC TUNNEL PLACEMENT GREATER THAN FIVE YEARS  OF AGE   6/4/2019 10:05 AM     CLINICAL HISTORY/INDICATION: Chronic kidney disease requiring  dialysis.    PROCEDURES PERFORMED: Tunneled dialysis catheter placement.    MEDICATIONS: 16 mL 1% lidocaine, 1 mg Versed IV, 50 mcg fentanyl IV  and Ancef 2 g IV.    CONTRAST: None.    FLUORO: 0.4 minutes.    IMAGES/DOSE (AIR KERMA): 6 mGy.    CONSENT: Following a discussion of the risks, benefits, indications  and alternatives to treatment, appropriate informed consent was  obtained.      TIMEOUT: A timeout was performed per universal protocol policy to  ensure correct  patient, site, and procedure to be performed.     CENTRAL LINE STATEMENT: The patient was brought to the interventional  radiology suite and placed supine on the table. The patients right  neck and anterior chest region were prepped and draped in a sterile  fashion for tunneled line placement.  The procedure was performed  using maximal Sterile Barrier Technique Utilized: Cap AND mask AND  sterile gown AND sterile gloves AND sterile full body drape AND hand  hygiene AND skin preparation 2% chlorhexidine for cutaneous antisepsis  (or acceptable alternative antiseptics).  Sterile Ultrasound Technique  Utilized ?Sterile gel AND sterile probe covers.     PROCEDURE AND FINDINGS:  This central line was performed in accordance  with the central line bundle with the exception of vein selection.  Following a discussion of the risks, benefits, indications and  alternatives to treatment, appropriate informed consent was obtained.   The patient was brought to the interventional radiology suite and  placed supine on the table. The patients right neck and anterior chest  region were prepped and draped in a sterile fashion for tunneled line  placement.  A timeout was performed per universal protocol policy to  ensure correct patient, site, and procedure to be performed.      Ultrasound was utilized to evaluate the veins of the right neck and a  permanent record of the image was obtained which demonstrates the  internal jugular vein to be patent. Under direct ultrasound guidance,  1% lidocaine was infiltrated and access was gained easily into the low  lateral aspect of the internal jugular vein utilizing micropuncture  technique. The wire was advanced easily under fluoroscopic guidance  and the tract was serially dilated and a peel away sheath placed. A  subcutaneous tunnel was then created over the anterior/superior chest  wall using local anesthesia and blunt dissection. Under fluoroscopic  guidance, a 14.5 [place] dual lumen 19  cm cuffed catheter was then  pulled through the tunnel and was advanced through the peel away  sheath. A final placement film demonstrates the catheter tip at the  cavoatrial junction with the patient supine.  All of the ports flush  and aspirate without difficulty following placement.  The catheter was  secured in position. The small incision at the base of the neck was  closed uneventfully.  A sterile dressing was applied.     Throughout the procedure, the patient was monitored by a radiology  nurse for cardiac rhythm which remained stable. Total moderate  sedation time was 20 minutes. The patient tolerated the procedure well  and left the interventional radiology suite in stable condition.      Impression    IMPRESSION:  Uneventful placement of a right internal jugular vein  14.5 Armenian dual lumen 19 cm tunneled Palindrome catheter, as  described using ultrasound and fluoroscopic guidance. This catheter is  indicated to be use for hemodialysis or pheresis.    EVE YANG,    Glucose by meter   Result Value Ref Range    Glucose 97 70 - 99 mg/dL

## 2019-06-04 NOTE — PLAN OF CARE
Assumed patient cares from 7p-11p.  VSS. On room air.  A/O x 4.  Up SBA with cane and gait belt. Ambulated in halls this shift.   Hep gtt @ 15 ml/hr.   .   Plan is to place CBC tomorrow, dialysis here tomorrow and Wed, then outpatient dialysis on Friday.   Renal diet. Will need to be NPO at midnight.

## 2019-06-04 NOTE — PLAN OF CARE
Pt AOx4. VSS, afeb, on RA. Tele - NSR. Went down to have tunneled dialysis catheter placement this AM, then to HD following IR procedure. . +2-3 BLE edema. Up SBA and cane. Calls appro.    Pt returned from HD around 1400 had 2 liters removed.  BP elevated, scheduled BP meds and other morning meds given. C/o 6/10 pain where catheter was placed, tylenol given x1. Dialysis diet. BG 97.  Plan - possible discharge back to home tomorrow after HD. Will continue to monitor.

## 2019-06-04 NOTE — CONSULTS
Care Coordination:  CTS consult placed for arranging HD and follow up with Dr Viarmontes. Call placed to [facility] in [place] to schedule follow up with Dr Viramontes for next week. Dr Viramontes has a closed practice and pt has not seen her in the past. They are unable to schedule pt with Dr Viramontes. They have scheduled follow up for Monday 6/10 at 1:40 with Dr Briseno with diabetic care and new HD.    Pt noted to have CVC placed today and started HD today 6/4. Per Nephrology, pt will run at [facility] on MWF. She will be ready to start HD on Friday 6/6 after another run tomorrow. Referral information sent to [facility]. Awaiting return call.    Goldie Pedraza RN CTS

## 2019-06-04 NOTE — PROVIDER NOTIFICATION
Pt planned for HD line placement and HD to start tomorrow. On Heparin gtt @ 1500 units/hr. Per report to stop at 0400, but no orders or note. Md paged for clarification.   Miranda Owens RN on 6/4/2019 at 1:04 AM

## 2019-06-04 NOTE — PROGRESS NOTES
Cross cover    Called this patient supposed to be having a tunneled catheter placed in the morning.  On heparin drip for history of DVT and PE.  Apparently the plan was to stop the heparin drip at 4 AM which was passed on in nursing reports.  No note actually indicates that but it makes him sense that this would need to be stopped in anticipation of procedure.  Therefore wrote to discontinue the heparin drip at 4 AM.

## 2019-06-04 NOTE — PROVIDER NOTIFICATION
Patient received morning BP medications post dialysis, can 1600 dose of hydralazine be held as it was just given at 1400? Can parameters also be placed for coreg and hydralazine?    Dr Shell paged    Parameters placed and 1600 hydralazine dose held

## 2019-06-04 NOTE — PROGRESS NOTES
Potassium   Date Value Ref Range Status   06/03/2019 5.5 (H) 3.4 - 5.3 mmol/L Final   ]  Lab Results   Component Value Date    HGB 7.2 06/02/2019       DIALYSIS PROCEDURE NOTE  Hepatitis status of previous patient on machine log was checked and ok to use with this patient hepatitis status.  Patient dialyzed for 2.5 hrs on a 2 K bath with a  net fluid removal of 2L.  A BFR of 300ml/min was obtained via right tunneled catheter.  Patient was seen by  during treatment.  Total heparin received during treatment:: 2000units.  Heparin instilled in both ports post run.  Meds given:Hectoral, Epogen, Venofer Complications:patient arrived to dialysis from IR after tunneled catheter placement.  Pt rested with eyes closed during the entire treatment.   Procedure and ESRD teaching regarding procedure and what to expect in outpatient dialysis.  Barrier to learning was that pt was very sleepy.  She did respond to questions but was very quiet and only answered with one word responses. Reinforcement is needed .  See flowsheet in EPIC for further details and post assessment. Transducer pods intact and checked every 15 mins.  Machine water alarm in place and functioning.  HEPATITIS B SURFACE ANTIGEN negative  Date 6/3/19  HEPATITIS B SURFACE ANTIBODY susceptible DATE 6/3/19  CHLORINE AND CHLORAMINES CHECK on WATER SYSTEM EVERY 4 hours.   PT returned via wheelchair  Catheter Access: Aseptic prep done for both on/off.  Report received from: FRANK Lawrence RN  Report given to: FRANK Lawrence RN    Next dialysis tomorrow - 6/5/19 and will be set up for OP dialysis.

## 2019-06-04 NOTE — PROCEDURES
RADIOLOGY POST PROCEDURE NOTE w/ SEDATION  Patient name: Roxy Beaulieu  MRN: [id number]  : 1960    Pre-procedure diagnosis: renal failure requiring dialysis  Post-procedure diagnosis: Same    Procedure Date/Time: 2019  9:57 AM  Procedure: tunneled dialysis catheter placement.   Estimated blood loss: None  Specimen(s) collected with description: none    I determined this patient to be an appropriate candidate for the planned sedation and procedure and reassessed the patient IMMEDIATELY PRIOR to sedation and procedure.     The patient tolerated the procedure well with no immediate complications.  Significant findings:none    See imaging dictation for procedural details.    Provider name: Irena Mandujano  Assistant(s):None

## 2019-06-04 NOTE — PLAN OF CARE
VS: WDL  Orientation: wdl  Tele: SR  Glucose checks:  130 overnight  Activity: SBA with cane  Diet:NPO  GI: WDL  : WDL  Respiratory: WDL  IV: heparin stopped at 5am  Plan: dialysis line placement today, dialysis to follow

## 2019-06-04 NOTE — PLAN OF CARE
OT: Session attempted. Pt out of room at time of attempt.  Chart indicates pt had tunneled dialysis catheter placement this AM, then to dialysis following procedure

## 2019-06-05 VITALS
RESPIRATION RATE: 16 BRPM | DIASTOLIC BLOOD PRESSURE: 41 MMHG | TEMPERATURE: 99.6 F | SYSTOLIC BLOOD PRESSURE: 140 MMHG | WEIGHT: 205.25 LBS | OXYGEN SATURATION: 93 % | BODY MASS INDEX: 32.21 KG/M2 | HEIGHT: 67 IN | HEART RATE: 71 BPM

## 2019-06-05 PROBLEM — I50.43 ACUTE ON CHRONIC COMBINED SYSTOLIC AND DIASTOLIC HEART FAILURE (H): Status: ACTIVE | Noted: 2018-12-19

## 2019-06-05 PROBLEM — I82.90 VTE (VENOUS THROMBOEMBOLISM): Status: ACTIVE | Noted: 2018-12-19

## 2019-06-05 PROBLEM — Z86.711 HISTORY OF PULMONARY EMBOLISM: Status: ACTIVE | Noted: 2019-06-05

## 2019-06-05 PROBLEM — I26.99 OTHER PULMONARY EMBOLISM WITHOUT ACUTE COR PULMONALE, UNSPECIFIED CHRONICITY (H): Status: ACTIVE | Noted: 2019-06-05

## 2019-06-05 PROBLEM — J96.01 ACUTE RESPIRATORY FAILURE WITH HYPOXIA (H): Status: ACTIVE | Noted: 2019-06-05

## 2019-06-05 PROBLEM — R06.02 SHORTNESS OF BREATH: Status: ACTIVE | Noted: 2018-10-04

## 2019-06-05 LAB
ERYTHROCYTE [DISTWIDTH] IN BLOOD BY AUTOMATED COUNT: 15.8 % (ref 10–15)
GLUCOSE BLDC GLUCOMTR-MCNC: 154 MG/DL (ref 70–99)
GLUCOSE BLDC GLUCOMTR-MCNC: 163 MG/DL (ref 70–99)
GLUCOSE BLDC GLUCOMTR-MCNC: 176 MG/DL (ref 70–99)
HCT VFR BLD AUTO: 25.4 % (ref 35–47)
HGB BLD-MCNC: 7.5 G/DL (ref 11.7–15.7)
INR PPP: 1.92 (ref 0.86–1.14)
LMWH PPP CHRO-ACNC: <0.1 IU/ML
MCH RBC QN AUTO: 26.7 PG (ref 26.5–33)
MCHC RBC AUTO-ENTMCNC: 29.5 G/DL (ref 31.5–36.5)
MCV RBC AUTO: 90 FL (ref 78–100)
PLATELET # BLD AUTO: 176 10E9/L (ref 150–450)
RBC # BLD AUTO: 2.81 10E12/L (ref 3.8–5.2)
WBC # BLD AUTO: 5.9 10E9/L (ref 4–11)

## 2019-06-05 PROCEDURE — 00000146 ZZHCL STATISTIC GLUCOSE BY METER IP

## 2019-06-05 PROCEDURE — 85520 HEPARIN ASSAY: CPT | Performed by: HOSPITALIST

## 2019-06-05 PROCEDURE — 25000128 H RX IP 250 OP 636: Performed by: INTERNAL MEDICINE

## 2019-06-05 PROCEDURE — 90937 HEMODIALYSIS REPEATED EVAL: CPT

## 2019-06-05 PROCEDURE — 85610 PROTHROMBIN TIME: CPT | Performed by: HOSPITALIST

## 2019-06-05 PROCEDURE — 63400005 ZZH RX 634: Performed by: INTERNAL MEDICINE

## 2019-06-05 PROCEDURE — 25000132 ZZH RX MED GY IP 250 OP 250 PS 637: Performed by: INTERNAL MEDICINE

## 2019-06-05 PROCEDURE — 36415 COLL VENOUS BLD VENIPUNCTURE: CPT | Performed by: HOSPITALIST

## 2019-06-05 PROCEDURE — 99239 HOSP IP/OBS DSCHRG MGMT >30: CPT | Performed by: INTERNAL MEDICINE

## 2019-06-05 PROCEDURE — 85027 COMPLETE CBC AUTOMATED: CPT | Performed by: HOSPITALIST

## 2019-06-05 RX ORDER — HEPARIN SODIUM 1000 [USP'U]/ML
500 INJECTION, SOLUTION INTRAVENOUS; SUBCUTANEOUS CONTINUOUS
Status: DISCONTINUED | OUTPATIENT
Start: 2019-06-05 | End: 2019-06-05

## 2019-06-05 RX ORDER — DOXERCALCIFEROL 4 UG/2ML
4 INJECTION INTRAVENOUS
Status: COMPLETED | OUTPATIENT
Start: 2019-06-05 | End: 2019-06-05

## 2019-06-05 RX ORDER — WARFARIN SODIUM 5 MG/1
10 TABLET ORAL DAILY
Qty: 30 TABLET | Refills: 0 | Status: ON HOLD
Start: 2019-06-05 | End: 2019-10-28

## 2019-06-05 RX ORDER — HEPARIN SODIUM 1000 [USP'U]/ML
500 INJECTION, SOLUTION INTRAVENOUS; SUBCUTANEOUS
Status: DISCONTINUED | OUTPATIENT
Start: 2019-06-05 | End: 2019-06-05

## 2019-06-05 RX ORDER — ALBUMIN (HUMAN) 12.5 G/50ML
50 SOLUTION INTRAVENOUS
Status: DISCONTINUED | OUTPATIENT
Start: 2019-06-05 | End: 2019-06-05

## 2019-06-05 RX ORDER — CALCIUM ACETATE 667 MG/1
667 CAPSULE ORAL
Qty: 90 CAPSULE | Refills: 1 | Status: ON HOLD | OUTPATIENT
Start: 2019-06-05 | End: 2019-10-28

## 2019-06-05 RX ORDER — UREA 8.5 G/85G
CREAM TOPICAL DAILY
Qty: 240 G | Refills: 0 | Status: ON HOLD | OUTPATIENT
Start: 2019-06-05 | End: 2019-10-28

## 2019-06-05 RX ADMIN — HEPARIN SODIUM 1600 UNITS: 1000 INJECTION INTRAVENOUS; SUBCUTANEOUS at 09:35

## 2019-06-05 RX ADMIN — IRON SUCROSE 100 MG: 20 INJECTION, SOLUTION INTRAVENOUS at 09:41

## 2019-06-05 RX ADMIN — SODIUM CHLORIDE 250 ML: 9 INJECTION, SOLUTION INTRAVENOUS at 09:34

## 2019-06-05 RX ADMIN — CALCIUM ACETATE 667 MG: 667 CAPSULE ORAL at 08:46

## 2019-06-05 RX ADMIN — EPOETIN ALFA 5000 UNITS: 3000 SOLUTION INTRAVENOUS; SUBCUTANEOUS at 09:46

## 2019-06-05 RX ADMIN — CARVEDILOL 50 MG: 25 TABLET, FILM COATED ORAL at 12:08

## 2019-06-05 RX ADMIN — CALCIUM ACETATE 667 MG: 667 CAPSULE ORAL at 12:08

## 2019-06-05 RX ADMIN — AMLODIPINE BESYLATE 5 MG: 5 TABLET ORAL at 12:08

## 2019-06-05 RX ADMIN — HEPARIN SODIUM 500 UNITS/HR: 1000 INJECTION INTRAVENOUS; SUBCUTANEOUS at 08:52

## 2019-06-05 RX ADMIN — DOXERCALCIFEROL 4 MCG: 4 INJECTION, SOLUTION INTRAVENOUS at 09:44

## 2019-06-05 RX ADMIN — HYDRALAZINE HYDROCHLORIDE 50 MG: 50 TABLET, FILM COATED ORAL at 12:08

## 2019-06-05 RX ADMIN — SODIUM CHLORIDE 300 ML: 9 INJECTION, SOLUTION INTRAVENOUS at 09:34

## 2019-06-05 ASSESSMENT — ACTIVITIES OF DAILY LIVING (ADL)
ADLS_ACUITY_SCORE: 15
ADLS_ACUITY_SCORE: 14
ADLS_ACUITY_SCORE: 15
ADLS_ACUITY_SCORE: 14

## 2019-06-05 ASSESSMENT — MIFFLIN-ST. JEOR
SCORE: 1528.86
SCORE: 1528.63

## 2019-06-05 NOTE — DISCHARGE INSTRUCTIONS
You have follow up scheduled at [facility] in [place] on Thursday 6/13 at 2:00 with Dr Briseno to establish care. Please bring your insurance card, ID and list of medications. If you need to rescheduled please call number below.  Address: [address], [place] [address] Phone: [phone]      You will get your outpatient dialysis at:  [facility]  [address], [place] [address]  [phone]      Your first outpatient run will be on Friday 6/7.  Please arrive at 1:00pm  to complete paperwork.    For subsequent runs, your chair time will be every  Monday, Wednesday, Friday at 1:45pm.  You should plan to arrive around 1:20pm    Please bring:   Two forms of ID  Insurance cards  Your CURRENT medication list  Something quiet to do  A small blanket because the room can be chilly.

## 2019-06-05 NOTE — PLAN OF CARE
Heart Failure Care Pathway  GOALS TO BE MET BEFORE DISCHARGE:    1. Decrease congestion and/or edema with diuretic therapy to achieve near      optimal volume status.            Dyspnea improved:  Yes            Edema improved:     No, please explain: +2        Net I/O and Weights since admission:          05/06 1500 - 06/05 1459  In: 9226.13 [P.O.:7455; I.V.:1771.13]  Out: 14109 [Urine:68395]  Net: -9148.87            Vitals:    05/27/19 1957 05/27/19 2314 05/28/19 0316 05/29/19 0608   Weight: 83.9 kg (185 lb) 101.8 kg (224 lb 8 oz) 101.4 kg (223 lb 8 oz) 100.5 kg (221 lb 8 oz)    05/31/19 0500 06/01/19 0548 06/02/19 0548 06/03/19 0504   Weight: 99.3 kg (218 lb 14.4 oz) 97.8 kg (215 lb 11.2 oz) 96.9 kg (213 lb 11.2 oz) 96.5 kg (212 lb 12.8 oz)    06/04/19 0606 06/05/19 0642   Weight: 96.3 kg (212 lb 6.4 oz) 93.1 kg (205 lb 4.8 oz)         2.  O2 sats > 92% on RA or at prior home O2 therapy level.          Current oxygenation status:       SpO2: 92 %         O2 Device: None (Room air),  Oxygen Delivery: 2 LPM         Able to wean O2 this shift to keep sats > 92%:  Yes       Does patient use Home O2? No    3.  Tolerates ambulation and mobility near baseline: Yes        How many times did the patient ambulate with nursing staff this shift? 3    Please review the Heart Failure Care Pathway for additional HF goal outcomes.    Julia Verduzco RN  6/5/2019

## 2019-06-05 NOTE — PHARMACY - DISCHARGE MEDICATION RECONCILIATION AND EDUCATION
Discharge medication review for this patient is complete.   Patient was counseled on new med regimen.  See EPIC for allergy information, prior to admission medications and immunization status.   Pharmacist assisted with medication reconciliation of discharge medications with PTA medications.    MD was contacted with any questions/concerns:None    Additional medication history information: emphasized the importance of taking Humulin 70/30 mix with meals, pt understood the reason behind it.    Discharge Medication List     Review of your medicines      START taking      Dose / Directions   calcium acetate 667 MG Caps capsule  Commonly known as:  PHOSLO  Used for:  ESRD (end stage renal disease) on dialysis.  Initiation of HD 6/2019.      Dose:  667 mg  Take 667 mg by mouth 3 times daily (with meals)  Quantity:  90 capsule  Refills:  1        CONTINUE these medicines which may have CHANGED, or have new prescriptions. If we are uncertain of the size of tablets/capsules you have at home, strength may be listed as something that might have changed.      Dose / Directions   * insulin isophane & regular susp  Commonly known as:  HumuLIN MIX 70/30 PEN , NovoLIN MIX 70/30 PEN  This may have changed:  You were already taking a medication with the same name, and this prescription was added. Make sure you understand how and when to take each.  Used for:  Uncontrolled type 2 diabetes mellitus with hyperglycemia, with long-term current use of insulin (H)  Replaces:  insulin NPH-insulin regular (70-30) 100 UNIT/ML vial      Dose:  16 Units  Inject 16 Units Subcutaneous daily (with dinner)  Refills:  0     * insulin isophane & regular susp  Commonly known as:  HumuLIN MIX 70/30 PEN , NovoLIN MIX 70/30 PEN  This may have changed:      Another medication with the same name was added. Make sure you understand how and when to take each.    Another medication with the same name was removed. Continue taking this medication, and follow the  directions you see here.  Used for:  Uncontrolled type 2 diabetes mellitus with hyperglycemia, with long-term current use of insulin (H)      Dose:  10 Units  Inject 10 Units Subcutaneous daily (with breakfast)  Refills:  0     warfarin 5 MG tablet  Commonly known as:  COUMADIN  This may have changed:  how much to take  Used for:  VTE (venous thromboembolism)      Dose:  10 mg  Take 2 tablets (10 mg) by mouth daily  Quantity:  30 tablet  Refills:  0         * This list has 2 medication(s) that are the same as other medications prescribed for you. Read the directions carefully, and ask your doctor or other care provider to review them with you.            CONTINUE these medicines which have NOT CHANGED      Dose / Directions   amLODIPine 5 MG tablet  Commonly known as:  NORVASC      Dose:  5 mg  Take 5 mg by mouth daily  Refills:  0     carvedilol 25 MG tablet  Commonly known as:  COREG  Used for:  Cardiomyopathy due to hypertension, with heart failure (H), Acute on chronic combined systolic and diastolic heart failure (H)      Dose:  50 mg  Take 2 tablets (50 mg) by mouth 2 times daily (with meals)  Quantity:  120 tablet  Refills:  0     hydrALAZINE 50 MG tablet  Commonly known as:  APRESOLINE  Used for:  Cardiomyopathy due to hypertension, with heart failure (H)      Dose:  50 mg  Take 1 tablet (50 mg) by mouth 3 times daily  Quantity:  90 tablet  Refills:  0     povidone-iodine scrub sponge 10 % swab  Commonly known as:  BETADINE SWABSTICK  Used for:  Skin ulcer of toe of left foot, limited to breakdown of skin (H)      Apply to foot wounds daily after washing and drying feet  Quantity:  60 each  Refills:  0     urea 10 % external cream  Commonly known as:  CARMOL  Used for:  Skin ulcer of toe of left foot, limited to breakdown of skin (H)      Apply topically daily  Quantity:  240 g  Refills:  0        STOP taking    insulin NPH-insulin regular (70-30) 100 UNIT/ML vial  Commonly known as:  HumuLIN MIX  70/30/NovoLIN MIX 70/30 VIAL  Replaced by:  insulin isophane & regular susp  You also have another medication with the same name that you need to continue taking as instructed.              Where to get your medicines      These medications were sent to [facility] - [place], [place] - [address] [place] [address]  [id number] [place] [id number]    Phone:  [phone]     calcium acetate 667 MG Caps capsule    povidone-iodine scrub sponge 10 % swab    urea 10 % external cream

## 2019-06-05 NOTE — PHARMACY-ANTICOAGULATION SERVICE
Clinical Pharmacy- Warfarin Discharge Note  This patient is currently on warfarin for the treatment of DVT/PE.  INR Goal= 2-3  Expected length of therapy undetermined.  Prior to admit dose: Warfarin 5mg daily         Anticoagulation Dose History     Recent Dosing and Labs Latest Ref Rng & Units 5/30/2019 5/31/2019 6/1/2019 6/2/2019 6/3/2019 6/4/2019 6/5/2019    Z IMS TEMPLATE - - - 12.5 mg 12.5 mg - 12.5 mg -    Warfarin 10 mg - 10 mg 10 mg - - - - -    INR 0.86 - 1.14 1.14 1.20(H) 1.32(H) 1.54(H) 1.94(H) 1.90(H) 1.92(H)    Chromogenic Factor 10 70 - 130 % - - 49(L) - 38(L) - -          Vitamin K doses administered during the last 7 days: none  FFP administered during the last 7 days: none    INR subtherapeutic on admission, unclear if compliance issue but pt has been requiring much higher warfarin doses while here to become therapeutic. Recommend discharge on warfarin 10mg daily with INR check on Friday.

## 2019-06-05 NOTE — PLAN OF CARE
Heart Failure Care Pathway  GOALS TO BE MET BEFORE DISCHARGE:    1. Decrease congestion and/or edema with diuretic therapy to achieve near      optimal volume status.            Dyspnea improved:  Yes            Edema improved:     No, please explain: +2/+3 edema        Net I/O and Weights since admission:          05/05 2300 - 06/04 2259  In: 9226.13 [P.O.:7455; I.V.:1771.13]  Out: 32840 [Urine:37023]  Net: -9148.87            Vitals:    05/27/19 1957 05/27/19 2314 05/28/19 0316 05/29/19 0608   Weight: 83.9 kg (185 lb) 101.8 kg (224 lb 8 oz) 101.4 kg (223 lb 8 oz) 100.5 kg (221 lb 8 oz)    05/31/19 0500 06/01/19 0548 06/02/19 0548 06/03/19 0504   Weight: 99.3 kg (218 lb 14.4 oz) 97.8 kg (215 lb 11.2 oz) 96.9 kg (213 lb 11.2 oz) 96.5 kg (212 lb 12.8 oz)    06/04/19 0606   Weight: 96.3 kg (212 lb 6.4 oz)       2.  O2 sats > 92% on RA or at prior home O2 therapy level.          Current oxygenation status:       SpO2: 93 %         O2 Device: None (Room air),  Oxygen Delivery: 2 LPM         Able to wean O2 this shift to keep sats > 92%:  Yes       Does patient use Home O2? No    3.  Tolerates ambulation and mobility near baseline: Yes        How many times did the patient ambulate with nursing staff this shift? 2, to bathroom    Please review the Heart Failure Care Pathway for additional HF goal outcomes.    Mila Montemayor RN  6/4/2019     Patient alert and oriented  Peripheral IV saline locked  Tolerating diet, fluids restricted  BM today, voiding without issue  +2/+3 BLE edema  Dialysis port placed today, CDI  Lungs clear  VSS, on room air  Tele: SR  No reports of pain  Plan to discharge back home with dialysis MWF  Continue with plan of care

## 2019-06-05 NOTE — CONSULTS
SW met with pt. Expressed no concerns with returning home. Pt reports that her car is at the hospital and she plans to drive home. Pt has recently started Dialysis, and will be going MWF. No additional SW needs identified.

## 2019-06-05 NOTE — PLAN OF CARE
VSS denies pain alert/oriented ambulates with SBA  and a cane +2 BLE edema.  Hemodialysis Kye cath on right chest wall will ran today and possible discharge.

## 2019-06-05 NOTE — PLAN OF CARE
Occupational Therapy and Cardiac Rehab Discharge Summary    Reason for therapy discharge:    Discharged to home.    Progress towards therapy goal(s). See goals on Care Plan in Epic electronic health record for goal details.  Goals partially met.  Barriers to achieving goals:   discharge from facility.    Therapy recommendation(s):    *Pt not seen by this therapist, previous recommendation: [facility] follow-up to assist with CHF mgmt

## 2019-06-05 NOTE — PROGRESS NOTES
Care Coordination:  Call received from [facility]. They have received referral information and have requested RN HD flowsheets and CXR for additional information. This information was faxed and now awaiting final chair time for [facility].    Goldie Pedraza RN CTS    Pt's follow up PCP appt was changed from Monday 6/10 to Thurs 6/13 at 2:00 at [facility] with Dr Briseno due to pt's HD schedule of MWF.  1130      Final chair time for OP HD received. Pt will have her first OP HD run at [facility] on Friday 6/7 at 1345. She will need to be there at 1300 the first day for paperwork and at 1320 there after. AVS updated with dialysis plan and PCP follow up. Bedside RN updated.   7837

## 2019-06-05 NOTE — PROGRESS NOTES
Assessment and Plan:   End-stage renal disease: Seen on dialysis.  This is her second run.  Yesterday she ran for 2.5 hours on a 2K bath and net removal of 2 L of fluid.  Blood flow rate of 300 was obtained via right CVC.  She received Hectorol, EPO and Venofer.  Today we will run for 3 hours with a blood flow rate of 400 and ultrafiltration of 2 L.  She will be on a 2K 35 bicarb bath.  We will use low-dose heparin.  She will get Hectorol 4 mcg IV, Venofer 100 mg IV and EPO 5000 units IV.    She can be discharged from our point of view and we will arrange for follow-up care at the [facility] dialysis unit on a Monday Wednesday Friday schedule.  I will call the unit to give them orders and arrange as scheduled.            Interval History:   Diabetes  Hypertension  Peripheral vascular disease  Anemia                 Review of Systems:   She has no symptoms referred to dialysis.  She denies muscle cramps, nausea or dizziness.          Medications:       amLODIPine  5 mg Oral Daily     calcium acetate  667 mg Oral TID w/meals     carvedilol  50 mg Oral BID w/meals     ceFAZolin-dextrose         doxercalciferol  4 mcg Intravenous Once in dialysis     epoetin fletcher (EPOGEN,PROCRIT) inj ESRD  5,000 Units Intravenous Once in dialysis     fentaNYL (PF)         heparin (porcine)         heparin (porcine)  500 Units Hemodialysis Machine Once in dialysis     heparin 10,000 units in 1000 mL NS         hydrALAZINE  50 mg Oral TID     insulin aspart   Subcutaneous QAM AC     insulin aspart   Subcutaneous Daily with lunch     insulin aspart   Subcutaneous Daily with supper     insulin aspart  1-7 Units Subcutaneous TID AC     insulin aspart  1-5 Units Subcutaneous At Bedtime     insulin isophane human  10 Units Subcutaneous Daily with supper     insulin isophane human  6 Units Subcutaneous QAM AC     iron sucrose  100 mg Intravenous Once in dialysis     lidocaine (PF)         midazolam         Resume a HELD Medication    Does not apply See Admin Instructions       sodium chloride 0.9 %       sodium chloride 0.9 %       heparin 10,000 units in 1000 mL NS       heparin (porcine) 500 Units/hr (19 0852)     - MEDICATION INSTRUCTIONS -       - MEDICATION INSTRUCTIONS -       Warfarin Therapy Reminder       Current active medications and PTA medications reviewed, see medication list for details.            Physical Exam:   Vitals were reviewed  Patient Vitals for the past 24 hrs:   BP Temp Temp src Heart Rate Resp SpO2 Weight   19 0915 150/65 -- -- 75 -- -- --   19 0900 154/64 -- -- 75 -- -- --   19 0845 165/76 -- -- 75 -- -- --   19 0833 170/78 -- -- 80 -- -- --   19 0821 172/73 -- -- 82 -- 95 % --   19 0800 166/65 98.7  F (37.1  C) Oral 77 -- 93 % 93.1 kg (205 lb 4 oz)   19 0751 170/69 98.4  F (36.9  C) -- 70 16 92 % --   19 0642 -- -- -- -- -- -- 93.1 kg (205 lb 4.8 oz)   19 0030 154/59 98.6  F (37  C) Oral 73 16 92 % --   19 2151 146/58 -- -- -- -- -- --   19 1753 152/63 -- -- 72 -- 93 % --   19 1552 (!) 125/38 99.8  F (37.7  C) Oral 76 18 99 % --   19 1500 -- 99.8  F (37.7  C) -- -- -- -- --   19 1428 -- -- -- -- -- 93 % --   19 1412 132/50 98.9  F (37.2  C) Oral 92 18 93 % --   19 1300 158/72 98.4  F (36.9  C) Oral 88 18 98 % --   06/04/19 1245 188/82 -- -- 88 -- -- --   19 1230 183/81 -- -- 83 -- -- --   19 1215 185/85 -- -- 82 -- -- --   19 1200 180/80 -- -- 81 -- -- --   19 1115 153/73 -- -- 79 16 -- --   19 1100 159/72 -- -- 78 16 -- --   19 1025 153/72 98.4  F (36.9  C) Oral 77 16 -- --   19 0949 175/81 -- -- 74 16 96 % --       Temp:  [98.4  F (36.9  C)-99.8  F (37.7  C)] 98.7  F (37.1  C)  Heart Rate:  [70-92] 75  Resp:  [16-18] 16  BP: (125-188)/(38-85) 150/65  FiO2 (%):  [31 %] 31 %  SpO2:  [92 %-99 %] 95 %    Temperatures:  Current - Temp: 98.7  F (37.1  C); Max - Temp  Av.9   F (37.2  C)  Min: 98.4  F (36.9  C)  Max: 99.8  F (37.7  C)  Respiration range: Resp  Av.7  Min: 16  Max: 18  Pulse range: No data recorded  Blood pressure range: Systolic (24hrs), Av , Min:125 , Max:188   ; Diastolic (24hrs), Av, Min:38, Max:85    Pulse oximetry range: SpO2  Av.4 %  Min: 92 %  Max: 99 %    I/O last 3 completed shifts:  In: 440 [P.O.:440]  Out: 1750 [Other:1750]      Intake/Output Summary (Last 24 hours) at 2019 0936  Last data filed at 2019 2153  Gross per 24 hour   Intake 440 ml   Output 1750 ml   Net -1310 ml       Alert sitting up in bed  Right CVC with no redness, tenderness or ecchymosis  Lungs were clear breath sounds  Cardiac exam regular rhythm normal S1-S2 no murmur rub or gallop  Lower extremities 1+ edema       Wt Readings from Last 4 Encounters:   19 93.1 kg (205 lb 4 oz)   18 93.4 kg (206 lb)   18 86.8 kg (191 lb 4.8 oz)   10/07/18 89.4 kg (197 lb)          Data:          Lab Results   Component Value Date     2019     2019     2019    Lab Results   Component Value Date    CHLORIDE 108 2019    CHLORIDE 108 2019    CHLORIDE 109 2019    Lab Results   Component Value Date    BUN 63 2019    BUN 60 2019    BUN 58 2019      Lab Results   Component Value Date    POTASSIUM 5.5 2019    POTASSIUM 5.3 2019    POTASSIUM 5.6 2019    Lab Results   Component Value Date    CO2 26 2019    CO2 27 2019    CO2 25 2019    Lab Results   Component Value Date    CR 5.02 2019    CR 4.82 2019    CR 4.73 2019        Recent Labs   Lab Test 19  0715 19  0607 19  0646  19  0607   WBC 5.9 4.8  --   --  6.1   HGB 7.5* 7.2* 7.1*   < > 7.4*   HCT 25.4* 24.1*  --   --  24.9*   MCV 90 90  --   --  88    166  --   --  184    < > = values in this interval not displayed.     Recent Labs   Lab Test 19  12/19/18  0631 10/04/18  1611   AST 38 9 26   ALT 56* 10 18   ALKPHOS 155* 91 167*   BILITOTAL 0.3 0.3 0.3       Recent Labs   Lab Test 12/17/18  1041 03/15/18  2041 05/03/17  0725   MAG 2.0 2.1 1.5*     Recent Labs   Lab Test 06/01/19  1302 06/01/19  0646 05/31/19  0641   PHOS 5.2* 5.6* 5.9*     Recent Labs   Lab Test 06/03/19  0652 06/02/19  0607 06/01/19  0646   GILL 8.7 8.4* 8.4*       Lab Results   Component Value Date    GILL 8.7 06/03/2019     Lab Results   Component Value Date    WBC 5.9 06/05/2019    HGB 7.5 (L) 06/05/2019    HCT 25.4 (L) 06/05/2019    MCV 90 06/05/2019     06/05/2019     Lab Results   Component Value Date     06/03/2019    POTASSIUM 5.5 (H) 06/03/2019    CHLORIDE 108 06/03/2019    CO2 26 06/03/2019     (H) 06/03/2019     Lab Results   Component Value Date    BUN 63 (H) 06/03/2019    CR 5.02 (H) 06/03/2019     Lab Results   Component Value Date    MAG 2.0 12/17/2018     Lab Results   Component Value Date    PHOS 5.2 (H) 06/01/2019       Creatinine   Date Value Ref Range Status   06/03/2019 5.02 (H) 0.52 - 1.04 mg/dL Final   06/02/2019 4.82 (H) 0.52 - 1.04 mg/dL Final   06/01/2019 4.73 (H) 0.52 - 1.04 mg/dL Final   05/31/2019 4.27 (H) 0.52 - 1.04 mg/dL Final   05/30/2019 4.27 (H) 0.52 - 1.04 mg/dL Final   05/29/2019 4.26 (H) 0.52 - 1.04 mg/dL Final       Attestation:  I have reviewed today's vital signs, notes, medications, labs and imaging.  Seen on dialysis.     Adolfo Odonnell MD

## 2019-06-05 NOTE — PROGRESS NOTES
Potassium   Date Value Ref Range Status   06/03/2019 5.5 (H) 3.4 - 5.3 mmol/L Final     Lab Results   Component Value Date    HGB 7.5 06/05/2019     Weight: 93.1 kg (205 lb 4 oz)  POST WT 91.1 kg  DIALYSIS PROCEDURE NOTE  Hepatitis status of previous patient on machine log was checked and verified ok to use with this patients hepatitis status.  Patient dialyzed for 3 hrs. on a 2 K bath with a net fluid removal of  2L.  A BFR of 350 ml/min was obtained via a RIJ. The patient was seen by Dr. Odonnell during the treatment.  Total heparin received during the treatment: 800 units.      Meds  Given: Epogen, Hectorol, Venofer, and Heparin     Complications: None.    Procedure and ESRD teaching on proper catheter care.   See flowsheet in EPIC for further details and post assessment.  Machine water alarm in place and functioning. Transducer pods intact and checked every 15min.    Vascular Access: Aseptic prep done for both on/off.  Report received from: Carleen Brooks RN   Report given to: Carleen Boroks RN     HEPATITIS B SURFACE ANTIGEN: Negative DATE; 06/03/2019    HEPATITIS B SURFACE ANTIBODY: Susceptible DATE: 06/03/2019  Chlorine/Chloramine water system checked every 4 hours.  Pt [facility]

## 2019-06-05 NOTE — DISCHARGE SUMMARY
[facility] Discharge Summary    Roxy Beaulieu MRN# [id number]   Age: 59 year old YOB: 1960     Date of Admission:  5/27/2019  Date of Discharge::  6/5/2019  Admitting Physician:  Lauri Hernandez MD  Discharge Physician:  Sekou Shell MD     Home clinic: [facility]          Admission Diagnoses:   Hypoxia [R09.02]  Acute on chronic systolic congestive heart failure (H) [I50.23]  Acute renal failure, unspecified acute renal failure type (H) [N17.9]  Other pulmonary embolism without acute cor pulmonale, unspecified chronicity (H) [I26.99]          Discharge Diagnosis:   Principal Problem:    Acute respiratory failure with hypoxia, thought to be multifactorial  Active Problems:    Uncontrolled type 2 diabetes mellitus with hyperglycemia, with long-term current use of insulin (H)    Shortness of breath    Acute on chronic combined systolic and diastolic heart failure (H)    History of VTE (venous thromboembolism)    ESRD (end stage renal disease) on dialysis.  Initiation of HD 6/2019.    History of pulmonary embolism with suspected recurrrent PE contributing to hypoxia            Procedures:   Ultrasound of lower extremity venous Doppler  Chest x-ray  Right knee x-ray  CVC placement by interventional radiology.    Echocardiogram       Interpretation Summary     The visual ejection fraction is estimated at 45-50%.  Left ventricular systolic function is mildly reduced.  A basal inferior wall motion abnormality can not be excluded on this study  even with contrast use.  Grade III or advanced diastolic dysfunction.  There is moderate to severe concentric left ventricular hypertrophy.  The study was technically difficult.    Initiation of hemodialysis.          Medications Prior to Admission:     No medications prior to admission.             Discharge Medications:     Current Discharge Medication List      START taking these medications    Details    calcium acetate (PHOSLO) 667 MG CAPS capsule Take 667 mg by mouth 3 times daily (with meals)  Qty: 90 capsule, Refills: 1    Associated Diagnoses: ESRD (end stage renal disease) on dialysis (H)         CONTINUE these medications which have CHANGED    Details   !! insulin isophane & regular (HUMULIN MIX 70/30 PEN , NOVOLIN MIX 70/30 PEN) susp Inject 16 Units Subcutaneous daily (with dinner)    Associated Diagnoses: Uncontrolled type 2 diabetes mellitus with hyperglycemia, with long-term current use of insulin (H)      !! insulin isophane & regular (HUMULIN MIX 70/30 PEN , NOVOLIN MIX 70/30 PEN) susp Inject 10 Units Subcutaneous daily (with breakfast)    Associated Diagnoses: Uncontrolled type 2 diabetes mellitus with hyperglycemia, with long-term current use of insulin (H)      povidone-iodine scrub sponge (BETADINE SWABSTICK) 10 % swab Apply to foot wounds daily after washing and drying feet  Qty: 60 each, Refills: 0    Associated Diagnoses: Skin ulcer of toe of left foot, limited to breakdown of skin (H)      urea (CARMOL) 10 % external cream Apply topically daily  Qty: 240 g, Refills: 0    Associated Diagnoses: Skin ulcer of toe of left foot, limited to breakdown of skin (H)      warfarin (COUMADIN) 5 MG tablet Take 2 tablets (10 mg) by mouth daily  Qty: 30 tablet, Refills: 0    Associated Diagnoses: VTE (venous thromboembolism)       !! - Potential duplicate medications found. Please discuss with provider.      CONTINUE these medications which have NOT CHANGED    Details   amLODIPine (NORVASC) 5 MG tablet Take 5 mg by mouth daily      carvedilol (COREG) 25 MG tablet Take 2 tablets (50 mg) by mouth 2 times daily (with meals)  Qty: 120 tablet, Refills: 0    Associated Diagnoses: Cardiomyopathy due to hypertension, with heart failure (H); Acute on chronic combined systolic and diastolic heart failure (H)      hydrALAZINE (APRESOLINE) 50 MG tablet Take 1 tablet (50 mg) by mouth 3 times daily  Qty: 90 tablet,  "Refills: 0    Associated Diagnoses: Cardiomyopathy due to hypertension, with heart failure (H)                   Consultations:   Consultation during this admission received from nephrology.          Hospital Course:   Roxy Beaulieu is a 59 year old female who came to attention on 5/27/2019 with shortness of breath.       Prior medical history is notable for poorly controlled type 2 diabetes and essential hypertension both contributed to her current chronic kidney disease.  She also has a hypertensive cardiomyopathy with both depressed LV function and advanced diastolic dysfunction.     Ms. Beaulieu was admitted to a telemetry bed and was started on IV diuresis.  Due to concern for the patient's apparent hypoxia with subtherapeutic INR and nonocclusive left DVT, (she is anticoagulated for recurrent pulmonary embolism) she was immediately started on a heparin drip and warfarin was restarted.       Overall, the patient's kidney function has worsened throughout the hospital stay and this prompted discussion about initiation of hemodialysis.      At the time of my initial introduction of the patient, June 2, 2019, the nurses indicated to me the patient seems to be quite confused and unreliable.  I talked to the patient's daughter by telephone who confirmed that this has been a concern of family members for a couple of years now.       As of 6/4/2019, Ms. Beaulieu had consented for to initiate hemodialysis.  A tunneled central venous catheter was placed and she was initiated on hemodialysis by Dr. Odonnell.  She subsequently underwent hemodialysis the second time on 6/5/2019 after which arrangements were made for discharge.    /41 (BP Location: Right arm)   Pulse 71   Temp 99.6  F (37.6  C) (Oral)   Resp 16   Ht 1.702 m (5' 7\")   Wt 93.1 kg (205 lb 4 oz)   SpO2 93%   BMI 32.15 kg/m    On the date of discharge, Ms. Beaulieu is alert, comfortable and in NAD.   Chest: CTA. No increased WOB.   COR: RRR without " murmur  Abd: soft, NTND  Extrem: mild pitting edema bilat.           Discharge Instructions and Follow-Up:   Discharge diet: Renal   Discharge activity: Activity as tolerated   Discharge follow-up: HD q M/W/F.  Recheck INR in 5 days.  Follow up with PMD as soon as convenient.           Discharge Disposition:   Discharged to home      Attestation:  I have reviewed today's vital signs, notes, medications, labs and imaging.  Total time: 45 minutes    Sekou Shell MD

## 2019-06-05 NOTE — PLAN OF CARE
Heart Failure Care Pathway  GOALS TO BE MET BEFORE DISCHARGE:    1. Decrease congestion and/or edema with diuretic therapy to achieve near      optimal volume status.            Dyspnea improved:  Yes            Edema improved:     Yes        Net I/O and Weights since admission:          05/06 1500 - 06/05 1459  In: 9226.13 [P.O.:7455; I.V.:1771.13]  Out: 45201 [Urine:02480]  Net: -9148.87            Vitals:    05/27/19 1957 05/27/19 2314 05/28/19 0316 05/29/19 0608   Weight: 83.9 kg (185 lb) 101.8 kg (224 lb 8 oz) 101.4 kg (223 lb 8 oz) 100.5 kg (221 lb 8 oz)    05/31/19 0500 06/01/19 0548 06/02/19 0548 06/03/19 0504   Weight: 99.3 kg (218 lb 14.4 oz) 97.8 kg (215 lb 11.2 oz) 96.9 kg (213 lb 11.2 oz) 96.5 kg (212 lb 12.8 oz)    06/04/19 0606 06/05/19 0642 06/05/19 0800   Weight: 96.3 kg (212 lb 6.4 oz) 93.1 kg (205 lb 4.8 oz) 93.1 kg (205 lb 4 oz)         2.  O2 sats > 92% on RA or at prior home O2 therapy level.          Current oxygenation status:       SpO2: 95 %         O2 Device: None (Room air),  Oxygen Delivery: 2 LPM         Able to wean O2 this shift to keep sats > 92%:  Yes       Does patient use Home O2? No    3.  Tolerates ambulation and mobility near baseline: Yes        How many times did the patient ambulate with nursing staff this shift? 2      VSS, BP elevated. On RA.  Alert and orientated x4. Up SBA with cane.  Bilateral LLE +2/+3 No c/o pain. Tele SR. Kye cath to R chest wall. Pt had dialysis today. Went over discharge paperwork with patient.Questions asked and answered. Verbalized understanding. Pt discharged per wheel chair with all belongings, and paperwork. Pt will  the medications she brought in from home (that where sent to pharmacy) prior to going home.           Please review the Heart Failure Care Pathway for additional HF goal outcomes.    Carleen Brooks RN  6/5/2019

## 2019-06-06 ENCOUNTER — NURSE TRIAGE (OUTPATIENT)
Dept: NURSING | Facility: CLINIC | Age: 59
End: 2019-06-06

## 2019-06-06 NOTE — TELEPHONE ENCOUNTER
"Patient states she was discharged from the hospital yesterday 6/5/19 and is to follow up with \"[facility]\" for dialysis tomorrow 6/7/19.  States she called the number and the clinic was closed. States the message at [facility] is that they are open tomorrow 6/7/19.    Advised Patient to call [facility] tomorrow morning as soon as clinic opens to schedule dialysis.     Patient also states she has a follow up appointment with her Primary Care Physician 6/7/19 stating it is with \"Dr. Viramontes at [facility].\"  Patient states Dr. ORLANDO London is no longer her PCP and it is now Dr. Viramontes stating \"who I saw a long time ago.\"    Per Epic chart this RN reviewed the 6/5/19 Discharge Summary note 'Discharge Instructions and Follow-Up\" with the Patient:        Discharge Instructions and Follow-Up:   Discharge diet: Renal   Discharge activity: Activity as tolerated   Discharge follow-up: HD q M/W/F.  Recheck INR in 5 days.  Follow up with PMD as soon as convenient.       This RN called [facility] and spoke with  (Julia) who stated that \"Dr. Viramontes is not with [facility] but is at [facility]- Tel. [phone].\"  This RN left a Telephone Voice message for Patient at [phone] advising Patient to call [facility] today or 6/7/19 in the morning at [phone] to follow up re plan of care.    Protocol-  Information Call Only- Adult  Care advice reviewed.   Disposition-  Information given as above.  Caller states understanding of the recommended disposition.   Advised to call back if further questions or concerns.     GIL Gilmore RN  [facility]     Reason for Disposition    [1] Caller requesting NON-URGENT health information AND [2] PCP's office is the best resource    Protocols used: INFORMATION ONLY CALL-A-AH    "

## 2019-06-24 ENCOUNTER — TELEPHONE (OUTPATIENT)
Dept: OTHER | Facility: CLINIC | Age: 59
End: 2019-06-24

## 2019-06-24 DIAGNOSIS — N18.6 ESRD (END STAGE RENAL DISEASE) ON DIALYSIS (H): Primary | ICD-10-CM

## 2019-06-24 DIAGNOSIS — Z99.2 ESRD (END STAGE RENAL DISEASE) ON DIALYSIS (H): Primary | ICD-10-CM

## 2019-06-24 NOTE — TELEPHONE ENCOUNTER
Pt to be scheduled for bilateral upper extremity venous mapping and consult with vascular surgery for AVF creation at next available.      CRISTINA LegerN, RN

## 2019-06-24 NOTE — TELEPHONE ENCOUNTER
Morris Garcia called to refer pt of Dr Odonnell' for surgery consult for AVF placement and vein map US. She is currently on dialysis MWF at [place], second shift. Routing to Dr Swan's RN to review and add orders if needed. She would like Zeynep eventually. Cristel Farah -  at [facility]

## 2019-06-25 NOTE — TELEPHONE ENCOUNTER
Scheduled 7/2/19 with Dr Swan/imaging in [place]. Cristel Farah -  at [facility]

## 2019-09-04 ENCOUNTER — TELEPHONE (OUTPATIENT)
Dept: OTHER | Facility: CLINIC | Age: 59
End: 2019-09-04

## 2019-09-04 NOTE — TELEPHONE ENCOUNTER
Laura from [facility] called to schedule vein mapping and consult.  I told Laura that this patient has cancelled multiple times and that this will be our final attempt to schedule this patient.  Laura will relay that message to patient. Patient is scheduled for vein mapping and appt with Dr. Swan on 10/03/19. Aurora Palomo,

## 2019-10-28 ENCOUNTER — HOSPITAL ENCOUNTER (INPATIENT)
Facility: CLINIC | Age: 59
LOS: 12 days | Discharge: NURSING, REHAB OR LONG-TERM CARE | End: 2019-11-09
Attending: PHYSICAL MEDICINE & REHABILITATION | Admitting: PHYSICAL MEDICINE & REHABILITATION
Payer: COMMERCIAL

## 2019-10-28 DIAGNOSIS — I63.00 CEREBROVASCULAR ACCIDENT (CVA) DUE TO THROMBOSIS OF PRECEREBRAL ARTERY (H): Primary | ICD-10-CM

## 2019-10-28 PROBLEM — I63.9 CVA (CEREBRAL VASCULAR ACCIDENT) (H): Status: ACTIVE | Noted: 2019-10-28

## 2019-10-28 LAB
GLUCOSE BLDC GLUCOMTR-MCNC: 102 MG/DL (ref 70–99)
GLUCOSE BLDC GLUCOMTR-MCNC: 105 MG/DL (ref 70–99)
GLUCOSE BLDC GLUCOMTR-MCNC: 142 MG/DL (ref 70–99)
GLUCOSE BLDC GLUCOMTR-MCNC: 58 MG/DL (ref 70–99)
GLUCOSE BLDC GLUCOMTR-MCNC: 65 MG/DL (ref 70–99)
GLUCOSE BLDC GLUCOMTR-MCNC: 83 MG/DL (ref 70–99)
GLUCOSE BLDC GLUCOMTR-MCNC: 97 MG/DL (ref 70–99)
HBA1C MFR BLD: 9.7 % (ref 0–5.6)

## 2019-10-28 PROCEDURE — 25000131 ZZH RX MED GY IP 250 OP 636 PS 637: Performed by: PHYSICIAN ASSISTANT

## 2019-10-28 PROCEDURE — 99221 1ST HOSP IP/OBS SF/LOW 40: CPT | Performed by: PHYSICIAN ASSISTANT

## 2019-10-28 PROCEDURE — 25000132 ZZH RX MED GY IP 250 OP 250 PS 637: Performed by: PHYSICAL MEDICINE & REHABILITATION

## 2019-10-28 PROCEDURE — 12800006 ZZH R&B REHAB

## 2019-10-28 PROCEDURE — 00000146 ZZHCL STATISTIC GLUCOSE BY METER IP

## 2019-10-28 PROCEDURE — 97530 THERAPEUTIC ACTIVITIES: CPT | Mod: GP | Performed by: STUDENT IN AN ORGANIZED HEALTH CARE EDUCATION/TRAINING PROGRAM

## 2019-10-28 PROCEDURE — 36415 COLL VENOUS BLD VENIPUNCTURE: CPT | Performed by: PHYSICIAN ASSISTANT

## 2019-10-28 PROCEDURE — 97162 PT EVAL MOD COMPLEX 30 MIN: CPT | Mod: GP | Performed by: STUDENT IN AN ORGANIZED HEALTH CARE EDUCATION/TRAINING PROGRAM

## 2019-10-28 PROCEDURE — 25000132 ZZH RX MED GY IP 250 OP 250 PS 637: Performed by: PHYSICIAN ASSISTANT

## 2019-10-28 PROCEDURE — 99207 ZZC CONSULT E&M CHANGED TO INITIAL LEVEL: CPT | Performed by: PHYSICIAN ASSISTANT

## 2019-10-28 PROCEDURE — 83036 HEMOGLOBIN GLYCOSYLATED A1C: CPT | Performed by: PHYSICIAN ASSISTANT

## 2019-10-28 RX ORDER — HYDRALAZINE HYDROCHLORIDE 50 MG/1
50 TABLET, FILM COATED ORAL 3 TIMES DAILY
Status: DISCONTINUED | OUTPATIENT
Start: 2019-10-28 | End: 2019-11-03

## 2019-10-28 RX ORDER — NICOTINE POLACRILEX 4 MG
15-30 LOZENGE BUCCAL
Status: DISCONTINUED | OUTPATIENT
Start: 2019-10-28 | End: 2019-11-09 | Stop reason: HOSPADM

## 2019-10-28 RX ORDER — CALCIUM ACETATE 667 MG/1
667 CAPSULE ORAL
Status: DISCONTINUED | OUTPATIENT
Start: 2019-10-28 | End: 2019-10-28

## 2019-10-28 RX ORDER — ASPIRIN 325 MG
325 TABLET ORAL AT BEDTIME
Status: DISCONTINUED | OUTPATIENT
Start: 2019-10-28 | End: 2019-11-09 | Stop reason: HOSPADM

## 2019-10-28 RX ORDER — HEPARIN SODIUM 1000 [USP'U]/ML
1000 INJECTION, SOLUTION INTRAVENOUS; SUBCUTANEOUS CONTINUOUS
Status: CANCELLED | OUTPATIENT
Start: 2019-10-28

## 2019-10-28 RX ORDER — AMLODIPINE BESYLATE 5 MG/1
5 TABLET ORAL AT BEDTIME
Status: DISCONTINUED | OUTPATIENT
Start: 2019-10-28 | End: 2019-11-05

## 2019-10-28 RX ORDER — AMOXICILLIN 250 MG
1-4 CAPSULE ORAL 2 TIMES DAILY PRN
Status: DISCONTINUED | OUTPATIENT
Start: 2019-10-28 | End: 2019-11-09 | Stop reason: HOSPADM

## 2019-10-28 RX ORDER — DEXTROSE MONOHYDRATE 25 G/50ML
25-50 INJECTION, SOLUTION INTRAVENOUS
Status: DISCONTINUED | OUTPATIENT
Start: 2019-10-28 | End: 2019-11-09 | Stop reason: HOSPADM

## 2019-10-28 RX ORDER — HEPARIN SODIUM 1000 [USP'U]/ML
1000 INJECTION, SOLUTION INTRAVENOUS; SUBCUTANEOUS
Status: CANCELLED | OUTPATIENT
Start: 2019-10-28 | End: 2019-10-28

## 2019-10-28 RX ORDER — SIMVASTATIN 20 MG
20 TABLET ORAL AT BEDTIME
Status: DISCONTINUED | OUTPATIENT
Start: 2019-10-28 | End: 2019-11-09 | Stop reason: HOSPADM

## 2019-10-28 RX ORDER — CARVEDILOL 25 MG/1
25 TABLET ORAL 2 TIMES DAILY WITH MEALS
Status: DISCONTINUED | OUTPATIENT
Start: 2019-10-28 | End: 2019-11-05

## 2019-10-28 RX ORDER — ACETAMINOPHEN 325 MG/1
650 TABLET ORAL EVERY 6 HOURS PRN
Status: DISCONTINUED | OUTPATIENT
Start: 2019-10-28 | End: 2019-11-09 | Stop reason: HOSPADM

## 2019-10-28 RX ORDER — BISACODYL 10 MG
10 SUPPOSITORY, RECTAL RECTAL DAILY PRN
Status: DISCONTINUED | OUTPATIENT
Start: 2019-10-28 | End: 2019-11-09 | Stop reason: HOSPADM

## 2019-10-28 RX ADMIN — HYDRALAZINE HYDROCHLORIDE 50 MG: 50 TABLET ORAL at 15:11

## 2019-10-28 RX ADMIN — INSULIN HUMAN 25 UNITS: 100 INJECTION, SUSPENSION SUBCUTANEOUS at 18:01

## 2019-10-28 RX ADMIN — HYDRALAZINE HYDROCHLORIDE 50 MG: 50 TABLET ORAL at 22:23

## 2019-10-28 RX ADMIN — SIMVASTATIN 20 MG: 20 TABLET, FILM COATED ORAL at 22:23

## 2019-10-28 RX ADMIN — INSULIN ASPART 1 UNITS: 100 INJECTION, SOLUTION INTRAVENOUS; SUBCUTANEOUS at 18:04

## 2019-10-28 RX ADMIN — AMLODIPINE BESYLATE 5 MG: 5 TABLET ORAL at 22:23

## 2019-10-28 RX ADMIN — ASPIRIN 325 MG ORAL TABLET 325 MG: 325 PILL ORAL at 22:24

## 2019-10-28 RX ADMIN — CARVEDILOL 25 MG: 25 TABLET, FILM COATED ORAL at 18:00

## 2019-10-28 ASSESSMENT — MIFFLIN-ST. JEOR: SCORE: 1546.55

## 2019-10-28 NOTE — PHARMACY-ADMISSION MEDICATION HISTORY
Admission medication history for the October 28, 2019 admission is complete.     Interview sources:  Care Everywhere    Reliability of source: realiable    Medication compliance: compliant    Changes made to PTA medication list (reason)  Added: aspirin, Epoetin  Deleted: betadine, Carmol, warfarin, PhosLo  Changed: carvedilol, insulin    Additional medication history information:   None      Prior to Admission medications    Medication Sig Last Dose Taking? Auth Provider   amLODIPine (NORVASC) 5 MG tablet Take 5 mg by mouth At Bedtime  10/27/2019 at Unknown time Yes Unknown, Entered By History   aspirin (ASA) 325 MG EC tablet Take 325 mg by mouth At Bedtime 10/27/2019 at Unknown time Yes Unknown, Entered By History   carvedilol (COREG) 25 MG tablet Take 2 tablets (50 mg) by mouth 2 times daily (with meals)  Patient taking differently: Take 25 mg by mouth 2 times daily (with meals)  10/28/2019 at Unknown time Yes Sekou Shell MD   hydrALAZINE (APRESOLINE) 50 MG tablet Take 1 tablet (50 mg) by mouth 3 times daily 10/28/2019 at Unknown time Yes Sekou Shell MD   insulin isophane & regular (HUMULIN MIX 70/30 PEN , NOVOLIN MIX 70/30 PEN) susp Inject 16 Units Subcutaneous daily (with dinner)  Patient taking differently: Inject 25 Units Subcutaneous daily (with dinner)    Sekou Shell MD   insulin isophane & regular (HUMULIN MIX 70/30 PEN , NOVOLIN MIX 70/30 PEN) susp Inject 10 Units Subcutaneous daily (with breakfast)   Sekou Shell MD       Time spent: 40 minutes    Medication history completed by:   Ani Marcelino, PharmD, BCPS October 28, 2019

## 2019-10-28 NOTE — H&P
[facility]   Acute Rehabilitation Unit  Admission History and Physical    chief complaint   Left leg weakness    REHAB DIAGNOSIS  CVA    History of Present illness  Roxy Beaulieu is a 59 year old LHD female with a PMH including but not limited to ESRD on HD, HTN, HLD, insulin dependent DM (HbA1c 10.7 on 10/2/19), and Hx of DVT/PE (~2004, not on anticoagulation due to non-compliance) who presented to [facility] on 10/23/19 with new onset of left leg weakness.  Initial CT was negative, however MRI demonstrated an acute R periventricular white matter infarct.  Further workup included an echo which showed an EF of 66% without mention of shunt, and no high grade stenosis of the head or neck seen on CTA.  Etiology of the stroke felt to be lacunar due to HTN.  She was continued on dialysis and will plan to transition to T/Th/Sat while at rehab.      Currently she continues to have weakness and mild numbness in the left leg, but is rapidly improving.  She denies HAs, chest pain, shortness of breath, change in vision, trouble swallowing, or problems with bowel or bladder.  She had a BM yesterday.  She does endorse decreased memory, however is not sure if this was starting to occur even prior to her stroke and possibly related to her stress.  Upon admission she was noted to have a glucose level of 65 which she attributes to not eating as many carbs as she typically does.  She says she will feel lightheaded when her glucose levels begin to fall.  She was given juice with improvement to 105.      FUNCTIONAL HISTORY  Current Functional Status:  PT:  Supine to Sit Independent   Sit to Supine Independent   Transfer   Sit To Stand Stand by assistance;Verbal cues  (for hands)   Stand To Sit CGA;Verbal cues  (for hands placement/safety)   Ambulation   Weight Bearing Status WBAT   Distance (ft) 60   Assistance Minimum assistance   Assistive Device Rolling walker   Verbal Cues  "Safety;LE advancement;Posture;Attention to task   Quality of Gait/Comment fair motor control left leg,needs cues to slow down   Pattern Decreased step length;Step through;Flexed posture;Unsteady   Endurance Deficit   Endurance Deficit Yes   Endurance Deficit Description fatigues easily   Balance   Sitting Balance Independent   Standing Balance CGA    OT:  Grooming Comb/brush hair;Wash/dry face;Brush teeth/dentures   Wash/Dry Face Independent;Sitting   Brush Teeth/Dentures SBA;Sitting   Comb/Brush Hair SBA;Sitting   Lower Body Dressing Socks   Socks SBA;Sitting   ADL Comments unsteady and feeling dizzy when stnding at sink, had to suddenly sit down on bath chair which was placed behind pt- cues for hand to support at sink, leaning heavily on forearms at sink due to \"dizzy\"     SLP:  Speech Language Evaluation    Motor speech was not impaired.   Speech intelligibility was approximately 100% at the conversational level.  Voice was not impaired.    Auditory comprehension was not impaired. Patient was able to answer moderate yes/no questions with 100% accuracy independently. Patient was able to follow moderate directions with 100% accuracy independently.    Verbal expression was not impaired. Confrontational naming was 100% accurate independently. Pt listed 21 items in a category in under a minute HOWARD. Patient was able to participate in conversation independently.    Reading comprehension was not impaired at phrase level.  Written expression was not impaired for personal information.    Cognition was not impaired. Patient was oriented to person, place, time/date and situation.  Memory was not impaired. Pt HOWARD recalled 3/3 words immediatly. 1/3 after 5 and 3/3 after 10 minute delays with distractor tasks. Pt reported that this is baseline memory function for her. She stated that at home she uses lists and a calendar to support memory      Recommendations:  Diet: regular textures and thin liquids  Strategies: standard safe " swallow strategies      Prior Functional Status:  Ambulates with a cane for balance.  Modified independent for ambulation, mobility, all ADLs, and all IADLs.  She drives herself to dialysis.    PAST Medical History   Reviewed and updated in Epic.  Past Medical History:   Diagnosis Date     Diabetes (H)      Gangrene of toe (H) 4/29/2017     High cholesterol      Hypertension      Obesity 4/29/2017     PE (pulmonary thromboembolism) (H)      Personal history of tobacco use, presenting hazards to health 4/29/2017     Uncontrolled type 2 diabetes mellitus with hyperglycemia, with long-term current use of insulin (H) 4/29/2017       Surgical History  Reviewed and updated in Epic.  Past Surgical History:   Procedure Laterality Date     AMPUTATE TOE(S) Left 5/1/2017    Procedure: AMPUTATE TOE(S);  Amputate first and second left toes;  Surgeon: Verna Fofana DPM, Podiatry/Foot and Ankle Surgery;  Location: [facility] OR     AMPUTATE TOE(S) Left 11/3/2018    Procedure: Partial left 3rd toe amputation;  Surgeon: Verna Fofana DPM, Podiatry/Foot and Ankle Surgery;  Location: [facility] OR     IR CVC TUNNEL PLACEMENT > 5 YRS OF AGE  6/4/2019     OPEN REDUCTION INTERNAL FIXATION FIBULA Left 3/16/2018    Procedure: OPEN REDUCTION INTERNAL FIXATION FIBULA;  Open reduction and internal fixation of displaced left lateral malleolar fracture;  Surgeon: Sumanth Wen MD;  Location: [facility] OR       SOCIAL HISTORY  Reviewed and updated in Epic.  Living situation: Lives alone in a condo, but she just sold this.  She says her sister is finding an apartment for her to live in.  If one can not be found, she states she can stay with her sister after discharge. Sister has 4 PETRA.    Tobacco use: Quit 4-5 years ago  Alcohol use: Denies  Illicit drug use: Denies    Social History     Socioeconomic History     Marital status: Single     Spouse name: Not on file     Number of children: Not on file     Years of education: Not on file     Highest education  level: Not on file   Occupational History     Not on file   Social Needs     Financial resource strain: Not on file     Food insecurity:     Worry: Not on file     Inability: Not on file     Transportation needs:     Medical: Not on file     Non-medical: Not on file   Tobacco Use     Smoking status: Former Smoker     Last attempt to quit: 7/25/2016     Years since quitting: 3.2     Smokeless tobacco: Never Used   Substance and Sexual Activity     Alcohol use: No     Drug use: No     Sexual activity: Not on file   Lifestyle     Physical activity:     Days per week: Not on file     Minutes per session: Not on file     Stress: Not on file   Relationships     Social connections:     Talks on phone: Not on file     Gets together: Not on file     Attends Restorationist service: Not on file     Active member of club or organization: Not on file     Attends meetings of clubs or organizations: Not on file     Relationship status: Not on file     Intimate partner violence:     Fear of current or ex partner: Not on file     Emotionally abused: Not on file     Physically abused: Not on file     Forced sexual activity: Not on file   Other Topics Concern     Not on file   Social History Narrative     Not on file       FAMILY HISTORY  No family history on file.        Medications  Scheduled meds  Medications Prior to Admission   Medication Sig Dispense Refill Last Dose     amLODIPine (NORVASC) 5 MG tablet Take 5 mg by mouth At Bedtime    10/27/2019 at Unknown time     aspirin (ASA) 325 MG EC tablet Take 325 mg by mouth At Bedtime   10/27/2019 at Unknown time     carvedilol (COREG) 25 MG tablet Take 2 tablets (50 mg) by mouth 2 times daily (with meals) (Patient taking differently: Take 25 mg by mouth 2 times daily (with meals) ) 120 tablet 0 10/28/2019 at Unknown time     darbepoetin rocky (ARANESP) 100 MCG/0.5ML injection Inject 100 mcg Subcutaneous once        Epoetin Rocky (EPOGEN IJ) Inject 10,000 Units as directed once a week         "hydrALAZINE (APRESOLINE) 50 MG tablet Take 1 tablet (50 mg) by mouth 3 times daily 90 tablet 0 10/28/2019 at Unknown time     insulin isophane & regular (HUMULIN MIX 70/30 PEN , NOVOLIN MIX 70/30 PEN) susp Inject 16 Units Subcutaneous daily (with dinner) (Patient taking differently: Inject 25 Units Subcutaneous daily (with dinner) )        insulin isophane & regular (HUMULIN MIX 70/30 PEN , NOVOLIN MIX 70/30 PEN) susp Inject 10 Units Subcutaneous daily (with breakfast)          ALLERGIES  Pt reports hives from Heparin    Review of Systems  A 10 point ROS was performed and negative unless otherwise noted in HPI.     Constitutional: Negative  Eyes: Negative  Ears, Nose, Throat: Negative  Cardiovascular: Negative  Respiratory: Negative   Gastrointestinal: Negative  Genitourinary: Negative  Musculoskeletal: +L sided weakness  Neurologic: +L sided weakness, numbness in leg  Psychiatric: Negative      Physical Exam  VITAL SIGNS:  /47 (BP Location: Left arm)   Pulse 71   Temp 98.3  F (36.8  C) (Oral)   Resp 20   Ht 1.727 m (5' 8\")   Wt 93.3 kg (205 lb 11.2 oz)   SpO2 97%   BMI 31.28 kg/m    BMI:  Estimated body mass index is 31.28 kg/m  as calculated from the following:    Height as of this encounter: 1.727 m (5' 8\").    Weight as of this encounter: 93.3 kg (205 lb 11.2 oz).     General: NAD, pleasant and cooperative   HEENT: NC/AT   Pulmonary: Non-labored breathing, CTA b/l, no w/r/r   Cardiovascular: RRR, S1+S2, no m/r/g  Abdominal: Soft, NT/ND, BS+  Lower Extremities: No LE edema or calf tenderness b/l.  +Varus foot on right.  MSK/neuro:   Mental Status:  alert and oriented x3   Cranial Nerves:  1. 2nd CN: Pupils equal, round, reactive to light and accomodation. and visual fields intact to confrontation.   2. 3rd,4th,6th CN:  EOMI, appropriate pupillary responses  3. 5th CN: facial sensation intact   4. 7th CN: face symmetrical   5. 8th CN: functional hearing bilaterally  6. 9th, 10th CN: palate elevates " symmetrically   7. 11th CN: sternocleidomastoids and trapezii strong   8. 12th CN: tongue midline and without fasciculations    Sensory: Normal to light touch in bilateral upper and lower extremities   Strength:   Shoulder abd  EF  WE  EE    Finger abd  R 5 5 5 5 5 5  L 4+ 4+ 5 4- 4+ 4+     HF  KE  DF  EHL  PF   R  4 5 5 5 5  L  4  5 5 5 5     Reflexes:    Biceps BR Triceps Patella Achilles  R 1+ 1+ 1+ 1+ 0  L 2+ 1+ 1+ 1+ 0     Park's test: negative bilaterally    Abnormal movements: None   Coordination: No dysmetria on finger to nose b/l     Speech: Fluent  Comprehension: In tact  Repetition: In tact  -->: No aphasia      Labs  Component Value Date   CREATININE 4.57 (H) 10/25/2019   BUN 31 (H) 10/25/2019    10/25/2019   K 5.3 10/28/2019    10/25/2019   CO2 28 10/25/2019     Lab Results   Component Value Date   WBC 7.1 10/23/2019   HGB 8.1 (L) 10/23/2019   HCT 26.7 (L) 10/23/2019   MCV 92 10/23/2019    10/23/2019     Lipid Profile   Collection Time: 10/24/19 5:26 AM   Result Value Ref Range   Triglycerides 62 <=149 mg/dL   Cholesterol 145 <=199 mg/dL   LDL Calculated 99 <=129 mg/dL   HDL Cholesterol 34 (L) >=50 mg/dL   Patient Fasting > 8hrs? Unknown     IMAGING  MRI Brain (10/24/19)  1.  Signal abnormalities compatible with a subcentimeter acute right-sided periventricular white matter infarction.  2.  No mass effect or hemorrhagic conversion.  3.  Findings compatible with mild chronic small vessel ischemic change.  4.  Bilateral mastoid effusions, left greater than right. Mastoiditis should be excluded clinically.    Echo (10/24/19)  Left Ventricle: Normal left ventricular size and systolic function.The   calculated left ventricular ejection fraction is 66%. This represents a   normal ejection fraction. Mild concentric hypertrophy noted. Unable to   assess left ventricular diastolic function given mitral stenosis. E/e' >   15, suggesting elevated LV filling pressures.Elevated left atrial    pressure.    The left ventricular wall motion is normal.    Right Ventricle: Normal right ventricular size and systolic function.   TAPSE is normal, which is consistent with normal right ventricular   systolic function.    Mitral Valve: Normal mitral valve structure. There is mild mitral   annular calcification. Mild Calcific mitral stenosis. Mild mitral   regurgitation.    Pericardium: Trace pericardial effusion.    No previous study for comparison.    No intracardiac mass or thrombus noted    CTA Head/Neck (10/23/19)  HEAD CT:  1.  No acute intracranial abnormality.    2.  Small amount of fluid in the left mastoid air cells. Clinical correlation for a left-sided mastoiditis would be of benefit.    HEAD CTA:   1.  No high-grade intracranial stenosis. No aneurysm. No high-flow vascular malformation.    NECK CTA:  1.  No high-grade stenosis of the neck vessels by NASCET criteria, with only mild narrowing at the carotid bifurcation, distal common carotid arteries and proximal internal carotid arteries bilaterally.  2.  Dominant left vertebral artery with no significant vertebral artery stenosis.  3.  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 While this variant anatomy can be asymptomatic, some patients experience dysphagia secondary to an external esophageal compression.    4.  Multinodular thyroid. Ultrasound recommended in further evaluation if one has not yet been performed.    SEJAL Beaulieu is a 59 year old LHD female with a PMH including but not limited to ESRD on HD, HTN, HLD, insulin dependent DM (HbA1c 10.7 on 10/2/19), and Hx of DVT/PE (~2004, not on anticoagulation due to non-compliance) who presented to [facility] on 10/23/19 with new onset of left leg weakness and found to have an acute R periventricular white matter infarct.  Etiology felt to be lacunar due to HTN.      Impairment group code: 01.1 L body involvement, R brain  stroke    PLAN  Rehabilitation  -The patient has impairments in strength, balance, and endurance, which are leading to activity limitations in ambulation, mobility, and ADLs.  She will be admitted into a comprehensive, interdisciplinary, inpatient rehabilitation program including  PT, OT and SLP 60 minutes of each on a daily basis.  SLP to further evaluate for cognitive deficits.  The patient requires close supervision by a physiatrist for management and monitoring of active and chronic medical co-morbidities, and to ensure the patient's ability to fully participate and benefit from the rigorous program.  Additionally, the patient requires specialized rehabilitation nursing for monitoring of vital signs, medication administration, patient training and education, and monitoring of bowel and bladder.  The assistance of the dietary and social work teams are also needed to optimize nutritional status and to collaborate and achieve the identified discharge goals.   A comprehensive individualized Care Plan will be formulated by the attending Physiatrist after initial evaluations by each Rehabilitation Team member at the initial team conference within four days of admission and updated/modified at subsequent team conferences.   This level of care and multidisciplinary approach is medically necessary and only available at the inpatient rehabilitation facility level of care.  The patient is willing to participate in 3 hours of therapy per day and has the potential for improvement.     Medical  1)Neurology  -Acute R periventricular lacunar CVA due to HTN   -Continue  mg daily indefinitely   -HTN and lipid control as below   -Follow up with neurology in 4-6 weeks  -Will provide ongoing education regarding secondary stroke prevention including control of BP and glucose, compliance with medications and physician follow up, and lifestyle modifications including diet and exercise.      2)CVS  -HTN: Continue Hydralazine 50  mg TID, Coreg 25 mg BID, and Norvasc 5 mg daily.  Monitor, titrate as needed.  -HLD: Zocor 20 mg daily.  LDL 99      3)Pulmonary  -No acute issues  -Hx of PE, not on anticoagulation      4)FENGI  -Diet: Regular consistency solids, thin liquids.  Renal diet/Consistent CHO  -Bowel meds: PRN Senokot-S, Dulcolax suppository      5)  -ESRD on HD: Consult nephrology for assistance with HD management.  Will dialyze T/Th/Sa while on rehab  -Check BMP in the morning.  Has prior history of hyperkalemia and was 5.3 on last check      6)DVT prophylaxis  -Hx of DVT/PE, however not on anticoagulation due to inconsistent compliance with Coumadin  -Continue  mg daily  -Mechanical prophylaxis      7)Pain  -Tylenol PRN      8)Endo   -IDDM (HbA1c 10.7)   -Continue NPH 25 units BID before meals   -Check glucose levels and sliding scale insulin qAC and at bedtime  -Thyroid ultrasound recommended if not done due to multinodular thyroid seen on CT.  Will recommend PCP follow after discharge.    9)Heme  -AoCD:  Receives weekly EPO injections, will defer administrations to nephrology  -Per pt, has heparin allergy which she reports hives.  Per prior notes, HIT AB + but FARHAT neg and pt has been receiving heparin in CDU prior to hospital admit      10)Psych  -Will place health psychology consult      11)Social/Dispo  -Anticipate discharge home at a modified independent level for ambulation, mobility, and ADLs  -ELOS: 7 days  -Rehab prognosis: Good  -Follow up appointments: PCP, Recommended thyroid ultrasound, neurology 4-6 weeks    Code status: Full Code (Discussed with patient upon admission)    Jeffry Suero MD  Department of Rehabilitation Medicine  Pager: [phone]    Time Spent on this Encounter   I, Jeffry Suero, spent a total of 70 minutes bedside and on the inpatient unit today managing the care of Roxy Beaulieu.  Over 50% of my time on the unit was spent counseling the patient and /or coordinating care.  See note for  details.

## 2019-10-28 NOTE — PLAN OF CARE
Discharge Planner PT   Patient plan for discharge: ELOS 7 days to achieve mod I mobility using FWW vs 4WW  Current status: CGA with FWW, vcs for safe approach to sit, gait 50' with distance limited by fatigue  Barriers to return to prior living situation: current status.   Recommendations for discharge: OP at [facility] - pt in agreement, states familiarity with campus  Rationale for recommendations: PLOF was mod I with SEC, worked part time driving for [employer]    Of note- pt just sold home, her sister is looking for one level apt. Alternatively pt would discharge to sister's home until apt is found.        Entered by: Shereen Lopez 10/28/2019 3:35 PM

## 2019-10-28 NOTE — CONSULTS
[facility], [place]  Consult Note - Hospitalist Service     Date of Admission:  10/28/2019  Consult Requested by: primary team  Reason for Consult: medical co-management    Assessment & Plan   Roxy Beaulieu is a 59 year old female with a pmh significant for ESRD on HD, HTN, DM insulin dependent, DVT, and PE who was admitted to OSH with LE weakness found to have CVA, transferred to ARU for aggressive rehabilitation.     CVA, Subcortical, w/ transient LLE weakness. Presented to OSH on 10/23 w/ LLE numbness and weakness. Head CT negative for acute stroke, CT angiogram negative for any significant arotid stenosis, MRI brain w/ signal abnormalities compatible w/ a subcentimeter acute R-sided periventricular white matter infarction, suspect lacunar stroke from HTN. Echo with EF 66%.  - Continue  mg every day  - Continue Statin  - Goal SBP <120    ESRD on HD. Dialyzies MWF PTA, transition to TThS while here.   - Nephrology consult  - Renal diet  - Epo weekly    HTN. Currently managed on coreg 25 mg BID and hydralazine 50 mg TID    DM II. Most recent Hgb A1C 10.7 on 10/2/2019.   - Continue NPH 25 units BID  - MSSI  - BG TID w/ meals, at bedtime, and 0200  - Hypoglycemia protocol    Hx of PE/DVT (2003). Unclear if this was provoked. Has been in AC with coumadin in the past, but no longer given compliance issues.         Mila Gil PA-C  [facility], [place]    ______________________________________________________________________    Chief Complaint   LLE weakness    History of Present Illness   Roxy Beaulieu is a 59 year old female with a pmh significant for ESRD on HD, HTN, DM insulin dependent, DVT, and PE who was admitted to OSH with LE weakness found to have CVA, transferred to ARU for aggressive rehabilitation.     Lin complains of ongoing weakness in her LLE but this has improved significantly since she was first admitted to the  "hospital. She denies any numbness or tingling. She is able to ambulate with the assistance of a walker and is ready to \"get out\" as soon as she can. Motivated to participate in therapies so she can discharge to home. Denies any current chest pain, shortness of breath or difficulty breathing.     Review of Systems   The 10 point Review of Systems is negative other than noted in the HPI or here.     Past Medical History    I have reviewed this patient's medical history and updated it with pertinent information if needed.   Past Medical History:   Diagnosis Date     Diabetes (H)      Gangrene of toe (H) 4/29/2017     High cholesterol      Hypertension      Obesity 4/29/2017     PE (pulmonary thromboembolism) (H)      Personal history of tobacco use, presenting hazards to health 4/29/2017     Uncontrolled type 2 diabetes mellitus with hyperglycemia, with long-term current use of insulin (H) 4/29/2017       Past Surgical History   I have reviewed this patient's surgical history and updated it with pertinent information if needed.  Past Surgical History:   Procedure Laterality Date     AMPUTATE TOE(S) Left 5/1/2017    Procedure: AMPUTATE TOE(S);  Amputate first and second left toes;  Surgeon: Verna Fofana DPM, Podiatry/Foot and Ankle Surgery;  Location: [facility] OR     AMPUTATE TOE(S) Left 11/3/2018    Procedure: Partial left 3rd toe amputation;  Surgeon: Verna Fofana DPM, Podiatry/Foot and Ankle Surgery;  Location: [facility] OR     IR CVC TUNNEL PLACEMENT > 5 YRS OF AGE  6/4/2019     OPEN REDUCTION INTERNAL FIXATION FIBULA Left 3/16/2018    Procedure: OPEN REDUCTION INTERNAL FIXATION FIBULA;  Open reduction and internal fixation of displaced left lateral malleolar fracture;  Surgeon: Sumanth Wen MD;  Location: [facility] OR       Social History   I have reviewed this patient's social history and updated it with pertinent information if needed.  Social History     Tobacco Use     Smoking status: Former Smoker     Last attempt to " quit: 7/25/2016     Years since quitting: 3.2     Smokeless tobacco: Never Used   Substance Use Topics     Alcohol use: No     Drug use: No       Family History   I have reviewed this patient's family history and updated it with pertinent information if needed.   No family history on file.    Medications   I have reviewed this patient's current medications    Allergies   No Active Allergies    Physical Exam   Vital Signs:                    Weight: 0 lbs 0 oz    General Appearance: Alert and oriented x 3, NAD  Eyes: PERRL  HEENT: Head normocephalic, atraumatic  Respiratory: Lungs CTAB, no wheezing or crackles  Cardiovascular: RRR, no murmurs or gallops  GI: abdomen soft, non distended, non tender to palpation  Skin: warm and dry to touch, no rashes or excoriations  Musculoskeletal: no joint pain or tenderness  Neurologic: CN II-XII intact  Psychiatric: mood appropriate    Data   Most Recent 3 CBC's:  Recent Labs   Lab Test 06/05/19  0715 06/02/19  0607 06/01/19  0646  05/30/19  0607   WBC 5.9 4.8  --   --  6.1   HGB 7.5* 7.2* 7.1*   < > 7.4*   MCV 90 90  --   --  88    166  --   --  184    < > = values in this interval not displayed.

## 2019-10-28 NOTE — PLAN OF CARE
FOCUS/GOAL  Mobility and Safety management    ASSESSMENT, INTERVENTIONS AND CONTINUING PLAN FOR GOAL:  New admit, arrived to [facility] this afternoon by [facility] transport via wheelchair. Alert and oriented x4, transfers with CGA and walker/gait belt. VSS, denied c/o pain or shortness of breath. History of DM, BG before lunch was 65, pt asymptomatic. x2 OJs given per pt's request and hypoglycemia protocol initiated. BG on recheck was 83. Pt received lunch and BG after eating half of her lunch was 105. Dr Suero and Mila MAURO notified. Continent of bladder using toilet in bathroom, LBM was on 10/26 per report from [facility]. PMH includes ESRD, current dialysis schedule is on Tuesday/Thursday/Saturday. Updated HUC, transportation has been arranged. Room and unit orientation provided, pt instructed on how to use call light/telephone, and to wait for assistance with all transfers. She verbalized understanding, bed alarm on. Will continue with admission process.

## 2019-10-28 NOTE — H&P
"Post Admission Physician Evaluation:    I have compared Roxy Beaulieu's condition on admission to acute rehabilitation to that outlined in the preadmission screen. History and physical exam performed by me.  No significant differences are identified and the patient remains appropriate for an inpatient rehabilitation facility level of care to manage medical issues and address functional impairments due to R lacunar CVA.    Comorbid medical conditions being managed: ESRD on dialysis, HTN, HLD, IDDM    Prior functional level:   Ambulates with a cane for balance.  Modified independent for ambulation, mobility, all ADLs, and all IADLs.  She drives herself to dialysis.    Present function:   PT:  Supine to Sit Independent   Sit to Supine Independent   Transfer   Sit To Stand Stand by assistance;Verbal cues  (for hands)   Stand To Sit CGA;Verbal cues  (for hands placement/safety)   Ambulation   Weight Bearing Status WBAT   Distance (ft) 60   Assistance Minimum assistance   Assistive Device Rolling walker   Verbal Cues Safety;LE advancement;Posture;Attention to task   Quality of Gait/Comment fair motor control left leg,needs cues to slow down   Pattern Decreased step length;Step through;Flexed posture;Unsteady   Endurance Deficit   Endurance Deficit Yes   Endurance Deficit Description fatigues easily   Balance   Sitting Balance Independent   Standing Balance CGA     OT:  Grooming Comb/brush hair;Wash/dry face;Brush teeth/dentures   Wash/Dry Face Independent;Sitting   Brush Teeth/Dentures SBA;Sitting   Comb/Brush Hair SBA;Sitting   Lower Body Dressing Socks   Socks SBA;Sitting   ADL Comments unsteady and feeling dizzy when stnding at sink, had to suddenly sit down on bath chair which was placed behind pt- cues for hand to support at sink, leaning heavily on forearms at sink due to \"dizzy\"      SLP:  Speech Language Evaluation    Motor speech was not impaired.   Speech intelligibility was approximately 100% at the " conversational level.  Voice was not impaired.    Auditory comprehension was not impaired. Patient was able to answer moderate yes/no questions with 100% accuracy independently. Patient was able to follow moderate directions with 100% accuracy independently.    Verbal expression was not impaired. Confrontational naming was 100% accurate independently. Pt listed 21 items in a category in under a minute HOWARD. Patient was able to participate in conversation independently.    Reading comprehension was not impaired at phrase level.  Written expression was not impaired for personal information.    Cognition was not impaired. Patient was oriented to person, place, time/date and situation.  Memory was not impaired. Pt HOWARD recalled 3/3 words immediatly. 1/3 after 5 and 3/3 after 10 minute delays with distractor tasks. Pt reported that this is baseline memory function for her. She stated that at home she uses lists and a calendar to support memory       Recommendations:  Diet: regular textures and thin liquids  Strategies: standard safe swallow strategies    Anticipated rehabilitation course:   -Anticipate discharge home at a modified independent level for ambulation, mobility, and ADLs  Will benefit from intensive rehabilitation includin minutes each of PT, OT and SLP  Rehabilitation nursing  Close management by physiatry    Prognosis: Good    Estimated length of stay: 7 days    Jeffry Suero MD  Department of Rehabilitation Medicine  Pager: [phone]

## 2019-10-28 NOTE — PROGRESS NOTES
"   10/28/19 1420   Quick Adds   Type of Visit Initial PT Evaluation   Living Environment   Lives With alone   Living Arrangements house  (Town home-see assessment for additional information)   Living Environment Comment pt just sold home, her sister is looking for a one level apt   Self-Care   Usual Activity Tolerance good   Current Activity Tolerance moderate   Regular Exercise Yes   Equipment Currently Used at Home cane, straight   Activity/Exercise/Self-Care Comment \"I like to walk, but as soon as the snow flies, that'll be done\"   Functional Level Prior   Ambulation 1-->assistive equipment   Transferring 1-->assistive equipment   Toileting 0-->independent   Bathing 0-->independent   Communication 0-->understands/communicates without difficulty   Swallowing 0-->swallows foods/liquids without difficulty   Cognition 0 - no cognition issues reported   Fall history within last six months yes   Number of times patient has fallen within last six months 2  (1 fall with CVA, 1 fall in hospital leg buckled)   Which of the above functional risks had a recent onset or change? ambulation;transferring;toileting;bathing;dressing;fall history   Prior Functional Level Comment Pt works part time for [employer] 4hr sifts at least 4-5 days weekly   General Information   Referring Physician Priscila Suero MD   Patient/Family Goals Statement to get more steady, to get used to this thing with my leg (weakness)   Pertinent History of Current Problem (include personal factors and/or comorbidities that impact the POC) R CVA, PMH inlcudes DM2, neuropathy, amputation L toes, ESRD on 3x weekly dialysis, DVT & PE w/ h/o poor med compliance, anemia, HTN   Precautions/Limitations fall precautions   General Observations pt sitting up in bed having just finished lunch   Cognitive Status Examination   Orientation orientation to person, place and time   Level of Consciousness alert   Follows Commands and Answers Questions 75% of the time "   Personal Safety and Judgment impulsive   Memory intact   Cognitive Comment slignhtly impulsive approach to chair   Pain Assessment   Patient Currently in Pain No   Range of Motion (ROM)   ROM Comment B ankle DF limited to neutral, ankles generally stiff multidirectional   Strength   Strength Comments BUE WFL, RLE WFL. LLE hip 4/5, ankle 4-/5, instrinsics 1/5 & B grt toe EXT 2/5 (likely d/t severe neuropathy)   ARC Assessment Only   Acute Rehab FIM See FIM scores for Mobility/ADL Assessment   Balance   Balance Comments good sitting balance, heavy UE support needing during gait, static balance NT   Sensory Examination   Sensory Perception Comments B feet neuropathy plantar numbness B feet   Coordination   Coordination Comments UE no deficits. BLE grossly WFL, fine coord NT   Muscle Tone   Muscle Tone no deficits were identified   General Therapy Interventions   Planned Therapy Interventions ADL retraining;balance training;gait training;groups;neuromuscular re-education;strengthening;transfer training;risk factor education;home program guidelines;progressive activity/exercise   Clinical Impression   Criteria for Skilled Therapeutic Intervention yes, treatment indicated   PT Diagnosis impaired functional mobility, force generation LLE   Influenced by the following impairments strength, balance, somatosensation (chronic), activity tolerance   Functional limitations due to impairments transfers, gait, household and community, ability to drive, perform work duties   Clinical Presentation Evolving/Changing   Clinical Presentation Rationale pt w/ > 4 personal factors limiting functional mobility   Clinical Decision Making (Complexity) Moderate complexity   Therapy Frequency Other (see comments)  (60-90 minutes daily)   Predicted Duration of Therapy Intervention (days/wks) 7 days   Anticipated Equipment Needs at Discharge front wheeled walker  (4WW vs FWW for community)   Anticipated Discharge Disposition Home with  Outpatient Therapy   Risk & Benefits of therapy have been explained Yes   Patient, Family & other staff in agreement with plan of care Yes   Clinical Impression Comments please see care plan note   Total Evaluation Time   Total Evaluation Time (Minutes) 30

## 2019-10-28 NOTE — CONSULTS
Social Work: Initial Assessment with Discharge Plan    Patient Name: Roxy Beaulieu  : 1960  Age: 59 year old  MRN: [id number]  Completed assessment with: Pt interview at bedside and phone call with pt sister   Admitted to ARU: Today 10/28/2019 from [facility]     Presenting Information   Date of SW assessment: 2019  Health Care Directive: Patient considering completing, Provided education and Health Care Directive Agent (if patient not able to make decisions)  Primary Health Care Agent: CASTILLO, Pt sister Yuki PH: [phone]  Secondary Health Care Agent: NOK Pt niece- need to assess further   Living Situation: PTA pt was living alone in a town home. Pt shared that the town home was recently sold and pt sister assisting with packing/moving and finding a new apt for pt. Nothing secured at this time.   Previous Functional Status: Pt shared that was was independent with all ADLs and IADLs. 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 Pt manages her own medications (although documented non compliance) and finances. Pt has outpt HD and drives herself. Denied hx of falls.   DME available: Cane at home, uses only for community distances and HD.   Patient and family understanding of hospitalization: Appropriate. Pt scored 13/15 on BIMS. Pt expressed anxiety about her care at ARU and transition, SWer discussed care provided and expectation. Pt denied any immediate concerns.   Cultural/Language/Spiritual Considerations: No [employer] listed.       Physical Health  Reason for admission: Stroke     Provider Information   Primary Care Physician:Dwain London--Listed on face sheet, although pt shared that she does not see him any longer. Shanda Tim, ANP in Internal Medicine at [facility] is who pt identifies as her PCP.   : Autumn Mcmillan RN [facility] (Ph: [phone]) and Clari ALCARAZ (Ph: [phone]) at [facility] (Ph:  [phone])    Mental Health/Chemical Dependency:   Diagnosis: Pt initially denied and later reported feeling anxious about her transition to ARU. Pt expressed, 'going with the flow', 'fine' and feeling 'fowl' since arriving on the unit. Pt was seen sitting up in bed with a heavy sweatshirt on and with her arms folded tightly. SWer offered to increase the temp in the room, pt declined. Pt appeared tense. SWer spoke with pt sister over the phone (with permission from pt). Pt's sister concerned for hx of depression that has never been treated. Pt sister shared that the pt often spends the day on the couch and has very little initiative. Pt's sister shared that herself and other extended family have used a lot of their energy and financial resources to assist the pt but that she does not do her part. Pt family appeared burnt out. Reports a long hx of this. Pt sister also concerned about pt's cognition and shared that the pt's niece and step mom have noticed more difficulty with short-term memory in the last few months. Pt sister hopeful for cog testing while admitted.   Alcohol/Tobacco/Narcotis: Denied. Documented hx of tobacco use although pt declined current use.   Support/Services in Place: Family support. No hx of support or services for mental health of substance use.   Services Needed/Recommended: HP consult. Discussed this with pt sister, pt sister hopeful that pt would participate and that family will have more insight on how they can better assist and support pt.   Sexuality/Intimacy: denied being sexually active at this time     Support System  Marital Status: Single. Pt reported being  many many many year ago. Not in a relationship at this time.   Family support: No children. One sibling (Yuki). Pt has a niece and step mom. Unsure if mother or father are alive.   Other support available: Pt denied having friends in the community. Reported having a neighbor who 'watches over things' and hopeful  that they stay in contact once pt moves.     Community Resources  Current in home services: None   Previous services: Hx of outpt therapy when she broke her ankle many years ago when slipping on ice. No hx of HHC or TCU placement.     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   Income Source: Wages. Applied for SSDI and was recently denied. Pt's sister has a call scheduled for Sat 11/2 with representative to determine next steps and appealing determination. Gave pt sister information/resources for [place] Disability [employer] and senior linkage line for additional assistance. Pt shared that she will begin collecting social security income next spring when she turns 60.   Education: Bachelors degree.   Financial Concerns:  Pt expressed having concerns but denied having any immediate concerns or needs. Although speaking with pt sister, clear that pt has very limited resources and financial support. Pt sister looking to apply and get section 8 housing.   Insurance: r[employer] STEWART.       Discharge Plan   Patient and family discharge goal: Pending at this time. Pt shared that she can discharge to her sister's home until there is an apt secured. Pt sister shared that she will be homeless and that no immediate or extended family can allow pt into their home and are already financially drained from trying to assist pt.   Provided Education on discharge plan: YES  Patient agreeable to discharge plan:  Need to discuss further   Provided education and attained signature for Medicare IM and IRF Patient Rights and Privacy Information provided to patient : N/A  Provided patient with [place] [facility] [place] Resources: YES  Barriers to discharge: discharge location and housing. Pt's basic needs, safety, MH (depression/anxiety) and compliance with follow up.     Discharge Recommendations   Disposition:  "Pending at this time. New apt, family home, LTC covered by MA.   Transportation Needs: Pt drives herself. Will need to assess and provide transportation resources.   Name of Transportation Company and Phone: Pending    Additional comments   Assessment completed. Pt arrived on the unit this afternoon. Pt shared that her town home recently sold and sister is assisting with looking for new apt. Pt gave SWer permission to contact sister. Sister expressed concerns about pt's cognition and MH. Will request HP consult. Pt attend outpt HD at [facility] on MWF second shift. Pt drives herself. Pt has no community resources or services. Recently denied for SSDI. Pt sister requested  assistance with getting medical records. Discussed HCD, which pt is considering. Pt sister directed to contact  at [facility] for housing resources. SWer will discuss with team and continue to follow.     Met with pt after phone call with sister. Notified pt that her sister, at this time, is not willing to allow pt to stay at her home. Pt expressed that option was the first one she thought of and that she plans to call her sister to discuss further. Pt dropped her chair alarm and phone on the floor. Pt appeared flustered and appeared tearful. Pt expressed \"I just feel bad for my sister\". Support provided. MD notified of update and plans to place HP consult. Will discuss further.     Please invite to Care Conference:  Pt sister Yuki, listed on face sheet      10/28/19 3690   Living Arrangements   Lives With alone   Living Arrangements house  (Town home-see assessment for additional information)   Able to Return to Prior Arrangements no   Living Arrangement Comments Sold town home, sister assisting with looking for sec 8 apt, family can't take pt in their home   Home Safety   Patient Feels Safe Living in Home? yes   Discharge Planning   Expected Discharge Date 11/04/19   Patient/Family Anticipates Transition to long-term " care facility;family members' home;home with help/services   Disposition Comments Discharge location pending    Concerns to be Addressed basic needs;cognitive/perceptual;compliance issue;discharge planning;financial/insurance;home safety;mental health   Concerns Comments Pt sister concerned about depression and congintive impairment, has no stable housing at this time, denied for SSDI     JOHANNA Daniels, [facility] Acute Rehab Unit   Phone: [phone]  I   Pager: [phone]

## 2019-10-29 LAB
ANION GAP SERPL CALCULATED.3IONS-SCNC: 8 MMOL/L (ref 3–14)
BUN SERPL-MCNC: 77 MG/DL (ref 7–30)
CALCIUM SERPL-MCNC: 8 MG/DL (ref 8.5–10.1)
CHLORIDE SERPL-SCNC: 102 MMOL/L (ref 94–109)
CO2 SERPL-SCNC: 27 MMOL/L (ref 20–32)
CREAT SERPL-MCNC: 8.03 MG/DL (ref 0.52–1.04)
GFR SERPL CREATININE-BSD FRML MDRD: 5 ML/MIN/{1.73_M2}
GLUCOSE BLDC GLUCOMTR-MCNC: 103 MG/DL (ref 70–99)
GLUCOSE BLDC GLUCOMTR-MCNC: 112 MG/DL (ref 70–99)
GLUCOSE BLDC GLUCOMTR-MCNC: 115 MG/DL (ref 70–99)
GLUCOSE BLDC GLUCOMTR-MCNC: 118 MG/DL (ref 70–99)
GLUCOSE BLDC GLUCOMTR-MCNC: 127 MG/DL (ref 70–99)
GLUCOSE BLDC GLUCOMTR-MCNC: 133 MG/DL (ref 70–99)
GLUCOSE BLDC GLUCOMTR-MCNC: 69 MG/DL (ref 70–99)
GLUCOSE BLDC GLUCOMTR-MCNC: 82 MG/DL (ref 70–99)
GLUCOSE BLDC GLUCOMTR-MCNC: 93 MG/DL (ref 70–99)
GLUCOSE SERPL-MCNC: 101 MG/DL (ref 70–99)
POTASSIUM SERPL-SCNC: 6 MMOL/L (ref 3.4–5.3)
SODIUM SERPL-SCNC: 138 MMOL/L (ref 133–144)

## 2019-10-29 PROCEDURE — 12800006 ZZH R&B REHAB

## 2019-10-29 PROCEDURE — 99232 SBSQ HOSP IP/OBS MODERATE 35: CPT | Performed by: PHYSICIAN ASSISTANT

## 2019-10-29 PROCEDURE — 00000146 ZZHCL STATISTIC GLUCOSE BY METER IP

## 2019-10-29 PROCEDURE — 90937 HEMODIALYSIS REPEATED EVAL: CPT

## 2019-10-29 PROCEDURE — 86706 HEP B SURFACE ANTIBODY: CPT | Performed by: CLINICAL NURSE SPECIALIST

## 2019-10-29 PROCEDURE — 25000128 H RX IP 250 OP 636: Performed by: CLINICAL NURSE SPECIALIST

## 2019-10-29 PROCEDURE — 25000132 ZZH RX MED GY IP 250 OP 250 PS 637: Performed by: PHYSICAL MEDICINE & REHABILITATION

## 2019-10-29 PROCEDURE — 87340 HEPATITIS B SURFACE AG IA: CPT | Performed by: CLINICAL NURSE SPECIALIST

## 2019-10-29 PROCEDURE — 80048 BASIC METABOLIC PNL TOTAL CA: CPT | Performed by: CLINICAL NURSE SPECIALIST

## 2019-10-29 PROCEDURE — 92523 SPEECH SOUND LANG COMPREHEN: CPT | Mod: GN | Performed by: SPEECH-LANGUAGE PATHOLOGIST

## 2019-10-29 PROCEDURE — 99207 ZZC CDG-MDM COMPONENT: MEETS LOW - DOWN CODED: CPT | Performed by: PHYSICIAN ASSISTANT

## 2019-10-29 PROCEDURE — 25000132 ZZH RX MED GY IP 250 OP 250 PS 637: Performed by: PHYSICIAN ASSISTANT

## 2019-10-29 PROCEDURE — 97166 OT EVAL MOD COMPLEX 45 MIN: CPT | Mod: GO

## 2019-10-29 PROCEDURE — 97535 SELF CARE MNGMENT TRAINING: CPT | Mod: GO

## 2019-10-29 PROCEDURE — 97127 ZZHC SP THERAPEUTIC INTERVENTION W/FOCUS ON COGNITIVE FUNCTION,EA 15 MIN: CPT | Mod: GN | Performed by: SPEECH-LANGUAGE PATHOLOGIST

## 2019-10-29 RX ADMIN — SIMVASTATIN 20 MG: 20 TABLET, FILM COATED ORAL at 21:41

## 2019-10-29 RX ADMIN — SODIUM CHLORIDE 250 ML: 9 INJECTION, SOLUTION INTRAVENOUS at 13:52

## 2019-10-29 RX ADMIN — SODIUM CHLORIDE 300 ML: 9 INJECTION, SOLUTION INTRAVENOUS at 13:52

## 2019-10-29 RX ADMIN — CARVEDILOL 25 MG: 25 TABLET, FILM COATED ORAL at 17:22

## 2019-10-29 RX ADMIN — AMLODIPINE BESYLATE 5 MG: 5 TABLET ORAL at 21:41

## 2019-10-29 RX ADMIN — Medication: at 13:53

## 2019-10-29 RX ADMIN — HYDRALAZINE HYDROCHLORIDE 50 MG: 50 TABLET ORAL at 21:40

## 2019-10-29 RX ADMIN — CARVEDILOL 25 MG: 25 TABLET, FILM COATED ORAL at 07:58

## 2019-10-29 RX ADMIN — ASPIRIN 325 MG ORAL TABLET 325 MG: 325 PILL ORAL at 21:41

## 2019-10-29 RX ADMIN — HYDRALAZINE HYDROCHLORIDE 50 MG: 50 TABLET ORAL at 07:58

## 2019-10-29 RX ADMIN — INSULIN HUMAN 25 UNITS: 100 INJECTION, SUSPENSION SUBCUTANEOUS at 08:01

## 2019-10-29 ASSESSMENT — MIFFLIN-ST. JEOR
SCORE: 1532.5
SCORE: 1554.5

## 2019-10-29 ASSESSMENT — ACTIVITIES OF DAILY LIVING (ADL): PREVIOUS_RESPONSIBILITIES: MEAL PREP;HOUSEKEEPING;LAUNDRY;SHOPPING;MEDICATION MANAGEMENT;FINANCES;DRIVING;WORK

## 2019-10-29 NOTE — PROGRESS NOTES
Received a phone call from pt sister this morning requesting follow up on medical records in order to send in with disability appeal. SWer completed medical release of information form with pt and tubed the form and emailed medical records with request to process ASAP. Pt sister continued to expressed concerns about pt mental health and cognition and hoping to get updates on team (SLP and OT) regarding her cognition and testing.     Met with pt at bedside to discuss. Discussed HCD. Left blank for in pt room to complete this evening when she gets back from HD. Notified pt that SW would follow up tomorrow afternoon and arrange for a notary to completed.     Called [facility], pt getting HD at [facility] location. Requested that SWer fax updated clinicals to 'resume' care at outpt HD before discharge and confirm chair time. Pt sister did mention that pt has missed more than a few sessions at outpt HD and that the facility called in a well-being check at least once. Will need to verify that [facility] is willing to accept pt back and no concerns moving forward.     Called pt sister at the end of the day to update about medical records. Sister will be here at 2:30pm tomorrow to participate in stroke class. Pt sister shared that she has to return to work on Thursday and that her son's are assisting with moving pt's belonging out of her home. Pt sister expressed frustration that pt never told family that she actually sold her place and needs assistance last minute. Pt sister encouraged and instructed pt to search for extended care hotels and financial assistance on her computer while at HD today. Pt sister shared again that pt' lacks any initiation and that she expects family to assist with all her needs but that family is getting burnt out'.     Discussed requesting CADI waiver assessment from [facility] and completing metro mobility application with pt before discharge. SWer will plan to assist and also plan to  meet with pt and sister tomorrow and provide additional financial emergency resources for [facility] tomorrow. Final discharge location pending at this time.     Pt sister concerned about discharge date as she was originally told 2 weeks, 10 day and now 7 days and is not sure that things will be in place by the time pt is ready for discharge. SW will discuss further with team.     JOHANNA Daniels, [facility] Acute Rehab Unit   Phone: [phone]  I   Pager: [phone]

## 2019-10-29 NOTE — PLAN OF CARE
Patient is A&Ox3, disoriented to time. CGA with walker and gait belt. LBM 10/26 per report. Voiding spontaneously. BG at dinner- 142. BG at HS was 82, given christiano crackers, rechecked bg was 58. Then given 240 apple juice, and 2 pieces of PB toast, BG increased to 97. Oncoming staff to continue to monitor. Continue with POC.

## 2019-10-29 NOTE — PLAN OF CARE
OT:    Discharge Planner OT   Patient plan for discharge: home either to sister's home or extended stay hotel  Current status: OT evaluation completed and treatment initiated.  Pt Ax1 with FWW for toilet transfers, toileting, dressing with set-up.  Pt impulsive and moves quickly.  Pt reports she had not eaten anything during OT session at 9:30, needed assist to locate menu, phone, and dial.    Barriers to return to prior living situation: medical, L LE weakness, impulsive/cognition, lack of stable discharge environment (pt just sold her home)  Recommendations for discharge: ELOS 7 days with goal for home with OP OT, may need assist/supervision 2/2 cognition.  Recommend OP driving safety evaluation prior to return to drive.           Entered by: Kenya Molina 10/29/2019 4:11 PM     Per PT, pt with episode of low BG following OT session, discussed that pt would benefit from meal assist.  PT updated RN.

## 2019-10-29 NOTE — CONSULTS
Nephrology Initial Consult  October 29, 2019      Roxy Beaulieu MRN:[id number] YOB: 1960  Date of Admission:10/28/2019  Primary care provider: Dwain London  Requesting physician: Priscila Suero MD    ASSESSMENT AND RECOMMENDATIONS:   ESRD-Started HD ~6 months ago thought to be due to HTN and DM2.  Started in 5/2019 after being non-responsive to diuretics, has tunneled RIJ line which we will use for HD, allergic to heparin.  Will add more details after receiving rounding report.  Starting with 2L of UF today.   -HD 2L of UF today, next planned run 10/31.      Volume-Mild edema, no SOB at rest.  Pulling 2L with run today,     Electrolytes/pH-K 6.0 (drawn on run), adjusting K bath, likely due to dietary intake as last run was on 10/26.      DM2-A1C noted to be 9.7.      Recommendations were communicated to primary team via note      RIK Mondragon CNS  Clinical Nurse Specialist  [phone]    REASON FOR CONSULT: Requested to evaluate 59 yof with ESRD for management of HD while admitted to ARU for rehab post CVA.      HISTORY OF PRESENT ILLNESS:  Roxy Beaulieu is a 59 yof with ESRD on HD, HTN, DM insulin dependent, DVT, and PE who was admitted to OSH with LE weakness found to have CVA, transferred to ARU for aggressive rehabilitation. Nephrology consulted for management of HD while at ARU.       PAST MEDICAL HISTORY:  Reviewed with patient on 10/29/2019, no changes to PMH  Past Medical History:   Diagnosis Date     Diabetes (H)      Gangrene of toe (H) 4/29/2017     High cholesterol      Hypertension      Obesity 4/29/2017     PE (pulmonary thromboembolism) (H)      Personal history of tobacco use, presenting hazards to health 4/29/2017     Uncontrolled type 2 diabetes mellitus with hyperglycemia, with long-term current use of insulin (H) 4/29/2017       Past Surgical History:   Procedure Laterality Date     AMPUTATE TOE(S) Left 5/1/2017    Procedure: AMPUTATE TOE(S);   Amputate first and second left toes;  Surgeon: Verna Fofana DPM, Podiatry/Foot and Ankle Surgery;  Location: [facility] OR     AMPUTATE TOE(S) Left 11/3/2018    Procedure: Partial left 3rd toe amputation;  Surgeon: Verna Fofana DPM, Podiatry/Foot and Ankle Surgery;  Location: [facility] OR     IR CVC TUNNEL PLACEMENT > 5 YRS OF AGE  6/4/2019     OPEN REDUCTION INTERNAL FIXATION FIBULA Left 3/16/2018    Procedure: OPEN REDUCTION INTERNAL FIXATION FIBULA;  Open reduction and internal fixation of displaced left lateral malleolar fracture;  Surgeon: Sumanth Wen MD;  Location: [facility] OR        MEDICATIONS:  PTA Meds  Prior to Admission medications    Medication Sig Last Dose Taking? Auth Provider   amLODIPine (NORVASC) 5 MG tablet Take 5 mg by mouth At Bedtime  10/27/2019 at 2041 Yes Unknown, Entered By History   aspirin (ASA) 325 MG EC tablet Take 325 mg by mouth At Bedtime 10/27/2019 at 2041 Yes Unknown, Entered By History   carvedilol (COREG) 25 MG tablet Take 2 tablets (50 mg) by mouth 2 times daily (with meals)  Patient taking differently: Take 25 mg by mouth 2 times daily (with meals)  10/28/2019 at 0811 Yes Sekou Shell MD   hydrALAZINE (APRESOLINE) 50 MG tablet Take 1 tablet (50 mg) by mouth 3 times daily 10/28/2019 at Unknown time Yes Sekou Shell MD   darbepoetin fletcher (ARANESP) 100 MCG/0.5ML injection Inject 100 mcg Subcutaneous once Unknown at Unknown time  Unknown, Entered By History   Epoetin Fletcher (EPOGEN IJ) Inject 10,000 Units as directed once a week Unknown at Unknown time  Unknown, Entered By History   insulin isophane & regular (HUMULIN MIX 70/30 PEN , NOVOLIN MIX 70/30 PEN) susp Inject 16 Units Subcutaneous daily (with dinner)  Patient taking differently: Inject 25 Units Subcutaneous daily (with dinner)  10/28/2019 at 0759  Sekou Shell MD   insulin isophane & regular (HUMULIN MIX 70/30 PEN , NOVOLIN MIX 70/30 PEN) susp Inject 10 Units Subcutaneous daily (with breakfast)   Sekou Shell MD       Current Meds    amLODIPine  5 mg Oral At Bedtime     aspirin  325 mg Oral At Bedtime     carvedilol  25 mg Oral BID w/meals     hydrALAZINE  50 mg Oral TID     insulin isophane & regular  20 Units Subcutaneous BID AC     simvastatin  20 mg Oral At Bedtime     sodium chloride (PF)  9 mL Intracatheter During Hemodialysis (from stock)     sodium chloride (PF)  9 mL Intracatheter During Hemodialysis (from stock)     Infusion Meds      ALLERGIES:    Allergies   Allergen Reactions     Heparin Hives     HIT-serotonin assay negative. NOT HIT       REVIEW OF SYSTEMS:  A 10 point review of systems was negative except as noted above.    SOCIAL HISTORY:   Social History     Socioeconomic History     Marital status: Single     Spouse name: Not on file     Number of children: Not on file     Years of education: Not on file     Highest education level: Not on file   Occupational History     Not on file   Social Needs     Financial resource strain: Not on file     Food insecurity:     Worry: Not on file     Inability: Not on file     Transportation needs:     Medical: Not on file     Non-medical: Not on file   Tobacco Use     Smoking status: Former Smoker     Last attempt to quit: 7/25/2016     Years since quitting: 3.2     Smokeless tobacco: Never Used   Substance and Sexual Activity     Alcohol use: No     Drug use: No     Sexual activity: Not on file   Lifestyle     Physical activity:     Days per week: Not on file     Minutes per session: Not on file     Stress: Not on file   Relationships     Social connections:     Talks on phone: Not on file     Gets together: Not on file     Attends Mandaen service: Not on file     Active member of club or organization: Not on file     Attends meetings of clubs or organizations: Not on file     Relationship status: Not on file     Intimate partner violence:     Fear of current or ex partner: Not on file     Emotionally abused: Not on file     Physically abused: Not on file     Forced  "sexual activity: Not on file   Other Topics Concern     Not on file   Social History Narrative     Not on file   No changes to social hx.     FAMILY MEDICAL HISTORY:   No family hx of early renal disease     PHYSICAL EXAM:   Temp  Av.3  F (36.8  C)  Min: 98.2  F (36.8  C)  Max: 98.6  F (37  C)      Pulse  Av  Min: 69  Max: 78 Resp  Av.9  Min: 11  Max: 22  SpO2  Av.5 %  Min: 90 %  Max: 97 %       BP (!) 185/83   Pulse 75   Temp 98.2  F (36.8  C) (Oral)   Resp 11   Ht 1.727 m (5' 8\")   Wt 94.1 kg (207 lb 7.3 oz)   SpO2 95%   BMI 31.54 kg/m     Date 10/29/19 0700 - 10/30/19 0659   Shift 7923-8137 7040-1132 6336-1441 24 Hour Total   INTAKE   Other  0  0   Shift Total(mL/kg)  0(0)  0(0)   OUTPUT   Other  2000   Shift Total(mL/kg)  (21.25)  (21.25)   Weight (kg) 94.1 94.1 94.1 94.1      Admit Weight: 93.3 kg (205 lb 11.2 oz)     GENERAL APPEARANCE: alert and no distress  EYES: No scleral icterus  NECK:  No JVD  Pulmonary: lungs clear to auscultation with equal breath sounds bilaterally, no clubbing or cyanosis  CV: Regular rhythm, normal rate, no rub   - JVP not elevated   - Edema +2LE  GI: soft, nontender, normal bowel sounds  MS: no evidence of inflammation in joints, no muscle tenderness  : No [place]  SKIN: no rash, warm, dry  NEURO: mentation intact and speech normal    LABS:   CMP  Recent Labs   Lab 10/29/19  1330      POTASSIUM 6.0*   CHLORIDE 102   CO2 27   ANIONGAP 8   *   BUN 77*   CR 8.03*   GFRESTIMATED 5*   GFRESTBLACK 6*   GILL 8.0*     CBCNo lab results found in last 7 days.  INRNo lab results found in last 7 days.  ABGNo lab results found in last 7 days.   URINE STUDIES  Recent Labs   Lab Test 10/05/18  1155 03/15/18  1442   COLOR Straw Yellow   APPEARANCE Clear Clear   URINEGLC 150* >499*   URINEBILI Negative Negative   URINEKETONE Negative 5*   SG 1.012 1.024   UBLD Small* Small*   URINEPH 6.0 6.0   PROTEIN >499* >499*   NITRITE Negative Negative "   LEUKEST Trace* Negative   RBCU 1 1   WBCU 4 1     Recent Labs   Lab Test 12/19/18  0825 10/05/18  1155   UTPG 6.15* 9.00*     PTH  Recent Labs   Lab Test 06/01/19  1302 10/06/18  0609   PTHI 210* 248*     IRON STUDIES  Recent Labs   Lab Test 05/28/19  1157 10/06/18  0609   IRON 32* 33*   * 170*   IRONSAT 17 19   MITRA 107 165

## 2019-10-29 NOTE — PLAN OF CARE
Completed cognitive linguistic eval per MD orders. Pt presents with moslty intact lanaguag functioning- Verbal rexpression, reading comprehension and writing are all WFL. For auditory comprehension- WFL for mod level info but with lengthier paragraph length info- pt has mild difficulty - however this appears to be more related to ST memory deficits vs and acutal difficulty with processing auditory info. Pt' attention ( sustained, flexible and alternating)  are WFL; mod to complex level problem sovling, reasoning and sequencing are all intact. Pt' ST memory for shorter bits of info ( and even with a delaye ie: 3 pieces of info) is intact but with lengthier paragraph level info- pt has some difficulty with recall-- Pt however states that this has always been baseline for her and states that she is not noticing any difference in her memory or cognition and states that he sister is also not noting any difficulty. Will plan to complete further standardized testing of ST memory-- per pt her memory is baseline ( she states she has always had difficulty retaining info if she is not interested inthe subjec)-- will further assess if this is baseline or not. Have decreased pts minutes to 30 per day and will likely see for just a short course of sptx to review and practice compensatory memory strategies.

## 2019-10-29 NOTE — PROGRESS NOTES
"   10/29/19 8751   Quick Adds   Type of Visit Initial Occupational Therapy Evaluation   Living Environment   Lives With alone   Living Arrangements condominium  (just sold)   Transportation Anticipated car, drives self;family or friend will provide   Living Environment Comment Pt just sold her condo.  Looking to get an apartment, may stay with sister or in extended stay hotel   Self-Care   Usual Activity Tolerance good   Regular Exercise No   Equipment Currently Used at Home cane, straight   Activity/Exercise/Self-Care Comment Pt used a cane when she was away from home 2/2 neuropathy in feet and balance issues.    Functional Level   Ambulation 1-->assistive equipment   Transferring 1-->assistive equipment   Toileting 0-->independent   Bathing 0-->independent   Dressing 0-->independent   Eating 0-->independent   Communication 0-->understands/communicates without difficulty   Swallowing 0-->swallows foods/liquids without difficulty   Cognition 0 - no cognition issues reported   Fall history within last six months yes   Number of times patient has fallen within last six months 2   Which of the above functional risks had a recent onset or change? 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  Initial CT was negative, " "however MRI demonstrated an acute R periventricular white matter infarct. \"   Precautions/Limitations fall precautions   Cognitive Status Examination   Orientation orientation to person, place and time   Level of Consciousness alert   Follows Commands (Cognition) WNL   Cognitive Comment Somewhat impulsive   Visual Perception   Visual Perception Wears glasses   Visual Perception Comments Glasses for reading only, denies changes   Sensory Examination   Sensory Comments Baseline neuropathy in B feet, no changes   Pain Assessment   Patient Currently in Pain No   Integumentary/Edema   Integumentary/Edema Comments Some baseline dependent edema in B feet, no changes   Range of Motion (ROM)   ROM Comment WFL BUE   Strength   Strength Comments Strength 4/5 in B anterior deltoid, biceps, triceps   Hand Strength   Left hand  (pounds) 46 pounds   Right hand  (pounds) 44 pounds   Coordination   Left hand, nine hole peg test (seconds) 25.53   Right hand, nine hole peg test (seconds) 24.89   Functional Limitations Decreased speed;Object transport impaired   Coordination Comments finger-nose ok, WFL   ARC Assessment Only   Acute Rehab FIM See FIM scores for Mobility/ADL Assessment   Instrumental Activities of Daily Living (IADL)   Previous Responsibilities meal prep;housekeeping;laundry;shopping;medication management;finances;driving;work   IADL Comments 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  Ind with all IADL except did not need to do outdoor work as living in [place].    Activities of Daily Living Analysis   Impairments Contributing to Impaired Activities of Daily Living balance impaired;cognition impaired;coordination impaired;muscle tone abnormal;strength decreased;sensory feedback impaired;sensation decreased   General Therapy Interventions   Planned Therapy Interventions ADL retraining;IADL retraining;balance training;cognition;strengthening;neuromuscular re-education;transfer training;home program guidelines   Clinical " Impression   Criteria for Skilled Therapeutic Interventions Met yes, treatment indicated   OT Diagnosis decreased I with ADL   Influenced by the following impairments balance, cognition, coordination deficits, L LE deficits, generalized deconditioning   Assessment of Occupational Performance 3-5 Performance Deficits   Identified Performance Deficits dressing, bathing, transfers, toileting, IADL   Clinical Decision Making (Complexity) Moderate complexity   Therapy Frequency Daily   Predicted Duration of Therapy Intervention (days/wks) 7 days   Anticipated Equipment Needs at Discharge bathing equipment   Anticipated Discharge Disposition Home with Outpatient Therapy;Home with Assist   Risks and Benefits of Treatment have been explained. Yes   Patient, Family & other staff in agreement with plan of care Yes   Clinical Impression Comments Pt presents with decreased participation in ADL 2/2 deficits in balance, coordination, L LE strength, and cognition.  Pt will benefit from continued skilled OT to maximize safety and I with ADL/IADL.   Total Evaluation Time   Total Evaluation Time (Minutes) 30

## 2019-10-29 NOTE — PROGRESS NOTES
10/29/19 0800   General Information, SLP   Type of Evaluation Speech and Language;Cognitive-Linguistic   Type of Visit Initial   Start of Care Date 10/29/19   Onset of Illness/Injury or Date of Surgery - Date 10/23/19   Referring Physician Dr Pio MD   Patient/Family Goals Statement to return home to former level of independence   Pertinent History of Current Problem Roxy Beaulieu is a 59 year old LHD female with a PMH including but not limited to ESRD on HD, HTN, HLD, insulin dependent DM (HbA1c 10.7 on 10/2/19), and Hx of DVT/PE (~2004, not on anticoagulation due to non-compliance) who presented to [facility] on 10/23/19 with new onset of left leg weakness.  Initial CT was negative, however MRI demonstrated an acute R periventricular white matter infarct.  Further workup included an echo which showed an EF of 66% without mention of shunt, and no high grade stenosis of the head or neck seen on CTA.  Etiology of the stroke felt to be lacunar due to HTN   Precautions/Limitations fall precautions   General Observations pleasant and cooperative; behavior wFL   Oral Motor Sensory Function   Completed on Swallow Evaluation   (toelrates regular diet without difficulty)   Speech   Speech Comments Speech is 100% intelligible   Language: Auditory Comprehension (understanding of spoken language)   Tests were administered at the following levels Moderate (routine daily activities);Complex (vocation/community/social activities)   Yes/No Sentence and Simple Paragraph; [place] Diagnostic Aphasia Exam 3 short (out of 6 total) 5   Paragraph; Discourse Comprehension Test (out of 8 total; less than 7 is below mean) 4   Functional Assessment Scale (Auditory Comprehension) Mild Impairment   Comments (Auditory Comprehension) Pt had increaed errors on lengthier paragraph level info but more WFL for mdoerate level info- suspect potential ST memory deficits may be more of a factor with the lengthier paragraph vs auditory  comprehension impairments as informal observation - pt appears to process info without difficulty   Language: Verbal Expression (use of spoken language to express information)   Tests were administered at the following levels Complex (vocation/community/social activities)   Define Words; [place] Test for Differential Diagnosis Of Aphasia (out of 10 total) 10   Conversation; [place] Diagnostic Aphasia Exam rating (out of 5 total) 5   Functional Assessment Scale (Verbal Expression) No Impairment   Comments (Verbal Expression) No obvious difficulty with word finding in conversation or on structured tasks   Reading Comprehension (understanding of written language)   Tests were administered at the following levels Complex (vocation/community/social activities)   Sentences and Paragraphs; [place] Diagnostic Aphasia Exam (out of 10 total) 10   Functional Assessment Scale (Reading Comprehension) No Impairment   Written Expression (use of writing to express information)   Tests were administered at the following levels Moderate (routine daily activities)   Pragmatics (the social or functional use of a language)   Deficits noted in Nonverbal None   Deficits noted in Conversational Skills None   Deficits noted in the Use of Linguistic Context None   Deficits noted in the Organization of Narrative; RICE None   Functional Assessment Scale  (Pragmatics) No Impairment   Cognitive Status Examination   Attention intact  (100% sustained; 1 error each flexible & alternating)   Behavioral Observations WFL   Orientation intact  (5/5 general orientation)   Short Term Memory impaired  (3/3/ delayed recall; 4/15 paragaph recall)   Long Term Memory intact   Reasoning intact  (4/4 verbal p.x; 6/6 verbal reason; 2/2 numerics; 10/10 seq)   Additional cognitive-linguistic evaluation indicated  recommend   Standardized cognitive-linguistic assessment completed to be completed during future session   Cognitive Status Exam Comments Pt' attention (  sustained, flexible and alternating)  are WFL; mod to complex level problem sovling, reasoning and sequencing are all intact. Pt' ST memory for shorter bits of info ( and even with a delaye ie: 3 pieces of info) is intact but with lengthier paragraph level info- pt has some difficulty with recall-- Pt however states that this has always been baseline for her and states that she is not noticing any difference in her memory or cognition and states that he sister is also not noting any difficulty.    General Therapy Interventions   Planned Therapy Interventions Cognitive Treatment   Cognitive treatment Internal memory strategy training;External memory strategy training   Clinical Impression, SLP Eval   Criteria for Skilled Therapeutic Interventions Met Yes;Treatment indicated   SLP Diagnosis Mild memory impairements   Influenced by the following factors/impairments baseline level of functioning   Rehab Potential Good, to achieve stated therapy goals   Therapy Frequency Daily   Predicted Duration of Therapy Intervention (days/wks) 1 week or less   Anticipated Discharge Disposition Home with Assist   Risks and Benefits of Treatment have been explained. Yes   Patient, Family & other staff in agreement with plan of care Yes   Clinical Impression Comments Completed cognitive linguistic eval per MD orders. Pt presents with moslty intact lanaguag functioning- Verbal rexpression, reading comprehension and writing are all WFL. For auditory comprehension- WFL for mod level info but with lengthier paragraph length info- pt has mild difficulty - however this appears to be more related to ST memory deficits vs and acutal difficulty with processing auditory info. Pt' attention ( sustained, flexible and alternating)  are WFL; mod to complex level problem sovling, reasoning and sequencing are all intact. Pt' ST memory for shorter bits of info ( and even with a delaye ie: 3 pieces of info) is intact but with lengthier paragraph level  info- pt has some difficulty with recall-- Pt however states that this has always been baseline for her and states that she is not noticing any difference in her memory or cognition and states that he sister is also not noting any difficulty. Will plan to complete further standardized testing of ST memory-- per pt her memory is baseline ( she states she has always had difficulty retaining info if she is not interested inthe subjec)-- will further assess if this is baseline or not. Have decreased pts minutes to 30 per day and will likely see for just a short course of sptx to review and practice compensatory memory strategies.    Total Evaluation Time      Total Evaluation Time (Minutes) 23

## 2019-10-29 NOTE — PROGRESS NOTES
HEMODIALYSIS TREATMENT NOTE    Date: 10/29/2019  Time: 5:21 PM    Data:  Pre Wt: 94.1 kg (207 lb 7.3 oz)   Desired Wt: 92.1 kg   Post Wt: 91.9 kg (202 lb 9.6 oz)  Weight change: 2.2 kg  Ultrafiltration - Post Run Net Total Removed (mL): 2000 mL  Vascular Access Status: patent  Dialyzer Rinse: Streaked  Total Blood Volume Processed: 69.49 L Liters  Total Dialysis (Treatment) Time: 3.5 Hours    Lab:   Yes, Hep B    Interventions:Assessment:  3.5 hour tx complete. Writer assumed dialysis care of pt with 1 hour 45 minutes left of tx. K2/Ca2.5, . CVC utilized without complication. Pt hypertensive throughout tx. Pt asymptomatic. 2L of fluid obtained wihtout c/o cramping or nausea. CVC dressing changed, CVC lumens saline locked and clear guard caps applied. BG at end of tx was 127. Hand off report given to AKHIL LAGUNA.     Plan:    Per neprhology team.

## 2019-10-29 NOTE — PLAN OF CARE
FOCUS/GOAL  Medical management    ASSESSMENT, INTERVENTIONS AND CONTINUING PLAN FOR GOAL:  Patient was admitted yesterday with stroke, left side weakness. Hx of ESRD, on hemodialysis, scheduled for today,  at 12:30. She has a dialysis port right upper chest. She slept well, independent with bed mobility. Found her sitting at the edge of the bed around 0545, bed alarm was on, she returned to bed independently. No c/o pain.

## 2019-10-29 NOTE — PROGRESS NOTES
[facility]   Acute Rehabilitation Unit  Daily progress note    INTERVAL HISTORY  Overnight Lin was noted to have a glucose level of 58, which improved to 97 after apple juice and PB toast.  She says she was asymptomatic during this time.  This morning she has no concerns or complaints.  Denies chest pain, shortness of breath, fevers, chills, lightheadedness, or dizziness.  Therapy evaluations completed today, anticipate discharge on 11/3, however discharge location still to be determined.        MEDICATIONS  Scheduled:    amLODIPine  5 mg Oral At Bedtime     aspirin  325 mg Oral At Bedtime     carvedilol  25 mg Oral BID w/meals     hydrALAZINE  50 mg Oral TID     insulin isophane & regular  20 Units Subcutaneous BID AC     simvastatin  20 mg Oral At Bedtime     sodium chloride (PF)  9 mL Intracatheter During Hemodialysis (from stock)     sodium chloride (PF)  9 mL Intracatheter During Hemodialysis (from stock)        PRN:  sodium chloride 0.9%, acetaminophen, alteplase, alteplase, bisacodyl, glucose **OR** dextrose **OR** glucagon, senna-docusate, sodium chloride (PF), sodium chloride (PF)       PHYSICAL EXAM  Patient Vitals for the past 24 hrs:   BP Temp Temp src Pulse Heart Rate Resp SpO2 Weight   10/29/19 1445 (!) 175/78 -- -- -- 74 16 94 % --   10/29/19 1430 (!) 165/71 -- -- -- 75 22 93 % --   10/29/19 1415 (!) 163/75 -- -- -- 72 13 92 % --   10/29/19 1400 (!) 159/71 -- -- 75 75 17 93 % --   10/29/19 1345 (!) 150/69 -- -- -- 75 18 93 % --   10/29/19 1325 (!) 157/82 -- -- -- 73 16 94 % --   10/29/19 1305 (!) 145/73 98.2  F (36.8  C) Oral -- 72 16 95 % 94.1 kg (207 lb 7.3 oz)   10/29/19 0700 (!) 157/70 98.2  F (36.8  C) Oral 78 -- -- -- --   10/29/19 0100 (!) 148/62 98.3  F (36.8  C) Oral 76 -- 16 92 % --   10/28/19 2220 138/55 -- -- 69 -- -- -- --   10/28/19 1950 123/42 98.6  F (37  C) Oral 72 -- 20 92 % --   10/28/19 1729 (!) 163/97 -- -- 77 -- 20 90 % --   10/28/19 1511  131/47 -- -- -- -- -- -- --       GEN: NAD, pleasant and cooperative  HEENT: NC/AT  CVS: RRR, S1+S2, no m/r/g  PULM: CTA b/l, no w/r/r  ABD: Soft, NT, ND, bowel sounds present  EXT: No LE edema or calf tenderness b/l.  Varus foot on right.   Neuro: Answers appropriately, follows commands    LABS  CBC RESULTS:   Recent Labs   Lab Test 06/05/19  0715   WBC 5.9   RBC 2.81*   HGB 7.5*   HCT 25.4*   MCV 90   MCH 26.7   MCHC 29.5*   RDW 15.8*          Last Comprehensive Metabolic Panel:  Sodium   Date Value Ref Range Status   10/29/2019 138 133 - 144 mmol/L Final     Potassium   Date Value Ref Range Status   10/29/2019 6.0 (H) 3.4 - 5.3 mmol/L Final     Chloride   Date Value Ref Range Status   10/29/2019 102 94 - 109 mmol/L Final     Carbon Dioxide   Date Value Ref Range Status   10/29/2019 27 20 - 32 mmol/L Final     Anion Gap   Date Value Ref Range Status   10/29/2019 8 3 - 14 mmol/L Final     Glucose   Date Value Ref Range Status   10/29/2019 101 (H) 70 - 99 mg/dL Final     Urea Nitrogen   Date Value Ref Range Status   10/29/2019 77 (H) 7 - 30 mg/dL Final     Creatinine   Date Value Ref Range Status   10/29/2019 8.03 (H) 0.52 - 1.04 mg/dL Final     GFR Estimate   Date Value Ref Range Status   10/29/2019 5 (L) >60 mL/min/[1.73_m2] Final     Comment:     Non  GFR Calc  Starting 12/18/2018, serum creatinine based estimated GFR (eGFR) will be   calculated using the Chronic Kidney Disease Epidemiology Collaboration   (CKD-EPI) equation.       Calcium   Date Value Ref Range Status   10/29/2019 8.0 (L) 8.5 - 10.1 mg/dL Final       Recent Labs   Lab 10/29/19  1330 10/29/19  1154 10/29/19  1131 10/29/19  1114 10/29/19  0753 10/29/19  0203 10/28/19  2359   *  --   --   --   --   --   --    BGM  --  93 82 69* 133* 112* 103*         ASSESSMENT  Roxy Beaulieu is a 59 year old LHD female with a PMH including but not limited to ESRD on HD, HTN, HLD, insulin dependent DM (HbA1c 10.7 on 10/2/19), and  Hx of DVT/PE (~2004, not on anticoagulation due to non-compliance) who presented to [facility] on 10/23/19 with new onset of left leg weakness and found to have an acute R periventricular white matter infarct.  Etiology felt to be lacunar due to HTN.      Impairment group code: 01.1 L body involvement, R brain stroke      PLAN  Rehabilitation  -Continue with inpatient rehabilitation program including PT, OT, and SLP for 3 hrs/day, rehab nursing, social work, nutrition, and close monitoring by physiatry  -The patient has impairments in strength, balance, and endurance, which are leading to activity limitations in ambulation, mobility, and ADLs    Medical  1)Neurology  -Acute R periventricular lacunar CVA due to HTN               -Continue  mg daily indefinitely               -HTN and lipid control as below               -Follow up with neurology in 4-6 weeks  -Will provide ongoing education regarding secondary stroke prevention including control of BP and glucose, compliance with medications and physician follow up, and lifestyle modifications including diet and exercise.        2)CVS  -HTN: Continue Hydralazine 50 mg TID, Coreg 25 mg BID, and Norvasc 5 mg daily.  Monitor, titrate as needed.  -HLD: Zocor 20 mg daily.  LDL 99        3)Pulmonary  -No acute issues  -Hx of PE, not on anticoagulation        4)FENGI  -Diet: Regular consistency solids, thin liquids.  Renal diet/Consistent CHO  -Bowel meds: PRN Senokot-S, Dulcolax suppository        5)  -ESRD on HD: Consult nephrology for assistance with HD management.  Will dialyze T/Th/Sa while on rehab  -Potassium today 6.0, drawn at dialysis.         6)DVT prophylaxis  -Hx of DVT/PE, however not on anticoagulation due to inconsistent compliance with Coumadin  -Continue  mg daily  -Mechanical prophylaxis        7)Pain  -Tylenol PRN        8)Endo   -IDDM (HbA1c 10.7)               -Decreased NPH to 20 units BID due to low glucose readings                -Check glucose levels and sliding scale insulin qAC and at bedtime  -Thyroid ultrasound recommended if not done due to multinodular thyroid seen on CT.  Will recommend PCP follow after discharge.     9)Heme  -AoCD:  Receives weekly EPO injections, will defer administrations to nephrology  -Per pt, has heparin allergy which she reports hives.  Per prior notes, HIT AB + but FARHAT neg and pt has been receiving heparin in CDU prior to hospital admit        10)Psych  -Will place health psychology consult        11)Social/Dispo  -Anticipate discharge home at a modified independent level for ambulation, mobility, and ADLs  -ELOS: 7 days  -Rehab prognosis: Good  -Follow up appointments: PCP, Recommended thyroid ultrasound, neurology 4-6 weeks     Code status: Full Code (Discussed with patient upon admission)     Jeffry Suero MD  Department of Rehabilitation Medicine  Pager: [phone]    Time Spent on this Encounter   I, Jeffry Suero, spent a total of 25 minutes face-to-face or managing the care of Roxy Beaulieu. Over 50% of my time on the unit was spent counseling the patient and coordinating care. See note for details.

## 2019-10-29 NOTE — PLAN OF CARE
FOCUS/GOAL  Medical management, Mobility, and Reinforcement of self-care/ADL    ASSESSMENT, INTERVENTIONS AND CONTINUING PLAN FOR GOAL:  Pt is A&Ox4, VSS and denies any pain. Pt had BG of 69 at approx 1100 and reported feeling lightheaded, Pt drank an orange juice and ate lunch, BG stabilized after this. Pt is a CGA w/ walker and is continent using toilet, Pt has minimal weakness to L LE. Pt has unaccessed port in R chest. Pt left unit to receive dialysis at approx 1230 his shift.

## 2019-10-29 NOTE — PROGRESS NOTES
[facility]    Medicine Progress Note - Hospitalist Service       Date of Admission:  10/28/2019  Assessment & Plan       Roxy Beaulieu is a 59 year old female with a pmh significant for ESRD on HD, HTN, DM insulin dependent, DVT, and PE who was admitted to OSH with LE weakness found to have CVA, transferred to ARU for aggressive rehabilitation.      CVA, Subcortical, w/ transient LLE weakness. Presented to OSH on 10/23 w/ LLE numbness and weakness. Head CT negative for acute stroke, CT angiogram negative for any significant arotid stenosis, MRI brain w/ signal abnormalities compatible w/ a subcentimeter acute R-sided periventricular white matter infarction, suspect lacunar stroke from HTN. Echo with EF 66%.  - Continue  mg every day  - Continue Statin  - Goal SBP <120     ESRD on HD. Dialyzies MWF PTA, transition to TThS while here.   - Nephrology consult  - Renal diet  - Epo weekly     HTN. Currently managed on amlodipine 5 mg daily coreg 25 mg BID and hydralazine 50 mg TID. Stable. Continue.      DM II. Most recent Hgb A1C 10.7 on 10/2/2019. Resumed PTA regime on admission of NPH 25 units BID. BG intermittently dipping as low as 58 yesterday evening.   - Decrease NPH to 20 units BID  - BG TID w/ meals, at bedtime, and 0200  - Hypoglycemia protocol    Recent Labs   Lab 10/29/19  0753 10/29/19  0203 10/28/19  2359 10/28/19  2331 10/28/19  2256 10/28/19  2122   * 112* 103* 97 58* 102*          Hx of PE/DVT (2003). Unclear if this was provoked. Has been in AC with coumadin in the past, but no longer given compliance issues.       Mila Gil PA-C  Hospitalist Service  [facility]    ______________________________________________________________________    Interval History   BG running low at hs, down to 58, pt asymptomatic. States that appetite is similar to home, is not skipping meals. No chest pain, shortness of  breath or difficulty breathing. No fevers or chills. Left leg with ongoing weakness, unchanged. Able to ambulate with walker.     Data reviewed today: I reviewed all medications, new labs and imaging results over the last 24 hours.    Physical Exam   Vital Signs: Temp: 98.2  F (36.8  C) Temp src: Oral BP: (!) 157/70 Pulse: 78   Resp: 16 SpO2: 92 % O2 Device: None (Room air)    Weight: 205 lbs 11.2 oz  General Appearance: Alert and oriented x 3, NAD  Respiratory: Lungs CTAB, no wheezing or crackles  Cardiovascular: RRR  GI: Abdomen soft, non distended, non tender to palpation  Skin: warm and dry to touch,   Other: No peripheral

## 2019-10-30 LAB
GLUCOSE BLDC GLUCOMTR-MCNC: 103 MG/DL (ref 70–99)
GLUCOSE BLDC GLUCOMTR-MCNC: 112 MG/DL (ref 70–99)
GLUCOSE BLDC GLUCOMTR-MCNC: 116 MG/DL (ref 70–99)
GLUCOSE BLDC GLUCOMTR-MCNC: 145 MG/DL (ref 70–99)
GLUCOSE BLDC GLUCOMTR-MCNC: 149 MG/DL (ref 70–99)
GLUCOSE BLDC GLUCOMTR-MCNC: 41 MG/DL (ref 70–99)
GLUCOSE BLDC GLUCOMTR-MCNC: 47 MG/DL (ref 70–99)
GLUCOSE BLDC GLUCOMTR-MCNC: 58 MG/DL (ref 70–99)
GLUCOSE BLDC GLUCOMTR-MCNC: 90 MG/DL (ref 70–99)
GLUCOSE BLDC GLUCOMTR-MCNC: 91 MG/DL (ref 70–99)
GLUCOSE BLDC GLUCOMTR-MCNC: 98 MG/DL (ref 70–99)
HBV SURFACE AB SERPL IA-ACNC: 2.23 M[IU]/ML
HBV SURFACE AG SERPL QL IA: NONREACTIVE

## 2019-10-30 PROCEDURE — 97535 SELF CARE MNGMENT TRAINING: CPT | Mod: GO | Performed by: OCCUPATIONAL THERAPIST

## 2019-10-30 PROCEDURE — 25000132 ZZH RX MED GY IP 250 OP 250 PS 637: Performed by: PHYSICAL MEDICINE & REHABILITATION

## 2019-10-30 PROCEDURE — 97750 PHYSICAL PERFORMANCE TEST: CPT | Mod: GP | Performed by: PHYSICAL THERAPIST

## 2019-10-30 PROCEDURE — 25000132 ZZH RX MED GY IP 250 OP 250 PS 637: Performed by: PHYSICIAN ASSISTANT

## 2019-10-30 PROCEDURE — 00000146 ZZHCL STATISTIC GLUCOSE BY METER IP

## 2019-10-30 PROCEDURE — 97110 THERAPEUTIC EXERCISES: CPT | Mod: GP | Performed by: PHYSICAL THERAPIST

## 2019-10-30 PROCEDURE — 97127 ZZHC SP THERAPEUTIC INTERVENTION W/FOCUS ON COGNITIVE FUNCTION,EA 15 MIN: CPT | Mod: GN | Performed by: SPEECH-LANGUAGE PATHOLOGIST

## 2019-10-30 PROCEDURE — 99207 ZZC CDG-MDM COMPONENT: MEETS LOW - DOWN CODED: CPT | Performed by: PHYSICIAN ASSISTANT

## 2019-10-30 PROCEDURE — 12800006 ZZH R&B REHAB

## 2019-10-30 PROCEDURE — 97530 THERAPEUTIC ACTIVITIES: CPT | Mod: GP | Performed by: PHYSICAL THERAPIST

## 2019-10-30 PROCEDURE — 99232 SBSQ HOSP IP/OBS MODERATE 35: CPT | Performed by: PHYSICIAN ASSISTANT

## 2019-10-30 RX ORDER — SOD CHLORD/LANOLIN/MIN.OIL/PET
LOTION (ML) TOPICAL
Status: DISCONTINUED | OUTPATIENT
Start: 2019-10-31 | End: 2019-11-01

## 2019-10-30 RX ADMIN — ASPIRIN 325 MG ORAL TABLET 325 MG: 325 PILL ORAL at 22:32

## 2019-10-30 RX ADMIN — AMLODIPINE BESYLATE 5 MG: 5 TABLET ORAL at 22:32

## 2019-10-30 RX ADMIN — HYDRALAZINE HYDROCHLORIDE 50 MG: 50 TABLET ORAL at 22:32

## 2019-10-30 RX ADMIN — CARVEDILOL 25 MG: 25 TABLET, FILM COATED ORAL at 08:20

## 2019-10-30 RX ADMIN — DEXTROSE 15 G: 15 GEL ORAL at 01:40

## 2019-10-30 RX ADMIN — SIMVASTATIN 20 MG: 20 TABLET, FILM COATED ORAL at 22:32

## 2019-10-30 RX ADMIN — HYDRALAZINE HYDROCHLORIDE 50 MG: 50 TABLET ORAL at 14:18

## 2019-10-30 RX ADMIN — HYDRALAZINE HYDROCHLORIDE 50 MG: 50 TABLET ORAL at 08:20

## 2019-10-30 RX ADMIN — SENNOSIDES AND DOCUSATE SODIUM 4 TABLET: 8.6; 5 TABLET ORAL at 22:32

## 2019-10-30 RX ADMIN — CARVEDILOL 25 MG: 25 TABLET, FILM COATED ORAL at 17:23

## 2019-10-30 ASSESSMENT — MIFFLIN-ST. JEOR: SCORE: 1528.41

## 2019-10-30 NOTE — PROGRESS NOTES
10/30/19 1000   Signing Clinician's Name / Credentials   Signing clinician's name / credentials CRISSY Reynoso   Forbes Balance Scale (FORBES K, HEBERT S, TREY SEN, NGUYỄN, SUZANNE: MEASURING BALANCE IN THE ELDERLY: VALIDATION OF AN INSTRUMENT. CAN. J. PUB. HEALTH, JULY/AUGUST SUPPLEMENT 2:S7-11, 1992.)   Sit To Stand 1   Standing Unsupported 1   Sitting Unsupported 4   Stand to Sit 1   Transfers 2   Standing with Eyes Closed 3   Standing Unsupported, Feet Together 0   Reach Forward With Outstretched Arm 0   Retrieve Object From Floor 0   Turning to Look Behind 0   Turn 360 Degrees 0   Placing Alternate Foot on Stool (4-6 inches) 0   Unsupported Tandem Stand (Demonstrate to Subject) 0   One Leg Stand 0   Total Score (A score of 45 or less has been correlated with an increased risk of falls)   Total Score (out of 56) 12   Forbes Balance Scale (BBS) Cutoff Scores for CVA Population:  Patient Score: 12/56    The BBS is a measure of static and dynamic standing balance that has been validated in community dwelling elderly individuals and individuals who have Parkinson's Disease, MS, and those who are s/p CVA and TBI. The test is administered without an assistive device. Scores from the Forbes are used to determine the probability of falling based on the patient's previous history of falls and their test performance.     0-20 High risk for falling- Corresponded with w/c bound status  21-40 Medium risk for falling- Able to walk with assistance  41-56 Low risk for falling- Able to walk independently  According to [facility].  Available at http://www.strokecenter.org/.  Accessibility verified April 10, 2013.  Minimal Detectable Change = 6.5 according to Fabiano & Seney 2008    Assessment (rationale for performing, application to patient s function & care plan): continue w/ FWW w/ assist and alarms  Minutes billed as physical performance test: 10

## 2019-10-30 NOTE — PROGRESS NOTES
"SWer f/u with medical records and copy from the last 5 years (per sister request and pt gave permission for 5 years) was delivered to pt room. Medical records will be used to assist pt and sister with appealing disability denial. Pt sister is working with a  to assist.     Met with pt this morning. Pt appeared more upbeat and was making sarcastic comments to SWer. Pt has yet to complete HCD and was given information and application for metro mobility. Pt initially denied and expressed plans to drive after discharge. SW informed pt that it will be recommended that she does not drive and will need to be cleared by outpt therapist to resume. Pt expressed understanding and plans to complete the application.     SWer met with pt and sister later in the afternoon after stroke class. Upon entering, pt had her head down, hair in her face and hands covering her face. Pt appeared more irritable and quick to answer and without much participation in the conversation. Pt observed shaking her head multiple times. SWer discussed metro mobility and HCD with sister. Pt sister shared that she and other family members are working diligently on finding pt a place to move to but no place confirmed at this time. Pt sister showed SWer paperwork to get section 8 housing voucher. Family continues to search and assist with coordinating. SWer discussed anticipated discharge timeframe and that there will have to be an alternative plan if an apt is not secured. Pt stated, \"just let me go live in a tent\". Swer and sister provided support, pt did not appear receptive. Sister acknowledged that outpt therapy and outpt HD will need to be scheduled once a final discharge location is determined. Sister left and will follow up with SWer at a later time.     When pt sister left, pt demeanor did not change. SWer attempted to offer support and check in with how pt was feeling. Pt stated \"I would feel better if you have me a few beers\" and then " "quickly stated \"no, I'm kidding\". Pt shared that her phone is turned off, hasn't been able to call to turn it back on. SWer provided pt with information on how to make long-distance or outside calls from room phone. Pt shared that her town house sold over the summer, unclear if pt had made any previous attempts to find housing.     SW plans to follow up with pt and pt sister RE: discharge location and confirm outpt HD chair, resources given (HCD and metro mobility application), schedule CADI waiver assessment post discharge and connect pt with clinic CC and SWer. Pt denied additional SW need at this time.     Jessi Kolb, JOHANNA, [facility] Acute Rehab Unit   Phone: [phone]  I   Pager: [phone]    "

## 2019-10-30 NOTE — PROGRESS NOTES
[facility], [facility] Progress Note - Hospitalist Service       Date of Admission:  10/28/2019  Assessment & Plan       Roxy Beaulieu is a 59 year old female with a pmh significant for ESRD on HD, HTN, DM insulin dependent, DVT, and PE who was admitted to OSH with LE weakness found to have CVA, transferred to ARU for aggressive rehabilitation.      CVA, Subcortical, w/ transient LLE weakness. Presented to OSH on 10/23 w/ LLE numbness and weakness. Head CT negative for acute stroke, CT angiogram negative for any significant arotid stenosis, MRI brain w/ signal abnormalities compatible w/ a subcentimeter acute R-sided periventricular white matter infarction, suspect lacunar stroke from HTN. Echo with EF 66%.  - Continue  mg every day  - Continue Statin  - Goal SBP <120     ESRD on HD. Dialyzies MWF PTA, transition to TThS while here.   - Nephrology consult  - Renal diet  - Epo weekly     HTN. Currently managed on amlodipine 5 mg daily coreg 25 mg BID and hydralazine 50 mg TID. Stable. Continue.      DM II. Most recent Hgb A1C 10.7 on 10/2/2019. Resumed PTA regime on admission of NPH 25 units BID. Decreased to 20 units BID given intermittent drops in BG. Dropped to 47 at 0200 on 10/30, thus will decrease evening dose of NPH by 20% (16 units).   - Continue NPH 20 units qam, decrease evening dose to 16 units  - BG TID w/ meals, at bedtime, and 0200  - Hypoglycemia protocol    Recent Labs   Lab 10/30/19  0824 10/30/19  0808 10/30/19  0231 10/30/19  0218 10/30/19  0200 10/30/19  0144  10/29/19  1330   GLC  --   --   --   --   --   --   --  101*   * 145* 116* 90 58* 47*   < >  --     < > = values in this interval not displayed.     - will continue to monitor and adjust regimen as needed     Hx of PE/DVT (2003). Unclear if this was provoked. Has been in AC with coumadin in the past, but no longer given compliance issues.       Mila Gil PA-C  Hospitalist  Service  [facility] [place]    ______________________________________________________________________    Interval History   BG dropped at 0200 check, requiring dextrose. Pt symptomatic, complained of lightheadedness and dizziness. She did not pass out. BG improved with dextrose. Currently denies chest pain, shortness of breath or difficulty breathing. She is feeling more steady on her feet.     Data reviewed today: I reviewed all medications, new labs and imaging results over the last 24 hours.    Physical Exam   Vital Signs: Temp: 98.6  F (37  C) Temp src: Oral BP: (!) 152/66 Pulse: 80 Heart Rate: 76 Resp: 16 SpO2: 95 % O2 Device: None (Room air)    Weight: 201 lbs 11.2 oz  General Appearance: Alert and oriented x 3, NAD  Respiratory: Lungs CTAB, no wheezing or crackles  Cardiovascular: RRR  GI: Abdomen soft, non distended, non tender to palpation  Skin: warm and dry to touch,   Other: No peripheral

## 2019-10-30 NOTE — CONSULTS
Stroke Education Note    The following information has been reviewed with the patient and family:    1. Warning signs of stroke    2. Calling 911 if having warning signs of stroke    3. All modifiable risk factors: hypertension, CAD, atrial fib, diabetes, hypercholesterolemia, smoking, substance abuse, diet, physical inactivity, obesity, sleep apnea.    4. Patient's risk factors for stroke which include: HTN, HLD, DM, and Physical inactivity    5. Follow-up plan for after discharge    6. Discharge medications which include: Aspirin, Norvasc, Coreg, and hydralazine    In addition, the [facility] Stroke Class Handout has been given to the patient and family.    Learner's response to risk factors / lifestyle modification education: Taking steps     ABDIFATAH Harris RN

## 2019-10-30 NOTE — PROGRESS NOTES
[facility]   Acute Rehabilitation Unit  Daily progress note    INTERVAL HISTORY  Overnight Lin again was hypoglycemic with glucose levels into the 40s.  During this episode she felt lightheaded, and was given juice and christiano crackers with improvement.  Discussed with hospitalist team, NPH decreased in the evening and will liberalize carbohydrates in diet.  This morning has no new concerns or complaints.  Denies chest pain or shortness of breath.  Is seen ambulating to the gym from her room with CGA, and needed a standing rest break.  Is CGA for transfers, supervision for UBD, and SBA to CGA for LBD.        MEDICATIONS  Scheduled:    amLODIPine  5 mg Oral At Bedtime     aspirin  325 mg Oral At Bedtime     carvedilol  25 mg Oral BID w/meals     hydrALAZINE  50 mg Oral TID     insulin isophane & regular  15 Units Subcutaneous QPM     [START ON 10/31/2019] insulin isophane & regular  20 Units Subcutaneous QAM     simvastatin  20 mg Oral At Bedtime        PRN:  acetaminophen, bisacodyl, glucose **OR** dextrose **OR** glucagon, moisturizing lotion, senna-docusate       PHYSICAL EXAM  Patient Vitals for the past 24 hrs:   BP Temp Temp src Pulse Heart Rate Resp SpO2 Weight   10/30/19 1418 125/52 -- -- -- -- -- -- --   10/30/19 0811 (!) 152/66 -- -- -- 76 -- -- --   10/30/19 0706 (!) 155/61 -- -- -- 79 -- -- --   10/30/19 0617 (!) 166/70 98.6  F (37  C) Oral 80 -- 16 95 % 91.5 kg (201 lb 11.2 oz)   10/30/19 0245 (!) 147/60 -- -- -- -- -- -- --   10/29/19 2140 (!) 169/69 -- -- -- -- -- -- --   10/29/19 1904 (!) 146/58 -- -- -- -- -- -- --   10/29/19 1722 (!) 187/95 99  F (37.2  C) Oral 80 -- 18 94 % --   10/29/19 1700 -- -- -- -- -- -- -- 91.9 kg (202 lb 9.6 oz)   10/29/19 1632 (!) 193/82 98.3  F (36.8  C) Oral -- 76 20 97 % --   10/29/19 1630 (!) 190/86 -- -- -- 75 18 96 % --   10/29/19 1615 (!) 185/83 -- -- -- 76 11 95 % --   10/29/19 1600 (!) 173/74 -- -- -- 74 22 95 % --    10/29/19 1545 (!) 175/75 -- -- -- 75 19 94 % --   10/29/19 1530 (!) 163/67 -- -- -- 71 13 93 % --       GEN: NAD, pleasant and cooperative, participating in PT  HEENT: NC/AT  CVS: RRR, S1+S2, no m/r/g  PULM: CTA b/l, no w/r/r  ABD: Soft, NT, ND, bowel sounds present  EXT: No LE edema or calf tenderness b/l.  Varus foot on right.   Neuro: Answers appropriately, follows commands    LABS  CBC RESULTS:   Recent Labs   Lab Test 06/05/19  0715   WBC 5.9   RBC 2.81*   HGB 7.5*   HCT 25.4*   MCV 90   MCH 26.7   MCHC 29.5*   RDW 15.8*          Last Comprehensive Metabolic Panel:  Sodium   Date Value Ref Range Status   10/29/2019 138 133 - 144 mmol/L Final     Potassium   Date Value Ref Range Status   10/29/2019 6.0 (H) 3.4 - 5.3 mmol/L Final     Chloride   Date Value Ref Range Status   10/29/2019 102 94 - 109 mmol/L Final     Carbon Dioxide   Date Value Ref Range Status   10/29/2019 27 20 - 32 mmol/L Final     Anion Gap   Date Value Ref Range Status   10/29/2019 8 3 - 14 mmol/L Final     Glucose   Date Value Ref Range Status   10/29/2019 101 (H) 70 - 99 mg/dL Final     Urea Nitrogen   Date Value Ref Range Status   10/29/2019 77 (H) 7 - 30 mg/dL Final     Creatinine   Date Value Ref Range Status   10/29/2019 8.03 (H) 0.52 - 1.04 mg/dL Final     GFR Estimate   Date Value Ref Range Status   10/29/2019 5 (L) >60 mL/min/[1.73_m2] Final     Comment:     Non  GFR Calc  Starting 12/18/2018, serum creatinine based estimated GFR (eGFR) will be   calculated using the Chronic Kidney Disease Epidemiology Collaboration   (CKD-EPI) equation.       Calcium   Date Value Ref Range Status   10/29/2019 8.0 (L) 8.5 - 10.1 mg/dL Final       Recent Labs   Lab 10/30/19  1230 10/30/19  0824 10/30/19  0808 10/30/19  0231 10/30/19  0218 10/30/19  0200  10/29/19  1330   GLC  --   --   --   --   --   --   --  101*   * 149* 145* 116* 90 58*   < >  --     < > = values in this interval not displayed.          ASSESSMENT  Roxy Beaulieu is a 59 year old LHD female with a PMH including but not limited to ESRD on HD, HTN, HLD, insulin dependent DM (HbA1c 10.7 on 10/2/19), and Hx of DVT/PE (~2004, not on anticoagulation due to non-compliance) who presented to [facility] on 10/23/19 with new onset of left leg weakness and found to have an acute R periventricular white matter infarct.  Etiology felt to be lacunar due to HTN.      Impairment group code: 01.1 L body involvement, R brain stroke      PLAN  Rehabilitation  -Continue with inpatient rehabilitation program including PT, OT, and SLP for 3 hrs/day, rehab nursing, social work, nutrition, and close monitoring by physiatry  -The patient has impairments in strength, balance, and endurance, which are leading to activity limitations in ambulation, mobility, and ADLs    Medical  1)Neurology  -Acute R periventricular lacunar CVA due to HTN               -Continue  mg daily indefinitely               -HTN and lipid control as below               -Follow up with neurology in 4-6 weeks  -Will provide ongoing education regarding secondary stroke prevention including control of BP and glucose, compliance with medications and physician follow up, and lifestyle modifications including diet and exercise.        2)CVS  -HTN: Continue Hydralazine 50 mg TID, Coreg 25 mg BID, and Norvasc 5 mg daily.  Monitor, titrate as needed.  -HLD: Zocor 20 mg daily.  LDL 99        3)Pulmonary  -No acute issues  -Hx of PE, not on anticoagulation        4)FENGI  -Diet: Regular consistency solids, thin liquids.  Renal diet.  Discontinue moderate consistent CHO restriction due to hypoglycemia episodes.  -Bowel meds: PRN Senokot-S, Dulcolax suppository        5)  -ESRD on HD: Consulted nephrology for assistance with HD management.  Will dialyze T/Th/Sa while on rehab.  Assistance is appreciated.   -Potassium on 10/29, 6.0.  Drawn at dialysis.         6)DVT prophylaxis  -Hx of  DVT/PE, however not on anticoagulation due to inconsistent compliance with Coumadin  -Continue  mg daily  -Mechanical prophylaxis        7)Pain  -Tylenol PRN        8)Endo   -IDDM (HbA1c 10.7)               -Decrease insulin 70/30 further to 15 units in the evening and 20 units in the morning due to low glucose readings overnight               -Check glucose levels and sliding scale insulin qAC and at bedtime  -Thyroid ultrasound recommended if not done due to multinodular thyroid seen on CT.  Will recommend PCP follow after discharge.     9)Heme  -AoCD:  Receives weekly EPO injections, will defer administrations to nephrology  -Per pt, has heparin allergy which she reports hives.  Per prior notes, HIT AB + but FARHAT neg and pt has been receiving heparin in CDU prior to hospital admit        10)Psych  -Will place health psychology consult        11)Social/Dispo  -Anticipate discharge home at a modified independent level for ambulation, mobility, and ADLs  -ELOS: 7 days  -Rehab prognosis: Good  -Follow up appointments: PCP, Recommended thyroid ultrasound, neurology 4-6 weeks     Code status: Full Code (Discussed with patient upon admission)     Jeffry Suero MD  Department of Rehabilitation Medicine  Pager: [phone]    Time Spent on this Encounter   I, Jeffry Suero, spent a total of 25 minutes face-to-face or managing the care of Roxy ORTEGA Christofer. Over 50% of my time on the unit was spent counseling the patient and coordinating care. See note for details.

## 2019-10-30 NOTE — PROGRESS NOTES
Patient is A&Ox3. Denies pain. Returned from dialysis at 1700. , and 115.  No BM or voids this shift. Continue with POC.

## 2019-10-30 NOTE — PROGRESS NOTES
OT: Completed full shower with ADLs this morning. Pt is CGA with use of FWW for functional transfers in room. Pt is supervision for UB dressing and SBA-CGA for LB dressing. Pt is CGA for G/H and oral cares completed standing at sink with FWW. Pt is SBA for showering/bathing and able to wash/rinse/dry 10/10 body parts. Discussed at end of session pt's previous home set up and what she is now looking for in a home. Also discussed safe approach to surfaces with use of FWW; noted that pt turns quickly and did abandon walker during G/H at sink.

## 2019-10-30 NOTE — PLAN OF CARE
FOCUS/GOAL  Medical management    ASSESSMENT, INTERVENTIONS AND CONTINUING PLAN FOR GOAL:  Patient set off bed alarm at 0130, found her sitting at the EOB, she said she felt she had low blood sugar, reported she was light headed and confused. BG was 41, gave her 15 gm of lactose gel, 120 ml of apple juice and one packet of christiano crackers, BG increased to 47 in 15 minutes, she said she was feeling better,  she had taken part of the glutose gel, encouraged her to take all of it, she did not like the taste, BG increased to 58, she continued to state that she was feeling a lot better, that the BG level did not reflect how she felt, she declined to take more glutose gel and apple juice, BG increased to 90, at 0231, BG was 116. Left MD a note to review BG.  No c/o pain. She is independent with bed mobility, did not get out of bed, but per evening report, she transferred and ambulated with a walker and CGA. She has a dialysis shunt right upper chest. Continue bed/chair alarm.

## 2019-10-30 NOTE — PROGRESS NOTES
Repeatable Battery for the Assessment of Neuropsychological Status (RBANS)  FORM A   Immediate Memory Visuospatial/Constructional Language Attention Delayed Memory Total Scale   Index Score 65 78 85 109 78 79   Percentile Rank 1 7 16 73 7 8       SLP:  Pt seen for administration of RBANS Form A.  Results are based on a mean of 100 and a standard deviation of +/- 15.   Interpretation:  Results reveal memory skills to be below the mean given her age.  Relative areas of strength include attention and language.  Pt with significant impairments in immediate and delayed memory.  Pt appeared with minimal effort for the tasks and was unwilling to attempt to make any guesses related to the delayed recall tasks just reporting,  I don t know, I don t remember.  Pt did report she was upset she didn t receive a higher score.        Administration time: 25 minutes  Documentation time:  10 minutes  Total time:  35 minutes     10/30/19 0840   Signing Clinician's Name / Credentials   Signing clinician's name / credentials Mila Shafer MS SLPCCC   Quick Adds   Rehab Discipline SLP   Additional Documentation   Rehab Comments -10 min SLP, RN cares   SLP Plan SLP: review results of RBANS.  target memory, strategies, etc.    Total Session Time   Total Session Time (minutes) 35 minutes   SLP - Acute Rehab Center Time   Individual Time (minutes) - enter zero if not applicable - SLP 35  (cog tx)   Group Time (minutes) - enter zero if not applicable  - SLP 0   Concurrent Time (minutes) - enter zero if not applicable  - SLP 0   Co-Treatment Time (minutes) - enter zero if not applicable  - SLP 0   ARC Total Session Time (minutes) - SLP 35   Memory   Memory Comment SLP:  Completed formal assessmetn RBANS, see attached note for details.

## 2019-10-30 NOTE — PLAN OF CARE
FOCUS/GOAL  Bowel management, Nutrition/Feeding/Swallowing precautions, and Medical management    ASSESSMENT, INTERVENTIONS AND CONTINUING PLAN FOR GOAL:  Alert & oriented ,able to verbalize needs. Needs CGA with walker for transfers and toileting. No BM this shift, reported having a small BM on 10/29, declined any interventions at this time. Blood glucose 145 & 103, insulin given per orders, appetite 50% for breakfast and 100% for lunch. Port to right chest CDI. Denies pain or discomfort. Fall precautions in place.

## 2019-10-30 NOTE — PHARMACY-MEDICATION REGIMEN REVIEW
Pharmacy Medication Regimen Review  Roxy Beaulieu is a 59 year old female who is currently in the Acute Rehab Unit.    Assessment: All medications have an appropriate indications, durations and no unnecessary use was found    Plan:   1. Patient on insulin - monitor for s/s of hypoglycemia. Hypoglycemia protocol is ordered.  Attending provider will be sent this note for review.  If there are any emergent issues noted above, pharmacist will contact provider directly by phone.      Pharmacy will periodically review the resident's medication regimen for any PRN medications not administered in > 72 hours and discontinue them. The pharmacist will discuss gradual dose reductions of psychopharmacologic medications with interdisciplinary team on a regular basis.    Please contact pharmacy if the above does not answer specific medication questions/concerns.    Background:  A pharmacist has reviewed all medications and pertinent medical history today.  Medications were reviewed for appropriate use and any irregularities found are listed with recommendations.      Current Facility-Administered Medications:      acetaminophen (TYLENOL) tablet 650 mg, 650 mg, Oral, Q6H PRN, Priscila Suero MD     amLODIPine (NORVASC) tablet 5 mg, 5 mg, Oral, At Bedtime, Louise, Mila H, PA-C, 5 mg at 10/29/19 2141     aspirin (ASA) tablet 325 mg, 325 mg, Oral, At Bedtime, Louise, Mila H, PA-C, 325 mg at 10/29/19 2141     bisacodyl (DULCOLAX) Suppository 10 mg, 10 mg, Rectal, Daily PRN, Priscila Suero MD     carvedilol (COREG) tablet 25 mg, 25 mg, Oral, BID w/meals, Louise Mila H, PA-C, 25 mg at 10/30/19 0820     glucose gel 15-30 g, 15-30 g, Oral, Q15 Min PRN, 15 g at 10/30/19 0140 **OR** dextrose 50 % injection 25-50 mL, 25-50 mL, Intravenous, Q15 Min PRN **OR** glucagon injection 1 mg, 1 mg, Subcutaneous, Q15 Min PRN, Louise Mila H, PA-C     hydrALAZINE (APRESOLINE) tablet 50 mg, 50 mg, Oral, TID, Priscila Suero  MD Jeffry, 50 mg at 10/30/19 0820     insulin isophane & regular (HumuLIN MIX 70/30 PEN , NovoLIN MIX 70/30 PEN) injection 15 Units, 15 Units, Subcutaneous, QPM, Mila Gil PA-C     [START ON 10/31/2019] insulin isophane & regular (HumuLIN MIX 70/30 PEN , NovoLIN MIX 70/30 PEN) injection 20 Units, 20 Units, Subcutaneous, QAM, Mila Gil PA-C     senna-docusate (SENOKOT-S/PERICOLACE) 8.6-50 MG per tablet 1-4 tablet, 1-4 tablet, Oral, BID PRN, Pio, Priscila Teran MD     simvastatin (ZOCOR) tablet 20 mg, 20 mg, Oral, At Bedtime, Mila Gil PA-C, 20 mg at 10/29/19 2141  No current outpatient prescriptions on file.   PMH:   ESRD - on HD  Anemia  S/p stroke  HTN  DM  H/o DVT/PE  Dyslipidemia    Ani Marcelino, KelsiD, BCPS October 30, 2019

## 2019-10-31 LAB
ANION GAP SERPL CALCULATED.3IONS-SCNC: 12 MMOL/L (ref 3–14)
BUN SERPL-MCNC: 58 MG/DL (ref 7–30)
CALCIUM SERPL-MCNC: 7.9 MG/DL (ref 8.5–10.1)
CHLORIDE SERPL-SCNC: 100 MMOL/L (ref 94–109)
CO2 SERPL-SCNC: 24 MMOL/L (ref 20–32)
CREAT SERPL-MCNC: 6.1 MG/DL (ref 0.52–1.04)
ERYTHROCYTE [DISTWIDTH] IN BLOOD BY AUTOMATED COUNT: 14.8 % (ref 10–15)
GFR SERPL CREATININE-BSD FRML MDRD: 7 ML/MIN/{1.73_M2}
GLUCOSE BLDC GLUCOMTR-MCNC: 117 MG/DL (ref 70–99)
GLUCOSE BLDC GLUCOMTR-MCNC: 140 MG/DL (ref 70–99)
GLUCOSE BLDC GLUCOMTR-MCNC: 152 MG/DL (ref 70–99)
GLUCOSE BLDC GLUCOMTR-MCNC: 162 MG/DL (ref 70–99)
GLUCOSE BLDC GLUCOMTR-MCNC: 76 MG/DL (ref 70–99)
GLUCOSE SERPL-MCNC: 151 MG/DL (ref 70–99)
GLUCOSE SERPL-MCNC: ABNORMAL MG/DL (ref 70–99)
HCT VFR BLD AUTO: 31.2 % (ref 35–47)
HGB BLD-MCNC: 9.3 G/DL (ref 11.7–15.7)
IRON SATN MFR SERPL: 14 % (ref 15–46)
IRON SERPL-MCNC: 24 UG/DL (ref 35–180)
MCH RBC QN AUTO: 27.3 PG (ref 26.5–33)
MCHC RBC AUTO-ENTMCNC: 29.8 G/DL (ref 31.5–36.5)
MCV RBC AUTO: 92 FL (ref 78–100)
PLATELET # BLD AUTO: 327 10E9/L (ref 150–450)
POTASSIUM SERPL-SCNC: 5.3 MMOL/L (ref 3.4–5.3)
POTASSIUM SERPL-SCNC: ABNORMAL MMOL/L (ref 3.4–5.3)
RBC # BLD AUTO: 3.41 10E12/L (ref 3.8–5.2)
SODIUM SERPL-SCNC: 136 MMOL/L (ref 133–144)
TIBC SERPL-MCNC: 176 UG/DL (ref 240–430)
WBC # BLD AUTO: 9.1 10E9/L (ref 4–11)

## 2019-10-31 PROCEDURE — 84520 ASSAY OF UREA NITROGEN: CPT

## 2019-10-31 PROCEDURE — 12800006 ZZH R&B REHAB

## 2019-10-31 PROCEDURE — 84132 ASSAY OF SERUM POTASSIUM: CPT | Performed by: PHYSICAL MEDICINE & REHABILITATION

## 2019-10-31 PROCEDURE — 82310 ASSAY OF CALCIUM: CPT

## 2019-10-31 PROCEDURE — 97535 SELF CARE MNGMENT TRAINING: CPT | Mod: GO | Performed by: OCCUPATIONAL THERAPIST

## 2019-10-31 PROCEDURE — 99232 SBSQ HOSP IP/OBS MODERATE 35: CPT | Performed by: PHYSICIAN ASSISTANT

## 2019-10-31 PROCEDURE — 85027 COMPLETE CBC AUTOMATED: CPT | Performed by: CLINICAL NURSE SPECIALIST

## 2019-10-31 PROCEDURE — 97530 THERAPEUTIC ACTIVITIES: CPT | Mod: GO | Performed by: OCCUPATIONAL THERAPIST

## 2019-10-31 PROCEDURE — 25000132 ZZH RX MED GY IP 250 OP 250 PS 637: Performed by: PHYSICIAN ASSISTANT

## 2019-10-31 PROCEDURE — 84295 ASSAY OF SERUM SODIUM: CPT

## 2019-10-31 PROCEDURE — 82374 ASSAY BLOOD CARBON DIOXIDE: CPT

## 2019-10-31 PROCEDURE — 82565 ASSAY OF CREATININE: CPT

## 2019-10-31 PROCEDURE — 00000146 ZZHCL STATISTIC GLUCOSE BY METER IP

## 2019-10-31 PROCEDURE — 82947 ASSAY GLUCOSE BLOOD QUANT: CPT | Performed by: PHYSICAL MEDICINE & REHABILITATION

## 2019-10-31 PROCEDURE — 25000132 ZZH RX MED GY IP 250 OP 250 PS 637: Performed by: PHYSICAL MEDICINE & REHABILITATION

## 2019-10-31 PROCEDURE — 97110 THERAPEUTIC EXERCISES: CPT | Mod: GP | Performed by: PHYSICAL THERAPIST

## 2019-10-31 PROCEDURE — 83550 IRON BINDING TEST: CPT | Performed by: CLINICAL NURSE SPECIALIST

## 2019-10-31 PROCEDURE — 36415 COLL VENOUS BLD VENIPUNCTURE: CPT | Performed by: CLINICAL NURSE SPECIALIST

## 2019-10-31 PROCEDURE — 82435 ASSAY OF BLOOD CHLORIDE: CPT

## 2019-10-31 PROCEDURE — 83540 ASSAY OF IRON: CPT | Performed by: CLINICAL NURSE SPECIALIST

## 2019-10-31 PROCEDURE — 99207 ZZC CDG-MDM COMPONENT: MEETS LOW - DOWN CODED: CPT | Performed by: PHYSICIAN ASSISTANT

## 2019-10-31 PROCEDURE — 36415 COLL VENOUS BLD VENIPUNCTURE: CPT | Performed by: PHYSICAL MEDICINE & REHABILITATION

## 2019-10-31 PROCEDURE — 97127 ZZHC SP THERAPEUTIC INTERVENTION W/FOCUS ON COGNITIVE FUNCTION,EA 15 MIN: CPT | Mod: GN | Performed by: SPEECH-LANGUAGE PATHOLOGIST

## 2019-10-31 PROCEDURE — 97112 NEUROMUSCULAR REEDUCATION: CPT | Mod: GP | Performed by: PHYSICAL THERAPIST

## 2019-10-31 RX ADMIN — HYDRALAZINE HYDROCHLORIDE 50 MG: 50 TABLET ORAL at 13:27

## 2019-10-31 RX ADMIN — AMLODIPINE BESYLATE 5 MG: 5 TABLET ORAL at 20:47

## 2019-10-31 RX ADMIN — HYDRALAZINE HYDROCHLORIDE 50 MG: 50 TABLET ORAL at 09:31

## 2019-10-31 RX ADMIN — ASPIRIN 325 MG ORAL TABLET 325 MG: 325 PILL ORAL at 20:47

## 2019-10-31 RX ADMIN — HYDRALAZINE HYDROCHLORIDE 50 MG: 50 TABLET ORAL at 20:47

## 2019-10-31 RX ADMIN — CARVEDILOL 25 MG: 25 TABLET, FILM COATED ORAL at 17:24

## 2019-10-31 RX ADMIN — CARVEDILOL 25 MG: 25 TABLET, FILM COATED ORAL at 09:38

## 2019-10-31 RX ADMIN — SIMVASTATIN 20 MG: 20 TABLET, FILM COATED ORAL at 20:47

## 2019-10-31 ASSESSMENT — MIFFLIN-ST. JEOR: SCORE: 1534.76

## 2019-10-31 NOTE — PLAN OF CARE
Reviewed memory strategy handout with pt and gave to pt. Practiced memory strategy of review, rehearsal  and repetition to remember a visual scene- pt at 6/7 accuracy. The practice these strategies with recall of 4 words and then answering a question that relates to one of the words- pt is at 3/4 with use of this strategy

## 2019-10-31 NOTE — CONSULTS
Health Psychology                  Clinic    Department of Medicine  Grecia Ramos, Ph.D., L.P. [phone]                          Clinics [facility] Center  [facility] Mikhail Osman, Ph.D.,  L.P. [phone]                 3rd Floor  [place] Mail Code [address]62   Laura Saha, Ph.D., L.P. [phone]    [address] Maile Gastelum, Ph.D., L.P. [phone]            [place] [address]          Sumanth Varghese, Ph.D., A.B.PAMELA.P., L.P. [phone]      Danielle Cummins, Ph.D., L.P. [phone]      Inpatient Health Psychology Consultation*      DOS:  10/30/2019      REFERRAL SOURCE:  Priscila Suero MD, Acute [facility], [facility].      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  Ms. Beaulieu has a significant level of medical complexity, with a history of ESRD on HD, and insulin-dependent diabetes.  There have been concerns raised about nonadherence with medical recommendations and medications, particularly with her diabetes.  Her family has also reported concerns about their observations of possible premorbid memory deficits and changes, as well as concern about her overall mood and ability to engage in good self-care over an extended period of time.  This evaluation was requested to assess her current emotional status and to make treatment recommendations.      HISTORY OF PRESENT ILLNESS:  Ms. Beaulieu acknowledged that this has been a difficult period for her.  She adamantly denies any history of depression, and becomes quite upset with anger directed at her sister when I shared with her that her sister had expressed concerns about the possibility of depression.  Ms. Beaulieu returned to this distress several times over our conversation.  She reports that her  "mood is never depressed and that she is happy with her life.  She insists that there has been a history of difficult dynamics between herself and her sister, and that this is the source of her sister's report which Ms. Beaulieu believes stems from what she describes as her sister's tendency to disapprove of the choices she has made in her life that are different than her sister's.  However, Ms. Beaulieu does acknowledge a significant level of anxiety.  She describes that she has a tendency to worry over things in a ruminative way.  Although she minimizes the frequency that the worrying interferes with functioning, she acknowledges very readily that it can affect her ability to initiate and maintain sleep, to concentrate on reading or watching information, and can leave her irritable with others.      Ms. Beaulieu was not able to provide information about her reactions to the work that she is doing within her rehabilitation therapies.  In fact, she did not recall that she has been working with speech language therapies, although she had met with them this morning prior to our conversation.  When I reminded her of the therapist and what they had done, she acknowledged that she remembered doing that but did not know that that was speech therapy.  She also exhibited several other clear examples of memory deficits, including her lack of memory for a fairly significant conversation with the [facility]  yesterday that triggered some strong emotional reactions.       In conversations about her reactions to her medical issues and concerns about her adherence to medical recommendations, she reported that she was diagnosed with type 2 diabetes at approximately age 38.  She acknowledged that she did not follow recommendations because she did not want to believe that it was important to do so, using the phrase \"I was in denial.\"  On further conversation, she acknowledged that she was aware of the information she had been " "given about the potential consequences of nonadherence to diabetes management, and made a decision that it was simply not something she was going to do.  When I brought up the possibility that she could meet with diabetes education while on the rehabilitation unit, she replied that she could meet with a diabetes educator, but that did not mean that she would follow the recommendations because the consequences are not disturbing to her.  When reminded that she had told me earlier how difficult the need was to do dialysis 3 times a week because it took 5 hours out of each day and that she understands that that is a long-term consequence of unmanaged diabetes, she then walked back her comment about how difficult dialysis is and stated that it is just boring.  At the same time, Ms. Beaulieu noted that \"maybe\" this stroke would be enough of a reality check to allow her to make different choices for her health.  She noted that she is making healthier choices in her meal selection than she might have typically made at home, as an example. However, she adamantly stated her belief that she has been adherent with anticoagulation treatment despite documentation to the contrary.      SOCIAL HISTORY:  Ms. Beaulieu was the oldest of 3 children in her family.  She had a different father than her younger sister and brother, and felt that she was highly disliked by her mother because of that.  She also believes that this is the reason that her sister has held some disdain for her over their life.  Ms. Beaulieu's mother  while she was pregnant with Ms. Beaulieu, and she was raised by her stepfather as if she was his own, saw him as her Dad and he agreed, and remained close to him over his lifetime.  Her dad remarried after her mother  of alcohol-related issues when Ms. Beaulieu was 15, and she became close with her stepmother and remains so to this day.  Her dad and stepmom had a son with whom Ms. Beaulieu was extremely close " until his death in his early 20s.  Ms. Beaulieu never  and never had children, and believes that some of her sisters' dislike is based on the different ways of life that she has chosen.      PAST MEDICAL HISTORY:  As above in HPI.  Ms. Beaulieu was diagnosed with type 2 diabetes at approximately age 38.  She has reportedly been nonadherent with treating her diabetes over many years, and has end-stage renal disease with 3 times a week hemodialysis.  Her diabetes is now insulin-dependent.  I note documentation in Care Everywhere of a hospital admission on 10/01 during which she was found to have a hemoglobin A1c of 10.7.  Please see Ms. Beaulieu's [facility], electronic medical record for more complete information on her medical history, current admission and status, and all medications.      PSYCHIATRIC HISTORY:  As above in HPI.  There was no documentation found in Ms. Beaulieu's medical record regarding any psychiatric history.  Her sister reports concern on the family's part because of their observations that she appears to have very little initiative and is very sedentary.  I do note that Ms. Beaulieu's physician noted in August of this year that she used to walk more regularly but because of neuropathy in her feet, has stopped.  No other significant psychiatric history was available at the time of this evaluation.      BEHAVIORAL IMPRESSIONS:  Ms. Beaulieu presented for this evaluation resting in her room on the [facility] between scheduled rehabilitation therapies.  She was alert and oriented x 3.  She was initially very welcoming and open to this contact, but became more irritable over the course of the conversation, although most of that was directed outwardly about her sister.  Speech was clear and coherent, but reflected clear difficulties with memory.  She exhibited a variable affect, from euthymic to irritable.  Insight and judgment appear to be guarded.  She exhibited no evidence of distortions of  "thought or perception.      IMPRESSIONS AND PLAN:  Roxy Beaulieu is a 59-year-old woman currently on the [facility].  Ms. Beaulieu has a history of significant medical issues that appear to be, in great part, related to difficulty with adherence to managing her type 2 diabetes which was diagnosed approximately 20 years ago.  She is now living with end-stage renal disease and doing 3 times weekly hemodialysis.  She has severe neuropathy that has led her to be more sedentary than she had been in the past.  She has a reported history of DVT and PEs, but had not been adherent with her anticoagulant, including not taking aspirin.  She denied that today and told me that she has been taking aspirin as prescribed.      Ms. Beaulieu has been reported by family to have been exhibiting memory difficulties even prior to this stroke.  As a result, it is not completely possible to assess whether her current memory issues are related to impact of stroke or may have been present, at least partially, premorbidly. However, based on input from speech-language therapy where she has been assessed with some cognitive measures, cognitive impairment appears to be only in the domain of memory, with no apparent impairment in problem-solving, reasoning, or other areas that would be seen often when cognition is affected by stroke. Ms. Beaulieu exhibits a very rigid belief system that could be consistent with dementia.  At the same time, it could be related to personality issues.  She is adamant in her assertion that although she understands that dwelling on anger toward her sister would not be a healthy choice right now, that she needs to \"do her own process\" as she always does.  When I pointed out to her that she had already shared with me that she felt it would be important to learn to make healthier choices because of the stroke and that she might consider doing the same in terms of her reactions to her sister, she " suggested that these comments felt judgemental.  It seems important to obtain a full neuropsychological evaluation for Ms. Denis.  Although it may not affect the course of her rehabilitation, it may be unlikely that she will complete this independently if left to be done on an outpatient basis following discharge.      Although Ms. Denis denies strongly any history of depression, her responses today, level of irritability, and other aspects of her presentation as well as the decades of nonadherence with medical recommendations suggest at least some level of depression that has affected her functioning.  In addition, she reports symptoms that appear to fit a diagnosis of generalized anxiety disorder, noting that anxious rumination does affect her sleep, concentration and focus, and level of irritability.  Although she minimizes frequency of the impact of these symptoms, I am concerned that she may not be reporting 100% accurately. I will plan to follow Ms. Denis through this rehabilitation admission.      DIAGNOSES:   1.  Generalized anxiety disorder (F41.1).   2.  Major depressive disorder, unspecified (F32.9).   3.  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   4. Rule out premorbid cognitive impairment.        CHRISTIANO DUBOIS, PHD, LP        *In accordance with the Rules of the [place] Board of [employer], it is noted that psychological descriptions and scientific procedures underlying psychological evaluations have limitations.  Absolute predictions cannot be made based on information in this report.         D: 10/30/2019   T: 10/30/2019   MT:       Name:     ONDINA DENIS   MRN:      [id number]        Account:       [id number]   :      1960           Consult Date:  10/30/2019      Document: [id number]

## 2019-10-31 NOTE — PROGRESS NOTES
[facility], [facility] Progress Note - Hospitalist Service       Date of Admission:  10/28/2019  Assessment & Plan       Roxy Beaulieu is a 59 year old female with a pmh significant for ESRD on HD, HTN, DM insulin dependent, DVT, and PE who was admitted to OSH with LE weakness found to have CVA, transferred to ARU for aggressive rehabilitation.      CVA, Subcortical, w/ transient LLE weakness. Presented to OSH on 10/23 w/ LLE numbness and weakness. Head CT negative for acute stroke, CT angiogram negative for any significant arotid stenosis, MRI brain w/ signal abnormalities compatible w/ a subcentimeter acute R-sided periventricular white matter infarction, suspect lacunar stroke from HTN. Echo with EF 66%.  - Continue  mg every day  - Continue Statin  - Goal SBP <120     ESRD on HD. Dialyzies MWF PTA, transition to TThS while here.   - Nephrology consult  - Renal diet  - Epo weekly     HTN. Currently managed on amlodipine 5 mg daily coreg 25 mg BID and hydralazine 50 mg TID. Stable. Continue.      DM II. Most recent Hgb A1C 10.7 on 10/2/2019. Resumed PTA regime on admission of NPH 25 units BID. Decreased to 20 units BID given intermittent drops in BG. Dropped to 47 at 0200 on 10/30, thus will decrease evening dose of NPH by 20% (16 units).   - Continue NPH 20 units qam,  16 units with dinner  - BG TID w/ meals, at bedtime, and 0200  - Hypoglycemia protocol    Recent Labs   Lab 10/31/19  0819 10/31/19  0224 10/30/19  2252 10/30/19  2158 10/30/19  1706 10/30/19  1230  10/29/19  1330   GLC  --   --   --   --   --   --   --  101*   * 117* 98 91 112* 103*   < >  --     < > = values in this interval not displayed.   - No changes made today, continue current regimen     Hx of PE/DVT (2003). Unclear if this was provoked. Has been in AC with coumadin in the past, but no longer given compliance issues.       IM will sign off. Please notify any intercurrent medical  questions or concerns arise. Thank you for involving me int his patients ute Gil PA-C  Hospitalist Service  [facility], [place]    ______________________________________________________________________    Interval History   BG well controlled, no episodes of hypoglycemia with current regimen. Denies any symptoms of hypoglycemia over the past 25 hours. Denies any new chest pain, shortness of breath or difficulty breathing. She is not having any fevers or chills.     Data reviewed today: I reviewed all medications, new labs and imaging results over the last 24 hours.    Physical Exam   Vital Signs: Temp: 97.8  F (36.6  C) Temp src: Oral BP: (!) 140/60 Pulse: 72   Resp: 18 SpO2: 92 % O2 Device: None (Room air)    Weight: 201 lbs 11.2 oz  General Appearance: Alert and oriented x 3, NAD  Respiratory: Lungs CTAB, no wheezing or crackles  Cardiovascular: RRR  GI: Abdomen soft, non distended, non tender to palpation  Skin: warm and dry to touch,   Other: No peripheral

## 2019-10-31 NOTE — PLAN OF CARE
Patient is A&OX4, able to make needs known. Denies pain. CGA with walker. No BM this shift- PRN senna given. BG at HS- 91, gave snack of 2 slices of PB toast, and 8oz apple juice. Monitor overnight. Continue with POC.

## 2019-10-31 NOTE — PLAN OF CARE
Acute Rehab Care Conference/Team Rounds      Type: Team Rounds    Present: Dr. Priscila Suero, Rozina Bender NP, Patience Ibrahim RN, Jessi Rubio SW, Pao Witt OT, Shereen Lopez PT, Danielle Shi SLP, Delia Velasco , Adelfo Collazo, Tonja Shafer Dietician        Discharge Barriers/Treatment/Education    Rehab Diagnosis: R lacunar CVA.    Active Medical Co-morbidities/Prognosis: ESRD on dialysis, HTN, HLD, IDDM    Safety: Alert and oriented, forgetful. Able to make needs known. Uses call light appropriately.  Bed alarm on for safety.     Pain: Offers no C/O pain    Medications, Skin, Tubes/Lines:  Skin intact,   DL CVC in right chest CDI    Swallowing/Nutrition: Tolerates a regular diet with thin liquids without difficulty    Bowel/Bladder: Continent of bowel and bladder. On hemodialysis    Psychosocial: Single, never  and has no children. PTA was living in a town home which recently sold. Family assisting with moving pt's stuff and finding a new apt. Pt has support from sister and step mother. Pt not working, no steady income and family assisting with applying for disability. Concerns for mental health and health psychology following.     ADLs/IADLs: Pt is early in ARU stay, but has been making progress with ADLs. Lin is SBA for UB dressing and CGA for LB dressing with use of FWW. Pt is SBA for showering/bathing and able to wash/rinse/dry 10/10 body parts while seated on shower bench. Pt is CGA for toilet transfer/hygiene with use of FWW. Pt is SBA-CGA with use of FWW for g/h and oral cares standing at EOS. Anticipating pt will be returning to sister's home for short stay before moving into new apartment. Anticipating pt will require assistance with higher level IADLs such as medication management and finances. Anticipating pt will return home with home care OT vs OP OT. Pt will need to be cleared by home care/OP for return to work status and return to driving. Anticipate  pt will require FWW, shower bench/chair, grab bars, and potentially bed rail.     Mobility: Up in room w/ walker and assist; severe neuropathy w/ insensate feet, visually dependent for mobility; Forbes score 12/56.  Will need a walker for ambulation due to LE weakness and falls risk.  Recommend home care at time of discharge.     Cognition/Language: Pt's language skills tested out WFL and cognitive areas of attention and problem solving/reasoning. With ST memory - pt has moderate  deficits overall based on Rbans testing- pt does better with shorter pieces of info but with lengthier instructions or info- pt has very poor recall. Have reviewed compensatory memory strategies and pt has been introduced to memory notebook. Pt will need oversight with medication management, finances, diabetes management  Following discharge and will need HH sptx following discharge to address memory    Community Re-Entry: ambulatory w/ assist for limited distances    Transportation: not a  due to LE weakness, neuropathy, cognition/memory; car transfer with assist but not a true barrier      Plan of Care and goals reviewed and updated.    Discharge Plan/Recommendations    Fall Precautions: continue    Overall plan for the patient:   Insulin regimen adjusted, decreased at night with improvement in hypoglycemia.  On dialysis 3x/wk.   Nursing - MAP will be initiated.  Continent b/b.  OT - CGA LBD, SBA UBD.  CGA toileting and hygiene with walker.  SBA oral cares, G/H, showering, bathing.  MOCA 21/30.  Needed minor cues for meds.  PT - Ambulating 100 ft with a walker, 12/56 on FORBES.  SLP - Working on memory which is quite impaired, worsening even prior to hospitalization.  Recommending oversight with medications.   PT, OT, SLP although discharge location is questionable.  She has sold her town home and sister not able to care for her.  Sister and brother in law are looking into apartments.  Likely Neuropsych eval inpatient. Push back  discharge to 11/8.     Utilization Review and Continued Stay Justification    Medical Necessity Criteria:    For any criteria that is not met, please document reason and plan for discharge, transfer, or modification of plan of care to address.    Requires intensive rehabilitation program to treat functional deficits?: Yes    Requires 3x per week or greater involvement of rehabilitation physician to oversee rehabilitation program?: Yes    Requires rehabilitation nursing interventions?: Yes    Patient is making functional progress?: Yes    There is a potential for additional functional progress? Yes    Patient is participating in therapy 3 hours per day a minimum of 5 days per week or 15 hours per week in 7 day period?:Yes    Has discharge needs that require coordinated discharge planning approach?:Yes      Barriers/Concerns related to meeting medical necessity criteria:  None    Team Plan to Address Concern:  N/A      Final Physician Sign off    Statement of Approval: I have reviewed and agree with the recommendations and documentation in this care conference note.       Patient Goals  Social Work Goals:Confirm discharge recommendations with therapy, coordinate safe discharge plan and remain available to support and assist as needed.       OT Frequency: daily  OT goal: hygiene/grooming: modified independent  OT goal: upper body dressing: Modified independent, including set-up/clothing retrieval  OT goal: lower body dressing: Modified independent, including set-up/clothing retrieval  OT goal: upper body bathing: Modified independent  OT goal: lower body bathing: Modified independent  OT goal: toilet transfer/toileting: Modified independent  OT goal: meal preparation: Modified independent, with simple meal preparation  OT goal: home management: Modified independent, with light demand household tasks  OT goal: cognitive: Patient/caregiver will verbalize understanding of cognitive assessment results/recommendations as  needed for safe discharge planning  OT goal 1: Pt will complete tub/shower transfer mod I using appropriate DME       PT Frequency: daily x 60 minutes  PT goal: bed mobility: Independent, Supine to/from sit, Rolling, Bridging  PT goal: transfers: Modified independent, Bed to/from chair, Assistive device, Sit to/from stand(using 4WW vs FWW for home negotiation)  PT goal: gait: Modified independent, Rolling walker, Greater than 200 feet, 50 feet, Assistive device, Supervision/stand-by assist(mod I home, SBA limited community)  PT goal: stairs: Supervision/stand-by assist, 3 stairs, Rail on right(for home access into sister's home)  PT goal: perform aerobic activity with stable cardiovascular response: continuous activity, 15 minutes, NuStep(for cardiovasuclar health, endurance, OMNI no > 7/10)     PT goal 1: using handout be ind with strength HEP for improved functional mobility  PT Goal 2: using handout be ind with balance HEP for improved functional mobility  PT Goal 3: perform floor transfer w/ furniture A, mod I to demo safe fall recovery               SLP Frequency: daily, 30 mins  SLP goal 1: Pt will utilize compensatory memory strategies to complete St recall tasks with 90% accuracy             Patient/Family Goal: Medication Management: Pt and familiy will verbalize understanding of medication regimen and will participate and pass MAP before discharge        Goal: Skin Integrity: Pt will verbalize three interventions to prevent skin breakdown and will maintain skin integrity during stay at ARU                    Goal: Safety Management: Pt will use call light appropriately to make needs known and will wait for assistance during stay at ARU

## 2019-10-31 NOTE — PROGRESS NOTES
[facility]   Acute Rehabilitation Unit  Daily progress note    INTERVAL HISTORY  Yesterday the patient received multiple senna tabs for constipation, and developed diarrhea today.  She says she needed to go to the bathroom 8 times in an hour, and did not feel she could go to dialysis.  Staff discussed with nephrology, and will delay dialysis run to tomorrow, and again on Saturday as per original schedule.  She currently has no concerns or complaints.  Denies chest pain, shortness of breath, nausea, or abdominal pain.  Glucose levels overnight improved and did not have hypoglycemia.  For functional updates, please see team rounds note from today.      MEDICATIONS  Scheduled:    - Medication Assessment Program - Rehab Services   Does not apply See Admin Instructions     amLODIPine  5 mg Oral At Bedtime     aspirin  325 mg Oral At Bedtime     carvedilol  25 mg Oral BID w/meals     hydrALAZINE  50 mg Oral TID     insulin isophane & regular  15 Units Subcutaneous Daily with supper     insulin isophane & regular  20 Units Subcutaneous QAM     simvastatin  20 mg Oral At Bedtime        PRN:  acetaminophen, bisacodyl, glucose **OR** dextrose **OR** glucagon, moisturizing lotion, senna-docusate       PHYSICAL EXAM  Patient Vitals for the past 24 hrs:   BP Temp Temp src Pulse Resp SpO2 Weight   10/31/19 1327 128/54 -- -- -- -- -- --   10/31/19 1150 -- -- -- -- -- -- 92.1 kg (203 lb 1.6 oz)   10/31/19 0930 138/59 -- -- -- -- -- --   10/31/19 0617 (!) 140/60 97.8  F (36.6  C) Oral 72 18 92 % --   10/30/19 2100 (!) 158/69 -- -- 78 -- -- --       GEN: NAD, pleasant and cooperative  HEENT: NC/AT  CVS: RRR, S1+S2, no m/r/g  PULM: CTA b/l, no w/r/r  ABD: Soft, NT, ND, bowel sounds present  EXT: No LE edema or calf tenderness b/l.  Varus foot on right.   Neuro: Answers appropriately, follows commands    LABS  CBC RESULTS:   Recent Labs   Lab Test 10/31/19  0556   WBC 9.1   RBC 3.41*   HGB 9.3*    HCT 31.2*   MCV 92   MCH 27.3   MCHC 29.8*   RDW 14.8          Last Comprehensive Metabolic Panel:  Sodium   Date Value Ref Range Status   10/31/2019 136 133 - 144 mmol/L Final     Potassium   Date Value Ref Range Status   10/31/2019 5.3 3.4 - 5.3 mmol/L Final     Chloride   Date Value Ref Range Status   10/31/2019 100 94 - 109 mmol/L Final     Carbon Dioxide   Date Value Ref Range Status   10/31/2019 24 20 - 32 mmol/L Final     Anion Gap   Date Value Ref Range Status   10/31/2019 12 3 - 14 mmol/L Final     Glucose   Date Value Ref Range Status   10/31/2019 151 (H) 70 - 99 mg/dL Final     Urea Nitrogen   Date Value Ref Range Status   10/31/2019 58 (H) 7 - 30 mg/dL Final     Creatinine   Date Value Ref Range Status   10/31/2019 6.10 (H) 0.52 - 1.04 mg/dL Final     GFR Estimate   Date Value Ref Range Status   10/31/2019 7 (L) >60 mL/min/[1.73_m2] Final     Comment:     Non  GFR Calc  Starting 12/18/2018, serum creatinine based estimated GFR (eGFR) will be   calculated using the Chronic Kidney Disease Epidemiology Collaboration   (CKD-EPI) equation.       Calcium   Date Value Ref Range Status   10/31/2019 7.9 (L) 8.5 - 10.1 mg/dL Final       Recent Labs   Lab 10/31/19  1331 10/31/19  1144 10/31/19  0819 10/31/19  0556 10/31/19  0224 10/30/19  2252 10/30/19  2158 10/30/19  1706  10/29/19  1330   *  --   --  Canceled, Test credited  --   --   --   --   --  101*   BGM  --  162* 152*  --  117* 98 91 112*   < >  --     < > = values in this interval not displayed.         ASSESSMENT  Roxy Beaulieu is a 59 year old LHD female with a PMH including but not limited to ESRD on HD, HTN, HLD, insulin dependent DM (HbA1c 10.7 on 10/2/19), and Hx of DVT/PE (~2004, not on anticoagulation due to non-compliance) who presented to [facility] on 10/23/19 with new onset of left leg weakness and found to have an acute R periventricular white matter infarct.  Etiology felt to be lacunar due to  HTN.      Impairment group code: 01.1 L body involvement, R brain stroke      PLAN  Rehabilitation  -Continue with inpatient rehabilitation program including PT, OT, and SLP for 3 hrs/day, rehab nursing, social work, nutrition, and close monitoring by physiatry  -The patient has impairments in strength, balance, and endurance, which are leading to activity limitations in ambulation, mobility, and ADLs    Medical  1)Neurology  -Acute R periventricular lacunar CVA due to HTN               -Continue  mg daily indefinitely               -HTN and lipid control as below               -Follow up with neurology in 4-6 weeks  -Will provide ongoing education regarding secondary stroke prevention including control of BP and glucose, compliance with medications and physician follow up, and lifestyle modifications including diet and exercise.        2)CVS  -HTN: Continue Hydralazine 50 mg TID, Coreg 25 mg BID, and Norvasc 5 mg daily.  Monitor, titrate as needed.  -HLD: Zocor 20 mg daily.  LDL 99        3)Pulmonary  -No acute issues  -Hx of PE, not on anticoagulation        4)FENGI  -Diet: Regular consistency solids, thin liquids.  Renal diet.  Discontinued moderate consistent CHO restriction due to hypoglycemia episodes.  -Bowel meds: PRN Senokot-S, Dulcolax suppository        5)  -ESRD on HD: Consulted nephrology for assistance with HD management.  Will dialyze T/Th/Sa while on rehab. Due to refusal today, will re-schedule for tomorrow. Assistance is appreciated.   -Potassium on 10/31, 5.3         6)DVT prophylaxis  -Hx of DVT/PE, however not on anticoagulation due to inconsistent compliance with Coumadin  -Continue  mg daily  -Mechanical prophylaxis        7)Pain  -Tylenol PRN        8)Endo   -IDDM (HbA1c 10.7)               -Decreased insulin 70/30 further to 15 units in the evening and 20 units in the morning due to low glucose readings overnight.  Improved last night.               -Check glucose levels and  sliding scale insulin qAC and at bedtime  -Thyroid ultrasound recommended if not done due to multinodular thyroid seen on CT.  Will recommend PCP follow after discharge.     9)Heme  -AoCD:  Receives weekly EPO injections, will defer administrations to nephrology  -Per pt, has heparin allergy which she reports hives.  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  -May require neuropsych testing        11)Social/Dispo  -Anticipate discharge home at a modified independent level for ambulation, mobility, and ADLs  -ELOS: Tentative discharge date 11/8  -Rehab prognosis: Good  -Follow up appointments: PCP, Recommended thyroid ultrasound, neurology 4-6 weeks     Code status: Full Code (Discussed with patient upon admission)     Jeffry Suero MD  Department of Rehabilitation Medicine  Pager: [phone]    Time Spent on this Encounter   I, Jeffry Suero, spent a total of 35 minutes face-to-face or managing the care of Roxy Beaulieu. Over 50% of my time on the unit was spent counseling the patient and coordinating care including formal team rounds. See note for details.

## 2019-10-31 NOTE — PROGRESS NOTES
Spoke with pt sister multiple times this afternoon. Pt sister notified SWer that she found a low-income apt for pt in [facility] (near extended family), second level, no elevator access that she was going to go visit this afternoon. Pt's sister went to see it and was happy with the location. One apt left and available for pt if the building approves and if pt is agreeable. Pt's sister will come by this evening and have pt sign some paperwork and discuss further with her.     Decided in team rounds to extend pt stay, anticipate discharge next Friday 11/8. Notified pt sister. Went to notify pt later this afternoon but pt was on the phone and so SW unable to update. Pt sister expressed appreciation.     Notified MD that pt has expressed to SW and her sister plans to continue driving post discharge and to transport herself to HD. Discussed concerns with pt sister. Provided pt sister with information for [employer] and [facility] for further if ILF/TRAV is needed.     Will continue to work and assist pt and sister on housing/discharge location, transportation resources, CADI waiver, HCD and financial resources. Will need to confirm what county pt will move to in order to f/u with resources. Will need to work with Luis to relocate pt to a different HD center. Will need to establish care with PCP. Pt sister prefers to remain with  and [facility] is the closest. Will plan to connect with clinic CC and SWer. Final discharge recommendations for C vs outpt pending.     Jessi Kolb, JOHANNA, CADC-[place]  [facility] Acute Rehab Unit   Phone: [phone]  I   Pager: [phone]

## 2019-10-31 NOTE — PLAN OF CARE
"Alert and oriented, forgetful. Able to  make needs known. Call light in reach. Offers no C/O pain/ /discomfort. BG at 0224 was 117. Up with CGA and walker.States she has not been able to sleep, \" I have too much on my mind.\". Continue with plan of care.   "

## 2019-10-31 NOTE — PLAN OF CARE
FOCUS/GOAL  Bowel management and Medical management    ASSESSMENT, INTERVENTIONS AND CONTINUING PLAN FOR GOAL:  Alert & oriented, forgetful. Uses call light to make needs, does not wait for assistance. Needs CGA with walker for transfers and toileting. Continent of bowel, had loose stools x4. Appetite 100% for breakfast and lunch, blood glucose 152 & 162, insulin given per orders. Dialysis reschedule for Friday 2/2 having loose stools. Dry skin to L/E, awaiting for Lubriderm from pharmacy. Pt will start MAP in AM. Fall precautions in place.

## 2019-11-01 LAB
GLUCOSE BLDC GLUCOMTR-MCNC: 147 MG/DL (ref 70–99)
GLUCOSE BLDC GLUCOMTR-MCNC: 183 MG/DL (ref 70–99)
GLUCOSE BLDC GLUCOMTR-MCNC: 196 MG/DL (ref 70–99)
GLUCOSE BLDC GLUCOMTR-MCNC: 204 MG/DL (ref 70–99)
GLUCOSE BLDC GLUCOMTR-MCNC: 286 MG/DL (ref 70–99)

## 2019-11-01 PROCEDURE — 97150 GROUP THERAPEUTIC PROCEDURES: CPT | Mod: GO | Performed by: STUDENT IN AN ORGANIZED HEALTH CARE EDUCATION/TRAINING PROGRAM

## 2019-11-01 PROCEDURE — 25000132 ZZH RX MED GY IP 250 OP 250 PS 637: Performed by: PHYSICAL MEDICINE & REHABILITATION

## 2019-11-01 PROCEDURE — 12800006 ZZH R&B REHAB

## 2019-11-01 PROCEDURE — 25800030 ZZH RX IP 258 OP 636: Performed by: CLINICAL NURSE SPECIALIST

## 2019-11-01 PROCEDURE — 63400005 ZZH RX 634: Performed by: CLINICAL NURSE SPECIALIST

## 2019-11-01 PROCEDURE — 90937 HEMODIALYSIS REPEATED EVAL: CPT

## 2019-11-01 PROCEDURE — 97535 SELF CARE MNGMENT TRAINING: CPT | Mod: GO | Performed by: OCCUPATIONAL THERAPIST

## 2019-11-01 PROCEDURE — 25000132 ZZH RX MED GY IP 250 OP 250 PS 637: Performed by: PHYSICIAN ASSISTANT

## 2019-11-01 PROCEDURE — 00000146 ZZHCL STATISTIC GLUCOSE BY METER IP

## 2019-11-01 PROCEDURE — 97112 NEUROMUSCULAR REEDUCATION: CPT | Mod: GP | Performed by: PHYSICAL THERAPIST

## 2019-11-01 PROCEDURE — 97110 THERAPEUTIC EXERCISES: CPT | Mod: GP | Performed by: PHYSICAL THERAPIST

## 2019-11-01 RX ORDER — SOD CHLORD/LANOLIN/MIN.OIL/PET
LOTION (ML) TOPICAL 3 TIMES DAILY
Status: DISCONTINUED | OUTPATIENT
Start: 2019-11-01 | End: 2019-11-09 | Stop reason: HOSPADM

## 2019-11-01 RX ADMIN — HYDRALAZINE HYDROCHLORIDE 50 MG: 50 TABLET ORAL at 09:35

## 2019-11-01 RX ADMIN — SODIUM CHLORIDE 250 ML: 9 INJECTION, SOLUTION INTRAVENOUS at 15:40

## 2019-11-01 RX ADMIN — INSULIN HUMAN 13 UNITS: 100 INJECTION, SUSPENSION SUBCUTANEOUS at 18:11

## 2019-11-01 RX ADMIN — SODIUM CHLORIDE 300 ML: 9 INJECTION, SOLUTION INTRAVENOUS at 15:39

## 2019-11-01 RX ADMIN — SIMVASTATIN 20 MG: 20 TABLET, FILM COATED ORAL at 20:39

## 2019-11-01 RX ADMIN — ASPIRIN 325 MG ORAL TABLET 325 MG: 325 PILL ORAL at 20:38

## 2019-11-01 RX ADMIN — Medication: at 15:39

## 2019-11-01 RX ADMIN — CARVEDILOL 25 MG: 25 TABLET, FILM COATED ORAL at 09:36

## 2019-11-01 RX ADMIN — EPOETIN ALFA 5000 UNITS: 10000 SOLUTION INTRAVENOUS; SUBCUTANEOUS at 15:49

## 2019-11-01 RX ADMIN — CARVEDILOL 25 MG: 25 TABLET, FILM COATED ORAL at 18:14

## 2019-11-01 RX ADMIN — HYDRALAZINE HYDROCHLORIDE 50 MG: 50 TABLET ORAL at 20:39

## 2019-11-01 RX ADMIN — AMLODIPINE BESYLATE 5 MG: 5 TABLET ORAL at 20:38

## 2019-11-01 RX ADMIN — Medication: at 20:40

## 2019-11-01 ASSESSMENT — MIFFLIN-ST. JEOR
SCORE: 1506.5
SCORE: 1537.5

## 2019-11-01 NOTE — PROGRESS NOTES
OT: Focused on morning ADL routine, kitchen meal prep task, and finances. Pt is SBA for UB dressing and CGA with use of FWW for LB dressing. Pt did have 1 LOB in bathroom when performing g/h with writer assisting. Pt not aware of how fast she moves and has poor safety awareness using FWW. Pt is CGA with g/h and oral cares. Pt is CGA with toilet transfer/hygiene using FWW and grab bars. Pt completing kitchen task to make toast with increasd vc's to use FWW safely in kitchen. Pt completing finances worksheet with 100% to identify credit card statement information correctly.

## 2019-11-01 NOTE — PROGRESS NOTES
"Nephrology Progress Note  11/01/2019       Roxy Beaulieu is a 59 yof with ESRD on HD, HTN, DM insulin dependent, DVT, and PE who was admitted to OSH with LE weakness found to have CVA, transferred to ARU for aggressive rehabilitation. Nephrology consulted for management of HD while at ARU.       Interval History  Mrs Beaulieu was seen HD today, held on HD yesterday due to issues with diarrhea so running as extra run today.  Will plan for run tomorrow as well to get back on TTS schedule for rehab purposes tomorrow.      ASSESSMENT AND RECOMMENDATIONS:   ESRD-Started HD ~6 months ago thought to be due to HTN and DM2.  Runs 3h MWF at [facility] under care of Dr Cox but on TTS for rehab schedule at TCU.  Started in 5/2019 after being non-responsive to diuretics, has tunneled RIJ line which we will use for HD, allergic to heparin.                  -HD 2L of UF today as she was unable to run yesterday, plan for run tomorrow to get back on schedule.       Volume-Mild edema, no SOB at rest.  Pulling 2L with run today,      Electrolytes/pH-K 6.0 (drawn on run), adjusting K bath, likely due to dietary intake as last run was on 10/26.      Anemia-Hgb 9.3, on 6000u EPO with runs.       DM2-A1C noted to be 9.7.    Seen and discussed with Dr Ventura     Recommendations were communicated to primary team via note       RIK Mondragon CNS  Clinical Nurse Specialist  [phone]    Review of Systems:   I reviewed the following systems:  Gen: No fevers or chills  CV: No CP at rest  Resp: No SOB at rest  GI: No N/V    Physical Exam:   I/O last 3 completed shifts:  In: 200 [P.O.:200]  Out: -    BP (!) 159/67   Pulse 81   Temp 98.5  F (36.9  C) (Oral)   Resp 16   Ht 1.727 m (5' 8\")   Wt 92.4 kg (203 lb 11.3 oz)   SpO2 100%   BMI 30.97 kg/m       GENERAL APPEARANCE: alert and no distress  EYES: No scleral icterus  NECK:  No JVD  Pulmonary: lungs clear to auscultation with equal breath sounds bilaterally, no clubbing or " cyanosis  CV: Regular rhythm, normal rate, no rub   - JVP not elevated   - Edema +2LE  GI: soft, nontender, normal bowel sounds  MS: no evidence of inflammation in joints, no muscle tenderness  : No Ventura  SKIN: no rash, warm, dry  NEURO: mentation intact and speech normal    Labs:   All labs reviewed by me  Electrolytes/Renal -   Recent Labs   Lab Test 10/31/19  1331 10/31/19  0556 10/29/19  1330 06/03/19  0652  06/01/19  1302 06/01/19  0646 05/31/19  0641  12/17/18  1041  03/15/18  2041  05/03/17  0725   NA  --  136 138 141   < >  --  140 140   < > 141   < > 135   < > 140   POTASSIUM 5.3 Canceled, Test credited 6.0* 5.5*   < > 5.6* 5.7* 5.4*   < > 4.8   < > 4.1   < > 4.6   CHLORIDE  --  100 102 108   < >  --  109 109   < > 106   < > 100   < > 109   CO2  --  24 27 26   < >  --  25 25   < > 29   < > 29   < > 27   BUN  --  58* 77* 63*   < >  --  58* 59*   < > 37*   < > 50*   < > 16   CR  --  6.10* 8.03* 5.02*   < >  --  4.73* 4.27*   < > 2.59*   < > 1.46*   < > 1.06*   * Canceled, Test credited 101* 125*   < >  --  114* 114*   < > 178*   < > 207*   < > 161*   GILL  --  7.9* 8.0* 8.7   < >  --  8.4* 8.5   < > 8.4*   < > 8.7   < > 8.7   MAG  --   --   --   --   --   --   --   --   --  2.0  --  2.1  --  1.5*   PHOS  --   --   --   --   --  5.2* 5.6* 5.9*   < >  --    < > 3.2  --   --     < > = values in this interval not displayed.       CBC -   Recent Labs   Lab Test 10/31/19  0556 06/05/19  0715 06/02/19  0607   WBC 9.1 5.9 4.8   HGB 9.3* 7.5* 7.2*    176 166       LFTs -   Recent Labs   Lab Test 06/01/19  0646 05/31/19  0641 05/30/19  0607  05/27/19 2010 12/19/18  0631 12/18/18  0620 10/04/18  1611   ALKPHOS  --   --   --   --  155* 91  --  167*   BILITOTAL  --   --   --   --  0.3 0.3  --  0.3   ALT  --   --   --   --  56* 10  --  18   AST  --   --   --   --  38 9  --  26   PROTTOTAL  --   --   --   --  6.9 5.7* 5.8* 7.0   ALBUMIN 3.5 3.2* 2.4*   < > 2.9* 2.2* 2.3* 2.7*    < > = values in this  interval not displayed.       Iron Panel -   Recent Labs   Lab Test 10/31/19  0556 05/28/19  1157 10/06/18  0609   IRON 24* 32* 33*   IRONSAT 14* 17 19   MITRA  --  107 165           Current Medications:    - Medication Assessment Program - Rehab Services   Does not apply See Admin Instructions     amLODIPine  5 mg Oral At Bedtime     aspirin  325 mg Oral At Bedtime     carvedilol  25 mg Oral BID w/meals     hydrALAZINE  50 mg Oral TID     insulin isophane & regular  13 Units Subcutaneous Daily with supper     insulin isophane & regular  20 Units Subcutaneous QAM     moisturizing lotion   Topical TID     simvastatin  20 mg Oral At Bedtime     sodium chloride (PF)  9 mL Intracatheter During Hemodialysis (from stock)     sodium chloride (PF)  9 mL Intracatheter During Hemodialysis (from stock)       I, Gaetano Ventura MD, evaluated this patient with Alphonse Jaramillo, Clinical Nurse Specialist, 11/01/19, as part of a shared visit.     I personally reviewed major complaints, physical findings, investigations, medications and the overall management plan.        My key findings: dialysis-dependent kidney failure    Key management decisions made by me: continue scheduled renal replacement therapy.

## 2019-11-01 NOTE — PLAN OF CARE
FOCUS/GOAL  Safety management and Mobility    ASSESSMENT, INTERVENTIONS AND CONTINUING PLAN FOR GOAL:  Pt denied pain during shift. Denied SOB, denied difficulty breathing. No neuro changes. Pt set off bed alarm x1 - sitting at EOB waiting to use toilet in bathroom to void. Pt acknowledged need to call for assistance. Pt using walker, walking into bathroom CGA for safety. Pt drank apple juice prior to BG 0200; . Alarms on for safety purposes. Pt sleeping well during the night. Call light within reach. Continue with POC.

## 2019-11-01 NOTE — PLAN OF CARE
Patient left for dialysis at 1220.Pt ate lunch prior to leaving, BG was 204. Report given to receiving RN.

## 2019-11-01 NOTE — PROGRESS NOTES
[facility]   Acute Rehabilitation Unit  Daily progress note    INTERVAL HISTORY  Overnight glucose levels were 76 which improved after crackers and juice.  She was asymptomatic during this time.  Today she is complaining of dry and itchy legs, and asking for something to help with this.  Had diarrhea throughout the day yesterday but improved today so far.  As she declined dialysis yesterday, she was re-scheduled to today.  Functionally she only scored 12/56 on her FORBES and neuropathy may be playing a significant role in her balance deficits.  Her sister may have found an apartment for her to reside after discharge.       MEDICATIONS  Scheduled:    - Medication Assessment Program - Rehab Services   Does not apply See Admin Instructions     sodium chloride 0.9%  250 mL Intravenous Once in dialysis     sodium chloride 0.9%  300 mL Hemodialysis Machine Once     amLODIPine  5 mg Oral At Bedtime     aspirin  325 mg Oral At Bedtime     carvedilol  25 mg Oral BID w/meals     epoetin fletcher (EPOGEN,PROCRIT) inj ESRD  5,000 Units Intravenous Once in dialysis     hydrALAZINE  50 mg Oral TID     insulin isophane & regular  13 Units Subcutaneous Daily with supper     insulin isophane & regular  20 Units Subcutaneous QAM     - MEDICATION INSTRUCTIONS -   Does not apply Once     simvastatin  20 mg Oral At Bedtime     sodium chloride (PF)  9 mL Intracatheter During Hemodialysis (from stock)     sodium chloride (PF)  9 mL Intracatheter During Hemodialysis (from stock)        PRN:  sodium chloride 0.9%, acetaminophen, alteplase, alteplase, bisacodyl, glucose **OR** dextrose **OR** glucagon, moisturizing lotion, senna-docusate, sodium chloride (PF), sodium chloride (PF)       PHYSICAL EXAM  Patient Vitals for the past 24 hrs:   BP Temp Temp src Pulse Heart Rate Resp SpO2   11/01/19 0935 (!) 151/65 -- -- -- -- -- --   11/01/19 0933 (!) 151/65 97.1  F (36.2  C) Oral -- 82 18 94 %   10/31/19 2045  134/53 -- -- 78 -- -- --   10/31/19 1724 (!) 154/51 -- -- -- -- -- --   10/31/19 1651 (!) 162/65 97.6  F (36.4  C) Oral 78 -- 16 96 %   10/31/19 1327 128/54 -- -- -- -- -- --       GEN: NAD, pleasant and cooperative, participating in OT  HEENT: NC/AT  CVS: RRR, S1+S2, no m/r/g  PULM: CTA b/l, no w/r/r  ABD: Soft, NT, ND, bowel sounds present  EXT: No LE edema or calf tenderness b/l.  Varus foot on right.   Neuro: Answers appropriately, follows commands    LABS  CBC RESULTS:   Recent Labs   Lab Test 10/31/19  0556   WBC 9.1   RBC 3.41*   HGB 9.3*   HCT 31.2*   MCV 92   MCH 27.3   MCHC 29.8*   RDW 14.8          Last Comprehensive Metabolic Panel:  Sodium   Date Value Ref Range Status   10/31/2019 136 133 - 144 mmol/L Final     Potassium   Date Value Ref Range Status   10/31/2019 5.3 3.4 - 5.3 mmol/L Final     Chloride   Date Value Ref Range Status   10/31/2019 100 94 - 109 mmol/L Final     Carbon Dioxide   Date Value Ref Range Status   10/31/2019 24 20 - 32 mmol/L Final     Anion Gap   Date Value Ref Range Status   10/31/2019 12 3 - 14 mmol/L Final     Glucose   Date Value Ref Range Status   10/31/2019 151 (H) 70 - 99 mg/dL Final     Urea Nitrogen   Date Value Ref Range Status   10/31/2019 58 (H) 7 - 30 mg/dL Final     Creatinine   Date Value Ref Range Status   10/31/2019 6.10 (H) 0.52 - 1.04 mg/dL Final     GFR Estimate   Date Value Ref Range Status   10/31/2019 7 (L) >60 mL/min/[1.73_m2] Final     Comment:     Non  GFR Calc  Starting 12/18/2018, serum creatinine based estimated GFR (eGFR) will be   calculated using the Chronic Kidney Disease Epidemiology Collaboration   (CKD-EPI) equation.       Calcium   Date Value Ref Range Status   10/31/2019 7.9 (L) 8.5 - 10.1 mg/dL Final       Recent Labs   Lab 11/01/19  1200 11/01/19  0924 11/01/19  0232 10/31/19  2126 10/31/19  1655 10/31/19  1331 10/31/19  1144  10/31/19  0556  10/29/19  1330   GLC  --   --   --   --   --  151*  --   --  Canceled, Test  credited  --  101*   * 147* 183* 76 140*  --  162*   < >  --    < >  --     < > = values in this interval not displayed.         ASSESSMENT  Roxy Beaulieu is a 59 year old LHD female with a PMH including but not limited to ESRD on HD, HTN, HLD, insulin dependent DM (HbA1c 10.7 on 10/2/19), and Hx of DVT/PE (~2004, not on anticoagulation due to non-compliance) who presented to [facility] on 10/23/19 with new onset of left leg weakness and found to have an acute R periventricular white matter infarct.  Etiology felt to be lacunar due to HTN.      Impairment group code: 01.1 L body involvement, R brain stroke      PLAN  Rehabilitation  -Continue with inpatient rehabilitation program including PT, OT, and SLP for 3 hrs/day, rehab nursing, social work, nutrition, and close monitoring by physiatry  -The patient has impairments in strength, balance, and endurance, which are leading to activity limitations in ambulation, mobility, and ADLs    Medical  1)Neurology  -Acute R periventricular lacunar CVA due to HTN               -Continue  mg daily indefinitely               -HTN and lipid control as below               -Follow up with neurology in 4-6 weeks  -Will provide ongoing education regarding secondary stroke prevention including control of BP and glucose, compliance with medications and physician follow up, and lifestyle modifications including diet and exercise.        2)CVS  -HTN: Continue Hydralazine 50 mg TID, Coreg 25 mg BID, and Norvasc 5 mg daily.  Monitor, titrate as needed.  -HLD: Zocor 20 mg daily.  LDL 99        3)Pulmonary  -No acute issues  -Hx of PE, not on anticoagulation        4)FENGI  -Diet: Regular consistency solids, thin liquids.  Renal diet.  Discontinued moderate consistent CHO restriction due to hypoglycemia episodes.  -Bowel meds: PRN Senokot-S, Dulcolax suppository        5)  -ESRD on HD: Consulted nephrology for assistance with HD management.  Will dialyze  T/Th/Sa while on rehab. Due to refusal yesterday, was re-scheduled for today. Assistance is appreciated.   -Potassium on 10/31, 5.3         6)DVT prophylaxis  -Hx of DVT/PE, however not on anticoagulation due to inconsistent compliance with Coumadin  -Continue  mg daily  -Mechanical prophylaxis        7)Pain  -Tylenol PRN        8)Endo   -IDDM (HbA1c 10.7)               -Decreased insulin 70/30 further to 13 units in the evening and 20 units in the morning due to low glucose readings overnight.                 -Check glucose levels and sliding scale insulin qAC and at bedtime  -Thyroid ultrasound recommended if not done due to multinodular thyroid seen on CT.  Will recommend PCP follow after discharge.     9)Heme  -AoCD:  Receives weekly EPO injections, will defer administrations to nephrology  -Per pt, has heparin allergy which she reports hives.  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  -May require neuropsych testing        11)Social/Dispo  -Anticipate discharge home at a modified independent level for ambulation, mobility, and ADLs  -ELOS: Tentative discharge date 11/8  -Rehab prognosis: Good  -Follow up appointments: PCP, Recommended thyroid ultrasound, neurology 4-6 weeks     Code status: Full Code (Discussed with patient upon admission)     Jeffry Suero MD  Department of Rehabilitation Medicine  Pager: [phone]    Time Spent on this Encounter   I, Jeffry Suero, spent a total of 25 minutes face-to-face or managing the care of Roxy Beaulieu. Over 50% of my time on the unit was spent counseling the patient and coordinating care. See note for details.

## 2019-11-01 NOTE — PLAN OF CARE
Discharge Planner PT   Patient plan for discharge: home either an apt or w/ her sister  Current status: ww, sba, insensate feet, poor memory, Velez score 12/56 and visually dependent  Barriers to return to prior living situation: falls risk, poor support system  Recommendations for discharge: continue IP care to progress LE strength and gait  Rationale for recommendations: pt presentation       Entered by: Dorota Herrera 11/01/2019 4:41 PM

## 2019-11-01 NOTE — PROGRESS NOTES
HEMODIALYSIS TREATMENT NOTE    Date: 11/1/2019  Time: 4.20 PM    Data:  Pre Wt: 92.4 kg (203 lb 11.3 oz)   Desired Wt: 89.4 kg   Post Wt: 89.3 kg (196 lb 13.9 oz)  Weight change: 3.1 kg  Ultrafiltration - Post Run Net Total Removed : 3000 mL  Vascular Access Status: patent  Dialyzer Rinse: Streaked, Light  Total Blood Volume Processed: 70.79 Liters  Total Dialysis (Treatment) Time: 3 Hours    Lab:   No     Assessment / Interventions: Assumed care mid treatment from another RN, pt alert & orient x4, denies pain, 3 hours HD via RCVC  with good flow,  Epogen given per order, Vital signs were stable during treatment, she completed her treatment with B/p167/71, crit - line -4.2%, CVC saline locked, clear guard applied, hand off report given to ARU RN & pt sent back in a stable condition.       Plan:    Cont with Renal Team

## 2019-11-01 NOTE — PLAN OF CARE
Pt denied pain, chest pain, or SOB. VSS   K+ 5.3 today. Plan for dialysis tomorrow.   Pt self-transferred x2 due to urgency with loose stool. Had large incontinence of loose stool, soiled pants and bathroom floor.   PVR 55cc   before supper, pt ate 100%. BG 76 at HS, pt ate snacks when offered.   Reviewed medication list with patient and provided medication list, pt verbalized understanding. Plan to start MAP tomorrow.    Bed alarm on for safety concern.

## 2019-11-01 NOTE — PLAN OF CARE
"OT: Pt participated in the established \"Transitions Group\" for stroke pts. Highlighting topics such as return to meaningful activities, health/wellness, fatigue, depression/anxiety, bowel/bladder considerations w/ community re-integration and caregiver wellness/support. Pt receptive to class. Pt reports personal goals after discharge are to walk with a cane.    "

## 2019-11-02 LAB
GLUCOSE BLDC GLUCOMTR-MCNC: 140 MG/DL (ref 70–99)
GLUCOSE BLDC GLUCOMTR-MCNC: 152 MG/DL (ref 70–99)
GLUCOSE BLDC GLUCOMTR-MCNC: 212 MG/DL (ref 70–99)
GLUCOSE BLDC GLUCOMTR-MCNC: 219 MG/DL (ref 70–99)
GLUCOSE BLDC GLUCOMTR-MCNC: 244 MG/DL (ref 70–99)

## 2019-11-02 PROCEDURE — 5A1D90Z PERFORMANCE OF URINARY FILTRATION, CONTINUOUS, GREATER THAN 18 HOURS PER DAY: ICD-10-PCS | Performed by: CLINICAL NURSE SPECIALIST

## 2019-11-02 PROCEDURE — 25800030 ZZH RX IP 258 OP 636: Performed by: CLINICAL NURSE SPECIALIST

## 2019-11-02 PROCEDURE — 97530 THERAPEUTIC ACTIVITIES: CPT | Mod: GO

## 2019-11-02 PROCEDURE — 97127 ZZHC SP THERAPEUTIC INTERVENTION W/FOCUS ON COGNITIVE FUNCTION,EA 15 MIN: CPT | Mod: GN | Performed by: SPEECH-LANGUAGE PATHOLOGIST

## 2019-11-02 PROCEDURE — 97535 SELF CARE MNGMENT TRAINING: CPT | Mod: GO

## 2019-11-02 PROCEDURE — 00000146 ZZHCL STATISTIC GLUCOSE BY METER IP

## 2019-11-02 PROCEDURE — 90937 HEMODIALYSIS REPEATED EVAL: CPT

## 2019-11-02 PROCEDURE — 25000132 ZZH RX MED GY IP 250 OP 250 PS 637: Performed by: PHYSICAL MEDICINE & REHABILITATION

## 2019-11-02 PROCEDURE — 97150 GROUP THERAPEUTIC PROCEDURES: CPT | Mod: GP

## 2019-11-02 PROCEDURE — 25000132 ZZH RX MED GY IP 250 OP 250 PS 637: Performed by: PHYSICIAN ASSISTANT

## 2019-11-02 PROCEDURE — 12800006 ZZH R&B REHAB

## 2019-11-02 RX ADMIN — HYDRALAZINE HYDROCHLORIDE 50 MG: 50 TABLET ORAL at 20:25

## 2019-11-02 RX ADMIN — INSULIN HUMAN 13 UNITS: 100 INJECTION, SUSPENSION SUBCUTANEOUS at 18:10

## 2019-11-02 RX ADMIN — Medication: at 08:53

## 2019-11-02 RX ADMIN — SODIUM CHLORIDE 300 ML: 9 INJECTION, SOLUTION INTRAVENOUS at 13:33

## 2019-11-02 RX ADMIN — ASPIRIN 325 MG ORAL TABLET 325 MG: 325 PILL ORAL at 20:24

## 2019-11-02 RX ADMIN — SODIUM CHLORIDE 250 ML: 9 INJECTION, SOLUTION INTRAVENOUS at 13:33

## 2019-11-02 RX ADMIN — SIMVASTATIN 20 MG: 20 TABLET, FILM COATED ORAL at 20:25

## 2019-11-02 RX ADMIN — AMLODIPINE BESYLATE 5 MG: 5 TABLET ORAL at 20:27

## 2019-11-02 RX ADMIN — CARVEDILOL 25 MG: 25 TABLET, FILM COATED ORAL at 18:09

## 2019-11-02 RX ADMIN — Medication: at 20:29

## 2019-11-02 ASSESSMENT — MIFFLIN-ST. JEOR: SCORE: 1497.11

## 2019-11-02 NOTE — PROGRESS NOTES
HEMODIALYSIS TREATMENT NOTE    Date: 11/2/2019  Time: 4:36 PM    Data:  Pre Wt: 90 kg (198 lb 6.6 oz)   Desired Wt: 88 kg   Post Wt: 87.2 kg (192 lb 3.9 oz)  Weight change: 2.8 kg  Ultrafiltration - Post Run Net Total Removed (mL): 2000 mL  Vascular Access Status: patent  Dialyzer Rinse: Streaked  Total Blood Volume Processed: 60.2 L Liters  Total Dialysis (Treatment) Time: 3 Hours    Lab:   No    Interventions:Assessment:  3 hour tx complete. K2/Ca3, . CVC utilized without complication.  2 L of fluid obtained without c/o cramping or nausea. Epogen administered during HD per order, see MAR. CVC lumens saline locked and clear guard caps applied. Hand off report given to ARU RN     Plan:    Per nephrology team.

## 2019-11-02 NOTE — PLAN OF CARE
Pt attended Falls Prevention class today with group of 5 patients. Pt selected for class due to documented gait deficit and falls risk. Class includes education in falls risks, how to decrease that risk through behavior and home modifications and energy conservation; and instruction in available equipment designed to increase home safety. Pt was able to verbalize understanding of materials and participated appropriately in the discussion and problem-solving segments of the class.

## 2019-11-02 NOTE — PLAN OF CARE
FOCUS/GOAL  Bladder management    ASSESSMENT, INTERVENTIONS AND CONTINUING PLAN FOR GOAL:  Incontinent in pad this am. On MAP, did not call for med's et reminded to call. Able to take out correct meds et dosages.. To dialysis et lunch sent with. Report given to dialysis nurse.

## 2019-11-02 NOTE — PLAN OF CARE
Pt returned to ARU safely after dialysis.   Pt denied pain. /61. Scheduled coreg given. /47 at HS.   Pt already ate about half of supper when checked . Pt was reminded that BG needs to be checked before eating meals.   Pt ate 100% of supper.    at HS.   On map, pt forgot to call for medications twice. Needing cues to choose right medications.   Pt didn't set off alarm this pm.  No bm today per pt.   Will continue MAP and educate pt since pt lives alone.

## 2019-11-02 NOTE — PROGRESS NOTES
"  [facility]   Acute Rehabilitation Unit  Daily progress note    INTERVAL HISTORY  Overnight glucose levels were high, 286 at bedtime, but she can not tell me exactly what she ate for dinner.  This morning she has no concerns or complaints.  I discussed with her re-adding a consistent CHO diet although more liberal than the one she had previously.  She is in agreement with this and says \"I need boundaries\" when it comes to the carbohydrates.  Denies chest pain or shortness of breath.  Itchiness of the legs is improved, although she does not recall if lotion was applied  She will be going for dialysis today.  Functionally she continues to score low on the FORBES, 12/56, and has very poor sensation in the feet.  With OT was SBA for UBD, CGA for LBD, and did have a loss of balance in the bathroom during G/H which required therapy assistance to correct.        MEDICATIONS  Scheduled:    - Medication Assessment Program - Rehab Services   Does not apply See Admin Instructions     sodium chloride 0.9%  250 mL Intravenous Once in dialysis     sodium chloride 0.9%  300 mL Hemodialysis Machine Once     amLODIPine  5 mg Oral At Bedtime     aspirin  325 mg Oral At Bedtime     carvedilol  25 mg Oral BID w/meals     epoetin fletcher (EPOGEN,PROCRIT) inj ESRD  6,000 Units Intravenous Once in dialysis     hydrALAZINE  50 mg Oral TID     insulin isophane & regular  13 Units Subcutaneous Daily with supper     insulin isophane & regular  20 Units Subcutaneous QAM     moisturizing lotion   Topical TID     - MEDICATION INSTRUCTIONS -   Does not apply Once     simvastatin  20 mg Oral At Bedtime     sodium chloride (PF)  9 mL Intracatheter During Hemodialysis (from stock)     sodium chloride (PF)  9 mL Intracatheter During Hemodialysis (from stock)     sodium chloride (PF)  9 mL Intracatheter During Hemodialysis (from stock)     sodium chloride (PF)  9 mL Intracatheter During Hemodialysis (from stock)    "     PRN:  sodium chloride 0.9%, sodium chloride 0.9%, acetaminophen, alteplase, alteplase, alteplase, alteplase, bisacodyl, glucose **OR** dextrose **OR** glucagon, senna-docusate, sodium chloride (PF), sodium chloride (PF), sodium chloride (PF), sodium chloride (PF)       PHYSICAL EXAM  Patient Vitals for the past 24 hrs:   BP Temp Temp src Pulse Heart Rate Resp SpO2 Weight   11/02/19 0831 (!) 156/53 -- -- -- -- -- -- --   11/02/19 0829 (!) 156/53 99.1  F (37.3  C) Oral 89 86 16 93 % --   11/02/19 0628 -- -- -- -- -- -- -- 88.4 kg (194 lb 12.8 oz)   11/01/19 2033 127/47 -- -- -- 83 -- -- --   11/01/19 1715 (!) 161/61 99.6  F (37.6  C) Oral 86 -- 16 -- --   11/01/19 1620 (!) 167/71 -- -- -- 84 20 100 % 89.3 kg (196 lb 13.9 oz)   11/01/19 1615 (!) 170/76 -- -- -- 80 20 100 % --   11/01/19 1600 (!) 148/65 -- -- -- 75 14 100 % --   11/01/19 1545 (!) 159/67 -- -- -- 75 16 100 % --   11/01/19 1530 (!) 151/64 -- -- -- 75 13 100 % --   11/01/19 1515 (!) 164/72 -- -- -- 78 18 99 % --   11/01/19 1500 135/73 -- -- -- 75 15 99 % --   11/01/19 1445 (!) 154/63 -- -- -- 79 16 99 % --   11/01/19 1430 (!) 158/65 -- -- -- 80 13 99 % --   11/01/19 1415 (!) 150/62 -- -- -- 78 13 98 % --   11/01/19 1400 (!) 149/62 -- -- -- 79 13 98 % --   11/01/19 1345 (!) 147/61 -- -- 81 81 12 98 % --   11/01/19 1330 138/58 -- -- 82 82 15 98 % --   11/01/19 1315 (!) 144/60 -- -- -- 85 15 91 % --   11/01/19 1308 (!) 148/51 -- -- -- 88 18 94 % --   11/01/19 1300 (!) 146/63 98.5  F (36.9  C) Oral -- 90 21 98 % 92.4 kg (203 lb 11.3 oz)       GEN: NAD, pleasant and cooperative  HEENT: NC/AT  CVS: RRR, S1+S2, no m/r/g  PULM: CTA b/l, no w/r/r  ABD: Soft, NT, ND, bowel sounds present  EXT: No LE edema or calf tenderness b/l.  Varus foot on right.   Neuro: Answers appropriately, follows commands    LABS  CBC RESULTS:   Recent Labs   Lab Test 10/31/19  0556   WBC 9.1   RBC 3.41*   HGB 9.3*   HCT 31.2*   MCV 92   MCH 27.3   MCHC 29.8*   RDW 14.8           Last Comprehensive Metabolic Panel:  Sodium   Date Value Ref Range Status   10/31/2019 136 133 - 144 mmol/L Final     Potassium   Date Value Ref Range Status   10/31/2019 5.3 3.4 - 5.3 mmol/L Final     Chloride   Date Value Ref Range Status   10/31/2019 100 94 - 109 mmol/L Final     Carbon Dioxide   Date Value Ref Range Status   10/31/2019 24 20 - 32 mmol/L Final     Anion Gap   Date Value Ref Range Status   10/31/2019 12 3 - 14 mmol/L Final     Glucose   Date Value Ref Range Status   10/31/2019 151 (H) 70 - 99 mg/dL Final     Urea Nitrogen   Date Value Ref Range Status   10/31/2019 58 (H) 7 - 30 mg/dL Final     Creatinine   Date Value Ref Range Status   10/31/2019 6.10 (H) 0.52 - 1.04 mg/dL Final     GFR Estimate   Date Value Ref Range Status   10/31/2019 7 (L) >60 mL/min/[1.73_m2] Final     Comment:     Non  GFR Calc  Starting 12/18/2018, serum creatinine based estimated GFR (eGFR) will be   calculated using the Chronic Kidney Disease Epidemiology Collaboration   (CKD-EPI) equation.       Calcium   Date Value Ref Range Status   10/31/2019 7.9 (L) 8.5 - 10.1 mg/dL Final       Recent Labs   Lab 11/02/19  0754 11/02/19  0154 11/01/19  2105 11/01/19  1809 11/01/19  1200 11/01/19  0924  10/31/19  1331  10/31/19  0556  10/29/19  1330   GLC  --   --   --   --   --   --   --  151*  --  Canceled, Test credited  --  101*   * 140* 286* 196* 204* 147*   < >  --    < >  --    < >  --     < > = values in this interval not displayed.         ASSESSMENT  Roxy Beaulieu is a 59 year old LHD female with a PMH including but not limited to ESRD on HD, HTN, HLD, insulin dependent DM (HbA1c 10.7 on 10/2/19), and Hx of DVT/PE (~2004, not on anticoagulation due to non-compliance) who presented to [facility] on 10/23/19 with new onset of left leg weakness and found to have an acute R periventricular white matter infarct.  Etiology felt to be lacunar due to HTN.      Impairment group code: 01.1 L  body involvement, R brain stroke      PLAN  Rehabilitation  -Continue with inpatient rehabilitation program including PT, OT, and SLP for 3 hrs/day, rehab nursing, social work, nutrition, and close monitoring by physiatry  -The patient has impairments in strength, balance, and endurance, which are leading to activity limitations in ambulation, mobility, and ADLs    Medical  1)Neurology  -Acute R periventricular lacunar CVA due to HTN               -Continue  mg daily indefinitely               -HTN and lipid control as below               -Follow up with neurology in 4-6 weeks  -Will provide ongoing education regarding secondary stroke prevention including control of BP and glucose, compliance with medications and physician follow up, and lifestyle modifications including diet and exercise.        2)CVS  -HTN: Continue Hydralazine 50 mg TID, Coreg 25 mg BID, and Norvasc 5 mg daily.  Monitor, titrate as needed.  -HLD: Zocor 20 mg daily.  LDL 99        3)Pulmonary  -No acute issues  -Hx of PE, not on anticoagulation        4)FENGI  -Diet: Regular consistency solids, thin liquids.  Renal diet.  Discontinued moderate consistent CHO restriction due to hypoglycemia episodes.  -Bowel meds: PRN Senokot-S, Dulcolax suppository        5)  -ESRD on HD: Consulted nephrology for assistance with HD management.  Will dialyze T/Th/Sa while on rehab. On schedule for today. Assistance is appreciated.   -Potassium on 10/31, 5.3         6)DVT prophylaxis  -Hx of DVT/PE, however not on anticoagulation due to inconsistent compliance with Coumadin  -Continue  mg daily  -Mechanical prophylaxis        7)Pain  -Tylenol PRN        8)Endo   -IDDM (HbA1c 10.7)               -Decreased insulin 70/30 further to 13 units in the evening and 20 units in the morning due to low glucose readings overnight, however glucose was high last night.  Will re-add consistent CHO diet, this time very high CHO, which is more liberal than prior                -Check glucose levels and sliding scale insulin qAC and at bedtime  -Thyroid ultrasound recommended if not done due to multinodular thyroid seen on CT.  Will recommend PCP follow after discharge.     9)Heme  -AoCD:  Receives weekly EPO injections, will defer administrations to nephrology  -Per pt, has heparin allergy which she reports hives.  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  -Ongoing cognitive deficits noted.  Will place IP neuropsych consult to further quantify cognition and ability to manage IADLs and medications.        11)Social/Dispo  -Anticipate discharge home at a modified independent level for ambulation, mobility, and ADLs  -ELOS: Tentative discharge date 11/8  -Rehab prognosis: Fair - Sensation and cognitive deficits are more pronounced than initially suspected  -Follow up appointments: PCP, Recommended thyroid ultrasound, neurology 4-6 weeks     Code status: Full Code (Discussed with patient upon admission)     Jeffry Suero MD  Department of Rehabilitation Medicine  Pager: [phone]    Time Spent on this Encounter   I, Jeffry Suero, spent a total of 25 minutes face-to-face or managing the care of Roxy ORTEGA Christofer. Over 50% of my time on the unit was spent counseling the patient and coordinating care. See note for details.

## 2019-11-02 NOTE — PLAN OF CARE
FOCUS/GOAL  Medical management    ASSESSMENT, INTERVENTIONS AND CONTINUING PLAN FOR GOAL:  Patient slept well, found her awake around 0400, she verbalized no concerns, no pain. Independent with bed mobility, turning from side to side, she did not set off bed alarm.  tonight.  She has dialysis today, will be picked up at 12:30, has dialysis port right upper chest.

## 2019-11-02 NOTE — PLAN OF CARE
SLP: went through details for MAP (nursing explained as well),. reviewed list of medications with pt.  AT tnis time pt relying on list for schedule/indications. PT expresses understanding she may need use medication box at home.

## 2019-11-03 LAB
GLUCOSE BLDC GLUCOMTR-MCNC: 106 MG/DL (ref 70–99)
GLUCOSE BLDC GLUCOMTR-MCNC: 165 MG/DL (ref 70–99)
GLUCOSE BLDC GLUCOMTR-MCNC: 185 MG/DL (ref 70–99)
GLUCOSE BLDC GLUCOMTR-MCNC: 187 MG/DL (ref 70–99)
GLUCOSE BLDC GLUCOMTR-MCNC: 188 MG/DL (ref 70–99)

## 2019-11-03 PROCEDURE — 97530 THERAPEUTIC ACTIVITIES: CPT | Mod: GP

## 2019-11-03 PROCEDURE — 00000146 ZZHCL STATISTIC GLUCOSE BY METER IP

## 2019-11-03 PROCEDURE — 25000131 ZZH RX MED GY IP 250 OP 636 PS 637: Performed by: PHYSICAL MEDICINE & REHABILITATION

## 2019-11-03 PROCEDURE — 97110 THERAPEUTIC EXERCISES: CPT | Mod: GP

## 2019-11-03 PROCEDURE — 97127 ZZHC SP THERAPEUTIC INTERVENTION W/FOCUS ON COGNITIVE FUNCTION,EA 15 MIN: CPT | Mod: GN | Performed by: SPEECH-LANGUAGE PATHOLOGIST

## 2019-11-03 PROCEDURE — 25000132 ZZH RX MED GY IP 250 OP 250 PS 637: Performed by: PHYSICAL MEDICINE & REHABILITATION

## 2019-11-03 PROCEDURE — 97116 GAIT TRAINING THERAPY: CPT | Mod: GP | Performed by: PHYSICAL THERAPIST

## 2019-11-03 PROCEDURE — 97110 THERAPEUTIC EXERCISES: CPT | Mod: GP | Performed by: PHYSICAL THERAPIST

## 2019-11-03 PROCEDURE — 12800006 ZZH R&B REHAB

## 2019-11-03 PROCEDURE — 97530 THERAPEUTIC ACTIVITIES: CPT | Mod: GO

## 2019-11-03 PROCEDURE — 97535 SELF CARE MNGMENT TRAINING: CPT | Mod: GO

## 2019-11-03 PROCEDURE — 25000132 ZZH RX MED GY IP 250 OP 250 PS 637: Performed by: PHYSICIAN ASSISTANT

## 2019-11-03 RX ORDER — HYDRALAZINE HYDROCHLORIDE 25 MG/1
25 TABLET, FILM COATED ORAL 3 TIMES DAILY
Status: DISCONTINUED | OUTPATIENT
Start: 2019-11-03 | End: 2019-11-05

## 2019-11-03 RX ADMIN — AMLODIPINE BESYLATE 5 MG: 5 TABLET ORAL at 20:03

## 2019-11-03 RX ADMIN — INSULIN HUMAN 13 UNITS: 100 INJECTION, SUSPENSION SUBCUTANEOUS at 18:03

## 2019-11-03 RX ADMIN — SIMVASTATIN 20 MG: 20 TABLET, FILM COATED ORAL at 20:03

## 2019-11-03 RX ADMIN — Medication: at 08:53

## 2019-11-03 RX ADMIN — HYDRALAZINE HYDROCHLORIDE 50 MG: 50 TABLET ORAL at 08:52

## 2019-11-03 RX ADMIN — HYDRALAZINE HYDROCHLORIDE 25 MG: 25 TABLET ORAL at 14:38

## 2019-11-03 RX ADMIN — ASPIRIN 325 MG ORAL TABLET 325 MG: 325 PILL ORAL at 20:03

## 2019-11-03 RX ADMIN — CARVEDILOL 25 MG: 25 TABLET, FILM COATED ORAL at 18:05

## 2019-11-03 RX ADMIN — Medication: at 20:03

## 2019-11-03 RX ADMIN — HYDRALAZINE HYDROCHLORIDE 25 MG: 25 TABLET ORAL at 20:03

## 2019-11-03 RX ADMIN — CARVEDILOL 25 MG: 25 TABLET, FILM COATED ORAL at 08:51

## 2019-11-03 ASSESSMENT — MIFFLIN-ST. JEOR: SCORE: 1477.15

## 2019-11-03 NOTE — PLAN OF CARE
FOCUS/GOAL  Medical management    ASSESSMENT, INTERVENTIONS AND CONTINUING PLAN FOR GOAL:  Patient slept well, no c/o pain. She did not call for assistance. Independent with bed mobility, did not set off bed alarms.  Dialysis port right upper chest.

## 2019-11-03 NOTE — PLAN OF CARE
SLP: Pt on MAP, error in medication set up - pt asked to check, needed to discuss to determine error, notfied nursing. pt did not call for medications, did not seem remember discussed prior.  wrote note for pt, also on board. gave pt notebook.

## 2019-11-03 NOTE — PLAN OF CARE
PT: -15 min in AM due to dizziness. Sitting BP:116/65 mmHg > Standing BP:98/48 mmHg. Pt symptomatic. Continue per POC as appropriate.

## 2019-11-03 NOTE — PLAN OF CARE
Pt returned to ARU after dialysis at 1755.   Alert and oriented x3, very forgetful.   Temp 99.7, /52 at 1800, pt denies pain, chest pain, SOB, or new concerns, reports feeling okay.   Temp 99.6, /52 at 2020  SBA-CGA, gait belt and walker for functional mobility, independent with bed mobility.   On Map, pt didn't call for medications at 6pm or 8pm although reminded pt twice. Provided updated medication list, pt picked out right medications except two.    before supper, diet changed from regular to very high consistent CHO today. Pt ate 100% of supper.  at HS.  Pt used call light appropriately and voided in toilet x1, continent. No bm on pm.   Bed/chair alarms on at all times.   Continue MAP and SLP plans to assess pt with am medications tomorrow.

## 2019-11-03 NOTE — PLAN OF CARE
FOCUS/GOAL  Medication management    ASSESSMENT, INTERVENTIONS AND CONTINUING PLAN FOR GOAL:  Did not call for med's this shift,  reminder note put on white board et by bed.  Did pull correct medications et dosages am et afternoon. Complaining of dizziness et orthostatic et b/p's low. PMR updated, new apresoline orders et pharmacy called for new label et correct dosage put in container.

## 2019-11-03 NOTE — PROGRESS NOTES
"Nephrology Progress Note  11/02/2019       Roxy Beaulieu is a 59 yof with ESRD on HD, HTN, DM insulin dependent, DVT, and PE who was admitted to OSH with LE weakness found to have CVA, transferred to ARU for aggressive rehabilitation. Nephrology consulted for management of HD while at ARU.       Interval History  Seen and examined on dialysis, tolerating well.    No new complaints    ASSESSMENT AND RECOMMENDATIONS:   ESRD-Started HD ~6 months ago thought to be due to HTN and DM2.  Runs 3h MWF at [facility] under care of Dr Cox but on TTS for rehab schedule at TCU.  Started in 5/2019 after being non-responsive to diuretics, has tunneled RIJ line which we will use for HD, allergic to heparin.                  -HD today and will resume T, TH, S schedule thereafter       Volume-Mild edema, no SOB at rest.      Electrolytes/pH- improved as on 10/31, please check BMP in the AM     Anemia-Hgb 9.3, on 6000u EPO with runs.       DM2-A1C noted to be 9.7.    Lyndon Mann MD    Review of Systems:   I reviewed the following systems:  Gen: No fevers or chills  CV: No CP at rest  Resp: No SOB at rest  GI: No N/V    Physical Exam:   I/O last 3 completed shifts:  In: 0   Out: 3000 [Other:3000]   /52 (BP Location: Left arm)   Pulse 77   Temp 99.6  F (37.6  C) (Oral)   Resp 18   Ht 1.727 m (5' 8\")   Wt 88.4 kg (194 lb 12.8 oz)   SpO2 96%   BMI 29.62 kg/m       GENERAL APPEARANCE: alert and no distress  EYES: No scleral icterus  NECK:  No JVD  Pulmonary: lungs clear to auscultation with equal breath sounds bilaterally, no clubbing or cyanosis  CV: Regular rhythm, normal rate, no rub   - JVP not elevated   - Edema improved LE  GI: soft, nontender, normal bowel sounds  MS: no evidence of inflammation in joints, no muscle tenderness  : No Ventura  SKIN: no rash, warm, dry  NEURO: mentation intact and speech normal    Labs:   All labs reviewed by me  Electrolytes/Renal -   Recent Labs   Lab Test 10/31/19  1331 " 10/31/19  0556 10/29/19  1330 06/03/19  0652  06/01/19  1302 06/01/19  0646 05/31/19  0641  12/17/18  1041  03/15/18  2041  05/03/17  0725   NA  --  136 138 141   < >  --  140 140   < > 141   < > 135   < > 140   POTASSIUM 5.3 Canceled, Test credited 6.0* 5.5*   < > 5.6* 5.7* 5.4*   < > 4.8   < > 4.1   < > 4.6   CHLORIDE  --  100 102 108   < >  --  109 109   < > 106   < > 100   < > 109   CO2  --  24 27 26   < >  --  25 25   < > 29   < > 29   < > 27   BUN  --  58* 77* 63*   < >  --  58* 59*   < > 37*   < > 50*   < > 16   CR  --  6.10* 8.03* 5.02*   < >  --  4.73* 4.27*   < > 2.59*   < > 1.46*   < > 1.06*   * Canceled, Test credited 101* 125*   < >  --  114* 114*   < > 178*   < > 207*   < > 161*   GILL  --  7.9* 8.0* 8.7   < >  --  8.4* 8.5   < > 8.4*   < > 8.7   < > 8.7   MAG  --   --   --   --   --   --   --   --   --  2.0  --  2.1  --  1.5*   PHOS  --   --   --   --   --  5.2* 5.6* 5.9*   < >  --    < > 3.2  --   --     < > = values in this interval not displayed.       CBC -   Recent Labs   Lab Test 10/31/19  0556 06/05/19  0715 06/02/19  0607   WBC 9.1 5.9 4.8   HGB 9.3* 7.5* 7.2*    176 166       LFTs -   Recent Labs   Lab Test 06/01/19  0646 05/31/19  0641 05/30/19  0607  05/27/19 2010 12/19/18  0631 12/18/18  0620 10/04/18  1611   ALKPHOS  --   --   --   --  155* 91  --  167*   BILITOTAL  --   --   --   --  0.3 0.3  --  0.3   ALT  --   --   --   --  56* 10  --  18   AST  --   --   --   --  38 9  --  26   PROTTOTAL  --   --   --   --  6.9 5.7* 5.8* 7.0   ALBUMIN 3.5 3.2* 2.4*   < > 2.9* 2.2* 2.3* 2.7*    < > = values in this interval not displayed.       Iron Panel -   Recent Labs   Lab Test 10/31/19  0556 05/28/19  1157 10/06/18  0609   IRON 24* 32* 33*   IRONSAT 14* 17 19   MITRA  --  107 165           Current Medications:    - Medication Assessment Program - Rehab Services   Does not apply See Admin Instructions     amLODIPine  5 mg Oral At Bedtime     aspirin  325 mg Oral At Bedtime      carvedilol  25 mg Oral BID w/meals     hydrALAZINE  50 mg Oral TID     insulin isophane & regular  13 Units Subcutaneous Daily with supper     insulin isophane & regular  20 Units Subcutaneous QAM     moisturizing lotion   Topical TID     simvastatin  20 mg Oral At Bedtime

## 2019-11-03 NOTE — PLAN OF CARE
OT: Required min A for standing balance when unsupported. Max standing trial of 2-3 minutes during session. Dizziness and low BP during standing, requested shortened therapy d/t not feeling well and hypotension. -35 minutes total

## 2019-11-03 NOTE — PROGRESS NOTES
"  [facility]   Acute Rehabilitation Unit  Daily progress note    INTERVAL HISTORY  Today Lin had symptomatic hypotension, 82/69 during ambulation with therapy.  She was returned to bed with improvement in symptoms.  She received all of her BP medications this morning and also had dialysis yesterday.  She feels she has decreased swelling in her legs and states it's the \"best it's been in a long time\".  Denies chest pain, shortness of breath, fevers, or chills.       MEDICATIONS  Scheduled:    - Medication Assessment Program - Rehab Services   Does not apply See Admin Instructions     amLODIPine  5 mg Oral At Bedtime     aspirin  325 mg Oral At Bedtime     carvedilol  25 mg Oral BID w/meals     hydrALAZINE  25 mg Oral TID     insulin isophane & regular  13 Units Subcutaneous Daily with supper     insulin isophane & regular  20 Units Subcutaneous QAM     moisturizing lotion   Topical TID     simvastatin  20 mg Oral At Bedtime        PRN:  acetaminophen, bisacodyl, glucose **OR** dextrose **OR** glucagon, senna-docusate       PHYSICAL EXAM  Patient Vitals for the past 24 hrs:   BP Temp Temp src Pulse Heart Rate Resp SpO2 Weight   11/03/19 1438 137/57 -- -- -- -- -- -- --   11/03/19 0930 (!) 82/69 -- -- -- -- -- -- --   11/03/19 0627 132/64 98.7  F (37.1  C) Oral 74 -- 16 94 % 86.4 kg (190 lb 6.4 oz)   11/02/19 2021 134/52 99.6  F (37.6  C) Oral -- 87 -- -- --   11/02/19 1800 (!) 162/71 99.7  F (37.6  C) -- -- 72 18 96 % --   11/02/19 1635 (!) 152/69 98.5  F (36.9  C) Oral -- 77 -- 100 % --   11/02/19 1625 134/82 -- -- 77 77 12 95 % --   11/02/19 1615 134/76 -- -- 77 76 11 96 % --   11/02/19 1600 120/62 -- -- 77 77 11 94 % --   11/02/19 1545 137/66 -- -- 77 78 12 94 % --   11/02/19 1530 (!) 146/72 -- -- 79 78 19 96 % --   11/02/19 1515 (!) 144/66 -- -- 78 78 23 95 % --   11/02/19 1500 139/66 -- -- 77 78 10 93 % --       GEN: NAD, pleasant and cooperative  HEENT: NC/AT  CVS: RRR, " S1+S2, no m/r/g  PULM: CTA b/l, no w/r/r  ABD: Soft, NT, ND, bowel sounds present  EXT: No LE edema or calf tenderness b/l.  Varus foot on right.   Neuro: Answers appropriately, follows commands    LABS  CBC RESULTS:   Recent Labs   Lab Test 10/31/19  0556   WBC 9.1   RBC 3.41*   HGB 9.3*   HCT 31.2*   MCV 92   MCH 27.3   MCHC 29.8*   RDW 14.8          Last Comprehensive Metabolic Panel:  Sodium   Date Value Ref Range Status   10/31/2019 136 133 - 144 mmol/L Final     Potassium   Date Value Ref Range Status   10/31/2019 5.3 3.4 - 5.3 mmol/L Final     Chloride   Date Value Ref Range Status   10/31/2019 100 94 - 109 mmol/L Final     Carbon Dioxide   Date Value Ref Range Status   10/31/2019 24 20 - 32 mmol/L Final     Anion Gap   Date Value Ref Range Status   10/31/2019 12 3 - 14 mmol/L Final     Glucose   Date Value Ref Range Status   10/31/2019 151 (H) 70 - 99 mg/dL Final     Urea Nitrogen   Date Value Ref Range Status   10/31/2019 58 (H) 7 - 30 mg/dL Final     Creatinine   Date Value Ref Range Status   10/31/2019 6.10 (H) 0.52 - 1.04 mg/dL Final     GFR Estimate   Date Value Ref Range Status   10/31/2019 7 (L) >60 mL/min/[1.73_m2] Final     Comment:     Non  GFR Calc  Starting 12/18/2018, serum creatinine based estimated GFR (eGFR) will be   calculated using the Chronic Kidney Disease Epidemiology Collaboration   (CKD-EPI) equation.       Calcium   Date Value Ref Range Status   10/31/2019 7.9 (L) 8.5 - 10.1 mg/dL Final       Recent Labs   Lab 11/03/19  1216 11/03/19  0727 11/03/19  0112 11/02/19  2151 11/02/19  1758 11/02/19  1202  10/31/19  1331  10/31/19  0556  10/29/19  1330   GLC  --   --   --   --   --   --   --  151*  --  Canceled, Test credited  --  101*   * 165* 106* 152* 212* 219*   < >  --    < >  --    < >  --     < > = values in this interval not displayed.         ASSESSMENT  Roxy Beaulieu is a 59 year old LHD female with a PMH including but not limited to ESRD on HD,  HTN, HLD, insulin dependent DM (HbA1c 10.7 on 10/2/19), and Hx of DVT/PE (~2004, not on anticoagulation due to non-compliance) who presented to [facility] on 10/23/19 with new onset of left leg weakness and found to have an acute R periventricular white matter infarct.  Etiology felt to be lacunar due to HTN.      Impairment group code: 01.1 L body involvement, R brain stroke      PLAN  Rehabilitation  -Continue with inpatient rehabilitation program including PT, OT, and SLP for 3 hrs/day, rehab nursing, social work, nutrition, and close monitoring by physiatry  -The patient has impairments in strength, balance, and endurance, which are leading to activity limitations in ambulation, mobility, and ADLs    Medical  1)Neurology  -Acute R periventricular lacunar CVA due to HTN               -Continue  mg daily indefinitely               -HTN and lipid control as below               -Follow up with neurology in 4-6 weeks  -Will provide ongoing education regarding secondary stroke prevention including control of BP and glucose, compliance with medications and physician follow up, and lifestyle modifications including diet and exercise.        2)CVS  -HTN: Monitor, titrate as needed   -Symptomatic hypotension 11/3.  Will decrease Hydralazine to 25 mg TID, continue Coreg 25 mg BID and Norvasc 25 mg daily.  With improvement in swelling, may be related to fluid removal after dialysis yesterday.  Continue to monitor and titrate meds as needed.   -HLD: Zocor 20 mg daily.  LDL 99        3)Pulmonary  -No acute issues  -Hx of PE, not on anticoagulation        4)FENGI  -Diet: Regular consistency solids, thin liquids.  Renal diet.  Discontinued moderate consistent CHO restriction due to hypoglycemia episodes.  -Bowel meds: PRN Senokot-S, Dulcolax suppository        5)  -ESRD on HD: Consulted nephrology for assistance with HD management.  Will dialyze T/Th/Sa while on rehab.  Assistance is appreciated.   -Potassium  on 10/31, 5.3         6)DVT prophylaxis  -Hx of DVT/PE, however not on anticoagulation due to inconsistent compliance with Coumadin  -Continue  mg daily  -Mechanical prophylaxis        7)Pain  -Tylenol PRN        8)Endo   -IDDM (HbA1c 10.7)               -Decreased insulin 70/30 further to 13 units in the evening and 20 units in the morning due to low glucose readings overnight, however glucose was high evening of 11/2.  Will re-add consistent CHO diet, this time very high CHO, which is more liberal than prior               -Check glucose levels and sliding scale insulin qAC and at bedtime  -Thyroid ultrasound recommended if not done due to multinodular thyroid seen on CT.  Will recommend PCP follow after discharge.     9)Heme  -AoCD:  Receives weekly EPO injections, will defer administrations to nephrology  -Per pt, has heparin allergy which she reports hives.  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  -Ongoing cognitive deficits noted.  Have placed IP neuropsych consult to further quantify cognition and ability to manage IADLs and medications.        11)Social/Dispo  -Anticipate discharge home at a modified independent level for ambulation, mobility, and ADLs  -ELOS: Tentative discharge date 11/8  -Rehab prognosis: Fair - Sensation and cognitive deficits are more pronounced than initially suspected  -Follow up appointments: PCP, Recommended thyroid ultrasound, neurology 4-6 weeks     Code status: Full Code (Discussed with patient upon admission)     Jeffry Suero MD  Department of Rehabilitation Medicine  Pager: [phone]    Time Spent on this Encounter   I, Jeffry Suero, spent a total of 25 minutes face-to-face or managing the care of Roxy Zhangx. Over 50% of my time on the unit was spent counseling the patient and coordinating care. See note for details.

## 2019-11-04 LAB
GLUCOSE BLDC GLUCOMTR-MCNC: 130 MG/DL (ref 70–99)
GLUCOSE BLDC GLUCOMTR-MCNC: 153 MG/DL (ref 70–99)
GLUCOSE BLDC GLUCOMTR-MCNC: 171 MG/DL (ref 70–99)
GLUCOSE BLDC GLUCOMTR-MCNC: 192 MG/DL (ref 70–99)
GLUCOSE BLDC GLUCOMTR-MCNC: 71 MG/DL (ref 70–99)

## 2019-11-04 PROCEDURE — 97530 THERAPEUTIC ACTIVITIES: CPT | Mod: GO | Performed by: OCCUPATIONAL THERAPIST

## 2019-11-04 PROCEDURE — 97110 THERAPEUTIC EXERCISES: CPT | Mod: GO | Performed by: OCCUPATIONAL THERAPIST

## 2019-11-04 PROCEDURE — 25000132 ZZH RX MED GY IP 250 OP 250 PS 637: Performed by: PHYSICIAN ASSISTANT

## 2019-11-04 PROCEDURE — 00000146 ZZHCL STATISTIC GLUCOSE BY METER IP

## 2019-11-04 PROCEDURE — 25000132 ZZH RX MED GY IP 250 OP 250 PS 637: Performed by: PHYSICAL MEDICINE & REHABILITATION

## 2019-11-04 PROCEDURE — 97127 ZZHC SP THERAPEUTIC INTERVENTION W/FOCUS ON COGNITIVE FUNCTION,EA 15 MIN: CPT | Mod: GN | Performed by: SPEECH-LANGUAGE PATHOLOGIST

## 2019-11-04 PROCEDURE — 97530 THERAPEUTIC ACTIVITIES: CPT | Mod: GP

## 2019-11-04 PROCEDURE — 12800006 ZZH R&B REHAB

## 2019-11-04 PROCEDURE — 97110 THERAPEUTIC EXERCISES: CPT | Mod: GP

## 2019-11-04 PROCEDURE — 97535 SELF CARE MNGMENT TRAINING: CPT | Mod: GO | Performed by: OCCUPATIONAL THERAPIST

## 2019-11-04 RX ADMIN — SIMVASTATIN 20 MG: 20 TABLET, FILM COATED ORAL at 20:15

## 2019-11-04 RX ADMIN — Medication: at 20:14

## 2019-11-04 RX ADMIN — CARVEDILOL 25 MG: 25 TABLET, FILM COATED ORAL at 08:15

## 2019-11-04 RX ADMIN — ASPIRIN 325 MG ORAL TABLET 325 MG: 325 PILL ORAL at 20:14

## 2019-11-04 RX ADMIN — Medication: at 08:19

## 2019-11-04 RX ADMIN — CARVEDILOL 25 MG: 25 TABLET, FILM COATED ORAL at 18:03

## 2019-11-04 RX ADMIN — HYDRALAZINE HYDROCHLORIDE 25 MG: 25 TABLET ORAL at 20:15

## 2019-11-04 RX ADMIN — AMLODIPINE BESYLATE 5 MG: 5 TABLET ORAL at 20:15

## 2019-11-04 RX ADMIN — INSULIN HUMAN 13 UNITS: 100 INJECTION, SUSPENSION SUBCUTANEOUS at 17:59

## 2019-11-04 RX ADMIN — HYDRALAZINE HYDROCHLORIDE 25 MG: 25 TABLET ORAL at 08:15

## 2019-11-04 RX ADMIN — HYDRALAZINE HYDROCHLORIDE 25 MG: 25 TABLET ORAL at 13:36

## 2019-11-04 ASSESSMENT — MIFFLIN-ST. JEOR: SCORE: 1489.4

## 2019-11-04 NOTE — PLAN OF CARE
PT: still orthostatic this AM, but less symptomatic than previous. Issued handout for upper calisthenics to preform prior to PT/OT sessions for blood pressure management.    Please continue to encourage fluids.

## 2019-11-04 NOTE — PLAN OF CARE
Pt is alert and oriented x3, very forgetful.   On Map, pt set up phone alarm at 6pm to call for medication with encouragement at start of pm shift but pt didn't call at 6pm, pt stated that phone alarm went off but didn't remember what it was for. Pt able to pick out correct medications and verbalized dose and indication correctly using medication list.   Pt again set up phone alarm at 8pm and added drugs for the reason then pt called at 8pm for medications and picked out correct medications and answered basic questions about medications when asked looking at the medication list.   Pt was encouraged to set up phone alarms for tomorrow at 8am, 2pm, 6pm, and 8pm.   Temp 100.2 /64 at 1716, pt denies pain, chest pain, SOB, chills, UTI sx, or new concerns. Lung sounds clear. No voiding or having bm this pm when offered toileting q 2hrs.   Pt was in bed all pm, on the phone many times.   When offered oral care at HS, pt stated if she didn't need to get out of bed. Pt brushed teeth in bed with setup of items.    before dinner, ate 100%.  at HS.   Temp 97.6 /51  Pt denied dizziness all pm.   Bed alarm on. Will continue MAP.

## 2019-11-04 NOTE — PROGRESS NOTES
11/04/19 1446   Signing Clinician's Name / Credentials   Signing clinician's name / credentials Gabbie GONG   Quick Adds   Rehab Discipline SLP   Additional Documentation   SLP Plan SLP:s ee if neuropsych testing results in.  work on moderate complex cognitive tasks, can also continue IPAD (flow 7x7),    Total Session Time   Total Session Time (minutes) 45 minutes   SLP - Acute Rehab Center Time   Individual Time (minutes) - enter zero if not applicable - SLP 45   Group Time (minutes) - enter zero if not applicable  - SLP 0   Concurrent Time (minutes) - enter zero if not applicable  - SLP 0   Co-Treatment Time (minutes) - enter zero if not applicable  - SLP 0   ARC Total Session Time (minutes) - SLP 45   Problem Solving   Problem Solving Comment SLP: WJ-R test of verbal analogies 46th percentile, began another assessment but pt stated she did not understand directions at more complex level and wanted to stop (pt also had just gone through neuropsych testing , mild difficulty with new task for planning, reasoning (flow on IPAD up to 6x6)

## 2019-11-04 NOTE — PLAN OF CARE
FOCUS/GOAL  Bowel management, Bladder management, Medication management, and Mobility    ASSESSMENT, INTERVENTIONS AND CONTINUING PLAN FOR GOAL:  Patient alert and oriented X 4. Using call light and verbalize needs. Denies pain during shift. Patient on medication administration protocol (MAP), called appropriately for AM medication and was able to identify the right medication with the right dose, needed a cue for PM meds. Continent of bowel and bladder, using toilet in bathroom. Contact guard assist of 1, patient moves quickly to transfer prior to gait belt being put on. BG before breakfast 171 and before lunch 71.  Therapy reported patient being orthostatic, junior hose applied, MD placed order.

## 2019-11-04 NOTE — PLAN OF CARE
FOCUS/GOAL  Medical management    ASSESSMENT, INTERVENTIONS AND CONTINUING PLAN FOR GOAL:  Patient slept well. She did not call for assistance. She is independent with bed mobility. She did not set off bed alarm.  No c/o pain this AM. Continue bed/chair alarm.

## 2019-11-04 NOTE — PROGRESS NOTES
[facility]   Acute Rehabilitation Unit  Daily progress note    INTERVAL HISTORY  No acute events overnight.  This morning Lin felt mildly dizzy when initially getting out of bed, but resolved quickly and is significantly improved compared to yesterday.  KRYSTEN preston were donned also.  Denies chest pain or shortness of breath.  Neuropsych testing will occur today to further evaluate cognitive deficits.  She has continued to struggle with MAP program.  Therapy session was limited yesterday due to dizziness and orthostasis.    MEDICATIONS  Scheduled:    - Medication Assessment Program - Rehab Services   Does not apply See Admin Instructions     amLODIPine  5 mg Oral At Bedtime     aspirin  325 mg Oral At Bedtime     carvedilol  25 mg Oral BID w/meals     hydrALAZINE  25 mg Oral TID     insulin isophane & regular  13 Units Subcutaneous Daily with supper     [START ON 11/5/2019] insulin isophane & regular  22 Units Subcutaneous QAM     moisturizing lotion   Topical TID     simvastatin  20 mg Oral At Bedtime        PRN:  acetaminophen, bisacodyl, glucose **OR** dextrose **OR** glucagon, senna-docusate       PHYSICAL EXAM  Patient Vitals for the past 24 hrs:   BP Temp Temp src Pulse Heart Rate Resp SpO2 Weight   11/04/19 0842 -- -- -- -- -- -- 94 % --   11/04/19 0815 (!) 147/62 -- -- -- -- -- -- --   11/04/19 0624 -- -- -- -- -- -- -- 87.6 kg (193 lb 1.6 oz)   11/03/19 2005 (!) 141/51 97.6  F (36.4  C) Oral -- 79 -- -- --   11/03/19 1716 (!) 167/64 100.2  F (37.9  C) Oral 76 76 16 93 % --   11/03/19 1438 137/57 -- -- -- -- -- -- --       GEN: NAD, pleasant and cooperative  HEENT: NC/AT  CVS: RRR, S1+S2, no m/r/g  PULM: CTA b/l, no w/r/r  ABD: Soft, NT, ND, bowel sounds present  EXT: No LE edema or calf tenderness b/l.  Varus foot on right.   Neuro: Answers appropriately, follows commands    LABS  CBC RESULTS:   Recent Labs   Lab Test 10/31/19  0556   WBC 9.1   RBC 3.41*   HGB 9.3*    HCT 31.2*   MCV 92   MCH 27.3   MCHC 29.8*   RDW 14.8          Last Comprehensive Metabolic Panel:  Sodium   Date Value Ref Range Status   10/31/2019 136 133 - 144 mmol/L Final     Potassium   Date Value Ref Range Status   10/31/2019 5.3 3.4 - 5.3 mmol/L Final     Chloride   Date Value Ref Range Status   10/31/2019 100 94 - 109 mmol/L Final     Carbon Dioxide   Date Value Ref Range Status   10/31/2019 24 20 - 32 mmol/L Final     Anion Gap   Date Value Ref Range Status   10/31/2019 12 3 - 14 mmol/L Final     Glucose   Date Value Ref Range Status   10/31/2019 151 (H) 70 - 99 mg/dL Final     Urea Nitrogen   Date Value Ref Range Status   10/31/2019 58 (H) 7 - 30 mg/dL Final     Creatinine   Date Value Ref Range Status   10/31/2019 6.10 (H) 0.52 - 1.04 mg/dL Final     GFR Estimate   Date Value Ref Range Status   10/31/2019 7 (L) >60 mL/min/[1.73_m2] Final     Comment:     Non  GFR Calc  Starting 12/18/2018, serum creatinine based estimated GFR (eGFR) will be   calculated using the Chronic Kidney Disease Epidemiology Collaboration   (CKD-EPI) equation.       Calcium   Date Value Ref Range Status   10/31/2019 7.9 (L) 8.5 - 10.1 mg/dL Final       Recent Labs   Lab 11/04/19  0816 11/04/19  0205 11/03/19  2104 11/03/19  1713 11/03/19  1216 11/03/19  0727  10/31/19  1331  10/31/19  0556  10/29/19  1330   GLC  --   --   --   --   --   --   --  151*  --  Canceled, Test credited  --  101*   * 130* 188* 187* 185* 165*   < >  --    < >  --    < >  --     < > = values in this interval not displayed.         ASSESSMENT  Roxy Beaulieu is a 59 year old LHD female with a PMH including but not limited to ESRD on HD, HTN, HLD, insulin dependent DM (HbA1c 10.7 on 10/2/19), and Hx of DVT/PE (~2004, not on anticoagulation due to non-compliance) who presented to [facility] on 10/23/19 with new onset of left leg weakness and found to have an acute R periventricular white matter infarct.  Etiology  felt to be lacunar due to HTN.      Impairment group code: 01.1 L body involvement, R brain stroke      PLAN  Rehabilitation  -Continue with inpatient rehabilitation program including PT, OT, and SLP for 3 hrs/day, rehab nursing, social work, nutrition, and close monitoring by physiatry  -The patient has impairments in strength, balance, and endurance, which are leading to activity limitations in ambulation, mobility, and ADLs    Medical  1)Neurology  -Acute R periventricular lacunar CVA due to HTN               -Continue  mg daily indefinitely               -HTN and lipid control as below               -Follow up with neurology in 4-6 weeks  -Will provide ongoing education regarding secondary stroke prevention including control of BP and glucose, compliance with medications and physician follow up, and lifestyle modifications including diet and exercise.        2)CVS  -HTN: Monitor, titrate as needed   -Symptomatic hypotension 11/3.  Have decreased Hydralazine to 25 mg TID, continue Coreg 25 mg BID and Norvasc 25 mg daily.  With improvement in swelling, may be related to fluid removal after dialysis yesterday.  Continue to monitor and titrate meds as needed.  TEDs have been ordered.  -HLD: Zocor 20 mg daily.  LDL 99        3)Pulmonary  -No acute issues  -Hx of PE, not on anticoagulation        4)FENGI  -Diet: Regular consistency solids, thin liquids.  Renal diet.  Discontinued moderate consistent CHO restriction due to hypoglycemia episodes.  -Bowel meds: PRN Senokot-S, Dulcolax suppository        5)  -ESRD on HD: Consulted nephrology for assistance with HD management.  Will dialyze T/Th/Sa while on rehab.  Assistance is appreciated.   -Potassium on 10/31, 5.3         6)DVT prophylaxis  -Hx of DVT/PE, however not on anticoagulation due to inconsistent compliance with Coumadin  -Continue  mg daily  -Mechanical prophylaxis        7)Pain  -Tylenol PRN        8)Endo   -IDDM (HbA1c 10.7)                -Decreased evening insulin 70/30 to 13 units, however daytime glucose levels are elevated so will increase morning dose to 22 units.  Continue very high consistent CHO diet.               -Check glucose levels and sliding scale insulin qAC and at bedtime  -Thyroid ultrasound recommended if not done due to multinodular thyroid seen on CT.  Will recommend PCP follow after discharge.     9)Heme  -AoCD:  Receives weekly EPO injections, will defer administrations to nephrology  -Per pt, has heparin allergy which she reports hives.  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  -Ongoing cognitive deficits noted.  Have placed IP neuropsych consult to further quantify cognition and ability to manage IADLs and medications.        11)Social/Dispo  -Anticipate discharge home at a modified independent level for ambulation, mobility, and ADLs  -ELOS: Tentative discharge date 11/8  -Rehab prognosis: Fair - Sensation and cognitive deficits are more pronounced than initially suspected  -Follow up appointments: PCP, Recommended thyroid ultrasound, neurology 4-6 weeks     Code status: Full Code (Discussed with patient upon admission)     Jeffry Suero MD  Department of Rehabilitation Medicine  Pager: [phone]    Time Spent on this Encounter   I, Jeffry Suero, spent a total of 25 minutes face-to-face or managing the care of Roxy Beaulieu. Over 50% of my time on the unit was spent counseling the patient and coordinating care. See note for details.

## 2019-11-04 NOTE — PROGRESS NOTES
OT: -10 d/t nursing and dietician needs during AM session.     Pt completing UB dressing with SBA/set up while seated; pt is CGA using FWW for LB dressing. Pt is CGA for oral cares using FWW standing at sink. BP monitored during session; pt orthostatic after completing oral cares and symptomatic. KRYSTEN hose donned by patient with SBA while seated. Focused on UE strengthening exercises while seated to increase independence with ADLs and IADLs.    -20 d/t low BG and pt needing to eat during PM session.     BP monitored throughout PM session; KRYSTEN hose still on. Pt symptomatic upon standing with FWW and CGA and requesting to sit due to dizziness. UB calisthetics completed in seated due to pt's symptoms.

## 2019-11-04 NOTE — PROGRESS NOTES
Called [facility] and requested a CADI waiver assessment for PCA services in the home. Met with pt at bedside, received permission to make call and process with providing information. Intake completed. [facility] will schedule in-home assessment soon.      SW contacted [facility] to learn about eligibility. Will discuss and provided pt with information and contact to schedule rides. Began writing a letter RE: pt hospitalization to give pt's sister for disability purposes. Neuopsych testing pending. Final discharge location and needs pending as well.     Will plan to touch base with HD center and fax updated clinicals. SWer will continue to follow.     Jessi Kolb, JOHANNA, CAD-[place]  [place] [facility]   Phone: [phone]  I   Pager: [phone]

## 2019-11-05 LAB
GLUCOSE BLDC GLUCOMTR-MCNC: 103 MG/DL (ref 70–99)
GLUCOSE BLDC GLUCOMTR-MCNC: 155 MG/DL (ref 70–99)
GLUCOSE BLDC GLUCOMTR-MCNC: 202 MG/DL (ref 70–99)
GLUCOSE BLDC GLUCOMTR-MCNC: 241 MG/DL (ref 70–99)
GLUCOSE BLDC GLUCOMTR-MCNC: 336 MG/DL (ref 70–99)

## 2019-11-05 PROCEDURE — 97110 THERAPEUTIC EXERCISES: CPT | Mod: GO

## 2019-11-05 PROCEDURE — 92507 TX SP LANG VOICE COMM INDIV: CPT | Mod: GN

## 2019-11-05 PROCEDURE — 90937 HEMODIALYSIS REPEATED EVAL: CPT

## 2019-11-05 PROCEDURE — 25000132 ZZH RX MED GY IP 250 OP 250 PS 637: Performed by: PHYSICAL MEDICINE & REHABILITATION

## 2019-11-05 PROCEDURE — 25800030 ZZH RX IP 258 OP 636: Performed by: CLINICAL NURSE SPECIALIST

## 2019-11-05 PROCEDURE — 97112 NEUROMUSCULAR REEDUCATION: CPT | Mod: GP | Performed by: PHYSICAL THERAPIST

## 2019-11-05 PROCEDURE — 63400005 ZZH RX 634: Performed by: CLINICAL NURSE SPECIALIST

## 2019-11-05 PROCEDURE — 97110 THERAPEUTIC EXERCISES: CPT | Mod: GP | Performed by: PHYSICAL THERAPIST

## 2019-11-05 PROCEDURE — 99232 SBSQ HOSP IP/OBS MODERATE 35: CPT | Performed by: PHYSICIAN ASSISTANT

## 2019-11-05 PROCEDURE — 12800006 ZZH R&B REHAB

## 2019-11-05 PROCEDURE — 25000132 ZZH RX MED GY IP 250 OP 250 PS 637: Performed by: PHYSICIAN ASSISTANT

## 2019-11-05 PROCEDURE — 97530 THERAPEUTIC ACTIVITIES: CPT | Mod: GO

## 2019-11-05 PROCEDURE — 99207 ZZC CDG-MDM COMPONENT: MEETS LOW - DOWN CODED: CPT | Performed by: PHYSICIAN ASSISTANT

## 2019-11-05 PROCEDURE — 00000146 ZZHCL STATISTIC GLUCOSE BY METER IP

## 2019-11-05 RX ORDER — AMLODIPINE BESYLATE 2.5 MG/1
2.5 TABLET ORAL AT BEDTIME
Status: DISCONTINUED | OUTPATIENT
Start: 2019-11-05 | End: 2019-11-06

## 2019-11-05 RX ORDER — CARVEDILOL 12.5 MG/1
12.5 TABLET ORAL 2 TIMES DAILY WITH MEALS
Status: DISCONTINUED | OUTPATIENT
Start: 2019-11-05 | End: 2019-11-06

## 2019-11-05 RX ADMIN — INSULIN HUMAN 13 UNITS: 100 INJECTION, SUSPENSION SUBCUTANEOUS at 18:34

## 2019-11-05 RX ADMIN — SIMVASTATIN 20 MG: 20 TABLET, FILM COATED ORAL at 20:00

## 2019-11-05 RX ADMIN — AMLODIPINE BESYLATE 2.5 MG: 2.5 TABLET ORAL at 20:03

## 2019-11-05 RX ADMIN — SODIUM CHLORIDE 300 ML: 9 INJECTION, SOLUTION INTRAVENOUS at 13:37

## 2019-11-05 RX ADMIN — EPOETIN ALFA 6000 UNITS: 10000 SOLUTION INTRAVENOUS; SUBCUTANEOUS at 13:38

## 2019-11-05 RX ADMIN — HYDRALAZINE HYDROCHLORIDE 25 MG: 25 TABLET ORAL at 08:11

## 2019-11-05 RX ADMIN — ASPIRIN 325 MG ORAL TABLET 325 MG: 325 PILL ORAL at 20:00

## 2019-11-05 RX ADMIN — Medication: at 08:17

## 2019-11-05 RX ADMIN — Medication 12.5 MG: at 20:03

## 2019-11-05 RX ADMIN — SODIUM CHLORIDE 250 ML: 9 INJECTION, SOLUTION INTRAVENOUS at 13:37

## 2019-11-05 RX ADMIN — Medication: at 20:03

## 2019-11-05 RX ADMIN — Medication: at 13:38

## 2019-11-05 RX ADMIN — CARVEDILOL 12.5 MG: 12.5 TABLET, FILM COATED ORAL at 18:32

## 2019-11-05 RX ADMIN — CARVEDILOL 25 MG: 25 TABLET, FILM COATED ORAL at 08:11

## 2019-11-05 ASSESSMENT — MIFFLIN-ST. JEOR
SCORE: 1492.12
SCORE: 1492.5

## 2019-11-05 NOTE — PROGRESS NOTES
[facility]   Acute Rehabilitation Unit  Daily progress note    INTERVAL HISTORY  No acute events overnight.  This morning continued to have symptomatic orthostasis, particularly when first getting up and ambulating with therapy.  Did improve but not completely resolve throughout her therapy session.  Yesterday morning NPH was increased to 22 units, but lunchtime glucose was 70.  Have asked hospitalist to follow along for assistance with these issues.  Neuropsychology testing was completed yesterday, report pending.  Denies chest pain or shortness of breath.     MEDICATIONS  Scheduled:    - Medication Assessment Program - Rehab Services   Does not apply See Admin Instructions     sodium chloride 0.9%  250 mL Intravenous Once in dialysis     sodium chloride 0.9%  300 mL Hemodialysis Machine Once     sodium chloride 0.9%  500 mL Intravenous Once     amLODIPine  2.5 mg Oral At Bedtime     aspirin  325 mg Oral At Bedtime     carvedilol  12.5 mg Oral BID w/meals     epoetin fletcher (EPOGEN,PROCRIT) inj ESRD  6,000 Units Intravenous Once in dialysis     hydrALAZINE  12.5 mg Oral TID     insulin isophane & regular  13 Units Subcutaneous Daily with supper     insulin isophane & regular  22 Units Subcutaneous QAM     moisturizing lotion   Topical TID     - MEDICATION INSTRUCTIONS -   Does not apply Once     simvastatin  20 mg Oral At Bedtime     sodium chloride (PF)  9 mL Intracatheter During Hemodialysis (from stock)     sodium chloride (PF)  9 mL Intracatheter During Hemodialysis (from stock)        PRN:  sodium chloride 0.9%, acetaminophen, alteplase, alteplase, bisacodyl, glucose **OR** dextrose **OR** glucagon, senna-docusate, sodium chloride (PF), sodium chloride (PF)       PHYSICAL EXAM  Patient Vitals for the past 24 hrs:   BP Temp Temp src Pulse Heart Rate Resp SpO2 Weight   11/05/19 0649 138/62 97.6  F (36.4  C) Oral 79 -- 18 93 % 87.9 kg (193 lb 11.2 oz)   11/04/19 2015 133/57 --  -- -- -- -- -- --   11/04/19 1804 (!) 148/51 -- -- -- 83 -- -- --   11/04/19 1609 (!) 147/56 -- -- -- -- -- -- --   11/04/19 1524 (!) 156/60 98.7  F (37.1  C) Oral 77 77 16 91 % --       GEN: NAD, pleasant and cooperative, participating in PT  HEENT: NC/AT  CVS: RRR, S1+S2, no m/r/g  PULM: CTA b/l, no w/r/r  ABD: Soft, NT, ND, bowel sounds present  EXT: No LE edema or calf tenderness b/l.  KRYSTEN hoses in place. Varus foot on right.   Neuro: Answers appropriately, follows commands    LABS  CBC RESULTS:   Recent Labs   Lab Test 10/31/19  0556   WBC 9.1   RBC 3.41*   HGB 9.3*   HCT 31.2*   MCV 92   MCH 27.3   MCHC 29.8*   RDW 14.8            Last Comprehensive Metabolic Panel:  Sodium   Date Value Ref Range Status   10/31/2019 136 133 - 144 mmol/L Final     Potassium   Date Value Ref Range Status   10/31/2019 5.3 3.4 - 5.3 mmol/L Final     Chloride   Date Value Ref Range Status   10/31/2019 100 94 - 109 mmol/L Final     Carbon Dioxide   Date Value Ref Range Status   10/31/2019 24 20 - 32 mmol/L Final     Anion Gap   Date Value Ref Range Status   10/31/2019 12 3 - 14 mmol/L Final     Glucose   Date Value Ref Range Status   10/31/2019 151 (H) 70 - 99 mg/dL Final     Urea Nitrogen   Date Value Ref Range Status   10/31/2019 58 (H) 7 - 30 mg/dL Final     Creatinine   Date Value Ref Range Status   10/31/2019 6.10 (H) 0.52 - 1.04 mg/dL Final     GFR Estimate   Date Value Ref Range Status   10/31/2019 7 (L) >60 mL/min/[1.73_m2] Final     Comment:     Non  GFR Calc  Starting 12/18/2018, serum creatinine based estimated GFR (eGFR) will be   calculated using the Chronic Kidney Disease Epidemiology Collaboration   (CKD-EPI) equation.       Calcium   Date Value Ref Range Status   10/31/2019 7.9 (L) 8.5 - 10.1 mg/dL Final       Recent Labs   Lab 11/05/19  0807 11/05/19  0217 11/04/19  2117 11/04/19  1708 11/04/19  1322 11/04/19  0816  10/31/19  1331  10/31/19  0556  10/29/19  1330   GLC  --   --   --   --   --    --   --  151*  --  Canceled, Test credited  --  101*   * 103* 192* 153* 71 171*   < >  --    < >  --    < >  --     < > = values in this interval not displayed.         ASSESSMENT  Roxy Beaulieu is a 59 year old LHD female with a PMH including but not limited to ESRD on HD, HTN, HLD, insulin dependent DM (HbA1c 10.7 on 10/2/19), and Hx of DVT/PE (~2004, not on anticoagulation due to non-compliance) who presented to [facility] on 10/23/19 with new onset of left leg weakness and found to have an acute R periventricular white matter infarct.  Etiology felt to be lacunar due to HTN.      Impairment group code: 01.1 L body involvement, R brain stroke      PLAN  Rehabilitation  -Continue with inpatient rehabilitation program including PT, OT, and SLP for 3 hrs/day, rehab nursing, social work, nutrition, and close monitoring by physiatry  -The patient has impairments in strength, balance, and endurance, which are leading to activity limitations in ambulation, mobility, and ADLs    Medical  1)Neurology  -Acute R periventricular lacunar CVA due to HTN               -Continue  mg daily indefinitely               -HTN and lipid control as below               -Follow up with neurology in 4-6 weeks  -Will provide ongoing education regarding secondary stroke prevention including control of BP and glucose, compliance with medications and physician follow up, and lifestyle modifications including diet and exercise.        2)CVS  -HTN: Monitor, titrate as needed   -Ongoing symptomatic hypotension since 11/3.  Hospitalist team has re-evaluated patient, assistance is appreciated.  BP meds decreased to Hydralazine 12.5 mg TID, Coreg 12.5 mg BID and Norvasc 2.5 mg daily (correction to prior notes, was previously on 5 mg or Norvasc).  Nephrology team aware, will adjust fluid removal with dialysis.  -HLD: Zocor 20 mg daily.  LDL 99        3)Pulmonary  -No acute issues  -Hx of PE, not on  anticoagulation        4)FENGI  -Diet: Regular consistency solids, thin liquids.  Renal diet.  Discontinued moderate consistent CHO restriction due to hypoglycemia episodes.  -Bowel meds: PRN Senokot-S, Dulcolax suppository        5)  -ESRD on HD: Consulted nephrology for assistance with HD management.  Will dialyze T/Th/Sa while on rehab.  Assistance is appreciated.   -Potassium on 10/31, 5.3         6)DVT prophylaxis  -Hx of DVT/PE, however not on anticoagulation due to inconsistent compliance with Coumadin  -Continue  mg daily  -Mechanical prophylaxis        7)Pain  -Tylenol PRN        8)Endo   -IDDM (HbA1c 10.7)               -Decreased evening insulin 70/30 to 13 units, however daytime glucose levels were elevated so increased morning dose to 22 units.  Continue very high consistent CHO diet.  Yesterday lunch glucose was 70, if continues to have low values will back down morning dose again.               -Check glucose levels and sliding scale insulin qAC and at bedtime  -Thyroid ultrasound recommended if not done due to multinodular thyroid seen on CT.  Will recommend PCP follow after discharge.     9)Heme  -AoCD:  Receives weekly EPO injections, will defer administrations to nephrology  -Per pt, has heparin allergy which she reports hives.  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  -Ongoing cognitive deficits noted.  Have placed IP neuropsych consult to further quantify cognition and ability to manage IADLs and medications.        11)Social/Dispo  -Anticipate discharge home at a modified independent level for ambulation, mobility, and ADLs  -ELOS: Tentative discharge date 11/8  -Rehab prognosis: Fair - Sensation and cognitive deficits are more pronounced than initially suspected  -Follow up appointments: PCP, Recommended thyroid ultrasound, neurology 4-6  weeks     Code status: Full Code (Discussed with patient upon admission)     Jeffry Suero MD  Department of Rehabilitation Medicine  Pager: [phone]    Time Spent on this Encounter   I, Jeffry Suero, spent a total of 30 minutes face-to-face or managing the care of Roxyhank Beaulieu. Over 50% of my time on the unit was spent counseling the patient and coordinating care. See note for details.

## 2019-11-05 NOTE — PLAN OF CARE
Alert and orientedx4. Used call light appropriately. Denies any pain. Not in respiratory distress. Continue on MAP- calls appropriately, needed little cueing for meds. Declined bed alarm at HS. CGA for transfers, moves so quick when ambulating/transfering. Continent of bladder. Last BM was 2 days ago per pt but declined bowel regimen, encouraged fluids instead. BG at supper was 153 and 192 at HS. TEDS on for orthostasis.   Will continue plan of care.

## 2019-11-05 NOTE — PLAN OF CARE
OT: BP appeared better this AM - pt at beginning of session reporting not symptoms, towards end of session began noting slight dizziness with standing. See flow sheet for BP measures.     Completed hot meal prep/kitchen mobility w/fww - 2 noted LOB w/pt able to recover w/CGA provided by writer.  Pt at beginning of activity w/reasonable problem solving and safety awareness, towards end when becoming fatigued pts safety awareness became impaired and began relying on resting hips on counter and increased cuing for safety needed.    Cued to call for meds and BG check - when nursing entered to inquire about call light, pt required 2 - 3 cues for recall.

## 2019-11-05 NOTE — PLAN OF CARE
Pt alert and oriented x 4. Able to communicate needs.   Denies pain, SOB or any discomfort. Early morning slightly dizzy. /62 & pulse 79.  Assist of one for transfer and mobility.   Appetite good.  Teds on for Orthostasis. Declined 500 NS bolus and said makes her void during hemodialysis and it is very uncomfortable. Sophie & Dr Michelle updated via web page.   Continent of B/B. Last BM 11/2.   Left for hemodialysis at 1230. Unable to chart on MAR for 1400 medication which were not given (chart is pending).   This morning, pt did MAP program very well.    Continue to monitor.

## 2019-11-05 NOTE — PROGRESS NOTES
[facility], [place]    Medicine Progress Note - Hospitalist Service       Date of Admission:  10/28/2019  Assessment & Plan   Roxy Beaulieu is a 59 year old female with a pmh significant for ESRD on HD, HTN, DM insulin dependent, DVT, and PE who was admitted to OSH with LE weakness found to have CVA, transferred to ARU for aggressive rehabilitation. Medicine previously following and signed off 10/31, asked to re-evaluate patient 11/5 d/t orthostatic hypotension.      CVA, Subcortical, w/ transient LLE weakness. Presented to OSH on 10/23 w/ LLE numbness and weakness. Head CT negative for acute stroke, CT angiogram negative for any significant arotid stenosis, MRI brain w/ signal abnormalities compatible w/ a subcentimeter acute R-sided periventricular white matter infarction, suspect lacunar stroke from HTN. Echo with EF 66%.  - Continue  mg every day  - Continue Statin  - Goal SBP <120     ESRD on HD. Dialyzies MWF PTA, transition to TThS while here. Last had HD on 11/2, UF with 2L removed. Nephrology aware of orthostatic hypotension that has been present since most recent HD. Plan for HD today. Renal diet. Epo weekly.     Hx of HTN  Orthostatic hypotension  Previously on norvasc 5mg, coreg 25mg BID, and hydralazine 50mg TID. Had HD Saturday and since has had symptomatic orthostatic hypotension. No syncope.Wt was 203lbs prior to HD on 11/2, Wt 193lbs today. No edema on exam. Pt appears dry.    - Give 500cc NS bolus - per d/w renal  - Plan for HD today - renal aware of orthostasis so will avoid removing fluid pending their assessment  - Decrease norvasc to 2.5mg, decrease coreg to 12.5mg BID and decrease hydralazine to 12.5mg TID. All with hold parameters.      DM II. Most recent Hgb A1C 10.7 on 10/2/2019. PTA on NPH 25 units BID. NPH has been adjusted for labile BGs while at ARU. Currently on NPH 22qAM and 13U qPM. BGs have been 100-180, with the exception of one low to 70  around 1300 yesterday.   - Continue current dosing of NPH 22U qAM and 13U qPM  - BG TID w/ meals, at bedtime, and 0200  - Hypoglycemia protocol  - If pts has recurrent hypoglycemia during the daytime, would decrease NPH to 20U     Hx of PE/DVT (2003). Unclear if this was provoked. Has been in AC with coumadin in the past, but no longer given compliance issues.     Leta Mathis PA-C  Hospitalist Service  [facility], [place]    ______________________________________________________________________    Interval History   Lin complained of lightheadedness and dizziness upon standing this morning. She reports this has been ongoing since HD on Saturday. She reports that her edema in her legs hasn't looked this good in awhile. She denies any chest pain, sob, or dyspnea. No fevers or chills. No other acute concerns.     Physical Exam   Vital Signs: Temp: 97.6  F (36.4  C) Temp src: Oral BP: 138/62 Pulse: 79 Heart Rate: 83 Resp: 18 SpO2: 93 % O2 Device: None (Room air)    Weight: 193 lbs 11.2 oz  GENERAL: Alert and oriented x 3. NAD. Cooperative. Responds appropriately to questions,.   HEENT: Anicteric sclera. MM slightly dry.    CV: RRR.   RESPIRATORY: Effort normal on RA. Lungs CTAB throughout.   NEUROLOGICAL: No focal deficits. Moves all extremities.    EXTREMITIES: No peripheral edema in BLE.   SKIN: No jaundice or rashes on exposed areas of skin

## 2019-11-05 NOTE — PROGRESS NOTES
"Nephrology Progress Note  11/05/2019         Roxy Beaulieu is a 59 yof with ESRD on HD, HTN, DM insulin dependent, DVT, and PE who was admitted to OSH with LE weakness found to have CVA, transferred to ARU for aggressive rehabilitation. Nephrology consulted for management of HD while at ARU.        Interval History  Mrs Beaulieu was seen HD today, running 1L as she is at EDW, given 500cc this am which did help.  Can back off on one of her 3 antihypertensives if needed as she appears to be at EDW at ~88kg.       ASSESSMENT AND RECOMMENDATIONS:   ESRD-Started HD ~6 months ago thought to be due to HTN and DM2.  Runs 3h MWF at [facility] under care of Dr Cox but on TTS for rehab schedule at TCU.  Started in 5/2019 after being non-responsive to diuretics, has tunneled RIJ line which we will use for HD, allergic to heparin.                  -HD 1L of UF today with some orthostasis this am, given 0.5L NS which did help, will pull that back as SBP was in 140's and encouraged her to eat/drink normally.  EDW 88kg.       Volume-Mild edema, no SOB at rest.  Pulling 1L with run today,      Electrolytes/pH-No labs this am, can check prior to next run.       Anemia-Hgb 9.3 last check, on 6000u EPO with runs.       DM2-A1C noted to be 9.7.     Seen and discussed with Dr Pena     Recommendations were communicated to primary team via note     RIK Mondragon CNS  Clinical Nurse Specialist  [phone]      Review of Systems:   I reviewed the following systems:  Gen: No fevers or chills  CV: No CP at rest  Resp: No SOB at rest  GI: No N/V        Physical Exam:   No intake/output data recorded.   BP (!) 167/78   Pulse 83   Temp 98.4  F (36.9  C) (Oral)   Resp 20   Ht 1.727 m (5' 8\")   Wt 87.9 kg (193 lb 12.6 oz)   SpO2 94%   BMI 29.46 kg/m       GENERAL APPEARANCE: alert and no distress  EYES: No scleral icterus  NECK:  No JVD  Pulmonary: lungs clear to auscultation with equal breath sounds bilaterally, no " clubbing or cyanosis  CV: Regular rhythm, normal rate, no rub   - JVP not elevated   - Edema +2LE  GI: soft, nontender, normal bowel sounds  MS: no evidence of inflammation in joints, no muscle tenderness  : No Ventura  SKIN: no rash, warm, dry  NEURO: mentation intact and speech normal    Labs:   All labs reviewed by me  Electrolytes/Renal -   Recent Labs   Lab Test 10/31/19  1331 10/31/19  0556 10/29/19  1330 06/03/19  0652  06/01/19  1302 06/01/19  0646 05/31/19  0641  12/17/18  1041  03/15/18  2041  05/03/17  0725   NA  --  136 138 141   < >  --  140 140   < > 141   < > 135   < > 140   POTASSIUM 5.3 Canceled, Test credited 6.0* 5.5*   < > 5.6* 5.7* 5.4*   < > 4.8   < > 4.1   < > 4.6   CHLORIDE  --  100 102 108   < >  --  109 109   < > 106   < > 100   < > 109   CO2  --  24 27 26   < >  --  25 25   < > 29   < > 29   < > 27   BUN  --  58* 77* 63*   < >  --  58* 59*   < > 37*   < > 50*   < > 16   CR  --  6.10* 8.03* 5.02*   < >  --  4.73* 4.27*   < > 2.59*   < > 1.46*   < > 1.06*   * Canceled, Test credited 101* 125*   < >  --  114* 114*   < > 178*   < > 207*   < > 161*   GILL  --  7.9* 8.0* 8.7   < >  --  8.4* 8.5   < > 8.4*   < > 8.7   < > 8.7   MAG  --   --   --   --   --   --   --   --   --  2.0  --  2.1  --  1.5*   PHOS  --   --   --   --   --  5.2* 5.6* 5.9*   < >  --    < > 3.2  --   --     < > = values in this interval not displayed.       CBC -   Recent Labs   Lab Test 10/31/19  0556 06/05/19  0715 06/02/19  0607   WBC 9.1 5.9 4.8   HGB 9.3* 7.5* 7.2*    176 166       LFTs -   Recent Labs   Lab Test 06/01/19  0646 05/31/19  0641 05/30/19  0607  05/27/19 2010 12/19/18  0631 12/18/18  0620 10/04/18  1611   ALKPHOS  --   --   --   --  155* 91  --  167*   BILITOTAL  --   --   --   --  0.3 0.3  --  0.3   ALT  --   --   --   --  56* 10  --  18   AST  --   --   --   --  38 9  --  26   PROTTOTAL  --   --   --   --  6.9 5.7* 5.8* 7.0   ALBUMIN 3.5 3.2* 2.4*   < > 2.9* 2.2* 2.3* 2.7*    < > = values in  this interval not displayed.       Iron Panel -   Recent Labs   Lab Test 10/31/19  0556 05/28/19  1157 10/06/18  0609   IRON 24* 32* 33*   IRONSAT 14* 17 19   MITRA  --  107 165           Current Medications:    - Medication Assessment Program - Rehab Services   Does not apply See Admin Instructions     sodium chloride 0.9%  500 mL Intravenous Once     amLODIPine  2.5 mg Oral At Bedtime     aspirin  325 mg Oral At Bedtime     carvedilol  12.5 mg Oral BID w/meals     hydrALAZINE  12.5 mg Oral TID     insulin isophane & regular  13 Units Subcutaneous Daily with supper     insulin isophane & regular  22 Units Subcutaneous QAM     moisturizing lotion   Topical TID     simvastatin  20 mg Oral At Bedtime     sodium chloride (PF)  9 mL Intracatheter During Hemodialysis (from stock)     sodium chloride (PF)  9 mL Intracatheter During Hemodialysis (from stock)

## 2019-11-05 NOTE — PROGRESS NOTES
At HD this afternoon. Received vm from pt sister Yuki. Yuki shared that an application for an apt was completed last week but takes 10-14 days to review. Pt/pt family have not yet completed application for section 8. Family working on moving pt's belongings out of her townhouse as it is costing them $25.00/day until it is all moved out.  has taken the pt's case and will work with the pt and family to appeal and overturn the denial for disability. Pt sister inquired about a community CM/insurance CM that can assist pt once discharged. Will notify pt sister of [facility] CM and Care Coordinator/SWer at outpt clinic. Pt sister shared that at this time there is not place for pt to discharge to and that they 'continue to work on it' and acknowledged knowing that pt's discharge date is scheduled for Friday 11/8.     Called pt sister back and left another vm. Inquiring about plans for pt to discharge to family member house until apt is secured. Need to confirm outpt HD schedule and confirm op vs HHC at discharge. Neuropsych testing results pending.     SWer will continue to follow.     Jessi Kolb, JOHANNA, CAD-[place]  [facility]   Phone: [phone]  I   Pager: [phone]

## 2019-11-05 NOTE — PLAN OF CARE
SLP: Melinda/ling tx session completed. Pt participated in moderately complex deductive reasoning puzzles with min cues needed aside from initial direction. She was impulsive to start, however then reasoned and verbalized her rationale appropriately t/o the task. Pt also completed functional word problem task with 80% accuracy, some lack of attention to detail, but able to self correct independently once attention was drawn to her error.

## 2019-11-05 NOTE — PLAN OF CARE
FOCUS/GOAL  Bowel management, Bladder management, Pain management, Skin integrity and Cognition/Memory/Judgment/Problem solving    ASSESSMENT, INTERVENTIONS AND CONTINUING PLAN FOR GOAL:  Pt is alert and oriented x4, denies fever, chills, CP, SOB, N/V, abdominal pain, or new weakness/numbness/tingling, incontinent of bowel smear and bladder while sleeping but then continent once awake, transferring with assist of 1 and ww, sleeping well throughout the night, no tubes, lines or drains, no further care concerns at this time continue with POC.

## 2019-11-06 LAB
GLUCOSE BLDC GLUCOMTR-MCNC: 117 MG/DL (ref 70–99)
GLUCOSE BLDC GLUCOMTR-MCNC: 187 MG/DL (ref 70–99)
GLUCOSE BLDC GLUCOMTR-MCNC: 205 MG/DL (ref 70–99)
GLUCOSE BLDC GLUCOMTR-MCNC: 225 MG/DL (ref 70–99)
GLUCOSE BLDC GLUCOMTR-MCNC: 96 MG/DL (ref 70–99)

## 2019-11-06 PROCEDURE — 25000132 ZZH RX MED GY IP 250 OP 250 PS 637: Performed by: GENERAL PRACTICE

## 2019-11-06 PROCEDURE — 97127 ZZHC SP THERAPEUTIC INTERVENTION W/FOCUS ON COGNITIVE FUNCTION,EA 15 MIN: CPT | Mod: GN | Performed by: REHABILITATION PRACTITIONER

## 2019-11-06 PROCEDURE — 97110 THERAPEUTIC EXERCISES: CPT | Mod: GP | Performed by: PHYSICAL THERAPIST

## 2019-11-06 PROCEDURE — 97110 THERAPEUTIC EXERCISES: CPT | Mod: GO

## 2019-11-06 PROCEDURE — 00000146 ZZHCL STATISTIC GLUCOSE BY METER IP

## 2019-11-06 PROCEDURE — 99232 SBSQ HOSP IP/OBS MODERATE 35: CPT | Performed by: PHYSICIAN ASSISTANT

## 2019-11-06 PROCEDURE — 12800006 ZZH R&B REHAB

## 2019-11-06 PROCEDURE — 97530 THERAPEUTIC ACTIVITIES: CPT | Mod: GO

## 2019-11-06 PROCEDURE — 25000132 ZZH RX MED GY IP 250 OP 250 PS 637: Performed by: PHYSICIAN ASSISTANT

## 2019-11-06 PROCEDURE — 97112 NEUROMUSCULAR REEDUCATION: CPT | Mod: GP | Performed by: PHYSICAL THERAPIST

## 2019-11-06 PROCEDURE — 97127 ZZHC SP THERAPEUTIC INTERVENTION W/FOCUS ON COGNITIVE FUNCTION,EA 15 MIN: CPT | Mod: GN | Performed by: SPEECH-LANGUAGE PATHOLOGIST

## 2019-11-06 PROCEDURE — 97535 SELF CARE MNGMENT TRAINING: CPT | Mod: GO

## 2019-11-06 RX ORDER — CARVEDILOL 12.5 MG/1
12.5 TABLET ORAL ONCE
Status: COMPLETED | OUTPATIENT
Start: 2019-11-06 | End: 2019-11-06

## 2019-11-06 RX ORDER — HYDRALAZINE HYDROCHLORIDE 25 MG/1
25 TABLET, FILM COATED ORAL 3 TIMES DAILY
Status: DISCONTINUED | OUTPATIENT
Start: 2019-11-06 | End: 2019-11-07

## 2019-11-06 RX ORDER — AMLODIPINE BESYLATE 5 MG/1
5 TABLET ORAL AT BEDTIME
Status: DISCONTINUED | OUTPATIENT
Start: 2019-11-06 | End: 2019-11-09 | Stop reason: HOSPADM

## 2019-11-06 RX ORDER — CARVEDILOL 25 MG/1
25 TABLET ORAL 2 TIMES DAILY WITH MEALS
Status: DISCONTINUED | OUTPATIENT
Start: 2019-11-06 | End: 2019-11-09 | Stop reason: HOSPADM

## 2019-11-06 RX ADMIN — ASPIRIN 325 MG ORAL TABLET 325 MG: 325 PILL ORAL at 21:10

## 2019-11-06 RX ADMIN — CARVEDILOL 12.5 MG: 12.5 TABLET, FILM COATED ORAL at 08:23

## 2019-11-06 RX ADMIN — HYDRALAZINE HYDROCHLORIDE 25 MG: 25 TABLET ORAL at 21:12

## 2019-11-06 RX ADMIN — CARVEDILOL 25 MG: 25 TABLET, FILM COATED ORAL at 17:53

## 2019-11-06 RX ADMIN — CARVEDILOL 12.5 MG: 12.5 TABLET, FILM COATED ORAL at 09:41

## 2019-11-06 RX ADMIN — AMLODIPINE BESYLATE 5 MG: 2.5 TABLET ORAL at 21:10

## 2019-11-06 RX ADMIN — Medication 12.5 MG: at 09:41

## 2019-11-06 RX ADMIN — INSULIN HUMAN 13 UNITS: 100 INJECTION, SUSPENSION SUBCUTANEOUS at 17:53

## 2019-11-06 RX ADMIN — HYDRALAZINE HYDROCHLORIDE 25 MG: 25 TABLET ORAL at 14:37

## 2019-11-06 RX ADMIN — Medication: at 14:37

## 2019-11-06 RX ADMIN — SIMVASTATIN 20 MG: 20 TABLET, FILM COATED ORAL at 21:11

## 2019-11-06 RX ADMIN — Medication: at 08:27

## 2019-11-06 RX ADMIN — Medication: at 21:09

## 2019-11-06 RX ADMIN — Medication 12.5 MG: at 08:23

## 2019-11-06 RX ADMIN — PSYLLIUM HUSK 1 PACKET: 3.4 POWDER ORAL at 21:18

## 2019-11-06 ASSESSMENT — MIFFLIN-ST. JEOR: SCORE: 1483.5

## 2019-11-06 NOTE — PROGRESS NOTES
OT: Focused on home simulation discussion for discharge. Pt unable to give concrete information for bedroom, bathroom and layout of home. Pt able to discuss bathroom shower environment and that it is a walk in shower with no grab bars and space for a shower chair. Simulated pt's shower transfer with walk in shower and grey shower chair using FWW and CGA with vc's for hand placement and safe FWW approach.      Focused on review of BUE HEP yellow theraband. Pt completing with supervision and vc's.  Focused on money management and grocery activity with intermittent standing using FWW and SBA.  Pt not able to make correct monetary amount through fake money and not able to calculate correct total of groceries. Pt arriving at correct amount of money owed for groceries after 3rd attempt. Pt making calculation errors for 1st and 2nd attempt and to calculate out money.

## 2019-11-06 NOTE — PROGRESS NOTES
Name: Roxy Beaulieu  MRN: [id number]  : 1960 LEVIN: 2019  Staff: GREYSON Tech: SH Age: 59  Sex: Female Hand: Left Educ: 16  Vision: 2030 ?with correction / ?without correction    ORIENTATION     Time  -31     Place       Personal info          Presidents 5 /6    ORIENTATION 2019     Time  -1     Place       Personal info          Presidents 5 /6    WAIS-IV     Raw SSa     Comprehension  27 11     Matrix Reasoning 13 9     Digit Span  34 14 RDS= 12     Coding  63 11    GAVINO-O    Raw  T %ile     Copy    23.5     <1        Time to Copy  129        WRAT4   SS %ile Grade Equiv.     Word Reading  99 47 12.7    COWAT (FAS)   Raw: 44  T: 49  Animals   Raw:  18 T-score:  43   BOSTON NAMING TEST   Raw: 15/15  Z = 0.90  COMPLEX IDEATIONAL MATERIAL   Raw:  T: 53  WRITING SAMPLES   Copy: Borderline   Dictation: Borderline   Spontaneous: Impaired    TRAILS  Raw  Err %ile    A 32  0 41-56   B 87  0 41-51  STROOP Raw %ile   Word 86 30-37   Color  Couldn t differentiate Blue and Green        Color/Word      WCST    Raw %ile   Categories: 6 >16   Total Errors: 9 79   Persev. Err.: 5 73  FTMS:  0 >16         MELANIA   Raw: 22 %ile: 32-59    BDI-II  Raw:  5 Interpretation: minimal  ERLIN  Raw:  4 Interpretation: minimal    WMS-IV  Raw SS / %ile     LM I  8 2     LM II  0 1     LM Recog. 0 <2nd    HVLT-R     Trial 1 2 3 Total      5 7 7  19  Raw T     Total Learning (1-3) 19 30     Delayed Recall  0 <20     Percent Retention 0 <20     Recognition Hits/FP 10/ 36    BVMT-R     Trial 1 2 3 Total      0 2 2  4  Raw T / %ile     Total Learning (1-3) 4 <20     Delayed Recall  1 <20     Percent Retention 50 <1st     Recognition Hits/FP 6/ 11th-16th    DCT E-score: 11

## 2019-11-06 NOTE — PROGRESS NOTES
[facility], [place]    Medicine Progress Note - Hospitalist Service       Date of Admission:  10/28/2019  Assessment & Plan   Roxy Beaulieu is a 59 year old female with a pmh significant for ESRD on HD, HTN, DM insulin dependent, DVT, and PE who was admitted to OSH with LE weakness found to have CVA, transferred to ARU for aggressive rehabilitation. Medicine previously following and signed off 10/31, asked to re-evaluate patient 11/5 d/t orthostatic hypotension.      CVA, Subcortical, w/ transient LLE weakness. Presented to OSH on 10/23 w/ LLE numbness and weakness. Head CT negative for acute stroke, CT angiogram negative for any significant arotid stenosis, MRI brain w/ signal abnormalities compatible w/ a subcentimeter acute R-sided periventricular white matter infarction, suspect lacunar stroke from HTN. Echo with EF 66%.  - Continue  mg every day  - Continue Statin  - Goal SBP <120     ESRD on HD. Dialyzies MWF PTA, transition to TThS while here. Last had HD on 11/5, UF with 1L removed (500cc accounted for d/t 500cc NS bolus prior to HD). Renal diet. Epo weekly.     Hx of HTN  Orthostatic hypotension - resolved  Previously on norvasc 5mg, coreg 25mg BID, and hydralazine 50mg TID. Had 2L UF with HD 11/2 and developed symptomatic orthostatic hypotension for a few days following. S/p 500cc NS bolus 11/5 with 1L UF with HD following. BP meds were decreased d/t symptomatic hypotension, but BPs now persistently elevated. EDW 88kg, Wt 87kg today.   - Increase coreg back to pta dose of 25mg BID  - Increase hydralazine to 25mg TID  - Increase norvasc to 5mg qPM  - Cont to encourage po fluid intake to avoid orthostasis, nephrology managing HD      DM II. Most recent Hgb A1C 10.7 on 10/2/2019. PTA on NPH 25 units BID. NPH has been adjusted for labile BGs while at ARU. Currently on NPH 22qAM and 13U qPM. BGs have been 100-180, with the exception of one high to 336 around 2000 last  night.   - Continue  current dosing of NPH 22U qAM and 13U qPM  - BG TID w/ meals, at bedtime, and 0200  - Hypoglycemia protocol  - If pts has recurrent hypperglycemia tonight, will increase PM NPH dosing to 15U     Hx of PE/DVT (2003). Unclear if this was provoked. Has been in AC with coumadin in the past, but no longer given compliance issues.     Leta Mathis PA-C  Hospitalist Service  [facility], [place]  ____________________________________________________________________    Interval History   Lin is doing okay today. Her lightheadedness and dizziness has resolved. She denies any chest pain, sob, or dyspnea. She feels her edema in her legs is stable to improved. She has no fevers or chills. No other acute concerns.     Physical Exam   Vital Signs: Temp: 99.6  F (37.6  C) Temp src: Oral BP: (!) 164/72 Pulse: 80 Heart Rate: 86 Resp: 16 SpO2: 93 % O2 Device: None (Room air)    Weight: 191 lbs 12.8 oz  GENERAL: Alert and oriented x 3. NAD. Sitting up in chair. Pleasant and cooperative.   HEENT: MMM  CV: RRR.   RESPIRATORY: Effort normal on RA. Lungs CTAB throughout.   NEUROLOGICAL: No focal deficits.  EXTREMITIES: No peripheral edema in BLE

## 2019-11-06 NOTE — PLAN OF CARE
SLP: noting mild/moderate deficits for planning/reasoning and needing repetition for new tasks/directions.

## 2019-11-06 NOTE — PLAN OF CARE
Pt returned from HD before supper. Alert and oriented x 4, pleasant and able to make needs known. Forgetful. Has no c/o pain/discomfort. A1 with walker.Good appetite ate well, over 75%. BG before supper 241  on scheduled insulin. HS . On MAP program did  not call for  1800 meds or HS meds. CVC line  dressing was changed at HD, CDI. Bed alarm on for safety. Will continue to monitor.

## 2019-11-06 NOTE — CONSULTS
"  Health Psychology                  Clinic    Department of Medicine  Grecia Ramos, Ph.D., L.P. [phone]                          Clinics and [facility] Center  [facility] Mikhail Osman, Ph.D.,  L.P. [phone]                 3rd Floor  [place] Mail Code [address]   Laura Saha, Ph.D., L.P. [phone]    [address] Maile Gastelum, Ph.D., L.P. [phone]            [place] [address]          Sumanth Varghese, Ph.D., A.SUZANNE.GARLAND., L.P. [phone]      Danielle Cummins, Ph.D., L.P. [phone]      Inpatient Health Psychology Consultation*    DOS:  11/05/2019      Clinical Information:  Ms Beaulieu appeared to recognize my face when I entered her room but needed a reminder of my role. She did not recall that she had been highly distressed at the end of our contact last week, nor did she appear to recall that she had been very angry at her sister at that time. Today she was focused entirely on her appreciation for her sister, making repeated comments about how much hard work her sister has been doing, how much she appreciates that effort, and how much she regrets the additional burden she is putting on her sister.   I asked about her perception of the work she is doing within [facility] and she was unable to give any concrete examples of what type of work,but did give her observation that it is not really focused on speech/language per se but it is \"about thinking.\" Agreed with her observation, and she then perseverated on her confusion regarding why it is called speech therapy if it is really about thinking.   She continues to exhibit an extremely high level of difficulty with short term memory. While she does acknowledge awareness that she was having difficulty with memory premorbidly, she is rigid in her assertions of remembering everything that has occurred since admission to [facility] on 10/28. She insisted with me today that her sister " "has been here only once to visit, had no memory that her sister came and attended the Stroke Education class with her last week. She recalled with only minimal prompting that there had been an apartment that was an option in [place] but could not recall why it was deemed not workable without more significant prompting.  Continues to express her belief that her sister has agreed to allow her to stay at sister's home until an appropriate apartment is found. Stated her belief that \"she has a little room waiting for me\" and her plan to \"just stay in my room\" so she will not add any additional stress to the household.  Assessment: Cognitive impairment and possible personality traits prominent. She continues to exhibit a pattern of cognitive functioning suggestive of dementia vs cognitive impairment secondary to stroke. Somewhat notable that she did not mention having done a neuropsychological assessment today.   Diagnosis:    1.  Generalized anxiety disorder (F41.1).   2.  Major depressive disorder, unspecified (F32.9).   3.  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   4.  Rule out premorbid cognitive impairment.  Recommendations/Plan: Awaiting input from results of neuropsychological assessment from today that may clarify more long-term needs.  Time Spent with Patient: 38 minutes (In: 0940 Out: 1018)  Service Provided: Individual psychotherapy   Provider: Grecia Ramos, Ph.D,    Provider Pager: [phone]   Provider Phone: [phone]    *In accordance with the Rules of the [place] [employer], it is noted that psychological descriptions and scientific procedures underlying psychological evaluations have limitations.  Absolute predictions cannot be made based on information in this report.        "

## 2019-11-06 NOTE — PROGRESS NOTES
Pt sister Annamaria emailed SWer requesting information for SW in the community to assist with finding housing once pt is to discharge. Suggested pt sister contact [facility]  and pt's insurance ([employer]) to see if a CM has been assigned.     Met with pt at bedside to provide the same information as above and additional information that was typed up with additional resources, contact information and instructions for CADI waiver an transportation. Initially pt could not recall what information SWer was sharing and when she had these conversations with SWer. Once SWer shared more information, she was able to recall but appeared frustrated as it was difficult for her to understand. Step by step instructions and information was typed on a word document and pt had difficulty following.     Discussed plans for discharge as discharge was scheduled for Friday 11/8. Pt shared that her sister has a bed and room ready for her and she will discharge to her sister's home. SWer later emailed pt sister who shared that she CANNOT discharge to her home as her home is not accessible and no one is home to assist pt. No other family available to assist.     Per team rounds, pt discharge moved to 11/12 as pt is not mod I and still has more to achieve before discharge. Neuropsych testing results still pending. Pt sister inquiring about 'emergency or alternative living' in the case that no housing is found in time. SWer awaiting results of neuropsych testing and will follow up with team and pt/pt sister to discuss further. Pt would have TCU/LTC benefits and would have more oversight.    Jessi Kolb, JOHANNA, [facility]   Phone: [phone]  I   Pager: [phone]

## 2019-11-06 NOTE — PROGRESS NOTES
HEMODIALYSIS TREATMENT NOTE    Date: 11/5/2019  Time: 6:05 PM    Data:  Pre Wt: 87.9 kg (193 lb 12.6 oz)   Desired Wt: 85.9 kg   Post Wt: 86.9 kg (191 lb 9.3 oz)   Weight change: 1 kg  Ultrafiltration - Post Run Net Total Removed (mL): 1000 mL  Vascular Access Status: tunneled RIJ CVC.  Dressing change performed; exit site WDL.  Lumens saline locked post-HD and new Clear Guard caps applied.    Dialyzer Rinse: Heavy, Streaked  Total Blood Volume Processed: 71.8 L Liters  Total Dialysis (Treatment) Time: 3.5 hours    Lab:   No    Assessment & Interventions:  Writer assumed this patient's dialysis cares at 1530, with approximately 1.5 hours of treatment time remaining.  Patient alert, oriented, calm & cooperative.  3.5 hour dialysis run completed via RIJ CVC with dialysate K3/Ca3/Na138/[id number] and net UF 1 L.  CVC dressing change performed; exit site WDL.  Overall an uneventful run; vitally stable throughout.       Plan:    Next HD per renal team.  Handoff report given to AKHIL LAGUNA.

## 2019-11-06 NOTE — PROGRESS NOTES
Name: Roxy Beaulieu   MR#: [id number]  YOB: 1960  Date of Exam: 11/04/2019, 11/06/2019    Neuropsychology Laboratory  [facility]    NEUROPSYCHOLOGICAL EVALUATION    IDENTIFYING INFORMATION  Roxy Beaulieu is a 59 year old, left handed, , with 16 years of formal education. She was evaluated at bedside.    BACKGROUND INFORMATION / INTERVIEW FINDINGS    Records indicate that Ms. Beaulieu s medical history includes end-stage renal disease (on hemodialysis for the last six months), hypertension, hyperlipidemia, insulin-dependent diabetes mellitus, and history of DVD/PE in approximately 2004. She presented to the [facility] emergency department on October 23, 2019 with sudden onset of left leg weakness. She was found to have an acute right periventricular white matter infarct. The etiology was felt to be lacunar due to hypertension. An MRI of her brain on October 24, 2019 documented  1.  Signal abnormalities compatible with a subcentimeter acute right-sided periventricular white matter infarction. 2.  No mass effect or hemorrhagic conversion. 3.  Findings compatible with mild chronic small vessel ischemic change. 4.  Bilateral mastoid effusions, left greater than right. Mastoiditis should be excluded clinically.  She was admitted to the [facility] ARU  on October 28, 2019 for rehabilitation. During this stay on the rehabilitation unit, the patient completed a consultation with Dr. Grecia Ramos, of the Health Psychology group on 10/30/2019. Dr. Ramos opined that the patient has generalized anxiety disorder, depression, and memory deficits following the cerebral infarction. There was also some concern that the patient has had cognitive impairments prior to the recent stroke. There are several comments in her chart about how she had poor medication compliance. Concerns have been expressed about her ability to manage IADLs. The current evaluation was  "requested by Dr. Jeffry Suero, in this context.    On interview, Ms. Beaulieu reported that her neurologic function was normal up until about a year ago. She stated that she began experiencing little troubles with her memory at that time. She denied having identified a trigger for onset of these issues a year ago. She stated that her thinking and neurologic function were then stable up until the point of the stroke. She indicated that she had another step down in her thinking, although she noted that she has had some degree of recovery since the stroke, and her thinking skills are now close to back to pre-stroke levels. She indicated that the stroke impacted her memory. She reported that she is not as sharp as she once was, which bothers her. She reported that she forgets newly acquired information such as events. She stated that these memory problems are \"kind of random.\" She otherwise denied cognitive changes or difficulties, even when presented with specific cognitive domains. She denied a history of attentional problems or having ever been diagnosed with a learning disability.    With respect to mental health, Ms. Beaulieu reported that her mood is \"surprisingly good.\" She stated that she has hope. She stated that she went through a period of depression in 2004 when her father was killed in a farming accident. She indicated that she took an antidepressant for about a year, and also saw counselor for a period of time. She otherwise denied prior mental health diagnoses or treatments. She denied suicidal ideation or past suicide attempts. She reported that she is Voodoo, and her Voodoo beliefs prevent her from considering suicide.    With respect to other medical background, Ms. Beaulieu denied other prior known stroke in addition the event described above. She denied prior TBI or seizure. She reported that her sleep is okay, and noted that she sleeps pretty well when she is at home. She noted that she averages " six or seven hours of sleep per night. She stated that she slept only about three hours the night before the initial exam date, as she was unable to get comfortable in the hospital bed. She denied pain. She denied any residual problems with her left leg as a result of the stroke. She did note that she is left-handed, and noticed the day before the initial exam date that she was having difficulty grasping a pencil with her left hand. I told her to discuss this issue with occupational therapy in her next session. Per records, her current medications include acetaminophen, amlodipine, aspirin, Bisacodyl, carvedilol, dextrose injection, glucagon injection, hydralazine, insulin, senna-docusate, and simvastatin. She denied alcohol, tobacco, or illicit drug use. She stated that she did some partying in early 20s, but otherwise denied past problematic substance use. She denied prior treatment for substance related issues.    [facility]. [facility]. She reported that she is planning on moving to a smaller place that does not have steps to enter. She indicated that her sister is working on this. She manages her own basic and instrumental daily activities. She drives. She stated that she does not feel like she is having problems with any of these tasks. By way of background, she has never been  and does not have children. She has a sister who lives nearby, and noted that her mother lives in [facility]. Regarding educational background, she graduated from high school with above average grades. She earned a bachelor's degree in digital photography from [employer]. 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 She is a  in this company. She and her  do not have employees, but sometimes use contractors. 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 In the past, she  "worked as a  and as a /. She reported that she has applied for disability. She stated that she was turned down the first time, but is now reapplying, and is working with an .    BEHAVIORAL OBSERVATIONS  On the initial date of testing, Ms. Beaulieu was polite and cooperative with the exam. She was evaluated at bedside, and her gait was not observed. Her speech was normal. Her comprehension was normal. Her thought processes were notable for mild drowsiness, mild variability in her motivation and persistence, mild carelessness, and mild slowing. Memory deficits were also apparent in the interview, as she forgot portions of the discussion that had taken place earlier in the interview. Her mood was mildly depressed and anxious with congruent affect. She had reduced confidence in her ability on testing, particularly so for memory tasks. Her effort was rated as adequate. The current results are felt to be a broadly accurate depiction of her cognitive functioning.     On the second evaluation date, she was again generally pleasant and cooperative. She remembered the technician's face when he returned to her room. She was resistant to portions of memory testing, and frequently responded \"I do not know,\" or \"I can t guess,\" to memory testing. She refused to guess on recognition memory portions of testing.    RESULTS OF EXAM  Her performances on standardized measures of neuropsychological functioning were as follows.    On the initial date of testing, she was severely disoriented to time (stated that the month was January), and misstated the name of the city. She was otherwise oriented to place and was fully oriented to various aspects of personal information. She was able to state the name of the current president, in the four most recent past presidents, but provided some of these names out of order. On the second evaluation date, she was oriented to time, place, and various aspects of personal " information. She was able to provide the names of five of the six most recent past presidents. Performance on a measure of single word reading was average. She obtained passing scores on stand-alone and embedded metrics of cognitive performance validity. Auditory attention for digits was superior. Learning of words in a list format was borderline impaired. Delayed recall of list words was impaired, as she was unable recollect any of the stimuli. Percent retention of list words was impaired. Delayed recognition of the list words, however, was borderline impaired (she accurately recognized 10 of 12 list words, and endorsed only 1 false positive error). Learning of story information was impaired. Delayed recall, and delayed recognition of story information were both impaired. It should be noted that she refused to guests on these measures. Learning of simple geometric shapes and their spatial locations was impaired. Delayed recall of the shapes and their locations was impaired. Percent retention of the shapes was impaired. Delayed recognition of the shapes, however, was low average. Visuospatial judgments for variably oriented lines were average. Nonverbal reasoning for incomplete matrices was average. Her drawing of a complicated geometric figure was impaired, and was notable for a hurried approach with inattention to the figure s details and spatial relations. Comprehension of phrases and short stories was average. Verbal associative fluency was average. Semantic verbal fluency was low average. Naming to confrontation was intact. Verbal reasoning and comprehension of cultural norms was average. Writing samples were borderline impaired in the copy condition, with punctuation errors and sloppy handwriting. In the sentence writing to dictation condition, her performance was again borderline impaired, with missing punctuation and sloppy handwriting. In the spontaneous sentence writing condition, her performance was  impaired, with unusual sentence structure, missing punctuation, and sloppy handwriting. Speeded visual sequencing under focused attention was average. A similar measure with a divided attention component was average. Speeded word reading was average. Measures of speeded color naming and speed of inhibition of an over-learned response were both not able to be completed because she was unable to differentiate colors on these tasks. Novel problem-solving and responding to instructive feedback was performed in the average to high average range. Speeded visuomotor coding was average.    She endorsed items consistent with minimal symptoms of depression, and minimal symptoms of anxiety on self-report measures.    IMPRESSIONS  Ms. 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 However, I strongly suspect that functional factors are contributing to the clinical picture. In this exam, she struggled with learning and recall of both verbal and nonverbal information. However, it is important to note that during these portions of testing, she appear to have a somewhat lackadaisical approach. She also refused to guess on some measures. Also importantly, her recognition memory performances were performed within broad normal limits. This relatively intact nature of a recognition memory would argue against profound dysfunction of her mesial temporal lobes. In the end, I suspect that she does have some degree of neurologically based memory impairment, but there is a functional overlay to her day-to-day memory processing. Her other cognitive abilities were absolutely normal and performed in keeping with her average range cognitive baseline. I do not see evidence that suggests that her right hemispheric stroke has had an impact on her cognition. Further, her executive functioning (problem solving) abilities were measured to be normal in this exam. That said,  it can be the case that individuals have the capacity to make good decisions, but choose not to do so in some cases. She is not reporting elevated symptoms of depression or anxiety.    With respect to the referral question, I think that Ms. Beaulieu would benefit from increased oversight and support with her day-to-day needs. I do think that her memory problems, be they neurologic or functional in nature, could contribute to difficulties with managing day-to-day needs like medications, finances, and meal preparation. I do not think that she necessarily needs full-time support, but daily check ins would be helpful.    RECOMMENDATIONS    1. As noted above, Ms. Beaulieu could benefit from increased support and supervision of her daily activities. I am concerned that her memory problems will produce difficulties with day-to-day tasks.    2. Her memory could benefit from routine use of a memory notebook or other assistive device to keep track of important information and dates. Her memory also benefits from recognition cues.    3. I am concerned about her ability to successfully keep working, given her apparent memory troubles. She is encouraged to take steps toward snf.     4. She had sloppy handwriting on a writing test and reported that she has been experiencing troubles with grasping a pencil since her stroke. I encouraged the patient to discuss this trouble with her occupational therapy providers.     5. Given the mild weaknesses in this exam, follow-up neuropsychological evaluation is recommended in one year in order to assess and update recommendations as appropriate. The current results can be used as a baseline at that time.    Emir Heath, Ph.D., L.P., ABPP-CN   / Licensed Psychologist [id number]  Department of Rehabilitation Medicine  Division of Adult Neuropsychology  [facility]    Time spent:  One unit (40 minutes) neurobehavioral status exam including interview and  clinical assessment by licensed and board-certified neuropsychologist (CPT 25389). One unit (60 minutes) neuropsychological testing evaluation by licensed and board-certified neuropsychologist, including integration of patient data, interpretation of standardized test results and clinical data, clinical decision-making, treatment planning, and report, first hour (CPT 67909). Two unit(s) (100 minutes) of neuropsychological testing evaluation by licensed and board-certified neuropsychologist, including integration of patient data, interpretation of standardized test results and clinical data, clinical decision-making, treatment planning, and report, subsequent hours (CPT 89886). One unit (30 minutes) of psychological and neuropsychological test administration and scoring by technician, first 30 minutes (CPT 18905). Four units (135 minutes) psychological or neuropsychological test administration and scoring by technician, subsequent 30 minutes (CPT 14670). Diagnoses: G31.84, R41.3, F06.8.

## 2019-11-06 NOTE — PLAN OF CARE
Participated in entire therapy session w/o orthostasis; dynamic stability and trunk engagement improving.

## 2019-11-06 NOTE — PROGRESS NOTES
[facility]   Acute Rehabilitation Unit  Daily progress note    INTERVAL HISTORY  No acute events overnight.  Patient had elevated BP this AM, which has been under control with adjustments made by IM team.  She did well in therapy session this AM.  She is hoping to advance to cane.  Discussed being safe for discharge and need for 24/7 support.  Will await neuro psych testing results for compentency and capacity.     PT: Participated in entire therapy session w/o orthostasis; dynamic stability and trunk engagement improving.       MEDICATIONS  Scheduled:    - Medication Assessment Program - Rehab Services   Does not apply See Admin Instructions     sodium chloride 0.9%  500 mL Intravenous Once     amLODIPine  5 mg Oral At Bedtime     aspirin  325 mg Oral At Bedtime     carvedilol  25 mg Oral BID w/meals     hydrALAZINE  25 mg Oral TID     insulin isophane & regular  13 Units Subcutaneous Daily with supper     insulin isophane & regular  22 Units Subcutaneous QAM     moisturizing lotion   Topical TID     simvastatin  20 mg Oral At Bedtime        PRN:  acetaminophen, bisacodyl, glucose **OR** dextrose **OR** glucagon, senna-docusate       PHYSICAL EXAM  Patient Vitals for the past 24 hrs:   BP Temp Temp src Pulse Heart Rate Resp SpO2 Weight   11/06/19 0938 136/51 98.9  F (37.2  C) Oral 76 -- -- -- --   11/06/19 0809 (!) 164/72 99.6  F (37.6  C) Oral 80 -- 16 -- --   11/06/19 0712 (!) 168/67 99.5  F (37.5  C) Oral 79 -- 16 93 % --   11/06/19 0649 -- -- -- -- -- -- -- 87 kg (191 lb 12.8 oz)   11/05/19 2003 (!) 164/70 -- -- -- -- -- -- --   11/05/19 1902 (!) 140/89 99.1  F (37.3  C) Oral 86 86 16 -- --   11/05/19 1830 (!) 168/63 99  F (37.2  C) Tympanic 83 -- 18 93 % --   11/05/19 1658 (!) 194/85 -- -- 86 80 18 95 % --   11/05/19 1654 (!) 179/86 -- -- 80 80 19 96 % --   11/05/19 1645 (!) 176/81 -- -- 80 80 18 97 % --   11/05/19 1630 (!) 175/86 -- -- 111 82 16 96 % --   11/05/19 1615  (!) 175/89 -- -- 84 84 19 95 % --   11/05/19 1600 (!) 171/78 -- -- 82 83 20 96 % --   11/05/19 1545 (!) 173/89 -- -- 82 82 20 95 % --   11/05/19 1530 (!) 170/74 -- -- 81 82 18 95 % --   11/05/19 1515 (!) 167/78 -- -- 83 84 20 94 % --   11/05/19 1500 (!) 174/80 -- -- 81 80 16 94 % --   11/05/19 1445 (!) 178/86 -- -- 82 83 20 94 % --   11/05/19 1430 (!) 185/84 -- -- 81 81 13 94 % --   11/05/19 1415 (!) 179/84 -- -- 80 79 12 95 % --   11/05/19 1400 (!) 173/76 -- -- 77 77 13 93 % --   11/05/19 1345 (!) 143/63 -- -- 75 75 15 92 % --       GEN: NAD, pleasant and cooperative, participating in PT  HEENT: NC/AT  CVS: well perfused, 2+ radial pulse  PULM: nonlabored to room air, symmetrical chest rise  ABD: Soft, NT, ND, bowel sounds present  EXT: No LE edema or calf tenderness b/l.  KRYSTEN hoses in place. Varus foot on right.   Neuro: Answers appropriately, follows commands, AAOx3.    LABS  CBC RESULTS:   Recent Labs   Lab Test 10/31/19  0556   WBC 9.1   RBC 3.41*   HGB 9.3*   HCT 31.2*   MCV 92   MCH 27.3   MCHC 29.8*   RDW 14.8            Last Comprehensive Metabolic Panel:  Sodium   Date Value Ref Range Status   10/31/2019 136 133 - 144 mmol/L Final     Potassium   Date Value Ref Range Status   10/31/2019 5.3 3.4 - 5.3 mmol/L Final     Chloride   Date Value Ref Range Status   10/31/2019 100 94 - 109 mmol/L Final     Carbon Dioxide   Date Value Ref Range Status   10/31/2019 24 20 - 32 mmol/L Final     Anion Gap   Date Value Ref Range Status   10/31/2019 12 3 - 14 mmol/L Final     Glucose   Date Value Ref Range Status   10/31/2019 151 (H) 70 - 99 mg/dL Final     Urea Nitrogen   Date Value Ref Range Status   10/31/2019 58 (H) 7 - 30 mg/dL Final     Creatinine   Date Value Ref Range Status   10/31/2019 6.10 (H) 0.52 - 1.04 mg/dL Final     GFR Estimate   Date Value Ref Range Status   10/31/2019 7 (L) >60 mL/min/[1.73_m2] Final     Comment:     Non  GFR Calc  Starting 12/18/2018, serum creatinine based  estimated GFR (eGFR) will be   calculated using the Chronic Kidney Disease Epidemiology Collaboration   (CKD-EPI) equation.       Calcium   Date Value Ref Range Status   10/31/2019 7.9 (L) 8.5 - 10.1 mg/dL Final       Recent Labs   Lab 11/06/19  1210 11/06/19  0813 11/06/19  0207 11/05/19  2058 11/05/19  1751 11/05/19  1229  10/31/19  1331  10/31/19  0556   GLC  --   --   --   --   --   --   --  151*  --  Canceled, Test credited   [facility] 187* 225* 205* 336* 241* 202*   < >  --    < >  --     < > = values in this interval not displayed.         ASSESSMENT  Roxy Beaulieu is a 59 year old LHD female with a PMH including but not limited to ESRD on HD, HTN, HLD, insulin dependent DM (HbA1c 10.7 on 10/2/19), and Hx of DVT/PE (~2004, not on anticoagulation due to non-compliance) who presented to [facility] on 10/23/19 with new onset of left leg weakness and found to have an acute R periventricular white matter infarct.  Etiology felt to be lacunar due to HTN.      Impairment group code: 01.1 L body involvement, R brain stroke      PLAN  Rehabilitation  -Continue with inpatient rehabilitation program including PT, OT, and SLP for 3 hrs/day, rehab nursing, social work, nutrition, and close monitoring by physiatry  -The patient has impairments in strength, balance, and endurance, which are leading to activity limitations in ambulation, mobility, and ADLs    Medical  1)Neurology  -Acute R periventricular lacunar CVA due to HTN               -Continue  mg daily indefinitely               -HTN and lipid control as below               -Follow up with neurology in 4-6 weeks  -Will provide ongoing education regarding secondary stroke prevention including control of BP and glucose, compliance with medications and physician follow up, and lifestyle modifications including diet and exercise.        2)CVS  -HTN: Monitor, titrate as needed   -Ongoing symptomatic hypotension since 11/3.  Hospitalist team has  re-evaluated patient, assistance is appreciated.  BP meds decreased to Hydralazine 12.5 mg TID, Coreg 12.5 mg BID and Norvasc 2.5 mg daily (correction to prior notes, was previously on 5 mg or Norvasc).  Nephrology team aware, will adjust fluid removal with dialysis.  -HLD: Zocor 20 mg daily.  LDL 99  11/6: BP running better today after adjustments by Medicine team.          3)Pulmonary  -No acute issues  -Hx of PE, not on anticoagulation        4)FENGI  -Diet: Regular consistency solids, thin liquids.  Renal diet.  Discontinued moderate consistent CHO restriction due to hypoglycemia episodes.  -Bowel meds: PRN Senokot-S, Dulcolax suppository        5)  -ESRD on HD: Consulted nephrology for assistance with HD management.  Will dialyze T/Th/Sa while on rehab.  Assistance is appreciated.   -Potassium on 10/31, 5.3         6)DVT prophylaxis  -Hx of DVT/PE, however not on anticoagulation due to inconsistent compliance with Coumadin  -Continue  mg daily  -Mechanical prophylaxis        7)Pain  -Tylenol PRN        8)Endo   -IDDM (HbA1c 10.7)               -11/5 Decreased evening insulin 70/30 to 13 units, however daytime glucose levels were elevated so increased morning dose to 22 units.  Continue very high consistent CHO diet.  Yesterday lunch glucose was 70, if continues to have low values will back down morning dose again.               -Check glucose levels and sliding scale insulin qAC and at bedtime  -Thyroid ultrasound recommended if not done due to multinodular thyroid seen on CT.  Will recommend PCP follow after discharge.  11/6: sugars slightly elevated overnight, running better today, will continue to monitor.      9)Heme  -AoCD:  Receives weekly EPO injections, will defer administrations to nephrology  -Per pt, has heparin allergy which she reports hives.  Per prior notes, HIT AB + but FARHAT neg and pt has been receiving heparin in CDU prior to hospital admit        10)Psych  -Health psychology  consulted, assistance is greatly appreciated.  -Ongoing cognitive deficits noted.  Have placed IP neuropsych consult to further quantify cognition and ability to manage IADLs and medications.  11/6: awaiting study/testing results        11)Social/Dispo  -Anticipate discharge home at a modified independent level for ambulation, mobility, and ADLs  -ELOS: Tentative discharge date 11/8  -Rehab prognosis: Fair - Sensation and cognitive deficits are more pronounced than initially suspected  -Follow up appointments: PCP, Recommended thyroid ultrasound, neurology 4-6 weeks     Code status: Full Code (Discussed with patient upon admission)         Mario Quintero DO on 11/6/2019 at 1:36 PM      I spent a total of 25 minutes face to face and coordinating care of Roxy Beaulieu. Over 50% of my time on the unit was spent counseling the patient and /or coordinating care regarding Acute R periventricular lacunar CVA due to HTN .

## 2019-11-06 NOTE — PLAN OF CARE
FOCUS/GOAL  Bowel management, Bladder management, Pain management and Cognition/Memory/Judgment/Problem solving    ASSESSMENT, INTERVENTIONS AND CONTINUING PLAN FOR GOAL:  Pt is alert and oriented, denied pain, sob, or new changes in sensation, continent of bowel and bladder, transferring to toilet to void, on MAP, BG of 202 at 2am, no further care concerns at this time continue with POC.

## 2019-11-07 LAB
ANION GAP SERPL CALCULATED.3IONS-SCNC: 12 MMOL/L (ref 3–14)
BUN SERPL-MCNC: 75 MG/DL (ref 7–30)
CALCIUM SERPL-MCNC: 8.1 MG/DL (ref 8.5–10.1)
CHLORIDE SERPL-SCNC: 106 MMOL/L (ref 94–109)
CO2 SERPL-SCNC: 22 MMOL/L (ref 20–32)
CREAT SERPL-MCNC: 7.32 MG/DL (ref 0.52–1.04)
GFR SERPL CREATININE-BSD FRML MDRD: 5 ML/MIN/{1.73_M2}
GLUCOSE BLDC GLUCOMTR-MCNC: 144 MG/DL (ref 70–99)
GLUCOSE BLDC GLUCOMTR-MCNC: 145 MG/DL (ref 70–99)
GLUCOSE BLDC GLUCOMTR-MCNC: 150 MG/DL (ref 70–99)
GLUCOSE BLDC GLUCOMTR-MCNC: 163 MG/DL (ref 70–99)
GLUCOSE BLDC GLUCOMTR-MCNC: 208 MG/DL (ref 70–99)
GLUCOSE SERPL-MCNC: 181 MG/DL (ref 70–99)
POTASSIUM SERPL-SCNC: 5.2 MMOL/L (ref 3.4–5.3)
SODIUM SERPL-SCNC: 139 MMOL/L (ref 133–144)

## 2019-11-07 PROCEDURE — 97530 THERAPEUTIC ACTIVITIES: CPT | Mod: GO | Performed by: OCCUPATIONAL THERAPIST

## 2019-11-07 PROCEDURE — 97110 THERAPEUTIC EXERCISES: CPT | Mod: GP | Performed by: PHYSICAL THERAPIST

## 2019-11-07 PROCEDURE — 97110 THERAPEUTIC EXERCISES: CPT | Mod: GO | Performed by: OCCUPATIONAL THERAPIST

## 2019-11-07 PROCEDURE — 80048 BASIC METABOLIC PNL TOTAL CA: CPT | Performed by: CLINICAL NURSE SPECIALIST

## 2019-11-07 PROCEDURE — 97530 THERAPEUTIC ACTIVITIES: CPT | Mod: GP | Performed by: PHYSICAL THERAPIST

## 2019-11-07 PROCEDURE — 25000132 ZZH RX MED GY IP 250 OP 250 PS 637: Performed by: GENERAL PRACTICE

## 2019-11-07 PROCEDURE — 12800006 ZZH R&B REHAB

## 2019-11-07 PROCEDURE — 25800030 ZZH RX IP 258 OP 636: Performed by: CLINICAL NURSE SPECIALIST

## 2019-11-07 PROCEDURE — 25000132 ZZH RX MED GY IP 250 OP 250 PS 637: Performed by: PHYSICIAN ASSISTANT

## 2019-11-07 PROCEDURE — 00000146 ZZHCL STATISTIC GLUCOSE BY METER IP

## 2019-11-07 PROCEDURE — 63400005 ZZH RX 634: Performed by: CLINICAL NURSE SPECIALIST

## 2019-11-07 PROCEDURE — 97535 SELF CARE MNGMENT TRAINING: CPT | Mod: GO | Performed by: OCCUPATIONAL THERAPIST

## 2019-11-07 PROCEDURE — 99207 ZZC CDG-MDM COMPONENT: MEETS MODERATE - UP CODED: CPT | Performed by: PHYSICIAN ASSISTANT

## 2019-11-07 PROCEDURE — 99232 SBSQ HOSP IP/OBS MODERATE 35: CPT | Performed by: PHYSICIAN ASSISTANT

## 2019-11-07 PROCEDURE — 97127 ZZHC SP THERAPEUTIC INTERVENTION W/FOCUS ON COGNITIVE FUNCTION,EA 15 MIN: CPT | Mod: GN | Performed by: SPEECH-LANGUAGE PATHOLOGIST

## 2019-11-07 PROCEDURE — 90937 HEMODIALYSIS REPEATED EVAL: CPT

## 2019-11-07 RX ORDER — AMOXICILLIN 250 MG
1-4 CAPSULE ORAL 2 TIMES DAILY PRN
DISCHARGE
Start: 2019-11-07 | End: 2020-01-31

## 2019-11-07 RX ORDER — HYDRALAZINE HYDROCHLORIDE 50 MG/1
50 TABLET, FILM COATED ORAL 3 TIMES DAILY
Status: DISCONTINUED | OUTPATIENT
Start: 2019-11-07 | End: 2019-11-09 | Stop reason: HOSPADM

## 2019-11-07 RX ORDER — DEXTROSE MONOHYDRATE 25 G/50ML
25-50 INJECTION, SOLUTION INTRAVENOUS
DISCHARGE
Start: 2019-11-07 | End: 2019-11-18

## 2019-11-07 RX ORDER — SIMVASTATIN 20 MG
20 TABLET ORAL AT BEDTIME
DISCHARGE
Start: 2019-11-07

## 2019-11-07 RX ORDER — SOD CHLORD/LANOLIN/MIN.OIL/PET
LOTION (ML) TOPICAL 3 TIMES DAILY
DISCHARGE
Start: 2019-11-07 | End: 2019-11-18

## 2019-11-07 RX ORDER — BISACODYL 10 MG
10 SUPPOSITORY, RECTAL RECTAL DAILY PRN
DISCHARGE
Start: 2019-11-07

## 2019-11-07 RX ORDER — HYDRALAZINE HYDROCHLORIDE 50 MG/1
50 TABLET, FILM COATED ORAL 3 TIMES DAILY
DISCHARGE
Start: 2019-11-07 | End: 2020-01-22 | Stop reason: DRUGHIGH

## 2019-11-07 RX ORDER — ACETAMINOPHEN 325 MG/1
650 TABLET ORAL EVERY 6 HOURS PRN
DISCHARGE
Start: 2019-11-07

## 2019-11-07 RX ORDER — CARVEDILOL 25 MG/1
25 TABLET ORAL 2 TIMES DAILY WITH MEALS
Status: ON HOLD | DISCHARGE
Start: 2019-11-07 | End: 2023-01-01

## 2019-11-07 RX ADMIN — SODIUM CHLORIDE 250 ML: 9 INJECTION, SOLUTION INTRAVENOUS at 13:28

## 2019-11-07 RX ADMIN — HYDRALAZINE HYDROCHLORIDE 50 MG: 25 TABLET ORAL at 20:37

## 2019-11-07 RX ADMIN — EPOETIN ALFA 6000 UNITS: 10000 SOLUTION INTRAVENOUS; SUBCUTANEOUS at 13:28

## 2019-11-07 RX ADMIN — Medication: at 08:48

## 2019-11-07 RX ADMIN — CARVEDILOL 25 MG: 25 TABLET, FILM COATED ORAL at 08:46

## 2019-11-07 RX ADMIN — ASPIRIN 325 MG ORAL TABLET 325 MG: 325 PILL ORAL at 20:37

## 2019-11-07 RX ADMIN — Medication: at 20:40

## 2019-11-07 RX ADMIN — AMLODIPINE BESYLATE 5 MG: 2.5 TABLET ORAL at 20:37

## 2019-11-07 RX ADMIN — SIMVASTATIN 20 MG: 20 TABLET, FILM COATED ORAL at 20:37

## 2019-11-07 RX ADMIN — Medication: at 13:29

## 2019-11-07 RX ADMIN — SODIUM CHLORIDE 300 ML: 9 INJECTION, SOLUTION INTRAVENOUS at 13:27

## 2019-11-07 RX ADMIN — INSULIN HUMAN 13 UNITS: 100 INJECTION, SUSPENSION SUBCUTANEOUS at 18:00

## 2019-11-07 RX ADMIN — CARVEDILOL 25 MG: 25 TABLET, FILM COATED ORAL at 18:00

## 2019-11-07 RX ADMIN — PSYLLIUM HUSK 1 PACKET: 3.4 POWDER ORAL at 08:47

## 2019-11-07 RX ADMIN — HYDRALAZINE HYDROCHLORIDE 25 MG: 25 TABLET ORAL at 08:46

## 2019-11-07 ASSESSMENT — MIFFLIN-ST. JEOR
SCORE: 1463.5
SCORE: 1493.02

## 2019-11-07 NOTE — PLAN OF CARE
Acute Rehab Care Conference/Team Rounds      Type: Team Rounds    Present: Dr. Rojas, Che oCrnejo PA, Patience Ibrahim RN, Jessi Rubio SW, Pao Witt OT, Dorota Herrera PT, Danielle Shi SLP, Amy Rain , Adelfo Collazo, Andra Forde Dietician      Discharge Barriers/Treatment/Education    Rehab Diagnosis: Roxy Beaulieu is a 59 year old LHD female with a PMH including but not limited to ESRD on HD, HTN, HLD, insulin dependent DM (HbA1c 10.7 on 10/2/19), and Hx of DVT/PE (~2004, not on anticoagulation due to non-compliance) who presented to [facility] on 10/23/19 with new onset of left leg weakness and found to have an acute R periventricular white matter infarct.  Etiology felt to be lacunar due to HTN.    Active Medical Co-morbidities/Prognosis: see HPI and all notes.    Safety: Pt is alert, oriented, anxious, and impulsive, self transferred multiple times on evening of 11/6, is able to turn off alarms on bed and chair even while call light is in reach, transferring with SBA and ww.     Pain: Pt denies pain.    Medications, Skin, Tubes/Lines: Pt takes pills whole with water, is on MAP and is not calling for medications but able to select correct medications when reminded, scabs on left foot CDI and YENY, dialysis port to right chest.     Swallowing/Nutrition: Pt tolerates current regular diet without difficulty    Bowel/Bladder: Pt is mostly continent of bowel and bladder with intermittent incontinence overnight, pt reported diarrhea on evening of 11/6 but not observed.      Psychosocial: Single, never  and has no children. PTA was living in a town home which recently sold. Family assisting with moving pt's stuff and finding a new apt. Pt has support from sister. Pt not working, no steady income and family assisting with applying for disability. Concerns for mental health and health psychology following.     ADLs/IADLs: Pt has been making slow but steady  progress at ARU. Pt is SBA for g/h and oral cares standing at sink using FWW. Pt is supervision for UB dressing seated EOB; pt is SBA-CGA for LB dressing using FWW to stand and perform clothing management. Pt is CGA for toilet transfer and SBA for toilet hygiene using grab bar and FWW. Pt is SBA for showering/bathing seated and CGA for shower transfer using FWW. Pt is limited due to short term memory deficits and OT is recommending pt has oversight with medication management, meal preparation, shopping, finances, and home mgmt tasks. Pt reports her sister will be completing all other IADLs while she is staying with her. Anticipate pt will return home with FWW, shower chair, and potentially reacher. Anticipate pt will discharge to sister's home with home care OT vs OP OT services. Will complete EFPT to further assess executive functioning skills.     Mobility: Up in room w/ walker.  Ongoing LE weakness and falls risk but using walker w/o incident.  Velez score 12/56 primary deficits related to LE weakness and insensate feet. TUG score 19 seconds w/ ww.  Recommend discharge to supervised setting due to cognition and falls risk.    Cognition/Language: Pt making variable progress in cognitive goals- moderate impairments in ST memory- pt with some reduced insight overall to the severity of it. Have added goals also for reasoning as pt does well on structured complex level reasoning tasks but has difficulty with reasoning with more functional tasks. Pt would need oversight with medication management, finances, complex decision making. Plan is to discharge to LTC placement- continued tx recommended if available at site.   Community Re-Entry: walker w/ assist; w/c for greater than 250'    Transportation: not a  at baseline, car transfer not a barrier    Decision maker: self    Plan of Care and goals reviewed and updated.    Discharge Plan/Recommendations    Fall Precautions: continue    Patient/Family input to goals:      Anticipated rehab needs following discharge: PT,OT,SLP    Anticipated care giver support after discharge: TCU placement as family can not care for her at home.    Estimated length of stay: Fri 11/08    Overall plan for the patient: continue ARU      Utilization Review and Continued Stay Justification    Medical Necessity Criteria:    For any criteria that is not met, please document reason and plan for discharge, transfer, or modification of plan of care to address.    Requires intensive rehabilitation program to treat functional deficits?: Yes    Requires 3x per week or greater involvement of rehabilitation physician to oversee rehabilitation program?: Yes    Requires rehabilitation nursing interventions?: Yes    Patient is making functional progress?: Yes    There is a potential for additional functional progress? Yes    Patient is participating in therapy 3 hours per day a minimum of 5 days per week or 15 hours per week in 7 day period?:Yes    Has discharge needs that require coordinated discharge planning approach?:Yes      Barriers/Concerns related to meeting medical necessity criteria:  none    Team Plan to Address Concern:  na      Final Physician Sign off    Statement of Approval: reviewed and approved. Kamaljit Rojas MD      Patient Goals  Social Work Goals: Confirm discharge recommendations with therapy, coordinate safe discharge plan and remain available to support and assist as needed.    OT Frequency: daily  OT goal: hygiene/grooming: modified independent  OT goal: upper body dressing: Modified independent, including set-up/clothing retrieval  OT goal: lower body dressing: Modified independent, including set-up/clothing retrieval  OT goal: upper body bathing: Modified independent  OT goal: lower body bathing: Modified independent  OT goal: toilet transfer/toileting: Modified independent  OT goal: meal preparation: Modified independent, with simple meal preparation  OT goal: home management: Modified  independent, with light demand household tasks  OT goal: cognitive: Patient/caregiver will verbalize understanding of cognitive assessment results/recommendations as needed for safe discharge planning  OT goal 1: Pt will complete tub/shower transfer mod I using appropriate DME       PT Frequency: daily x 60 minutes  PT goal: bed mobility: (met)  PT goal: transfers: (met)  PT goal: gait: Modified independent, Rolling walker, Greater than 200 feet, 50 feet, Assistive device, Supervision/stand-by assist(mod I home, SBA limited community)  PT goal: stairs: Supervision/stand-by assist, 3 stairs, Rail on right(for home access into sister's home)  PT goal: perform aerobic activity with stable cardiovascular response: (met)     PT goal 1: using handout be ind with strength HEP for improved functional mobility  PT Goal 2: using handout be ind with balance HEP for improved functional mobility  PT Goal 3: perform floor transfer w/ furniture A, mod I to demo safe fall recovery MET               SLP Frequency: daily, 30 mins  SLP goal 1: Pt will utilize compensatory memory strategies to complete St recall tasks with 90% accuracy  SLP goal 2: SLP: pt to complete moderate complex verbal/written reasoning tasks 90% accuracy min cues for increased independence with IADLs

## 2019-11-07 NOTE — PROGRESS NOTES
After discussing further with team, plan for TCU discharge with plan to eventually move into LTC until housing is secured.     Met with pt at bedside, pt appeared A&O and pleasant. Discussed at length with pt the difference between TCU and LTC and how her MA would cover TCU and pay for room-and-board in LTC. Insurance would also provide transportation coverage to get to outpt HD at [facility] location. Pt expressed happiness and expressed agreement with plan. Shared a list of options with pt, pt agreeable to sending referrals close to her HD center and where she was previously living. Referrals faxed to [facility] at [place], [facility], [facility] in [place] and [facility] in [place]. [facility] Down[facility] at [place] and [facility] willing and able to accept. Notified pt of the facilities that were willing to accept. Pt prefers to discharge to [facility]. Notified admissions. Pt will discharge right into LTC and get TCU (therapy) benefits. Pt will have a semi-private room and pt is aware and agreeable. JOCELYNE called pt's insurance ([employer]) and confirmed that pt has transportation benefits to get to HD. Information on how to schedule transport was given to admissions at the TCU.     Nieves called [facility] admissions and informed that the [place] location is closed on Thursdays. Left a vm with the DON and will follow up in the morning. Need to confirm that pt can resume her regular outpt chair time on MWF. Pt received HD this morning. Contacted Medicine PA and followed up with Nephrology and pt will go to HD at [facility] tomorrow, Fri 11/08 for a short run. Pt will start at 12:30 and go for two hours. Nieves contacted [facility] PH: [phone] and scheduled a pick-up from ARU at 12:00pm and a return pick-up (back to ARU) at 3:30pm. Charge RN notified. Therapy updated and can schedule independent day for Friday 11/08 am.     Spoke with pt over the phone, pt agreeable to plan and  denied concerns or needs. Spoke with pt sister (with permission) over the phone and shared update. Pt sister agreeable and expressed relief. Pt sister plans to follow up with SWer at the LTC to determine next steps and is aware that once pt is approved for disability and getting SSDI that her check will be turned over to the LTC to pay for room-and-board. Pt sister will visit tomorrow and is unable to transport to LTC tomorrow.     Left vm with admissions at [facility] to confirm plans. Will follow up in the AM to confirm all discharge plans. Due to the time of pt arrival back to ARU, pt will most likely discharge Saturday 11/09 in the AM to LTC at [facility] in [place].     SWer will confirm discharge plans with admissions [facility], [facility], confirm plans with pt and pt sister (Lyla), arrange transportation and complete PAS.     Jessi Kolb, JOHANNA, [facility] [place] [facility]   Phone: [phone]  I   Pager: [phone]

## 2019-11-07 NOTE — PLAN OF CARE
FOCUS/GOAL  Bladder management, Nutrition/Feeding/Swallowing precautions, and Medical management    ASSESSMENT, INTERVENTIONS AND CONTINUING PLAN FOR GOAL:  Alert & oriented, uses call light to make needs known. MOD I in room with walker, but needs night light for evenings and nights. Continent of bladder and bowel, reports no loose stools this shift. Appetite 100% for meals, blood glucose 144 & 208, insulin given per orders. Continues on MAP does not call for meds, but able to identify the correct schedule medication and dosages. Denies pain or disocmfort. Pt left for dialysis at 1230.

## 2019-11-07 NOTE — DISCHARGE INSTRUCTIONS
Follow up Appointments    -- PCP:  You are scheduled to see Dr. Dwain London on Wednesday, November 13th at 11:30 AM. Please check in at 11:15 AM.    Address  [facility] [address] [place] [address]  Phone   [phone]      -- Neurology in 4-6 weeks:  You are scheduled to see Dr. Valerio on January 21 at 10:00 AM. You have been placed on a waitlist to try for an earlier appointment date, please called [phone] if you have any questions or wish to know your status on the waitlist.    Address  [facility], Floor [address]                          [address] [place] [address]  Phone   [phone]      -- It is recommended that you also schedule a Lab/Imaging appointment for a thyroid ultrasound. To schedule, please call [phone].     Address                [facility]- Lab/Imaging, 1st Floor                               [address] [place] [address]  Phone                   [phone]    --------------------------------    To reduce the risk of subsequent stroke there are several important factors including optimal management of anticoagulants, blood pressure, cholesterol, diabetes and smoking abstinence.    Anticoagulation:  You are on Aspirin    Blood Pressure:  Keeping your blood pressures less than 130/80 has been shown to reduce risk of recurrent stroke. Recording your blood pressure and heart rate twice daily in a log book can help you and your providers make decisions on optimal management. You are encouraged to bring your log book with you to your primary physician and/or cardiology doctor visits.    You are currently on Norvasc, Coreg, and Hydralazine to help control your blood pressure. Several lifestyle modifications have been associated with blood pressure reduction and are an important part of a comprehensive plan. These include: weight  "loss (if over-weight); a diet low in salt and cholesterol and rich in fruits and vegetables; regular aerobic physical activity and limited alcohol consumption.    Diabetes:  Optimal management of diabetes not only reduces risk of another stroke, it can help with healing process from your recent stroke. Blood glucose levels measure how well you're controlling your diabetes. An additional test \"hemoglobin A1C\" reflects how you have been overall with glucose control in the prior few months. This should be less than 7 though even lower below 6 is considered normal. Your recent Hgb A1C was [insert A1C]. This should be followed up with your primary provider and/or endocrinologist.    Your present plan is to check blood glucose consistently Before Meals and at Bedtime. These should be recorded along with date/time and any relevant notes such as food consumed, insulin/medication taken etc. This helps you and your providers make decisions on optimal management. You're encouraged to bring you log book with to your primary and/or endocrinology doctor visits.  Your current medications include Insulin (Humulin mix 7/30). Specific doses and frequencies listed elsewhere.     Diet:  Diet and exercise are very important for diabetes regardless of being on medication or not. Be aware of and moderate your carbohydrate intake as instructed by doctor, dietitian or nurse.  Regular physical activity may be more difficult since your stroke though doing what you can on a daily basis is helpful.     Cholesterol:  Traditional target levels for LDL cholesterol or \"bad cholesterol\" is less than 130 however once you have had a stroke, your target LDL level is now less than 70. Additional recommendations such as increasing your HDL or \"good\" cholesterol and lowering your triglyceride level can also be important.    Your most recent lipid panel was   Lab Results   Component Value Date    CHOL 175 05/29/2019     Lab Results   Component Value Date "    HDL 41 05/29/2019     Lab Results   Component Value Date     05/29/2019     Lab Results   Component Value Date    TRIG 142 05/29/2019     No results found for: CHOLHDLRATIO    This should be followed up in 2-3 months with your primary provider.    Smoking:  Finally one of the most important modifiable risk factors is to not smoke. This includes cigarettes, pipes, cigars, chewing tobacco and second hand smoke. Support through counseling, nicotine replacement, and oral smoking-cessation medications may all be helpful. Often people have been able to quit during their hospitalization but once returning to their familiar environment, the urges can be stronger. If this is the case, we encourage you to get support. There are numerous options, start by talking with your doctor.

## 2019-11-07 NOTE — PROGRESS NOTES
HEMODIALYSIS TREATMENT NOTE    Date: 11/7/2019  Time: 2:25 PM    Data:  Pre Wt: 85 kg (193 lb 12.6 oz)   Desired Wt: 84 kg   Post Wt: 84.1 kg (191 lb 9.3 oz)  Weight change: 1 kg  Ultrafiltration - Post Run Net Total Removed (mL): 900 mL  Vascular Access Status: patent  Dialyzer Rinse: Heavy, Streaked  Total Blood Volume Processed: 69.9 L Liters  Total Dialysis (Treatment) Time: 3 hours Hours    Lab:   yes    Interventions:Assessments  Pt dialyzed today for 3hrs on a K-3 Ca-2.25 bath via RCVC. 1Kg of UF. VSS throughout. Epo given during tx. BMP drawn and sent. See Epic for further details. Report to ARU PCN post HD     Plan:    Per renal team

## 2019-11-07 NOTE — DISCHARGE SUMMARY
[facility]   Acute Rehabilitation Unit  Discharge summary     Date of Admission: 10/28/2019  Date of Discharge: 11/09/2019  Disposition: to TCU   Primary Care Physician: Dwain London  Attending physician: Priscila Suero MD        discharge diagnosis      Acute right periventricular ischemic infarction (stroke)      brief summary    Roxy Beaulieu is a 59 year old LHD female with a PMH including but not limited to ESRD on HD, HTN, HLD, insulin dependent DM (HbA1c 10.7 on 10/2/19), and Hx of DVT/PE (~2004, not on anticoagulation due to non-compliance) who presented to [facility] on 10/23/19 with new onset of left leg weakness.  Initial CT was negative, however MRI demonstrated an acute R periventricular white matter infarct.  Further workup included an echo which showed an EF of 66% without mention of shunt, and no high grade stenosis of the head or neck seen on CTA.  Etiology of the stroke felt to be lacunar due to HTN.  She was continued on dialysis and will plan to transition to T/Th/Sat while at rehab.        rehabilitation course    As per Rehab Team Rounds on Thursday, 11/07:    Safety: Pt is alert, oriented, anxious, and impulsive, self transferred multiple times on evening of 11/6, is able to turn off alarms on bed and chair even while call light is in reach, transferring with SBA and ww.      Pain: Pt denies pain.     Medications, Skin, Tubes/Lines: Pt takes pills whole with water, is on MAP and is not calling for medications but able to select correct medications when reminded, scabs on left foot CDI and YENY, dialysis port to right chest.      Swallowing/Nutrition: Pt tolerates current regular diet without difficulty     Bowel/Bladder: Pt is mostly continent of bowel and bladder with intermittent incontinence overnight, pt reported diarrhea on evening of 11/6 but not observed.       Psychosocial: Single, never  and has no children.       ADLs/IADLs: Pt has been making slow but steady progress at ARU. Pt is SBA for g/h and oral cares standing at sink using FWW. Pt is supervision for UB dressing seated EOB; pt is SBA-CGA for LB dressing using FWW to stand and perform clothing management. Pt is CGA for toilet transfer and SBA for toilet hygiene using grab bar and FWW. Pt is SBA for showering/bathing seated and CGA for shower transfer using FWW. Pt is limited due to short term memory deficits and OT is recommending pt has oversight with medication management, meal preparation, shopping, finances, and home mgmt tasks. Pt reports her sister will be completing all other IADLs while she is staying with her. Anticipate pt will return home with FWW, shower chair, and potentially reacher. Patient will require ongoing OT services.      Mobility: Up in room w/ walker.  Ongoing LE weakness and falls risk but using walker w/o incident.  Velez score 12/56 primary deficits related to LE weakness and insensate feet. TUG score 19 seconds w/ ww.  Recommend discharge to supervised setting due to cognition and falls risk.     Cognition/Language: Pt making variable progress in cognitive goals- moderate impairments in ST memory- pt with some reduced insight overall to the severity of it. Have added goals also for reasoning as pt does well on structured complex level reasoning tasks but has difficulty with reasoning with more functional tasks. Pt would need oversight with medication management, finances, complex decision making. Plan is to discharge to LTC placement- continued tx recommended if available at site.   Community Re-Entry: walker w/ assist; w/c for greater than 250'     mEDICAL COURSE    While on the Rehab Unit the patient was followed by the Hospitalist service:    CVA, Subcortical, w/ transient LLE weakness. Presented to OSH on 10/23 w/ LLE numbness and weakness. Head CT negative for acute stroke, CT angiogram negative for any significant arotid stenosis,  MRI brain w/ signal abnormalities compatible w/ a subcentimeter acute R-sided periventricular white matter infarction, suspect lacunar stroke from HTN. Echo with EF 66%.  - Continue  mg every day  - Continue Statin  - Goal SBP <120 - adjusting BPs as below     ESRD on HD. Dialysis occurred on MWF PTA, transition to TThS while here.   Patient previously on MWF schedule prior to ARU, last dialyzed on 11/8 per renal so that she will continue with her previous MWF schedule starting 11/11/19.   Renal diet. Epo weekly. will continue on thrice weekly dialysis following discharge from the ARU.       Hx of HTN  Orthostatic hypotension - resolved  Her BPs have been somewhat difficult to control given her occasional orthostatic hypotension, appear improved since most recent medication dose adjustments, would recommend continuing current regimen of norvasc 5mg at bedtime, carvedilol 25mg BID, and hydralazine 50mg TID. Nephrology can continue to adjust these as outpatient.     DM II. Most recent Hgb A1C 10.7 on 10/2/2019. PTA on NPH 25 units BID. NPH has been adjusted for labile BGs while at ARU. Currently on NPH 22qAM and 13U qPM. BGs stable .   - Continue current dosing of NPH 22U qAM and 13U qPM  - BG TID w/ meals, at bedtime, and 0200  - Hypoglycemia protocol     Hx of PE/DVT (2003). Unclear if this was provoked. Has been in AC with coumadin in the past, but no longer given compliance issues.       dISCHARGE MEDICATIONS  Current Discharge Medication List      START taking these medications    Details   acetaminophen (TYLENOL) 325 MG tablet Take 2 tablets (650 mg) by mouth every 6 hours as needed for mild pain or fever (> 101 F)    Associated Diagnoses: Cerebrovascular accident (CVA) due to thrombosis of precerebral artery (H)      bisacodyl (DULCOLAX) 10 MG suppository Place 1 suppository (10 mg) rectally daily as needed for constipation    Associated Diagnoses: Cerebrovascular accident (CVA) due to thrombosis  of precerebral artery (H)      dextrose 50 % injection Inject 25-50 mLs into the vein every 15 minutes as needed for low blood sugar    Associated Diagnoses: Cerebrovascular accident (CVA) due to thrombosis of precerebral artery (H)      moisturizing lotion (LUBRIDERM) Apply topically 3 times daily    Associated Diagnoses: Cerebrovascular accident (CVA) due to thrombosis of precerebral artery (H)      psyllium (METAMUCIL/KONSYL) Packet Take 1 packet by mouth daily    Associated Diagnoses: Cerebrovascular accident (CVA) due to thrombosis of precerebral artery (H)      senna-docusate (SENOKOT-S/PERICOLACE) 8.6-50 MG tablet Take 1-4 tablets by mouth 2 times daily as needed for constipation    Associated Diagnoses: Cerebrovascular accident (CVA) due to thrombosis of precerebral artery (H)      simvastatin (ZOCOR) 20 MG tablet Take 1 tablet (20 mg) by mouth At Bedtime    Associated Diagnoses: Cerebrovascular accident (CVA) due to thrombosis of precerebral artery (H)         CONTINUE these medications which have CHANGED    Details   carvedilol (COREG) 25 MG tablet Take 1 tablet (25 mg) by mouth 2 times daily (with meals)    Associated Diagnoses: Cerebrovascular accident (CVA) due to thrombosis of precerebral artery (H)      hydrALAZINE (APRESOLINE) 50 MG tablet Take 1 tablet (50 mg) by mouth 3 times daily    Associated Diagnoses: Cerebrovascular accident (CVA) due to thrombosis of precerebral artery (H)      !! insulin isophane & regular (HUMULIN MIX 70/30 PEN , NOVOLIN MIX 70/30 PEN) susp Inject 22 Units Subcutaneous every morning    Associated Diagnoses: Cerebrovascular accident (CVA) due to thrombosis of precerebral artery (H)      !! insulin isophane & regular (HUMULIN MIX 70/30 PEN , NOVOLIN MIX 70/30 PEN) susp Inject 13 Units Subcutaneous daily (with dinner)    Associated Diagnoses: Cerebrovascular accident (CVA) due to thrombosis of precerebral artery (H)       !! - Potential duplicate medications found. Please  discuss with provider.      CONTINUE these medications which have NOT CHANGED    Details   amLODIPine (NORVASC) 5 MG tablet Take 5 mg by mouth At Bedtime       aspirin (ASA) 325 MG EC tablet Take 325 mg by mouth At Bedtime      darbepoetin rocky (ARANESP) 100 MCG/0.5ML injection Inject 100 mcg Subcutaneous once      Epoetin Rocky (EPOGEN IJ) Inject 10,000 Units as directed once a week               DISCHARGE INSTRUCTIONS AND FOLLOW UP  Discharge Procedure Orders   General info for SNF   Order Comments: Length of Stay Estimate: Short Term Care: Estimated # of Days <30  Condition at Discharge: Improving  Level of care:skilled   Rehabilitation Potential: Fair  Admission H&P remains valid and up-to-date: Yes  Recent Chemotherapy: N/A  Use Nursing Home Standing Orders: Yes     Mantoux instructions   Order Comments: Give two-step Mantoux (PPD) Per Facility Policy Yes     Activity - Up with nursing assistance     Order Specific Question Answer Comments   Is discharge order? Yes      Activity - Up with assistive device     Order Specific Question Answer Comments   Is discharge order? Yes      Reason for your hospital stay   Order Comments: S/p stroke. Rehab to help improve function.     Full Code     Order Specific Question Answer Comments   Code status determined by: Discussion with patient/legal decision maker      Physical Therapy Adult Consult   Order Comments: Evaluate and treat as clinically indicated.    Reason:  S/p stroke     Occupational Therapy Adult Consult   Order Comments: Evaluate and treat as clinically indicated.    Reason:  S/p stroke     Speech Language Path Adult Consult   Order Comments: Evaluate and treat as clinically indicated.    Reason:  S/p stroke     Fall precautions     Advance Diet as Tolerated   Order Comments: Follow this diet upon discharge: Orders Placed This Encounter      Room Service      Very High Consistent CHO Diet     Order Specific Question Answer Comments   Is discharge order? Yes          Follow up Appointments    -- PCP:  You are scheduled to see your PCP, Dr. Dwain London, on Wednesday, November 13th at 11:30 AM. Please check in at 11:15 AM.    Address  [facility] [address] [place] [address]  Phone   [phone]      -- Neurology   You are scheduled to see Dr. Valerio on January 21 at 10:00 AM. You have been placed on a waitlist to try for an earlier appointment date, please called [phone] if you have any questions or wish to know your status on the waitlist.    Address  [facility], Floor [address]                          [address] [place] [address]  Phone   [phone]      -- It is recommended that you also schedule a Lab/Imaging appointment for a thyroid ultrasound. To schedule, please call [phone].     Address                [facility], 1st Floor                               [address] [place] [address]  Phone                   [phone]      physical examination    Most recent Vital Signs:   Vitals:    11/07/19 1345 11/07/19 1400 11/07/19 1415 11/07/19 1430   BP: (!) 141/69 (!) 143/76 (!) 156/81    BP Location:       Pulse: 73 73 74    Resp: 25 18 18 18   Temp:       TempSrc:       SpO2: 97% 96% 96% 96%   Weight:       Height:           GEN: NAD, pleasant and cooperative, sitting in chair at bedside in room comfortably.  HEENT: NC/AT  CVS: well perfused, 2+ radial pulse  PULM: nonlabored to room air  ABD: Soft, NT, ND  EXT: No LE edema or calf tenderness b/l.  KRYSTEN hoses in place.   Neuro: Answers appropriately, follows commands, AAOx3.      35 minutes spent in discharge, including >50% in counseling and coordination of care, medication review and plan of care recommended on follow up.       Mario Quintero DO on 11/9/2019 at 4:33 PM      Discharge summary was forwarded to Dwain London  (PCP) at the time of discharge, so as to bridge from hospital to outpatient care.     It was our pleasure to care for Roxy Beaulieu during this hospitalization. Please do not hesitate to contact our Acute Rehabilitation Unit should there be questions regarding the hospital course or discharge plan.

## 2019-11-07 NOTE — PROGRESS NOTES
[facility], [place]    Medicine Progress Note - Hospitalist Service       Date of Admission:  10/28/2019  Assessment & Plan   Roxy Beaulieu is a 59 year old female with a pmh significant for ESRD on HD, HTN, DM insulin dependent, DVT, and PE who was admitted to OSH with LE weakness found to have CVA, transferred to ARU for aggressive rehabilitation. Medicine previously following and signed off 10/31, asked to re-evaluate patient 11/5 d/t orthostatic hypotension.      CVA, Subcortical, w/ transient LLE weakness. Presented to OSH on 10/23 w/ LLE numbness and weakness. Head CT negative for acute stroke, CT angiogram negative for any significant arotid stenosis, MRI brain w/ signal abnormalities compatible w/ a subcentimeter acute R-sided periventricular white matter infarction, suspect lacunar stroke from HTN. Echo with EF 66%.  - Continue  mg every day  - Continue Statin  - Goal SBP <120 - adjusting BPs as below     ESRD on HD. Dialyzies MWF PTA, transition to TThS while here. Last had HD on 11/5, UF with 1L removed (500cc accounted for d/t 500cc NS bolus prior to HD). Renal diet. Epo weekly. Plan for HD today.      Hx of HTN  Orthostatic hypotension - resolved  Previously on norvasc 5mg, coreg 25mg BID, and hydralazine 50mg TID. Had 2L UF with HD 11/2 and developed symptomatic orthostatic hypotension for a few days following. S/p 500cc NS bolus 11/5 with 1L UF with HD following. BP meds were decreased d/t symptomatic hypotension, which has now resolved thus doses adjusted 11/6. EDW 88kg, Wt 88kg today.   - Continue coreg 25mg BID, increase hydralazine to 50mg TID, cont norvasc 5mg qPM  - Cont to encourage po fluid intake to avoid orthostasis, nephrology managing HD      DM II. Most recent Hgb A1C 10.7 on 10/2/2019. PTA on NPH 25 units BID. NPH has been adjusted for labile BGs while at ARU. Currently on NPH 22qAM and 13U qPM. BGs stable .   - Continue current dosing of  NPH 22U qAM and 13U qPM  - BG TID w/ meals, at bedtime, and 0200  - Hypoglycemia protocol     Hx of PE/DVT (2003). Unclear if this was provoked. Has been in AC with coumadin in the past, but no longer given compliance issues.     Episode of stool urgency. Overnight last night. Denies any abd pain. No fevers or chills. Agree with metamucil as ordered by primary team. Will monitor and consider screening for c.diff if pt develops loose stools or persistent symptoms.     Leta Mathis PA-C  Hospitalist Service  [facility] [place]  ______________________________________________________________________    Interval History   Lin is doing well today. She denies any lightheadedness or dizziness. No chest pain, sob, or dyspnea. No fevers or chills. No abdominal pain. Had some stool urgency last night, denies watery stools. No other acute concerns.       Physical Exam   Vital Signs: Temp: 98.6  F (37  C) Temp src: Oral BP: (!) 152/62 Pulse: 84 Heart Rate: 80 Resp: 16 SpO2: 90 % O2 Device: None (Room air)    Weight: 193 lbs 14.4 oz  GENERAL: Alert and oriented x 3. NAD. Sitting up in chair. Chronically ill appearing.   HEENT: Anicteric sclera. MMM  CV: RRR.   RESPIRATORY: Effort normal on RA. Lungs CTAB.  GI: Abdomen soft and non distended with normoactive bowel sounds present in all quadrants. No tenderness, rebound, guarding.   NEUROLOGICAL: No focal deficits. Moves all extremities.    EXTREMITIES: No peripheral edema. Intact bilateral pedal pulses.   SKIN: No jaundice or rashes on exposed areas of skin

## 2019-11-07 NOTE — PROGRESS NOTES
"Nephrology Progress Note  11/07/2019         Roxy Beaulieu is a 59 yof with ESRD on HD, HTN, DM insulin dependent, DVT, and PE who was admitted to OSH with LE weakness found to have CVA, transferred to ARU for aggressive rehabilitation. Nephrology consulted for management of HD while at ARU.        Interval History  Mrs Beaulieu was seen HD today, minimal UF as her wt is at low for hospitalization.  Nearing discharge from ARU and will be on MWF schedule so will run tomorrow for logistical reasons, will send separate discharge summary to HD unit.         ASSESSMENT AND RECOMMENDATIONS:   ESRD-Started HD ~6 months ago thought to be due to HTN and DM2.  Runs 3h MWF at [facility] under care of Dr Cox but on TTS for rehab schedule at TCU.  Started in 5/2019 after being non-responsive to diuretics, has tunneled RIJ line which we will use for HD, allergic to heparin.                  -HD 1L of UF today with some orthostasis this am, given 0.5L NS which did help, will pull that back as SBP was in 140's and encouraged her to eat/drink normally.  EDW 88kg.       Volume-Mild edema, no SOB at rest.  Pulling 1L with run today.       Electrolytes/pH-K 5.2, bicarb 22, no acute issue, running today and will run tomorrow for logistics of discharging.      Anemia-Hgb 9.3 last check, on 6000u EPO with runs.       DM2-A1C noted to be 9.7.     Seen and discussed with Dr Pena     Recommendations were communicated to primary team via note       RIK Mondragon CNS  Clinical Nurse Specialist  [phone]    Review of Systems:   I reviewed the following systems:  Gen: No fevers or chills  CV: No CP at rest  Resp: No SOB at rest  GI: No N/V    Physical Exam:   I/O last 3 completed shifts:  In: 69 [P.O.:69]  Out: -    BP (!) 150/74   Pulse 74   Temp 98  F (36.7  C) (Oral)   Resp 20   Ht 1.727 m (5' 8\")   Wt 85 kg (187 lb 6.3 oz)   SpO2 96%   BMI 28.49 kg/m       GENERAL APPEARANCE: alert and no " distress  EYES: No scleral icterus  NECK:  No JVD  Pulmonary: lungs clear to auscultation with equal breath sounds bilaterally, no clubbing or cyanosis  CV: Regular rhythm, normal rate, no rub   - JVP not elevated   - Edema +2LE  GI: soft, nontender, normal bowel sounds  MS: no evidence of inflammation in joints, no muscle tenderness  : No Ventura  SKIN: no rash, warm, dry  NEURO: mentation intact and speech normal    Labs:   All labs reviewed by me  Electrolytes/Renal -   Recent Labs   Lab Test 11/07/19  1343 10/31/19  1331 10/31/19  0556 10/29/19  1330  06/01/19  1302 06/01/19  0646 05/31/19  0641  12/17/18  1041  03/15/18  2041  05/03/17  0725     --  136 138   < >  --  140 140   < > 141   < > 135   < > 140   POTASSIUM 5.2 5.3 Canceled, Test credited 6.0*   < > 5.6* 5.7* 5.4*   < > 4.8   < > 4.1   < > 4.6   CHLORIDE 106  --  100 102   < >  --  109 109   < > 106   < > 100   < > 109   CO2 22  --  24 27   < >  --  25 25   < > 29   < > 29   < > 27   BUN 75*  --  58* 77*   < >  --  58* 59*   < > 37*   < > 50*   < > 16   CR 7.32*  --  6.10* 8.03*   < >  --  4.73* 4.27*   < > 2.59*   < > 1.46*   < > 1.06*   * 151* Canceled, Test credited 101*   < >  --  114* 114*   < > 178*   < > 207*   < > 161*   GILL 8.1*  --  7.9* 8.0*   < >  --  8.4* 8.5   < > 8.4*   < > 8.7   < > 8.7   MAG  --   --   --   --   --   --   --   --   --  2.0  --  2.1  --  1.5*   PHOS  --   --   --   --   --  5.2* 5.6* 5.9*   < >  --    < > 3.2  --   --     < > = values in this interval not displayed.       CBC -   Recent Labs   Lab Test 10/31/19  0556 06/05/19  0715 06/02/19  0607   WBC 9.1 5.9 4.8   HGB 9.3* 7.5* 7.2*    176 166       LFTs -   Recent Labs   Lab Test 06/01/19  0646 05/31/19  0641 05/30/19  0607  05/27/19 2010 12/19/18  0631 12/18/18  0620 10/04/18  1611   ALKPHOS  --   --   --   --  155* 91  --  167*   BILITOTAL  --   --   --   --  0.3 0.3  --  0.3   ALT  --   --   --   --  56* 10  --  18   AST  --   --   --   --   38 9  --  26   PROTTOTAL  --   --   --   --  6.9 5.7* 5.8* 7.0   ALBUMIN 3.5 3.2* 2.4*   < > 2.9* 2.2* 2.3* 2.7*    < > = values in this interval not displayed.       Iron Panel -   Recent Labs   Lab Test 10/31/19  0556 05/28/19  1157 10/06/18  0609   IRON 24* 32* 33*   IRONSAT 14* 17 19   MITRA  --  107 165           Current Medications:    - Medication Assessment Program - Rehab Services   Does not apply See Admin Instructions     sodium chloride 0.9%  500 mL Intravenous Once     amLODIPine  5 mg Oral At Bedtime     aspirin  325 mg Oral At Bedtime     carvedilol  25 mg Oral BID w/meals     hydrALAZINE  50 mg Oral TID     insulin isophane & regular  13 Units Subcutaneous Daily with supper     insulin isophane & regular  22 Units Subcutaneous QAM     moisturizing lotion   Topical TID     psyllium  1 packet Oral Daily     simvastatin  20 mg Oral At Bedtime     sodium chloride (PF)  9 mL Intracatheter During Hemodialysis (from stock)     sodium chloride (PF)  9 mL Intracatheter During Hemodialysis (from stock)

## 2019-11-07 NOTE — PLAN OF CARE
Patient alert and oriented, denies pain this shift. CGA with walker for transfers and ambulation, however was found self-transferring throughout shift. Was reminded by staff that she needed to call for assistance and patient verbalized understanding. Continent of bowel using toilet in bathroom, patient self-reports having multiple diarrhea episodes this evening. MD paged and metamucil added. Patient did not call for MAP but was able to choose medications appropriately when prompted. Bed alarm on for safety, call light within reach. Ok to continue with care plan.

## 2019-11-07 NOTE — PROGRESS NOTES
"CLINICAL NUTRITION SERVICES -- BRIEF NOTE    Roxy Beaulieu is a/an 59 year old female assessed by the dietitian for LOS. Reviewed chart for nutritional risks. Pt is eating well and has not experienced weight loss.    History of DM2, gangrene of toe, high cholesterol, HTN, obesity, PE, smoking, CKD IV, cardiomyopathy, CH, ESRD.    CURRENT NUTRITION ORDERS  Diet: Very High Consistent Carbohydrate, deliver to nsg stn, pt needs BG checks.  Room service with assist    Intake/Tolerance: Pt has eaten 100% of meals since admission, per flowsheet. Having consistent meals ordered via assist.    ANTHROPOMETRICS  Height: 172.7 cm (5' 8\")  Most Recent Weight: 88 kg (193 lb 14.4 oz)    IBW: 64 kg  %IBW: 138%  Adjusted BW: 70 kg  BMI: Overweight BMI 25-29.9 (29.48)  Weight History: Weight down 3 kg Oct-Nov (~3%).  11/07/19 0615 88 kg (193 lb 14.4 oz)   11/06/19 0649 87 kg (191 lb 12.8 oz)   11/05/19 1320 87.9 kg (193 lb 12.6 oz)   11/05/19 0649 87.9 kg (193 lb 11.2 oz)   11/04/19 0624 87.6 kg (193 lb 1.6 oz)   11/03/19 0627 86.4 kg (190 lb 6.4 oz)   11/02/19 0628 88.4 kg (194 lb 12.8 oz)   11/01/19 1620 89.3 kg (196 lb 13.9 oz)   11/01/19 1300 92.4 kg (203 lb 11.3 oz)   10/31/19 1150 92.1 kg (203 lb 1.6 oz)   10/30/19 0617 91.5 kg (201 lb 11.2 oz)   10/29/19 1700 91.9 kg (202 lb 9.6 oz)   10/29/19 1305 94.1 kg (207 lb 7.3 oz)   10/28/19 1230 93.3 kg (205 lb 11.2 oz)     91 kg (200 lb 11.2 oz) 10/03/2019      89.4 kg (197 lb) 08/15/2019      91.2 kg (201 lb) 07/16/2019      Dosing Weight: 70 kg (adjusted body weight)    ASSESSED NUTRITION NEEDS  Estimated Energy Needs: 7459-8995 kcals/day (20 - 25 kcals/kg)  Justification: Maintenance  Estimated Protein Needs:  grams protein/day (1.2 - 1.5 grams of pro/kg)  Justification: Maintenance  Estimated Fluid Needs: 5304-8074 mL/day (1 mL/kcal)   Justification: Maintenance and Per provider pending fluid status    INTERVENTIONS:  Implementation:  Collaboration with other " providers: Discussed pt in rounds, potential discharge tomorrow (11/8).    Follow up/Monitoring:  Will follow up with pt as needed and also at LOS day 14 per protocol.    Liberty Beck  Dietetic Intern

## 2019-11-07 NOTE — PROGRESS NOTES
[facility]   Acute Rehabilitation Unit  Daily progress note    INTERVAL HISTORY  No acute events overnight.  Patient is being followed by Hospitalist for multiple medical issues.  She is hoping to advance to cane.  Discussed being safe for discharge and need for 24/7 support.  Neuro psych testing results - see report by Dr. Heath of 11/06.    Rehab Team Rounds today. See separate note for details. Patient stated she plans to discharge to sister's home, but SW note yesterday indicates that is not possible. This was discussed with entire rehab team during team rounds. Plan will be for patient to discharge to TCU tomorrow, Friday 11/08. Patient will continue on current medications and thrice weekly dialysis.    Patient reports no new issues.     MEDICATIONS  Scheduled:    - Medication Assessment Program - Rehab Services   Does not apply See Admin Instructions     sodium chloride 0.9%  250 mL Intravenous Once in dialysis     sodium chloride 0.9%  300 mL Hemodialysis Machine Once     sodium chloride 0.9%  500 mL Intravenous Once     amLODIPine  5 mg Oral At Bedtime     aspirin  325 mg Oral At Bedtime     carvedilol  25 mg Oral BID w/meals     epoetin fletcher (EPOGEN,PROCRIT) inj ESRD  6,000 Units Intravenous Once in dialysis     hydrALAZINE  25 mg Oral TID     insulin isophane & regular  13 Units Subcutaneous Daily with supper     insulin isophane & regular  22 Units Subcutaneous QAM     moisturizing lotion   Topical TID     - MEDICATION INSTRUCTIONS -   Does not apply Once     psyllium  1 packet Oral Daily     simvastatin  20 mg Oral At Bedtime     sodium chloride (PF)  9 mL Intracatheter During Hemodialysis (from stock)     sodium chloride (PF)  9 mL Intracatheter During Hemodialysis (from stock)        PRN:  sodium chloride 0.9%, acetaminophen, alteplase, alteplase, bisacodyl, glucose **OR** dextrose **OR** glucagon, senna-docusate, sodium chloride (PF), sodium chloride (PF)        PHYSICAL EXAM  Patient Vitals for the past 24 hrs:   BP Temp Temp src Pulse Heart Rate Resp SpO2 Weight   11/07/19 0843 (!) 152/62 -- -- -- 80 -- -- --   11/07/19 0615 (!) 152/75 98.6  F (37  C) Oral 84 -- 16 90 % 88 kg (193 lb 14.4 oz)   11/06/19 1700 (!) 164/62 98.5  F (36.9  C) Oral -- 73 16 96 % --   11/06/19 1614 -- 98.8  F (37.1  C) Oral 76 -- 16 95 % --   11/06/19 1435 123/77 -- -- 73 -- -- -- --       GEN: NAD, pleasant and cooperative, sitting in chair at bedside in room comfortably.  HEENT: NC/AT  CVS: well perfused, 2+ radial pulse  PULM: nonlabored to room air  ABD: Soft, NT, ND  EXT: No LE edema or calf tenderness b/l.  KRYSTEN hoses in place.   Neuro: Answers appropriately, follows commands, AAOx3.    LABS  CBC RESULTS:   Recent Labs   Lab Test 10/31/19  0556   WBC 9.1   RBC 3.41*   HGB 9.3*   HCT 31.2*   MCV 92   MCH 27.3   MCHC 29.8*   RDW 14.8            Last Comprehensive Metabolic Panel:  Sodium   Date Value Ref Range Status   10/31/2019 136 133 - 144 mmol/L Final     Potassium   Date Value Ref Range Status   10/31/2019 5.3 3.4 - 5.3 mmol/L Final     Chloride   Date Value Ref Range Status   10/31/2019 100 94 - 109 mmol/L Final     Carbon Dioxide   Date Value Ref Range Status   10/31/2019 24 20 - 32 mmol/L Final     Anion Gap   Date Value Ref Range Status   10/31/2019 12 3 - 14 mmol/L Final     Glucose   Date Value Ref Range Status   10/31/2019 151 (H) 70 - 99 mg/dL Final     Urea Nitrogen   Date Value Ref Range Status   10/31/2019 58 (H) 7 - 30 mg/dL Final     Creatinine   Date Value Ref Range Status   10/31/2019 6.10 (H) 0.52 - 1.04 mg/dL Final     GFR Estimate   Date Value Ref Range Status   10/31/2019 7 (L) >60 mL/min/[1.73_m2] Final     Comment:     Non  GFR Calc  Starting 12/18/2018, serum creatinine based estimated GFR (eGFR) will be   calculated using the Chronic Kidney Disease Epidemiology Collaboration   (CKD-EPI) equation.       Calcium   Date Value Ref Range  Status   10/31/2019 7.9 (L) 8.5 - 10.1 mg/dL Final       Recent Labs   Lab 11/07/19  0730 11/06/19  2143 11/06/19  1715 11/06/19  1210 11/06/19  0813 11/06/19  0207  10/31/19  1331   GLC  --   --   --   --   --   --   --  151*   * 96 117* 187* 225* 205*   < >  --     < > = values in this interval not displayed.         ASSESSMENT  Roxy Beaulieu is a 59 year old LHD female with a PMH including but not limited to ESRD on HD, HTN, HLD, insulin dependent DM (HbA1c 10.7 on 10/2/19), and Hx of DVT/PE (~2004, not on anticoagulation due to non-compliance) who presented to [facility] on 10/23/19 with new onset of left leg weakness and found to have an acute R periventricular white matter infarct.  Etiology felt to be lacunar due to HTN.      Impairment group code: 01.1 L body involvement, R brain stroke      PLAN  Rehabilitation  -Continue with inpatient rehabilitation program including PT, OT, and SLP for 3 hrs/day, rehab nursing, social work, nutrition, and close monitoring by physiatry  -The patient has impairments in strength, balance, and endurance, which are leading to activity limitations in ambulation, mobility, and ADLs    Medical  1)Neurology  -Acute R periventricular lacunar CVA due to HTN               -Continue  mg daily indefinitely               -HTN and lipid control as below               -Follow up with neurology in 4-6 weeks  -Will provide ongoing education regarding secondary stroke prevention including control of BP and glucose, compliance with medications and physician follow up, and lifestyle modifications including diet and exercise.        2)CVS  -HTN: Monitor, titrate as needed   -Ongoing symptomatic hypotension since 11/3.  Hospitalist team has re-evaluated patient, assistance is appreciated.  BP meds decreased to Hydralazine 12.5 mg TID, Coreg 12.5 mg BID and Norvasc 2.5 mg daily (correction to prior notes, was previously on 5 mg or Norvasc).  Nephrology team aware,  will adjust fluid removal with dialysis.  -HLD: Zocor 20 mg daily.  LDL 99  11/6: BP running better today after adjustments by Medicine team.          3)Pulmonary  -No acute issues  -Hx of PE, not on anticoagulation        4)FENGI  -Diet: Regular consistency solids, thin liquids.  Renal diet.  Discontinued moderate consistent CHO restriction due to hypoglycemia episodes.  -Bowel meds: PRN Senokot-S, Dulcolax suppository        5)  -ESRD on HD: Consulted nephrology for assistance with HD management.  Will dialyze T/Th/Sa while on rehab.  Assistance is appreciated.   -Potassium on 10/31, 5.3         6)DVT prophylaxis  -Hx of DVT/PE, however not on anticoagulation due to inconsistent compliance with Coumadin  -Continue  mg daily  -Mechanical prophylaxis        7)Pain  -Tylenol PRN        8)Endo   -IDDM (HbA1c 10.7)               -11/5 Decreased evening insulin 70/30 to 13 units, however daytime glucose levels were elevated so increased morning dose to 22 units.  Continue very high consistent CHO diet.  Yesterday lunch glucose was 70, if continues to have low values will back down morning dose again.               -Check glucose levels and sliding scale insulin qAC and at bedtime  -Thyroid ultrasound recommended if not done due to multinodular thyroid seen on CT.  Will recommend PCP follow after discharge.  11/6: sugars slightly elevated overnight, running better today, will continue to monitor.      9)Heme  -AoCD:  Receives weekly EPO injections, will defer administrations to nephrology  -Per pt, has heparin allergy which she reports hives.  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  -Ongoing cognitive deficits noted.  Have placed IP neuropsych consult to further quantify cognition and ability to manage IADLs and medications.  11/6: awaiting study/testing  results        11)Social/Dispo  -Anticipate discharge home at a modified independent level for ambulation, mobility, and ADLs  -ELOS: Tentative discharge date 11/8  -Rehab prognosis: Fair - Sensation and cognitive deficits are more pronounced than initially suspected  -Follow up appointments: PCP, Recommended thyroid ultrasound, neurology 4-6 weeks     Code status: Full Code (Discussed with patient upon admission)         Kamaljit Rojas MD on 11/6/2019 at 1:36 PM      I spent a total of >65 minutes face to face and coordinating care of Roxy Beaulieu. Over 50% of my time on the unit was spent counseling the patient and /or coordinating care regarding Acute R periventricular lacunar CVA due to HTN .

## 2019-11-07 NOTE — PLAN OF CARE
Completed paragraph memory recall task with pt using strategies of rehearsal, visualization and association- with 1st task scored 5/5 and the 2nd 4/5. Completed a 2nd memory task involving 1st recalling 12 words that she read through randomly- with out use of a strategy recall was at 7/12 - then had pt use strategy of rewriting the words into categories- with this - pt able to then recall 11/12; pt reports that it helps for her to rewrite memory.. Also completed memory task of recall of 4 words and then answering a question related to 1 fo the words- pt using strategies of rehearsal and repetition- with use of these strategies- pt at 5/5 accuracy. Pt also cmpleted a written divided attentiont ask with 100% accuracy independently. Pt was able to solve a complex reasoning task with 100% accuracy- did not need any assist from clinician with this task- does better on more strctured reasoning tasks vs more functional tasks

## 2019-11-07 NOTE — PLAN OF CARE
FOCUS/GOAL  Bowel management, Bladder management, Skin integrity and Cognition/Memory/Judgment/Problem solving    ASSESSMENT, INTERVENTIONS AND CONTINUING PLAN FOR GOAL:  Pt is alert and oriented, denied pain, sob, or new changes in sensation, pt laying with head at foot of bed at start of shift, reportedly self transferring on evening shift and able to turn off alarms, no reports of pain, sob, or new changes in sensation overnight, continent of bowel and bladder, dialysis port in right chest, no further care concerns at this time continue with POC

## 2019-11-08 LAB
GLUCOSE BLDC GLUCOMTR-MCNC: 139 MG/DL (ref 70–99)
GLUCOSE BLDC GLUCOMTR-MCNC: 141 MG/DL (ref 70–99)
GLUCOSE BLDC GLUCOMTR-MCNC: 196 MG/DL (ref 70–99)
GLUCOSE BLDC GLUCOMTR-MCNC: 208 MG/DL (ref 70–99)
GLUCOSE BLDC GLUCOMTR-MCNC: 209 MG/DL (ref 70–99)
GLUCOSE BLDC GLUCOMTR-MCNC: 90 MG/DL (ref 70–99)

## 2019-11-08 PROCEDURE — 97530 THERAPEUTIC ACTIVITIES: CPT | Mod: GP | Performed by: PHYSICAL THERAPIST

## 2019-11-08 PROCEDURE — 25000132 ZZH RX MED GY IP 250 OP 250 PS 637: Performed by: PHYSICIAN ASSISTANT

## 2019-11-08 PROCEDURE — 25000132 ZZH RX MED GY IP 250 OP 250 PS 637: Performed by: GENERAL PRACTICE

## 2019-11-08 PROCEDURE — 25800030 ZZH RX IP 258 OP 636: Performed by: CLINICAL NURSE SPECIALIST

## 2019-11-08 PROCEDURE — 97110 THERAPEUTIC EXERCISES: CPT | Mod: GO | Performed by: OCCUPATIONAL THERAPIST

## 2019-11-08 PROCEDURE — 90937 HEMODIALYSIS REPEATED EVAL: CPT

## 2019-11-08 PROCEDURE — 97535 SELF CARE MNGMENT TRAINING: CPT | Mod: GO | Performed by: OCCUPATIONAL THERAPIST

## 2019-11-08 PROCEDURE — 12800006 ZZH R&B REHAB

## 2019-11-08 PROCEDURE — 97127 ZZHC SP THERAPEUTIC INTERVENTION W/FOCUS ON COGNITIVE FUNCTION,EA 15 MIN: CPT | Mod: GN | Performed by: SPEECH-LANGUAGE PATHOLOGIST

## 2019-11-08 PROCEDURE — 63400005 ZZH RX 634: Performed by: CLINICAL NURSE SPECIALIST

## 2019-11-08 PROCEDURE — 97110 THERAPEUTIC EXERCISES: CPT | Mod: GP | Performed by: PHYSICAL THERAPIST

## 2019-11-08 PROCEDURE — 00000146 ZZHCL STATISTIC GLUCOSE BY METER IP

## 2019-11-08 PROCEDURE — 97750 PHYSICAL PERFORMANCE TEST: CPT | Mod: GP | Performed by: PHYSICAL THERAPIST

## 2019-11-08 RX ADMIN — PSYLLIUM HUSK 1 PACKET: 3.4 POWDER ORAL at 09:18

## 2019-11-08 RX ADMIN — SODIUM CHLORIDE 300 ML: 9 INJECTION, SOLUTION INTRAVENOUS at 12:25

## 2019-11-08 RX ADMIN — AMLODIPINE BESYLATE 5 MG: 2.5 TABLET ORAL at 20:39

## 2019-11-08 RX ADMIN — SIMVASTATIN 20 MG: 20 TABLET, FILM COATED ORAL at 20:40

## 2019-11-08 RX ADMIN — Medication: at 12:25

## 2019-11-08 RX ADMIN — SODIUM CHLORIDE 250 ML: 9 INJECTION, SOLUTION INTRAVENOUS at 12:25

## 2019-11-08 RX ADMIN — CARVEDILOL 25 MG: 25 TABLET, FILM COATED ORAL at 09:16

## 2019-11-08 RX ADMIN — Medication: at 10:30

## 2019-11-08 RX ADMIN — HYDRALAZINE HYDROCHLORIDE 50 MG: 25 TABLET ORAL at 09:13

## 2019-11-08 RX ADMIN — ASPIRIN 325 MG ORAL TABLET 325 MG: 325 PILL ORAL at 20:39

## 2019-11-08 RX ADMIN — HYDRALAZINE HYDROCHLORIDE 50 MG: 25 TABLET ORAL at 20:39

## 2019-11-08 RX ADMIN — EPOETIN ALFA 6000 UNITS: 10000 SOLUTION INTRAVENOUS; SUBCUTANEOUS at 12:34

## 2019-11-08 RX ADMIN — CARVEDILOL 25 MG: 25 TABLET, FILM COATED ORAL at 18:33

## 2019-11-08 RX ADMIN — INSULIN HUMAN 13 UNITS: 100 INJECTION, SUSPENSION SUBCUTANEOUS at 18:38

## 2019-11-08 RX ADMIN — Medication: at 20:41

## 2019-11-08 ASSESSMENT — MIFFLIN-ST. JEOR
SCORE: 1485.77
SCORE: 1487.5

## 2019-11-08 NOTE — PROGRESS NOTES
HEMODIALYSIS TREATMENT NOTE    Date: 11/8/2019  Time: 2:55 PM    Data:  Pre Wt: 87.4 kg (192 lb 10.9 oz)   Desired Wt: 87.4 kg   Post Wt: 87.4 kg  Weight change: 0 kg  Ultrafiltration - Post Run Net Total Removed (mL): 0 mL  Vascular Access Status: patent  Dialyzer Rinse: Streaked, Light  Total Blood Volume Processed: 46.7 Liters  Total Dialysis (Treatment) Time: 2 Hours    Lab:   No    Interventions/Assessment:  Stable 2 hr HD tx via RCVC on K2 Ca3 dialysate bath with . Epogen given IV. SBP in the 150s near end of run. No fluid removed per order and CVC saline locked with clear guard caps. Report given to primary ARU RN and pt transported back with [facility] via w/c.      Plan:    Pt to discharge to TCU.

## 2019-11-08 NOTE — PLAN OF CARE
FOCUS/GOAL  Bowel management, Bladder management, and Medical management    ASSESSMENT, INTERVENTIONS AND CONTINUING PLAN FOR GOAL:  Pt A & Ox 4. Declined pain. Discontinuoued of MAP from this shift.   Cont of B/B, LBM 11/6. Pt had a 2hr hemodialysis this afternoon. CVV on right chest.  Planned to discharge to TCU tomorrow, 11/9. BG was monitored and carbohydrate was counted per order.   Per PT reported, pt tried to walk to bathroom w/o walker. Education for using walker and call light was applied this shift. Please ensure bed alarm on during night and put walker close to patient.

## 2019-11-08 NOTE — PROGRESS NOTES
Occupational Therapy Discharge Summary    Reason for therapy discharge:    Discharged to transitional care facility.    Progress towards therapy goal(s). See goals on Care Plan in [facility] electronic health record for goal details.  Goals met    Therapy recommendation(s):    Continued therapy is recommended.  Rationale/Recommendations:  Recommend continued skilled OT services to increase independence with ADLs and IADLs. .  Continue home exercise program.    Pt will be discharging to [facility] TCU with recommendation of further OT to address independence with ADLs and IADLs. Recommending pt continue with UE strengthening HEP and hand strengthening HEP given. Pt is mod I with functional transfers using FWW. Pt is IND with UB dressing and mod I with LB dressing using FWW. Pt is mod I for g/h and oral cares standing at sink using FWW. Pt is mod I for toilet transfer and hygiene using FWW. Pt is SBA for showering/bathing and shower transfer using FWW and shower bench. Anticipate pt will require ongoing assistance with IADLs including medication management, laundry, cleaning, and finances. Pt scoring 21/30 on MoCA; 26/30 indicates normal screen. Anticipate pt will require shower bench and FWW when discharging.

## 2019-11-08 NOTE — PLAN OF CARE
Completed deductive reasonin gpuzzle- complex in a timely manner and independently w ith 100% accuracy. Pt however with Flow free game on I-pad- needing increased time for problem solving with this task n order to self- correct and change paths- advanced to 8x8 grid. Compelted memory recall tasks- paragraph level - had pt use rehearsal and visualization to aid in recall- pt scored 4/5 , 3/5 and 2/6 with recall without a repetition. With task invovling recall of 16 wrods- pt using strategy of categorization and rewriting words under their categories to aid in recall - with using strategy- pt kumar to recall 10/16  wrods with additional 1st letter cues pt could then recall and additional 4/6 words. Pt is d/cing to a TCU today with continued sptx planned to target immediate memory and ST memory- pt demonstrating still moderate difficulty in this cognitive area. Pt has made progress in higher level reasoning- but likely still address at next level of care    Addendum: p.m session today cx- 30 due to pt at dialysis in pm  Speech Language Therapy Discharge Summary    Reason for therapy discharge:    Discharged to transitional care facility.    Progress towards therapy goal(s). See goals on Care Plan in [facility] electronic health record for goal details.  Goals partially met.  Barriers to achieving goals:   discharge from facility.    Therapy recommendation(s):    Continued therapy is recommended.  Rationale/Recommendations:  See above summary.

## 2019-11-08 NOTE — PROGRESS NOTES
[facility]   Acute Rehabilitation Unit  Daily progress note    INTERVAL HISTORY  No acute events overnight.  Patient is being followed by Hospitalist for multiple medical issues.      She will have dialysis again today, Friday (while on ARU received dialysis TuThSat), but will not complete dialysis until later in the afternoon. For that reason, patient will discharge to TCU Saturday. Patient will continue on current  thrice weekly dialysis (MWF).    Patient reports no new issues. She denies pain, SOB, NorV. Feeling well.    MEDICATIONS  Scheduled:    - Medication Assessment Program - Rehab Services   Does not apply See Admin Instructions     sodium chloride 0.9%  250 mL Intravenous Once in dialysis     sodium chloride 0.9%  300 mL Hemodialysis Machine Once     sodium chloride 0.9%  500 mL Intravenous Once     amLODIPine  5 mg Oral At Bedtime     aspirin  325 mg Oral At Bedtime     carvedilol  25 mg Oral BID w/meals     epoetin fletcher (EPOGEN,PROCRIT) inj ESRD  6,000 Units Intravenous Once in dialysis     hydrALAZINE  50 mg Oral TID     insulin isophane & regular  13 Units Subcutaneous Daily with supper     insulin isophane & regular  22 Units Subcutaneous QAM     moisturizing lotion   Topical TID     - MEDICATION INSTRUCTIONS -   Does not apply Once     psyllium  1 packet Oral Daily     simvastatin  20 mg Oral At Bedtime     sodium chloride (PF)  9 mL Intracatheter During Hemodialysis (from stock)     sodium chloride (PF)  9 mL Intracatheter During Hemodialysis (from stock)        PRN:  sodium chloride 0.9%, acetaminophen, alteplase, alteplase, bisacodyl, glucose **OR** dextrose **OR** glucagon, senna-docusate, sodium chloride (PF), sodium chloride (PF)       PHYSICAL EXAM  Patient Vitals for the past 24 hrs:   BP Temp Temp src Pulse Heart Rate Resp SpO2 Weight   11/08/19 0637 (!) 157/61 98  F (36.7  C) Oral 78 -- 16 93 % 87.2 kg (192 lb 4.8 oz)   11/07/19 2030 (!) 148/66 -- --  -- 82 -- -- --   11/07/19 1754 139/60 -- -- -- 78 18 96 % --   11/07/19 1630 (!) 170/75 98.6  F (37  C) Oral -- 75 17 98 % --   11/07/19 1625 (!) 150/80 -- -- -- 75 21 97 % --   11/07/19 1615 (!) 140/79 -- -- 74 73 23 97 % --   11/07/19 1600 (!) 155/81 -- -- 73 73 23 98 % --   11/07/19 1545 (!) 154/84 -- -- 74 73 17 97 % --   11/07/19 1530 (!) 164/83 -- -- 75 74 17 97 % --   11/07/19 1515 (!) 150/74 -- -- 74 73 20 96 % --   11/07/19 1500 (!) 146/98 -- -- 73 73 19 97 % --   11/07/19 1445 (!) 158/78 -- -- 73 74 27 96 % --   11/07/19 1430 (!) 155/79 -- -- 74 74 18 96 % --   11/07/19 1415 (!) 156/81 -- -- 74 74 18 96 % --   11/07/19 1400 (!) 143/76 -- -- 73 73 18 96 % --   11/07/19 1345 (!) 141/69 -- -- 73 73 25 97 % --   11/07/19 1325 (!) 166/73 98  F (36.7  C) Oral -- 77 16 97 % --   11/07/19 1315 -- -- -- -- -- -- -- 85 kg (187 lb 6.3 oz)       GEN: NAD, pleasant and cooperative, sitting in chair at bedside in room comfortably.  HEENT: NC/AT  CVS: well perfused, 2+ radial pulse  PULM: nonlabored to room air  ABD: Soft, NT, ND  EXT: No LE edema or calf tenderness b/l.  KRYSTEN hoses in place.   Neuro: Answers appropriately, follows commands, AAOx3.    LABS  CBC RESULTS:   Recent Labs   Lab Test 10/31/19  0556   WBC 9.1   RBC 3.41*   HGB 9.3*   HCT 31.2*   MCV 92   MCH 27.3   MCHC 29.8*   RDW 14.8            Last Comprehensive Metabolic Panel:  Sodium   Date Value Ref Range Status   11/07/2019 139 133 - 144 mmol/L Final     Potassium   Date Value Ref Range Status   11/07/2019 5.2 3.4 - 5.3 mmol/L Final     Chloride   Date Value Ref Range Status   11/07/2019 106 94 - 109 mmol/L Final     Carbon Dioxide   Date Value Ref Range Status   11/07/2019 22 20 - 32 mmol/L Final     Anion Gap   Date Value Ref Range Status   11/07/2019 12 3 - 14 mmol/L Final     Glucose   Date Value Ref Range Status   11/07/2019 181 (H) 70 - 99 mg/dL Final     Urea Nitrogen   Date Value Ref Range Status   11/07/2019 75 (H) 7 - 30 mg/dL Final      Creatinine   Date Value Ref Range Status   11/07/2019 7.32 (H) 0.52 - 1.04 mg/dL Final     GFR Estimate   Date Value Ref Range Status   11/07/2019 5 (L) >60 mL/min/[1.73_m2] Final     Comment:     Non  GFR Calc  Starting 12/18/2018, serum creatinine based estimated GFR (eGFR) will be   calculated using the Chronic Kidney Disease Epidemiology Collaboration   (CKD-EPI) equation.       Calcium   Date Value Ref Range Status   11/07/2019 8.1 (L) 8.5 - 10.1 mg/dL Final       Recent Labs   Lab 11/08/19  0755 11/08/19  0215 11/07/19  2104 11/07/19  1742 11/07/19  1607 11/07/19  1343 11/07/19  1156   GLC  --   --   --   --   --  181*  --    * 90 163* 145* 150*  --  208*         ASSESSMENT  Roxy Beaulieu is a 59 year old LHD female with a PMH including but not limited to ESRD on HD, HTN, HLD, insulin dependent DM (HbA1c 10.7 on 10/2/19), and Hx of DVT/PE (~2004, not on anticoagulation due to non-compliance) who presented to [facility] on 10/23/19 with new onset of left leg weakness and found to have an acute R periventricular white matter infarct.  Etiology felt to be lacunar due to HTN.      Impairment group code: 01.1 L body involvement, R brain stroke      PLAN  Rehabilitation  -Continue with inpatient rehabilitation program including PT, OT, and SLP for 3 hrs/day, rehab nursing, social work, nutrition, and close monitoring by physiatry  -The patient has impairments in strength, balance, and endurance, which are leading to activity limitations in ambulation, mobility, and ADLs  -Will discharge to TCU on Saturday for ongoing rehab.    Medical  1)Neurology  -Acute R periventricular lacunar CVA due to HTN               -Continue  mg daily indefinitely               -HTN and lipid control as below               -Follow up with neurology in 4-6 weeks  -Will provide ongoing education regarding secondary stroke prevention including control of BP and glucose, compliance with  medications and physician follow up, and lifestyle modifications including diet and exercise.        2)CVS  -HTN: Monitor, titrate as needed   -Ongoing symptomatic hypotension since 11/3.  Hospitalist team has re-evaluated patient, assistance is appreciated.  BP meds decreased to Hydralazine 12.5 mg TID, Coreg 12.5 mg BID and Norvasc 2.5 mg daily (correction to prior notes, was previously on 5 mg or Norvasc).  Nephrology team aware, will adjust fluid removal with dialysis.  -HLD: Zocor 20 mg daily.  LDL 99  11/6: BP running better today after adjustments by Medicine team.          3)Pulmonary  -No acute issues  -Hx of PE, not on anticoagulation        4)FENGI  -Diet: Regular consistency solids, thin liquids.  Renal diet.  Discontinued moderate consistent CHO restriction due to hypoglycemia episodes.  -Bowel meds: PRN Senokot-S, Dulcolax suppository        5)  -ESRD on HD: Consulted nephrology for assistance with HD management.  Will dialyze T/Th/Sa while on rehab and then transition back to [facility] at time of discharge. Patient today is to have dialysis this afternoon and then will discharge to TCU tomorrow.  Assistance is appreciated.   -Potassium on 11/07, 5.2         6)DVT prophylaxis  -Hx of DVT/PE, however not on anticoagulation due to inconsistent compliance with Coumadin  -Continue  mg daily  -Mechanical prophylaxis        7)Pain  -Tylenol PRN        8)Endo   -IDDM (HbA1c 10.7)               -11/5 Decreased evening insulin 70/30 to 13 units, however daytime glucose levels were elevated so increased morning dose to 22 units.  Continue very high consistent CHO diet.                 -Check glucose levels and sliding scale insulin qAC and at bedtime  -Thyroid ultrasound recommended if not done due to multinodular thyroid seen on CT.  Will recommend PCP follow after discharge.  11/6: sugars slightly elevated overnight, running better today, will continue to monitor.      9)Heme  -AoCD:  Receives weekly EPO  injections, will defer administrations to nephrology  -Per pt, has heparin allergy which she reports hives.  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  -Ongoing cognitive deficits noted.  Had neuropsych testing. See report of Dr Heath on 11/06. Patient will need assistance due to cognitive deficits at this time.        11)Social/Dispo  -Anticipate discharge home at a modified independent level for ambulation, mobility, and ADLs  -ELOS: Tentative discharge date 11/8  -Rehab prognosis: Fair - Sensation and cognitive deficits are more pronounced than initially suspected  -Follow up appointments: PCP, Recommended thyroid ultrasound, neurology 4-6 weeks     Code status: Full Code (Discussed with patient upon admission)         Kamaljit Rojas MD on 11/6/2019 at 1:36 PM      I spent a total of >25 minutes face to face and coordinating care of Roxy Beaulieu. Over 50% of my time on the unit was spent counseling the patient and /or coordinating care regarding Acute R periventricular lacunar CVA due to HTN .

## 2019-11-08 NOTE — DISCHARGE SUMMARY
Dialysis Discharge Summary Brief    [facility]  Division of Nephrology  Nephrology Discharge Dialysis Orders  Ph: [phone]  Fax: [phone]    Roxy Beaulieu  MRN: [id number]  YOB: 1960    [facility]  Primary Nephrologist: Dr oCx    Date of Admission: 10/28/2019  Date of Discharge: 11/8/19  Discharge Diagnosis: ESRD    [x] Resume all previous dialysis orders with exception as noted below    Mrs Beaulieu had HD managed at [facility] during time at rehab post CVA.  Stable on HD, wt after HD was 87.4kg today 11/8.  Plan to discharge to home today and will come to his usual outpt HD on 11/11.  My pager is below with any questions, she had no issues on runs.        New Orders (if not applicable put NA):  Estimated Dry Weight 87kg.    Dialysis Duration N/A   Dialysis Access N/A   Antibiotics (dose per dialysis, end date) N/A           Labs to be drawn at dialysis N/A   Other major changes to dialysis prescription (e.g. Dialysate bath, heparin, blood flow rate, etc)   N/A   Medication changes (also fax the unit a copy of the discharge summary)         N/A     Name of provider completing this form: RIK Mondragon CNS

## 2019-11-08 NOTE — PLAN OF CARE
Pt alert and oriented. Slept well throughout the night. Able to make needs known. Denies pain, denies SOB. Mod I w/ walker. Independently positioning. BG @ 0200=90. Per SW note, pt has a short dialysis run today starting at 12:30, going for two hours. Call light in reach, continue w/ POC.

## 2019-11-08 NOTE — PLAN OF CARE
Physical Therapy Discharge Summary    Reason for therapy discharge:    Discharged to long term care facility.    Progress towards therapy goal(s). See goals on Care Plan in [facility] electronic health record for goal details.  Goals met    Therapy recommendation(s):    Continued therapy is recommended.  Rationale/Recommendations:  discharge to LTC w/ ongoing therapy recommended to improve LE strength, dynamic stability during amb, and progress trunk co-contraction; pt does not have consistent support from family; meehan score improved from 12/56 to 25/56 since admission to ARU; pt on dialysis 3days/wk; Pt goals: progress to full community mobility, progress to amb w/ cane, return to leisure activities. .

## 2019-11-08 NOTE — PLAN OF CARE
FOCUS/GOAL  Medical management    ASSESSMENT, INTERVENTIONS AND CONTINUING PLAN FOR GOAL:  Pt is alert and oriented. Mod I in room with walker, need some sort of light for patient during the night. Denies shortness of breath/chest pain. Continent of bowel/bladder. Dialysis today, returned ~1730. Denies pain. Plans to discharge to [facility] in [place] Saturday AM.  & 163.  Per SW, will have short run of dialysis tomorrow. Continue POC.

## 2019-11-08 NOTE — PROGRESS NOTES
11/08/19 0800   Signing Clinician's Name / Credentials   Signing clinician's name / credentials CRISSY Reynoso    Forbes Balance Scale (FORBES K, HEBERT S, TREY SEN, SUZANNE ADRIAN: MEASURING BALANCE IN THE ELDERLY: VALIDATION OF AN INSTRUMENT. CAN. J. PUB. HEALTH, JULY/AUGUST SUPPLEMENT 2:S7-11, 1992.)   Sit To Stand 3   Standing Unsupported 2   Sitting Unsupported 4   Stand to Sit 2   Transfers 3   Standing with Eyes Closed 2   Standing Unsupported, Feet Together 0   Reach Forward With Outstretched Arm 2   Retrieve Object From Floor 3   Turning to Look Behind 1   Turn 360 Degrees 1   Placing Alternate Foot on Stool (4-6 inches) 0   Unsupported Tandem Stand (Demonstrate to Subject) 1   One Leg Stand 1   Total Score (A score of 45 or less has been correlated with an increased risk of falls)   Total Score (out of 56) 25     Forbes Balance Scale (BBS) Cutoff Scores for CVA Population:  Patient Score: 25/56    The BBS is a measure of static and dynamic standing balance that has been validated in community dwelling elderly individuals and individuals who have Parkinson's Disease, MS, and those who are s/p CVA and TBI. The test is administered without an assistive device. Scores from the Forbes are used to determine the probability of falling based on the patient's previous history of falls and their test performance.     0-20 High risk for falling- Corresponded with w/c bound status  21-40 Medium risk for falling- Able to walk with assistance  41-56 Low risk for falling- Able to walk independently  According to [facility] [employer].  Available at http://www.strokecenter.org/.  Accessibility verified April 10, 2013.  Minimal Detectable Change = 6.5 according to Fabiano & Seney 2008    Assessment (rationale for performing, application to patient s function & care plan): improved from previous score of 12/56.  This is a statistical and clinical change although she still remains at high risk of falls she has  demonstrated that there is benefit for her participating in skilled therapy services.   Minutes billed as physical performance test: 15

## 2019-11-08 NOTE — PROGRESS NOTES
Brief Medicine Note  November 8, 2019    Patient is set to discharge to TCU today. Medicine has been following for HTN, DM II, and ESRD on HD.     - Patient previously on MWF schedule prior to ARU, last dialyzed 11/7 and will dialyze again today 11/8 per d/w renal so that she can return to her previous MWF schedule starting 11/11/19.  - Her BPs have been somewhat difficult to control given her occasional orthostatic hypotension, appear improved since most recent medication dose adjustments, would recommend continuing current regimen of norvasc 5mg at bedtime, carvedilol 25mg BID, and hydralazine 50mg TID. Nephrology can continue to adjust these as outpatient.  - Her BGs stable  on current insulin regimen, thus would continue this on discharge: NPH 22U qAM and 13U qPM. Monitor BG QID.     Leta Mathis PA-C

## 2019-11-08 NOTE — PROGRESS NOTES
Called [facility] and confirmed outpt HD and chair for Monday 11/11 (regular time and schedule). [facility] denied additional needs from SWer at this time. PH: [phone], Fax: [phone]. Pt is on a MWF schedule. Pt needs to be at the facility by 11:15am, run at 11:45am, runs for 3.5 hours and  to return to LTC at 4:00pm. Staff at LTC given this information and will follow up. Pt and pt sister aware as well.     Confirmed plans with admissions at [facility] in [place]. Staff will f/u with HD, assisting pt with setting up transport to  and CAD[place] waiver with the county. Plan to admit to LTC tomorrow, 11/09 and pt will have a shared room. Pt aware and agreeable.     Confirmed discharge plans with pt sister Lyla over the phone. Lyla expressed understanding and agreement. Expressed appreciation and denied additional needs. Lyla will touch base with SWer at LT to assist further.     Met with pt at bedside, pt agreeable to plan and denied additional needs. Discussed transportation and time. Pt agreeable and aware that insurance should cover cost but OOP just in case.     [facility] PH: [phone] w/c ride scheduled for tomorrow, 11/09 at 11:00am to LTC at [facility] PH: [phone], F: [phone]. [id number] completed. No further SW needs at this time.     Jessi Kolb, LICJOCELYNE, [facility] Re[facility]   Phone: [phone]  I   Pager: [phone]

## 2019-11-09 ENCOUNTER — TELEPHONE (OUTPATIENT)
Dept: GERIATRICS | Facility: CLINIC | Age: 59
End: 2019-11-09

## 2019-11-09 VITALS
SYSTOLIC BLOOD PRESSURE: 113 MMHG | WEIGHT: 193.1 LBS | BODY MASS INDEX: 29.27 KG/M2 | HEART RATE: 67 BPM | HEIGHT: 68 IN | RESPIRATION RATE: 17 BRPM | TEMPERATURE: 97.7 F | DIASTOLIC BLOOD PRESSURE: 51 MMHG | OXYGEN SATURATION: 92 %

## 2019-11-09 LAB
GLUCOSE BLDC GLUCOMTR-MCNC: 157 MG/DL (ref 70–99)
GLUCOSE BLDC GLUCOMTR-MCNC: 188 MG/DL (ref 70–99)
GLUCOSE BLDC GLUCOMTR-MCNC: 197 MG/DL (ref 70–99)

## 2019-11-09 PROCEDURE — 00000146 ZZHCL STATISTIC GLUCOSE BY METER IP

## 2019-11-09 PROCEDURE — 25000132 ZZH RX MED GY IP 250 OP 250 PS 637: Performed by: GENERAL PRACTICE

## 2019-11-09 PROCEDURE — 25000132 ZZH RX MED GY IP 250 OP 250 PS 637: Performed by: PHYSICIAN ASSISTANT

## 2019-11-09 RX ADMIN — CARVEDILOL 25 MG: 25 TABLET, FILM COATED ORAL at 07:39

## 2019-11-09 RX ADMIN — HYDRALAZINE HYDROCHLORIDE 50 MG: 25 TABLET ORAL at 07:39

## 2019-11-09 RX ADMIN — Medication: at 07:44

## 2019-11-09 RX ADMIN — PSYLLIUM HUSK 1 PACKET: 3.4 POWDER ORAL at 07:39

## 2019-11-09 ASSESSMENT — MIFFLIN-ST. JEOR: SCORE: 1489.4

## 2019-11-09 NOTE — TELEPHONE ENCOUNTER
New admission. Supper BG 92 - scheduled 13u NPH. Patient is going to eat dinner soon.     Orders:  Give 4u NPH  Monitor BG level    Dr. Mila Mcelroy, RIK, [facility]  Phone: [phone]  Fax: [phone]

## 2019-11-09 NOTE — PLAN OF CARE
No acute issues or concerns overnight. Appears to be sleeping well as noted during rounds. Has no c/o's pain, discomfort, CP and SOB. Mod I with walker. BG @ 0200 = 157. Planning discharge to TCU today - wheelchair pickup scheduled @ 1100.

## 2019-11-09 NOTE — PLAN OF CARE
Patient mod in in room with walker.  Continent of bowel and bladder.   this am. Insulins given. /74 this am, decreased to WNL later after medications.  Discharged with wheelchair transport. Information faxed to [facility].

## 2019-11-09 NOTE — PLAN OF CARE
FOCUS/GOAL  Medication management, Skin integrity, and Safety management    ASSESSMENT, INTERVENTIONS AND CONTINUING PLAN FOR GOAL:  Pt is alert and oriented X4. VSS. Denies pain. Continent of bowel and bladder. Mod I in room with a walker, SBA overnight.  before meals and 208 at HS, scheduled insulin given. Pt Calls appropriately for assistance. Ok to continue with POC.

## 2019-11-11 ENCOUNTER — NURSING HOME VISIT (OUTPATIENT)
Dept: GERIATRICS | Facility: CLINIC | Age: 59
End: 2019-11-11
Payer: COMMERCIAL

## 2019-11-11 VITALS
OXYGEN SATURATION: 93 % | HEART RATE: 71 BPM | DIASTOLIC BLOOD PRESSURE: 73 MMHG | BODY MASS INDEX: 29.46 KG/M2 | SYSTOLIC BLOOD PRESSURE: 133 MMHG | TEMPERATURE: 97.8 F | WEIGHT: 194.4 LBS | HEIGHT: 68 IN | RESPIRATION RATE: 18 BRPM

## 2019-11-11 DIAGNOSIS — I12.0 HYPERTENSIVE CHRONIC KIDNEY DISEASE WITH STAGE 5 CHRONIC KIDNEY DISEASE OR END STAGE RENAL DISEASE (H): ICD-10-CM

## 2019-11-11 DIAGNOSIS — Z99.2 TYPE 2 DIABETES MELLITUS WITH CHRONIC KIDNEY DISEASE ON CHRONIC DIALYSIS, WITH LONG-TERM CURRENT USE OF INSULIN (H): ICD-10-CM

## 2019-11-11 DIAGNOSIS — E78.2 MIXED HYPERLIPIDEMIA: ICD-10-CM

## 2019-11-11 DIAGNOSIS — Z86.718 PERSONAL HISTORY OF OTHER VENOUS THROMBOSIS AND EMBOLISM: ICD-10-CM

## 2019-11-11 DIAGNOSIS — N18.6 ANEMIA IN CHRONIC KIDNEY DISEASE, ON CHRONIC DIALYSIS (H): ICD-10-CM

## 2019-11-11 DIAGNOSIS — E11.22 TYPE 2 DIABETES MELLITUS WITH CHRONIC KIDNEY DISEASE ON CHRONIC DIALYSIS, WITH LONG-TERM CURRENT USE OF INSULIN (H): ICD-10-CM

## 2019-11-11 DIAGNOSIS — N18.6 END STAGE RENAL DISEASE (H): ICD-10-CM

## 2019-11-11 DIAGNOSIS — D63.1 ANEMIA IN CHRONIC KIDNEY DISEASE, ON CHRONIC DIALYSIS (H): ICD-10-CM

## 2019-11-11 DIAGNOSIS — Z99.2 ANEMIA IN CHRONIC KIDNEY DISEASE, ON CHRONIC DIALYSIS (H): ICD-10-CM

## 2019-11-11 DIAGNOSIS — R53.81 PHYSICAL DECONDITIONING: ICD-10-CM

## 2019-11-11 DIAGNOSIS — Z99.2 DEPENDENCE ON RENAL DIALYSIS (H): ICD-10-CM

## 2019-11-11 DIAGNOSIS — N18.6 TYPE 2 DIABETES MELLITUS WITH CHRONIC KIDNEY DISEASE ON CHRONIC DIALYSIS, WITH LONG-TERM CURRENT USE OF INSULIN (H): ICD-10-CM

## 2019-11-11 DIAGNOSIS — I69.352 HEMIPLEGIA AND HEMIPARESIS FOLLOWING CEREBRAL INFARCTION AFFECTING LEFT DOMINANT SIDE (H): Primary | ICD-10-CM

## 2019-11-11 DIAGNOSIS — Z79.4 TYPE 2 DIABETES MELLITUS WITH CHRONIC KIDNEY DISEASE ON CHRONIC DIALYSIS, WITH LONG-TERM CURRENT USE OF INSULIN (H): ICD-10-CM

## 2019-11-11 PROCEDURE — 99310 SBSQ NF CARE HIGH MDM 45: CPT | Performed by: NURSE PRACTITIONER

## 2019-11-11 ASSESSMENT — MIFFLIN-ST. JEOR: SCORE: 1495.29

## 2019-11-11 NOTE — PROGRESS NOTES
[facility]  PRIMARY CARE PROVIDER AND CLINIC:  Jeffrey J Mohr, [facility] [address] / [place] [address]  Chief Complaint   Patient presents with     [facility] Care     [facility] Record Number:  [id number]  Place of Service where encounter took place:  [facility]  (S) [[id number]]    Roxy Beaulieu  is a 59 year old  (1960), [facility]  Admitted to this facility for  rehab, medical management and nursing care.    HPI:    HPI information obtained from: facility chart records, facility staff, patient report and [facility] chart review.   Brief Summary of Hospital Course: Patient with a past medical history of ESRD on dialysis, HTN, HLD, DM2, and history of DVT/PE. She was hospitalized with new left leg weakness-MRI showed a Rperventricular white matter infarct. The etiology of stroke felt to be lacunar due to HTN. She was discharged to acute rehab and is now at [facility] due to higher level of care than her condo. Per patient her condo was recently sold and she is looking for a new place. Patient is not , does not have children, her sister is helping with her move and is her main support.   Updates on Status Since Skilled nursing Admission: Patient is alert and oriented. She denies chest pain or shortness of breath. She has a flat affect. She is compliant with her dialysis T/TH/Sat. She stated her bowels are working as normal for her.    CODE STATUS/ADVANCE DIRECTIVES DISCUSSION:   CPR/Full code   Patient's living condition: lives in a skilled nursing facility  ALLERGIES: Heparin  PAST MEDICAL HISTORY:  has a past medical history of Diabetes (H), Gangrene of toe (H) (4/29/2017), High cholesterol, Hypertension, Obesity (4/29/2017), PE (pulmonary thromboembolism) (H), Personal history of tobacco use, presenting hazards to health (4/29/2017), and Uncontrolled type 2 diabetes mellitus with  hyperglycemia, with long-term current use of insulin (H) (4/29/2017).  PAST SURGICAL HISTORY:   has a past surgical history that includes Amputate toe(s) (Left, 5/1/2017); Open reduction internal fixation fibula (Left, 3/16/2018); Amputate toe(s) (Left, 11/3/2018); and IR CVC Tunnel Placement > 5 Yrs of Age (6/4/2019).  FAMILY HISTORY: family history is not on file.  SOCIAL HISTORY:   reports that she quit smoking about 3 years ago. She has never used smokeless tobacco. She reports that she does not drink alcohol or use drugs.    Post Discharge Medication Reconciliation Status: discharge medications reconciled, continue medications without change    Current Outpatient Medications   Medication Sig Dispense Refill     acetaminophen (TYLENOL) 325 MG tablet Take 2 tablets (650 mg) by mouth every 6 hours as needed for mild pain or fever (> 101 F)       amLODIPine (NORVASC) 5 MG tablet Take 5 mg by mouth At Bedtime        aspirin (ASA) 325 MG EC tablet Take 325 mg by mouth At Bedtime       bisacodyl (DULCOLAX) 10 MG suppository Place 1 suppository (10 mg) rectally daily as needed for constipation       carvedilol (COREG) 25 MG tablet Take 1 tablet (25 mg) by mouth 2 times daily (with meals)       darbepoetin rocky (ARANESP) 100 MCG/0.5ML injection Inject 100 mcg Subcutaneous every 28 days        Epoetin Rocky (EPOGEN IJ) Inject 10,000 Units as directed once a week       hydrALAZINE (APRESOLINE) 50 MG tablet Take 1 tablet (50 mg) by mouth 3 times daily       insulin isophane & regular (HUMULIN MIX 70/30 PEN , NOVOLIN MIX 70/30 PEN) susp Inject 13 Units Subcutaneous daily (with dinner)       insulin NPH (HUMULIN N/NOVOLIN N VIAL) 100 UNIT/ML vial Inject 18 Units Subcutaneous daily       psyllium (METAMUCIL/KONSYL) Packet Take 1 packet by mouth daily       senna-docusate (SENOKOT-S/PERICOLACE) 8.6-50 MG tablet Take 1-4 tablets by mouth 2 times daily as needed for constipation       simvastatin (ZOCOR) 20 MG tablet Take 1  "tablet (20 mg) by mouth At Bedtime       dextrose 50 % injection Inject 25-50 mLs into the vein every 15 minutes as needed for low blood sugar (Patient not taking: Reported on 11/11/2019)       insulin isophane & regular (HUMULIN MIX 70/30 PEN , NOVOLIN MIX 70/30 PEN) susp Inject 22 Units Subcutaneous every morning (Patient not taking: Reported on 11/11/2019)       MEDICATION ASSESSMENT PROGRAM - REHAB SERVICES 1 each See Admin Instructions (Patient not taking: Reported on 11/11/2019)       moisturizing lotion (LUBRIDERM) Apply topically 3 times daily (Patient not taking: Reported on 11/11/2019)       ROS:  10 point ROS of systems including Constitutional, Eyes, Respiratory, Cardiovascular, Gastroenterology, Genitourinary, Integumentary, Musculoskeletal, Psychiatric were all negative except for pertinent positives noted in my HPI.    Vitals:  /73   Pulse 71   Temp 97.8  F (36.6  C)   Resp 18   Ht 1.727 m (5' 8\")   Wt 88.2 kg (194 lb 6.4 oz)   SpO2 93%   BMI 29.56 kg/m    Exam:  GENERAL APPEARANCE:  Alert, in no distress, cooperative  ENT:  Mouth and posterior oropharynx normal, moist mucous membranes, normal hearing acuity  EYES:  EOM, conjunctivae, lids, pupils and irises normal  RESP:  respiratory effort and palpation of chest normal, lungs clear to auscultation , no respiratory distress  CV:  Palpation and auscultation of heart done , regular rate and rhythm, no murmur, rub, or gallop, peripheral edema 1+ in bilateral lower extremities  ABDOMEN:  normal bowel sounds, soft, nontender, no hepatosplenomegaly or other masses  M/S:   Gait and station abnormal wheelchair bound, assist with adls  SKIN:  dialysis catheter in the right upper chest-c/d/i without s/s of infection  NEURO:   purposefulmovements in all 4 extremities, slight weakness of left lower leg compared to right leg  PSYCH:  oriented X 3, flat affect    Lab/Diagnostic data:  Labs done in SNF are in [place] EPIC. Please refer to them using " EPIC/Care Everywhere. and Recent labs in EPIC reviewed by me today.     ASSESSMENT/PLAN:  (I69.352) Hemiplegia and hemiparesis following cerebral infarction affecting left dominant side (H)  (primary encounter diagnosis)  (R53.81) Physical deconditioning  Comment: new condition  Plan: Continue with ASA, statinContinue with monitoring blood pressures, PT/OT/SLP to eval and treat, SW to assist with discharge planning.     (I12.0) Hypertensive chronic kidney disease with stage 5 chronic kidney disease or end stage renal disease (H)  (N18.6) End stage renal disease (H)  (Z99.2) Dependence on renal dialysis (H)  (N18.6,  D63.1,  Z99.2) Anemia in chronic kidney disease, on chronic dialysis (H)  Comment: variable blood pressure changes  Plan: Continue with dialysis, avoid nephrotoxins, continue to monitor dialysis catheter for s/s of infection. Continue with norvasc, ASA, statin, carvedilol and hydralazine-monitor for s/s of hypotension    (E11.22,  N18.6,  Z99.2,  Z79.4) Type 2 diabetes mellitus with chronic kidney disease on chronic dialysis, with long-term current use of insulin (H)  Comment: uncontrolled  Plan: last hemoglobin a 1 c was high 9.7, her insulins were low during acute rehab, will make adjustments as needed. Will continue to check blood sugars prior to meals and bedtime.    (E78.2) Mixed hyperlipidemia  Comment: chronic  Plan: Continue statin and monitoring      Total time spent with patient visit at the skilled nursing facility was 45 min including patient visit, review of past records and phone call to patient contact. Greater than 50% of total time spent with counseling and coordinating care due to complex medical condition.     Electronically signed by:  Autumn Trivedi NP

## 2019-11-11 NOTE — LETTER
11/11/2019        RE: Roxy Beaulieu  [address]  [place] [address]        [facility]  PRIMARY CARE PROVIDER AND CLINIC:  Jeffrey J Mohr, [facility] [address] / [place] [address]  Chief Complaint   Patient presents with     Establish Care     [facility] Record Number:  [id number]  Place of Service where encounter took place:  [facility]  (S) [[id number]]    Roxy Beaulieu  is a 59 year old  (1960), admitted to the above facility from [facility]..  Admitted to this facility for  rehab, medical management and nursing care.    HPI:    HPI information obtained from: facility chart records, facility staff, patient report and [facility] chart review.   Brief Summary of Hospital Course: Patient with a past medical history of ESRD on dialysis, HTN, HLD, DM2, and history of DVT/PE. She was hospitalized with new left leg weakness-MRI showed a Rperventricular white matter infarct. The etiology of stroke felt to be lacunar due to HTN. She was discharged to acute rehab and is now at [facility] due to higher level of care than her condo. Per patient her condo was recently sold and she is looking for a new place. Patient is not , does not have children, her sister is helping with her move and is her main support.   Updates on Status Since Skilled nursing Admission: Patient is alert and oriented. She denies chest pain or shortness of breath. She has a flat affect. She is compliant with her dialysis T/TH/Sat. She stated her bowels are working as normal for her.    CODE STATUS/ADVANCE DIRECTIVES DISCUSSION:   CPR/Full code   Patient's living condition: lives in a skilled nursing facility  ALLERGIES: Heparin  PAST MEDICAL HISTORY:  has a past medical history of Diabetes (H), Gangrene of toe (H) (4/29/2017), High cholesterol, Hypertension, Obesity (4/29/2017), PE (pulmonary thromboembolism) (H), Personal history of tobacco use,  presenting hazards to health (4/29/2017), and Uncontrolled type 2 diabetes mellitus with hyperglycemia, with long-term current use of insulin (H) (4/29/2017).  PAST SURGICAL HISTORY:   has a past surgical history that includes Amputate toe(s) (Left, 5/1/2017); Open reduction internal fixation fibula (Left, 3/16/2018); Amputate toe(s) (Left, 11/3/2018); and IR CVC Tunnel Placement > 5 Yrs of Age (6/4/2019).  FAMILY HISTORY: family history is not on file.  SOCIAL HISTORY:   reports that she quit smoking about 3 years ago. She has never used smokeless tobacco. She reports that she does not drink alcohol or use drugs.    Post Discharge Medication Reconciliation Status: discharge medications reconciled, continue medications without change    Current Outpatient Medications   Medication Sig Dispense Refill     acetaminophen (TYLENOL) 325 MG tablet Take 2 tablets (650 mg) by mouth every 6 hours as needed for mild pain or fever (> 101 F)       amLODIPine (NORVASC) 5 MG tablet Take 5 mg by mouth At Bedtime        aspirin (ASA) 325 MG EC tablet Take 325 mg by mouth At Bedtime       bisacodyl (DULCOLAX) 10 MG suppository Place 1 suppository (10 mg) rectally daily as needed for constipation       carvedilol (COREG) 25 MG tablet Take 1 tablet (25 mg) by mouth 2 times daily (with meals)       darbepoetin rocky (ARANESP) 100 MCG/0.5ML injection Inject 100 mcg Subcutaneous every 28 days        Epoetin Rocky (EPOGEN IJ) Inject 10,000 Units as directed once a week       hydrALAZINE (APRESOLINE) 50 MG tablet Take 1 tablet (50 mg) by mouth 3 times daily       insulin isophane & regular (HUMULIN MIX 70/30 PEN , NOVOLIN MIX 70/30 PEN) susp Inject 13 Units Subcutaneous daily (with dinner)       insulin NPH (HUMULIN N/NOVOLIN N VIAL) 100 UNIT/ML vial Inject 18 Units Subcutaneous daily       psyllium (METAMUCIL/KONSYL) Packet Take 1 packet by mouth daily       senna-docusate (SENOKOT-S/PERICOLACE) 8.6-50 MG tablet Take 1-4 tablets by mouth 2  "times daily as needed for constipation       simvastatin (ZOCOR) 20 MG tablet Take 1 tablet (20 mg) by mouth At Bedtime       dextrose 50 % injection Inject 25-50 mLs into the vein every 15 minutes as needed for low blood sugar (Patient not taking: Reported on 11/11/2019)       insulin isophane & regular (HUMULIN MIX 70/30 PEN , NOVOLIN MIX 70/30 PEN) susp Inject 22 Units Subcutaneous every morning (Patient not taking: Reported on 11/11/2019)       MEDICATION ASSESSMENT PROGRAM - REHAB SERVICES 1 each See Admin Instructions (Patient not taking: Reported on 11/11/2019)       moisturizing lotion (LUBRIDERM) Apply topically 3 times daily (Patient not taking: Reported on 11/11/2019)       ROS:  10 point ROS of systems including Constitutional, Eyes, Respiratory, Cardiovascular, Gastroenterology, Genitourinary, Integumentary, Musculoskeletal, Psychiatric were all negative except for pertinent positives noted in my HPI.    Vitals:  /73   Pulse 71   Temp 97.8  F (36.6  C)   Resp 18   Ht 1.727 m (5' 8\")   Wt 88.2 kg (194 lb 6.4 oz)   SpO2 93%   BMI 29.56 kg/m     Exam:  GENERAL APPEARANCE:  Alert, in no distress, cooperative  ENT:  Mouth and posterior oropharynx normal, moist mucous membranes, normal hearing acuity  EYES:  EOM, conjunctivae, lids, pupils and irises normal  RESP:  respiratory effort and palpation of chest normal, lungs clear to auscultation , no respiratory distress  CV:  Palpation and auscultation of heart done , regular rate and rhythm, no murmur, rub, or gallop, peripheral edema 1+ in bilateral lower extremities  ABDOMEN:  normal bowel sounds, soft, nontender, no hepatosplenomegaly or other masses  M/S:   Gait and station abnormal wheelchair bound, assist with adls  SKIN:  dialysis catheter in the right upper chest-c/d/i without s/s of infection  NEURO:   purposefulmovements in all 4 extremities, slight weakness of left lower leg compared to right leg  PSYCH:  oriented X 3, flat " affect    Lab/Diagnostic data:  Labs done in SNF are in [facility] EPIC. Please refer to them using [facility] Everywhere. and Recent labs in EPIC reviewed by me today.     ASSESSMENT/PLAN:  (I69.352) Hemiplegia and hemiparesis following cerebral infarction affecting left dominant side (H)  (primary encounter diagnosis)  (R53.81) Physical deconditioning  Comment: new condition  Plan: Continue with ASA, statinContinue with monitoring blood pressures, PT/OT/SLP to eval and treat, SW to assist with discharge planning.     (I12.0) Hypertensive chronic kidney disease with stage 5 chronic kidney disease or end stage renal disease (H)  (N18.6) End stage renal disease (H)  (Z99.2) Dependence on renal dialysis (H)  (N18.6,  D63.1,  Z99.2) Anemia in chronic kidney disease, on chronic dialysis (H)  Comment: variable blood pressure changes  Plan: Continue with dialysis, avoid nephrotoxins, continue to monitor dialysis catheter for s/s of infection. Continue with norvasc, ASA, statin, carvedilol and hydralazine-monitor for s/s of hypotension    (E11.22,  N18.6,  Z99.2,  Z79.4) Type 2 diabetes mellitus with chronic kidney disease on chronic dialysis, with long-term current use of insulin (H)  Comment: uncontrolled  Plan: last hemoglobin a 1 c was high 9.7, her insulins were low during acute rehab, will make adjustments as needed. Will continue to check blood sugars prior to meals and bedtime.    (E78.2) Mixed hyperlipidemia  Comment: chronic  Plan: Continue statin and monitoring      Total time spent with patient visit at the skilled nursing facility was 45 min including patient visit, review of past records and phone call to patient contact. Greater than 50% of total time spent with counseling and coordinating care due to complex medical condition.     Electronically signed by:  Autumn Trivedi NP                       Sincerely,        Autumn Trivedi NP

## 2019-11-14 ENCOUNTER — PRE VISIT (OUTPATIENT)
Dept: NEUROLOGY | Facility: CLINIC | Age: 59
End: 2019-11-14

## 2019-11-14 NOTE — TELEPHONE ENCOUNTER
FUTURE VISIT INFORMATION      FUTURE VISIT INFORMATION:    Date: 1/21/2020    Time: 10AM    [place] [facility]  REFERRAL INFORMATION:    Referring provider:  Dr. Suero    Referring providers clinic:  [facility]     Reason for visit/diagnosis  Stroke     RECORDS REQUESTED FROM:       Clinic name Comments Records Status Imaging Status   [facility] EVerywhere Requested to PACS MR brain 10/24/2019 and CTA Head and Neck 10/23/2019   [facility] Everywhere N/A

## 2019-11-19 ENCOUNTER — DOCUMENTATION ONLY (OUTPATIENT)
Dept: OTHER | Facility: CLINIC | Age: 59
End: 2019-11-19

## 2019-11-19 ENCOUNTER — AMBULATORY - HEALTHEAST (OUTPATIENT)
Dept: OTHER | Facility: CLINIC | Age: 59
End: 2019-11-19

## 2019-11-20 ENCOUNTER — DOCUMENTATION ONLY (OUTPATIENT)
Dept: CARE COORDINATION | Facility: CLINIC | Age: 59
End: 2019-11-20

## 2019-12-05 VITALS
TEMPERATURE: 97.7 F | DIASTOLIC BLOOD PRESSURE: 77 MMHG | RESPIRATION RATE: 16 BRPM | HEART RATE: 70 BPM | WEIGHT: 191 LBS | HEIGHT: 68 IN | OXYGEN SATURATION: 98 % | BODY MASS INDEX: 28.95 KG/M2 | SYSTOLIC BLOOD PRESSURE: 159 MMHG

## 2019-12-05 ASSESSMENT — MIFFLIN-ST. JEOR: SCORE: 1479.87

## 2019-12-05 NOTE — TELEPHONE ENCOUNTER
RECORDS RECEIVED FROM: [facility] discharge F/U   DATE RECEIVED: 1/21/20   NOTES (FOR ALL VISITS) STATUS DETAILS   OFFICE NOTE from referring provider N/A    OFFICE NOTE from other specialist N/A    DISCHARGE SUMMARY from hospital Received [facility]:  10/28/19-11/9/19    [facility]:  10/23/19-10/28/19   DISCHARGE REPORT from the ER N/A    OPERATIVE REPORT N/A    MEDICATION LIST Internal    IMAGING  (FOR ALL VISITS)     EMG N/A    EEG N/A    ECT N/A    MRI (HEAD, NECK, SPINE) Received [facility]:  MRI Brain 10/24/19   LUMBAR PUNCTURE N/A    KERRIE Scan N/A    CT (HEAD, NECK, SPINE) Received [facility]:  CTA Head Neck 10/23/19      Imaging Received  12/5/19  [facility]   Image Type (x): Disc:   PACS: x   Exam Date/Name CTA Head Neck 10/23/19  MRI Brain 10/24/19 Comments: images resolved in PACS

## 2019-12-06 ENCOUNTER — NURSING HOME VISIT (OUTPATIENT)
Dept: GERIATRICS | Facility: CLINIC | Age: 59
End: 2019-12-06

## 2019-12-06 DIAGNOSIS — Z53.9 ERRONEOUS ENCOUNTER--DISREGARD: Primary | ICD-10-CM

## 2019-12-09 ENCOUNTER — TRANSFERRED RECORDS (OUTPATIENT)
Dept: HEALTH INFORMATION MANAGEMENT | Facility: CLINIC | Age: 59
End: 2019-12-09

## 2019-12-16 ENCOUNTER — TELEPHONE (OUTPATIENT)
Dept: OTHER | Facility: CLINIC | Age: 59
End: 2019-12-16

## 2019-12-16 ENCOUNTER — TRANSFERRED RECORDS (OUTPATIENT)
Dept: HEALTH INFORMATION MANAGEMENT | Facility: CLINIC | Age: 59
End: 2019-12-16

## 2019-12-16 DIAGNOSIS — Z99.2 ESRD (END STAGE RENAL DISEASE) ON DIALYSIS (H): Primary | ICD-10-CM

## 2019-12-16 DIAGNOSIS — N18.6 ESRD (END STAGE RENAL DISEASE) ON DIALYSIS (H): Primary | ICD-10-CM

## 2019-12-16 NOTE — TELEPHONE ENCOUNTER
Referral received via fax.     Pt previously scheduled and cancelled multiple times for same referral.     Pt referred to [facility] by Dr. Odonnell for vein mapping and surgical consult for potential AVF creation. Pt has CVC on right. ESRD, [facility] [place] M-W-Fri.     Pt needs to be scheduled for bilateral upper extremity venous mapping and consult with vascular surgery.  Will route to scheduling to coordinate an appointment at next available.     CRISTINA LegerN, RN  [facility]

## 2019-12-17 NOTE — TELEPHONE ENCOUNTER
December 17, 2019    Patient is scheduled for US and New Consult Appointment with Dr. Victor on 1/15/2020 at [facility].       [facility]  Appointment Scheduling   [facility]  Office: [phone]  Fax: [phone]  Direct: [phone]

## 2019-12-25 ENCOUNTER — TELEPHONE (OUTPATIENT)
Dept: GERIATRICS | Facility: CLINIC | Age: 59
End: 2019-12-25

## 2019-12-25 NOTE — TELEPHONE ENCOUNTER
Nursing called to report a blood sugar of 509 and after 18 units of 70/30 her sugar was 520    Orders:  NPH 6 units now.  Call back at next sugar check if elevated    Electronically signed by Gracie Segura RN, CNP

## 2020-01-03 ENCOUNTER — NURSING HOME VISIT (OUTPATIENT)
Dept: GERIATRICS | Facility: CLINIC | Age: 60
End: 2020-01-03
Payer: COMMERCIAL

## 2020-01-03 VITALS
OXYGEN SATURATION: 96 % | HEIGHT: 68 IN | SYSTOLIC BLOOD PRESSURE: 167 MMHG | TEMPERATURE: 98.6 F | RESPIRATION RATE: 16 BRPM | DIASTOLIC BLOOD PRESSURE: 80 MMHG | WEIGHT: 194.3 LBS | HEART RATE: 77 BPM | BODY MASS INDEX: 29.45 KG/M2

## 2020-01-03 DIAGNOSIS — Z53.9 ERRONEOUS ENCOUNTER--DISREGARD: Primary | ICD-10-CM

## 2020-01-03 RX ORDER — CALCIUM ACETATE 667 MG/1
667 CAPSULE ORAL 4 TIMES DAILY
Status: ON HOLD | COMMUNITY
End: 2023-01-01

## 2020-01-03 ASSESSMENT — MIFFLIN-ST. JEOR: SCORE: 1494.84

## 2020-01-03 NOTE — LETTER
1/3/2020        RE: Roxy Beaulieu  [address]  [place] [address]        At dialysis and not seen today.   Jennifer Longo MD       Sincerely,        Jennifer Longo MD

## 2020-01-16 ENCOUNTER — OFFICE VISIT (OUTPATIENT)
Dept: OTHER | Facility: CLINIC | Age: 60
End: 2020-01-16
Attending: SURGERY
Payer: COMMERCIAL

## 2020-01-16 ENCOUNTER — HOSPITAL ENCOUNTER (OUTPATIENT)
Dept: ULTRASOUND IMAGING | Facility: CLINIC | Age: 60
Discharge: HOME | End: 2020-01-16
Attending: SURGERY | Admitting: SURGERY
Payer: COMMERCIAL

## 2020-01-16 ENCOUNTER — HOSPITAL ENCOUNTER (OUTPATIENT)
Facility: CLINIC | Age: 60
End: 2020-01-16
Attending: SURGERY | Admitting: SURGERY
Payer: COMMERCIAL

## 2020-01-16 VITALS
DIASTOLIC BLOOD PRESSURE: 82 MMHG | SYSTOLIC BLOOD PRESSURE: 180 MMHG | RESPIRATION RATE: 16 BRPM | HEART RATE: 87 BPM | HEIGHT: 67 IN | WEIGHT: 193 LBS | BODY MASS INDEX: 30.29 KG/M2

## 2020-01-16 DIAGNOSIS — N18.6 ESRD (END STAGE RENAL DISEASE) ON DIALYSIS (H): ICD-10-CM

## 2020-01-16 DIAGNOSIS — Z99.2 ESRD (END STAGE RENAL DISEASE) ON DIALYSIS (H): ICD-10-CM

## 2020-01-16 DIAGNOSIS — N18.6 ESRD (END STAGE RENAL DISEASE) ON DIALYSIS (H): Primary | ICD-10-CM

## 2020-01-16 DIAGNOSIS — Z99.2 ESRD (END STAGE RENAL DISEASE) ON DIALYSIS (H): Primary | ICD-10-CM

## 2020-01-16 DIAGNOSIS — N18.6 END-STAGE RENAL DISEASE ON HEMODIALYSIS (H): Primary | ICD-10-CM

## 2020-01-16 DIAGNOSIS — Z99.2 END-STAGE RENAL DISEASE ON HEMODIALYSIS (H): Primary | ICD-10-CM

## 2020-01-16 PROCEDURE — 93970 EXTREMITY STUDY: CPT

## 2020-01-16 PROCEDURE — 99203 OFFICE O/P NEW LOW 30 MIN: CPT | Mod: ZP | Performed by: SURGERY

## 2020-01-16 PROCEDURE — G0463 HOSPITAL OUTPT CLINIC VISIT: HCPCS

## 2020-01-16 ASSESSMENT — MIFFLIN-ST. JEOR: SCORE: 1473.07

## 2020-01-16 NOTE — PROGRESS NOTES
"Roxy Beaulieu is a 59 year old female who presents for:  Chief Complaint   Patient presents with     RECHECK     VHC 12:45; WRO 2:00) BILAT VEIN MAP. New Patient Referral from Dr. Odonnell for vein mapping and surgical consult for potential AVF creation. Pt has CVC on right. *tav        Vitals:    Vitals:    01/16/20 1311   BP: (!) 186/88   BP Location: Left arm   Patient Position: Chair   Cuff Size: Adult Regular   Pulse: 87   Resp: 16   Weight: 193 lb (87.5 kg)   Height: 5' 7\" (1.702 m)       BMI:  Estimated body mass index is 30.23 kg/m  as calculated from the following:    Height as of this encounter: 5' 7\" (1.702 m).    Weight as of this encounter: 193 lb (87.5 kg).    Pain Score:  Data Unavailable        Nilo Reed CMA    "

## 2020-01-16 NOTE — NURSING NOTE
Patient Education    Procedure: Right brachial to cephalic AVF creation  Diagnosis: ESRD on dialysis  Anticoagulation Instruction: n/a  Pre-Operative Physical Exam: You need to have a pre-op physical exam within 30 days of your procedure. Your procedure may be cancelled if you do not have a current History and Physical. Call your PCP's office to schedule.  Allergies:  Updated in Epic  Bowel Prep: n/a  Post Procedure Education: Vascular [facility] patient post-procedure fact sheet reviewed with patient.    Learner(s):patient  Method: Listening, Reading  Barriers to Learning:  Patient stated her blood sugar had dropped and felt confused.  Orange juice and christiano crackers provided and patient appeared more awake and stated she felt better.  Instructions were discussed multiple times.  Outcome: Patient did verbalize understanding of above education.    AVF vein building exercises discussed and reviewed.  Tourniquet and hand exerciser provided.  Patient also instructed no further IV sticks or lab draws are to be done on the right arm.    Leighann Pitts, CRISTINAN, RN  [facility]

## 2020-01-16 NOTE — PROGRESS NOTES
[facility]    Roxy Beaulieu comes to see me today for evaluation of permanent hemodialysis access.  She is on chronic hemodialysis via a right  tunneled catheter at the [facility] dialysis unit every [id number].  Patient is left-handed.    Meds: Medications: Aspirin, Norvasc, Coreg, Apresoline, insulin, Zocor, PhosLo       history of hypertension, diabetes mellitus, and relatively recent stroke.      Exam: Patient is in a wheelchair.  She felt that she was having a hypoglycemic episode and we gave her orange juice and crackers and she improved significantly.       Initial blood pressure 186/88.  Pulse 87.       Chest= clear.  Right jugular tunneled catheter in place.       Cardiovascular= regular rate    Difficult to palpate the right radial pulse with a +2 left radial pulse.  However bedside Doppler does reveal good pulmonary flow that does not diminish with occlusion of the radial artery bilaterally.    No arm edema.  No well-developed forearm or antecubital veins bilaterally.      Vein mapping was performed today.  Both the cephalic and basilic systems are very small on the left diameters in the range of 2 mm or less.  They are somewhat better on the right.  Forearm cephalic is small but the antecubital measures 3 mm the upper arm and the 2.5 mm range.  Basilic vein is somewhat bigger being 2.4 mm at the elbow but 5.4 mm in the axilla.      Pression: End-stage renal disease need a permanent hemodialysis access.  Patient is left-handed this we look at the right arm which does have somewhat bigger veins.  Obvious atherosclerosis but adequate blood supply to her hand making a steal less likely.  Initially will evaluate the antecubital/cephalic system for a brachial cephalic fistula.  If we find this is too small intraoperatively we would do a first stage brachial to basilic arteriovenous fistula and this is been discussed with the patient.    This was performed under local  anesthetic with sedation.  She will continue on her aspirin.       Sumanth Corbin MD   CC:  [facility]

## 2020-01-17 ENCOUNTER — NURSING HOME VISIT (OUTPATIENT)
Dept: GERIATRICS | Facility: CLINIC | Age: 60
End: 2020-01-17
Payer: COMMERCIAL

## 2020-01-17 VITALS
WEIGHT: 197 LBS | BODY MASS INDEX: 30.92 KG/M2 | SYSTOLIC BLOOD PRESSURE: 163 MMHG | HEART RATE: 73 BPM | DIASTOLIC BLOOD PRESSURE: 73 MMHG | OXYGEN SATURATION: 97 % | RESPIRATION RATE: 16 BRPM | TEMPERATURE: 98.6 F | HEIGHT: 67 IN

## 2020-01-17 DIAGNOSIS — Z99.2 TYPE 2 DIABETES MELLITUS WITH CHRONIC KIDNEY DISEASE ON CHRONIC DIALYSIS, WITH LONG-TERM CURRENT USE OF INSULIN (H): ICD-10-CM

## 2020-01-17 DIAGNOSIS — I12.0 HYPERTENSIVE CHRONIC KIDNEY DISEASE WITH STAGE 5 CHRONIC KIDNEY DISEASE OR END STAGE RENAL DISEASE (H): ICD-10-CM

## 2020-01-17 DIAGNOSIS — Z99.2 ANEMIA IN CHRONIC KIDNEY DISEASE, ON CHRONIC DIALYSIS (H): ICD-10-CM

## 2020-01-17 DIAGNOSIS — N18.6 TYPE 2 DIABETES MELLITUS WITH CHRONIC KIDNEY DISEASE ON CHRONIC DIALYSIS, WITH LONG-TERM CURRENT USE OF INSULIN (H): ICD-10-CM

## 2020-01-17 DIAGNOSIS — N18.6 ANEMIA IN CHRONIC KIDNEY DISEASE, ON CHRONIC DIALYSIS (H): ICD-10-CM

## 2020-01-17 DIAGNOSIS — I63.81 LACUNAR STROKE (H): Primary | ICD-10-CM

## 2020-01-17 DIAGNOSIS — D63.1 ANEMIA IN CHRONIC KIDNEY DISEASE, ON CHRONIC DIALYSIS (H): ICD-10-CM

## 2020-01-17 DIAGNOSIS — Z79.4 TYPE 2 DIABETES MELLITUS WITH CHRONIC KIDNEY DISEASE ON CHRONIC DIALYSIS, WITH LONG-TERM CURRENT USE OF INSULIN (H): ICD-10-CM

## 2020-01-17 DIAGNOSIS — E11.22 TYPE 2 DIABETES MELLITUS WITH CHRONIC KIDNEY DISEASE ON CHRONIC DIALYSIS, WITH LONG-TERM CURRENT USE OF INSULIN (H): ICD-10-CM

## 2020-01-17 DIAGNOSIS — I69.352 HEMIPLEGIA AND HEMIPARESIS FOLLOWING CEREBRAL INFARCTION AFFECTING LEFT DOMINANT SIDE (H): ICD-10-CM

## 2020-01-17 PROCEDURE — 99305 1ST NF CARE MODERATE MDM 35: CPT | Performed by: INTERNAL MEDICINE

## 2020-01-17 RX ORDER — CALCIUM CARBONATE 500 MG/1
2 TABLET, CHEWABLE ORAL 3 TIMES DAILY PRN
Status: ON HOLD | COMMUNITY
End: 2023-01-01

## 2020-01-17 RX ORDER — HYDRALAZINE HYDROCHLORIDE 25 MG/1
25 TABLET, FILM COATED ORAL
COMMUNITY

## 2020-01-17 RX ORDER — ONDANSETRON 4 MG/1
4 TABLET, FILM COATED ORAL EVERY 12 HOURS PRN
COMMUNITY

## 2020-01-17 ASSESSMENT — MIFFLIN-ST. JEOR: SCORE: 1491.22

## 2020-01-17 NOTE — PROGRESS NOTES
Roxy Beaulieu is a 59 year old female seen January 17, 2020 at [facility] where she has resided for 2 months (admit 11/2019) seen for acute CVI and ESRD.   Pt is seen in her room up to , just back from dialysis.   States she is feeling well, denies any current pain or other symptoms.  Has been on dialysis since June 2019, now scheduled to get an AV fistula placed.  Current IR tunneled catheter placed in October 2019.   ESRD secondary to HTN and DM2  Pt presented to [facility] in October 2019 with new LLE weakness and MRI showed an acute right periventricular white matter infarct, thought to be lacunar from HTN.   ECHO showed EF 66% without shunt, and no high grade stenosis seen on head/neck CTA.   She was discharged to Acute Rehab and participated with therapies.  Continued with cognitive and physical limitations and transferred to LT, probably for permanent placement.       Past Medical History:   Diagnosis Date     Diabetes (H)      Gangrene of toe (H) 4/29/2017     High cholesterol      Hypertension      Obesity 4/29/2017     PE (pulmonary thromboembolism) (H)      Personal history of tobacco use, presenting hazards to health 4/29/2017     Uncontrolled type 2 diabetes mellitus with hyperglycemia, with long-term current use of insulin (H) 4/29/2017   Heparin induced thrombocytopenia, 2018  ESRD on dialysis, 2019    Past Surgical History:   Procedure Laterality Date     AMPUTATE TOE(S) Left 5/1/2017    Procedure: AMPUTATE TOE(S);  Amputate first and second left toes;  Surgeon: Verna Fofana DPM, Podiatry/Foot and Ankle Surgery;  Location: [facility] OR     AMPUTATE TOE(S) Left 11/3/2018    Procedure: Partial left 3rd toe amputation;  Surgeon: Verna Fofana DPM, Podiatry/Foot and Ankle Surgery;  Location: [facility] OR     IR CVC TUNNEL PLACEMENT > 5 YRS OF AGE  6/4/2019     OPEN REDUCTION INTERNAL FIXATION FIBULA Left 3/16/2018    Procedure: OPEN REDUCTION INTERNAL FIXATION FIBULA;  Open reduction  "and internal fixation of displaced left lateral malleolar fracture;  Surgeon: Sumanth Wen MD;  Location: [facility] OR     Social History     Tobacco Use     Smoking status: Former Smoker     Last attempt to quit: 7/25/2016     Years since quitting: 3.4     Smokeless tobacco: Never Used   Substance Use Topics     Alcohol use: No      SH:  Previously lived alone, condo in [place].   Her sister Yuki is first contact.     Worked as a , and before as a .       FH: Mother still living in [facility].  Father was killed in a farming accident.     Review Of Systems  Skin: negative   Eyes: impaired vision, glasses  Ears/Nose/Throat: hearing loss  Respiratory: No shortness of breath, dyspnea on exertion, cough, or hemoptysis  Cardiovascular: negative  Gastrointestinal: negative  Genitourinary: negative  Musculoskeletal: Velez 12/56  TUG 19 sec  Neurologic: STML, decreased insight.  Neuropsych testing done in Acute Rehab.  Psychiatric: anxiety and depression  Hematologic/Lymphatic/Immunologic: negative  Endocrine: diabetes      GENERAL APPEARANCE: alert and no distress  BP (!) 163/73   Pulse 73   Temp 98.6  F (37  C)   Resp 16   Ht 1.702 m (5' 7\")   Wt 89.4 kg (197 lb)   SpO2 97%   BMI 30.85 kg/m     HEENT: normocephalic, no lesion or abnormalities  NECK: no adenopathy, no asymmetry, masses, or scars and thyroid normal to palpation  Tunneled catheter right chest wall, site okay  RESP: lungs clear to auscultation - no rales, rhonchi or wheezes  CV: regular rate and rhythm, normal S1 S2  ABDOMEN:  soft, nontender, no HSM or masses and bowel sounds normal  MS: extremities normal- no gross deformities noted, no ext edema  SKIN: no suspicious lesions or rashes  NEURO: speech normal, WC bound  PSYCH: affect okay, no particularly conversant  LYMPHATICS: No cervical,  or supraclavicular nodes    Last Comprehensive Metabolic Panel:  Sodium   Date Value Ref Range Status   11/07/2019 139 133 - 144 mmol/L Final "     Potassium   Date Value Ref Range Status   11/07/2019 5.2 3.4 - 5.3 mmol/L Final     Chloride   Date Value Ref Range Status   11/07/2019 106 94 - 109 mmol/L Final     Carbon Dioxide   Date Value Ref Range Status   11/07/2019 22 20 - 32 mmol/L Final     Anion Gap   Date Value Ref Range Status   11/07/2019 12 3 - 14 mmol/L Final     Glucose   Date Value Ref Range Status   11/07/2019 181 (H) 70 - 99 mg/dL Final     Urea Nitrogen   Date Value Ref Range Status   11/07/2019 75 (H) 7 - 30 mg/dL Final     Creatinine   Date Value Ref Range Status   11/07/2019 7.32 (H) 0.52 - 1.04 mg/dL Final     GFR Estimate   Date Value Ref Range Status   11/07/2019 5 (L) >60 mL/min/[1.73_m2] Final     Comment:     Non  GFR Calc  Starting 12/18/2018, serum creatinine based estimated GFR (eGFR) will be   calculated using the Chronic Kidney Disease Epidemiology Collaboration   (CKD-EPI) equation.       Calcium   Date Value Ref Range Status   11/07/2019 8.1 (L) 8.5 - 10.1 mg/dL Final     Lab Results   Component Value Date    ALBUMIN 3.5 06/01/2019     Lab Results   Component Value Date    WBC 9.1 10/31/2019      HGB 9.3 10/31/2019      MCV 92 10/31/2019       10/31/2019     ECHO 5/27/19 Interpretation Summary  The visual ejection fraction is estimated at 45-50%.  Left ventricular systolic function is mildly reduced.  A basal inferior wall motion abnormality can not be excluded on this study even with contrast use.  Grade III or advanced diastolic dysfunction. There is moderate to severe concentric left ventricular hypertrophy.      IMP/PLAN:    (I63.81) Lacunar stroke (H)  (primary encounter diagnosis)  (I69.352) Hemiplegia and hemiparesis following cerebral infarction affecting left dominant side (H)  Comment: improving, finished course of therapies  Plan: daily ASA, bp meds, simvastatin for secondary prevention     (I12.0) Hypertensive chronic kidney disease with stage 5 chronic kidney disease or end stage renal  disease (H)  Comment:   BP Readings from Last 3 Encounters:   01/17/20 (!) 163/73   01/16/20 (!) 180/82   01/03/20 (!) 167/80      Pulse Readings from Last 4 Encounters:   01/17/20 73   01/16/20 87   01/03/20 77   12/05/19 70      Plan: amlodipine 5 mg/day, carvedilol 25 mg bid, hydralazine 50 mg bid    (E11.22,  N18.6,  Z99.2,  Z79.4) Type 2 diabetes mellitus with chronic kidney disease on chronic dialysis, with long-term current use of insulin (H)  Comment: not compliant in home setting     Lab Results   Component Value Date    A1C 9.7 10/28/2019    A1C 10.5 05/28/2019    A1C 11.6 10/04/2018    A1C >16.0 03/16/2018      Plan: remains on 70 /30 insulin bid, follow BGM   On statin and ASA, not on ACEI secondary to renal failure.       (N18.6,  D63.1,  Z99.2) Anemia in chronic kidney disease, on chronic dialysis (H)  Comment: hgb 9.3  Plan: Aranesp, Epogen as ordered; follow hgb     Jennifer Longo MD

## 2020-01-17 NOTE — LETTER
1/17/2020        RE: Roxy Beaulieu  [address]  [place] [address]        Roxy Beaulieu is a 59 year old female seen January 17, 2020 at [facility] where she has resided for 2 months (admit 11/2019) seen for acute CVI and ESRD.   Pt is seen in her room up to , just back from dialysis.   States she is feeling well, denies any current pain or other symptoms.  Has been on dialysis since June 2019, now scheduled to get an AV fistula placed.  Current IR tunneled catheter placed in October 2019.   ESRD secondary to HTN and DM2  Pt presented to [facility] in October 2019 with new LLE weakness and MRI showed an acute right periventricular white matter infarct, thought to be lacunar from HTN.   ECHO showed EF 66% without shunt, and no high grade stenosis seen on head/neck CTA.   She was discharged to Acute Rehab and participated with therapies.  Continued with cognitive and physical limitations and transferred to LT, probably for permanent placement.       Past Medical History:   Diagnosis Date     Diabetes (H)      Gangrene of toe (H) 4/29/2017     High cholesterol      Hypertension      Obesity 4/29/2017     PE (pulmonary thromboembolism) (H)      Personal history of tobacco use, presenting hazards to health 4/29/2017     Uncontrolled type 2 diabetes mellitus with hyperglycemia, with long-term current use of insulin (H) 4/29/2017   Heparin induced thrombocytopenia, 2018  ESRD on dialysis, 2019    Past Surgical History:   Procedure Laterality Date     AMPUTATE TOE(S) Left 5/1/2017    Procedure: AMPUTATE TOE(S);  Amputate first and second left toes;  Surgeon: Verna Fofana DPM, Podiatry/Foot and Ankle Surgery;  Location: [facility] OR     AMPUTATE TOE(S) Left 11/3/2018    Procedure: Partial left 3rd toe amputation;  Surgeon: Verna Fofana DPM, Podiatry/Foot and Ankle Surgery;  Location: [facility] OR     IR CVC TUNNEL PLACEMENT > 5 YRS OF AGE  6/4/2019     OPEN REDUCTION INTERNAL FIXATION FIBULA  "Left 3/16/2018    Procedure: OPEN REDUCTION INTERNAL FIXATION FIBULA;  Open reduction and internal fixation of displaced left lateral malleolar fracture;  Surgeon: Sumanth Wen MD;  Location:  OR     Social History     Tobacco Use     Smoking status: Former Smoker     Last attempt to quit: 7/25/2016     Years since quitting: 3.4     Smokeless tobacco: Never Used   Substance Use Topics     Alcohol use: No      SH:  Previously lived alone, condo in [place].   Her sister Yuki is first contact.     Worked as a , and before as a .       FH: Mother still living in [facility].  Father was killed in a farming accident.     Review Of Systems  Skin: negative   Eyes: impaired vision, glasses  Ears/Nose/Throat: hearing loss  Respiratory: No shortness of breath, dyspnea on exertion, cough, or hemoptysis  Cardiovascular: negative  Gastrointestinal: negative  Genitourinary: negative  Musculoskeletal: Velez 12/56  TUG 19 sec  Neurologic: STML, decreased insight.  Neuropsych testing done in Acute Rehab.  Psychiatric: anxiety and depression  Hematologic/Lymphatic/Immunologic: negative  Endocrine: diabetes      GENERAL APPEARANCE: alert and no distress  BP (!) 163/73   Pulse 73   Temp 98.6  F (37  C)   Resp 16   Ht 1.702 m (5' 7\")   Wt 89.4 kg (197 lb)   SpO2 97%   BMI 30.85 kg/m      HEENT: normocephalic, no lesion or abnormalities  NECK: no adenopathy, no asymmetry, masses, or scars and thyroid normal to palpation  Tunneled catheter right chest wall, site okay  RESP: lungs clear to auscultation - no rales, rhonchi or wheezes  CV: regular rate and rhythm, normal S1 S2  ABDOMEN:  soft, nontender, no HSM or masses and bowel sounds normal  MS: extremities normal- no gross deformities noted, no ext edema  SKIN: no suspicious lesions or rashes  NEURO: speech normal, WC bound  PSYCH: affect okay, no particularly conversant  LYMPHATICS: No cervical,  or supraclavicular nodes    Last Comprehensive Metabolic " Panel:  Sodium   Date Value Ref Range Status   11/07/2019 139 133 - 144 mmol/L Final     Potassium   Date Value Ref Range Status   11/07/2019 5.2 3.4 - 5.3 mmol/L Final     Chloride   Date Value Ref Range Status   11/07/2019 106 94 - 109 mmol/L Final     Carbon Dioxide   Date Value Ref Range Status   11/07/2019 22 20 - 32 mmol/L Final     Anion Gap   Date Value Ref Range Status   11/07/2019 12 3 - 14 mmol/L Final     Glucose   Date Value Ref Range Status   11/07/2019 181 (H) 70 - 99 mg/dL Final     Urea Nitrogen   Date Value Ref Range Status   11/07/2019 75 (H) 7 - 30 mg/dL Final     Creatinine   Date Value Ref Range Status   11/07/2019 7.32 (H) 0.52 - 1.04 mg/dL Final     GFR Estimate   Date Value Ref Range Status   11/07/2019 5 (L) >60 mL/min/[1.73_m2] Final     Comment:     Non  GFR Calc  Starting 12/18/2018, serum creatinine based estimated GFR (eGFR) will be   calculated using the Chronic Kidney Disease Epidemiology Collaboration   (CKD-EPI) equation.       Calcium   Date Value Ref Range Status   11/07/2019 8.1 (L) 8.5 - 10.1 mg/dL Final     Lab Results   Component Value Date    ALBUMIN 3.5 06/01/2019     Lab Results   Component Value Date    WBC 9.1 10/31/2019      HGB 9.3 10/31/2019      MCV 92 10/31/2019       10/31/2019     ECHO 5/27/19 Interpretation Summary  The visual ejection fraction is estimated at 45-50%.  Left ventricular systolic function is mildly reduced.  A basal inferior wall motion abnormality can not be excluded on this study even with contrast use.  Grade III or advanced diastolic dysfunction. There is moderate to severe concentric left ventricular hypertrophy.      IMP/PLAN:    (I63.81) Lacunar stroke (H)  (primary encounter diagnosis)  (I69.352) Hemiplegia and hemiparesis following cerebral infarction affecting left dominant side (H)  Comment: improving, finished course of therapies  Plan: daily ASA, bp meds, simvastatin for secondary prevention     (I12.0)  Hypertensive chronic kidney disease with stage 5 chronic kidney disease or end stage renal disease (H)  Comment:   BP Readings from Last 3 Encounters:   01/17/20 (!) 163/73   01/16/20 (!) 180/82   01/03/20 (!) 167/80      Pulse Readings from Last 4 Encounters:   01/17/20 73   01/16/20 87   01/03/20 77   12/05/19 70      Plan: amlodipine 5 mg/day, carvedilol 25 mg bid, hydralazine 50 mg bid    (E11.22,  N18.6,  Z99.2,  Z79.4) Type 2 diabetes mellitus with chronic kidney disease on chronic dialysis, with long-term current use of insulin (H)  Comment: not compliant in home setting     Lab Results   Component Value Date    A1C 9.7 10/28/2019    A1C 10.5 05/28/2019    A1C 11.6 10/04/2018    A1C >16.0 03/16/2018      Plan: remains on 70 /30 insulin bid, follow BGM   On statin and ASA, not on ACEI secondary to renal failure.       (N18.6,  D63.1,  Z99.2) Anemia in chronic kidney disease, on chronic dialysis (H)  Comment: hgb 9.3  Plan: Aranesp, Epogen as ordered; follow hgb     Jennifer Longo MD       Sincerely,        Jennifer Longo MD

## 2020-01-26 RX ORDER — SENNOSIDES A AND B 8.6 MG/1
1 TABLET, FILM COATED ORAL 2 TIMES DAILY PRN
Status: ON HOLD | COMMUNITY
End: 2020-02-11

## 2020-01-26 ASSESSMENT — MIFFLIN-ST. JEOR: SCORE: 1487.59

## 2020-01-26 NOTE — PROGRESS NOTES
PRE-OP EVALUATION:    [place] Medical Record Number:  [id number]  Place of Service where encounter took place:  [facility]  (S) [[id number]]  Today's date: 2020    [facility]  [address]  [facility] [address]  [phone]  Dept: [phone]    PRE-OP EVALUATION:  Today's date: 2020    Roxy Beaulieu (: 1960) presents for pre-operative evaluation assessment as requested by Dr. Sumanth Corbin.  She requires evaluation and anesthesia risk assessment prior to undergoing surgery/procedure for treatment of  ESRD .    Proposed Surgery/ Procedure: Right Brachial to cephalic arteriovenous fistula creation  Date of Surgery/ Procedure: 2020  Time of Surgery/ Procedure: 10:05AM  Hospital/Surgical Facility: [facility]  Primary Physician: Autumn Trivedi  Type of Anesthesia Anticipated: to be determined    Patient has a Health Care Directive or Living Will:  NO    1. YES - DO YOU HAVE A HISTORY OF HEART ATTACK, STROKE, STENT, BYPASS OR SURGERY ON AN ARTERY IN THE HEAD, NECK, HEART OR LEG? 10/2019  2. NO - Do you ever have any pain or discomfort in your chest?  3. YES - DO YOU HAVE A HISTORY OF HEART FAILURE Congestive heart failure, no current symptoms weights and volume status stable  4. NO - Are you troubled by shortness of breath when: walking on the level, up a slight hill or at night?  5. NO - Do you currently have a cold, bronchitis or other respiratory infection?  6. NO - Do you have a cough, shortness of breath or wheezing?  7. NO - Do you sometimes get pains in the calves of your legs when you walk?  8. NO - Do you or anyone in your family have previous history of blood clots?  9. NO - Do you or does anyone in your family have a serious bleeding problem such as prolonged bleeding following surgeries or cuts?  10. NO - Have you ever had problems with anemia or been told to take iron pills?  11. NO - Have you  had any abnormal blood loss such as black, tarry or bloody stools, or abnormal vaginal bleeding?  12. NO - Have you ever had a blood transfusion?  13. NO - Have you or any of your relatives ever had problems with anesthesia?  14. NO - Do you have sleep apnea, excessive snoring or daytime drowsiness?  15. NO - Do you have any prosthetic heart valves?  16. NO - Do you have prosthetic joints?  17. NO - Is there any chance that you may be pregnant?      HPI:     HPI related to upcoming procedure: end stage renal disease on dialysis      ANEMIA - Patient has a recent history of moderate-severe anemia, which has not been symptomatic.     CHF - Patient has a longstanding history of moderate-severe CHF. Exacerbating conditions include hypertension. Currently the patient's condition is same. Current treatment regimen includes beta blocker and hydralazine. The patient denies chest pain, edema, orthopnea, SOB or recent weight gain. Last Echocardiogram 5/28/19 EKG 1/24/19    DIABETES - Patient has a longstanding history of DiabetesType Type II . Patient is being treated with insulin injections and ASA and denies significant side effects. Control has been fair. Complicating factors include but are not limited to: hypertension, hyperlipidemia, chronic kidney disease and CVA.    Blood sugars have bee ranging , mostly around 150s    HYPERLIPIDEMIA - Patient has a long history of significant Hyperlipidemia requiring medication for treatment with recent good control. Patient reports no problems or side effects with the medication.     HYPERTENSION - Patient has longstanding history of HTN , currently denies any symptoms referable to elevated blood pressure. Specifically denies chest pain, palpitations, dyspnea, orthopnea, PND or peripheral edema. Blood pressure readings have been in normal range. Current medication regimen is as listed below. Patient denies any side effects of medication.     RENAL INSUFFICIENCY - Patient has a  longstanding history of moderate-severe chronic renal insufficiency. Last Cr 4.8      MEDICAL HISTORY:     Patient Active Problem List    Diagnosis Date Noted     Gangrene of toe (H) 04/29/2017     Priority: High     Obesity 04/29/2017     Priority: High     CVA (cerebral vascular accident) (H) 10/28/2019     Priority: Medium     ESRD (end stage renal disease) on dialysis.  Initiation of HD 6/2019. 06/05/2019     Priority: Medium     Other pulmonary embolism without acute cor pulmonale, unspecified chronicity (H) 06/05/2019     Priority: Medium     History of pulmonary embolism with suspected recurrrent PE contributing to hypoxia 06/05/2019     Priority: Medium     Acute respiratory failure with hypoxia, thought to be multifactorial 06/05/2019     Priority: Medium     CHF (congestive heart failure) (H) 05/27/2019     Priority: Medium     Hypertensive cardiomyopathy (H) 12/19/2018     Priority: Medium     Anemia of chronic renal failure, stage 4 (severe) (H) 12/19/2018     Priority: Medium     Acute on chronic combined systolic and diastolic heart failure (H) 12/19/2018     Priority: Medium     CKD (chronic kidney disease) stage 4, GFR 15-29 ml/min (H) 12/19/2018     Priority: Medium     VTE (venous thromboembolism) 12/19/2018     Priority: Medium     Congestive heart failure (CHF) (H) 12/17/2018     Priority: Medium     Toe ulcer, left, with unspecified severity (H) 11/02/2018     Priority: Medium     Ulcer of toe of left foot (H) 11/02/2018     Priority: Medium     Shortness of breath 10/04/2018     Priority: Medium     Ankle fracture, left 03/16/2018     Priority: Medium     Fracture of fibula, distal, left, closed 03/15/2018     Priority: Medium     Left foot infection 04/29/2017     Priority: Medium     Uncontrolled type 2 diabetes mellitus with hyperglycemia, with long-term current use of insulin (H) 04/29/2017     Priority: Medium     Personal history of tobacco use, presenting hazards to health 04/29/2017      Priority: Medium      Past Medical History:   Diagnosis Date     Diabetes (H)      Gangrene of toe (H) 4/29/2017     High cholesterol      Hypertension      Obesity 4/29/2017     PE (pulmonary thromboembolism) (H)      Personal history of tobacco use, presenting hazards to health 4/29/2017     Uncontrolled type 2 diabetes mellitus with hyperglycemia, with long-term current use of insulin (H) 4/29/2017     Past Surgical History:   Procedure Laterality Date     AMPUTATE TOE(S) Left 5/1/2017    Procedure: AMPUTATE TOE(S);  Amputate first and second left toes;  Surgeon: Verna Fofana DPM, Podiatry/Foot and Ankle Surgery;  Location: [facility] OR     AMPUTATE TOE(S) Left 11/3/2018    Procedure: Partial left 3rd toe amputation;  Surgeon: Verna Fofana DPM, Podiatry/Foot and Ankle Surgery;  Location: [facility] OR     IR CVC TUNNEL PLACEMENT > 5 YRS OF AGE  6/4/2019     OPEN REDUCTION INTERNAL FIXATION FIBULA Left 3/16/2018    Procedure: OPEN REDUCTION INTERNAL FIXATION FIBULA;  Open reduction and internal fixation of displaced left lateral malleolar fracture;  Surgeon: Sumanth Wen MD;  Location: [facility] OR     Current Outpatient Medications   Medication Sig Dispense Refill     acetaminophen (TYLENOL) 325 MG tablet Take 2 tablets (650 mg) by mouth every 6 hours as needed for mild pain or fever (> 101 F)       amLODIPine (NORVASC) 5 MG tablet Take 5 mg by mouth At Bedtime        aspirin (ASA) 325 MG EC tablet Take 325 mg by mouth At Bedtime       bisacodyl (DULCOLAX) 10 MG suppository Place 1 suppository (10 mg) rectally daily as needed for constipation       calcium acetate (PHOSLO) 667 MG CAPS capsule Take 667 mg by mouth 4 times daily (with meals and nightly)       calcium carbonate (TUMS) 500 MG chewable tablet Take 2 chew tab by mouth 3 times daily as needed for heartburn       carvedilol (COREG) 25 MG tablet Take 1 tablet (25 mg) by mouth 2 times daily (with meals)       hydrALAZINE (APRESOLINE) 50 MG tablet Take 50 mg by  "mouth 2 times daily       insulin isophane & regular (HUMULIN MIX 70/30 PEN , NOVOLIN MIX 70/30 PEN) susp Inject 13 Units Subcutaneous daily (with dinner)       insulin NPH (HUMULIN N/NOVOLIN N VIAL) 100 UNIT/ML vial Inject 18 Units Subcutaneous daily       ondansetron (ZOFRAN) 4 MG tablet Take 4 mg by mouth every 12 hours as needed for nausea       senna (SENOKOT) 8.6 MG tablet Take 1 tablet by mouth 2 times daily as needed for constipation       simvastatin (ZOCOR) 20 MG tablet Take 1 tablet (20 mg) by mouth At Bedtime       darbepoetin rocky (ARANESP) 100 MCG/0.5ML injection Inject 100 mcg Subcutaneous every 28 days        Epoetin Rocky (EPOGEN IJ) Inject 10,000 Units as directed once a week       psyllium (METAMUCIL/KONSYL) Packet Take 1 packet by mouth daily (Patient not taking: Reported on 1/17/2020)       senna-docusate (SENOKOT-S/PERICOLACE) 8.6-50 MG tablet Take 1-4 tablets by mouth 2 times daily as needed for constipation (Patient not taking: Reported on 1/26/2020)       OTC products: None, except as noted above    Allergies   Allergen Reactions     Heparin Hives     HIT-serotonin assay negative. NOT HIT      Latex Allergy: NO    Social History     Tobacco Use     Smoking status: Former Smoker     Last attempt to quit: 7/25/2016     Years since quitting: 3.5     Smokeless tobacco: Never Used   Substance Use Topics     Alcohol use: No     History   Drug Use No       REVIEW OF SYSTEMS:   10 point ROS of systems including Constitutional, Eyes, Respiratory, Cardiovascular, Gastroenterology, Genitourinary, Integumentary, Musculoskeletal, Psychiatric were all negative except for pertinent positives noted in my HPI.    EXAM:   BP (!) 157/72   Pulse 72   Temp 98.4  F (36.9  C)   Resp 17   Ht 1.702 m (5' 7\")   Wt 89 kg (196 lb 3.2 oz)   SpO2 96%   BMI 30.73 kg/m    GENERAL APPEARANCE:  Alert, in no distress, cooperative  ENT:  Mouth and posterior oropharynx normal, moist mucous membranes, normal hearing " acuity  EYES:  EOM, conjunctivae, lids, pupils and irises normal  RESP:  respiratory effort and palpation of chest normal, lungs clear to auscultation , no respiratory distress  CV:  Palpation and auscultation of heart done , regular rate and rhythm, no murmur, rub, or gallop, no edema  ABDOMEN:  normal bowel sounds, soft, nontender, no hepatosplenomegaly or other masses  SKIN:  Inspection of skin and subcutaneous tissue baseline, Palpation of skin and subcutaneous tissue baseline  NEURO:   left slightly weaker than right  PSYCH:  oriented X 3, affect and mood normal    DIAGNOSTICS:   EKG: appears normal, NSR, unchanged from previous tracings    Recent Labs   Lab Test 11/07/19  1343 10/31/19  1331 10/31/19  0556  10/28/19  1512 06/05/19  0715 06/04/19  0724  05/28/19  0001   HGB  --   --  9.3*  --   --  7.5*  --    < > 7.4*   PLT  --   --  327  --   --  176  --    < > 192   INR  --   --   --   --   --  1.92* 1.90*   < >  --      --  136   < >  --   --   --    < >  --    POTASSIUM 5.2 5.3 Canceled, Test credited   < >  --   --   --    < >  --    CR 7.32*  --  6.10*   < >  --   --   --    < >  --    A1C  --   --   --   --  9.7*  --   --   --  10.5*    < > = values in this interval not displayed.        IMPRESSION:   Reason for surgery/procedure: permanent dialysis access  Diagnosis/reason for consult: ESRD    The proposed surgical procedure is considered LOW risk.    REVISED CARDIAC RISK INDEX  The patient has the following serious cardiovascular risks for perioperative complications such as (MI, PE, VFib and 3  AV Block):  Congestive Heart Failure (pulmonary edema, PND, s3 roxana, CXR with pulmonary congestion, basilar rales)  Cerebrovascular Disease (TIA or CVA)  Diabetes Mellitus (on Insulin)  Serum Creatinine >2.0 mg/dl  INTERPRETATION: 3 risks: Class IV (high risk - >11% complication rate)    The patient has the following additional risks for perioperative complications:  No identified additional  risks    No diagnosis found.    ORDERS:     --Consult hospital rounder / IM to assist post-op medical management    --Patient is to take all scheduled medications on the day of surgery EXCEPT for modifications listed below.    Diabetes Medication Use  -----Hold mixed insulins (e.g. Insulin 70/30) while NPO (fasting)      APPROVAL GIVEN to proceed with proposed procedure, without further diagnostic evaluation       Signed Electronically by: Autumn Trivedi NP    Copy of this evaluation report is provided to requesting physician.    [facility] Preop Guidelines    Revised Cardiac Risk Index

## 2020-01-27 ENCOUNTER — NURSING HOME VISIT (OUTPATIENT)
Dept: GERIATRICS | Facility: CLINIC | Age: 60
End: 2020-01-27
Payer: COMMERCIAL

## 2020-01-27 DIAGNOSIS — Z99.2 TYPE 2 DIABETES MELLITUS WITH CHRONIC KIDNEY DISEASE ON CHRONIC DIALYSIS, WITH LONG-TERM CURRENT USE OF INSULIN (H): ICD-10-CM

## 2020-01-27 DIAGNOSIS — I69.352 HEMIPLEGIA AND HEMIPARESIS FOLLOWING CEREBRAL INFARCTION AFFECTING LEFT DOMINANT SIDE (H): ICD-10-CM

## 2020-01-27 DIAGNOSIS — D63.1 ANEMIA IN CHRONIC KIDNEY DISEASE, ON CHRONIC DIALYSIS (H): ICD-10-CM

## 2020-01-27 DIAGNOSIS — N18.6 TYPE 2 DIABETES MELLITUS WITH CHRONIC KIDNEY DISEASE ON CHRONIC DIALYSIS, WITH LONG-TERM CURRENT USE OF INSULIN (H): ICD-10-CM

## 2020-01-27 DIAGNOSIS — N18.6 ANEMIA IN CHRONIC KIDNEY DISEASE, ON CHRONIC DIALYSIS (H): ICD-10-CM

## 2020-01-27 DIAGNOSIS — Z99.2 END STAGE RENAL FAILURE ON DIALYSIS (H): Primary | ICD-10-CM

## 2020-01-27 DIAGNOSIS — Z99.2 ANEMIA IN CHRONIC KIDNEY DISEASE, ON CHRONIC DIALYSIS (H): ICD-10-CM

## 2020-01-27 DIAGNOSIS — I12.0 HYPERTENSIVE CHRONIC KIDNEY DISEASE WITH STAGE 5 CHRONIC KIDNEY DISEASE OR END STAGE RENAL DISEASE (H): ICD-10-CM

## 2020-01-27 DIAGNOSIS — E11.22 TYPE 2 DIABETES MELLITUS WITH CHRONIC KIDNEY DISEASE ON CHRONIC DIALYSIS, WITH LONG-TERM CURRENT USE OF INSULIN (H): ICD-10-CM

## 2020-01-27 DIAGNOSIS — N18.6 END STAGE RENAL FAILURE ON DIALYSIS (H): Primary | ICD-10-CM

## 2020-01-27 DIAGNOSIS — Z79.4 TYPE 2 DIABETES MELLITUS WITH CHRONIC KIDNEY DISEASE ON CHRONIC DIALYSIS, WITH LONG-TERM CURRENT USE OF INSULIN (H): ICD-10-CM

## 2020-01-27 PROCEDURE — 99309 SBSQ NF CARE MODERATE MDM 30: CPT | Performed by: NURSE PRACTITIONER

## 2020-01-28 ENCOUNTER — HOSPITAL LABORATORY (OUTPATIENT)
Dept: OTHER | Facility: CLINIC | Age: 60
End: 2020-01-28

## 2020-01-28 LAB
ANION GAP SERPL CALCULATED.3IONS-SCNC: 3 MMOL/L (ref 3–14)
BUN SERPL-MCNC: 35 MG/DL (ref 7–30)
CALCIUM SERPL-MCNC: 9 MG/DL (ref 8.5–10.1)
CHLORIDE SERPL-SCNC: 99 MMOL/L (ref 94–109)
CO2 SERPL-SCNC: 29 MMOL/L (ref 20–32)
CREAT SERPL-MCNC: 4.8 MG/DL (ref 0.52–1.04)
ERYTHROCYTE [DISTWIDTH] IN BLOOD BY AUTOMATED COUNT: 15.3 % (ref 10–15)
GFR SERPL CREATININE-BSD FRML MDRD: 9 ML/MIN/{1.73_M2}
GLUCOSE SERPL-MCNC: 140 MG/DL (ref 70–99)
HCT VFR BLD AUTO: 33.6 % (ref 35–47)
HGB BLD-MCNC: 10.8 G/DL (ref 11.7–15.7)
MCH RBC QN AUTO: 27.9 PG (ref 26.5–33)
MCHC RBC AUTO-ENTMCNC: 32.1 G/DL (ref 31.5–36.5)
MCV RBC AUTO: 87 FL (ref 78–100)
PLATELET # BLD AUTO: 220 10E9/L (ref 150–450)
POTASSIUM SERPL-SCNC: 4.7 MMOL/L (ref 3.4–5.3)
RBC # BLD AUTO: 3.87 10E12/L (ref 3.8–5.2)
SODIUM SERPL-SCNC: 131 MMOL/L (ref 133–144)
WBC # BLD AUTO: 7.5 10E9/L (ref 4–11)

## 2020-01-31 VITALS
TEMPERATURE: 98.4 F | OXYGEN SATURATION: 96 % | SYSTOLIC BLOOD PRESSURE: 157 MMHG | WEIGHT: 196.2 LBS | DIASTOLIC BLOOD PRESSURE: 72 MMHG | BODY MASS INDEX: 30.79 KG/M2 | HEART RATE: 72 BPM | RESPIRATION RATE: 17 BRPM | HEIGHT: 67 IN

## 2020-02-03 RX ORDER — AMOXICILLIN 250 MG
1-4 CAPSULE ORAL 2 TIMES DAILY PRN
COMMUNITY
End: 2020-02-04 | Stop reason: HOSPADM

## 2020-02-03 RX ORDER — CEFAZOLIN SODIUM 2 G/100ML
2 INJECTION, SOLUTION INTRAVENOUS
Status: CANCELLED | OUTPATIENT
Start: 2020-02-03

## 2020-02-04 ENCOUNTER — ANESTHESIA (OUTPATIENT)
Dept: SURGERY | Facility: CLINIC | Age: 60
End: 2020-02-04

## 2020-02-04 ENCOUNTER — TELEPHONE (OUTPATIENT)
Dept: OTHER | Facility: CLINIC | Age: 60
End: 2020-02-04

## 2020-02-04 ENCOUNTER — ANESTHESIA EVENT (OUTPATIENT)
Dept: SURGERY | Facility: CLINIC | Age: 60
End: 2020-02-04

## 2020-02-04 NOTE — TELEPHONE ENCOUNTER
Returned call to [facility] regarding medication holds prior to 2/11/20 procedure with Dr. Corbin.  Spoke with Marifer and deferred medication holds to provider that completed the preop clearance.  Marifer stated she will discuss with that provider.  Leighann Pitts, BSN, RN-[facility] [place]

## 2020-02-11 ENCOUNTER — SURGERY (OUTPATIENT)
Age: 60
End: 2020-02-11
Payer: COMMERCIAL

## 2020-02-11 ENCOUNTER — HOSPITAL ENCOUNTER (OUTPATIENT)
Facility: CLINIC | Age: 60
Discharge: HOME | End: 2020-02-11
Attending: SURGERY | Admitting: SURGERY
Payer: COMMERCIAL

## 2020-02-11 ENCOUNTER — ANESTHESIA (OUTPATIENT)
Dept: SURGERY | Facility: CLINIC | Age: 60
End: 2020-02-11
Payer: COMMERCIAL

## 2020-02-11 ENCOUNTER — ANESTHESIA EVENT (OUTPATIENT)
Dept: SURGERY | Facility: CLINIC | Age: 60
End: 2020-02-11
Payer: COMMERCIAL

## 2020-02-11 ENCOUNTER — APPOINTMENT (OUTPATIENT)
Dept: SURGERY | Facility: PHYSICIAN GROUP | Age: 60
End: 2020-02-11
Payer: COMMERCIAL

## 2020-02-11 VITALS
SYSTOLIC BLOOD PRESSURE: 121 MMHG | HEART RATE: 75 BPM | BODY MASS INDEX: 30.01 KG/M2 | RESPIRATION RATE: 13 BRPM | OXYGEN SATURATION: 94 % | WEIGHT: 198 LBS | DIASTOLIC BLOOD PRESSURE: 76 MMHG | HEIGHT: 68 IN | TEMPERATURE: 97 F

## 2020-02-11 DIAGNOSIS — N18.6 ESRD (END STAGE RENAL DISEASE) ON DIALYSIS (H): Primary | ICD-10-CM

## 2020-02-11 DIAGNOSIS — Z99.2 ESRD (END STAGE RENAL DISEASE) ON DIALYSIS (H): Primary | ICD-10-CM

## 2020-02-11 LAB
GLUCOSE SERPL-MCNC: 194 MG/DL (ref 70–99)
POTASSIUM SERPL-SCNC: 4.2 MMOL/L (ref 3.4–5.3)

## 2020-02-11 PROCEDURE — 71000012 ZZH RECOVERY PHASE 1 LEVEL 1 FIRST HR: Performed by: SURGERY

## 2020-02-11 PROCEDURE — 37000009 ZZH ANESTHESIA TECHNICAL FEE, EACH ADDTL 15 MIN: Performed by: SURGERY

## 2020-02-11 PROCEDURE — 84132 ASSAY OF SERUM POTASSIUM: CPT | Performed by: SURGERY

## 2020-02-11 PROCEDURE — 25000132 ZZH RX MED GY IP 250 OP 250 PS 637: Performed by: SURGERY

## 2020-02-11 PROCEDURE — 27210794 ZZH OR GENERAL SUPPLY STERILE: Performed by: SURGERY

## 2020-02-11 PROCEDURE — 71000027 ZZH RECOVERY PHASE 2 EACH 15 MINS: Performed by: SURGERY

## 2020-02-11 PROCEDURE — 25000128 H RX IP 250 OP 636: Performed by: NURSE ANESTHETIST, CERTIFIED REGISTERED

## 2020-02-11 PROCEDURE — 25000128 H RX IP 250 OP 636: Performed by: SURGERY

## 2020-02-11 PROCEDURE — 36000058 ZZH SURGERY LEVEL 3 EA 15 ADDTL MIN: Performed by: SURGERY

## 2020-02-11 PROCEDURE — 40000170 ZZH STATISTIC PRE-PROCEDURE ASSESSMENT II: Performed by: SURGERY

## 2020-02-11 PROCEDURE — 36000056 ZZH SURGERY LEVEL 3 1ST 30 MIN: Performed by: SURGERY

## 2020-02-11 PROCEDURE — 36415 COLL VENOUS BLD VENIPUNCTURE: CPT | Performed by: SURGERY

## 2020-02-11 PROCEDURE — 25000125 ZZHC RX 250: Performed by: SURGERY

## 2020-02-11 PROCEDURE — 37000008 ZZH ANESTHESIA TECHNICAL FEE, 1ST 30 MIN: Performed by: SURGERY

## 2020-02-11 PROCEDURE — 82947 ASSAY GLUCOSE BLOOD QUANT: CPT | Performed by: SURGERY

## 2020-02-11 PROCEDURE — 25800030 ZZH RX IP 258 OP 636: Performed by: NURSE ANESTHETIST, CERTIFIED REGISTERED

## 2020-02-11 PROCEDURE — 25000125 ZZHC RX 250: Performed by: NURSE ANESTHETIST, CERTIFIED REGISTERED

## 2020-02-11 PROCEDURE — 36821 AV FUSION DIRECT ANY SITE: CPT | Performed by: SURGERY

## 2020-02-11 RX ORDER — ONDANSETRON 2 MG/ML
4 INJECTION INTRAMUSCULAR; INTRAVENOUS EVERY 30 MIN PRN
Status: DISCONTINUED | OUTPATIENT
Start: 2020-02-11 | End: 2020-02-11 | Stop reason: HOSPADM

## 2020-02-11 RX ORDER — HYDROMORPHONE HYDROCHLORIDE 1 MG/ML
.3-.5 INJECTION, SOLUTION INTRAMUSCULAR; INTRAVENOUS; SUBCUTANEOUS EVERY 10 MIN PRN
Status: DISCONTINUED | OUTPATIENT
Start: 2020-02-11 | End: 2020-02-11 | Stop reason: HOSPADM

## 2020-02-11 RX ORDER — LIDOCAINE HYDROCHLORIDE 10 MG/ML
INJECTION, SOLUTION INFILTRATION; PERINEURAL
Status: DISCONTINUED
Start: 2020-02-11 | End: 2020-02-11 | Stop reason: HOSPADM

## 2020-02-11 RX ORDER — FENTANYL CITRATE 0.05 MG/ML
25-50 INJECTION, SOLUTION INTRAMUSCULAR; INTRAVENOUS
Status: DISCONTINUED | OUTPATIENT
Start: 2020-02-11 | End: 2020-02-11 | Stop reason: HOSPADM

## 2020-02-11 RX ORDER — ACETAMINOPHEN 650 MG/1
650 SUPPOSITORY RECTAL EVERY 4 HOURS PRN
Status: DISCONTINUED | OUTPATIENT
Start: 2020-02-11 | End: 2020-02-11 | Stop reason: HOSPADM

## 2020-02-11 RX ORDER — BUPIVACAINE HYDROCHLORIDE 5 MG/ML
INJECTION, SOLUTION PERINEURAL PRN
Status: DISCONTINUED | OUTPATIENT
Start: 2020-02-11 | End: 2020-02-11 | Stop reason: HOSPADM

## 2020-02-11 RX ORDER — CEFAZOLIN SODIUM 2 G/100ML
2 INJECTION, SOLUTION INTRAVENOUS
Status: COMPLETED | OUTPATIENT
Start: 2020-02-11 | End: 2020-02-11

## 2020-02-11 RX ORDER — HYDROCODONE BITARTRATE AND ACETAMINOPHEN 5; 325 MG/1; MG/1
1-2 TABLET ORAL EVERY 6 HOURS PRN
Qty: 6 TABLET | Refills: 0 | Status: SHIPPED | OUTPATIENT
Start: 2020-02-11 | End: 2020-04-27

## 2020-02-11 RX ORDER — MAGNESIUM HYDROXIDE 1200 MG/15ML
LIQUID ORAL PRN
Status: DISCONTINUED | OUTPATIENT
Start: 2020-02-11 | End: 2020-02-11 | Stop reason: HOSPADM

## 2020-02-11 RX ORDER — ASPIRIN 81 MG/1
81 TABLET, CHEWABLE ORAL ONCE
Status: DISCONTINUED | OUTPATIENT
Start: 2020-02-11 | End: 2020-02-11 | Stop reason: HOSPADM

## 2020-02-11 RX ORDER — NALOXONE HYDROCHLORIDE 0.4 MG/ML
.1-.4 INJECTION, SOLUTION INTRAMUSCULAR; INTRAVENOUS; SUBCUTANEOUS
Status: DISCONTINUED | OUTPATIENT
Start: 2020-02-11 | End: 2020-02-11 | Stop reason: HOSPADM

## 2020-02-11 RX ORDER — PROPOFOL 10 MG/ML
INJECTION, EMULSION INTRAVENOUS PRN
Status: DISCONTINUED | OUTPATIENT
Start: 2020-02-11 | End: 2020-02-11

## 2020-02-11 RX ORDER — FENTANYL CITRATE 50 UG/ML
INJECTION, SOLUTION INTRAMUSCULAR; INTRAVENOUS PRN
Status: DISCONTINUED | OUTPATIENT
Start: 2020-02-11 | End: 2020-02-11

## 2020-02-11 RX ORDER — ONDANSETRON 2 MG/ML
INJECTION INTRAMUSCULAR; INTRAVENOUS PRN
Status: DISCONTINUED | OUTPATIENT
Start: 2020-02-11 | End: 2020-02-11

## 2020-02-11 RX ORDER — BUPIVACAINE HYDROCHLORIDE 5 MG/ML
INJECTION, SOLUTION EPIDURAL; INTRACAUDAL
Status: DISCONTINUED
Start: 2020-02-11 | End: 2020-02-11 | Stop reason: HOSPADM

## 2020-02-11 RX ORDER — SODIUM CHLORIDE, SODIUM LACTATE, POTASSIUM CHLORIDE, CALCIUM CHLORIDE 600; 310; 30; 20 MG/100ML; MG/100ML; MG/100ML; MG/100ML
INJECTION, SOLUTION INTRAVENOUS CONTINUOUS
Status: DISCONTINUED | OUTPATIENT
Start: 2020-02-11 | End: 2020-02-11 | Stop reason: HOSPADM

## 2020-02-11 RX ORDER — SODIUM CHLORIDE 9 MG/ML
INJECTION, SOLUTION INTRAVENOUS CONTINUOUS PRN
Status: DISCONTINUED | OUTPATIENT
Start: 2020-02-11 | End: 2020-02-11

## 2020-02-11 RX ORDER — LIDOCAINE HYDROCHLORIDE 20 MG/ML
INJECTION, SOLUTION INFILTRATION; PERINEURAL PRN
Status: DISCONTINUED | OUTPATIENT
Start: 2020-02-11 | End: 2020-02-11

## 2020-02-11 RX ORDER — FENTANYL CITRATE 0.05 MG/ML
25-100 INJECTION, SOLUTION INTRAMUSCULAR; INTRAVENOUS
Status: DISCONTINUED | OUTPATIENT
Start: 2020-02-11 | End: 2020-02-11 | Stop reason: HOSPADM

## 2020-02-11 RX ORDER — PROPOFOL 10 MG/ML
INJECTION, EMULSION INTRAVENOUS CONTINUOUS PRN
Status: DISCONTINUED | OUTPATIENT
Start: 2020-02-11 | End: 2020-02-11

## 2020-02-11 RX ORDER — ONDANSETRON 4 MG/1
4 TABLET, ORALLY DISINTEGRATING ORAL EVERY 30 MIN PRN
Status: DISCONTINUED | OUTPATIENT
Start: 2020-02-11 | End: 2020-02-11 | Stop reason: HOSPADM

## 2020-02-11 RX ORDER — AMOXICILLIN 250 MG
1 CAPSULE ORAL 2 TIMES DAILY PRN
COMMUNITY

## 2020-02-11 RX ORDER — ASPIRIN 81 MG/1
81 TABLET, CHEWABLE ORAL DAILY
Status: DISCONTINUED | OUTPATIENT
Start: 2020-02-11 | End: 2020-02-11 | Stop reason: HOSPADM

## 2020-02-11 RX ORDER — DIAZEPAM 10 MG/2ML
2.5 INJECTION, SOLUTION INTRAMUSCULAR; INTRAVENOUS
Status: DISCONTINUED | OUTPATIENT
Start: 2020-02-11 | End: 2020-02-11 | Stop reason: HOSPADM

## 2020-02-11 RX ADMIN — FENTANYL CITRATE 25 MCG: 50 INJECTION, SOLUTION INTRAMUSCULAR; INTRAVENOUS at 14:41

## 2020-02-11 RX ADMIN — FENTANYL CITRATE 25 MCG: 50 INJECTION, SOLUTION INTRAMUSCULAR; INTRAVENOUS at 15:06

## 2020-02-11 RX ADMIN — LIDOCAINE HYDROCHLORIDE 60 MG: 20 INJECTION, SOLUTION INFILTRATION; PERINEURAL at 14:39

## 2020-02-11 RX ADMIN — PROPOFOL 50 MCG/KG/MIN: 10 INJECTION, EMULSION INTRAVENOUS at 14:40

## 2020-02-11 RX ADMIN — LIDOCAINE HYDROCHLORIDE 14 ML: 10 INJECTION, SOLUTION EPIDURAL; INFILTRATION; INTRACAUDAL; PERINEURAL at 14:50

## 2020-02-11 RX ADMIN — LIDOCAINE HYDROCHLORIDE 20 MG: 20 INJECTION, SOLUTION INFILTRATION; PERINEURAL at 15:00

## 2020-02-11 RX ADMIN — BUPIVACAINE HYDROCHLORIDE 6 ML: 5 INJECTION, SOLUTION PERINEURAL at 16:12

## 2020-02-11 RX ADMIN — ASPIRIN 81 MG 81 MG: 81 TABLET ORAL at 17:11

## 2020-02-11 RX ADMIN — SODIUM CHLORIDE: 9 INJECTION, SOLUTION INTRAVENOUS at 14:35

## 2020-02-11 RX ADMIN — DEXMEDETOMIDINE HYDROCHLORIDE 0.5 MCG/KG/HR: 100 INJECTION, SOLUTION INTRAVENOUS at 14:40

## 2020-02-11 RX ADMIN — CEFAZOLIN SODIUM 2 G: 2 INJECTION, SOLUTION INTRAVENOUS at 14:45

## 2020-02-11 RX ADMIN — PROPOFOL 10 MG: 10 INJECTION, EMULSION INTRAVENOUS at 14:47

## 2020-02-11 RX ADMIN — ONDANSETRON 4 MG: 2 INJECTION INTRAMUSCULAR; INTRAVENOUS at 16:13

## 2020-02-11 RX ADMIN — PROPOFOL 20 MG: 10 INJECTION, EMULSION INTRAVENOUS at 14:42

## 2020-02-11 RX ADMIN — SODIUM CHLORIDE 1000 ML: 900 IRRIGANT IRRIGATION at 14:48

## 2020-02-11 RX ADMIN — LIDOCAINE HYDROCHLORIDE 20 MG: 20 INJECTION, SOLUTION INFILTRATION; PERINEURAL at 14:47

## 2020-02-11 ASSESSMENT — MIFFLIN-ST. JEOR: SCORE: 1511.62

## 2020-02-11 NOTE — DISCHARGE INSTRUCTIONS
ARTERIAL [facility]ENOUS [facility]  Discharge Instructions    Dr. Sumanth Corbin  [phone]        In 48 hours, remove dressing.  Leave steri-strips in place until they fall off by themselves--usually 7-10 days.      Ok to shower once dressing is removed.  No bathing or soaking for 4 weeks      Follow up with Dr. Corbin in 2 weeks.     Call Dr Corbin's office for problems:    Signs of infection--redness, swelling, drainage, temperature.     Fever over 101     Persistent nausea and vomiting    Any questions or concerns        Same Day Surgery Discharge Instructions for  Sedation and General Anesthesia       It's not unusual to feel dizzy, light-headed or faint for up to 24 hours after surgery or while taking pain medication.  If you have these symptoms: sit for a few minutes before standing and have someone assist you when you get up to walk or use the bathroom.      You should rest and relax for the next 24 hours. We recommend you make arrangements to have an adult stay with you for at least 24 hours after your discharge.  Avoid hazardous and strenuous activity.      DO NOT DRIVE any vehicle or operate mechanical equipment for 24 hours following the end of your surgery.  Even though you may feel normal, your reactions may be affected by the medication you have received.      Do not drink alcoholic beverages for 24 hours following surgery.       Slowly progress to your regular diet as you feel able. It's not unusual to feel nauseated and/or vomit after receiving anesthesia.  If you develop these symptoms, drink clear liquids (apple juice, ginger ale, broth, 7-up, etc. ) until you feel better.  If your nausea and vomiting persists for 24 hours, please notify your surgeon.        All narcotic pain medications, along with inactivity and anesthesia, can cause constipation. Drinking plenty of liquids and increasing fiber intake will help.      For any questions of a medical nature, call your surgeon.      Do not make important  decisions for 24 hours.      If you feel your pain is not well managed with the pain medications prescribed by your surgeon, please contact your surgeon's office to let them know so they can address your concerns.         **If you have questions or concerns about your procedure  call Dr Sumanth Corbin at [phone]**

## 2020-02-11 NOTE — PROGRESS NOTES
Discharge instructions reviewed with patient  Roxy Beaulieu . Patient and caretaker verbalized understanding discharge instructions, all questions answered, has no further question, and no knowledge deficit noted. AVS was given to patient/family. Prescription was/were given to patient with an intact seal. Patient's belongings were returned to the patient . Patient was discharged in stable condition. Patient was transferred to a wheel chair and was accompanied by transport service back to the nursing home.

## 2020-02-11 NOTE — ANESTHESIA PREPROCEDURE EVALUATION
Anesthesia Pre-Procedure Evaluation    Patient: Roxy Beaulieu   MRN: [id number] : 1960          Preoperative Diagnosis: ESRD (end stage renal disease) on dialysis (H) [N18.6, Z99.2]    Procedure(s):  RIGHT BRACHIAL TO CEPHALIC ARTERIOVENOUS FISTULA CREATION    Past Medical History:   Diagnosis Date     Anemia      Cerebral artery occlusion with cerebral infarction (H)     affected left side with weakness in arm and leg     CHF (congestive heart failure) (H)      Diabetes (H)      ESRD on dialysis (H)     HD STARTED 2019     Gangrene of toe (H) 2017     Hemiplegia and hemiparesis following cerebral infarction affecting left dominant side (H)      High cholesterol      Hypertension      Hypertensive cardiomyopathy (H)      Obesity 2017     PE (pulmonary thromboembolism) (H)     RECURRENT     Personal history of tobacco use, presenting hazards to health 2017     Renal insufficiency      SOB (shortness of breath)      Ulcer of toe of left foot (H)     GANGRENE     Uncontrolled type 2 diabetes mellitus with hyperglycemia, with long-term current use of insulin (H) 2017     VTE (venous thromboembolism)      Past Surgical History:   Procedure Laterality Date     AMPUTATE TOE(S) Left 2017    Procedure: AMPUTATE TOE(S);  Amputate first and second left toes;  Surgeon: Verna Fofana DPM, Podiatry/Foot and Ankle Surgery;  Location: [facility] OR     AMPUTATE TOE(S) Left 11/3/2018    Procedure: Partial left 3rd toe amputation;  Surgeon: Verna Fofana DPM, Podiatry/Foot and Ankle Surgery;  Location:  OR     IR CVC TUNNEL PLACEMENT > 5 YRS OF AGE  2019     OPEN REDUCTION INTERNAL FIXATION FIBULA Left 3/16/2018    Procedure: OPEN REDUCTION INTERNAL FIXATION FIBULA;  Open reduction and internal fixation of displaced left lateral malleolar fracture;  Surgeon: Sumanth Wen MD;  Location:  OR       Anesthesia Evaluation     . Pt has had prior anesthetic.     No history of anesthetic  complications          ROS/MED HX    ENT/Pulmonary:      (-) sleep apnea   Neurologic:     (+)CVA with deficits- left side weakness,     Cardiovascular:     (+) hypertension----. : . CHF etiology: diastolic and systolic Last EF: 45-50 date: 2019 . . :. . Previous cardiac testing Echodate:2019results:The visual ejection fraction is estimated at 45-50%. Left ventricular systolic function is mildly reduced. A basal inferior wall motion abnormality can not be excluded on this study even with contrast use. Grade III or advanced diastolic dysfunction. There is moderate to severe concentric left ventricular hypertrophy. The study was technically difficult.date: results: date: results: date: results:          METS/Exercise Tolerance:     Hematologic:     (+) History of blood clots -      Musculoskeletal:         GI/Hepatic:        (-) GERD   Renal/Genitourinary:     (+) chronic renal disease (she was last dialyzed yesterday through catheter.  This will be her first fistula), type: ESRD,       Endo:     (+) type II DM .      Psychiatric:         Infectious Disease:         Malignancy:         Other: Comment: Nursing home                         Physical Exam  Normal systems: cardiovascular and pulmonary    Airway   Mallampati: II  TM distance: >3 FB  Neck ROM: full    Dental   (+) upper dentures and partials    Cardiovascular   Rhythm and rate: regular and normal      Pulmonary    breath sounds clear to auscultation            Lab Results   Component Value Date    WBC 7.5 01/28/2020    HGB 10.8 (L) 01/28/2020    HCT 33.6 (L) 01/28/2020     01/28/2020    CRP <2.9 12/18/2018    SED >140 (H) 11/02/2018     (L) 01/28/2020    POTASSIUM 4.2 02/11/2020    CHLORIDE 99 01/28/2020    CO2 29 01/28/2020    BUN 35 (H) 01/28/2020    CR 4.80 (H) 01/28/2020     (H) 02/11/2020    GILL 9.0 01/28/2020    PHOS 5.2 (H) 06/01/2019    MAG 2.0 12/17/2018    ALBUMIN 3.5 06/01/2019    PROTTOTAL 6.9 05/27/2019    ALT 56 (H)  "05/27/2019    AST 38 05/27/2019    ALKPHOS 155 (H) 05/27/2019    BILITOTAL 0.3 05/27/2019    PTT 43 (H) 06/04/2019    INR 1.92 (H) 06/05/2019    TSH 1.42 12/17/2018       Preop Vitals  BP Readings from Last 3 Encounters:   01/26/20 (!) 157/72   01/17/20 (!) 163/73   01/16/20 (!) 180/82    Pulse Readings from Last 3 Encounters:   01/26/20 72   01/17/20 73   01/16/20 87      Resp Readings from Last 3 Encounters:   01/26/20 17   01/17/20 16   01/16/20 16    SpO2 Readings from Last 3 Encounters:   01/26/20 96%   01/17/20 97%   01/03/20 96%      Temp Readings from Last 1 Encounters:   01/26/20 36.9  C (98.4  F)    Ht Readings from Last 1 Encounters:   01/26/20 1.702 m (5' 7\")      Wt Readings from Last 1 Encounters:   01/26/20 89 kg (196 lb 3.2 oz)    Estimated body mass index is 30.73 kg/m  as calculated from the following:    Height as of 1/26/20: 1.702 m (5' 7\").    Weight as of 1/26/20: 89 kg (196 lb 3.2 oz).       Anesthesia Plan      History & Physical Review  History and physical reviewed and following examination; no interval change.    ASA Status:  4 .    NPO Status:  > 8 hours    Plan for MAC Reason for MAC:  Deep or markedly invasive procedure (G8)         Postoperative Care  Postoperative pain management:  IV analgesics.      Consents  Anesthetic plan, risks, benefits and alternatives discussed with:  Patient..                 Homer Reis MD  "

## 2020-02-11 NOTE — ANESTHESIA CARE TRANSFER NOTE
Patient: Roxy Beaulieu    Procedure(s):  RIGHT PROXIMAL RADIAL TO CEPHALIC ARTERIOVENOUS FISTULA CREATION    Diagnosis: ESRD (end stage renal disease) on dialysis (H) [N18.6, Z99.2]  Diagnosis Additional Information: No value filed.    Anesthesia Type:   MAC     Note:  Airway :Face Mask  Patient transferred to:PACU  Comments: Transferred to PACU, spontaneous respirations, 10L oxygen via facemask.  All monitors and alarms on and functioning, VSS.  Patient awake, comfortable.  Report to PACU RN.Handoff Report: Identifed the Patient, Identified the Reponsible Provider, Reviewed the pertinent medical history, Discussed the surgical course, Reviewed Intra-OP anesthesia mangement and issues during anesthesia, Set expectations for post-procedure period and Allowed opportunity for questions and acknowledgement of understanding      Vitals: (Last set prior to Anesthesia Care Transfer)    CRNA VITALS  2/11/2020 1550 - 2/11/2020 1625      2/11/2020             NIBP:  (!) 184/161    NIBP Mean:  163    Resp Rate (set):  10                Electronically Signed By: RIK Wakefield CRNA  February 11, 2020  4:25 PM

## 2020-02-11 NOTE — OR NURSING
Telephone report was given to nursing home RN.  Patient will be transported back  via w/c accompanied by transport service.  Belongings: clothes and wc.

## 2020-02-11 NOTE — ANESTHESIA POSTPROCEDURE EVALUATION
Patient: Roxy Beaulieu    Procedure(s):  RIGHT PROXIMAL RADIAL TO CEPHALIC ARTERIOVENOUS FISTULA CREATION    Diagnosis:ESRD (end stage renal disease) on dialysis (H) [N18.6, Z99.2]  Diagnosis Additional Information: No value filed.    Anesthesia Type:  MAC    Note:  Anesthesia Post Evaluation    Patient location during evaluation: PACU  Patient participation: Able to fully participate in evaluation  Level of consciousness: awake  Pain management: adequate  Airway patency: patent  Cardiovascular status: acceptable  Respiratory status: acceptable  Hydration status: acceptable  PONV: controlled     Anesthetic complications: None          Last vitals:  Vitals:    02/11/20 1230 02/11/20 1620 02/11/20 1630   BP: (!) 176/75 (!) 148/64 (!) 157/72   Pulse:   70   Resp: 20 16 16   Temp: 36.6  C (97.8  F)     SpO2: 97% 97% 93%         Electronically Signed By: Homer Reis MD  February 11, 2020  4:36 PM

## 2020-02-11 NOTE — BRIEF OP NOTE
[facility]    Brief Operative Note    Pre-operative diagnosis: ESRD (end stage renal disease) on dialysis (H) [N18.6, Z99.2]  Post-operative diagnosis Same as pre-operative diagnosis    Procedure: Procedure(s):  RIGHT PROXIMAL RADIAL TO CEPHALIC ARTERIOVENOUS FISTULA CREATION  Surgeon: Surgeon(s) and Role:     * Sumanth Corbin MD - Primary     ASSISTANT:   Mat Holloway MD (Vascular Fellow)    Anesthesia: Monitor Anesthesia Care   Estimated blood loss: 2.5 mL  Drains: None  Specimens: * No specimens in log *  Findings:   Normal anatomic variation with mildly fibrotic vessels. Anastomosis created hemostatic prior to closure.  Complications: None.  Implants: * No implants in log *

## 2020-02-12 NOTE — OP NOTE
Procedure Date: 02/11/2020      PREOPERATIVE DIAGNOSIS:  Endstage renal disease in need of permanent hemodialysis access.      POSTOPERATIVE DIAGNOSIS:  Endstage renal disease in need of permanent hemodialysis access.      PROCEDURE:  Right upper arm proximal radial to radiocephalic arteriovenous fistula creation.      SURGEON:  Sumanth Corbin MD.      ASSISTANT:  Mat Holloway MD (Vascular fellow).      ANESTHESIA:  Local with intravenous supplementation.      PREOPERATIVE MEDICATIONS:  Ancef 2 grams IV.      INDICATIONS:  A 59-year-old patient who is on chronic hemodialysis via right jugular tunneled catheter.  She needs permanent hemodialysis access.  She is left handed.  She has inadequate, though smaller right upper arm cephalic and basilic vein.  We felt that creation of a right upper arm cephalic fistula would be beneficial.  She does have a reported allergy to heparin, which is not used during her dialysis runs.  She comes to the operating room today under informed consent.      DESCRIPTION OF PROCEDURE:  The patient was brought to the operating room.  She had a well-developed right antecubital-cephalic vein.  With a duplex ultrasound, I identified the rest of the cephalic vein from the antecubital level to the shoulder and it was of adequate diameter.  There appeared to be slight wall thickening in the distal one-third segment, but otherwise unremarkable.        VASCULAR EXPOSURE:  Right arm and hand were prepped and draped.  Timeout was called and sites were identified.  One percent lidocaine with bicarbonate was injected in a field block fashion.  A vertical antecubital incision was made.  Dissection was carried down to identify the antecubital/cephalic vein.  This was dissected free.  There were several small branches below the antecubital crease that were divided between 3-0 silk suture.  The patient had a large branch going to the basilic venous system that was dissected free and preserved.  We then  dissected down to the vein until it dove down toward the artery.  Several small side branches were ligated between 4-0 silk suture.      We then exposed the distal brachial artery beyond the brachial aponeurosis and this was excellent quality with a strong pulse, measuring at least 4 mm in diameter.  We then dissected free the proximal radial and ulnar arteries and these were likewise encircled and appeared to be disease free.      No heparin was given due to her allergy.  We ligated the distal portion of the cephalic vein with 3-0 silk suture and this was mobilized up to the brachial venous branches.  This was easily irrigated with saline solution followed by 1% lidocaine.  This accepted up to a 3 mm dilator with no difficulty and good backbleeding.  Distal end of the vein was spatulated and a microvascular bulldog was applied.  Vessel loops were then tightened around the brachial/ulnar/radial arteries.  We made an arteriotomy from the origin of the radial artery extending 1 cm distally.  There was a small posterior wall radial intimal flap that did not appear to be related to our dissection.  This was excised and the vessel was otherwise unremarkable with the brachial artery inflow being widely patent.  We then sewed our spatulated vein in an end-to-side fashion with interrupted 7-0 Prolene suture loupe magnification.  Prior to closure, the vessels were flushed with good inflow and outflow being noted.  Blood flow was allowed to go up the fistula with a strong pulse being noted.  Two interrupted 7-0 Prolene sutures were used on the interrupted anastomosis for good hemostasis.  We had an excellent pulse all the way up to the shoulder with good Doppler signals and no obvious competing side branches.      I wanted to preserve around the brachial system.  We, therefore, placed 2 microvascular bulldogs; one in our newly created fistula and the other on the brachial complex, preserving the inflow.  This was divided with  an 11 blade scalpel, and both sides were closed with running 7-0 Prolene sutures with no encroachment of either luminal diameter.      Wounds were infiltrated with 0.5% Marcaine for post-analgesia.  Subcutaneous tissue was closed with interrupted 3-0 Vicryl.  Due to her very thin skin, the skin was then closed with a running 5-0 Monocryl suture followed by Steri-Strips, gauze and Alicia dressing.      The patient tolerated the procedure well.      ESTIMATED BLOOD LOSS:  2 mL.      COMPLICATIONS:  None.      The patient has good arterial inflow and a good cephalic vein.  She will be given aspirin in Recovery and continue on this.  All heparin products are avoided in her situation.         DOREEN JEFFERSON MD             D: 2020   T: 2020   MT:       Name:     ONDINA DENIS   MRN:      [id number]        Account:        [id number]   :      1960           Procedure Date: 2020      Document: [id number]       cc: Doreen Jefferson MD

## 2020-02-13 ENCOUNTER — HOSPITAL LABORATORY (OUTPATIENT)
Dept: OTHER | Facility: CLINIC | Age: 60
End: 2020-02-13

## 2020-02-13 LAB — HBA1C MFR BLD: 6.5 % (ref 0–5.6)

## 2020-02-20 ENCOUNTER — TELEPHONE (OUTPATIENT)
Dept: OTHER | Facility: CLINIC | Age: 60
End: 2020-02-20

## 2020-02-20 DIAGNOSIS — Z99.2 END STAGE RENAL FAILURE ON DIALYSIS (H): Primary | ICD-10-CM

## 2020-02-20 DIAGNOSIS — N18.6 END STAGE RENAL FAILURE ON DIALYSIS (H): Primary | ICD-10-CM

## 2020-02-20 RX ORDER — HYDROCODONE BITARTRATE AND ACETAMINOPHEN 5; 325 MG/1; MG/1
1-2 TABLET ORAL EVERY 6 HOURS PRN
Qty: 60 TABLET | Refills: 0 | Status: SHIPPED | OUTPATIENT
Start: 2020-02-20 | End: 2020-06-12 | Stop reason: ALTCHOICE

## 2020-02-20 NOTE — TELEPHONE ENCOUNTER
Routing to Dr. Corbin to verify pt is to be on Aspirin 325mg at bedtime and no further Aspirin in a.m. or otherwise.    Pt is s/p Right upper arm proximal radial to radiocephalic arteriovenous fistula creation 2/11/20.    JASE Diaz from [facility] [phone] called to clarify Aspirin orders. Pt has 81mg ASA in a.m. and 325mg ASA at bedtime.     I explained only the 325mg at bedtime is noted on discharge papers and on current med list.   They note pt was previously getting an a.m. and a p.m. dose and are looking for verification.     I will discuss with Dr. Corbin and call them back.     Esther Nguyen, BSN, RN  [facility] [place]

## 2020-02-21 NOTE — TELEPHONE ENCOUNTER
Per Dr. Corbin pt only needs to be on 325mg Aspirin at bedtime, however okay to take 81mg ASA in a.m. also if needed.     I called [facility], relayed this information to RN who notes understanding.     Esther Nguyen, BSN, RN  [facility] [place]

## 2020-02-22 ENCOUNTER — TELEPHONE (OUTPATIENT)
Dept: NURSING | Facility: CLINIC | Age: 60
End: 2020-02-22

## 2020-02-22 NOTE — TELEPHONE ENCOUNTER
"LTC nurse calling with question about aspirin dose. Advised that aspirin is not on on her list of meds prescribed by a [facility] proveder. It is a \"reported\" by patient med\" so I don't have information on doses.  Velvet Davis RN  [facility]    "

## 2020-02-24 VITALS
DIASTOLIC BLOOD PRESSURE: 72 MMHG | RESPIRATION RATE: 16 BRPM | BODY MASS INDEX: 29.77 KG/M2 | HEIGHT: 68 IN | HEART RATE: 73 BPM | OXYGEN SATURATION: 99 % | WEIGHT: 196.4 LBS | SYSTOLIC BLOOD PRESSURE: 156 MMHG | TEMPERATURE: 98 F

## 2020-02-24 ASSESSMENT — MIFFLIN-ST. JEOR: SCORE: 1504.36

## 2020-02-25 ENCOUNTER — NURSING HOME VISIT (OUTPATIENT)
Dept: GERIATRICS | Facility: CLINIC | Age: 60
End: 2020-02-25
Payer: COMMERCIAL

## 2020-02-25 DIAGNOSIS — Z86.711 HISTORY OF PULMONARY EMBOLISM: ICD-10-CM

## 2020-02-25 DIAGNOSIS — N18.6 ESRD (END STAGE RENAL DISEASE) ON DIALYSIS (H): ICD-10-CM

## 2020-02-25 DIAGNOSIS — I50.22 CHRONIC SYSTOLIC CONGESTIVE HEART FAILURE (H): ICD-10-CM

## 2020-02-25 DIAGNOSIS — N18.5 ANEMIA OF CHRONIC RENAL FAILURE, STAGE 5 (H): ICD-10-CM

## 2020-02-25 DIAGNOSIS — Z99.2 ESRD (END STAGE RENAL DISEASE) ON DIALYSIS (H): ICD-10-CM

## 2020-02-25 DIAGNOSIS — E11.65 UNCONTROLLED TYPE 2 DIABETES MELLITUS WITH HYPERGLYCEMIA, WITH LONG-TERM CURRENT USE OF INSULIN (H): Primary | ICD-10-CM

## 2020-02-25 DIAGNOSIS — Z79.4 UNCONTROLLED TYPE 2 DIABETES MELLITUS WITH HYPERGLYCEMIA, WITH LONG-TERM CURRENT USE OF INSULIN (H): Primary | ICD-10-CM

## 2020-02-25 DIAGNOSIS — D63.1 ANEMIA OF CHRONIC RENAL FAILURE, STAGE 5 (H): ICD-10-CM

## 2020-02-25 PROCEDURE — 99309 SBSQ NF CARE MODERATE MDM 30: CPT | Performed by: NURSE PRACTITIONER

## 2020-02-25 NOTE — PROGRESS NOTES
[facility]  Chief Complaint   Patient presents with     long-term Regulatory     [place] Medical Record Number:  [id number]  Place of Service where encounter took place:  [facility]  (S) [[id number]]    HPI:    Roxy Beaulieu  is 59 year old (1960), who is being seen today for a federally mandated E/M visit.  HPI information obtained from: facility chart records, facility staff, patient report, [facility] chart review and Care Everywhere [facility] chart review. Today's concerns are:  Uncontrolled type 2 diabetes mellitus with hyperglycemia, with long-term current use of insulin (H)  She is on Insulin Humulin 70/30 with no other hypoglycemic agents.  Blood sugar ranges between  with most recent A1c was 6.5 which is down from 9.7.  Improved control over the past 6 months.  She denies feelings of hypo/hyperglycemia.  States appetite is ok.    Chronic systolic congestive heart failure (H)  She is on Hemodialysis 3 times weekly. Blood pressures managed with Amlodipine, Carvedilol, Hydralazine, also on ASA and Zocor.  Denies chest pain, dyspnea, nausea, headache, cough and no discomfort due to edema.      ESRD (end stage renal disease) on dialysis.  Initiation of HD 6/2019.  Anemia of chronic renal failure, stage 5 (H) on dialysis  On 2/11/20 had a RIGHT PROXIMAL RADIAL TO CEPHALIC ARTERIOVENOUS FISTULA CREATION with Dr. Corbin  .  Healing well.  HGB has improved over the past few months has gone up to 10.8 from 7.8.  She is not on iron supplement.  Energy level feel low much of the time.    History of pulmonary embolism with suspected recurrrent PE contributing to hypoxia  She is on a full ASA daily.      ALLERGIES:Heparin  PAST MEDICAL HISTORY:   has a past medical history of Anemia, Cerebral artery occlusion with cerebral infarction (H), CHF (congestive heart failure) (H), Congestive heart failure (CHF) (H) (12/17/2018), Diabetes (H), ESRD on dialysis (H), Gangrene of  toe (H) (4/29/2017), Hemiplegia and hemiparesis following cerebral infarction affecting left dominant side (H), High cholesterol, Hypertension, Hypertensive cardiomyopathy (H), Obesity (4/29/2017), PE (pulmonary thromboembolism) (H), Personal history of tobacco use, presenting hazards to health (04/29/2017), Renal insufficiency, SOB (shortness of breath), Toe ulcer, left, with unspecified severity (H) (11/2/2018), Ulcer of toe of left foot (H), Uncontrolled type 2 diabetes mellitus with hyperglycemia, with long-term current use of insulin (H) (4/29/2017), and VTE (venous thromboembolism).  PAST SURGICAL HISTORY:   has a past surgical history that includes Amputate toe(s) (Left, 5/1/2017); Amputate toe(s) (Left, 11/3/2018); IR CVC Tunnel Placement > 5 Yrs of Age (6/4/2019); Open reduction internal fixation fibula (Left, 3/16/2018); and Create fistula arteriovenous upper extremity (Right, 2/11/2020).  FAMILY HISTORY: family history is not on file.  SOCIAL HISTORY:  reports that she quit smoking about 3 years ago. Her smoking use included cigarettes. She has never used smokeless tobacco. She reports that she does not drink alcohol or use drugs.    MEDICATIONS:  Current Outpatient Medications   Medication Sig Dispense Refill     acetaminophen (TYLENOL) 325 MG tablet Take 2 tablets (650 mg) by mouth every 6 hours as needed for mild pain or fever (> 101 F)       amLODIPine (NORVASC) 5 MG tablet Take 5 mg by mouth At Bedtime        aspirin (ASA) 325 MG EC tablet Take 325 mg by mouth At Bedtime       bisacodyl (DULCOLAX) 10 MG suppository Place 1 suppository (10 mg) rectally daily as needed for constipation       calcium acetate (PHOSLO) 667 MG CAPS capsule Take 667 mg by mouth 3 times daily (with meals) Also 1 tablet w/snacks       calcium carbonate (TUMS) 500 MG chewable tablet Take 2 chew tab by mouth 3 times daily as needed for heartburn       carvedilol (COREG) 25 MG tablet Take 1 tablet (25 mg) by mouth 2 times daily  "(with meals)       hydrALAZINE (APRESOLINE) 50 MG tablet Take 50 mg by mouth 2 times daily       HYDROcodone-acetaminophen (NORCO) 5-325 MG tablet Take 1-2 tablets by mouth every 6 hours as needed for moderate to severe pain 6 tablet 0     HYDROcodone-acetaminophen 5-325 MG PO tablet Take 1-2 tablets by mouth every 6 hours as needed for pain or moderate to severe pain 60 tablet 0     insulin isophane & regular (HUMULIN MIX 70/30 PEN , NOVOLIN MIX 70/30 PEN) susp Inject 18 Units Subcutaneous every morning (in addition to PM dose) only give on Su/Tu/Th/Sa - no dose on M/W/F       insulin isophane & regular (HUMULIN MIX 70/30 PEN , NOVOLIN MIX 70/30 PEN) susp Inject 13 Units Subcutaneous daily (with dinner)       ondansetron (ZOFRAN) 4 MG tablet Take 4 mg by mouth every 12 hours as needed for nausea       senna-docusate (SENOKOT-S/PERICOLACE) 8.6-50 MG tablet Take 1 tablet by mouth 2 times daily as needed for constipation       simvastatin (ZOCOR) 20 MG tablet Take 1 tablet (20 mg) by mouth At Bedtime         Case Management:  I have reviewed the care plan and MDS and do agree with the plan. Patient's desire to return to the community is present, but is not able due to care needs . Information reviewed:  Medications, vital signs, orders, and nursing notes.    ROS:  10 point ROS of systems including Constitutional, Eyes, Respiratory, Cardiovascular, Gastroenterology, Genitourinary, Integumentary, Musculoskeletal, Psychiatric were all negative except for pertinent positives noted in my HPI.    Vitals:  BP (!) 156/72   Pulse 73   Temp 98  F (36.7  C)   Resp 16   Ht 1.727 m (5' 8\")   Wt 89.1 kg (196 lb 6.4 oz)   SpO2 99%   BMI 29.86 kg/m    Body mass index is 29.86 kg/m .  Exam:  GENERAL APPEARANCE:  Alert  ENT:  Mouth and posterior oropharynx normal, moist mucous membranes  EYES:  EOM, conjunctivae, lids, pupils and irises normal  RESP:  respiratory effort and palpation of chest normal, lungs clear to auscultation " , no respiratory distress  CV:  Palpation and auscultation of heart done , regular rate and rhythm, no murmur, rub, or gallop  ABDOMEN:  normal bowel sounds, soft, nontender, no hepatosplenomegaly or other masses  M/S:   Gait and station abnormal wheelchair bound  Digits and nails normal  SKIN:  Inspection of skin and subcutaneous tissue baseline, Palpation of skin and subcutaneous tissue baseline, right arm incision CDI     Lab/Diagnostic data:   [facility] reviewed by me today.     ASSESSMENT/PLAN  (E11.65,  Z79.4) Uncontrolled type 2 diabetes mellitus with hyperglycemia, with long-term current use of insulin (H)  (primary encounter diagnosis)  Comment: not at candidate for oral agents due to ESRD  Plan: continue current Humulin 70/30, BGT QID consider decreasing to bid alternating times.  We do not have a sliding scale.      (I50.22) Chronic systolic congestive heart failure (H)  Comment: compensated  Plan: dialysis 3 times weekly to help manage Fluid volume.  Blood pressure medications without change today.    (N18.6,  Z99.2) ESRD (end stage renal disease) on dialysis.  Initiation of HD 6/2019.  Comment: healing well from RIGHT PROXIMAL RADIAL TO CEPHALIC ARTERIOVENOUS FISTULA CREATION   Plan: keep upcoming appointment with Dr. Corbin  Follow fluid and diet recommendations  Renally dose medications    (N18.5,  D63.1) Anemia of chronic renal failure, stage 5 (H) on dialysis  Comment: stable  Plan: follow hemoglobin    (Z86.711) History of pulmonary embolism with suspected recurrrent PE contributing to hypoxia  Comment: stable   Plan: continue  mg daily      Electronically signed by:  RIK Barrientos CNP

## 2020-02-25 NOTE — LETTER
2/25/2020        RE: Roxy Beaulieu  [address]  [place] [address]        [facility]  Chief Complaint   Patient presents with     custodial Regulatory     [facility] Medical Record Number:  [id number]  Place of Service where encounter took place:  [facility]  (S) [[id number]]    HPI:    Roxy Beaulieu  is 59 year old (1960), who is being seen today for a federally mandated E/M visit.  HPI information obtained from: facility chart records, facility staff, patient report, [facility] chart review and Care Everywhere [facility] chart review. Today's concerns are:  Uncontrolled type 2 diabetes mellitus with hyperglycemia, with long-term current use of insulin (H)  She is on Insulin Humulin 70/30 with no other hypoglycemic agents.  Blood sugar ranges between  with most recent A1c was 6.5 which is down from 9.7.  Improved control over the past 6 months.  She denies feelings of hypo/hyperglycemia.  States appetite is ok.    Chronic systolic congestive heart failure (H)  She is on Hemodialysis 3 times weekly. Blood pressures managed with Amlodipine, Carvedilol, Hydralazine, also on ASA and Zocor.  Denies chest pain, dyspnea, nausea, headache, cough and no discomfort due to edema.      ESRD (end stage renal disease) on dialysis.  Initiation of HD 6/2019.  Anemia of chronic renal failure, stage 5 (H) on dialysis  On 2/11/20 had a RIGHT PROXIMAL RADIAL TO CEPHALIC ARTERIOVENOUS FISTULA CREATION with Dr. Corbin  .  Healing well.  HGB has improved over the past few months has gone up to 10.8 from 7.8.  She is not on iron supplement.  Energy level feel low much of the time.    History of pulmonary embolism with suspected recurrrent PE contributing to hypoxia  She is on a full ASA daily.      ALLERGIES:Heparin  PAST MEDICAL HISTORY:   has a past medical history of Anemia, Cerebral artery occlusion with cerebral infarction (H), CHF (congestive heart failure) (H), Congestive  heart failure (CHF) (H) (12/17/2018), Diabetes (H), ESRD on dialysis (H), Gangrene of toe (H) (4/29/2017), Hemiplegia and hemiparesis following cerebral infarction affecting left dominant side (H), High cholesterol, Hypertension, Hypertensive cardiomyopathy (H), Obesity (4/29/2017), PE (pulmonary thromboembolism) (H), Personal history of tobacco use, presenting hazards to health (04/29/2017), Renal insufficiency, SOB (shortness of breath), Toe ulcer, left, with unspecified severity (H) (11/2/2018), Ulcer of toe of left foot (H), Uncontrolled type 2 diabetes mellitus with hyperglycemia, with long-term current use of insulin (H) (4/29/2017), and VTE (venous thromboembolism).  PAST SURGICAL HISTORY:   has a past surgical history that includes Amputate toe(s) (Left, 5/1/2017); Amputate toe(s) (Left, 11/3/2018); IR CVC Tunnel Placement > 5 Yrs of Age (6/4/2019); Open reduction internal fixation fibula (Left, 3/16/2018); and Create fistula arteriovenous upper extremity (Right, 2/11/2020).  FAMILY HISTORY: family history is not on file.  SOCIAL HISTORY:  reports that she quit smoking about 3 years ago. Her smoking use included cigarettes. She has never used smokeless tobacco. She reports that she does not drink alcohol or use drugs.    MEDICATIONS:  Current Outpatient Medications   Medication Sig Dispense Refill     acetaminophen (TYLENOL) 325 MG tablet Take 2 tablets (650 mg) by mouth every 6 hours as needed for mild pain or fever (> 101 F)       amLODIPine (NORVASC) 5 MG tablet Take 5 mg by mouth At Bedtime        aspirin (ASA) 325 MG EC tablet Take 325 mg by mouth At Bedtime       bisacodyl (DULCOLAX) 10 MG suppository Place 1 suppository (10 mg) rectally daily as needed for constipation       calcium acetate (PHOSLO) 667 MG CAPS capsule Take 667 mg by mouth 3 times daily (with meals) Also 1 tablet w/snacks       calcium carbonate (TUMS) 500 MG chewable tablet Take 2 chew tab by mouth 3 times daily as needed for  "heartburn       carvedilol (COREG) 25 MG tablet Take 1 tablet (25 mg) by mouth 2 times daily (with meals)       hydrALAZINE (APRESOLINE) 50 MG tablet Take 50 mg by mouth 2 times daily       HYDROcodone-acetaminophen (NORCO) 5-325 MG tablet Take 1-2 tablets by mouth every 6 hours as needed for moderate to severe pain 6 tablet 0     HYDROcodone-acetaminophen 5-325 MG PO tablet Take 1-2 tablets by mouth every 6 hours as needed for pain or moderate to severe pain 60 tablet 0     insulin isophane & regular (HUMULIN MIX 70/30 PEN , NOVOLIN MIX 70/30 PEN) susp Inject 18 Units Subcutaneous every morning (in addition to PM dose) only give on Su/Tu/Th/Sa - no dose on M/W/F       insulin isophane & regular (HUMULIN MIX 70/30 PEN , NOVOLIN MIX 70/30 PEN) susp Inject 13 Units Subcutaneous daily (with dinner)       ondansetron (ZOFRAN) 4 MG tablet Take 4 mg by mouth every 12 hours as needed for nausea       senna-docusate (SENOKOT-S/PERICOLACE) 8.6-50 MG tablet Take 1 tablet by mouth 2 times daily as needed for constipation       simvastatin (ZOCOR) 20 MG tablet Take 1 tablet (20 mg) by mouth At Bedtime         Case Management:  I have reviewed the care plan and MDS and do agree with the plan. Patient's desire to return to the community is present, but is not able due to care needs . Information reviewed:  Medications, vital signs, orders, and nursing notes.    ROS:  10 point ROS of systems including Constitutional, Eyes, Respiratory, Cardiovascular, Gastroenterology, Genitourinary, Integumentary, Musculoskeletal, Psychiatric were all negative except for pertinent positives noted in my HPI.    Vitals:  BP (!) 156/72   Pulse 73   Temp 98  F (36.7  C)   Resp 16   Ht 1.727 m (5' 8\")   Wt 89.1 kg (196 lb 6.4 oz)   SpO2 99%   BMI 29.86 kg/m    Body mass index is 29.86 kg/m .  Exam:  GENERAL APPEARANCE:  Alert  ENT:  Mouth and posterior oropharynx normal, moist mucous membranes  EYES:  EOM, conjunctivae, lids, pupils and irises " normal  RESP:  respiratory effort and palpation of chest normal, lungs clear to auscultation , no respiratory distress  CV:  Palpation and auscultation of heart done , regular rate and rhythm, no murmur, rub, or gallop  ABDOMEN:  normal bowel sounds, soft, nontender, no hepatosplenomegaly or other masses  M/S:   Gait and station abnormal wheelchair bound  Digits and nails normal  SKIN:  Inspection of skin and subcutaneous tissue baseline, Palpation of skin and subcutaneous tissue baseline, right arm incision CDI     Lab/Diagnostic data:   Recent labs in [facility] reviewed by me today.     ASSESSMENT/PLAN  (E11.65,  Z79.4) Uncontrolled type 2 diabetes mellitus with hyperglycemia, with long-term current use of insulin (H)  (primary encounter diagnosis)  Comment: not at candidate for oral agents due to ESRD  Plan: continue current Humulin 70/30, BGT QID consider decreasing to bid alternating times.  We do not have a sliding scale.      (I50.22) Chronic systolic congestive heart failure (H)  Comment: compensated  Plan: dialysis 3 times weekly to help manage Fluid volume.  Blood pressure medications without change today.    (N18.6,  Z99.2) ESRD (end stage renal disease) on dialysis.  Initiation of HD 6/2019.  Comment: healing well from RIGHT PROXIMAL RADIAL TO CEPHALIC ARTERIOVENOUS FISTULA CREATION   Plan: keep upcoming appointment with Dr. Corbin  Follow fluid and diet recommendations  Renally dose medications    (N18.5,  D63.1) Anemia of chronic renal failure, stage 5 (H) on dialysis  Comment: stable  Plan: follow hemoglobin    (Z86.711) History of pulmonary embolism with suspected recurrrent PE contributing to hypoxia  Comment: stable   Plan: continue  mg daily      Electronically signed by:  RIK Barrientos CNP              Sincerely,        RIK Barrientos CNP

## 2020-03-04 NOTE — PROGRESS NOTES
[facility]    Royx Beaulieu returns for vascular follow-up.  She is on chronic hemodialysis via right jugular tunneled catheter.  We placed a right upper arm proximal radial to cephalic arteriovenous fistula on 2/11/2020.  Adequate artery and vein.  No heparin used due to allergy.  On aspirin.    She is doing well since the surgery.  No problems with the antecubital incision or hand discomfort.      PMH: Medications: Coreg, Norvasc, Apresoline, Zocor, Humulin mix 70/30, PhosLo, aspirin      Exam: Alert and appropriate.  Very comfortable.  In her wheelchair             Blood pressure 144/68.  Pulse 73.             Well-healing antecubital incision.              +3 thrill and Doppler signal and arteriovenous fistula with no side branches              Good radial pulse with excellent Doppler signals.    Impression: Doing very well following placement of right upper arm radial to cephalic fistula.  I given a fistula tourniquet that she will use 5-6 times daily to help mature the fistula.  We will see her back in 4 weeks for a follow-up duplex ultrasound.  With a good clinical size I would expect with this will be able to be used at that time and this is been discussed with patient.       Sumanth Corbin MD

## 2020-03-05 ENCOUNTER — OFFICE VISIT (OUTPATIENT)
Dept: OTHER | Facility: CLINIC | Age: 60
End: 2020-03-05
Attending: SURGERY
Payer: COMMERCIAL

## 2020-03-05 VITALS — DIASTOLIC BLOOD PRESSURE: 68 MMHG | HEART RATE: 73 BPM | SYSTOLIC BLOOD PRESSURE: 144 MMHG

## 2020-03-05 DIAGNOSIS — I77.0 ARTERIOVENOUS FISTULA (H): ICD-10-CM

## 2020-03-05 DIAGNOSIS — Z99.2 ESRD (END STAGE RENAL DISEASE) ON DIALYSIS (H): Primary | ICD-10-CM

## 2020-03-05 DIAGNOSIS — N18.6 ESRD (END STAGE RENAL DISEASE) ON DIALYSIS (H): Primary | ICD-10-CM

## 2020-03-05 PROCEDURE — 99024 POSTOP FOLLOW-UP VISIT: CPT | Mod: ZP | Performed by: SURGERY

## 2020-03-05 PROCEDURE — G0463 HOSPITAL OUTPT CLINIC VISIT: HCPCS

## 2020-03-05 NOTE — NURSING NOTE
"Roxy Beaulieu is a 59 year old female who presents for:  Chief Complaint   Patient presents with     RECHECK     1st PO to 2/11/2020 RIGHT PROXIMAL RADIAL TO CEPHALIC ARTERIOVENOUS FISTULA CREATION with Dr. Corbin        Vitals:    Vitals:    03/05/20 0938   BP: (!) 144/68   BP Location: Left arm   Patient Position: Chair   Cuff Size: Adult Regular   Pulse: 73       BMI:  Estimated body mass index is 29.86 kg/m  as calculated from the following:    Height as of 2/24/20: 5' 8\" (1.727 m).    Weight as of 2/24/20: 196 lb 6.4 oz (89.1 kg).    Pain Score:  Data Unavailable        Mallory Dejesus MA    "

## 2020-03-11 PROBLEM — D63.1 ANEMIA OF CHRONIC RENAL FAILURE, STAGE 5 (H): Status: ACTIVE | Noted: 2018-12-19

## 2020-03-11 PROBLEM — S82.832A FRACTURE OF FIBULA, DISTAL, LEFT, CLOSED: Status: RESOLVED | Noted: 2018-03-15 | Resolved: 2020-03-11

## 2020-03-11 PROBLEM — L08.9 LEFT FOOT INFECTION: Status: RESOLVED | Noted: 2017-04-29 | Resolved: 2020-03-11

## 2020-03-11 PROBLEM — S82.892A ANKLE FRACTURE, LEFT: Status: RESOLVED | Noted: 2018-03-16 | Resolved: 2020-03-11

## 2020-03-11 PROBLEM — N18.5 ANEMIA OF CHRONIC RENAL FAILURE, STAGE 5 (H): Status: ACTIVE | Noted: 2018-12-19

## 2020-03-11 PROBLEM — I96 GANGRENE OF TOE (H): Status: RESOLVED | Noted: 2017-04-29 | Resolved: 2020-03-11

## 2020-03-11 PROBLEM — I50.9 CONGESTIVE HEART FAILURE (CHF) (H): Status: RESOLVED | Noted: 2018-12-17 | Resolved: 2020-03-11

## 2020-03-11 PROBLEM — L97.529: Status: RESOLVED | Noted: 2018-11-02 | Resolved: 2020-03-11

## 2020-03-11 PROBLEM — L97.529 ULCER OF TOE OF LEFT FOOT (H): Status: RESOLVED | Noted: 2018-11-02 | Resolved: 2020-03-11

## 2020-03-11 PROBLEM — N18.4 CKD (CHRONIC KIDNEY DISEASE) STAGE 4, GFR 15-29 ML/MIN (H): Status: RESOLVED | Noted: 2018-12-19 | Resolved: 2020-03-11

## 2020-03-11 PROBLEM — R06.02 SHORTNESS OF BREATH: Status: RESOLVED | Noted: 2018-10-04 | Resolved: 2020-03-11

## 2020-03-11 PROBLEM — I82.90 VTE (VENOUS THROMBOEMBOLISM): Status: RESOLVED | Noted: 2018-12-19 | Resolved: 2020-03-11

## 2020-04-05 ENCOUNTER — TELEPHONE (OUTPATIENT)
Dept: GERIATRICS | Facility: CLINIC | Age: 60
End: 2020-04-05

## 2020-04-06 ENCOUNTER — HOSPITAL LABORATORY (OUTPATIENT)
Dept: OTHER | Facility: CLINIC | Age: 60
End: 2020-04-06

## 2020-04-06 ENCOUNTER — TELEPHONE (OUTPATIENT)
Dept: GERIATRICS | Facility: CLINIC | Age: 60
End: 2020-04-06

## 2020-04-06 DIAGNOSIS — Z99.2 ESRD (END STAGE RENAL DISEASE) ON DIALYSIS (H): ICD-10-CM

## 2020-04-06 DIAGNOSIS — N18.6 ESRD (END STAGE RENAL DISEASE) ON DIALYSIS (H): ICD-10-CM

## 2020-04-06 DIAGNOSIS — R50.9 FEVER, UNSPECIFIED FEVER CAUSE: Primary | ICD-10-CM

## 2020-04-06 LAB
C PNEUM DNA SPEC QL NAA+PROBE: NORMAL
FLUAV H1 2009 PAND RNA SPEC QL NAA+PROBE: NORMAL
FLUAV H1 RNA SPEC QL NAA+PROBE: NORMAL
FLUAV H3 RNA SPEC QL NAA+PROBE: NORMAL
FLUAV RNA SPEC QL NAA+PROBE: NORMAL
FLUBV RNA SPEC QL NAA+PROBE: NORMAL
HADV DNA SPEC QL NAA+PROBE: NORMAL
HCOV PNL SPEC NAA+PROBE: NORMAL
HMPV RNA SPEC QL NAA+PROBE: NORMAL
HPIV1 RNA SPEC QL NAA+PROBE: NORMAL
HPIV2 RNA SPEC QL NAA+PROBE: NORMAL
HPIV3 RNA SPEC QL NAA+PROBE: NORMAL
HPIV4 RNA SPEC QL NAA+PROBE: NORMAL
M PNEUMO DNA SPEC QL NAA+PROBE: NORMAL
MICROBIOLOGIST REVIEW: NORMAL
RSV RNA SPEC QL NAA+PROBE: NORMAL
RSV RNA SPEC QL NAA+PROBE: NORMAL
RV+EV RNA SPEC QL NAA+PROBE: NORMAL

## 2020-04-06 NOTE — TELEPHONE ENCOUNTER
[facility] TELEPHONE ENCOUNTER    Chief Complaint   Patient presents with     Fever     missed dialysis       Roxy Beaulieu is a 59 year old  (1960),Nurse called today to report: this morning her temperature is 98.1 and was sent to dialysis however she was refused due to her reported fever of 100.4, runny nose, fatigue.  Her O2 sat has been > 92% on room air, no cough.    ASSESSMENT/PLAN     Fever, unspecified fever cause  ESRD (end stage renal disease) on dialysis (H)      Due to her being high risk and several evenings having unexplained fevers we will order a Covid-19 test.      Electronically signed by:   RIK Barrientos CNP

## 2020-04-06 NOTE — TELEPHONE ENCOUNTER
"Called tonight re: \"patient feeling hot\" about 1900. Temp checked and was 99.9. She was in bed and the covers were removed with recheck 99.3. She feels tired and wanted to \"sleep it off\", but otherwise no focal symptoms - no cough, URI sx, congestion, abdominal pain, diarrhea, nausea. Recheck of VS about 90 min later with 165/72 HR 87, O2 sats 96% and temp 100F oral. Again, did not endorse any focal signs of infection.     Review of PCC with about 12 hours of recorded fevers on 3/31. No temp recorded on 4/1 and was afebrile on 4/2-4/5 until this evening.     Asked RN to check VS once overnight and again in the morning and to call back if temp is increasing or O2 sats decreasing.     Facility is using universal masking and verified this with the nurse. Educated that this is standard precaution across the system and that if the patient should develop any respiratory symptoms, then would put the patient on droplet precautions and test for COVID. In the interim, appropriate PPE is encompassed by the universal masking.     July Dumas MD    "

## 2020-04-07 LAB
SARS-COV-2 PCR COMMENT: NORMAL
SARS-COV-2 RNA SPEC QL NAA+PROBE: NORMAL
SARS-COV-2 RNA SPEC QL NAA+PROBE: NORMAL
SPECIMEN SOURCE: NORMAL
SPECIMEN SOURCE: NORMAL

## 2020-04-14 ENCOUNTER — VIRTUAL VISIT (OUTPATIENT)
Dept: GERIATRICS | Facility: CLINIC | Age: 60
End: 2020-04-14
Payer: COMMERCIAL

## 2020-04-14 VITALS
WEIGHT: 196.8 LBS | RESPIRATION RATE: 17 BRPM | SYSTOLIC BLOOD PRESSURE: 155 MMHG | OXYGEN SATURATION: 95 % | BODY MASS INDEX: 29.83 KG/M2 | HEIGHT: 68 IN | HEART RATE: 71 BPM | DIASTOLIC BLOOD PRESSURE: 76 MMHG | TEMPERATURE: 98.6 F

## 2020-04-14 DIAGNOSIS — Z99.2 TYPE 2 DIABETES MELLITUS WITH CHRONIC KIDNEY DISEASE ON CHRONIC DIALYSIS, WITH LONG-TERM CURRENT USE OF INSULIN (H): Primary | ICD-10-CM

## 2020-04-14 DIAGNOSIS — I69.352 HEMIPLEGIA AND HEMIPARESIS FOLLOWING CEREBRAL INFARCTION AFFECTING LEFT DOMINANT SIDE (H): ICD-10-CM

## 2020-04-14 DIAGNOSIS — Z79.4 TYPE 2 DIABETES MELLITUS WITH CHRONIC KIDNEY DISEASE ON CHRONIC DIALYSIS, WITH LONG-TERM CURRENT USE OF INSULIN (H): Primary | ICD-10-CM

## 2020-04-14 DIAGNOSIS — N18.5 ANEMIA OF CHRONIC RENAL FAILURE, STAGE 5 (H): ICD-10-CM

## 2020-04-14 DIAGNOSIS — I12.0 HYPERTENSIVE CHRONIC KIDNEY DISEASE WITH STAGE 5 CHRONIC KIDNEY DISEASE OR END STAGE RENAL DISEASE (H): ICD-10-CM

## 2020-04-14 DIAGNOSIS — D63.1 ANEMIA OF CHRONIC RENAL FAILURE, STAGE 5 (H): ICD-10-CM

## 2020-04-14 DIAGNOSIS — R41.89 COGNITIVE IMPAIRMENT: ICD-10-CM

## 2020-04-14 DIAGNOSIS — E11.22 TYPE 2 DIABETES MELLITUS WITH CHRONIC KIDNEY DISEASE ON CHRONIC DIALYSIS, WITH LONG-TERM CURRENT USE OF INSULIN (H): Primary | ICD-10-CM

## 2020-04-14 DIAGNOSIS — N18.6 TYPE 2 DIABETES MELLITUS WITH CHRONIC KIDNEY DISEASE ON CHRONIC DIALYSIS, WITH LONG-TERM CURRENT USE OF INSULIN (H): Primary | ICD-10-CM

## 2020-04-14 PROCEDURE — 99309 SBSQ NF CARE MODERATE MDM 30: CPT | Mod: 95 | Performed by: INTERNAL MEDICINE

## 2020-04-14 ASSESSMENT — MIFFLIN-ST. JEOR: SCORE: 1506.18

## 2020-04-14 NOTE — LETTER
"    4/14/2020        RE: Roxy Beaulieu  [address]  [place] [address]        [facility] Regulatory   Roxy Beaulieu is being evaluated via a billable video visit due to the restrictions of the Covid-19 pandemic.   The patient has been notified of following:  \"This video visit will be conducted via a call between you and your provider. We have found that certain health care needs can be provided without the need for an in-person physical exam.  This service lets us provide the care you need with a video conversation. If during the course of the call the provider feels a video visit is not appropriate, you will not be charged for this service.\"   The provider has received verbal consent for a Video Visit from the patient or first contact? Yes  Patient or facility staff would like the video invitation sent by: N/A   Video Start Time: 2:00  [facility] Medical Record Number:  [id number]  Place of Location at the time of visit: [facility]  Chief Complaint   Patient presents with     Video Visit     Nursing Home Regulatory     HPI:  Roxy Beaulieu  is a 59 year old (1960), who is being seen today for an E-REG visit.  HPI information obtained from: facility staff and [facility] Epic chart review. She is in her room up to  looking out at the snowy day.   Doesn't like the confinement with COVID precautions, is still getting out to dialysis.    Missed a session last week when she showed up with fever.   Had mild respiratory symptoms, but now resolved.   COVID19 and viral panel all negative.        Pt has ESRD secondary to DM2 and HTN, and has been on dialysis since June 2019.  Current IR tunneled catheter placed in October 2019.  She had a RUE proximal radial to cephalic AV fistula placed on 2/11/20   Saw Dr Corbin in March and has upcoming follow up appointment to see if she can start using that.     Pt [facility] to [facility] in October 2019 with new LLE " weakness and MRI showed an acute right periventricular white matter infarct, thought to be lacunar from HTN.   ECHO showed EF 66% without shunt, and no high grade stenosis seen on head/neck CTA.   She was discharged to Acute Rehab and participated with therapies.  Continued with cognitive and physical limitations and transferred to LTC, probably for permanent placement.       Diagnosis Date     Diabetes (H)      Gangrene of toe (H) 4/29/2017     High cholesterol      Hypertension      Obesity 4/29/2017     PE (pulmonary thromboembolism) (H)      Personal history of tobacco use, presenting hazards to health 4/29/2017     Uncontrolled type 2 diabetes mellitus with hyperglycemia, with long-term current use of insulin (H) 4/29/2017   Heparin induced thrombocytopenia, 2018  ESRD on dialysis, 2019  Lacunar infarct, 2019 with residual LLE weakness  Cognitive impairment    Past Surgical History:   Procedure Laterality Date     AMPUTATE TOE(S) Left 5/1/2017    Procedure: AMPUTATE TOE(S);  Amputate first and second left toes;  Surgeon: Verna Fofana DPM, Podiatry/Foot and Ankle Surgery;  Location: [facility] OR     AMPUTATE TOE(S) Left 11/3/2018    Procedure: Partial left 3rd toe amputation;  Surgeon: Verna Fofana DPM, Podiatry/Foot and Ankle Surgery;  Location: [facility] OR     IR CVC TUNNEL PLACEMENT > 5 YRS OF AGE  6/4/2019     OPEN REDUCTION INTERNAL FIXATION FIBULA Left 3/16/2018    Procedure: OPEN REDUCTION INTERNAL FIXATION FIBULA;  Open reduction and internal fixation of displaced left lateral malleolar fracture;  Surgeon: Sumanth Wen MD;  Location: [facility] OR     SH:  Previously lived alone, condo in [place].   Her sister Yuki is first contact.     Worked as a , and before as a .    Past smoker     MEDICATIONS:  Current Outpatient Medications   Medication Sig Dispense Refill     senna-docusate (SENOKOT-S/PERICOLACE) 8.6-50 MG tablet Take 1 tablet by mouth 2 times daily as needed for constipation        "acetaminophen (TYLENOL) 325 MG tablet Take 2 tablets (650 mg) by mouth every 6 hours as needed for mild pain or fever (> 101 F)       amLODIPine (NORVASC) 5 MG tablet Take 5 mg by mouth At Bedtime        aspirin (ASA) 325 MG EC tablet Take 325 mg by mouth At Bedtime       bisacodyl (DULCOLAX) 10 MG suppository Place 1 suppository (10 mg) rectally daily as needed for constipation       calcium acetate (PHOSLO) 667 MG CAPS capsule Take 667 mg by mouth 3 times daily (with meals) Also 1 tablet w/snacks       calcium carbonate (TUMS) 500 MG chewable tablet Take 2 chew tab by mouth 3 times daily as needed for heartburn       carvedilol (COREG) 25 MG tablet Take 1 tablet (25 mg) by mouth 2 times daily (with meals)       hydrALAZINE (APRESOLINE) 50 MG tablet Take 50 mg by mouth 2 times daily       HYDROcodone-acetaminophen (NORCO) 5-325 MG tablet Take 1-2 tablets by mouth every 6 hours as needed for moderate to severe pain 6 tablet 0     HYDROcodone-acetaminophen 5-325 MG PO tablet Take 1-2 tablets by mouth every 6 hours as needed for pain or moderate to severe pain 60 tablet 0     insulin isophane & regular (HUMULIN MIX 70/30 PEN , NOVOLIN MIX 70/30 PEN) susp Inject 18 Units Subcutaneous every morning (in addition to PM dose) only give on Su/Tu/Th/Sa - no dose on M/W/F       insulin isophane & regular (HUMULIN MIX 70/30 PEN , NOVOLIN MIX 70/30 PEN) susp Inject 13 Units Subcutaneous daily (with dinner)       ondansetron (ZOFRAN) 4 MG tablet Take 4 mg by mouth every 12 hours as needed for nausea       simvastatin (ZOCOR) 20 MG tablet Take 1 tablet (20 mg) by mouth At Bedtime         REVIEW OF SYSTEMS: 4 point ROS including Respiratory, CV, GI and , other than that noted in the HPI,  is negative  Ambulatory short distances with assistance.       Objective: BP (!) 155/76   Pulse 71   Temp 98.6  F (37  C)   Resp 17   Ht 1.727 m (5' 8\")   Wt 89.3 kg (196 lb 12.8 oz)   SpO2 95%   BMI 29.92 kg/m    Limited visit exam done " given COVID-19 precautions.   GENERAL APPEARANCE:  Alert, in no distress, oriented   Affect is good, tracks fairly well.       Labs:   Most Recent 3 CBC's:  Recent Labs   Lab Test 01/28/20  1040 10/31/19  0556 06/05/19  0715   WBC 7.5 9.1 5.9   HGB 10.8* 9.3* 7.5*   MCV 87 92 90    327 176     Most Recent 3 BMP's:  Recent Labs   Lab Test 02/11/20  1158 01/28/20  1040 11/07/19  1343  10/31/19  0556   NA  --  131* 139  --  136   POTASSIUM 4.2 4.7 5.2   < > Canceled, Test credited   CHLORIDE  --  99 106  --  100   CO2  --  29 22  --  24   BUN  --  35* 75*  --  58*   CR  --  4.80* 7.32*  --  6.10*   ANIONGAP  --  3 12  --  12   GILL  --  9.0 8.1*  --  7.9*   * 140* 181*   < > Canceled, Test credited    < > = values in this interval not displayed.       ECHO 5/27/19 Interpretation Summary  The visual ejection fraction is estimated at 45-50%.  Left ventricular systolic function is mildly reduced.  A basal inferior wall motion abnormality can not be excluded on this study even with contrast use.  Grade III or advanced diastolic dysfunction. There is moderate to severe concentric left ventricular hypertrophy.      IMP/PLAN:    (I63.81) Lacunar stroke (H)   (I69.352) Hemiplegia and hemiparesis following cerebral infarction affecting left dominant side (H)  (R41.89) Cognitive impairment  Comment: finished course of therapies  Plan: daily ASA, bp meds, simvastatin for secondary prevention   LTC support for meds, meals, activity    (I12.0) Hypertensive chronic kidney disease with stage 5 chronic kidney disease or end stage renal disease (H)  Comment:   BP Readings from Last 3 Encounters:   04/14/20 (!) 155/76   03/10/20 (!) 165/68   03/05/20 (!) 144/68      Pulse Readings from Last 4 Encounters:   04/14/20 71   03/10/20 86   03/05/20 73   02/24/20 73      Plan: amlodipine 5 mg/day, carvedilol 25 mg bid, hydralazine 50 mg bid    (E11.22,  N18.6,  Z99.2,  Z79.4) Type 2 diabetes mellitus with chronic kidney disease on  chronic dialysis, with long-term current use of insulin (H)  Comment: not compliant in home setting, improved in LTC  Lab Results   Component Value Date    A1C 6.5 02/13/2020    A1C 9.7 10/28/2019    A1C 10.5 05/28/2019    A1C 11.6 10/04/2018    A1C >16.0 03/16/2018      Plan: remains on 70 /30 insulin bid, follow BGM   On statin and ASA, not on ACEI secondary to renal failure.     Continue Phoslo.     (N18.6,  D63.1,  Z99.2) Anemia in chronic kidney disease, on chronic dialysis (H)  Comment: hgb improved  Plan: Aranesp as ordered; follow hgb    Electronically signed by:   Jennifer Longo MD     Video-Visit Details  Type of service:  Video Visit  Video End Time (time video stopped): 2:10  Distant Location (provider location):  [facility]             Sincerely,        Jennifer Longo MD

## 2020-04-14 NOTE — PROGRESS NOTES
"[facility]   Roxy Beaulieu is being evaluated via a billable video visit due to the restrictions of the Covid-19 pandemic.   The patient has been notified of following:  \"This video visit will be conducted via a call between you and your provider. We have found that certain health care needs can be provided without the need for an in-person physical exam.  This service lets us provide the care you need with a video conversation. If during the course of the call the provider feels a video visit is not appropriate, you will not be charged for this service.\"   The provider has received verbal consent for a Video Visit from the patient or first contact? Yes  Patient or facility staff would like the video invitation sent by: N/A   Video Start Time: 2:00  [place] Medical Record Number:  [id number]  Place of Location at the time of visit: [facility]  Chief Complaint   Patient presents with     Video Visit     Nursing Home Regulatory     HPI:  Roxy Beaulieu  is a 59 year old (1960), who is being seen today for an E-REG visit.  HPI information obtained from: facility staff and [place] Epic chart review. She is in her room up to  looking out at the snowy day.   Doesn't like the confinement with COVID precautions, is still getting out to dialysis.    Missed a session last week when she showed up with fever.   Had mild respiratory symptoms, but now resolved.   COVID19 and viral panel all negative.        Pt has ESRD secondary to DM2 and HTN, and has been on dialysis since June 2019.  Current IR tunneled catheter placed in October 2019.  She had a RUE proximal radial to cephalic AV fistula placed on 2/11/20   Saw Dr Corbin in March and has upcoming follow up appointment to see if she can start using that.     Pt presented to [facility] in October 2019 with new LLE weakness and MRI showed an acute right periventricular white matter infarct, thought to be " lacunar from HTN.   ECHO showed EF 66% without shunt, and no high grade stenosis seen on head/neck CTA.   She was discharged to Acute Rehab and participated with therapies.  Continued with cognitive and physical limitations and transferred to LTC, probably for permanent placement.       Diagnosis Date     Diabetes (H)      Gangrene of toe (H) 4/29/2017     High cholesterol      Hypertension      Obesity 4/29/2017     PE (pulmonary thromboembolism) (H)      Personal history of tobacco use, presenting hazards to health 4/29/2017     Uncontrolled type 2 diabetes mellitus with hyperglycemia, with long-term current use of insulin (H) 4/29/2017   Heparin induced thrombocytopenia, 2018  ESRD on dialysis, 2019  Lacunar infarct, 2019 with residual LLE weakness  Cognitive impairment    Past Surgical History:   Procedure Laterality Date     AMPUTATE TOE(S) Left 5/1/2017    Procedure: AMPUTATE TOE(S);  Amputate first and second left toes;  Surgeon: Verna Fofana DPM, Podiatry/Foot and Ankle Surgery;  Location: [facility] OR     AMPUTATE TOE(S) Left 11/3/2018    Procedure: Partial left 3rd toe amputation;  Surgeon: Verna Fofana DPM, Podiatry/Foot and Ankle Surgery;  Location: [facility] OR     IR CVC TUNNEL PLACEMENT > 5 YRS OF AGE  6/4/2019     OPEN REDUCTION INTERNAL FIXATION FIBULA Left 3/16/2018    Procedure: OPEN REDUCTION INTERNAL FIXATION FIBULA;  Open reduction and internal fixation of displaced left lateral malleolar fracture;  Surgeon: Sumanth Wen MD;  Location: [facility] OR     SH:  Previously lived alone, condo in [place].   Her sister Yuki is first contact.     Worked as a , and before as a .    Past smoker     MEDICATIONS:  Current Outpatient Medications   Medication Sig Dispense Refill     senna-docusate (SENOKOT-S/PERICOLACE) 8.6-50 MG tablet Take 1 tablet by mouth 2 times daily as needed for constipation       acetaminophen (TYLENOL) 325 MG tablet Take 2 tablets (650 mg) by mouth every 6 hours as  "needed for mild pain or fever (> 101 F)       amLODIPine (NORVASC) 5 MG tablet Take 5 mg by mouth At Bedtime        aspirin (ASA) 325 MG EC tablet Take 325 mg by mouth At Bedtime       bisacodyl (DULCOLAX) 10 MG suppository Place 1 suppository (10 mg) rectally daily as needed for constipation       calcium acetate (PHOSLO) 667 MG CAPS capsule Take 667 mg by mouth 3 times daily (with meals) Also 1 tablet w/snacks       calcium carbonate (TUMS) 500 MG chewable tablet Take 2 chew tab by mouth 3 times daily as needed for heartburn       carvedilol (COREG) 25 MG tablet Take 1 tablet (25 mg) by mouth 2 times daily (with meals)       hydrALAZINE (APRESOLINE) 50 MG tablet Take 50 mg by mouth 2 times daily       HYDROcodone-acetaminophen (NORCO) 5-325 MG tablet Take 1-2 tablets by mouth every 6 hours as needed for moderate to severe pain 6 tablet 0     HYDROcodone-acetaminophen 5-325 MG PO tablet Take 1-2 tablets by mouth every 6 hours as needed for pain or moderate to severe pain 60 tablet 0     insulin isophane & regular (HUMULIN MIX 70/30 PEN , NOVOLIN MIX 70/30 PEN) susp Inject 18 Units Subcutaneous every morning (in addition to PM dose) only give on Su/Tu/Th/Sa - no dose on M/W/F       insulin isophane & regular (HUMULIN MIX 70/30 PEN , NOVOLIN MIX 70/30 PEN) susp Inject 13 Units Subcutaneous daily (with dinner)       ondansetron (ZOFRAN) 4 MG tablet Take 4 mg by mouth every 12 hours as needed for nausea       simvastatin (ZOCOR) 20 MG tablet Take 1 tablet (20 mg) by mouth At Bedtime         REVIEW OF SYSTEMS: 4 point ROS including Respiratory, CV, GI and , other than that noted in the HPI,  is negative  Ambulatory short distances with assistance.       Objective: BP (!) 155/76   Pulse 71   Temp 98.6  F (37  C)   Resp 17   Ht 1.727 m (5' 8\")   Wt 89.3 kg (196 lb 12.8 oz)   SpO2 95%   BMI 29.92 kg/m    Limited visit exam done given COVID-19 precautions.   GENERAL APPEARANCE:  Alert, in no distress, oriented "   Affect is good, tracks fairly well.       Labs:   Most Recent 3 CBC's:  Recent Labs   Lab Test 01/28/20  1040 10/31/19  0556 06/05/19  0715   WBC 7.5 9.1 5.9   HGB 10.8* 9.3* 7.5*   MCV 87 92 90    327 176     Most Recent 3 BMP's:  Recent Labs   Lab Test 02/11/20  1158 01/28/20  1040 11/07/19  1343  10/31/19  0556   NA  --  131* 139  --  136   POTASSIUM 4.2 4.7 5.2   < > Canceled, Test credited   CHLORIDE  --  99 106  --  100   CO2  --  29 22  --  24   BUN  --  35* 75*  --  58*   CR  --  4.80* 7.32*  --  6.10*   ANIONGAP  --  3 12  --  12   GILL  --  9.0 8.1*  --  7.9*   * 140* 181*   < > Canceled, Test credited    < > = values in this interval not displayed.       ECHO 5/27/19 Interpretation Summary  The visual ejection fraction is estimated at 45-50%.  Left ventricular systolic function is mildly reduced.  A basal inferior wall motion abnormality can not be excluded on this study even with contrast use.  Grade III or advanced diastolic dysfunction. There is moderate to severe concentric left ventricular hypertrophy.      IMP/PLAN:    (I63.81) Lacunar stroke (H)   (I69.352) Hemiplegia and hemiparesis following cerebral infarction affecting left dominant side (H)  (R41.89) Cognitive impairment  Comment: finished course of therapies  Plan: daily ASA, bp meds, simvastatin for secondary prevention   LTC support for meds, meals, activity    (I12.0) Hypertensive chronic kidney disease with stage 5 chronic kidney disease or end stage renal disease (H)  Comment:   BP Readings from Last 3 Encounters:   04/14/20 (!) 155/76   03/10/20 (!) 165/68   03/05/20 (!) 144/68      Pulse Readings from Last 4 Encounters:   04/14/20 71   03/10/20 86   03/05/20 73   02/24/20 73      Plan: amlodipine 5 mg/day, carvedilol 25 mg bid, hydralazine 50 mg bid    (E11.22,  N18.6,  Z99.2,  Z79.4) Type 2 diabetes mellitus with chronic kidney disease on chronic dialysis, with long-term current use of insulin (H)  Comment: not  compliant in home setting, improved in LTC  Lab Results   Component Value Date    A1C 6.5 02/13/2020    A1C 9.7 10/28/2019    A1C 10.5 05/28/2019    A1C 11.6 10/04/2018    A1C >16.0 03/16/2018      Plan: remains on 70 /30 insulin bid, follow BGM   On statin and ASA, not on ACEI secondary to renal failure.     Continue Phoslo.     (N18.6,  D63.1,  Z99.2) Anemia in chronic kidney disease, on chronic dialysis (H)  Comment: hgb improved  Plan: Aranesp as ordered; follow hgb    Electronically signed by:   Jennifer Longo MD     Video-Visit Details  Type of service:  Video Visit  Video End Time (time video stopped): 2:10  Distant Location (provider location):  [facility]

## 2020-04-15 ENCOUNTER — TELEPHONE (OUTPATIENT)
Dept: GERIATRICS | Facility: CLINIC | Age: 60
End: 2020-04-15

## 2020-04-15 NOTE — TELEPHONE ENCOUNTER
[facility] TELEPHONE ENCOUNTER    Chief Complaint   Patient presents with     BGT reading High     has no ssi        Roxy Beaulieu is a 59 year old  (1960),Nurse called today to report: receives Novolin 70/30 18 units in the morning and 13 units in the evening,  When blood sure read high, nurse went ahead and gave the 13 units then called.  Wondering what to do next.    ASSESSMENT/PLAN  Hyperglycemia insulin dependent    Give an additional Novolin 70/30  5 Units now  Electronically signed by:   RIK Barrientos CNP

## 2020-04-18 ENCOUNTER — TELEPHONE (OUTPATIENT)
Dept: GERIATRICS | Facility: CLINIC | Age: 60
End: 2020-04-18

## 2020-04-18 ENCOUNTER — HOSPITAL LABORATORY (OUTPATIENT)
Dept: OTHER | Facility: CLINIC | Age: 60
End: 2020-04-18

## 2020-04-18 LAB
ALBUMIN UR-MCNC: >600 MG/DL
APPEARANCE UR: ABNORMAL
BILIRUB UR QL STRIP: NEGATIVE
COLOR UR AUTO: YELLOW
GLUCOSE UR STRIP-MCNC: >1000 MG/DL
HGB UR QL STRIP: ABNORMAL
KETONES UR STRIP-MCNC: NEGATIVE MG/DL
LEUKOCYTE ESTERASE UR QL STRIP: ABNORMAL
NITRATE UR QL: NEGATIVE
PH UR STRIP: 6.5 PH (ref 5–7)
RBC #/AREA URNS AUTO: 1 /HPF (ref 0–2)
SARS-COV-2 PCR COMMENT: NORMAL
SARS-COV-2 RNA SPEC QL NAA+PROBE: NEGATIVE
SARS-COV-2 RNA SPEC QL NAA+PROBE: NORMAL
SOURCE: ABNORMAL
SP GR UR STRIP: 1.02 (ref 1–1.03)
SPECIMEN SOURCE: NORMAL
SPECIMEN SOURCE: NORMAL
SQUAMOUS #/AREA URNS AUTO: 18 /HPF (ref 0–1)
UROBILINOGEN UR STRIP-MCNC: NORMAL MG/DL (ref 0–2)
WBC #/AREA URNS AUTO: 11 /HPF (ref 0–5)

## 2020-04-18 NOTE — TELEPHONE ENCOUNTER
Lin's UA came to this NP's mailbox and so reviewed     Called the NH back to state that Lin has a lot of glucose spilling into her urine.  Already today her insulin was changed to be every day versus only on the days that she is not in dialysis.      This NP had increased her sugar checks to 4x day   And gave the ok for COVID check per request of supervisor of facility.    Now will give a sliding scale insulin order despite her being on 70-/30 insulin.  Her elevated sugars may be making her ill?    Novolog Pen sliding scale with meals    150-200 = 2u  201-250 = 3 units  251-300 - 4 unit(s)  301 - 350 = 5u  >351 = 7 units  Still call if sugar is over 450    Just now her blood sugar before supper was 401    As far as the UA, would wait to see the  before ordering anything    Electronically signed by Gracie Segura RN, CNP    Will update primary NP

## 2020-04-18 NOTE — TELEPHONE ENCOUNTER
99.5  Was normal this morning.  Sugars are elevated.  No respiratory symptoms.   Tested for COVID already and negative.       Would like them to obtain a UA/UC, higher sugars, low grade fever, fatigue.      Electronically signed by Gracie Segura RN, CNP

## 2020-04-18 NOTE — TELEPHONE ENCOUNTER
Nursing called this AM due to still having sugars in the 400's this AM  On call was called during the last 12 hours and given short acting insuline besides changing her insulin from 4 days a week to daily.    Is on a 70/30 medicine.  Discussed with nursing about being conservative at this time.  Is a dialysis patient.  Does not seem to be ill with an infection as in thinking is that a cause of increase sugars.    Orders:  Blood sugars 4 x day to start  Staff to call if sugars over 450    Will update regular NP as may need sliding scale after collecting more sugars.    Electronically signed by Gracie Segura RN, CNP

## 2020-04-18 NOTE — TELEPHONE ENCOUNTER
ON-CALL  GERIATRICS CROSS-COVERAGE NOTE:  Call from RN that the patient's blood glucose levels have been running high in the past few days and the last BG level was 497 mg/dL this am.  Recent notes from NP on April 15 indicates that the patient should be on Novolin 70/30, 13 units subcu in the evenings and 18 units subcu in the morning.  According to the RN, the order for Novolin 70/30 is only for 4 days a week, Tuesdays Thursdays, Saturdays, and Sundays.      Plan:   Give 4 units of NovoLog subcu x1 now  Change the a.m. Novolin 70/30 18 units subcu to every day of the week  Continue to monitor blood glucose before the meals and at night  Further adjust insulin regimen as indicated    Mio Guerra MD

## 2020-04-18 NOTE — TELEPHONE ENCOUNTER
Third call on resident.  Now having emesis.  Supervisor asked nursing to ask for COVID 19 testing again.    Ok to test again.    Electronically signed by Gracie Segura RN, CNP

## 2020-04-19 LAB
BACTERIA SPEC CULT: NORMAL
Lab: NORMAL
SPECIMEN SOURCE: NORMAL

## 2020-04-21 ENCOUNTER — DOCUMENTATION ONLY (OUTPATIENT)
Dept: GERIATRICS | Facility: CLINIC | Age: 60
End: 2020-04-21

## 2020-04-21 NOTE — PROGRESS NOTES
RN reported UA/UC results. UC+ > 100K normal diego. Patient now afebrile and asymptomatic.       Plan:   - No new orders

## 2020-04-23 ENCOUNTER — OFFICE VISIT (OUTPATIENT)
Dept: OTHER | Facility: CLINIC | Age: 60
End: 2020-04-23
Attending: SURGERY
Payer: COMMERCIAL

## 2020-04-23 ENCOUNTER — HOSPITAL ENCOUNTER (OUTPATIENT)
Dept: ULTRASOUND IMAGING | Facility: CLINIC | Age: 60
End: 2020-04-23
Attending: SURGERY
Payer: COMMERCIAL

## 2020-04-23 VITALS — SYSTOLIC BLOOD PRESSURE: 111 MMHG | DIASTOLIC BLOOD PRESSURE: 63 MMHG | HEART RATE: 70 BPM

## 2020-04-23 DIAGNOSIS — Z99.2 ESRD (END STAGE RENAL DISEASE) ON DIALYSIS (H): ICD-10-CM

## 2020-04-23 DIAGNOSIS — N18.6 ESRD (END STAGE RENAL DISEASE) ON DIALYSIS (H): ICD-10-CM

## 2020-04-23 DIAGNOSIS — I77.0 AVF (ARTERIOVENOUS FISTULA) (H): Primary | ICD-10-CM

## 2020-04-23 DIAGNOSIS — I77.0 ARTERIOVENOUS FISTULA (H): ICD-10-CM

## 2020-04-23 PROCEDURE — G0463 HOSPITAL OUTPT CLINIC VISIT: HCPCS

## 2020-04-23 PROCEDURE — 99214 OFFICE O/P EST MOD 30 MIN: CPT | Mod: 24 | Performed by: SURGERY

## 2020-04-23 PROCEDURE — 93990 DOPPLER FLOW TESTING: CPT

## 2020-04-23 NOTE — PROGRESS NOTES
[place] [facility]    Roxy Beaulieu is on chronic hemodialysis via right jugular tunneled catheter.  We placed a right upper arm proximal radial to cephalic fistula on 2/11/2020 to establish permanent hemodialysis access.  She had a very adequate artery and vein at the time of surgery.  We saw her on 3/5/2020 where the fistula was functioning well with good clinical size and flow rates.  She returns for a follow-up fistula duplex today to see if this has matured enough to be used for hemodialysis.    At the time of surgery her vein was smaller but disease-free.  This except a 3 mm dilator with no difficulty.  Ever, preoperative duplex at the average vein diameter of 2.3 mm beyond the dilated antecubital vein.  Basilic vein was somewhat larger measuring approximately 2.5 mm.    She has had no discomfort in her arm.  Her tunneled catheter is been working well.  She has been using her tourniquet in her upper arm but only once daily to help mature the fistula.    PMH: Allergies: Heparin compounds            Medications: Coreg, Norvasc, Apresoline, Zocor, Humulin, aspirin,                                                    PhosLo              Does not use any heparin during dialysis due to allergy (we did not use                              at the time of her surgery either)    Exam: Alert and appropriate.               Arrived in a wheelchair (weaker following her stroke)              Blood pressure 111/63 left arm.  Pulse 70 regular.              Chest= clear              Cardiovascular= regular rate              Good pulse within the upper arm fistula.  Area of palpable narrowing                above the antecubital incision.  Side branches appreciated several                Centimeters above this.  With an upper arm tourniquet the more                                proximal segment of the cephalic vein appears to dilate.                Difficult to palpate the radial pulse which is chronic.                      Strong biphasic signal in the distal radial pulse even with aVF                     Open.  No evidence of ischemia to her right hand.                   Developed distal forearm basilic vein.      Duplex unfortunately reveals several areas of stenosis.  Anastomosis is widely patent.  Palpable area of narrowing measures 2.5 mm then dilating up to 6.2 mm.  4.4 mm side branch is located just beyond this where the fistula is narrowed to 2.6 mm.  Narrowing in the mid upper arm to 2.3 mm.  Outflow volume 492 mL/min      Impression: Functioning right upper arm proximal radial to cephalic fistula but of these 2 areas of stenosis and a large competing side branch.  Smaller in the upper arm but this may be related to the side branch and not using a tourniquet to measure the diameter.  I would recommend that we ligate the side branch and use this as a onlay patch repair of the second stenosis.  Would evaluate the more proximal stenosis beyond the anastomosis and consider a vein patch using her right distal forearm basilic vein.  Would pass a dilator distally to see if the other stenoses are clinically significant or related just to the low flow.                  We will plan for the surgical procedure on the nondialysis day or in the afternoon of her every [facility] dialysis runs.  Continue on her aspirin.  Avoid heparin products as we have in the past.                  Patient will continue using her fistula exercises but recommended that she do this 5-6 times a day for 10 to 15 minutes to help further mature the fistula and increase the diameter.    Spent 25 minutes with the patient today with over 50% in counseling.       Sumanth Corbin MD

## 2020-04-24 ENCOUNTER — HOSPITAL LABORATORY (OUTPATIENT)
Dept: OTHER | Facility: CLINIC | Age: 60
End: 2020-04-24

## 2020-04-24 ENCOUNTER — TELEPHONE (OUTPATIENT)
Dept: OTHER | Facility: CLINIC | Age: 60
End: 2020-04-24

## 2020-04-24 DIAGNOSIS — T82.9XXA COMPLICATION OF ARTERIOVENOUS DIALYSIS FISTULA: Primary | ICD-10-CM

## 2020-04-24 LAB
ALBUMIN UR-MCNC: 300 MG/DL
APPEARANCE UR: CLEAR
BACTERIA #/AREA URNS HPF: ABNORMAL /HPF
BILIRUB UR QL STRIP: NEGATIVE
COLOR UR AUTO: YELLOW
GLUCOSE UR STRIP-MCNC: 300 MG/DL
HGB UR QL STRIP: ABNORMAL
HYALINE CASTS #/AREA URNS LPF: 6 /LPF (ref 0–2)
KETONES UR STRIP-MCNC: NEGATIVE MG/DL
LEUKOCYTE ESTERASE UR QL STRIP: ABNORMAL
MUCOUS THREADS #/AREA URNS LPF: PRESENT /LPF
NITRATE UR QL: NEGATIVE
PH UR STRIP: 7.5 PH (ref 5–7)
RBC #/AREA URNS AUTO: 2 /HPF (ref 0–2)
SOURCE: ABNORMAL
SP GR UR STRIP: 1.01 (ref 1–1.03)
SQUAMOUS #/AREA URNS AUTO: 8 /HPF (ref 0–1)
UROBILINOGEN UR STRIP-MCNC: NORMAL MG/DL (ref 0–2)
WBC #/AREA URNS AUTO: 8 /HPF (ref 0–5)

## 2020-04-24 NOTE — TELEPHONE ENCOUNTER
LM on preferred cell number to notify patient of confirmed surgery time for 04/28/20 @ 9:00AM.  Patient was aware in clinic on 04/23/20 that would schedule for that date but just needed to confirm the time.     Type of surgery: VEIN PATCH RIGHT RADIAL-CEPHALIC FISTULA WITH LIGATION OF SIDE BRANCH  Location of surgery: [facility]  Date and time of surgery: 04/28/20 @ 9:00AM  Surgeon: DR. JEFFERSON  Pre-Op Appt Date: DONE BY DR. JANELLE NEGRETE   Post-Op Appt Date: PT TO SCHEDULE   Packet sent out: GIVEN TO PT IN CLINIC ON 04/23/20  Pre-cert/Authorization completed:  Yes  Date: 04/24/20

## 2020-04-25 LAB
BACTERIA SPEC CULT: NORMAL
Lab: NORMAL
SPECIMEN SOURCE: NORMAL

## 2020-04-27 ENCOUNTER — TELEPHONE (OUTPATIENT)
Dept: OTHER | Facility: CLINIC | Age: 60
End: 2020-04-27

## 2020-04-27 RX ORDER — FUROSEMIDE 80 MG
80 TABLET ORAL DAILY
Status: ON HOLD | COMMUNITY
End: 2023-01-01

## 2020-04-27 NOTE — TELEPHONE ENCOUNTER
[place] [facility]    I called Roxy Beaulieu about her upper arm arteriovenous fistula revision tomorrow.  This performed under local anesthetic with sedation as an outpatient.  She had no further questions after our visit in the office last week.         Sumanth Corbin MD

## 2020-04-27 NOTE — PROGRESS NOTES
Spoke with Keisha Nguyen, NNAMDI [facility]. She will contact Mat, charge nurse, at [facility] regarding NPO status and diabetic medications prior to procedure 4/28/2020.  
Spoke with Mat, charge nurse, at [facility] regarding procedure 4/28/2020 at 0900. He stated patient will be transported to [facility] at 0700.  Pre-op nurse should call [facility] ( [phone]) at 0900 to let [facility] know what time to schedule the return transportation.   Mat stated Dr. Corbin gave no pre-op orders.   Patient will need insurance card et photo ID to register. Patient should bring no valuables or jewelry to hospital.  
DISPLAY PLAN FREE TEXT

## 2020-04-28 ENCOUNTER — HOSPITAL ENCOUNTER (OUTPATIENT)
Facility: CLINIC | Age: 60
Discharge: HOME | End: 2020-04-28
Attending: SURGERY | Admitting: SURGERY
Payer: COMMERCIAL

## 2020-04-28 ENCOUNTER — ANESTHESIA EVENT (OUTPATIENT)
Dept: SURGERY | Facility: CLINIC | Age: 60
End: 2020-04-28
Payer: COMMERCIAL

## 2020-04-28 ENCOUNTER — SURGERY (OUTPATIENT)
Age: 60
End: 2020-04-28
Payer: COMMERCIAL

## 2020-04-28 ENCOUNTER — ANESTHESIA (OUTPATIENT)
Dept: SURGERY | Facility: CLINIC | Age: 60
End: 2020-04-28
Payer: COMMERCIAL

## 2020-04-28 ENCOUNTER — APPOINTMENT (OUTPATIENT)
Dept: SURGERY | Facility: PHYSICIAN GROUP | Age: 60
End: 2020-04-28
Payer: COMMERCIAL

## 2020-04-28 VITALS
WEIGHT: 200 LBS | RESPIRATION RATE: 16 BRPM | HEART RATE: 65 BPM | TEMPERATURE: 97.2 F | HEIGHT: 68 IN | SYSTOLIC BLOOD PRESSURE: 158 MMHG | DIASTOLIC BLOOD PRESSURE: 67 MMHG | BODY MASS INDEX: 30.31 KG/M2 | OXYGEN SATURATION: 95 %

## 2020-04-28 DIAGNOSIS — Z99.2 ESRD (END STAGE RENAL DISEASE) ON DIALYSIS (H): Primary | ICD-10-CM

## 2020-04-28 DIAGNOSIS — T82.9XXA COMPLICATION OF ARTERIOVENOUS DIALYSIS FISTULA: ICD-10-CM

## 2020-04-28 DIAGNOSIS — N18.6 ESRD (END STAGE RENAL DISEASE) ON DIALYSIS (H): Primary | ICD-10-CM

## 2020-04-28 LAB
ANION GAP SERPL CALCULATED.3IONS-SCNC: 4 MMOL/L (ref 3–14)
BUN SERPL-MCNC: 42 MG/DL (ref 7–30)
CALCIUM SERPL-MCNC: 9.1 MG/DL (ref 8.5–10.1)
CHLORIDE SERPL-SCNC: 98 MMOL/L (ref 94–109)
CO2 SERPL-SCNC: 28 MMOL/L (ref 20–32)
CREAT SERPL-MCNC: 5.15 MG/DL (ref 0.52–1.04)
GFR SERPL CREATININE-BSD FRML MDRD: 8 ML/MIN/{1.73_M2}
GLUCOSE BLDC GLUCOMTR-MCNC: 172 MG/DL (ref 70–99)
GLUCOSE BLDC GLUCOMTR-MCNC: 194 MG/DL (ref 70–99)
GLUCOSE SERPL-MCNC: 192 MG/DL (ref 70–99)
HGB BLD-MCNC: 10.6 G/DL (ref 11.7–15.7)
POTASSIUM SERPL-SCNC: 4.6 MMOL/L (ref 3.4–5.3)
SODIUM SERPL-SCNC: 130 MMOL/L (ref 133–144)

## 2020-04-28 PROCEDURE — 25000128 H RX IP 250 OP 636: Performed by: SURGERY

## 2020-04-28 PROCEDURE — 82962 GLUCOSE BLOOD TEST: CPT

## 2020-04-28 PROCEDURE — 36832 AV FISTULA REVISION OPEN: CPT | Mod: AS | Performed by: PHYSICIAN ASSISTANT

## 2020-04-28 PROCEDURE — 36000058 ZZH SURGERY LEVEL 3 EA 15 ADDTL MIN: Performed by: SURGERY

## 2020-04-28 PROCEDURE — 36000056 ZZH SURGERY LEVEL 3 1ST 30 MIN: Performed by: SURGERY

## 2020-04-28 PROCEDURE — 93010 ELECTROCARDIOGRAM REPORT: CPT | Performed by: INTERNAL MEDICINE

## 2020-04-28 PROCEDURE — 25000125 ZZHC RX 250: Performed by: NURSE ANESTHETIST, CERTIFIED REGISTERED

## 2020-04-28 PROCEDURE — 80048 BASIC METABOLIC PNL TOTAL CA: CPT | Performed by: SURGERY

## 2020-04-28 PROCEDURE — 25800030 ZZH RX IP 258 OP 636: Performed by: NURSE ANESTHETIST, CERTIFIED REGISTERED

## 2020-04-28 PROCEDURE — 27210794 ZZH OR GENERAL SUPPLY STERILE: Performed by: SURGERY

## 2020-04-28 PROCEDURE — 71000012 ZZH RECOVERY PHASE 1 LEVEL 1 FIRST HR: Performed by: SURGERY

## 2020-04-28 PROCEDURE — 36832 AV FISTULA REVISION OPEN: CPT | Mod: 79 | Performed by: SURGERY

## 2020-04-28 PROCEDURE — 37000008 ZZH ANESTHESIA TECHNICAL FEE, 1ST 30 MIN: Performed by: SURGERY

## 2020-04-28 PROCEDURE — 40000065 ZZH STATISTIC EKG NON-CHARGEABLE

## 2020-04-28 PROCEDURE — 37000009 ZZH ANESTHESIA TECHNICAL FEE, EACH ADDTL 15 MIN: Performed by: SURGERY

## 2020-04-28 PROCEDURE — 40000169 ZZH STATISTIC PRE-PROCEDURE ASSESSMENT I: Performed by: SURGERY

## 2020-04-28 PROCEDURE — 25000128 H RX IP 250 OP 636: Performed by: NURSE ANESTHETIST, CERTIFIED REGISTERED

## 2020-04-28 PROCEDURE — 93005 ELECTROCARDIOGRAM TRACING: CPT

## 2020-04-28 PROCEDURE — 36415 COLL VENOUS BLD VENIPUNCTURE: CPT | Performed by: SURGERY

## 2020-04-28 PROCEDURE — 25800030 ZZH RX IP 258 OP 636: Performed by: ANESTHESIOLOGY

## 2020-04-28 PROCEDURE — 25000125 ZZHC RX 250: Performed by: SURGERY

## 2020-04-28 PROCEDURE — 85018 HEMOGLOBIN: CPT | Performed by: SURGERY

## 2020-04-28 PROCEDURE — 71000027 ZZH RECOVERY PHASE 2 EACH 15 MINS: Performed by: SURGERY

## 2020-04-28 RX ORDER — LIDOCAINE HYDROCHLORIDE 20 MG/ML
INJECTION, SOLUTION INFILTRATION; PERINEURAL PRN
Status: DISCONTINUED | OUTPATIENT
Start: 2020-04-28 | End: 2020-04-28

## 2020-04-28 RX ORDER — PROPOFOL 10 MG/ML
INJECTION, EMULSION INTRAVENOUS CONTINUOUS PRN
Status: DISCONTINUED | OUTPATIENT
Start: 2020-04-28 | End: 2020-04-28

## 2020-04-28 RX ORDER — LABETALOL HYDROCHLORIDE 5 MG/ML
10 INJECTION, SOLUTION INTRAVENOUS
Status: CANCELLED | OUTPATIENT
Start: 2020-04-28

## 2020-04-28 RX ORDER — PROPOFOL 10 MG/ML
INJECTION, EMULSION INTRAVENOUS PRN
Status: DISCONTINUED | OUTPATIENT
Start: 2020-04-28 | End: 2020-04-28

## 2020-04-28 RX ORDER — SODIUM CHLORIDE, SODIUM LACTATE, POTASSIUM CHLORIDE, CALCIUM CHLORIDE 600; 310; 30; 20 MG/100ML; MG/100ML; MG/100ML; MG/100ML
INJECTION, SOLUTION INTRAVENOUS CONTINUOUS
Status: CANCELLED | OUTPATIENT
Start: 2020-04-28

## 2020-04-28 RX ORDER — CEFAZOLIN SODIUM 2 G/100ML
2 INJECTION, SOLUTION INTRAVENOUS
Status: COMPLETED | OUTPATIENT
Start: 2020-04-28 | End: 2020-04-28

## 2020-04-28 RX ORDER — NALOXONE HYDROCHLORIDE 0.4 MG/ML
.1-.4 INJECTION, SOLUTION INTRAMUSCULAR; INTRAVENOUS; SUBCUTANEOUS
Status: CANCELLED | OUTPATIENT
Start: 2020-04-28 | End: 2020-04-29

## 2020-04-28 RX ORDER — ONDANSETRON 2 MG/ML
INJECTION INTRAMUSCULAR; INTRAVENOUS PRN
Status: DISCONTINUED | OUTPATIENT
Start: 2020-04-28 | End: 2020-04-28

## 2020-04-28 RX ORDER — FENTANYL CITRATE 50 UG/ML
INJECTION, SOLUTION INTRAMUSCULAR; INTRAVENOUS PRN
Status: DISCONTINUED | OUTPATIENT
Start: 2020-04-28 | End: 2020-04-28

## 2020-04-28 RX ORDER — DEXAMETHASONE SODIUM PHOSPHATE 4 MG/ML
4 INJECTION, SOLUTION INTRA-ARTICULAR; INTRALESIONAL; INTRAMUSCULAR; INTRAVENOUS; SOFT TISSUE
Status: CANCELLED | OUTPATIENT
Start: 2020-04-28

## 2020-04-28 RX ORDER — ONDANSETRON 4 MG/1
4 TABLET, ORALLY DISINTEGRATING ORAL EVERY 30 MIN PRN
Status: CANCELLED | OUTPATIENT
Start: 2020-04-28

## 2020-04-28 RX ORDER — BUPIVACAINE HYDROCHLORIDE 5 MG/ML
INJECTION, SOLUTION PERINEURAL PRN
Status: DISCONTINUED | OUTPATIENT
Start: 2020-04-28 | End: 2020-04-28 | Stop reason: HOSPADM

## 2020-04-28 RX ORDER — SODIUM CHLORIDE 9 MG/ML
INJECTION, SOLUTION INTRAVENOUS CONTINUOUS
Status: DISCONTINUED | OUTPATIENT
Start: 2020-04-28 | End: 2020-04-28 | Stop reason: HOSPADM

## 2020-04-28 RX ORDER — FENTANYL CITRATE 50 UG/ML
25-50 INJECTION, SOLUTION INTRAMUSCULAR; INTRAVENOUS
Status: CANCELLED | OUTPATIENT
Start: 2020-04-28

## 2020-04-28 RX ORDER — HYDRALAZINE HYDROCHLORIDE 20 MG/ML
2.5-5 INJECTION INTRAMUSCULAR; INTRAVENOUS EVERY 10 MIN PRN
Status: CANCELLED | OUTPATIENT
Start: 2020-04-28

## 2020-04-28 RX ORDER — ONDANSETRON 2 MG/ML
4 INJECTION INTRAMUSCULAR; INTRAVENOUS EVERY 30 MIN PRN
Status: CANCELLED | OUTPATIENT
Start: 2020-04-28

## 2020-04-28 RX ORDER — HYDROMORPHONE HYDROCHLORIDE 1 MG/ML
.3-.5 INJECTION, SOLUTION INTRAMUSCULAR; INTRAVENOUS; SUBCUTANEOUS EVERY 5 MIN PRN
Status: CANCELLED | OUTPATIENT
Start: 2020-04-28

## 2020-04-28 RX ORDER — HYDROCODONE BITARTRATE AND ACETAMINOPHEN 5; 325 MG/1; MG/1
1-2 TABLET ORAL EVERY 4 HOURS PRN
Qty: 10 TABLET | Refills: 0 | Status: SHIPPED | OUTPATIENT
Start: 2020-04-28 | End: 2020-05-15

## 2020-04-28 RX ADMIN — ONDANSETRON 4 MG: 2 INJECTION INTRAMUSCULAR; INTRAVENOUS at 10:13

## 2020-04-28 RX ADMIN — MIDAZOLAM 1 MG: 1 INJECTION INTRAMUSCULAR; INTRAVENOUS at 09:07

## 2020-04-28 RX ADMIN — DEXMEDETOMIDINE HYDROCHLORIDE 8 MCG: 100 INJECTION, SOLUTION INTRAVENOUS at 09:50

## 2020-04-28 RX ADMIN — DEXMEDETOMIDINE HYDROCHLORIDE 8 MCG: 100 INJECTION, SOLUTION INTRAVENOUS at 09:16

## 2020-04-28 RX ADMIN — LIDOCAINE HYDROCHLORIDE 15 ML: 10 INJECTION, SOLUTION EPIDURAL; INFILTRATION; INTRACAUDAL; PERINEURAL at 10:31

## 2020-04-28 RX ADMIN — PROPOFOL 20 MG: 10 INJECTION, EMULSION INTRAVENOUS at 09:11

## 2020-04-28 RX ADMIN — PROPOFOL 20 MG: 10 INJECTION, EMULSION INTRAVENOUS at 09:22

## 2020-04-28 RX ADMIN — PROPOFOL 75 MCG/KG/MIN: 10 INJECTION, EMULSION INTRAVENOUS at 09:11

## 2020-04-28 RX ADMIN — SODIUM CHLORIDE: 9 INJECTION, SOLUTION INTRAVENOUS at 08:53

## 2020-04-28 RX ADMIN — LIDOCAINE HYDROCHLORIDE 80 MG: 20 INJECTION, SOLUTION INFILTRATION; PERINEURAL at 09:11

## 2020-04-28 RX ADMIN — FENTANYL CITRATE 50 MCG: 50 INJECTION, SOLUTION INTRAMUSCULAR; INTRAVENOUS at 09:11

## 2020-04-28 RX ADMIN — FENTANYL CITRATE 25 MCG: 50 INJECTION, SOLUTION INTRAMUSCULAR; INTRAVENOUS at 09:53

## 2020-04-28 RX ADMIN — CEFAZOLIN SODIUM 2 G: 2 INJECTION, SOLUTION INTRAVENOUS at 09:14

## 2020-04-28 RX ADMIN — BUPIVACAINE HYDROCHLORIDE 10 ML: 5 INJECTION, SOLUTION PERINEURAL at 10:44

## 2020-04-28 ASSESSMENT — MIFFLIN-ST. JEOR: SCORE: 1520.69

## 2020-04-28 NOTE — DISCHARGE INSTRUCTIONS
Same Day Surgery Discharge Instructions for  Sedation and General Anesthesia       It's not unusual to feel dizzy, light-headed or faint for up to 24 hours after surgery or while taking pain medication.  If you have these symptoms: sit for a few minutes before standing and have someone assist you when you get up to walk or use the bathroom.      You should rest and relax for the next 24 hours. We recommend you make arrangements to have an adult stay with you for at least 24 hours after your discharge.  Avoid hazardous and strenuous activity.      DO NOT DRIVE any vehicle or operate mechanical equipment for 24 hours following the end of your surgery.  Even though you may feel normal, your reactions may be affected by the medication you have received.      Do not drink alcoholic beverages for 24 hours following surgery.       Slowly progress to your regular diet as you feel able. It's not unusual to feel nauseated and/or vomit after receiving anesthesia.  If you develop these symptoms, drink clear liquids (apple juice, ginger ale, broth, 7-up, etc. ) until you feel better.  If your nausea and vomiting persists for 24 hours, please notify your surgeon.        All narcotic pain medications, along with inactivity and anesthesia, can cause constipation. Drinking plenty of liquids and increasing fiber intake will help.      For any questions of a medical nature, call your surgeon.      Do not make important decisions for 24 hours.      If you had general anesthesia, you may have a sore throat for a couple of days related to the breathing tube used during surgery.  You may use Cepacol lozenges to help with this discomfort.  If it worsens or if you develop a fever, contact your surgeon.       If you feel your pain is not well managed with the pain medications prescribed by your surgeon, please contact your surgeon's office to let them know so they can address your concerns.       CoVid 19 Information    We want to give you  information regarding Covid. Please consult your primary care provider with any questions you might have.     Patient who have symptoms (cough, fever, or shortness of breath), need to isolate for 7 days from when symptoms started OR 72 hours after fever resolves (without fever reducing medications) AND improvement of respiratory symptoms (whichever is longer).      Isolate yourself at home (in own room/own bathroom if possible)    Do Not allow any visitors    Do Not go to work or school    Do Not go to Sikhism,  centers, shopping, or other public places.    Do Not shake hands.    Avoid close and intimate contact with others (hugging, kissing).    Follow CDC recommendations for household cleaning of frequently touched services.     After the initial 7 days, continue to isolate yourself from household members as much as possible. To continue decrease the risk of community spread and exposure, you and any members of your household should limit activities in public for 14 days after starting home isolation.     You can reference the following CDC link for helpful home isolation/care tips:  https://www.cdc.gov/coronavirus/2019-ncov/downloads/10Things.pdf    Protect Others:    Cover Your Mouth and Nose with a mask, disposable tissue or wash cloth to avoid spreading germs to others.    Wash your hands and face frequently with soap and water    Call Your Primary Doctor If: Breathing difficulty develops or you become worse.    For more information about COVID19 and options for caring for yourself at home, please visit the CDC website at https://www.cdc.gov/coronavirus/2019-ncov/about/steps-when-sick.html  For more options for care at [facility], please visit our website at [email or web address]    **If you have questions or concerns about your procedure  call Dr. Matthew Corbin at [phone]**

## 2020-04-28 NOTE — ANESTHESIA PREPROCEDURE EVALUATION
Anesthesia Pre-Procedure Evaluation    Patient: Roxy Beaulieu   MRN: [id number] : 1960          Preoperative Diagnosis: Complication of arteriovenous dialysis fistula [T82.9XXA]    Procedure(s):  VEIN PATCH RIGHT RADIAL TO SEPHALIC FISTULA WITH LIGATION OF SIDE BRANCH    Past Medical History:   Diagnosis Date     Anemia      Cerebral artery occlusion with cerebral infarction (H)     affected left side with weakness in arm and leg     CHF (congestive heart failure) (H)      Congestive heart failure (CHF) (H) 2018     Diabetes (H)      ESRD on dialysis (H)     HD STARTED 2019     Gangrene of toe (H) 2017     Hemiplegia and hemiparesis following cerebral infarction affecting left dominant side (H)      High cholesterol      Hypertension      Hypertensive cardiomyopathy (H)      Obesity 2017     PE (pulmonary thromboembolism) (H)     RECURRENT     Personal history of tobacco use, presenting hazards to health 2017     Renal insufficiency      SOB (shortness of breath)      Toe ulcer, left, with unspecified severity (H) 2018     Ulcer of toe of left foot (H)     GANGRENE     Uncontrolled type 2 diabetes mellitus with hyperglycemia, with long-term current use of insulin (H) 2017     VTE (venous thromboembolism)      Past Surgical History:   Procedure Laterality Date     AMPUTATE TOE(S) Left 2017    Procedure: AMPUTATE TOE(S);  Amputate first and second left toes;  Surgeon: Verna Fofana DPM, Podiatry/Foot and Ankle Surgery;  Location: [facility] OR     AMPUTATE TOE(S) Left 11/3/2018    Procedure: Partial left 3rd toe amputation;  Surgeon: Verna Fofana DPM, Podiatry/Foot and Ankle Surgery;  Location: [facility] OR     CREATE FISTULA ARTERIOVENOUS UPPER EXTREMITY Right 2020    Procedure: RIGHT PROXIMAL RADIAL TO CEPHALIC ARTERIOVENOUS FISTULA CREATION;  Surgeon: Sumanth Corbin MD;  Location: [facility] OR     IR CVC TUNNEL PLACEMENT > 5 YRS OF AGE  2019     OPEN REDUCTION  INTERNAL FIXATION FIBULA Left 3/16/2018    Procedure: OPEN REDUCTION INTERNAL FIXATION FIBULA;  Open reduction and internal fixation of displaced left lateral malleolar fracture;  Surgeon: Sumanth Wen MD;  Location:  OR       Anesthesia Evaluation     . Pt has had prior anesthetic.     No history of anesthetic complications          ROS/MED HX    ENT/Pulmonary:      (-) sleep apnea   Neurologic:     (+)CVA with deficits- left side weakness,     Cardiovascular:     (+) Dyslipidemia, hypertension----. : . CHF etiology: diastolic and systolic Last EF: 45-50 date: 2019 . . :. . Previous cardiac testing Echodate:2019results:The visual ejection fraction is estimated at 45-50%. Left ventricular systolic function is mildly reduced. A basal inferior wall motion abnormality can not be excluded on this study even with contrast use. Grade III or advanced diastolic dysfunction. There is moderate to severe concentric left ventricular hypertrophy. The study was technically difficult.date: results: date: results: date: results:          METS/Exercise Tolerance:     Hematologic:     (+) History of blood clots Anemia, -      Musculoskeletal:         GI/Hepatic:        (-) GERD   Renal/Genitourinary:     (+) chronic renal disease (she was last dialyzed yesterday through catheter.  This will be her first fistula), type: ESRD, Pt requires dialysis, type: Hemodialysis,       Endo:     (+) type II DM .      Psychiatric:         Infectious Disease:         Malignancy:         Other: Comment: Nursing home                         Physical Exam  Normal systems: pulmonary    Airway   Mallampati: II  TM distance: >3 FB  Neck ROM: full    Dental   (+) upper dentures and lower dentures    Cardiovascular   Rhythm and rate: regular      Pulmonary             Lab Results   Component Value Date    WBC 7.5 01/28/2020    HGB 10.8 (L) 01/28/2020    HCT 33.6 (L) 01/28/2020     01/28/2020    CRP <2.9 12/18/2018    SED >140 (H) 11/02/2018  "    (L) 01/28/2020    POTASSIUM 4.2 02/11/2020    CHLORIDE 99 01/28/2020    CO2 29 01/28/2020    BUN 35 (H) 01/28/2020    CR 4.80 (H) 01/28/2020     (H) 02/11/2020    GILL 9.0 01/28/2020    PHOS 5.2 (H) 06/01/2019    MAG 2.0 12/17/2018    ALBUMIN 3.5 06/01/2019    PROTTOTAL 6.9 05/27/2019    ALT 56 (H) 05/27/2019    AST 38 05/27/2019    ALKPHOS 155 (H) 05/27/2019    BILITOTAL 0.3 05/27/2019    PTT 43 (H) 06/04/2019    INR 1.92 (H) 06/05/2019    TSH 1.42 12/17/2018       Preop Vitals  BP Readings from Last 3 Encounters:   04/23/20 111/63   04/14/20 (!) 155/76   03/10/20 (!) 165/68    Pulse Readings from Last 3 Encounters:   04/23/20 70   04/14/20 71   03/10/20 86      Resp Readings from Last 3 Encounters:   04/14/20 17   03/10/20 17   02/24/20 16    SpO2 Readings from Last 3 Encounters:   04/14/20 95%   03/10/20 99%   02/24/20 99%      Temp Readings from Last 1 Encounters:   04/14/20 37  C (98.6  F)    Ht Readings from Last 1 Encounters:   04/14/20 1.727 m (5' 8\")      Wt Readings from Last 1 Encounters:   04/14/20 89.3 kg (196 lb 12.8 oz)    Estimated body mass index is 29.92 kg/m  as calculated from the following:    Height as of 4/14/20: 1.727 m (5' 8\").    Weight as of 4/14/20: 89.3 kg (196 lb 12.8 oz).       Anesthesia Plan      History & Physical Review  History and physical reviewed and following examination; no interval change.    ASA Status:  3 .    NPO Status:  > 8 hours    Plan for MAC Reason for MAC:  Deep or markedly invasive procedure (G8)  PONV prophylaxis:  Ondansetron (or other 5HT-3)         Postoperative Care  Postoperative pain management:  Multi-modal analgesia.      Consents  Anesthetic plan, risks, benefits and alternatives discussed with:  Patient..                 Jem Rizvi MD  "

## 2020-04-28 NOTE — ANESTHESIA CARE TRANSFER NOTE
Patient: Roxy Beaulieu    Procedure(s):  RIGHT RADIAL TO CEPHALIC FISTULA WITH LIGATION OF SIDE BRANCH     Diagnosis: Complication of arteriovenous dialysis fistula [T82.9XXA]  Diagnosis Additional Information: No value filed.    Anesthesia Type:   MAC     Note:  Airway :Nasal Cannula  Patient transferred to:PACU  Handoff Report: Identifed the Patient, Identified the Reponsible Provider, Reviewed the pertinent medical history, Discussed the surgical course, Reviewed Intra-OP anesthesia mangement and issues during anesthesia, Set expectations for post-procedure period and Allowed opportunity for questions and acknowledgement of understanding      Vitals: (Last set prior to Anesthesia Care Transfer)    CRNA VITALS  4/28/2020 1030 - 4/28/2020 1108      4/28/2020             Pulse:  66    SpO2:  100 %    Resp Rate (set):  10                Electronically Signed By: RIK Larios CRNA  April 28, 2020  11:08 AM

## 2020-04-28 NOTE — BRIEF OP NOTE
[facility]    Brief Operative Note    Pre-operative diagnosis: Complication of arteriovenous dialysis fistula [T82.9XXA]  Post-operative diagnosis Same as pre-operative diagnosis    Procedure: Procedure(s):  RIGHT RADIAL TO CEPHALIC FISTULA WITH LIGATION OF SIDE BRANCH   Surgeon: Surgeon(s) and Role:     * Sumanth Corbin MD - Primary     * Sneha Pro PA-C - Assisting  Anesthesia: Monitor Anesthesia Care   Estimated blood loss: 10 ml  Drains: None  Specimens: * No specimens in log *  Findings:   Severe hyperplasia with copious scar tissue removed. Parasitic branch ligated and used to repair as on lay patch. Palpable radial pulse and weak thrill palpable at conclusion of the case  Complications: None.  Implants: * No implants in log *

## 2020-04-28 NOTE — ANESTHESIA POSTPROCEDURE EVALUATION
Patient: Roxy Beaulieu    Procedure(s):  ONLAY VEIN PATCH RIGHT RADIAL TO CEPHALIC FISTULA WITH LIGATION OF SIDE BRANCH     Diagnosis:Complication of arteriovenous dialysis fistula [T82.9XXA]  Diagnosis Additional Information: No value filed.    Anesthesia Type:  MAC    Note:  Anesthesia Post Evaluation    Patient location during evaluation: PACU  Patient participation: Able to fully participate in evaluation  Level of consciousness: awake and alert  Pain management: adequate  Airway patency: patent  Cardiovascular status: acceptable  Respiratory status: acceptable and unassisted  Hydration status: acceptable  PONV: none             Last vitals:  Vitals:    04/28/20 1120 04/28/20 1130 04/28/20 1139   BP: (!) 155/75 (!) 158/67    Pulse: 64 65    Resp: 16 16 16   Temp:      SpO2: 94% 95% 95%         Electronically Signed By: Jem Rizvi MD  April 28, 2020  12:06 PM

## 2020-04-28 NOTE — OP NOTE
Procedure Date: 04/28/2020      PREOPERATIVE DIAGNOSIS:  Poorly functional right upper arm proximal radial to cephalic arteriovenous fistula secondary to large competing side branch and 2 areas of minimal hyperplasia.      POSTOPERATIVE DIAGNOSIS:  Poorly functional right upper arm proximal radial to cephalic arteriovenous fistula secondary to large competing side branch and 2 areas of minimal hyperplasia.      PROCEDURES:   1.  Excision of intimal hyperplasia with onlay vein patch angioplasty.   a.  Ligation of large competing side branch.   2.  Excision of more proximal intimal hyperplasia with primary vein closure, D-5.      SURGEON:  Sumanth Corbin MD      ASSISTANT:  Sneha Pro PA-C      ANESTHESIA:  Local with intravenous supplementation.      PREOPERATIVE MEDICATIONS:  Ancef 2 grams IV.      INDICATIONS:  This is a 59-year-old patient on chronic hemodialysis with right side tunneled catheter.  She needs permanent hemodialysis access.  SHE IS ALLERGIC TO HEPARIN.  We created a right upper arm proximal radial to cephalic arteriovenous fistula on 02/11/2020.  She had very adequate artery and vein at that time.  Vein was disease free and accepted at the time of surgery a 4 mm dilator with no difficulty.  On followup, she has a large competing side branch just in the very distal upper arm of approximately 3 cm above our original incision in the antecubital area.  She has an area of stenosis of approximately 1.5 cm from the anastomosis and a second area 1 cm above the competing side branch.  We felt that ligation of the competing side branch and repair of the stenotic areas were necessary for proper maturation and use of the fistula.  Informed consent was obtained.      DESCRIPTION OF PROCEDURE:  The patient was brought to the operating room.  Right arm and axillary area were prepped and draped.  Timeout was called and the sites were identified.  NO HEPARIN WAS USED DUE TO HER ALLERGY.        VASCULAR  EXPOSURE:  1% lidocaine was injected in field block fashion.  We made an incision from the antecubital crease and extended this up the arm for a length of 6 cm.  We identified the area of stenosis 1.5 cm from the anastomosis to the radial artery where the tissue was quite firm, consistent with intimal hyperplasia.  Mild aneurysmal dilatation was noted just beyond this which on the lateral aspect of the side branch was identified.  This was dissected free distally for a length of 2 cm.  We then dissected cephalad, identifying a second area of intimal hyperplasia.  There was another small side branch, which was ligated at this level, and the vein beyond this towards the shoulder appeared to be completely disease free and was encircled with Silastic vessel loop.      We placed a vascular clamp adjacent to the anastomosis and a vessel loop on the outflow vein.  We ligated our side branch and this was gently dilated with 1% lidocaine, measuring at least 3 mm in diameter.  This was then spatulated down onto the fistula extending this cephalad beyond the stenotic area.  There was significant intimal hyperplasia noted.  This was excised under loupe magnification.  We irrigated the outflow tract with heparinized saline solution.  This accepted up to a 5 mm dilator with no difficulty and good backbleeding with no evidence of stenosis close to the shoulder that we were suspicious of on the duplex ultrasound.  Using a #15 blade scalpel, I did excise the significant intimal hyperplasia.  I passed a dilator down towards the anastomosis and a 3 mm dilator would not go through the stenotic area that we had previously identified.      ONLAY PATCH ANGIOPLASTY:  We then used our spatulated side branch and sewed this to the venotomy where the intimal hyperplasia been excised with a running 7-0 Prolene suture.  We did have some clotting and normal saline was used to flush this out.  With release of the vessel loops, we did need to place  a 7-0 mattress suture on our onlay patch where it was quite thin and had some leaking.  Following this, we had an excellent palpable pulse and good flow through the patch angioplasty.      EXCISION OF INTIMAL HYPERPLASIA WITH PRIMARY VEIN CLOSURE:  After letting the blood flow for several minutes since we were not using heparin and to prevent clotting, we then reapplied the vascular clamp at the anastomosis and a vessel loop distally.  I made a transverse venotomy at this site right before the aneurysmal dilatation.  The vein proximal to this measured at least 6 mm and the aneurysmal area 8 mm.  We found an area of significant intimal hyperplasia that through our transverse venotomy, I was able to excise under loupe magnification, creating a nice wide luminal lumen.  I was able to pass a 5 mm dilator up the vein with no difficulty and the inflow accepted a 5 mm dilator also.  We closed this transverse venotomy with running 6-0 Prolene suture.      Now with release of the vascular clamps, we had a much stronger pulse all the way up to the shoulder.  Good hemostasis was appreciated.  Our onlay patch looked excellent with good flow and no bleeding.      Wounds were infiltrated with 0.5% Marcaine for post-analgesia.  Subcutaneous tissue was closed with interrupted 3-0 Vicryl.  Skin was closed with 5-0 Monocryl in subcuticular fashion, followed by gauze and Alicia wrap.      The patient tolerated the procedure well.      ESTIMATED BLOOD LOSS:  10 mL      COMPLICATIONS:  None.      The patient will continue on her adult aspirin.  Phone followup in 1 week with a duplex of the fistula in 3 weeks.      This operative procedure was more difficult due to the significant intimal hyperplasia in both areas that required excision.     Sneha Pro vascular PA-C assistance was required to aid in exposure and closure of this more difficult procedure due to the extensive intimal hyperplasia.        DOREEN JEFFERSON MD              D: 2020   T: 2020   MT: JOSÉ MIGUEL      Name:     ONDINA DENIS   MRN:      [id number]        Account:        [id number]   :      1960           Procedure Date: 2020      Document: [id number]       cc: Sumanth Corbin MD

## 2020-04-28 NOTE — OR NURSING
Pt report has been given and pt cares now handed off to ARIK Proctor RN.  Pt A&Ox4, denies any surgical site pain but reports persisting rt eye discomfort which was POA, Pt denies blurred vision and has reported that she has had this eye discomfort in the past.

## 2020-04-29 LAB — INTERPRETATION ECG - MUSE: NORMAL

## 2020-04-30 ENCOUNTER — TELEPHONE (OUTPATIENT)
Dept: OTHER | Facility: CLINIC | Age: 60
End: 2020-04-30

## 2020-04-30 NOTE — TELEPHONE ENCOUNTER
Routing to scheduling to contact pt and coordinate a phone post op call with Dr. Corbin in 2 weeks.      Leighann Pitts, CRISTINAN, RN-[facility]

## 2020-04-30 NOTE — TELEPHONE ENCOUNTER
April 30, 2020    HUC from [facility] called [facility] per the instruction of Lin's discharge paperwork.     Instructions say for Lin to follow-up in 2-3wks PO to her ONLAY VEIN PATCH RIGHT RADIAL TO CEPHALIC FISTULA WITH LIGATION OF SIDE BRANCH on 4/28/2020.     Routing to RN Triage for review and determine plan for follow-up appointment.     Brooklyn Boone    [facility]  Office: [phone]  Fax [phone]

## 2020-04-30 NOTE — TELEPHONE ENCOUNTER
April 30, 2020    Patient is scheduled for PO/RTN TELE E-Visit Appointment on 5/14/2020 with Dr. Corbin.     Brooklyn Bonoe    [facility]  Office: [phone]  Fax [phone]

## 2020-05-13 NOTE — PROGRESS NOTES
[place] [facility]    Roxy Beaulieu is on chronic hemodialysis via tunneled catheter.  She had undergone a previous placement of a right upper arm proximal radial to cephalic arteriovenous fistula on 2/11/2020.  Noted poor function on follow-up duplex.      On 4/28/2020 taken the operating.  Large competing side branch was ligated with excision of intimal hyperplasia and onlay vein patch angioplasty.  A more proximal area of intimal hyperplasia was excised and closed transversely primarily.      Patient has a telephone follow-up scheduled today.  She reports that her arm is doing well.  Incision is healing well and she has no discomfort.  She has been using her fistula tourniquet at the axillary area of several times daily and notices that the antecubital portion of the vein bulges out very nicely.  No ischemic changes at all to her hand or swelling issues.      It appears that she is doing well.  I have encouraged her to to do the tourniquet to mature the fistula 5-6 times daily for 5 to 10 minutes.  We will plan to see her in the office for a follow-up duplex of the fistula in 2 to 4 weeks and decide at that time whether the dialysis unit can start using the fistula further dialysis.    We spoke on the phone for 5 minutes today and she had no further questions.      Sumanth Corbin MD

## 2020-05-14 ENCOUNTER — VIRTUAL VISIT (OUTPATIENT)
Dept: OTHER | Facility: CLINIC | Age: 60
End: 2020-05-14
Attending: SURGERY
Payer: COMMERCIAL

## 2020-05-14 ENCOUNTER — TELEPHONE (OUTPATIENT)
Dept: GERIATRICS | Facility: CLINIC | Age: 60
End: 2020-05-14

## 2020-05-14 VITALS — SYSTOLIC BLOOD PRESSURE: 166 MMHG | DIASTOLIC BLOOD PRESSURE: 76 MMHG | WEIGHT: 188 LBS | BODY MASS INDEX: 28.59 KG/M2

## 2020-05-14 DIAGNOSIS — N18.6 ESRD (END STAGE RENAL DISEASE) ON DIALYSIS (H): ICD-10-CM

## 2020-05-14 DIAGNOSIS — I77.0 AVF (ARTERIOVENOUS FISTULA) (H): Primary | ICD-10-CM

## 2020-05-14 DIAGNOSIS — Z99.2 ESRD (END STAGE RENAL DISEASE) ON DIALYSIS (H): ICD-10-CM

## 2020-05-14 NOTE — PATIENT INSTRUCTIONS
[facility]    Plan:   Ultrasound of arteriovenous fistula and visit with Dr. Corbin in approximately 2-4 weeks.  Please call the number below to schedule.     For questions or concerns regarding this visit, please call [phone].

## 2020-05-14 NOTE — PROGRESS NOTES
"Roxy Beaulieu is a 59 year old female who is being evaluated via a billable telephone visit.      The patient has been notified of following:     \"This telephone visit will be conducted via a call between you and your physician/provider. We have found that certain health care needs can be provided without the need for a physical exam.  This service lets us provide the care you need with a short phone conversation.  If a prescription is necessary we can send it directly to your pharmacy.  If lab work is needed we can place an order for that and you can then stop by our lab to have the test done at a later time.    Telephone visits are billed at different rates depending on your insurance coverage. During this emergency period, for some insurers they may be billed the same as an in-person visit.  Please reach out to your insurance provider with any questions.    If during the course of the call the physician/provider feels a telephone visit is not appropriate, you will not be charged for this service.\"    Patient has given verbal consent for Telephone visit? yes     What phone number would you like to be contacted at? mobile phone number [phone]    How would you like to obtain your AVS? mailed  "

## 2020-05-15 ENCOUNTER — TELEPHONE (OUTPATIENT)
Dept: GERIATRICS | Facility: CLINIC | Age: 60
End: 2020-05-15

## 2020-05-15 ENCOUNTER — HOSPITAL LABORATORY (OUTPATIENT)
Dept: OTHER | Facility: CLINIC | Age: 60
End: 2020-05-15

## 2020-05-15 DIAGNOSIS — N18.6 ESRD (END STAGE RENAL DISEASE) ON DIALYSIS (H): ICD-10-CM

## 2020-05-15 DIAGNOSIS — Z99.2 ESRD (END STAGE RENAL DISEASE) ON DIALYSIS (H): ICD-10-CM

## 2020-05-15 RX ORDER — HYDROCODONE BITARTRATE AND ACETAMINOPHEN 5; 325 MG/1; MG/1
1-2 TABLET ORAL EVERY 4 HOURS PRN
Qty: 120 TABLET | Refills: 0 | Status: SHIPPED | OUTPATIENT
Start: 2020-05-15 | End: 2020-06-12 | Stop reason: ALTCHOICE

## 2020-05-15 NOTE — TELEPHONE ENCOUNTER
"New onset fever 100.2 around 2030 this evening, recheck 102.2. Staff nurse reports patient \"feels fine\". Denies any pain, urinary symptoms or respiratory symptoms. No cough. Good appetite this evening. Patient did go to bed a bit earlier than usual. Staff reports no COVID-19 cases on the unit.     Orders:  --ok to give prn Tylenol for fever  --push fluids  --monitor overnight for resolution, if persists- update PCP    TOMY Gruber  5/14/20     "

## 2020-05-15 NOTE — TELEPHONE ENCOUNTER
Staff called today noting ongoing fever in patient and requesting a refill of Norco.    Orders:  Swab for COVID19 - along with roommate  Refill sent for Norco    Dr. Pascale Mcelroy, APRN, DNP, A/GNP-[facility]  [address] [place] [address] [address]  [place] [address]     Cell: [phone]  Fax: [phone]  Email: [email or web address]

## 2020-05-16 LAB
SARS-COV-2 RNA SPEC QL NAA+PROBE: NOT DETECTED
SPECIMEN SOURCE: NORMAL

## 2020-05-18 ENCOUNTER — HOSPITAL LABORATORY (OUTPATIENT)
Dept: OTHER | Facility: CLINIC | Age: 60
End: 2020-05-18

## 2020-05-20 LAB
SARS-COV-2 RNA SPEC QL NAA+PROBE: NOT DETECTED
SPECIMEN SOURCE: NORMAL

## 2020-06-04 ENCOUNTER — HOSPITAL LABORATORY (OUTPATIENT)
Dept: OTHER | Facility: CLINIC | Age: 60
End: 2020-06-04

## 2020-06-04 ENCOUNTER — TELEPHONE (OUTPATIENT)
Dept: GERIATRICS | Facility: CLINIC | Age: 60
End: 2020-06-04

## 2020-06-05 ENCOUNTER — TELEPHONE (OUTPATIENT)
Dept: GERIATRICS | Facility: CLINIC | Age: 60
End: 2020-06-05

## 2020-06-05 DIAGNOSIS — E11.9 INSULIN DEPENDENT TYPE 2 DIABETES MELLITUS (H): Primary | ICD-10-CM

## 2020-06-05 DIAGNOSIS — Z79.4 INSULIN DEPENDENT TYPE 2 DIABETES MELLITUS (H): Primary | ICD-10-CM

## 2020-06-05 NOTE — TELEPHONE ENCOUNTER
[facility] TELEPHONE ENCOUNTER    Chief Complaint   Patient presents with     Hypoglycemia       Roxy Beaulieu is a 60 year old  (1960),Nurse called today to report: patient feels like she is getting too much insulin in the evening and needs to get up and eat during the night.  Will make the following changes as listed below.    ASSESSMENT/PLAN  1. Insulin dependent type 2 diabetes mellitus (H)    - insulin NPH-Regular 70/30 susp/pen; Inject 9 Units Subcutaneous daily (with dinner) Note takes 18 Units with breakfast      -Sliding scale insulin aspart (NOVOLOG PEN) 100 UNIT/ML pen; TID w/meals: -249=2U; 250-299=4u; 300-349=6u;350-399=8U; 400 & above 10units         You may check Blood sugar during the night if she is feeling like she is hypoglycemic      Electronically signed by:   RIK Barrientos CNP    
Abdomen soft, non-tender, no guarding.

## 2020-06-05 NOTE — TELEPHONE ENCOUNTER
"Called by nursing staff at [facility] about patient who has refused her 70/30 tonight.     Pt reports she has \"crashed\" with low blood sugars the past 2 nights and will not take her insulin tonight.    Nurse reports BGM with lowest reported sugar 120       Current      Okay to hold 70/30 tonight per patient.   Will need to follow up on dosing with pt visit.   Jennifer Longo MD      "

## 2020-06-06 LAB
COVID-19 VIRUS PCR TO MAYO - RESULT: NORMAL
SPECIMEN SOURCE: NORMAL

## 2020-06-09 ENCOUNTER — HOSPITAL ENCOUNTER (OUTPATIENT)
Dept: ULTRASOUND IMAGING | Facility: CLINIC | Age: 60
Discharge: HOME | End: 2020-06-09
Attending: SURGERY | Admitting: SURGERY
Payer: COMMERCIAL

## 2020-06-09 DIAGNOSIS — Z99.2 ESRD (END STAGE RENAL DISEASE) ON DIALYSIS (H): ICD-10-CM

## 2020-06-09 DIAGNOSIS — N18.6 ESRD (END STAGE RENAL DISEASE) ON DIALYSIS (H): ICD-10-CM

## 2020-06-09 DIAGNOSIS — I77.0 AVF (ARTERIOVENOUS FISTULA) (H): ICD-10-CM

## 2020-06-09 PROCEDURE — 93990 DOPPLER FLOW TESTING: CPT

## 2020-06-09 NOTE — PROGRESS NOTES
[place] [facility]    Roxy Beaulieu is on chronic hemodialysis via tunneled catheter.  She had a previous right upper arm proximal radiocephalic fistula placed on 2/11/2020.    Poor function was noted on follow-up duplex.  On 4/20/2020 a large competing side branch was ligated with excision of intimal hyperplasia and onlay vein patch angioplasty.  Another area more proximally with intimal hyperplasia was excised and closed transversely primarily.    Due to the COVID-19 ongoing pandemic the fact she lives in a care facility we did a telephone conference today since her video link was not compatible with their system.    She reports she is doing very well.  She can easily visualize the fistula in her upper arm that is the size of a pen.  She was at her physician's office this past week and they had a good pulse and thrill within the entire fistula from the elbow to the shoulder.    She is doing her tourniquet exercises.  She actually is doing it for an hour several times daily.  I told her it is more appropriate to to do it for approximately 10 minutes 5 times daily to help mature the fistula.    Patient is in the [facility] facility.  They are liberalizing them leaving the clinic facility for testing.  She is very close to the [facility] and thus will have her duplex performed there.  I will call her with the results and then we should be able to have the dialysis unit start using the fistula.  Once they have transitioned she will see me in the office here at [facility] or radiology at [facility] to remove her tunneled catheter.    We spent 11 minutes on the phone today discussing the situation.       Sumanth Corbin MD

## 2020-06-10 ENCOUNTER — VIRTUAL VISIT (OUTPATIENT)
Dept: GERIATRICS | Facility: CLINIC | Age: 60
End: 2020-06-10
Payer: COMMERCIAL

## 2020-06-10 VITALS
BODY MASS INDEX: 31.64 KG/M2 | OXYGEN SATURATION: 94 % | RESPIRATION RATE: 17 BRPM | HEIGHT: 68 IN | HEART RATE: 82 BPM | DIASTOLIC BLOOD PRESSURE: 75 MMHG | SYSTOLIC BLOOD PRESSURE: 159 MMHG | TEMPERATURE: 98.2 F | WEIGHT: 208.8 LBS

## 2020-06-10 DIAGNOSIS — E11.9 INSULIN DEPENDENT TYPE 2 DIABETES MELLITUS (H): ICD-10-CM

## 2020-06-10 DIAGNOSIS — I10 BENIGN ESSENTIAL HYPERTENSION: ICD-10-CM

## 2020-06-10 DIAGNOSIS — N18.6 ESRD (END STAGE RENAL DISEASE) ON DIALYSIS (H): ICD-10-CM

## 2020-06-10 DIAGNOSIS — D64.9 ANEMIA, UNSPECIFIED TYPE: ICD-10-CM

## 2020-06-10 DIAGNOSIS — Z99.2 ESRD (END STAGE RENAL DISEASE) ON DIALYSIS (H): ICD-10-CM

## 2020-06-10 DIAGNOSIS — Z79.4 INSULIN DEPENDENT TYPE 2 DIABETES MELLITUS (H): ICD-10-CM

## 2020-06-10 DIAGNOSIS — Z86.73 HISTORY OF CVA (CEREBROVASCULAR ACCIDENT): Primary | ICD-10-CM

## 2020-06-10 PROCEDURE — 99309 SBSQ NF CARE MODERATE MDM 30: CPT | Mod: 95 | Performed by: NURSE PRACTITIONER

## 2020-06-10 ASSESSMENT — MIFFLIN-ST. JEOR: SCORE: 1555.61

## 2020-06-10 NOTE — LETTER
"    6/10/2020        RE: Roxy Beaulieu  [address] [facility] [place] [address]        [facility] [facility] Reg[facility]   Roxy Beaulieu is being evaluated via a billable video visit due to the restrictions of the Covid-19 pandemic.   The patient has been notified of following: \"This video visit will be conducted via a call between you and your provider. We have found that certain health care needs can be provided without the need for an in-person physical exam.  This service lets us provide the care you need with a video conversation. If during the course of the call the provider feels a video visit is not appropriate, you will not be charged for this service.\"   The provider has received verbal consent for a Video Visit from the patient or first contact? Yes  Patient or facility staff would like the video invitation sent by: N/A   Video Start Time: 109    [facility] Medical Record Number:  [id number]  Place of Location at the time of visit: [facility]  Chief Complaint   Patient presents with     [facility] Regulatory     HPI:  Roxy Beaulieu  is a 60 year old (1960), who is being seen today for a Regulatory visit.  HPI information obtained from: facility chart records, facility staff, patient report and [facility] Epic chart review. Today's concern is:    History of Lacunar Stroke with Left-Sided Hemiparesis. No issues noted during today's visit. BIMS Score 15/15 on 5/12/20.    End-Stage Renal Disease on Dialysis. States she has to have an extra dialysis run this week on Saturday as she is fluid up. States she doesn't feel fluid up. Has some swelling in her legs, but thinks it's more due to her legs hanging too much. Has an appointment tomorrow with Vascular to discuss her fistula.    Hypertension. Upon review of blood pressure over the past 5 days, systolic range from 138-192. Diastolic range from 53-83.    Type 2 Diabetes Mellitus. Upon review of blood sugars over the past " 5 days, range is as follows:    Breakfast: 110-162  Lunch: 134-180  Dinner: 170-281   Bedtime: 114-220, most < 200    Anemia due to Renal Disease. Limited labs available to review as these are obtained at dialysis.    Pain. No reports of pain during today's visit. Upon review of documentation, has not utilized Hydrocodone-Acetaminophen in the month of June    Past Medical and Surgical History reviewed in Epic today.  MEDICATIONS:  Current Outpatient Medications   Medication Sig Dispense Refill     acetaminophen (TYLENOL) 325 MG tablet Take 2 tablets (650 mg) by mouth every 6 hours as needed for mild pain or fever (> 101 F)       amLODIPine (NORVASC) 5 MG tablet Take 5 mg by mouth At Bedtime        aspirin (ASA) 325 MG EC tablet Take 325 mg by mouth At Bedtime       bisacodyl (DULCOLAX) 10 MG suppository Place 1 suppository (10 mg) rectally daily as needed for constipation       calcium acetate (PHOSLO) 667 MG CAPS capsule Take 667 mg by mouth 4 times daily With Snacks and Meals       calcium carbonate (TUMS) 500 MG chewable tablet Take 2 chew tab by mouth 3 times daily as needed for heartburn       carvedilol (COREG) 25 MG tablet Take 1 tablet (25 mg) by mouth 2 times daily (with meals)       furosemide (LASIX) 80 MG tablet Take 80 mg by mouth four times a week On Tuesday, Thursday, Saturday, and Sunday       hydrALAZINE (APRESOLINE) 50 MG tablet Take 50 mg by mouth 2 times daily       HYDROcodone-acetaminophen (NORCO) 5-325 MG tablet Take 1-2 tablets by mouth every 4 hours as needed for moderate to severe pain 120 tablet 0     HYDROcodone-acetaminophen 5-325 MG PO tablet Take 1-2 tablets by mouth every 6 hours as needed for pain or moderate to severe pain 60 tablet 0     insulin aspart (NOVOLOG PEN) 100 UNIT/ML pen TID w/meals: -249=2U; 250-299=4u; 300-349=6u;350-399=8U; 400 & above 10units 9 mL 4     insulin isophane & regular (HUMULIN MIX 70/30 PEN , NOVOLIN MIX 70/30 PEN) susp Inject 18 Units Subcutaneous  "every morning (in addition to PM dose)       insulin NPH-Regular 70/30 susp Inject 9 Units Subcutaneous daily (with dinner) Note takes 18 Units with breakfast 6 mL 11     ondansetron (ZOFRAN) 4 MG tablet Take 4 mg by mouth every 12 hours as needed for nausea       senna-docusate (SENOKOT-S/PERICOLACE) 8.6-50 MG tablet Take 1 tablet by mouth 2 times daily as needed for constipation       simvastatin (ZOCOR) 20 MG tablet Take 1 tablet (20 mg) by mouth At Bedtime       REVIEW OF SYSTEMS: 4 point ROS including Respiratory, CV, GI and , other than that noted in the HPI,  is negative  Objective: BP (!) 159/75   Pulse 82   Temp 98.2  F (36.8  C)   Resp 17   Ht 1.727 m (5' 8\")   Wt 94.7 kg (208 lb 12.8 oz)   SpO2 94%   BMI 31.75 kg/m    Limited visit exam done given COVID-19 precautions.   GENERAL APPEARANCE:  Alert, in no distress  ENT:  Mouth and posterior oropharynx normal, moist mucous membranes  EYES:  EOM, conjunctivae, lids, pupils and irises normal  RESP:  no respiratory distress  PSYCH:  affect and mood normal  Labs:   Labs done in SNF are in [facility]. Please refer to them using [facility]/Care Everywhere.    ASSESSMENT/PLAN:  History of Lacunar Stroke with Left-Sided Hemiparesis. With cognitive impairment. Continue Aspirin and Simvastatin as ordered. Staff to assist with ADLs, medications and meals.     End-Stage Renal Disease on Dialysis. Received dialysis M,W, F. Follow-up with Vascular, Dr. Corbin, regarding fistula. Receives Furosemide on non-dialysis. Also on Phoslo     Hypertension. Blood pressures elevated. Previously on Amlodipine 10 mg PO every day, but now on 5 mg daily. Notes some edema in her legs. Takes Carvedilol and Hydralazine. Also on Furosemide. Will ask staff to have Nephrology review blood pressures at facility, in combination with blood pressures during dialysis. Further recommendations as per them.      Type 2 Diabetes Mellitus. Last A1C 6.5 on 2/13/20. Most blood sugars < 200. May " want to consider discontinuation of sliding scale insulin. Spoke to nursing staff regarding sliding scale insulin and they reported they would like to keep it as her blood sugars can run high although most all blood sugars in June have been < 200. Continue Insulin 70/30 as ordered. Monitor blood sugars and adjust treatment accordingly.     Anemia due to Renal Disease. Labs routinely monitored at dialysis.     Pain. Due to non-use of Hydrocodone-Acetaminophen, will discontinue. Acetaminophen available as needed.    Electronically signed by:  RIK Wolfe CNP     Video-Visit Details  Type of service:  Video Visit  Video End Time (time video stopped): 111  Distant Location (provider location):  [facility]             Sincerely,        RIK Wolfe CNP

## 2020-06-10 NOTE — PROGRESS NOTES
"[facility]   Roxy Beaulieu is being evaluated via a billable video visit due to the restrictions of the Covid-19 pandemic.   The patient has been notified of following: \"This video visit will be conducted via a call between you and your provider. We have found that certain health care needs can be provided without the need for an in-person physical exam.  This service lets us provide the care you need with a video conversation. If during the course of the call the provider feels a video visit is not appropriate, you will not be charged for this service.\"   The provider has received verbal consent for a Video Visit from the patient or first contact? Yes  Patient or facility staff would like the video invitation sent by: N/A   Video Start Time: 109    [facility] Medical Record Number:  [id number]  Place of Location at the time of visit: [facility]  Chief Complaint   Patient presents with     [facility]I Regulatory     HPI:  Roxy Beaulieu  is a 60 year old (1960), who is being seen today for a Regulatory visit.  HPI information obtained from: facility chart records, facility staff, patient report and [facility] Epic chart review. Today's concern is:    History of Lacunar Stroke with Left-Sided Hemiparesis. No issues noted during today's visit. BIMS Score 15/15 on 5/12/20.    End-Stage Renal Disease on Dialysis. States she has to have an extra dialysis run this week on Saturday as she is fluid up. States she doesn't feel fluid up. Has some swelling in her legs, but thinks it's more due to her legs hanging too much. Has an appointment tomorrow with Vascular to discuss her fistula.    Hypertension. Upon review of blood pressure over the past 5 days, systolic range from 138-192. Diastolic range from 53-83.    Type 2 Diabetes Mellitus. Upon review of blood sugars over the past 5 days, range is as follows:    Breakfast: 110-162  Lunch: 134-180  Dinner: 170-281 "   Bedtime: 114-220, most < 200    Anemia due to Renal Disease. Limited labs available to review as these are obtained at dialysis.    Pain. No reports of pain during today's visit. Upon review of documentation, has not utilized Hydrocodone-Acetaminophen in the month of June    Past Medical and Surgical History reviewed in Epic today.  MEDICATIONS:  Current Outpatient Medications   Medication Sig Dispense Refill     acetaminophen (TYLENOL) 325 MG tablet Take 2 tablets (650 mg) by mouth every 6 hours as needed for mild pain or fever (> 101 F)       amLODIPine (NORVASC) 5 MG tablet Take 5 mg by mouth At Bedtime        aspirin (ASA) 325 MG EC tablet Take 325 mg by mouth At Bedtime       bisacodyl (DULCOLAX) 10 MG suppository Place 1 suppository (10 mg) rectally daily as needed for constipation       calcium acetate (PHOSLO) 667 MG CAPS capsule Take 667 mg by mouth 4 times daily With Snacks and Meals       calcium carbonate (TUMS) 500 MG chewable tablet Take 2 chew tab by mouth 3 times daily as needed for heartburn       carvedilol (COREG) 25 MG tablet Take 1 tablet (25 mg) by mouth 2 times daily (with meals)       furosemide (LASIX) 80 MG tablet Take 80 mg by mouth four times a week On Tuesday, Thursday, Saturday, and Sunday       hydrALAZINE (APRESOLINE) 50 MG tablet Take 50 mg by mouth 2 times daily       HYDROcodone-acetaminophen (NORCO) 5-325 MG tablet Take 1-2 tablets by mouth every 4 hours as needed for moderate to severe pain 120 tablet 0     HYDROcodone-acetaminophen 5-325 MG PO tablet Take 1-2 tablets by mouth every 6 hours as needed for pain or moderate to severe pain 60 tablet 0     insulin aspart (NOVOLOG PEN) 100 UNIT/ML pen TID w/meals: -249=2U; 250-299=4u; 300-349=6u;350-399=8U; 400 & above 10units 9 mL 4     insulin isophane & regular (HUMULIN MIX 70/30 PEN , NOVOLIN MIX 70/30 PEN) susp Inject 18 Units Subcutaneous every morning (in addition to PM dose)       insulin NPH-Regular 70/30 susp Inject  "9 Units Subcutaneous daily (with dinner) Note takes 18 Units with breakfast 6 mL 11     ondansetron (ZOFRAN) 4 MG tablet Take 4 mg by mouth every 12 hours as needed for nausea       senna-docusate (SENOKOT-S/PERICOLACE) 8.6-50 MG tablet Take 1 tablet by mouth 2 times daily as needed for constipation       simvastatin (ZOCOR) 20 MG tablet Take 1 tablet (20 mg) by mouth At Bedtime       REVIEW OF SYSTEMS: 4 point ROS including Respiratory, CV, GI and , other than that noted in the HPI,  is negative  Objective: BP (!) 159/75   Pulse 82   Temp 98.2  F (36.8  C)   Resp 17   Ht 1.727 m (5' 8\")   Wt 94.7 kg (208 lb 12.8 oz)   SpO2 94%   BMI 31.75 kg/m    Limited visit exam done given COVID-19 precautions.   GENERAL APPEARANCE:  Alert, in no distress  ENT:  Mouth and posterior oropharynx normal, moist mucous membranes  EYES:  EOM, conjunctivae, lids, pupils and irises normal  RESP:  no respiratory distress  PSYCH:  affect and mood normal  Labs:   Labs done in SNF are in [facility] EPIC. Please refer to them using "[employer]/Care Everywhere.    ASSESSMENT/PLAN:  History of Lacunar Stroke with Left-Sided Hemiparesis. With cognitive impairment. Continue Aspirin and Simvastatin as ordered. Staff to assist with ADLs, medications and meals.     End-Stage Renal Disease on Dialysis. Received dialysis M,W, F. Follow-up with Vascular, Dr. Corbin, regarding fistula. Receives Furosemide on non-dialysis. Also on Phoslo     Hypertension. Blood pressures elevated. Previously on Amlodipine 10 mg PO every day, but now on 5 mg daily. Notes some edema in her legs. Takes Carvedilol and Hydralazine. Also on Furosemide. Will ask staff to have Nephrology review blood pressures at facility, in combination with blood pressures during dialysis. Further recommendations as per them.      Type 2 Diabetes Mellitus. Last A1C 6.5 on 2/13/20. Most blood sugars < 200. May want to consider discontinuation of sliding scale insulin. Spoke to nursing staff " regarding sliding scale insulin and they reported they would like to keep it as her blood sugars can run high although most all blood sugars in June have been < 200. Continue Insulin 70/30 as ordered. Monitor blood sugars and adjust treatment accordingly.     Anemia due to Renal Disease. Labs routinely monitored at dialysis.     Pain. Due to non-use of Hydrocodone-Acetaminophen, will discontinue. Acetaminophen available as needed.    Electronically signed by:  RIK Wolfe CNP     Video-Visit Details  Type of service:  Video Visit  Video End Time (time video stopped): 111  Distant Location (provider location):  [facility]

## 2020-06-11 ENCOUNTER — VIRTUAL VISIT (OUTPATIENT)
Dept: OTHER | Facility: CLINIC | Age: 60
End: 2020-06-11
Attending: SURGERY
Payer: COMMERCIAL

## 2020-06-11 DIAGNOSIS — I77.0 ARTERIOVENOUS FISTULA (H): Primary | ICD-10-CM

## 2020-06-11 DIAGNOSIS — N18.6 ESRD (END STAGE RENAL DISEASE) ON DIALYSIS (H): ICD-10-CM

## 2020-06-11 DIAGNOSIS — Z99.2 ESRD (END STAGE RENAL DISEASE) ON DIALYSIS (H): ICD-10-CM

## 2020-06-11 PROCEDURE — 99212 OFFICE O/P EST SF 10 MIN: CPT | Mod: 95 | Performed by: SURGERY

## 2020-06-11 NOTE — PATIENT INSTRUCTIONS
[facility]ASCULAR [facility]    -Schedule an ultrasound of your AVF  -Please call the number below to assist you in scheduling the ultrasound and a follow up phone visit with Dr. Corbin to discuss the results.    For questions or concerns regarding this visit, please call [phone].

## 2020-06-11 NOTE — PROGRESS NOTES
"Roxy Beaulieu is a 60 year old female who is being evaluated via a billable video visit.      The patient has been notified of following:     \"This video visit will be conducted via a call between you and your physician/provider. We have found that certain health care needs can be provided without the need for an in-person physical exam.  This service lets us provide the care you need with a video conversation.  If a prescription is necessary we can send it directly to your pharmacy.  If lab work is needed we can place an order for that and you can then stop by our lab to have the test done at a later time.    Video visits are billed at different rates depending on your insurance coverage.  Please reach out to your insurance provider with any questions.    If during the course of the call the physician/provider feels a video visit is not appropriate, you will not be charged for this service.\"    Patient has given verbal consent for Video visit? Yes    How would you like to obtain your AVS? Mail a copy  Patient would like the video invitation sent by: Text to cell phone: [email or web address]    Will anyone else be joining your video visit? No        Mallory Dejesus MA      "

## 2020-06-12 ENCOUNTER — TELEPHONE (OUTPATIENT)
Dept: OTHER | Facility: CLINIC | Age: 60
End: 2020-06-12

## 2020-06-12 NOTE — TELEPHONE ENCOUNTER
June 12, 2020    Patient is scheduled for AVF US at [facility] on 6/16/2020 & RTN TELE E-Visit appointment on 6/18/2020 with Dr. Corbin.     Brooklyn Boone    [facility] Vascular [facility]  Office: [phone]  Fax [phone]

## 2020-06-12 NOTE — TELEPHONE ENCOUNTER
Per Dr. Corbin's 6/11/20 phone visit with pt, please schedule pt for AVF US at next available.  Order entered.  Pt will need phone visit with Dr. Corbin to discuss results.    Leighann Pitts, CRISTINAN, RN-[facility] [place]

## 2020-06-16 ENCOUNTER — HOSPITAL ENCOUNTER (OUTPATIENT)
Dept: ULTRASOUND IMAGING | Facility: CLINIC | Age: 60
Discharge: HOME | End: 2020-06-16
Attending: SURGERY | Admitting: SURGERY
Payer: COMMERCIAL

## 2020-06-16 DIAGNOSIS — N18.6 ESRD (END STAGE RENAL DISEASE) ON DIALYSIS (H): ICD-10-CM

## 2020-06-16 DIAGNOSIS — Z99.2 ESRD (END STAGE RENAL DISEASE) ON DIALYSIS (H): ICD-10-CM

## 2020-06-16 DIAGNOSIS — I77.0 ARTERIOVENOUS FISTULA (H): ICD-10-CM

## 2020-06-16 PROCEDURE — 93990 DOPPLER FLOW TESTING: CPT

## 2020-06-17 ENCOUNTER — TELEPHONE (OUTPATIENT)
Dept: GERIATRICS | Facility: CLINIC | Age: 60
End: 2020-06-17

## 2020-06-17 NOTE — TELEPHONE ENCOUNTER
Upon return from dialysis had low grade temp of 99.1. staff reports she is wearing a heavy sweater.  She is asymptomatic.  Will recheck temp in 4 hrs, if temp persists may swab for covid.  6/4/20 covd swab neg.

## 2020-06-18 ENCOUNTER — VIRTUAL VISIT (OUTPATIENT)
Dept: OTHER | Facility: CLINIC | Age: 60
End: 2020-06-18
Attending: SURGERY
Payer: COMMERCIAL

## 2020-06-18 VITALS
BODY MASS INDEX: 28.25 KG/M2 | SYSTOLIC BLOOD PRESSURE: 164 MMHG | DIASTOLIC BLOOD PRESSURE: 72 MMHG | HEIGHT: 67 IN | WEIGHT: 180 LBS

## 2020-06-18 DIAGNOSIS — Z99.2 ESRD (END STAGE RENAL DISEASE) ON DIALYSIS (H): Primary | ICD-10-CM

## 2020-06-18 DIAGNOSIS — N18.6 ESRD (END STAGE RENAL DISEASE) ON DIALYSIS (H): Primary | ICD-10-CM

## 2020-06-18 DIAGNOSIS — I77.0 AVF (ARTERIOVENOUS FISTULA) (H): ICD-10-CM

## 2020-06-18 PROCEDURE — 99212 OFFICE O/P EST SF 10 MIN: CPT | Mod: 95 | Performed by: SURGERY

## 2020-06-18 ASSESSMENT — MIFFLIN-ST. JEOR: SCORE: 1409.1

## 2020-06-18 NOTE — PROGRESS NOTES
"Roxy Beaulieu is a 60 year old female who is being evaluated via a billable telephone visit.      The patient has been notified of following:     \"This telephone visit will be conducted via a call between you and your physician/provider. We have found that certain health care needs can be provided without the need for a physical exam.  This service lets us provide the care you need with a short phone conversation.  If a prescription is necessary we can send it directly to your pharmacy.  If lab work is needed we can place an order for that and you can then stop by our lab to have the test done at a later time.    Telephone visits are billed at different rates depending on your insurance coverage. During this emergency period, for some insurers they may be billed the same as an in-person visit.  Please reach out to your insurance provider with any questions.    If during the course of the call the physician/provider feels a telephone visit is not appropriate, you will not be charged for this service.\"    Patient has given verbal consent for Telephone visit?  Yes    What phone number would you like to be contacted at? [phone]    How would you like to obtain your AVS? Mail a copy    Mallory Dejesus MA    "

## 2020-06-18 NOTE — PROGRESS NOTES
[facility]    Roxy Beaulieu returns for vascular follow-up.  This 61-year-old independent woman is on chronic hemodialysis due to diabetic CKI.  She had a poorly functioning right upper arm proximal radial to cephalic fistula with a competing side branch and a site of intimal hyperplasia  created on 2/11/2020.  She underwent excision of intimal hyperplasia with onlay vein patch angioplasty along with ligation of competing side branch and excision of the more proximal intimal hyperplastic segment with primary closure on 4/20/2020.      PMH: Medications: Coreg, and Norvasc, Zocor, Lasix, PhosLo, aspirin,                                     Apresoline, NPH and Humulin  70/30 insulin    Duplex of the fistula on 6/16/2020 reveals it was widely patent.  Areas of surgical repair look excellent.  However there is a narrowing at the cephalic- subclavian junction at 2.8 mm.  Adequate flow still noted at 822 mL/min. (Testing on 6/9/2020 did show the stenosis at the outflow of 3.7 mm with outflow volume of 1523 mL/min).    COVID 19 testing on 6/4/2020 was normal.    Telephone conference was performed today.  She is doing very well.  Using the tourniquet on her fistula.  No issues with the incisions.  Reports a strong pulse in the fistula.      Reviewed the duplex ultrasound.  Fistula is widely patent.  This was discussed with the patient.      Impression: #1.  Dialysis unit may start using the fistula.  With the large size and good flow rates this should work very well.  Once he transitioned to the fistula she will call us to have her right jugular tunneled catheter removed in the office.                           #2.   Some concerned about the stenosis at the cephalic/subclavian junction.  Very little we can do for this except for follow this.  Would repeat a fistula duplex in 6 months with particular attention to this area.  This is also discussed with the patient.    We spent 5 minutes on the  telephone conference call today.       Sumanth Corbin MD

## 2020-06-19 NOTE — PATIENT INSTRUCTIONS
[facility]ASCULAR [facility]    1.  Dialysis may start using your fistula  2.  Dialysis can arrange for the CVC tunnel catheter removal once they give the okay, otherwise Dr. Corbin can remove this in the clinic.  Please call the number below if you would like to schedule the CVC removal with .    For questions or concerns regarding this visit, please call [phone].

## 2020-06-22 ENCOUNTER — HOSPITAL LABORATORY (OUTPATIENT)
Dept: OTHER | Facility: CLINIC | Age: 60
End: 2020-06-22

## 2020-06-23 ENCOUNTER — HOSPITAL ENCOUNTER (OUTPATIENT)
Facility: CLINIC | Age: 60
Setting detail: OBSERVATION
Discharge: NURSING, REHAB OR LONG-TERM CARE | End: 2020-06-24
Attending: EMERGENCY MEDICINE | Admitting: HOSPITALIST
Payer: COMMERCIAL

## 2020-06-23 DIAGNOSIS — K21.00 GASTROESOPHAGEAL REFLUX DISEASE WITH ESOPHAGITIS: Primary | ICD-10-CM

## 2020-06-23 DIAGNOSIS — N18.6 STAGE 5 CHRONIC KIDNEY DISEASE ON CHRONIC DIALYSIS (H): ICD-10-CM

## 2020-06-23 DIAGNOSIS — E87.1 HYPONATREMIA: ICD-10-CM

## 2020-06-23 DIAGNOSIS — K92.0 HEMATEMESIS WITH NAUSEA: ICD-10-CM

## 2020-06-23 DIAGNOSIS — D64.9 ANEMIA, UNSPECIFIED TYPE: ICD-10-CM

## 2020-06-23 DIAGNOSIS — K92.0 HEMATEMESIS, PRESENCE OF NAUSEA NOT SPECIFIED: ICD-10-CM

## 2020-06-23 DIAGNOSIS — Z99.2 STAGE 5 CHRONIC KIDNEY DISEASE ON CHRONIC DIALYSIS (H): ICD-10-CM

## 2020-06-23 LAB
ABO + RH BLD: NORMAL
ABO + RH BLD: NORMAL
ALBUMIN SERPL-MCNC: 3.6 G/DL (ref 3.4–5)
ALBUMIN UR-MCNC: 600 MG/DL
ALP SERPL-CCNC: 71 U/L (ref 40–150)
ALT SERPL W P-5'-P-CCNC: 11 U/L (ref 0–50)
ANION GAP SERPL CALCULATED.3IONS-SCNC: 14 MMOL/L (ref 3–14)
APPEARANCE UR: CLEAR
APTT PPP: 27 SEC (ref 22–37)
AST SERPL W P-5'-P-CCNC: 16 U/L (ref 0–45)
BACTERIA #/AREA URNS HPF: ABNORMAL /HPF
BASOPHILS # BLD AUTO: 0 10E9/L (ref 0–0.2)
BASOPHILS NFR BLD AUTO: 0.2 %
BILIRUB SERPL-MCNC: 0.3 MG/DL (ref 0.2–1.3)
BILIRUB UR QL STRIP: NEGATIVE
BLD GP AB SCN SERPL QL: NORMAL
BLOOD BANK CMNT PATIENT-IMP: NORMAL
BUN SERPL-MCNC: 83 MG/DL (ref 7–30)
CALCIUM SERPL-MCNC: 9.4 MG/DL (ref 8.5–10.1)
CHLORIDE SERPL-SCNC: 76 MMOL/L (ref 94–109)
CO2 SERPL-SCNC: 33 MMOL/L (ref 20–32)
COLOR UR AUTO: ABNORMAL
CREAT SERPL-MCNC: 9.17 MG/DL (ref 0.52–1.04)
DIFFERENTIAL METHOD BLD: ABNORMAL
EOSINOPHIL # BLD AUTO: 0 10E9/L (ref 0–0.7)
EOSINOPHIL NFR BLD AUTO: 0.1 %
ERYTHROCYTE [DISTWIDTH] IN BLOOD BY AUTOMATED COUNT: 16.2 % (ref 10–15)
GFR SERPL CREATININE-BSD FRML MDRD: 4 ML/MIN/{1.73_M2}
GLUCOSE BLDC GLUCOMTR-MCNC: 125 MG/DL (ref 70–99)
GLUCOSE BLDC GLUCOMTR-MCNC: 153 MG/DL (ref 70–99)
GLUCOSE SERPL-MCNC: 190 MG/DL (ref 70–99)
GLUCOSE UR STRIP-MCNC: 300 MG/DL
HBA1C MFR BLD: 5.8 % (ref 0–5.6)
HCT VFR BLD AUTO: 34 % (ref 35–47)
HGB BLD-MCNC: 11 G/DL (ref 11.7–15.7)
HGB BLD-MCNC: 11 G/DL (ref 11.7–15.7)
HGB UR QL STRIP: ABNORMAL
IMM GRANULOCYTES # BLD: 0.1 10E9/L (ref 0–0.4)
IMM GRANULOCYTES NFR BLD: 0.4 %
INR PPP: 1.14 (ref 0.86–1.14)
INTERPRETATION ECG - MUSE: NORMAL
KETONES UR STRIP-MCNC: NEGATIVE MG/DL
LEUKOCYTE ESTERASE UR QL STRIP: ABNORMAL
LYMPHOCYTES # BLD AUTO: 0.4 10E9/L (ref 0.8–5.3)
LYMPHOCYTES NFR BLD AUTO: 2.9 %
MCH RBC QN AUTO: 28.6 PG (ref 26.5–33)
MCHC RBC AUTO-ENTMCNC: 32.4 G/DL (ref 31.5–36.5)
MCV RBC AUTO: 88 FL (ref 78–100)
MONOCYTES # BLD AUTO: 0.7 10E9/L (ref 0–1.3)
MONOCYTES NFR BLD AUTO: 5.8 %
NEUTROPHILS # BLD AUTO: 11.1 10E9/L (ref 1.6–8.3)
NEUTROPHILS NFR BLD AUTO: 90.6 %
NITRATE UR QL: NEGATIVE
NRBC # BLD AUTO: 0 10*3/UL
NRBC BLD AUTO-RTO: 0 /100
PH UR STRIP: 8 PH (ref 5–7)
PLATELET # BLD AUTO: 285 10E9/L (ref 150–450)
POTASSIUM SERPL-SCNC: 5.1 MMOL/L (ref 3.4–5.3)
PROT SERPL-MCNC: 7.3 G/DL (ref 6.8–8.8)
RBC # BLD AUTO: 3.85 10E12/L (ref 3.8–5.2)
RBC #/AREA URNS AUTO: 7 /HPF (ref 0–2)
SODIUM SERPL-SCNC: 123 MMOL/L (ref 133–144)
SOURCE: ABNORMAL
SP GR UR STRIP: 1.01 (ref 1–1.03)
SPECIMEN EXP DATE BLD: NORMAL
SQUAMOUS #/AREA URNS AUTO: 11 /HPF (ref 0–1)
TROPONIN I SERPL-MCNC: <0.015 UG/L (ref 0–0.04)
UPPER GI ENDOSCOPY: NORMAL
UROBILINOGEN UR STRIP-MCNC: NORMAL MG/DL (ref 0–2)
WBC # BLD AUTO: 12.3 10E9/L (ref 4–11)
WBC #/AREA URNS AUTO: 12 /HPF (ref 0–5)

## 2020-06-23 PROCEDURE — 25800030 ZZH RX IP 258 OP 636: Performed by: EMERGENCY MEDICINE

## 2020-06-23 PROCEDURE — 00000146 ZZHCL STATISTIC GLUCOSE BY METER IP

## 2020-06-23 PROCEDURE — 88342 IMHCHEM/IMCYTCHM 1ST ANTB: CPT | Mod: 26 | Performed by: INTERNAL MEDICINE

## 2020-06-23 PROCEDURE — 86901 BLOOD TYPING SEROLOGIC RH(D): CPT | Performed by: EMERGENCY MEDICINE

## 2020-06-23 PROCEDURE — 85018 HEMOGLOBIN: CPT | Performed by: PHYSICIAN ASSISTANT

## 2020-06-23 PROCEDURE — C9113 INJ PANTOPRAZOLE SODIUM, VIA: HCPCS | Performed by: PHYSICIAN ASSISTANT

## 2020-06-23 PROCEDURE — 80053 COMPREHEN METABOLIC PANEL: CPT | Performed by: EMERGENCY MEDICINE

## 2020-06-23 PROCEDURE — 99285 EMERGENCY DEPT VISIT HI MDM: CPT | Mod: 25

## 2020-06-23 PROCEDURE — 96372 THER/PROPH/DIAG INJ SC/IM: CPT

## 2020-06-23 PROCEDURE — 25000128 H RX IP 250 OP 636: Performed by: INTERNAL MEDICINE

## 2020-06-23 PROCEDURE — C9113 INJ PANTOPRAZOLE SODIUM, VIA: HCPCS | Performed by: EMERGENCY MEDICINE

## 2020-06-23 PROCEDURE — 88342 IMHCHEM/IMCYTCHM 1ST ANTB: CPT | Performed by: INTERNAL MEDICINE

## 2020-06-23 PROCEDURE — G0378 HOSPITAL OBSERVATION PER HR: HCPCS

## 2020-06-23 PROCEDURE — 96361 HYDRATE IV INFUSION ADD-ON: CPT

## 2020-06-23 PROCEDURE — 43239 EGD BIOPSY SINGLE/MULTIPLE: CPT | Performed by: INTERNAL MEDICINE

## 2020-06-23 PROCEDURE — 96376 TX/PRO/DX INJ SAME DRUG ADON: CPT

## 2020-06-23 PROCEDURE — 93005 ELECTROCARDIOGRAM TRACING: CPT

## 2020-06-23 PROCEDURE — 88341 IMHCHEM/IMCYTCHM EA ADD ANTB: CPT | Performed by: INTERNAL MEDICINE

## 2020-06-23 PROCEDURE — 36415 COLL VENOUS BLD VENIPUNCTURE: CPT | Performed by: PHYSICIAN ASSISTANT

## 2020-06-23 PROCEDURE — 88312 SPECIAL STAINS GROUP 1: CPT | Performed by: INTERNAL MEDICINE

## 2020-06-23 PROCEDURE — U0003 INFECTIOUS AGENT DETECTION BY NUCLEIC ACID (DNA OR RNA); SEVERE ACUTE RESPIRATORY SYNDROME CORONAVIRUS 2 (SARS-COV-2) (CORONAVIRUS DISEASE [COVID-19]), AMPLIFIED PROBE TECHNIQUE, MAKING USE OF HIGH THROUGHPUT TECHNOLOGIES AS DESCRIBED BY CMS-2020-01-R: HCPCS | Performed by: EMERGENCY MEDICINE

## 2020-06-23 PROCEDURE — 84484 ASSAY OF TROPONIN QUANT: CPT | Performed by: EMERGENCY MEDICINE

## 2020-06-23 PROCEDURE — 25000125 ZZHC RX 250: Performed by: INTERNAL MEDICINE

## 2020-06-23 PROCEDURE — C9803 HOPD COVID-19 SPEC COLLECT: HCPCS

## 2020-06-23 PROCEDURE — 85610 PROTHROMBIN TIME: CPT | Performed by: EMERGENCY MEDICINE

## 2020-06-23 PROCEDURE — 88305 TISSUE EXAM BY PATHOLOGIST: CPT | Mod: 26 | Performed by: INTERNAL MEDICINE

## 2020-06-23 PROCEDURE — 25800030 ZZH RX IP 258 OP 636: Performed by: PHYSICIAN ASSISTANT

## 2020-06-23 PROCEDURE — 81001 URINALYSIS AUTO W/SCOPE: CPT | Performed by: EMERGENCY MEDICINE

## 2020-06-23 PROCEDURE — 96374 THER/PROPH/DIAG INJ IV PUSH: CPT

## 2020-06-23 PROCEDURE — 86900 BLOOD TYPING SEROLOGIC ABO: CPT | Performed by: EMERGENCY MEDICINE

## 2020-06-23 PROCEDURE — 88312 SPECIAL STAINS GROUP 1: CPT | Mod: 26 | Performed by: INTERNAL MEDICINE

## 2020-06-23 PROCEDURE — 88341 IMHCHEM/IMCYTCHM EA ADD ANTB: CPT | Mod: 26 | Performed by: INTERNAL MEDICINE

## 2020-06-23 PROCEDURE — 25000132 ZZH RX MED GY IP 250 OP 250 PS 637: Performed by: PHYSICIAN ASSISTANT

## 2020-06-23 PROCEDURE — 25000128 H RX IP 250 OP 636: Performed by: PHYSICIAN ASSISTANT

## 2020-06-23 PROCEDURE — 85730 THROMBOPLASTIN TIME PARTIAL: CPT | Performed by: EMERGENCY MEDICINE

## 2020-06-23 PROCEDURE — 25000128 H RX IP 250 OP 636: Performed by: EMERGENCY MEDICINE

## 2020-06-23 PROCEDURE — 83036 HEMOGLOBIN GLYCOSYLATED A1C: CPT | Performed by: EMERGENCY MEDICINE

## 2020-06-23 PROCEDURE — 40000104 ZZH STATISTIC MODERATE SEDATION < 10 MIN: Performed by: INTERNAL MEDICINE

## 2020-06-23 PROCEDURE — 85025 COMPLETE CBC W/AUTO DIFF WBC: CPT | Performed by: EMERGENCY MEDICINE

## 2020-06-23 PROCEDURE — 25000132 ZZH RX MED GY IP 250 OP 250 PS 637: Performed by: EMERGENCY MEDICINE

## 2020-06-23 PROCEDURE — 99220 ZZC INITIAL OBSERVATION CARE,LEVL III: CPT | Performed by: PHYSICIAN ASSISTANT

## 2020-06-23 PROCEDURE — 86850 RBC ANTIBODY SCREEN: CPT | Performed by: EMERGENCY MEDICINE

## 2020-06-23 PROCEDURE — 25000131 ZZH RX MED GY IP 250 OP 636 PS 637: Performed by: PHYSICIAN ASSISTANT

## 2020-06-23 PROCEDURE — 88305 TISSUE EXAM BY PATHOLOGIST: CPT | Performed by: INTERNAL MEDICINE

## 2020-06-23 RX ORDER — DEXTROSE MONOHYDRATE 25 G/50ML
25-50 INJECTION, SOLUTION INTRAVENOUS
Status: DISCONTINUED | OUTPATIENT
Start: 2020-06-23 | End: 2020-06-24 | Stop reason: HOSPADM

## 2020-06-23 RX ORDER — AMOXICILLIN 250 MG
1 CAPSULE ORAL 2 TIMES DAILY PRN
Status: DISCONTINUED | OUTPATIENT
Start: 2020-06-23 | End: 2020-06-23

## 2020-06-23 RX ORDER — HYDRALAZINE HYDROCHLORIDE 20 MG/ML
10 INJECTION INTRAMUSCULAR; INTRAVENOUS EVERY 4 HOURS PRN
Status: DISCONTINUED | OUTPATIENT
Start: 2020-06-23 | End: 2020-06-24 | Stop reason: HOSPADM

## 2020-06-23 RX ORDER — CALCIUM CARBONATE 500 MG/1
1000 TABLET, CHEWABLE ORAL 3 TIMES DAILY PRN
Status: DISCONTINUED | OUTPATIENT
Start: 2020-06-23 | End: 2020-06-24 | Stop reason: HOSPADM

## 2020-06-23 RX ORDER — AMOXICILLIN 250 MG
1 CAPSULE ORAL 2 TIMES DAILY PRN
Status: DISCONTINUED | OUTPATIENT
Start: 2020-06-23 | End: 2020-06-24 | Stop reason: HOSPADM

## 2020-06-23 RX ORDER — SUCRALFATE ORAL 1 G/10ML
1 SUSPENSION ORAL 4 TIMES DAILY PRN
Status: DISCONTINUED | OUTPATIENT
Start: 2020-06-23 | End: 2020-06-24 | Stop reason: HOSPADM

## 2020-06-23 RX ORDER — SIMVASTATIN 20 MG
20 TABLET ORAL AT BEDTIME
Status: DISCONTINUED | OUTPATIENT
Start: 2020-06-23 | End: 2020-06-24 | Stop reason: HOSPADM

## 2020-06-23 RX ORDER — FENTANYL CITRATE 50 UG/ML
INJECTION, SOLUTION INTRAMUSCULAR; INTRAVENOUS PRN
Status: DISCONTINUED | OUTPATIENT
Start: 2020-06-23 | End: 2020-06-23 | Stop reason: HOSPADM

## 2020-06-23 RX ORDER — OMEPRAZOLE 20 MG/1
20 TABLET, DELAYED RELEASE ORAL DAILY
Status: ON HOLD | COMMUNITY
End: 2020-06-24

## 2020-06-23 RX ORDER — HYDROCODONE BITARTRATE AND ACETAMINOPHEN 5; 325 MG/1; MG/1
1-2 TABLET ORAL EVERY 4 HOURS PRN
COMMUNITY
End: 2020-10-02

## 2020-06-23 RX ORDER — BISACODYL 10 MG
10 SUPPOSITORY, RECTAL RECTAL DAILY PRN
Status: DISCONTINUED | OUTPATIENT
Start: 2020-06-23 | End: 2020-06-24 | Stop reason: HOSPADM

## 2020-06-23 RX ORDER — PROCHLORPERAZINE 25 MG
25 SUPPOSITORY, RECTAL RECTAL EVERY 12 HOURS PRN
Status: DISCONTINUED | OUTPATIENT
Start: 2020-06-23 | End: 2020-06-24 | Stop reason: HOSPADM

## 2020-06-23 RX ORDER — LANOLIN ALCOHOL/MO/W.PET/CERES
3 CREAM (GRAM) TOPICAL
Status: DISCONTINUED | OUTPATIENT
Start: 2020-06-23 | End: 2020-06-24 | Stop reason: HOSPADM

## 2020-06-23 RX ORDER — NICOTINE POLACRILEX 4 MG
15-30 LOZENGE BUCCAL
Status: DISCONTINUED | OUTPATIENT
Start: 2020-06-23 | End: 2020-06-24 | Stop reason: HOSPADM

## 2020-06-23 RX ORDER — BISACODYL 10 MG
10 SUPPOSITORY, RECTAL RECTAL DAILY PRN
Status: DISCONTINUED | OUTPATIENT
Start: 2020-06-23 | End: 2020-06-23

## 2020-06-23 RX ORDER — FENTANYL CITRATE 50 UG/ML
50-100 INJECTION, SOLUTION INTRAMUSCULAR; INTRAVENOUS
Status: DISCONTINUED | OUTPATIENT
Start: 2020-06-23 | End: 2020-06-23 | Stop reason: HOSPADM

## 2020-06-23 RX ORDER — NALOXONE HYDROCHLORIDE 0.4 MG/ML
.1-.4 INJECTION, SOLUTION INTRAMUSCULAR; INTRAVENOUS; SUBCUTANEOUS
Status: DISCONTINUED | OUTPATIENT
Start: 2020-06-23 | End: 2020-06-23

## 2020-06-23 RX ORDER — LIDOCAINE 40 MG/G
CREAM TOPICAL
Status: DISCONTINUED | OUTPATIENT
Start: 2020-06-23 | End: 2020-06-23 | Stop reason: HOSPADM

## 2020-06-23 RX ORDER — FENTANYL CITRATE 50 UG/ML
50 INJECTION, SOLUTION INTRAMUSCULAR; INTRAVENOUS
Status: DISCONTINUED | OUTPATIENT
Start: 2020-06-23 | End: 2020-06-23

## 2020-06-23 RX ORDER — FENTANYL CITRATE 50 UG/ML
25-50 INJECTION, SOLUTION INTRAMUSCULAR; INTRAVENOUS
Status: DISCONTINUED | OUTPATIENT
Start: 2020-06-23 | End: 2020-06-23 | Stop reason: HOSPADM

## 2020-06-23 RX ORDER — FENTANYL CITRATE 50 UG/ML
25 INJECTION, SOLUTION INTRAMUSCULAR; INTRAVENOUS EVERY 5 MIN PRN
Status: DISCONTINUED | OUTPATIENT
Start: 2020-06-23 | End: 2020-06-23

## 2020-06-23 RX ORDER — ONDANSETRON 4 MG/1
4 TABLET, ORALLY DISINTEGRATING ORAL EVERY 6 HOURS PRN
Status: DISCONTINUED | OUTPATIENT
Start: 2020-06-23 | End: 2020-06-24 | Stop reason: HOSPADM

## 2020-06-23 RX ORDER — CARVEDILOL 25 MG/1
25 TABLET ORAL 2 TIMES DAILY WITH MEALS
Status: DISCONTINUED | OUTPATIENT
Start: 2020-06-23 | End: 2020-06-24 | Stop reason: HOSPADM

## 2020-06-23 RX ORDER — ACETAMINOPHEN 650 MG/1
650 SUPPOSITORY RECTAL EVERY 4 HOURS PRN
Status: DISCONTINUED | OUTPATIENT
Start: 2020-06-23 | End: 2020-06-24 | Stop reason: HOSPADM

## 2020-06-23 RX ORDER — AMLODIPINE BESYLATE 5 MG/1
5 TABLET ORAL AT BEDTIME
Status: DISCONTINUED | OUTPATIENT
Start: 2020-06-23 | End: 2020-06-24 | Stop reason: HOSPADM

## 2020-06-23 RX ORDER — NALOXONE HYDROCHLORIDE 0.4 MG/ML
.1-.4 INJECTION, SOLUTION INTRAMUSCULAR; INTRAVENOUS; SUBCUTANEOUS
Status: DISCONTINUED | OUTPATIENT
Start: 2020-06-23 | End: 2020-06-24 | Stop reason: HOSPADM

## 2020-06-23 RX ORDER — FLUMAZENIL 0.1 MG/ML
0.2 INJECTION, SOLUTION INTRAVENOUS
Status: DISCONTINUED | OUTPATIENT
Start: 2020-06-23 | End: 2020-06-23 | Stop reason: HOSPADM

## 2020-06-23 RX ORDER — ALUMINA, MAGNESIA, AND SIMETHICONE 2400; 2400; 240 MG/30ML; MG/30ML; MG/30ML
15 SUSPENSION ORAL ONCE
Status: COMPLETED | OUTPATIENT
Start: 2020-06-23 | End: 2020-06-23

## 2020-06-23 RX ORDER — PROCHLORPERAZINE MALEATE 5 MG
5 TABLET ORAL EVERY 6 HOURS PRN
Status: DISCONTINUED | OUTPATIENT
Start: 2020-06-23 | End: 2020-06-24 | Stop reason: HOSPADM

## 2020-06-23 RX ORDER — HYDRALAZINE HYDROCHLORIDE 50 MG/1
50 TABLET, FILM COATED ORAL 2 TIMES DAILY
Status: DISCONTINUED | OUTPATIENT
Start: 2020-06-23 | End: 2020-06-24 | Stop reason: HOSPADM

## 2020-06-23 RX ORDER — AMOXICILLIN 250 MG
2 CAPSULE ORAL 2 TIMES DAILY PRN
Status: DISCONTINUED | OUTPATIENT
Start: 2020-06-23 | End: 2020-06-24 | Stop reason: HOSPADM

## 2020-06-23 RX ORDER — SODIUM CHLORIDE 9 MG/ML
INJECTION, SOLUTION INTRAVENOUS CONTINUOUS
Status: DISCONTINUED | OUTPATIENT
Start: 2020-06-23 | End: 2020-06-24

## 2020-06-23 RX ORDER — FLUMAZENIL 0.1 MG/ML
0.2 INJECTION, SOLUTION INTRAVENOUS
Status: DISCONTINUED | OUTPATIENT
Start: 2020-06-23 | End: 2020-06-23

## 2020-06-23 RX ORDER — NALOXONE HYDROCHLORIDE 0.4 MG/ML
.1-.4 INJECTION, SOLUTION INTRAMUSCULAR; INTRAVENOUS; SUBCUTANEOUS
Status: DISCONTINUED | OUTPATIENT
Start: 2020-06-23 | End: 2020-06-23 | Stop reason: HOSPADM

## 2020-06-23 RX ORDER — CALCIUM ACETATE 667 MG/1
667 CAPSULE ORAL 4 TIMES DAILY
Status: DISCONTINUED | OUTPATIENT
Start: 2020-06-23 | End: 2020-06-24 | Stop reason: HOSPADM

## 2020-06-23 RX ORDER — ACETAMINOPHEN 325 MG/1
650 TABLET ORAL EVERY 4 HOURS PRN
Status: DISCONTINUED | OUTPATIENT
Start: 2020-06-23 | End: 2020-06-24 | Stop reason: HOSPADM

## 2020-06-23 RX ORDER — ONDANSETRON 2 MG/ML
4 INJECTION INTRAMUSCULAR; INTRAVENOUS EVERY 6 HOURS PRN
Status: DISCONTINUED | OUTPATIENT
Start: 2020-06-23 | End: 2020-06-24 | Stop reason: HOSPADM

## 2020-06-23 RX ADMIN — ASPIRIN 325 MG: 325 TABLET, COATED ORAL at 22:09

## 2020-06-23 RX ADMIN — PANTOPRAZOLE SODIUM 40 MG: 40 INJECTION, POWDER, FOR SOLUTION INTRAVENOUS at 09:34

## 2020-06-23 RX ADMIN — CALCIUM ACETATE 667 MG: 667 CAPSULE ORAL at 19:46

## 2020-06-23 RX ADMIN — SODIUM CHLORIDE: 9 INJECTION, SOLUTION INTRAVENOUS at 16:08

## 2020-06-23 RX ADMIN — INSULIN HUMAN 9 UNITS: 100 INJECTION, SUSPENSION SUBCUTANEOUS at 17:38

## 2020-06-23 RX ADMIN — PANTOPRAZOLE SODIUM 40 MG: 40 INJECTION, POWDER, FOR SOLUTION INTRAVENOUS at 19:46

## 2020-06-23 RX ADMIN — ALUMINUM HYDROXIDE, MAGNESIUM HYDROXIDE, AND DIMETHICONE 15 ML: 400; 400; 40 SUSPENSION ORAL at 09:33

## 2020-06-23 RX ADMIN — SODIUM CHLORIDE 500 ML: 9 INJECTION, SOLUTION INTRAVENOUS at 11:17

## 2020-06-23 RX ADMIN — AMLODIPINE BESYLATE 5 MG: 5 TABLET ORAL at 22:09

## 2020-06-23 RX ADMIN — CALCIUM ACETATE 667 MG: 667 CAPSULE ORAL at 17:38

## 2020-06-23 RX ADMIN — SIMVASTATIN 20 MG: 20 TABLET, FILM COATED ORAL at 22:09

## 2020-06-23 RX ADMIN — CARVEDILOL 25 MG: 25 TABLET, FILM COATED ORAL at 17:38

## 2020-06-23 RX ADMIN — HYDRALAZINE HYDROCHLORIDE 50 MG: 50 TABLET, FILM COATED ORAL at 19:46

## 2020-06-23 ASSESSMENT — ENCOUNTER SYMPTOMS
BLOOD IN STOOL: 0
BRUISES/BLEEDS EASILY: 0
DIARRHEA: 0
DIZZINESS: 0
COUGH: 0
CHILLS: 0
VOMITING: 1
WEAKNESS: 0
FEVER: 0
SHORTNESS OF BREATH: 0
NAUSEA: 1
SORE THROAT: 0
ABDOMINAL PAIN: 0
CONSTIPATION: 0

## 2020-06-23 NOTE — CONSULTS
[facility] Consultation    Consultation Assessment/Plan:    1.) Hematemesis:  - 2 episodes of hematemesis without ongoing symptoms  - hemodynamically stable with hgb at baseline  - differential includes PUD vs Rosie-Mendez tear vs esophagitis vs other less likely causes of upper GI bleeding  - will plan for upper endoscopy today to clarify source of symptoms and treat if appropriate    Elliott Rubi MD  [facility]    ---------------------------------------------------------------------------------------------------------------------------  Patient Name: Roxy Beaulieu      YOB: 1960 (Age: 60 year old)   Medical Record #: [id number]       Primary Physician: Keisha Nguyen   Referring Provider: Diogo Dillard MD   Admit Date/Time: 6/23/2020  8:48 AM       I was asked to see this patient by Diogo Dillard MD for evaluation of hematemesis.    History of Present Illness:  61 y/o woman with history of ESRD (on hemodialysis), DM, CVA, HTN and obesity who presents to hospital with complaints of hematemesis (x2) last night.  She has not had any further vomiting or bleeding for the past 12 hours.  She denies abdominal pain.  She has not had blood in the stool or melena.  She remains hemodynamically stable.  Hemoglobin is at baseline (hgb=11).  She takes ASA 325mg daily but no other NSAIDs.  She has a history of intermittent heartburn for which she takes Tums as needed.  She denies dysphagia.    Past Medical History:  Past Medical History:   Diagnosis Date     Anemia      Cerebral artery occlusion with cerebral infarction (H)     affected left side with weakness in arm and leg     CHF (congestive heart failure) (H)      Congestive heart failure (CHF) (H) 12/17/2018     Diabetes (H)      ESRD on dialysis (H)     HD STARTED 6/2019     Gangrene of toe (H) 4/29/2017     Hemiplegia and hemiparesis following cerebral infarction affecting left dominant side (H)      High  cholesterol      Hypertension      Hypertensive cardiomyopathy (H)      Obesity 4/29/2017     PE (pulmonary thromboembolism) (H)     RECURRENT     Personal history of tobacco use, presenting hazards to health 04/29/2017     Renal insufficiency      SOB (shortness of breath)      Toe ulcer, left, with unspecified severity (H) 11/2/2018     Ulcer of toe of left foot (H)     GANGRENE     Uncontrolled type 2 diabetes mellitus with hyperglycemia, with long-term current use of insulin (H) 4/29/2017     VTE (venous thromboembolism)      Past Surgical History:   Procedure Laterality Date     AMPUTATE TOE(S) Left 5/1/2017    Procedure: AMPUTATE TOE(S);  Amputate first and second left toes;  Surgeon: Verna Fofana DPM, Podiatry/Foot and Ankle Surgery;  Location: [facility] OR     AMPUTATE TOE(S) Left 11/3/2018    Procedure: Partial left 3rd toe amputation;  Surgeon: Verna Fofana DPM, Podiatry/Foot and Ankle Surgery;  Location:  OR     CREATE FISTULA ARTERIOVENOUS UPPER EXTREMITY Right 2/11/2020    Procedure: RIGHT PROXIMAL RADIAL TO CEPHALIC ARTERIOVENOUS FISTULA CREATION;  Surgeon: Sumanth Corbin MD;  Location:  OR     IR CVC TUNNEL PLACEMENT > 5 YRS OF AGE  6/4/2019     OPEN REDUCTION INTERNAL FIXATION FIBULA Left 3/16/2018    Procedure: OPEN REDUCTION INTERNAL FIXATION FIBULA;  Open reduction and internal fixation of displaced left lateral malleolar fracture;  Surgeon: Sumanth Wen MD;  Location: [facility] OR     REVISION FISTULA ARTERIOVENOUS UPPER EXTREMITY Right 4/28/2020    Procedure: ONLAY VEIN PATCH RIGHT RADIAL TO CEPHALIC FISTULA WITH LIGATION OF SIDE BRANCH ;  Surgeon: Sumanth Corbin MD;  Location:  OR       Review of Systems: A comprehensive review of systems was negative except for items noted in HPI/Subjective.    Current Medications:  No current outpatient medications on file.       Allergies/Sensitivities:   Allergies   Allergen Reactions     Heparin Hives     HIT-serotonin assay  negative. NOT HIT          Social History:   Social History     Socioeconomic History     Marital status: Single     Spouse name: Not on file     Number of children: Not on file     Years of education: Not on file     Highest education level: Not on file   Occupational History     Not on file   Social Needs     Financial resource strain: Not on file     Food insecurity     Worry: Not on file     Inability: Not on file     Transportation needs     Medical: Not on file     Non-medical: Not on file   Tobacco Use     Smoking status: Former Smoker     Types: Cigarettes     Last attempt to quit: 7/25/2016     Years since quitting: 3.9     Smokeless tobacco: Never Used   Substance and Sexual Activity     Alcohol use: No     Drug use: No     Sexual activity: Not on file   Lifestyle     Physical activity     Days per week: Not on file     Minutes per session: Not on file     Stress: Not on file   Relationships     Social connections     Talks on phone: Not on file     Gets together: Not on file     Attends [employer] service: Not on file     Active member of club or organization: Not on file     Attends meetings of clubs or organizations: Not on file     Relationship status: Not on file     Intimate partner violence     Fear of current or ex partner: Not on file     Emotionally abused: Not on file     Physically abused: Not on file     Forced sexual activity: Not on file   Other Topics Concern     Parent/sibling w/ CABG, MI or angioplasty before 65F 55M? Not Asked   Social History Narrative     Not on file     Family History: History reviewed. No pertinent family history.    Physical Exam:  BP (!) 170/82   Pulse 80   Temp 98.6  F (37  C) (Oral)   Resp 18   Wt 90 kg (198 lb 8 oz)   SpO2 90%   BMI 31.09 kg/m     General Appearance: Comfortable, laying in bed  Eyes: Normal  HEENT: Normal  Neck: Normal, no palpable lymph nodes  Respiratory: Normal  Cardiovascular: Normal  Gastrointestinal: Normal bowel  sounds  Musculoskeletal: Normal  Lymphatic: Normal  Skin: Normal  Neurologic: Normal     Labs/Imaging:  Recent Labs   Lab Test 06/23/20  1319 06/23/20  0919 04/28/20  0840 01/28/20  1040 10/31/19  0556 06/05/19  0715 06/04/19  0724 06/03/19  0652 06/02/19  0607 06/01/19  0646  05/31/19  0641 05/30/19  0607  05/28/19  0713   WBC  --  12.3*  --  7.5 9.1 5.9  --   --  4.8  --   --   --  6.1  --  5.4   HGB 11.0* 11.0* 10.6* 10.8* 9.3* 7.5*  --   --  7.2* 7.1*   < >  --  7.4*  --  7.0*   MCV  --  88  --  87 92 90  --   --  90  --   --   --  88  --  88   PLT  --  285  --  220 327 176  --   --  166  --   --   --  184  --  185   INR  --  1.14  --   --   --  1.92* 1.90* 1.94* 1.54* 1.32*  --  1.20* 1.14   < > 1.23*    < > = values in this interval not displayed.     Recent Labs   Lab Test 10/31/19  0556   IRON 24*   IRONSAT 14*     Recent Labs   Lab Test 06/23/20  0919 04/28/20  0840 02/11/20  1158 01/28/20  1040 11/07/19  1343 10/31/19  1331 10/31/19  0556 10/29/19  1330 06/03/19  0652   POTASSIUM 5.1 4.6 4.2 4.7 5.2 5.3 Canceled, Test credited 6.0* 5.5*   CHLORIDE 76* 98  --  99 106  --  100 102 108   BUN 83* 42*  --  35* 75*  --  58* 77* 63*     Recent Labs   Lab Test 06/23/20  0919 04/24/20  0358 04/18/20  1315 06/01/19  0646 05/31/19  0641 05/30/19  0607 05/29/19 0622 05/27/19 2010 12/19/18  0631  10/05/18  1155 10/04/18  1611 03/15/18  1442   PROTEIN  --  300* >600*  --   --   --   --   --   --   --  >499*  --  >499*   ALBUMIN 3.6  --   --  3.5 3.2* 2.4* 2.5* 2.9* 2.2*   < >  --  2.7*  --    BILITOTAL 0.3  --   --   --   --   --   --  0.3 0.3  --   --  0.3  --    AST 16  --   --   --   --   --   --  38 9  --   --  26  --    ALT 11  --   --   --   --   --   --  56* 10  --   --  18  --     < > = values in this interval not displayed.

## 2020-06-23 NOTE — PROGRESS NOTES
ROOM # [id number]    Living Situation (if not independent, order SW consult): [facility]   Facility name:   : [facility]    Activity level at baseline: Ind with walker per pt  Activity level on admit: Ax1 with walker      Patient registered to observation; given Patient Bill of Rights; given the opportunity to ask questions about observation status and their plan of care.  Patient has been oriented to the observation room, bathroom and call light is in place.    Discussed discharge goals and expectations with patient/family.

## 2020-06-23 NOTE — PHARMACY-ADMISSION MEDICATION HISTORY
Admission medication history interview status for this patient is complete. See [facility] admission navigator for allergy information, prior to admission medications and immunization status.     Medication history interview done via telephone during Covid-19 pandemic, indicate source(s):    Medication history resources (including written lists, pill bottles, clinic record): Facility [facility]    Changes made to PTA medication list:  Added: Norco, Omeprazole;   Deleted: None  Changed: Furosemide 80mg TuThSatSun --> 60mg TuThSatSun    Actions taken by pharmacist (provider contacted, etc):None     Additional medication history information:None    Medication reconciliation/reorder completed by provider prior to medication history? No     For patients on insulin therapy: YES  Sliding scale Novolog: Yes  Do you have a baseline novolog pre-meal dose:    Do you eat three meals a day:  Unknown  How many times do you check your blood glucose per day:  4  How many episodes of hypoglycemia do you have per week: Unknown  Do you have a Continuous glucose monitor (CGM) : No (remind pt that not approved for hospital use)  Any specific barriers to therapy? No  (cost, comfortable with injections, confident with current diabetes regimen?)      Prior to Admission medications    Medication Sig Last Dose Taking? Auth Provider   amLODIPine (NORVASC) 5 MG tablet Take 5 mg by mouth At Bedtime  6/22/2020 at HS Yes Unknown, Entered By History   aspirin (ASA) 325 MG EC tablet Take 325 mg by mouth At Bedtime 6/22/2020 at HS Yes Unknown, Entered By History   bisacodyl (DULCOLAX) 10 MG suppository Place 1 suppository (10 mg) rectally daily as needed for constipation  Yes Kamaljit Rojas MD   calcium acetate (PHOSLO) 667 MG CAPS capsule Take 667 mg by mouth 4 times daily With Snacks and Meals 6/23/2020 at x1 Yes Reported, Patient   calcium carbonate (TUMS) 500 MG chewable tablet Take 2 chew tab by mouth 3 times daily as needed for  heartburn 6/23/2020 at x1 Yes Reported, Patient   carvedilol (COREG) 25 MG tablet Take 1 tablet (25 mg) by mouth 2 times daily (with meals) 6/22/2020 at x2 Yes Kamaljit Rojas MD   furosemide (LASIX) 80 MG tablet Take 60 mg by mouth four times a week On Tuesday, Thursday, Saturday, and Sunday 6/23/2020 at AM Yes Reported, Patient   hydrALAZINE (APRESOLINE) 50 MG tablet Take 50 mg by mouth 2 times daily 6/22/2020 at x2 Yes Reported, Patient   HYDROcodone-acetaminophen (NORCO) 5-325 MG tablet Take 1-2 tablets by mouth every 4 hours as needed (For pain 1-5 give 1 tablet; for pain 6-10 give 2 tablets)  Yes Unknown, Entered By History   insulin aspart (NOVOLOG PEN) 100 UNIT/ML pen TID w/meals: -249=2U; 250-299=4u; 300-349=6u;350-399=8U; 400 & above 10units 6/22/2020 at Unknown time Yes Effie Hidalgo APRN CNP   insulin isophane & regular (HUMULIN MIX 70/30 PEN , NOVOLIN MIX 70/30 PEN) susp Inject 18 Units Subcutaneous every morning (in addition to PM dose) 6/22/2020 at AM Yes Reported, Patient   insulin NPH-Regular 70/30 susp Inject 9 Units Subcutaneous daily (with dinner) Note takes 18 Units with breakfast 6/22/2020 at PM Yes Keisha Nguyen APRN CNP   omeprazole (PRILOSEC OTC) 20 MG EC tablet Take 20 mg by mouth daily 6/23/2020 at AM Yes Unknown, Entered By History   ondansetron (ZOFRAN) 4 MG tablet Take 4 mg by mouth every 12 hours as needed for nausea 6/22/2020 at Unknown time Yes Reported, Patient   senna-docusate (SENOKOT-S/PERICOLACE) 8.6-50 MG tablet Take 1 tablet by mouth 2 times daily as needed for constipation  Yes Reported, Patient   simvastatin (ZOCOR) 20 MG tablet Take 1 tablet (20 mg) by mouth At Bedtime 6/22/2020 at HS Yes Kamaljit Rojas MD   acetaminophen (TYLENOL) 325 MG tablet Take 2 tablets (650 mg) by mouth every 6 hours as needed for mild pain or fever (> 101 F)   Kamaljit Rojas MD

## 2020-06-23 NOTE — ED PROVIDER NOTES
History     Chief Complaint:  Vomiting      HPI   Roxy Beaulieu is a 60 year old female who presents with vomiting. She reports two episodes associated with nausea in the middle of the night. She reports large amounts and per staff there was dark blood and coffee ground material. She notes she has had heartburn and was up most of the night due to the heartburn. She has had similar heartburn in the past that prompts nausea and vomiting but she has never vomited blood before. She takes aspirin daily but no other NSAIDs. She denies alcohol use. She denies abdominal pain. She has been having normal BMs without black or bloody stool. She denies other bleeding or abnormal bruising. She denies dizziness, chest pain, shortness of breath, or weakness. She denies cough, fever/chills, congestion or sore throat.    Allergies:  Heparin     Medications:    Acetaminophen   Amlodipine  Aspirin  Coreg  Lasix  Hydralazine  Insulin  Simvastatin  Tums    Past Medical History:    Anemia  Acute respiratory failure with hypoxia  CVA  Complication of arteriovenous dialysis fistula  Obesity  Cerebral artery occlusion with cerebral infarct  CHF  Diabetes  End-stage renal disease on dialysis  Gangrene of toe  Hyperlipidemia  Hypertension  Hypertensive cardiomyopathy  Obesity  PE  Toe ulcer, left  Venous thromboembolism    Past Surgical History:    Amputation of left toes  IR CVC tunnel placement  Revision fistula arteriovenous upper extremity  Create fistula arteriovenous upper extremity  Open reduction internal fixation fibula, left    Family History:    Hypertension, father  Hyperlipidemia, father  Alcohol and drug abuse, mother  Depression, mother    Social History:  Smoking status: Former smoker  Alcohol use: No  Drug use: No  PCP: Keisha Nguyen  Presents to the ED alone  Marital Status:  Single [1]      Review of Systems   Constitutional: Negative for chills and fever.   HENT: Negative for sore throat.    Respiratory: Negative  for cough and shortness of breath.    Cardiovascular: Negative for chest pain.   Gastrointestinal: Positive for nausea and vomiting (Dark-colored, coffee-colored). Negative for abdominal pain, blood in stool, constipation and diarrhea.   Neurological: Negative for dizziness and weakness.   Hematological: Does not bruise/bleed easily.   All other systems reviewed and are negative.      Physical Exam     Patient Vitals for the past 24 hrs:   BP Temp Temp src Pulse Heart Rate Resp SpO2   06/23/20 0852 (!) 159/63 98.1  F (36.7  C) Oral 81 81 18 98 %        Physical Exam  General: Elderly female sitting upright  Eyes: PERRL, Conjunctive within normal limits. No scleral icterus  ENT: Moist mucous membranes, oropharynx clear.   CV: Normal S1S2, no murmur, rub or gallop. Regular rate and rhythm  Resp: Clear to auscultation bilaterally, no wheezes, rales or rhonchi. Normal respiratory effort.  GI: Abdomen is soft, nontender and nondistended. No palpable masses. No rebound or guarding.  MSK: Bilateral lower extremity edema. Nontender. Normal active range of motion. Fistula RUE.  Skin: Warm and dry. No rashes or lesions or ecchymoses on visible skin.  Neuro: Alert and oriented. Responds appropriately to all questions and commands. No focal findings appreciated. Normal muscle tone.  Psych: Normal mood and affect.       Emergency Department Course   ECG 1142:  NSR. VR 79. Left anterior fascicular block. AZ 184ms. QRS 102ms. QT/QTc 398/456ms P-R-R 49, -46,47. Abnormal ECG. Preliminary read. 1145    Laboratory:  Laboratory findings were communicated with the patient who voiced understanding of the findings.    CBC: WBC 12.3(H), HGB 11.0(L),   CMP: Sodium 123 (L), chloride 76 (L), carbon dioxide 33 (H), glucose 190 (H), BUN 83 (H), GFR 4 (L), Creatinine 9.17 (H)  INR: 1.14   PTT: 27  ABO/Rh type and screen: O negative; antibody negative   Troponin (Collected 0952): <0.015     UA with micro:  "pending    Procedures    Interventions:  0933 Maalox ES, 15 mL, PO  0934 Pantoprazole, 40 mg, IV  NS 500mL IV Bolus     Emergency Department Course:   Nursing notes and vitals reviewed.    0901 I performed an exam of the patient as documented above.     0920 IV was inserted and blood was drawn for laboratory testing, results above.     1017 I personally reviewed the results with the patient and answered all related questions prior to admission. She is agreeable to admission.    1054  I spoke to Autumn Desai PA-C of the hospitalist service who accepts the patient for admission.     The patient provided a urine sample here in the emergency department. This was sent for laboratory testing, findings above.     I spoke with Dr. Rubi of [facility] who will likely perform endoscopy later today.     Patient stable on reassessment. Admitted to Dr. Dillard.       Impression & Plan      Medical Decision Making:  Roxy Beaulieu is a 60 year old female with ESRD on dialysis M/W/F who presents to the emergency department today for evaluation of hematemesis in the setting of chronic reflux. PUD or gastritis considered. Unlikely variceal or lower GI bleed. She is hemodynamically stable. She denies any symptoms on arrival aside from \"heartburn\" which she notes bothers her frequently. She is chronically anemic, no worse today. She is hyponatremic and hypochloremic, suggesting volume depletion. She had no further vomiting over her stay, and continued to deny symptoms. PPI was administered. Patient remained stable.       Diagnosis:    ICD-10-CM    1. Hematemesis with nausea  K92.0    2. Anemia, unspecified type  D64.9    3. Hyponatremia  E87.1    4. Stage 5 chronic kidney disease on chronic dialysis (H)  N18.6     Z99.2        Disposition:   The patient is admitted into the care of Autumn Desai PA-C.     Scribe Disclosure:  Johanny LY, am serving as a scribe at 0901 on 6/23/2020 to document services personally performed by " Emi Kingsley MD based on my observations and the provider's statements to me.  [facility] EMERGENCY DEPARTMENT       Emi Kingsley MD  06/23/20 1110       Emi Kingsley MD  06/23/20 1141

## 2020-06-23 NOTE — PLAN OF CARE
Care from admission to 7pm:  Pt is A&Ox4. Pt denies any pain or nausea. No vomiting since last night per pt report. Pt went for Upper Endoscopy with GI earlier today and found to have esophagitis. IV Protonix started. Neph consult for dialysis tomorrow. IVF NS at 50. Renal diet with limiting fluids to sips and minimal intake (500mL).

## 2020-06-23 NOTE — ED NOTES
"[facility]  ED Nurse Handoff Report    Roxy Beaulieu is a 60 year old female   ED Chief complaint: Vomiting  . ED Diagnosis:   Final diagnoses:   Hematemesis with nausea   Anemia, unspecified type   Hyponatremia   Stage 5 chronic kidney disease on chronic dialysis (H)     Allergies:   Allergies   Allergen Reactions     Heparin Hives     HIT-serotonin assay negative. NOT HIT       Code Status: Full Code  Activity level - Baseline/Home:  Assist X 1. Activity Level - Current:   Assist X 2. Lift room needed: No. Bariatric: No   Needed: No   Isolation: asymptomatic COVID swab. Infection: Not Applicable.     Vital Signs:   Vitals:    06/23/20 1000 06/23/20 1030 06/23/20 1100 06/23/20 1115   BP: (!) 146/76 (!) 167/68 (!) 175/69 (!) 174/70   Pulse: 77 78 77 77   Resp:       Temp:       TempSrc:       SpO2: 90% 98% 100% 100%       Cardiac Rhythm:  ,      Pain level: 0-10 Pain Scale: 8  Patient confused: No. Patient Falls Risk: Yes.   Elimination Status: has not had urge to void in ED   Patient Report - Initial Complaint: vomiting. Focused Assessment: Pt had one episode of coffee ground emesis per nursing home. Pt presents complaining of worsening heartburn more than her usual heartburn that started last night \"in the middle of the night\". Denies bloody stool   Tests Performed: labs, monitoring. Abnormal Results:   Abnormal Labs Reviewed   CBC WITH PLATELETS DIFFERENTIAL - Abnormal; Notable for the following components:       Result Value    WBC 12.3 (*)     Hemoglobin 11.0 (*)     Hematocrit 34.0 (*)     RDW 16.2 (*)     Absolute Neutrophil 11.1 (*)     Absolute Lymphocytes 0.4 (*)     All other components within normal limits   COMPREHENSIVE METABOLIC PANEL - Abnormal; Notable for the following components:    Sodium 123 (*)     Chloride 76 (*)     Carbon Dioxide 33 (*)     Glucose 190 (*)     Urea Nitrogen 83 (*)     Creatinine 9.17 (*)     GFR Estimate 4 (*)     GFR Estimate If Black 5 (*)  "    All other components within normal limits     .   Treatments provided: protonix, GI cocktail for heartburn  Family Comments: n/a  OBS brochure/video discussed/provided to patient:  N/A  ED Medications:   Medications   pantoprazole (PROTONIX) 40 mg IV push injection (40 mg Intravenous Given 6/23/20 0934)   alum & mag hydroxide-simethicone (MAALOX  ES) suspension 15 mL (15 mLs Oral Given 6/23/20 0933)   0.9% sodium chloride BOLUS (500 mLs Intravenous New Bag 6/23/20 1117)     Drips infusing:  No  For the majority of the shift, the patient's behavior Green. Interventions performed were n/a.    Sepsis treatment initiated: No       ED Nurse Name/Phone Number: Gwendolyn Blankenship RN,   11:25 AM  RECEIVING UNIT ED HANDOFF REVIEW    Above ED Nurse Handoff Report was reviewed: Yes  Reviewed by: Estelle Francis RN on June 23, 2020 at 12:41 PM

## 2020-06-23 NOTE — CONSULTS
D: SW consulted for discharge planning.    I/A: Per chart review, pt resides at [facility] in [place]. Confirmed with admissions staff at [facility], admissions staff confirming with facility SW that pt has a bed hold.    P: Return to [facility] when medically stable. HE WC transport has been arranged for pt during previous admissions. SW to continue following.       Yanet Tian, SHAZIA   Inpatient Care Coordination  [facility]   [phone]

## 2020-06-23 NOTE — OR NURSING
Patient tolerated procedure in endoscopy. Pt received 2mg versed, 100 mcg fentanyl, and benzocaine spray. Pt placed on nasal cannula during procedure due to O2 sats dropping after sedation given. RN to RN report given at 0177. Seema Oswald on 6/23/2020 at 3:56 PM

## 2020-06-23 NOTE — ED TRIAGE NOTES
"Pt had one episode of coffee ground emesis per nursing home. Pt presents complaining of worsening heartburn more than her usual heartburn that started last night \"in the middle of the night\". Denies bloody stool  "

## 2020-06-24 VITALS
OXYGEN SATURATION: 93 % | HEART RATE: 70 BPM | WEIGHT: 203.04 LBS | SYSTOLIC BLOOD PRESSURE: 146 MMHG | TEMPERATURE: 98.9 F | DIASTOLIC BLOOD PRESSURE: 66 MMHG | RESPIRATION RATE: 18 BRPM | BODY MASS INDEX: 31.8 KG/M2

## 2020-06-24 LAB
ANION GAP SERPL CALCULATED.3IONS-SCNC: 10 MMOL/L (ref 3–14)
BUN SERPL-MCNC: 90 MG/DL (ref 7–30)
CALCIUM SERPL-MCNC: 8.3 MG/DL (ref 8.5–10.1)
CHLORIDE SERPL-SCNC: 85 MMOL/L (ref 94–109)
CO2 SERPL-SCNC: 30 MMOL/L (ref 20–32)
COVID-19 VIRUS PCR TO MAYO - RESULT: NORMAL
CREAT SERPL-MCNC: 10.2 MG/DL (ref 0.52–1.04)
GFR SERPL CREATININE-BSD FRML MDRD: 4 ML/MIN/{1.73_M2}
GLUCOSE BLDC GLUCOMTR-MCNC: 126 MG/DL (ref 70–99)
GLUCOSE BLDC GLUCOMTR-MCNC: 88 MG/DL (ref 70–99)
GLUCOSE BLDC GLUCOMTR-MCNC: 91 MG/DL (ref 70–99)
GLUCOSE SERPL-MCNC: 98 MG/DL (ref 70–99)
HGB BLD-MCNC: 10.3 G/DL (ref 11.7–15.7)
POTASSIUM SERPL-SCNC: 4.9 MMOL/L (ref 3.4–5.3)
SARS-COV-2 RNA SPEC QL NAA+PROBE: NOT DETECTED
SODIUM SERPL-SCNC: 125 MMOL/L (ref 133–144)
SPECIMEN SOURCE: NORMAL
SPECIMEN SOURCE: NORMAL

## 2020-06-24 PROCEDURE — G0257 UNSCHED DIALYSIS ESRD PT HOS: HCPCS

## 2020-06-24 PROCEDURE — 25000132 ZZH RX MED GY IP 250 OP 250 PS 637: Performed by: PHYSICIAN ASSISTANT

## 2020-06-24 PROCEDURE — 80048 BASIC METABOLIC PNL TOTAL CA: CPT | Performed by: PHYSICIAN ASSISTANT

## 2020-06-24 PROCEDURE — C9113 INJ PANTOPRAZOLE SODIUM, VIA: HCPCS | Performed by: PHYSICIAN ASSISTANT

## 2020-06-24 PROCEDURE — 00000146 ZZHCL STATISTIC GLUCOSE BY METER IP

## 2020-06-24 PROCEDURE — G0378 HOSPITAL OBSERVATION PER HR: HCPCS

## 2020-06-24 PROCEDURE — 36415 COLL VENOUS BLD VENIPUNCTURE: CPT | Performed by: PHYSICIAN ASSISTANT

## 2020-06-24 PROCEDURE — 96376 TX/PRO/DX INJ SAME DRUG ADON: CPT

## 2020-06-24 PROCEDURE — 90937 HEMODIALYSIS REPEATED EVAL: CPT

## 2020-06-24 PROCEDURE — 25000128 H RX IP 250 OP 636: Performed by: PHYSICIAN ASSISTANT

## 2020-06-24 PROCEDURE — 25800030 ZZH RX IP 258 OP 636: Performed by: INTERNAL MEDICINE

## 2020-06-24 PROCEDURE — 96372 THER/PROPH/DIAG INJ SC/IM: CPT

## 2020-06-24 PROCEDURE — 25000131 ZZH RX MED GY IP 250 OP 636 PS 637: Performed by: PHYSICIAN ASSISTANT

## 2020-06-24 PROCEDURE — 99217 ZZC OBSERVATION CARE DISCHARGE: CPT | Performed by: HOSPITALIST

## 2020-06-24 PROCEDURE — 85018 HEMOGLOBIN: CPT | Performed by: PHYSICIAN ASSISTANT

## 2020-06-24 RX ORDER — OMEPRAZOLE 20 MG/1
40 TABLET, DELAYED RELEASE ORAL 2 TIMES DAILY
Qty: 60 TABLET | Refills: 1 | Status: ON HOLD | DISCHARGE
Start: 2020-06-24 | End: 2023-01-01

## 2020-06-24 RX ORDER — SUCRALFATE ORAL 1 G/10ML
1 SUSPENSION ORAL 4 TIMES DAILY PRN
Status: ON HOLD | DISCHARGE
Start: 2020-06-24 | End: 2023-01-01

## 2020-06-24 RX ADMIN — PANTOPRAZOLE SODIUM 40 MG: 40 INJECTION, POWDER, FOR SOLUTION INTRAVENOUS at 08:32

## 2020-06-24 RX ADMIN — SODIUM CHLORIDE 250 ML: 9 INJECTION, SOLUTION INTRAVENOUS at 09:23

## 2020-06-24 RX ADMIN — Medication: at 09:24

## 2020-06-24 RX ADMIN — CARVEDILOL 25 MG: 25 TABLET, FILM COATED ORAL at 08:32

## 2020-06-24 RX ADMIN — CALCIUM ACETATE 667 MG: 667 CAPSULE ORAL at 08:32

## 2020-06-24 RX ADMIN — SODIUM CHLORIDE 300 ML: 9 INJECTION, SOLUTION INTRAVENOUS at 09:23

## 2020-06-24 RX ADMIN — CALCIUM ACETATE 667 MG: 667 CAPSULE ORAL at 13:19

## 2020-06-24 RX ADMIN — INSULIN HUMAN 18 UNITS: 100 INJECTION, SUSPENSION SUBCUTANEOUS at 08:34

## 2020-06-24 NOTE — PLAN OF CARE
PRIMARY DIAGNOSIS: Hematemesis     OUTPATIENT/OBSERVATION GOALS TO BE MET BEFORE DISCHARGE  1. Orthostatic performed: N/A    2. Stable Hgb Yes.   Recent Labs   Lab Test 06/23/20  1319 06/23/20  0919 04/28/20  0840   HGB 11.0* 11.0* 10.6*         3. Resolved or declined bleeding episodes: Yes Last episode: Prior to hospitalization     4. Appropriate testing complete: Yes, EGD showed Esophagitis     5. Cleared for discharge by consultants (if involved): No    6. Safe discharge environment identified: Yes    Discharge Planner Nurse   Safe discharge environment identified: Yes, [facility],    Barriers to discharge: No       Entered by: Marlon Fry 06/23/2020      Please review provider order for any additional goals.   Nurse to notify provider when observation goals have been met and patient is ready for discharge.    Alert and Oriented x4. LS Clear. Bowel sounds active. Denies Nausea. PIV running NS at 50 ml/hr. 500 ml fluid restriction. Tolerating intake. RUQ dialysis cath in place, dressing CDI. Right arm fistula, thrill and bruit present. Dialysis tomorrow. Nephrology following. EGD completed, started protonix. VSS. Denies Pain. Will cont to monitor.

## 2020-06-24 NOTE — PROGRESS NOTES
Discharge Planner   Discharge Plans in progress: Return to [facility] after dialysis  Barriers to discharge plan: None anticipated.   Follow up plan: [facility] today. HE WC transport arranged for 1430 today. VM left at facility to notify them of pt's return. Discharge orders to be faxed. SW to continue following.        Entered by: Yanet Tian 06/24/2020 9:47 AM       SHAZIA Morejon   Inpatient Care Coordination  [facility]   [phone]

## 2020-06-24 NOTE — PROGRESS NOTES
Potassium   Date Value Ref Range Status   06/24/2020 4.9 3.4 - 5.3 mmol/L Final     Hemoglobin   Date Value Ref Range Status   06/24/2020 10.3 (L) 11.7 - 15.7 g/dL Final     Creatinine   Date Value Ref Range Status   06/24/2020 10.20 (H) 0.52 - 1.04 mg/dL Final     Urea Nitrogen   Date Value Ref Range Status   06/24/2020 90 (H) 7 - 30 mg/dL Final     Sodium   Date Value Ref Range Status   06/24/2020 125 (L) 133 - 144 mmol/L Final     INR   Date Value Ref Range Status   06/23/2020 1.14 0.86 - 1.14 Final       DIALYSIS PROCEDURE NOTE  Hepatitis status of previous patient on machine log was checked and verified ok to use with this patients hepatitis status.  Patient dialyzed for 3.5 hrs. on a 2 K bath with a net fluid removal of  3.2L.  A BFR of 400 ml/min was obtained via a right tunneled catheter. The treatment plan was discussed with Dr. Vaughn during the treatment.  Total heparin received during the treatment: 0 units.     Line flushed, clamped and capped with heparin 1:1000 2.1 mL (2100 units) per lumen  Meds  given: none Complications: none  Goal increased after one hour per Dr. Vaughn    Access care and importance of attending dialysis 3xweek discussed with patient.  Patient stated she understood.  Patient has a flat affect, so unclear if she will retain what we discussed.  ICEBOAT? Timeout performed pre-treatment  I: Patient was identified using 2 identifiers  C: Consent obtained/verified current before treatment  E: Equipment preventative maintenance is current and dialysis delivery system OK to use  B: Hepatitis B Surface Antigen: negative; Draw Date: 12/16/19      Hepatitis B Surface Antibody: immune; Draw Date: 1/20/20  O: Dialysis orders present and complete prior to treatment  A: Vascular access verified and assessed prior to treatment  T: Treatment was performed at a clinically appropriate time  ?: Patient was allowed to ask questions and address concerns prior to treatment  See flowsheet in EPIC for  further details and post assessment.  Machine water alarm in place and functioning. Transducer pods intact and checked every 15min.  Pt returned via Wheelchair  Report received from: DESTIN Francis RN  Report given to: DESTIN Francis RN  Chlorine/Chloramine water system checked every 4 hours.  Outpatient Dialysis at [facility]

## 2020-06-24 NOTE — PLAN OF CARE
PRIMARY DIAGNOSIS: Hematemesis     OUTPATIENT/OBSERVATION GOALS TO BE MET BEFORE DISCHARGE  1. Orthostatic performed: N/A    2. Stable Hgb Yes.   Recent Labs   Lab Test 06/23/20  1319 06/23/20  0919 04/28/20  0840   HGB 11.0* 11.0* 10.6*         3. Resolved or declined bleeding episodes: Yes Last episode: Prior to hospitalization     4. Appropriate testing complete: Yes, EGD showed Esophagitis     5. Cleared for discharge by consultants (if involved): No    6. Safe discharge environment identified: Yes    Discharge Planner Nurse   Safe discharge environment identified: Yes, [facility],    Barriers to discharge: No       Entered by: Estelle Francis 06/24/2020      Please review provider order for any additional goals.   Nurse to notify provider when observation goals have been met and patient is ready for discharge.    Alert and Oriented x4. Denies Nausea or pain. IVF discontinued, stopped this AM. 500 ml fluid restriction. Tolerating intake. RUQ dialysis cath in place, dressing CDI. Right arm fistula, thrill and bruit present. Pt at dialysis now. Nephrology following. EGD completed, started protonix. VSS. Denies Pain. Will cont to monitor.

## 2020-06-24 NOTE — DISCHARGE SUMMARY
"[facility]  Hospitalist Discharge Summary      Date of Admission:  6/23/2020  Date of Discharge:  6/24/2020  Discharging Provider: Diogo Dillard MD      Discharge Diagnoses   Hematemesis due to severe esophagitis. Acute blood loss anemia    Follow-ups Needed After Discharge   Follow-up Appointments     Follow-up and recommended labs and tests       Follow up with primary care provider, Keisha Nguyen, within 7 days for   hospital follow- up. Follow-up labs with dialysis. Follow-up with   [facility] for repeat EGD in 4-6 weeks ([phone]. You   were seen by Dr. Rubi while here)             Unresulted Labs Ordered in the Past 30 Days of this Admission     Date and Time Order Name Status Description    6/23/2020 1500 Surgical pathology exam In process       These results will be followed up by GI    Discharge Disposition   Discharged to long-term care facility  Condition at discharge: Stable      Hospital Course   Roxy Beaulieu is a 60 year old female with ESRD on HD M/W/F, combined CHF, hypertensive cardiomyopathy EF 45-50%, DM2, CVA with resulting left hemiparesis, PE/DVT, HTN, DLD who presented to [facility] ED on 6/23/2020 from [facility] with nausea, vomiting, and 2 episodes hematemesis.  Patient commonly has heartburn and it has caused nausea and vomiting in the past but never hematemesis.       In the ED, /63, HR 79, RR 18, SpO2 98%, temp 98.1.  BMP revealed sodium 123, potassium 5.1, chloride 76, CO2 33, BUN 83, Cr 9.17, GFR 4.  LFTs within normal limits.  CBC showed WBC 12.3, Hgb 11, Plt 285 with ANC 11.1.  Trop <0.015.  INR 1.14.  EKG showed NSR.  GI consulted.  Registered to Observation.      Patient seen on the floor where she reports she missed dialysis yesterday because she \"didn't feel good\".  She can't really expand on this but notes having some nausea and vomiting.  No fever, chills, shortness of breath, chest pain.  She has not had hematemesis before.  " Known GERD but no previous EGD on file in [facility] or Care Everywhere.  She reports her legs are a little swollen but notes she doesn't actually feel all that bad today.     EGD was done and showed severe erosive esophagitis. Her omeprazole will be increased to 40mg BID. She had dialysis done today. She feels well. Denies chest pain, sob, abdo pain, n/v/d. She feels ready for discharge. I offered to call family. She declined.    Consultations This Hospital Stay   GASTROENTEROLOGY IP CONSULT  SOCIAL WORK IP CONSULT  NEPHROLOGY IP CONSULT  PHYSICAL THERAPY ADULT IP CONSULT  OCCUPATIONAL THERAPY ADULT IP CONSULT    Code Status   Full Code    Time Spent on this Encounter   I, Diogo Dillard MD, personally saw the patient today and spent greater than 30 minutes discharging this patient.       Diogo Dillard MD  [facility]  ______________________________________________________________________    Physical Exam   Vital Signs: Temp: 98.9  F (37.2  C) Temp src: Oral BP: (!) 146/66 Pulse: 70 Heart Rate: 73 Resp: 18 SpO2: 93 % O2 Device: None (Room air) Oxygen Delivery: 2 LPM  Weight: 203 lbs .7 oz  Constitutional: awake, alert, cooperative, no apparent distress, and appears stated age  Eyes: Lids and lashes normal, pupils equal, round and reactive to light, extra ocular muscles intact, sclera clear, conjunctiva normal  ENT: Normocephalic, without obvious abnormality, atraumatic, sinuses nontender on palpation, external ears without lesions, oral pharynx with moist mucous membranes, tonsils without erythema or exudates, gums normal and good dentition.  Respiratory: No increased work of breathing, good air exchange, clear to auscultation bilaterally, no crackles or wheezing  Cardiovascular: Normal apical impulse, regular rate and rhythm, normal S1 and S2, no S3 or S4, and no murmur noted  GI: No scars, normal bowel sounds, soft, non-distended, non-tender, no masses palpated, no hepatosplenomegally  Skin: no  bruising or bleeding  Musculoskeletal: no lower extremity pitting edema present       Primary Care Physician   Keisha Nguyen    Discharge Orders      General info for SNF    Length of Stay Estimate: Long Term Care  Condition at Discharge: Stable  Level of care:skilled   Rehabilitation Potential: Fair  Admission H&P remains valid and up-to-date: Yes  Recent Chemotherapy: N/A  Use Nursing Home Standing Orders: Yes     Mantoux instructions    Give two-step Mantoux (PPD) Per Facility Policy Yes     Reason for your hospital stay    Hematemesis (vomiting blood), due to severe esophagitis     Glucose monitor nursing POCT    Before meals and at bedtime     Activity - Up with nursing assistance     Follow-up and recommended labs and tests     Follow up with primary care provider, Keisha Nguyen, within 7 days for hospital follow- up. Follow-up labs with dialysis. Follow-up with [facility] for repeat EGD in 4-6 weeks ([phone]. You were seen by Dr. Rubi while here)     Full Code     Physical Therapy Adult Consult    Evaluate and treat as clinically indicated.    Reason:  Weakness, ESRD     Occupational Therapy Adult Consult    Evaluate and treat as clinically indicated.    Reason:  Weakness, ESRD     Fall precautions     Advance Diet as Tolerated    Follow this diet upon discharge: Orders Placed This Encounter       Combination Diet Dialysis Diet, diabetic diet       Significant Results and Procedures   Most Recent 3 CBC's:  Recent Labs   Lab Test 06/24/20  0612 06/23/20  1319 06/23/20  0919  01/28/20  1040 10/31/19  0556   WBC  --   --  12.3*  --  7.5 9.1   HGB 10.3* 11.0* 11.0*   < > 10.8* 9.3*   MCV  --   --  88  --  87 92   PLT  --   --  285  --  220 327    < > = values in this interval not displayed.     Most Recent 3 BMP's:  Recent Labs   Lab Test 06/24/20  0612 06/23/20  0919 04/28/20  0840   * 123* 130*   POTASSIUM 4.9 5.1 4.6   CHLORIDE 85* 76* 98   CO2 30 33* 28   BUN 90* 83* 42*   CR  10.20* 9.17* 5.15*   ANIONGAP 10 14 4   GILL 8.3* 9.4 9.1   GLC 98 190* 192*     Most Recent 2 LFT's:  Recent Labs   Lab Test 06/23/20  0919 05/27/19 2010   AST 16 38   ALT 11 56*   ALKPHOS 71 155*   BILITOTAL 0.3 0.3   ,   Results for orders placed or performed during the hospital encounter of 06/16/20   US Ext Arterial Venous Dialys Acs Graft    Narrative    ULTRASOUND EXTREMITY ARTERIOVENOUS DIALYSIS ACCESS GRAFT  6/16/2020  11:27 AM     HISTORY:  Right proximal radial artery to cephalic vein fistula.    COMPARISON: 6/9/2020    FINDINGS: Color Doppler and spectral waveform analysis performed.    A right proximal radial artery to cephalic vein fistula is patent. The  outflow volume is measured to be 822 mL/m versus 1523 mL/m on the  previous exam.    Again noted is a focal stenosis in the central outflow cephalic vein  at its junction with the subclavian vein. The luminal diameter of the  vein in this area measures 2.8 mm.    Diameters of the remaining outflow vein range between 8.1 to 4.0 mm.      Impression    IMPRESSION: Patent right upper extremity dialysis fistula. Focal  stenosis in the central outflow vein as above.     ADAIR PONCE MD       Discharge Medications   Current Discharge Medication List      START taking these medications    Details   sucralfate (CARAFATE) 1 GM/10ML suspension Take 10 mLs (1 g) by mouth 4 times daily as needed (heartburn, reflux, nausea, vomiting)  Qty:      Associated Diagnoses: Gastroesophageal reflux disease with esophagitis; Hematemesis, presence of nausea not specified         CONTINUE these medications which have CHANGED    Details   omeprazole (PRILOSEC OTC) 20 MG EC tablet Take 2 tablets (40 mg) by mouth 2 times daily  Qty: 60 tablet, Refills: 1    Associated Diagnoses: Gastroesophageal reflux disease with esophagitis; Hematemesis, presence of nausea not specified         CONTINUE these medications which have NOT CHANGED    Details   amLODIPine (NORVASC) 5 MG tablet  Take 5 mg by mouth At Bedtime       aspirin (ASA) 325 MG EC tablet Take 325 mg by mouth At Bedtime      bisacodyl (DULCOLAX) 10 MG suppository Place 1 suppository (10 mg) rectally daily as needed for constipation    Associated Diagnoses: Cerebrovascular accident (CVA) due to thrombosis of precerebral artery (H)      calcium acetate (PHOSLO) 667 MG CAPS capsule Take 667 mg by mouth 4 times daily With Snacks and Meals      calcium carbonate (TUMS) 500 MG chewable tablet Take 2 chew tab by mouth 3 times daily as needed for heartburn      carvedilol (COREG) 25 MG tablet Take 1 tablet (25 mg) by mouth 2 times daily (with meals)    Associated Diagnoses: Cerebrovascular accident (CVA) due to thrombosis of precerebral artery (H)      furosemide (LASIX) 80 MG tablet Take 60 mg by mouth four times a week On Tuesday, Thursday, Saturday, and Sunday       hydrALAZINE (APRESOLINE) 50 MG tablet Take 50 mg by mouth 2 times daily      HYDROcodone-acetaminophen (NORCO) 5-325 MG tablet Take 1-2 tablets by mouth every 4 hours as needed (For pain 1-5 give 1 tablet; for pain 6-10 give 2 tablets)      insulin aspart (NOVOLOG PEN) 100 UNIT/ML pen TID w/meals: -249=2U; 250-299=4u; 300-349=6u;350-399=8U; 400 & above 10units  Qty: 9 mL, Refills: 4    Associated Diagnoses: Insulin dependent type 2 diabetes mellitus (H)      !! insulin isophane & regular (HUMULIN MIX 70/30 PEN , NOVOLIN MIX 70/30 PEN) susp Inject 18 Units Subcutaneous every morning (in addition to PM dose)      !! insulin NPH-Regular 70/30 susp Inject 9 Units Subcutaneous daily (with dinner) Note takes 18 Units with breakfast  Qty: 6 mL, Refills: 11    Associated Diagnoses: Insulin dependent type 2 diabetes mellitus (H)      ondansetron (ZOFRAN) 4 MG tablet Take 4 mg by mouth every 12 hours as needed for nausea      senna-docusate (SENOKOT-S/PERICOLACE) 8.6-50 MG tablet Take 1 tablet by mouth 2 times daily as needed for constipation      simvastatin (ZOCOR) 20 MG tablet  Take 1 tablet (20 mg) by mouth At Bedtime    Associated Diagnoses: Cerebrovascular accident (CVA) due to thrombosis of precerebral artery (H)      acetaminophen (TYLENOL) 325 MG tablet Take 2 tablets (650 mg) by mouth every 6 hours as needed for mild pain or fever (> 101 F)    Associated Diagnoses: Cerebrovascular accident (CVA) due to thrombosis of precerebral artery (H)       !! - Potential duplicate medications found. Please discuss with provider.        Allergies   Allergies   Allergen Reactions     Heparin Hives     HIT-serotonin assay negative. NOT HIT

## 2020-06-24 NOTE — PROGRESS NOTES
Patient's After Visit Summary was reviewed with patient.  Patient verbalized understanding of After Visit Summary, recommended follow up and was given an opportunity to ask questions.   Discharge medications sent home with patient/family: No   Discharged with [facility] WC transport.   OBSERVATION patient END time: 2:40p

## 2020-06-24 NOTE — PLAN OF CARE
PRIMARY DIAGNOSIS: Hematemesis     OUTPATIENT/OBSERVATION GOALS TO BE MET BEFORE DISCHARGE  1. Orthostatic performed: N/A    2. Stable Hgb Yes.   Recent Labs   Lab Test 06/23/20  1319 06/23/20  0919 04/28/20  0840   HGB 11.0* 11.0* 10.6*         3. Resolved or declined bleeding episodes: Yes Last episode: Prior to hospitalization     4. Appropriate testing complete: Yes, EGD showed Esophagitis     5. Cleared for discharge by consultants (if involved): No    6. Safe discharge environment identified: Yes    Discharge Planner Nurse   Safe discharge environment identified: Yes, [facility],    Barriers to discharge: No       Entered by: Marlon Fry 06/24/2020      Please review provider order for any additional goals.   Nurse to notify provider when observation goals have been met and patient is ready for discharge.    Alert and Oriented x4. LS Clear. Bowel sounds active. Denies Nausea. PIV running NS at 50 ml/hr. 500 ml fluid restriction. Tolerating intake. RUQ dialysis cath in place, dressing CDI. Right arm fistula, thrill and bruit present. Dialysis tomorrow. Nephrology following. EGD completed, started protonix. VSS. Denies Pain. Will cont to monitor.

## 2020-06-24 NOTE — PLAN OF CARE
PRIMARY DIAGNOSIS: Hematemesis     OUTPATIENT/OBSERVATION GOALS TO BE MET BEFORE DISCHARGE  1. Orthostatic performed: N/A    2. Stable Hgb Yes.   Recent Labs   Lab Test 06/23/20  1319 06/23/20  0919 04/28/20  0840   HGB 11.0* 11.0* 10.6*         3. Resolved or declined bleeding episodes: Yes Last episode: Prior to hospitalization     4. Appropriate testing complete: Yes, EGD showed Esophagitis     5. Cleared for discharge by consultants (if involved): No    6. Safe discharge environment identified: Yes    Discharge Planner Nurse   Safe discharge environment identified: Yes, [facility],    Barriers to discharge: No       Entered by: Estelle Francis 06/24/2020      Please review provider order for any additional goals.   Nurse to notify provider when observation goals have been met and patient is ready for discharge.    Alert and Oriented x4. Denies Nausea or pain. IVF discontinued, stopped this AM. 500 ml fluid restriction. Tolerating intake. RUQ dialysis cath in place, dressing CDI. Right arm fistula, thrill and bruit present. Pt at dialysis now. Nephrology following. EGD completed yesterday, started protonix. VSS. Denies Pain. Will cont to monitor.

## 2020-06-24 NOTE — PROGRESS NOTES
This patient was seen and examined while on dialysis. Professional oversight of the patient's dialysis care, access care and dialysis related co-morbidities were addressed as necessary with the patient and / or staff.     Patient is well-known to me as he dialyzes under my care.  She was admitted yesterday for complaints of nausea and vomiting with 2 episodes of hematemesis.  EGD showed significant esophagitis in the lower one third of esophagus.  Biopsy results pending.  Plan is for PPI and repeat EGD in 4 to 6 weeks.    Patient is being seen on dialysis.  She missed her dialysis on Monday.  She is about 5 kg above her dry weight.  She was given gentle IV fluids secondary to her GI bleed here in hospital.    Dialyzed through a tunneled dialysis catheter.  She has AV fistula which is ready to use but has not been used yet.    Seen and examined on dialysis.  No signs of respiratory distress.  Dialysis running well.  Being dialyzed through tunneled dialysis catheter.  Denies any complaints.    Exam  /59   Pulse 71   Temp 98.3  F (36.8  C) (Oral)   Resp 18   Wt 92.1 kg (203 lb 0.7 oz)   SpO2 93%   BMI 31.80 kg/m    AAO  CTA  PA  =soft  Trace  edema    Plan -  1 ESRD-missed dialysis on Monday.  5 kg above her dry weight.  No respiratory distress.  Dialysis today for 3-1/2 hours with 3 L ultrafiltration.  She returned to dialysis on Friday.  If unable to get down to target weight she may need an extra treatment in outpatient setting.  -Avoid further IV fluids.  -Noted plans for discharge.    2 upper GI bleed-secondary to esophagitis.  On PPI.    3 anemia in ESRD-along with GI bleed.  Hemoglobin stable.    Enrico Vaughn MD   June 24, 2020

## 2020-06-26 LAB — COPATH REPORT: NORMAL

## 2020-07-21 ENCOUNTER — HOSPITAL LABORATORY (OUTPATIENT)
Dept: OTHER | Facility: CLINIC | Age: 60
End: 2020-07-21

## 2020-07-24 LAB
SARS-COV-2 RNA SPEC QL NAA+PROBE: NOT DETECTED
SPECIMEN SOURCE: NORMAL

## 2020-08-04 ENCOUNTER — HOSPITAL LABORATORY (OUTPATIENT)
Dept: OTHER | Facility: CLINIC | Age: 60
End: 2020-08-04

## 2020-08-05 LAB
SARS-COV-2 RNA SPEC QL NAA+PROBE: NOT DETECTED
SPECIMEN SOURCE: NORMAL

## 2020-08-07 ENCOUNTER — TRANSFERRED RECORDS (OUTPATIENT)
Dept: HEALTH INFORMATION MANAGEMENT | Facility: CLINIC | Age: 60
End: 2020-08-07

## 2020-08-10 ENCOUNTER — NURSING HOME VISIT (OUTPATIENT)
Dept: GERIATRICS | Facility: CLINIC | Age: 60
End: 2020-08-10
Payer: COMMERCIAL

## 2020-08-10 VITALS
SYSTOLIC BLOOD PRESSURE: 168 MMHG | HEART RATE: 71 BPM | HEIGHT: 68 IN | DIASTOLIC BLOOD PRESSURE: 72 MMHG | TEMPERATURE: 98.4 F | WEIGHT: 199.4 LBS | BODY MASS INDEX: 30.22 KG/M2 | OXYGEN SATURATION: 93 % | RESPIRATION RATE: 16 BRPM

## 2020-08-10 DIAGNOSIS — R41.89 COGNITIVE IMPAIRMENT: ICD-10-CM

## 2020-08-10 DIAGNOSIS — N18.6 ESRD (END STAGE RENAL DISEASE) ON DIALYSIS (H): ICD-10-CM

## 2020-08-10 DIAGNOSIS — Z79.4 INSULIN DEPENDENT TYPE 2 DIABETES MELLITUS (H): ICD-10-CM

## 2020-08-10 DIAGNOSIS — Z86.73 HISTORY OF CVA (CEREBROVASCULAR ACCIDENT): ICD-10-CM

## 2020-08-10 DIAGNOSIS — I63.81 LACUNAR STROKE (H): ICD-10-CM

## 2020-08-10 DIAGNOSIS — K22.10 EROSIVE ESOPHAGITIS: Primary | ICD-10-CM

## 2020-08-10 DIAGNOSIS — Z99.2 ESRD (END STAGE RENAL DISEASE) ON DIALYSIS (H): ICD-10-CM

## 2020-08-10 DIAGNOSIS — Z11.59 ENCOUNTER FOR SCREENING FOR OTHER VIRAL DISEASES: Primary | ICD-10-CM

## 2020-08-10 DIAGNOSIS — E11.9 INSULIN DEPENDENT TYPE 2 DIABETES MELLITUS (H): ICD-10-CM

## 2020-08-10 DIAGNOSIS — I10 BENIGN ESSENTIAL HYPERTENSION: ICD-10-CM

## 2020-08-10 PROCEDURE — 99309 SBSQ NF CARE MODERATE MDM 30: CPT | Performed by: INTERNAL MEDICINE

## 2020-08-10 ASSESSMENT — MIFFLIN-ST. JEOR: SCORE: 1512.97

## 2020-08-10 NOTE — LETTER
"    8/10/2020        RE: Roxy Beaulieu  [address] [facility] [address]        Roxy Beaulieu is a 60 year old female seen August 10, 2020 at [facility] where she has resided for 9 months (admit 11/2019) seen for acute CVI and ESRD.   Pt is seen in her room up to .  States she is feeling well, denies any current pain or other symptoms.  She enjoys spending time outdoors on the patio, skin is tanned.    Had HD this morning and run went \"okay, it's done.\"   Using RUE fistula now and due to have her tunneled catheter removed next week.    Has been on dialysis since June 2019,  IR tunneled catheter placed in October 2019, and RUE AV fistula placed 2/2020.   ESRD secondary to HTN and DM2  Pt presented to [facility] in October 2019 with new LLE weakness and MRI showed an acute right periventricular white matter infarct, thought to be lacunar from HTN.   ECHO showed EF 66% without shunt, and no high grade stenosis seen on head/neck CTA.   She was discharged to Acute Rehab and participated with therapies.  Continued with cognitive and physical limitations and transferred to LT, probably for permanent placement.       Pt had a [facility] hospitalization in June 2020 following 2 episodes of hematemesis; hgb dropped from 11 to 10.3    She underwent EGD that showed severe erosive esophagitis and her omeprazole dose increased to 40 mg bid.  Today denies any discomfort, emesis or further bleeding.       Past Medical History:   Diagnosis Date     Diabetes (H)      Gangrene of toe (H) 4/29/2017     High cholesterol      Hypertension      Obesity 4/29/2017     PE (pulmonary thromboembolism) (H)      Personal history of tobacco use, presenting hazards to health 4/29/2017     Uncontrolled type 2 diabetes mellitus with hyperglycemia, with long-term current use of insulin (H) 4/29/2017   Heparin induced thrombocytopenia, 2018  ESRD on dialysis, 2019  Erosive esophagitis, 2020    Past Surgical History: "   Procedure Laterality Date     AMPUTATE TOE(S) Left 2017    Procedure: AMPUTATE TOE(S);  Amputate first and second left toes;  Surgeon: Verna Fofana DPM, Podiatry/Foot and Ankle Surgery;  Location: [facility] OR     AMPUTATE TOE(S) Left 11/3/2018    Procedure: Partial left 3rd toe amputation;  Surgeon: Verna Fofana DPM, Podiatry/Foot and Ankle Surgery;  Location: [facility] OR     CREATE FISTULA ARTERIOVENOUS UPPER EXTREMITY Right 2020    Procedure: RIGHT PROXIMAL RADIAL TO CEPHALIC ARTERIOVENOUS FISTULA CREATION;  Surgeon: Sumanth Corbin MD;  Location:  OR     ESOPHAGOSCOPY, GASTROSCOPY, DUODENOSCOPY (EGD), COMBINED Left 2020    Procedure: ESOPHAGOGASTRODUODENOSCOPY, WITH biopsies using biopsy forceps;  Surgeon: Elliott Rubi MD;  Location: [facility] GI     IR CVC TUNNEL PLACEMENT > 5 YRS OF AGE  2019     OPEN REDUCTION INTERNAL FIXATION FIBULA Left 3/16/2018    Procedure: OPEN REDUCTION INTERNAL FIXATION FIBULA;  Open reduction and internal fixation of displaced left lateral malleolar fracture;  Surgeon: Sumanth Wen MD;  Location: [facility] OR     REVISION FISTULA ARTERIOVENOUS UPPER EXTREMITY Right 2020    Procedure: ONLAY VEIN PATCH RIGHT RADIAL TO CEPHALIC FISTULA WITH LIGATION OF SIDE BRANCH ;  Surgeon: Sumanth Corbin MD;  Location:  OR     Social History     Tobacco Use     Smoking status: Former Smoker     Types: Cigarettes     Last attempt to quit: 2016     Years since quittin.0     Smokeless tobacco: Never Used   Substance Use Topics     Alcohol use: No      SH:  Previously lived alone, condo in [place].   Her sister Yuki is first contact.     Worked as a , and before that as a .       Review Of Systems  Musculoskeletal: Velez   TUG 19 sec  Neurologic: STML, decreased insight.  Neuropsych testing done in Acute Rehab.  Psychiatric: anxiety and depression  Wt Readings from Last 5 Encounters:   08/10/20 90.4 kg (199 lb 6.4 oz)   20  "92.1 kg (203 lb 0.7 oz)   06/18/20 81.6 kg (180 lb)   06/10/20 94.7 kg (208 lb 12.8 oz)   05/14/20 85.3 kg (188 lb)       GENERAL APPEARANCE: alert and no distress  BP (!) 168/72   Pulse 71   Temp 98.4  F (36.9  C)   Resp 16   Ht 1.727 m (5' 8\")   Wt 90.4 kg (199 lb 6.4 oz)   SpO2 93%   BMI 30.32 kg/m     HEENT: normocephalic, no lesion or abnormalities  NECK: no adenopathy, no asymmetry, masses, or scars and thyroid normal to palpation  Tunneled catheter right chest wall, site okay  RESP: lungs clear to auscultation - no rales, rhonchi or wheezes  CV: regular rate and rhythm, normal S1 S2  ABDOMEN:  soft, nontender, no HSM or masses and bowel sounds normal  MS: extremities normal- no gross deformities noted, no ext edema  NEURO: speech normal, WC bound, limited hx  PSYCH: affect okay    Last Comprehensive Metabolic Panel:  Sodium   Date Value Ref Range Status   06/24/2020 125 (L) 133 - 144 mmol/L Final     Potassium   Date Value Ref Range Status   06/24/2020 4.9 3.4 - 5.3 mmol/L Final     Chloride   Date Value Ref Range Status   06/24/2020 85 (L) 94 - 109 mmol/L Final     Carbon Dioxide   Date Value Ref Range Status   06/24/2020 30 20 - 32 mmol/L Final     Anion Gap   Date Value Ref Range Status   06/24/2020 10 3 - 14 mmol/L Final     Glucose   Date Value Ref Range Status   06/24/2020 98 70 - 99 mg/dL Final     Urea Nitrogen   Date Value Ref Range Status   06/24/2020 90 (H) 7 - 30 mg/dL Final     Creatinine   Date Value Ref Range Status   06/24/2020 10.20 (H) 0.52 - 1.04 mg/dL Final     GFR Estimate   Date Value Ref Range Status   06/24/2020 4 (L) >60 mL/min/[1.73_m2] Final     Comment:     Non  GFR Calc  Starting 12/18/2018, serum creatinine based estimated GFR (eGFR) will be   calculated using the Chronic Kidney Disease Epidemiology Collaboration   (CKD-EPI) equation.       Calcium   Date Value Ref Range Status   06/24/2020 8.3 (L) 8.5 - 10.1 mg/dL Final     Lab Results   Component Value " Date    AST 16 06/23/2020     Lab Results   Component Value Date    ALBUMIN 3.6 06/23/2020     Lab Results   Component Value Date    PROTTOTAL 7.3 06/23/2020      Lab Results   Component Value Date    ALKPHOS 71 06/23/2020     Lab Results   Component Value Date    WBC 12.3 06/23/2020      HGB 10.3 06/24/2020      MCV 88 06/23/2020       06/23/2020     ECHO 5/27/19 Interpretation Summary  The visual ejection fraction is estimated at 45-50%.  Left ventricular systolic function is mildly reduced.  A basal inferior wall motion abnormality can not be excluded on this study even with contrast use.  Grade III or advanced diastolic dysfunction. There is moderate to severe concentric left ventricular hypertrophy.      IMP/PLAN:   (K22.10) Erosive esophagitis   Comment: with recent bleed     Plan: continue omeprazole 40 mg bid; also has sucralfate and Zofran available if needed.     Follow up with GI prn       (I63.81) Lacunar stroke (H)    (I69.352) Hemiplegia and hemiparesis following cerebral infarction affecting left dominant side (H)  Comment: improving, finished course of therapies but remains WC bound  Plan: daily ASA, bp meds, simvastatin for secondary prevention     (I12.0) Hypertensive chronic kidney disease with stage 5 chronic kidney disease or end stage renal disease (H)  (N18.6,  Z99.2) ESRD (end stage renal disease) on dialysis.  Initiation of HD 6/2019.  Comment:   BP Readings from Last 3 Encounters:   08/10/20 (!) 168/72   06/24/20 (!) 146/66   06/18/20 (!) 164/72      Pulse Readings from Last 4 Encounters:   08/10/20 71   06/24/20 70   06/10/20 82   04/28/20 65      Plan: amlodipine 5 mg/day, carvedilol 25 mg bid, hydralazine 50 mg bid; consider increasing hydralazine to tid if SBPs remain >140     Also remains on PhosLo qid    (E11.22,  N18.6,  Z99.2,  Z79.4) Type 2 diabetes mellitus with chronic kidney disease on chronic dialysis, with long-term current use of insulin (H)  Comment:   Lab Results    Component Value Date    A1C 5.8 06/23/2020    A1C 6.5 02/13/2020    A1C 9.7 10/28/2019    A1C 10.5 05/28/2019    A1C 11.6 10/04/2018      Plan: remains on 70 /30 insulin 18 units with breakfast and 9 units at supper.  Has a sliding scale Novolog insulin available as well   Has had hypoglycemia, would consider changing to low dose Lantus for more even control.     On statin and ASA, not on ACEI secondary to renal failure.       (N18.6,  D63.1,  Z99.2) Anemia in chronic kidney disease, on chronic dialysis (H)  Comment: hgb improved   Plan: Aranesp, Epogen as ordered; follow hgb     Jennifer Longo MD       Sincerely,        Jennifer Longo MD

## 2020-08-14 ENCOUNTER — HOSPITAL LABORATORY (OUTPATIENT)
Dept: OTHER | Facility: CLINIC | Age: 60
End: 2020-08-14

## 2020-08-14 DIAGNOSIS — Z79.4 DIABETES MELLITUS TYPE 2, INSULIN DEPENDENT (H): Primary | ICD-10-CM

## 2020-08-14 DIAGNOSIS — E11.9 DIABETES MELLITUS TYPE 2, INSULIN DEPENDENT (H): Primary | ICD-10-CM

## 2020-08-14 NOTE — PROGRESS NOTES
"Roxy Beaulieu is a 60 year old female seen August 10, 2020 at [facility] where she has resided for 9 months (admit 11/2019) seen for acute CVI and ESRD.   Pt is seen in her room up to .  States she is feeling well, denies any current pain or other symptoms.  She enjoys spending time outdoors on the patio, skin is tanned.    Had HD this morning and run went \"okay, it's done.\"   Using RUE fistula now and due to have her tunneled catheter removed next week.    Has been on dialysis since June 2019,  IR tunneled catheter placed in October 2019, and RUE AV fistula placed 2/2020.   ESRD secondary to HTN and DM2  Pt presented to [facility] in October 2019 with new LLE weakness and MRI showed an acute right periventricular white matter infarct, thought to be lacunar from HTN.   ECHO showed EF 66% without shunt, and no high grade stenosis seen on head/neck CTA.   She was discharged to Acute Rehab and participated with therapies.  Continued with cognitive and physical limitations and transferred to LT, probably for permanent placement.       Pt had a [facility] hospitalization in June 2020 following 2 episodes of hematemesis; hgb dropped from 11 to 10.3    She underwent EGD that showed severe erosive esophagitis and her omeprazole dose increased to 40 mg bid.  Today denies any discomfort, emesis or further bleeding.       Past Medical History:   Diagnosis Date     Diabetes (H)      Gangrene of toe (H) 4/29/2017     High cholesterol      Hypertension      Obesity 4/29/2017     PE (pulmonary thromboembolism) (H)      Personal history of tobacco use, presenting hazards to health 4/29/2017     Uncontrolled type 2 diabetes mellitus with hyperglycemia, with long-term current use of insulin (H) 4/29/2017   Heparin induced thrombocytopenia, 2018  ESRD on dialysis, 2019  Erosive esophagitis, 2020    Past Surgical History:   Procedure Laterality Date     AMPUTATE TOE(S) Left 5/1/2017    Procedure: AMPUTATE " TOE(S);  Amputate first and second left toes;  Surgeon: Verna Fofana DPM, Podiatry/Foot and Ankle Surgery;  Location: [facility] OR     AMPUTATE TOE(S) Left 11/3/2018    Procedure: Partial left 3rd toe amputation;  Surgeon: Verna Fofana DPM, Podiatry/Foot and Ankle Surgery;  Location: [facility] OR     CREATE FISTULA ARTERIOVENOUS UPPER EXTREMITY Right 2020    Procedure: RIGHT PROXIMAL RADIAL TO CEPHALIC ARTERIOVENOUS FISTULA CREATION;  Surgeon: Sumanth Corbin MD;  Location:  OR     ESOPHAGOSCOPY, GASTROSCOPY, DUODENOSCOPY (EGD), COMBINED Left 2020    Procedure: ESOPHAGOGASTRODUODENOSCOPY, WITH biopsies using biopsy forceps;  Surgeon: Elliott Rubi MD;  Location: [facility] GI     IR CVC TUNNEL PLACEMENT > 5 YRS OF AGE  2019     OPEN REDUCTION INTERNAL FIXATION FIBULA Left 3/16/2018    Procedure: OPEN REDUCTION INTERNAL FIXATION FIBULA;  Open reduction and internal fixation of displaced left lateral malleolar fracture;  Surgeon: Sumanth Wen MD;  Location: [facility] OR     REVISION FISTULA ARTERIOVENOUS UPPER EXTREMITY Right 2020    Procedure: ONLAY VEIN PATCH RIGHT RADIAL TO CEPHALIC FISTULA WITH LIGATION OF SIDE BRANCH ;  Surgeon: Sumanth Corbin MD;  Location:  OR     Social History     Tobacco Use     Smoking status: Former Smoker     Types: Cigarettes     Last attempt to quit: 2016     Years since quittin.0     Smokeless tobacco: Never Used   Substance Use Topics     Alcohol use: No      SH:  Previously lived alone, condo in [place].   Her sister Yuki is first contact.     Worked as a , and before that as a .       Review Of Systems  Musculoskeletal: Velez   TUG 19 sec  Neurologic: STML, decreased insight.  Neuropsych testing done in Acute Rehab.  Psychiatric: anxiety and depression  Wt Readings from Last 5 Encounters:   08/10/20 90.4 kg (199 lb 6.4 oz)   20 92.1 kg (203 lb 0.7 oz)   20 81.6 kg (180 lb)   06/10/20 94.7 kg (208 lb 12.8  "oz)   05/14/20 85.3 kg (188 lb)       GENERAL APPEARANCE: alert and no distress  BP (!) 168/72   Pulse 71   Temp 98.4  F (36.9  C)   Resp 16   Ht 1.727 m (5' 8\")   Wt 90.4 kg (199 lb 6.4 oz)   SpO2 93%   BMI 30.32 kg/m     HEENT: normocephalic, no lesion or abnormalities  NECK: no adenopathy, no asymmetry, masses, or scars and thyroid normal to palpation  Tunneled catheter right chest wall, site okay  RESP: lungs clear to auscultation - no rales, rhonchi or wheezes  CV: regular rate and rhythm, normal S1 S2  ABDOMEN:  soft, nontender, no HSM or masses and bowel sounds normal  MS: extremities normal- no gross deformities noted, no ext edema  NEURO: speech normal, WC bound, limited hx  PSYCH: affect okay    Last Comprehensive Metabolic Panel:  Sodium   Date Value Ref Range Status   06/24/2020 125 (L) 133 - 144 mmol/L Final     Potassium   Date Value Ref Range Status   06/24/2020 4.9 3.4 - 5.3 mmol/L Final     Chloride   Date Value Ref Range Status   06/24/2020 85 (L) 94 - 109 mmol/L Final     Carbon Dioxide   Date Value Ref Range Status   06/24/2020 30 20 - 32 mmol/L Final     Anion Gap   Date Value Ref Range Status   06/24/2020 10 3 - 14 mmol/L Final     Glucose   Date Value Ref Range Status   06/24/2020 98 70 - 99 mg/dL Final     Urea Nitrogen   Date Value Ref Range Status   06/24/2020 90 (H) 7 - 30 mg/dL Final     Creatinine   Date Value Ref Range Status   06/24/2020 10.20 (H) 0.52 - 1.04 mg/dL Final     GFR Estimate   Date Value Ref Range Status   06/24/2020 4 (L) >60 mL/min/[1.73_m2] Final     Comment:     Non  GFR Calc  Starting 12/18/2018, serum creatinine based estimated GFR (eGFR) will be   calculated using the Chronic Kidney Disease Epidemiology Collaboration   (CKD-EPI) equation.       Calcium   Date Value Ref Range Status   06/24/2020 8.3 (L) 8.5 - 10.1 mg/dL Final     Lab Results   Component Value Date    AST 16 06/23/2020     Lab Results   Component Value Date    ALBUMIN 3.6 " 06/23/2020     Lab Results   Component Value Date    PROTTOTAL 7.3 06/23/2020      Lab Results   Component Value Date    ALKPHOS 71 06/23/2020     Lab Results   Component Value Date    WBC 12.3 06/23/2020      HGB 10.3 06/24/2020      MCV 88 06/23/2020       06/23/2020     ECHO 5/27/19 Interpretation Summary  The visual ejection fraction is estimated at 45-50%.  Left ventricular systolic function is mildly reduced.  A basal inferior wall motion abnormality can not be excluded on this study even with contrast use.  Grade III or advanced diastolic dysfunction. There is moderate to severe concentric left ventricular hypertrophy.      IMP/PLAN:   (K22.10) Erosive esophagitis   Comment: with recent bleed     Plan: continue omeprazole 40 mg bid; also has sucralfate and Zofran available if needed.     Follow up with GI prn       (I63.81) Lacunar stroke (H)    (I69.352) Hemiplegia and hemiparesis following cerebral infarction affecting left dominant side (H)  Comment: improving, finished course of therapies but remains WC bound  Plan: daily ASA, bp meds, simvastatin for secondary prevention     (I12.0) Hypertensive chronic kidney disease with stage 5 chronic kidney disease or end stage renal disease (H)  (N18.6,  Z99.2) ESRD (end stage renal disease) on dialysis.  Initiation of HD 6/2019.  Comment:   BP Readings from Last 3 Encounters:   08/10/20 (!) 168/72   06/24/20 (!) 146/66   06/18/20 (!) 164/72      Pulse Readings from Last 4 Encounters:   08/10/20 71   06/24/20 70   06/10/20 82   04/28/20 65      Plan: amlodipine 5 mg/day, carvedilol 25 mg bid, hydralazine 50 mg bid; consider increasing hydralazine to tid if SBPs remain >140     Also remains on PhosLo qid    (E11.22,  N18.6,  Z99.2,  Z79.4) Type 2 diabetes mellitus with chronic kidney disease on chronic dialysis, with long-term current use of insulin (H)  Comment:   Lab Results   Component Value Date    A1C 5.8 06/23/2020    A1C 6.5 02/13/2020    A1C 9.7  10/28/2019    A1C 10.5 05/28/2019    A1C 11.6 10/04/2018      Plan: remains on 70 /30 insulin 18 units with breakfast and 9 units at supper.  Has a sliding scale Novolog insulin available as well   Has had hypoglycemia, would consider changing to low dose Lantus for more even control.     On statin and ASA, not on ACEI secondary to renal failure.       (N18.6,  D63.1,  Z99.2) Anemia in chronic kidney disease, on chronic dialysis (H)  Comment: hgb improved   Plan: Aranesp, Epogen as ordered; follow hgb     Jennifer Longo MD

## 2020-08-15 LAB
SARS-COV-2 RNA SPEC QL NAA+PROBE: NOT DETECTED
SPECIMEN SOURCE: NORMAL

## 2020-08-18 ENCOUNTER — APPOINTMENT (OUTPATIENT)
Dept: INTERVENTIONAL RADIOLOGY/VASCULAR | Facility: CLINIC | Age: 60
End: 2020-08-18
Attending: RADIOLOGY
Payer: COMMERCIAL

## 2020-08-18 ENCOUNTER — HOSPITAL ENCOUNTER (OUTPATIENT)
Facility: CLINIC | Age: 60
Discharge: HOME | End: 2020-08-18
Attending: RADIOLOGY | Admitting: RADIOLOGY
Payer: COMMERCIAL

## 2020-08-18 VITALS
TEMPERATURE: 98 F | DIASTOLIC BLOOD PRESSURE: 82 MMHG | RESPIRATION RATE: 13 BRPM | SYSTOLIC BLOOD PRESSURE: 172 MMHG | HEART RATE: 72 BPM | OXYGEN SATURATION: 100 %

## 2020-08-18 DIAGNOSIS — N18.6 ESRD (END STAGE RENAL DISEASE) (H): ICD-10-CM

## 2020-08-18 PROCEDURE — 25000125 ZZHC RX 250: Performed by: RADIOLOGY

## 2020-08-18 PROCEDURE — 36589 REMOVAL TUNNELED CV CATH: CPT

## 2020-08-18 RX ORDER — LIDOCAINE HYDROCHLORIDE 10 MG/ML
INJECTION, SOLUTION EPIDURAL; INFILTRATION; INTRACAUDAL; PERINEURAL
Status: DISCONTINUED
Start: 2020-08-18 | End: 2020-08-18 | Stop reason: HOSPADM

## 2020-08-18 RX ORDER — LIDOCAINE HYDROCHLORIDE 10 MG/ML
10 INJECTION, SOLUTION EPIDURAL; INFILTRATION; INTRACAUDAL; PERINEURAL ONCE
Status: COMPLETED | OUTPATIENT
Start: 2020-08-18 | End: 2020-08-18

## 2020-08-18 RX ADMIN — LIDOCAINE HYDROCHLORIDE 8 ML: 10 INJECTION, SOLUTION EPIDURAL; INFILTRATION; INTRACAUDAL; PERINEURAL at 08:19

## 2020-08-18 NOTE — IP AVS SNAPSHOT
[facility] Cardiac Cath Lab  [address]  [facility] [address]  Phone:  [phone]  Fax:  [phone]                                    After Visit Summary   8/18/2020    Roxy Beaulieu    MRN: [id number]           After Visit Summary Signature Page    I have received my discharge instructions, and my questions have been answered. I have discussed any challenges I see with this plan with the nurse or doctor.    ..........................................................................................................................................  Patient/Patient Representative Signature      ..........................................................................................................................................  Patient Representative Print Name and Relationship to Patient    ..................................................               ................................................  Date                                   Time    ..........................................................................................................................................  Reviewed by Signature/Title    ...................................................              ..............................................  Date                                               Time          22EPIC Rev 08/18

## 2020-08-18 NOTE — DISCHARGE INSTRUCTIONS
Going Home after the   Removal of Your Tunneled Dialysis Catheter  ______________________________________________________________________    Patient Name:  Roxy Beaulieu  Today's Date:  August 18, 2020    The doctor who removed your port or catheter was: Dr. Bailey  at [facility]) in:    Interventional Radiology Department  When you get home:    No driving or drinking alcohol until tomorrow. You may still have side effects from   the medicine you received today (you may feel drowsy, unsteady or forgetful).    You should have an adult with you for the first 6 hours at home (radiology patients).    You may go back to your regular diet today.     If you take aspirin or Plavix, you may begin taking it again tomorrow. You may restart all other medicines today. Use pain medicine as directed.    Avoid heavy lifting or the overuse of your shoulder for three days.    Port site and bandage care    Keep the wound clean and dry for three days. Cover it with plastic before taking a shower.     Change the bandage if it gets wet or dirty. Use bacitracin (antibiotic cream) and clean gauze.     After three days, you may use Band-Aids until the wound has healed.    If you have oozing or bleeding from the port site or catheter (tube) site:   o Put direct pressure on the wound for 5 to 10 minutes with a gauze pad.  If you still have bleeding after 10 minutes, call your doctor.  o If you are bleeding a lot and can t control it with direct pressure, call 911.    Call your doctor at  Dialysis clinic if you have:    Swelling in your neck.    Signs of infection:   o fever over 100  F (37.8 C) under the tongue  o the wound is red, tender or draining.

## 2020-08-18 NOTE — PROCEDURES
[facility]    Procedure: HD cath removal    Date/Time: 8/18/2020 8:54 AM  Performed by: Melissa Bailey MD  Authorized by: Melissa Bailey MD     UNIVERSAL PROTOCOL   Site Marked: Yes  Prior Images Obtained and Reviewed:  Yes  Required items: Required blood products, implants, devices and special equipment available    Patient identity confirmed:  Verbally with patient  Patient was reevaluated immediately before administering moderate or deep sedation or anesthesia  Confirmation Checklist:  Patient's identity using two indicators, procedure was appropriate and matched the consent or emergent situation, correct equipment/implants were available and relevant allergies  Time out: Immediately prior to the procedure a time out was called    Universal Protocol: the Joint Commission Universal Protocol was followed    Preparation: Patient was prepped and draped in usual sterile fashion        See dictated procedure note for full details.  PROCEDURE   Patient Tolerance:  Patient tolerated the procedure well with no immediate complications    Length of time physician/provider present for 1:1 monitoring during sedation: 0

## 2020-08-18 NOTE — IP AVS SNAPSHOT
MRN:[id number]                      After Visit Summary   8/18/2020    Roxy Beaulieu    MRN: [id number]           Visit Information        Department      8/18/2020  7:38 AM [facility] Cardiac Cath Lab          Review of your medicines      UNREVIEWED medicines. Ask your doctor about these medicines       Dose / Directions   acetaminophen 325 MG tablet  Commonly known as:  TYLENOL  Used for:  Cerebrovascular accident (CVA) due to thrombosis of precerebral artery (H)      Dose:  650 mg  Take 2 tablets (650 mg) by mouth every 6 hours as needed for mild pain or fever (> 101 F)  Refills:  0     amLODIPine 5 MG tablet  Commonly known as:  NORVASC      Dose:  5 mg  Take 5 mg by mouth At Bedtime  Refills:  0     aspirin 325 MG EC tablet  Commonly known as:  ASA      Dose:  325 mg  Take 325 mg by mouth At Bedtime  Refills:  0     bisacodyl 10 MG suppository  Commonly known as:  DULCOLAX  Used for:  Cerebrovascular accident (CVA) due to thrombosis of precerebral artery (H)      Dose:  10 mg  Place 1 suppository (10 mg) rectally daily as needed for constipation  Refills:  0     calcium carbonate 500 MG chewable tablet  Commonly known as:  TUMS      Dose:  2 chew tab  Take 2 chew tab by mouth 3 times daily as needed for heartburn  Refills:  0     carvedilol 25 MG tablet  Commonly known as:  COREG  Used for:  Cerebrovascular accident (CVA) due to thrombosis of precerebral artery (H)      Dose:  25 mg  Take 1 tablet (25 mg) by mouth 2 times daily (with meals)  Refills:  0     furosemide 80 MG tablet  Commonly known as:  LASIX      Dose:  60 mg  Take 60 mg by mouth four times a week On Tuesday, Thursday, Saturday, and Sunday  Refills:  0     hydrALAZINE 50 MG tablet  Commonly known as:  APRESOLINE      Dose:  50 mg  Take 50 mg by mouth 2 times daily  Refills:  0     HYDROcodone-acetaminophen 5-325 MG tablet  Commonly known as:  NORCO      Dose:  1-2 tablet  Take 1-2 tablets by mouth every 4 hours as  needed (For pain 1-5 give 1 tablet; for pain 6-10 give 2 tablets)  Refills:  0     insulin aspart 100 UNIT/ML pen  Commonly known as:  NovoLOG PEN  Used for:  Insulin dependent type 2 diabetes mellitus (H)      TID w/meals: -249=2U; 250-299=4u; 300-349=6u;350-399=8U; 400 & above 10units  Quantity:  9 mL  Refills:  4     insulin NPH-Regular 70/30 susp  Used for:  Diabetes mellitus type 2, insulin dependent (H)      Inject 18 Units Subcutaneous daily (with breakfast) AND 9 Units daily (with dinner).  Refills:  0     omeprazole 20 MG EC tablet  Commonly known as:  priLOSEC OTC  Used for:  Gastroesophageal reflux disease with esophagitis, Hematemesis, presence of nausea not specified      Dose:  40 mg  Take 2 tablets (40 mg) by mouth 2 times daily  Quantity:  60 tablet  Refills:  1     ondansetron 4 MG tablet  Commonly known as:  ZOFRAN      Dose:  4 mg  Take 4 mg by mouth every 12 hours as needed for nausea  Refills:  0     PhosLo 667 MG Caps capsule  Generic drug:  calcium acetate      Dose:  667 mg  Take 667 mg by mouth 4 times daily With Snacks and Meals  Refills:  0     senna-docusate 8.6-50 MG tablet  Commonly known as:  SENOKOT-S/PERICOLACE      Dose:  1 tablet  Take 1 tablet by mouth 2 times daily as needed for constipation  Refills:  0     simvastatin 20 MG tablet  Commonly known as:  ZOCOR  Used for:  Cerebrovascular accident (CVA) due to thrombosis of precerebral artery (H)      Dose:  20 mg  Take 1 tablet (20 mg) by mouth At Bedtime  Refills:  0     sucralfate 1 GM/10ML suspension  Commonly known as:  CARAFATE  Used for:  Gastroesophageal reflux disease with esophagitis, Hematemesis, presence of nausea not specified      Dose:  1 g  Take 10 mLs (1 g) by mouth 4 times daily as needed (heartburn, reflux, nausea, vomiting)  Quantity:     Refills:  0              Protect others around you: Learn how to safely use, store and throw away your medicines at www.disposemymeds.org.       Follow-ups after your  visit       Care Instructions       Further instructions from your care team         Going Home after the   Removal of Your Tunneled Dialysis Catheter  ______________________________________________________________________    Patient Name:  Roxy Beaulieu  Today's Date:  August 18, 2020    The doctor who removed your port or catheter was: Dr. Bailey  at [facility]) in:    Interventional Radiology Department  When you get home:    No driving or drinking alcohol until tomorrow. You may still have side effects from   the medicine you received today (you may feel drowsy, unsteady or forgetful).    You should have an adult with you for the first 6 hours at home (radiology patients).    You may go back to your regular diet today.     If you take aspirin or Plavix, you may begin taking it again tomorrow. You may restart all other medicines today. Use pain medicine as directed.    Avoid heavy lifting or the overuse of your shoulder for three days.    Port site and bandage care    Keep the wound clean and dry for three days. Cover it with plastic before taking a shower.     Change the bandage if it gets wet or dirty. Use bacitracin (antibiotic cream) and clean gauze.     After three days, you may use Band-Aids until the wound has healed.    If you have oozing or bleeding from the port site or catheter (tube) site:   o Put direct pressure on the wound for 5 to 10 minutes with a gauze pad.  If you still have bleeding after 10 minutes, call your doctor.  o If you are bleeding a lot and can t control it with direct pressure, call 911.    Call your doctor at  Dialysis clinic if you have:    Swelling in your neck.    Signs of infection:   o fever over 100  F (37.8 C) under the tongue  o the wound is red, tender or draining.    Additional Information About Your Visit       MyChart Information    [facility] lets you send messages to your doctor, view your test results, renew your prescriptions, schedule  "appointments and more. To sign up, go to [email or web address] . Click on \"Log in\" on the left side of the screen, which will take you to the Welcome page. Then click on \"Sign up Now\" on the right side of the page.     You will be asked to enter the access code listed below, as well as some personal information. Please follow the directions to create your username and password.     Your access code is: [email or web address]  Expires: 2020  5:14 AM     Your access code will  in 60 days. If you need help or a new code, please call your   [facility] clinic or [phone].       Care EveryWhere ID    This is your Care EveryWhere ID. This could be used by other organizations to access your [facility] medical records  [id number]       Your Vitals Were  Most recent update: 2020  8:06 AM    Blood Pressure   172/82   (BP Location: Left arm)    Pulse   72    Temperature   98  F (36.7  C) (Temporal)    Respirations   13    Pulse Oximetry   100%          Primary Care Provider Office Phone # Fax #    RIK Allen [phone]      Equal Access to Services    JAIRO KIMBLE : Hadii dary ku hadasho Soomaali, waaxda luqadaha, qaybta kaalmada adeegyada, waxay shalonda makn jb dow . So [facility] [phone].    ATENCIÓN: Si habla español, tiene a cramer disposición servicios gratuitos de asistencia lingüística. Llame al [phone].    We comply with applicable federal and state civil rights laws, including the [place] Human Rights Act. We do not discriminate on the basis of race, color, creed, Mosque, national origin, marital status, age, disability, sex, sexual orientation, or gender identity.       Thank you!    Thank you for choosing [facility] for your care. Our goal is always to provide you with excellent care. Hearing back from our patients is one way we can continue to improve our services. Please take a few minutes to complete the written survey that you " may receive in the mail after you visit. If you would like to speak to someone directly about your visit please contact Patient Relations at [phone]. Thank you!              Medication List      ASK your doctor about these medications          Morning Afternoon Evening Bedtime As Needed    acetaminophen 325 MG tablet  Also known as:  TYLENOL  INSTRUCTIONS:  Take 2 tablets (650 mg) by mouth every 6 hours as needed for mild pain or fever (> 101 F)                     amLODIPine 5 MG tablet  Also known as:  NORVASC  INSTRUCTIONS:  Take 5 mg by mouth At Bedtime                     aspirin 325 MG EC tablet  Also known as:  ASA  INSTRUCTIONS:  Take 325 mg by mouth At Bedtime                     bisacodyl 10 MG suppository  Also known as:  DULCOLAX  INSTRUCTIONS:  Place 1 suppository (10 mg) rectally daily as needed for constipation                     calcium carbonate 500 MG chewable tablet  Also known as:  TUMS  INSTRUCTIONS:  Take 2 chew tab by mouth 3 times daily as needed for heartburn                     carvedilol 25 MG tablet  Also known as:  COREG  INSTRUCTIONS:  Take 1 tablet (25 mg) by mouth 2 times daily (with meals)                     furosemide 80 MG tablet  Also known as:  LASIX  INSTRUCTIONS:  Take 60 mg by mouth four times a week On Tuesday, Thursday, Saturday, and Sunday                     hydrALAZINE 50 MG tablet  Also known as:  APRESOLINE  INSTRUCTIONS:  Take 50 mg by mouth 2 times daily                     HYDROcodone-acetaminophen 5-325 MG tablet  Also known as:  NORCO  INSTRUCTIONS:  Take 1-2 tablets by mouth every 4 hours as needed (For pain 1-5 give 1 tablet; for pain 6-10 give 2 tablets)                     insulin aspart 100 UNIT/ML pen  Also known as:  NovoLOG PEN  INSTRUCTIONS:  TID w/meals: -249=2U; 250-299=4u; 300-349=6u;350-399=8U; 400 & above 10units                     insulin NPH-Regular 70/30 susp  INSTRUCTIONS:  Inject 18 Units Subcutaneous daily (with breakfast) AND 9  Units daily (with dinner).                     omeprazole 20 MG EC tablet  Also known as:  priLOSEC OTC  INSTRUCTIONS:  Take 2 tablets (40 mg) by mouth 2 times daily                     ondansetron 4 MG tablet  Also known as:  ZOFRAN  INSTRUCTIONS:  Take 4 mg by mouth every 12 hours as needed for nausea                     PhosLo 667 MG Caps capsule  INSTRUCTIONS:  Take 667 mg by mouth 4 times daily With Snacks and Meals  Generic drug:  calcium acetate                     senna-docusate 8.6-50 MG tablet  Also known as:  SENOKOT-S/PERICOLACE  INSTRUCTIONS:  Take 1 tablet by mouth 2 times daily as needed for constipation                     simvastatin 20 MG tablet  Also known as:  ZOCOR  INSTRUCTIONS:  Take 1 tablet (20 mg) by mouth At Bedtime                     sucralfate 1 GM/10ML suspension  Also known as:  CARAFATE  INSTRUCTIONS:  Take 10 mLs (1 g) by mouth 4 times daily as needed (heartburn, reflux, nausea, vomiting)

## 2020-09-02 ENCOUNTER — TELEPHONE (OUTPATIENT)
Dept: GERIATRICS | Facility: CLINIC | Age: 60
End: 2020-09-02

## 2020-09-03 NOTE — TELEPHONE ENCOUNTER
[facility] TELEPHONE ENCOUNTER    Chief Complaint   Patient presents with     hyperkalemia       Roxy Beaulieu is a 60 year old  (1960),Nurse called today to report: Dialysis called and stated her potassium draw from 8/31/20  Resulted today and is 6.6. They are requesting she goes to the hospital for reversal of hyperkalemia and if she does not go she will not be able for dialysis tomorrow. She is refusing to go to hospital tonight. VSS, no nausea, vomiting. She is also having dialysis on Friday.     ASSESSMENT/PLAN      Kayexalate 30g x 1 dose    Repeat BMP on 9/3/2020    Monitor for s/s of hyperkalemia and notify provider for worsening condition      Electronically signed by:   RIK Marlow CNP

## 2020-09-09 ENCOUNTER — HOSPITAL LABORATORY (OUTPATIENT)
Dept: OTHER | Facility: CLINIC | Age: 60
End: 2020-09-09

## 2020-09-10 LAB
SARS-COV-2 RNA SPEC QL NAA+PROBE: NOT DETECTED
SPECIMEN SOURCE: NORMAL

## 2020-09-15 ENCOUNTER — HOSPITAL LABORATORY (OUTPATIENT)
Dept: OTHER | Facility: CLINIC | Age: 60
End: 2020-09-15

## 2020-09-16 LAB
SARS-COV-2 RNA SPEC QL NAA+PROBE: NOT DETECTED
SPECIMEN SOURCE: NORMAL

## 2020-09-29 ENCOUNTER — HOSPITAL LABORATORY (OUTPATIENT)
Dept: OTHER | Facility: CLINIC | Age: 60
End: 2020-09-29

## 2020-09-30 LAB
SARS-COV-2 RNA SPEC QL NAA+PROBE: NOT DETECTED
SPECIMEN SOURCE: NORMAL

## 2020-10-02 ENCOUNTER — NURSING HOME VISIT (OUTPATIENT)
Dept: GERIATRICS | Facility: CLINIC | Age: 60
End: 2020-10-02

## 2020-10-02 ENCOUNTER — DISCHARGE SUMMARY NURSING HOME (OUTPATIENT)
Dept: GERIATRICS | Facility: CLINIC | Age: 60
End: 2020-10-02
Payer: COMMERCIAL

## 2020-10-02 VITALS
SYSTOLIC BLOOD PRESSURE: 157 MMHG | HEIGHT: 68 IN | TEMPERATURE: 97.5 F | BODY MASS INDEX: 30.58 KG/M2 | DIASTOLIC BLOOD PRESSURE: 73 MMHG | OXYGEN SATURATION: 100 % | HEART RATE: 86 BPM | WEIGHT: 201.8 LBS | RESPIRATION RATE: 17 BRPM

## 2020-10-02 DIAGNOSIS — R29.898 WEAKNESS OF BOTH LOWER EXTREMITIES: Primary | ICD-10-CM

## 2020-10-02 DIAGNOSIS — I63.00 CEREBROVASCULAR ACCIDENT (CVA) DUE TO THROMBOSIS OF PRECEREBRAL ARTERY (H): ICD-10-CM

## 2020-10-02 DIAGNOSIS — N18.6 ESRD (END STAGE RENAL DISEASE) ON DIALYSIS (H): ICD-10-CM

## 2020-10-02 DIAGNOSIS — I43 CARDIOMYOPATHY DUE TO HYPERTENSION, WITH HEART FAILURE (H): ICD-10-CM

## 2020-10-02 DIAGNOSIS — Z99.2 ESRD (END STAGE RENAL DISEASE) ON DIALYSIS (H): ICD-10-CM

## 2020-10-02 DIAGNOSIS — I69.352 HEMIPLEGIA AND HEMIPARESIS FOLLOWING CEREBRAL INFARCTION AFFECTING LEFT DOMINANT SIDE (H): Primary | ICD-10-CM

## 2020-10-02 DIAGNOSIS — Z79.4 UNCONTROLLED TYPE 2 DIABETES MELLITUS WITH HYPERGLYCEMIA, WITH LONG-TERM CURRENT USE OF INSULIN (H): ICD-10-CM

## 2020-10-02 DIAGNOSIS — E11.65 UNCONTROLLED TYPE 2 DIABETES MELLITUS WITH HYPERGLYCEMIA, WITH LONG-TERM CURRENT USE OF INSULIN (H): ICD-10-CM

## 2020-10-02 DIAGNOSIS — I50.43 ACUTE ON CHRONIC COMBINED SYSTOLIC AND DIASTOLIC HEART FAILURE (H): ICD-10-CM

## 2020-10-02 DIAGNOSIS — D63.1 ANEMIA OF CHRONIC RENAL FAILURE, STAGE 5 (H): ICD-10-CM

## 2020-10-02 DIAGNOSIS — N18.5 ANEMIA OF CHRONIC RENAL FAILURE, STAGE 5 (H): ICD-10-CM

## 2020-10-02 DIAGNOSIS — I11.0 CARDIOMYOPATHY DUE TO HYPERTENSION, WITH HEART FAILURE (H): ICD-10-CM

## 2020-10-02 PROBLEM — J96.01 ACUTE RESPIRATORY FAILURE WITH HYPOXIA (H): Status: RESOLVED | Noted: 2019-06-05 | Resolved: 2020-10-02

## 2020-10-02 PROBLEM — I26.99 OTHER PULMONARY EMBOLISM WITHOUT ACUTE COR PULMONALE, UNSPECIFIED CHRONICITY (H): Status: RESOLVED | Noted: 2019-06-05 | Resolved: 2020-10-02

## 2020-10-02 PROCEDURE — 99316 NF DSCHRG MGMT 30 MIN+: CPT | Performed by: NURSE PRACTITIONER

## 2020-10-02 ASSESSMENT — MIFFLIN-ST. JEOR: SCORE: 1523.86

## 2020-10-02 NOTE — PROGRESS NOTES
"  [facility] DME Order/Prescription    Date: October 2, 2020  Patient: Roxy Beaulieu, 1960    DME ordered:  Shower chair    Needing above DME for \"99\" length of need for the following medical necessity:    (R29.898) Weakness of both lower extremities  (primary encounter diagnosis)  Comment: MEDICALLY NECESSARY FOR SHOWER BENCH TO PROVIDE SAFETY AND FALLS PREVENTION WHEN BATHING  Plan: SHOWER BENCH ORDERED AT DISCHARGE FROM LTC FACILITY TO AL FACILITY    (N18.6,  Z99.2) ESRD (end stage renal disease) on dialysis.  Initiation of HD 6/2019.  (I63.00) Cerebrovascular accident (CVA) due to thrombosis of precerebral artery (H)        ELECTRONICALLY SIGNED BY KENNEDY CERTIFIED PROVIDER:  RIK Barrientos CNP   NPI: [id number]  [facility]  [address] [place] [address], [address]  [place] [address]  "

## 2020-10-02 NOTE — PROGRESS NOTES
[facility] DISCHARGE SUMMARY  PATIENT'S NAME: Roxy Beaulieu  YOB: 1960  MEDICAL RECORD NUMBER:  [id number]  Place of Service where encounter took place:  [facility]  (S) [[id number]]    PRIMARY CARE PROVIDER AND CLINIC RESPONSIBLE AFTER TRANSFER:   [facility]   Assisted Living: [facility]     Transferring providers: RIK Barrientos CNP, JANELLE NEGRETE MD  Recent Hospitalization/ED:  [facility] stay on 8/18/20.  Date of SNF Admission: November / 09 / 2019  Date of SNF (anticipated) Discharge: October / 06 / 2020  [facility]  Cognitive Scores: BIMS: 14/15  Physical Function: Wheelchair dependent  DME: Bath Bench    CODE STATUS/ADVANCE DIRECTIVES DISCUSSION:  Full Code   ALLERGIES: Heparin    DISCHARGE DIAGNOSIS/NURSING FACILITY COURSE:   Hemiplegia and hemiparesis following cerebral infarction affecting left dominant side (H)  Hx of CVA.  She has regained most of her function, continues to have residual weakness.  She is independent in wheelchair, she is able to self propel.      Acute on chronic combined systolic and diastolic heart failure (H)  Cardiomyopathy due to hypertension, with heart failure (H)  Compensated.  Weights fluctuate depending on dialysis days.    ESRD (end stage renal disease) on dialysis.  Initiation of HD 6/2019.  Anemia of chronic renal failure, stage 5 (H) on dialysis  Tolerating dialysis well. She is tired, but has a good appetite.    Uncontrolled type 2 diabetes mellitus with hyperglycemia, with long-term current use of insulin (H)  Blood sugar range in past month 105-243.    Hemoglobin A1C   Date Value Ref Range Status   06/23/2020 5.8 (H) 0 - 5.6 % Final     Comment:     Normal <5.7% Prediabetes 5.7-6.4%  Diabetes 6.5% or higher - adopted from ADA   consensus guidelines.           Past Medical History:  has a past medical  history of Anemia, Cerebral artery occlusion with cerebral infarction (H), CHF (congestive heart failure) (H), Congestive heart failure (CHF) (H) (12/17/2018), Diabetes (H), ESRD on dialysis (H), Gangrene of toe (H) (4/29/2017), Hemiplegia and hemiparesis following cerebral infarction affecting left dominant side (H), High cholesterol, Hypertension, Hypertensive cardiomyopathy (H), Obesity (4/29/2017), PE (pulmonary thromboembolism) (H), Personal history of tobacco use, presenting hazards to health (04/29/2017), Renal insufficiency, SOB (shortness of breath), Toe ulcer, left, with unspecified severity (H) (11/2/2018), Ulcer of toe of left foot (H), Uncontrolled type 2 diabetes mellitus with hyperglycemia, with long-term current use of insulin (H) (4/29/2017), and VTE (venous thromboembolism).    Discharge Medications:    Current Outpatient Medications   Medication Sig Dispense Refill     acetaminophen (TYLENOL) 325 MG tablet Take 2 tablets (650 mg) by mouth every 6 hours as needed for mild pain or fever (> 101 F)       amLODIPine (NORVASC) 5 MG tablet Take 5 mg by mouth At Bedtime        aspirin (ASA) 325 MG EC tablet Take 325 mg by mouth At Bedtime       bisacodyl (DULCOLAX) 10 MG suppository Place 1 suppository (10 mg) rectally daily as needed for constipation       calcium acetate (PHOSLO) 667 MG CAPS capsule Take 667 mg by mouth 4 times daily With Snacks and Meals       calcium carbonate (TUMS) 500 MG chewable tablet Take 2 chew tab by mouth 3 times daily as needed for heartburn       carvedilol (COREG) 25 MG tablet Take 1 tablet (25 mg) by mouth 2 times daily (with meals)       furosemide (LASIX) 80 MG tablet Take 60 mg by mouth four times a week On Tuesday, Thursday, Saturday, and Sunday        hydrALAZINE (APRESOLINE) 50 MG tablet Take 50 mg by mouth 2 times daily       HYDROcodone-acetaminophen (NORCO) 5-325 MG tablet Take 1-2 tablets by mouth every 4 hours as needed (For pain 1-5 give 1 tablet; for pain  "6-10 give 2 tablets)       insulin aspart (NOVOLOG PEN) 100 UNIT/ML pen TID w/meals: -249=2U; 250-299=4u; 300-349=6u;350-399=8U; 400 & above 10units 9 mL 4     insulin NPH-Regular 70/30 susp Inject 18 Units Subcutaneous daily (with breakfast) AND 9 Units daily (with dinner).       omeprazole (PRILOSEC OTC) 20 MG EC tablet Take 2 tablets (40 mg) by mouth 2 times daily 60 tablet 1     ondansetron (ZOFRAN) 4 MG tablet Take 4 mg by mouth every 12 hours as needed for nausea       senna-docusate (SENOKOT-S/PERICOLACE) 8.6-50 MG tablet Take 1 tablet by mouth 2 times daily as needed for constipation       simvastatin (ZOCOR) 20 MG tablet Take 1 tablet (20 mg) by mouth At Bedtime       sucralfate (CARAFATE) 1 GM/10ML suspension Take 10 mLs (1 g) by mouth 4 times daily as needed (heartburn, reflux, nausea, vomiting)         Medication Changes/Rationale:     No recent changes.  She has been medically stable    Controlled medications sent with patient:   not applicable/none     ROS:   4 point ROS including Respiratory, CV, GI and , other than that noted in the HPI,  is negative    Physical Exam:   Vitals: BP (!) 177/80   Pulse 86   Temp (!) 91.9  F (33.3  C)   Resp 17   Ht 1.727 m (5' 8\")   Wt 91.5 kg (201 lb 12.8 oz)   SpO2 100%   BMI 30.68 kg/m    BMI= Body mass index is 30.68 kg/m .     Exam:  GENERAL APPEARANCE:  Alert  ENT:  Mouth and posterior oropharynx normal, moist mucous membranes  EYES:  EOM, conjunctivae, lids, pupils and irises normal  RESP:  respiratory effort and palpation of chest normal, lungs clear to auscultation , no respiratory distress  CV:  Palpation and auscultation of heart done , regular rate and rhythm, no murmur, rub, or gallop  ABDOMEN:  normal bowel sounds, soft, nontender, no hepatosplenomegaly or other masses  M/S:   Gait and station abnormal wheelchair bound  Digits and nails normal  SKIN:  Inspection of skin and subcutaneous tissue baseline, Palpation of skin and subcutaneous " tissue baseline, right arm incision CDI        SNF labs:   Most Recent 3 CBC's:  Recent Labs   Lab Test 06/24/20  0612 06/23/20  1319 06/23/20  0919 01/28/20  1040 01/28/20  1040 10/31/19  0556   WBC  --   --  12.3*  --  7.5 9.1   HGB 10.3* 11.0* 11.0*   < > 10.8* 9.3*   MCV  --   --  88  --  87 92   PLT  --   --  285  --  220 327    < > = values in this interval not displayed.     Most Recent 3 BMP's:  Recent Labs   Lab Test 06/24/20  0612 06/23/20  0919 04/28/20  0840   * 123* 130*   POTASSIUM 4.9 5.1 4.6   CHLORIDE 85* 76* 98   CO2 30 33* 28   BUN 90* 83* 42*   CR 10.20* 9.17* 5.15*   ANIONGAP 10 14 4   GILL 8.3* 9.4 9.1   GLC 98 190* 192*     Most Recent Cholesterol Panel:  Recent Labs   Lab Test 05/29/19  0622   CHOL 175   *   HDL 41*   TRIG 142         DISCHARGE PLAN:    Follow up labs: No labs orders/due    Medical Follow Up:       Follow up with On Site NP/MD team in 2 weeks    MTM referral needed and placed by this provider: No    Dialysis Tuesdays, Thursdays, and Saturdays    Discharge Services: No therapy or home care recommended.     Discharge Instructions Verbalized to Patient at Discharge:     Monitor blood glucose monitoring 3 times a day. Keep a record and bring it to your next primary provider visit.     Continue to follow your diet:  cardiac prudent, low fat, low chol    2 gram sodium    no concentrated sweets.       TOTAL DISCHARGE TIME:   Greater than 30 minutes  Electronically signed by:  RIK Barrientos CNP

## 2020-10-02 NOTE — LETTER
10/2/2020        RE: Roxy Beaulieu  [address] [facility]3] [id number]        [facility] DISCHARGE SUMMARY  PATIENT'S NAME: Roxy Beaulieu  YOB: 1960  MEDICAL RECORD NUMBER:  [id number]  Place of Service where encounter took place:  [facility]  ([facility]) [[id number]]    PRIMARY CARE PROVIDER AND CLINIC RESPONSIBLE AFTER TRANSFER:   [facility]   Assisted Living: [facility]     Transferring providers: RIK Barrientos CNP, JANELLE NEGRETE MD  Recent Hospitalization/ED:  [facility] stay on 8/18/20.  Date of SNF Admission: November / 09 / 2019  Date of SNF (anticipated) Discharge: October / 06 / 2020  Discharged to: new assisted living for patient [facility]  Cognitive Scores: BIMS: 14/15  Physical Function: Wheelchair dependent  DME: [facility]    CODE STATUS/ADVANCE DIRECTIVES DISCUSSION:  Full Code   ALLERGIES: Heparin    DISCHARGE DIAGNOSIS/NURSING FACILITY COURSE:   Hemiplegia and hemiparesis following cerebral infarction affecting left dominant side (H)  Hx of CVA.  She has regained most of her function, continues to have residual weakness.  She is independent in wheelchair, she is able to self propel.      Acute on chronic combined systolic and diastolic heart failure (H)  Cardiomyopathy due to hypertension, with heart failure (H)  Compensated.  Weights fluctuate depending on dialysis days.    ESRD (end stage renal disease) on dialysis.  Initiation of HD 6/2019.  Anemia of chronic renal failure, stage 5 (H) on dialysis  Tolerating dialysis well. She is tired, but has a good appetite.    Uncontrolled type 2 diabetes mellitus with hyperglycemia, with long-term current use of insulin (H)  Blood sugar range in past month 105-243.    Hemoglobin A1C   Date Value Ref Range Status   06/23/2020 5.8 (H) 0 - 5.6 % Final     Comment:     Normal <5.7% Prediabetes 5.7-6.4%  Diabetes 6.5% or higher - adopted  from ADA   consensus guidelines.           Past Medical History:  has a past medical history of Anemia, Cerebral artery occlusion with cerebral infarction (H), CHF (congestive heart failure) (H), Congestive heart failure (CHF) (H) (12/17/2018), Diabetes (H), ESRD on dialysis (H), Gangrene of toe (H) (4/29/2017), Hemiplegia and hemiparesis following cerebral infarction affecting left dominant side (H), High cholesterol, Hypertension, Hypertensive cardiomyopathy (H), Obesity (4/29/2017), PE (pulmonary thromboembolism) (H), Personal history of tobacco use, presenting hazards to health (04/29/2017), Renal insufficiency, SOB (shortness of breath), Toe ulcer, left, with unspecified severity (H) (11/2/2018), Ulcer of toe of left foot (H), Uncontrolled type 2 diabetes mellitus with hyperglycemia, with long-term current use of insulin (H) (4/29/2017), and VTE (venous thromboembolism).    Discharge Medications:    Current Outpatient Medications   Medication Sig Dispense Refill     acetaminophen (TYLENOL) 325 MG tablet Take 2 tablets (650 mg) by mouth every 6 hours as needed for mild pain or fever (> 101 F)       amLODIPine (NORVASC) 5 MG tablet Take 5 mg by mouth At Bedtime        aspirin (ASA) 325 MG EC tablet Take 325 mg by mouth At Bedtime       bisacodyl (DULCOLAX) 10 MG suppository Place 1 suppository (10 mg) rectally daily as needed for constipation       calcium acetate (PHOSLO) 667 MG CAPS capsule Take 667 mg by mouth 4 times daily With Snacks and Meals       calcium carbonate (TUMS) 500 MG chewable tablet Take 2 chew tab by mouth 3 times daily as needed for heartburn       carvedilol (COREG) 25 MG tablet Take 1 tablet (25 mg) by mouth 2 times daily (with meals)       furosemide (LASIX) 80 MG tablet Take 60 mg by mouth four times a week On Tuesday, Thursday, Saturday, and Sunday        hydrALAZINE (APRESOLINE) 50 MG tablet Take 50 mg by mouth 2 times daily       HYDROcodone-acetaminophen (NORCO) 5-325 MG tablet Take  "1-2 tablets by mouth every 4 hours as needed (For pain 1-5 give 1 tablet; for pain 6-10 give 2 tablets)       insulin aspart (NOVOLOG PEN) 100 UNIT/ML pen TID w/meals: -249=2U; 250-299=4u; 300-349=6u;350-399=8U; 400 & above 10units 9 mL 4     insulin NPH-Regular 70/30 susp Inject 18 Units Subcutaneous daily (with breakfast) AND 9 Units daily (with dinner).       omeprazole (PRILOSEC OTC) 20 MG EC tablet Take 2 tablets (40 mg) by mouth 2 times daily 60 tablet 1     ondansetron (ZOFRAN) 4 MG tablet Take 4 mg by mouth every 12 hours as needed for nausea       senna-docusate (SENOKOT-S/PERICOLACE) 8.6-50 MG tablet Take 1 tablet by mouth 2 times daily as needed for constipation       simvastatin (ZOCOR) 20 MG tablet Take 1 tablet (20 mg) by mouth At Bedtime       sucralfate (CARAFATE) 1 GM/10ML suspension Take 10 mLs (1 g) by mouth 4 times daily as needed (heartburn, reflux, nausea, vomiting)         Medication Changes/Rationale:     No recent changes.  She has been medically stable    Controlled medications sent with patient:   not applicable/none     ROS:   4 point ROS including Respiratory, CV, GI and , other than that noted in the HPI,  is negative    Physical Exam:   Vitals: BP (!) 177/80   Pulse 86   Temp (!) 91.9  F (33.3  C)   Resp 17   Ht 1.727 m (5' 8\")   Wt 91.5 kg (201 lb 12.8 oz)   SpO2 100%   BMI 30.68 kg/m    BMI= Body mass index is 30.68 kg/m .     Exam:  GENERAL APPEARANCE:  Alert  ENT:  Mouth and posterior oropharynx normal, moist mucous membranes  EYES:  EOM, conjunctivae, lids, pupils and irises normal  RESP:  respiratory effort and palpation of chest normal, lungs clear to auscultation , no respiratory distress  CV:  Palpation and auscultation of heart done , regular rate and rhythm, no murmur, rub, or gallop  ABDOMEN:  normal bowel sounds, soft, nontender, no hepatosplenomegaly or other masses  M/S:   Gait and station abnormal wheelchair bound  Digits and nails normal  SKIN:  " Inspection of skin and subcutaneous tissue baseline, Palpation of skin and subcutaneous tissue baseline, right arm incision CDI        SNF labs:   Most Recent 3 CBC's:  Recent Labs   Lab Test 06/24/20  0612 06/23/20  1319 06/23/20  0919 01/28/20  1040 01/28/20  1040 10/31/19  0556   WBC  --   --  12.3*  --  7.5 9.1   HGB 10.3* 11.0* 11.0*   < > 10.8* 9.3*   MCV  --   --  88  --  87 92   PLT  --   --  285  --  220 327    < > = values in this interval not displayed.     Most Recent 3 BMP's:  Recent Labs   Lab Test 06/24/20  0612 06/23/20  0919 04/28/20  0840   * 123* 130*   POTASSIUM 4.9 5.1 4.6   CHLORIDE 85* 76* 98   CO2 30 33* 28   BUN 90* 83* 42*   CR 10.20* 9.17* 5.15*   ANIONGAP 10 14 4   GILL 8.3* 9.4 9.1   GLC 98 190* 192*     Most Recent Cholesterol Panel:  Recent Labs   Lab Test 05/29/19  0622   CHOL 175   *   HDL 41*   TRIG 142         DISCHARGE PLAN:    Follow up labs: No labs orders/due    Medical Follow Up:       Follow up with On Site NP/MD team in 2 weeks    MTM referral needed and placed by this provider: No    Dialysis Tuesdays, Thursdays, and Saturdays    Discharge Services: No therapy or home care recommended.     Discharge Instructions Verbalized to Patient at Discharge:     Monitor blood glucose monitoring 3 times a day. Keep a record and bring it to your next primary provider visit.     Continue to follow your diet:  cardiac prudent, low fat, low chol    2 gram sodium    no concentrated sweets.       TOTAL DISCHARGE TIME:   Greater than 30 minutes  Electronically signed by:  RIK Barrientos CNP                     Sincerely,        RIK Barrientos CNP

## 2020-12-11 ENCOUNTER — TELEPHONE (OUTPATIENT)
Dept: OTHER | Facility: CLINIC | Age: 60
End: 2020-12-11

## 2020-12-11 NOTE — TELEPHONE ENCOUNTER
2 attempts have been made to schedule. No further attempts to be made.     Attempts:  11/11/2020 1st LS  12/1/2020 2nd LS    Mago HOUGH

## 2020-12-30 ENCOUNTER — RECORDS - HEALTHEAST (OUTPATIENT)
Dept: LAB | Facility: CLINIC | Age: 60
End: 2020-12-30

## 2020-12-30 LAB
SARS-COV-2 PCR COMMENT: NORMAL
SARS-COV-2 RNA SPEC QL NAA+PROBE: NEGATIVE
SARS-COV-2 VIRUS SPECIMEN SOURCE: NORMAL

## 2021-02-17 ENCOUNTER — RECORDS - HEALTHEAST (OUTPATIENT)
Dept: LAB | Facility: CLINIC | Age: 61
End: 2021-02-17

## 2021-03-01 ENCOUNTER — TRANSFERRED RECORDS (OUTPATIENT)
Dept: HEALTH INFORMATION MANAGEMENT | Facility: CLINIC | Age: 61
End: 2021-03-01

## 2021-03-03 ENCOUNTER — RECORDS - HEALTHEAST (OUTPATIENT)
Dept: LAB | Facility: CLINIC | Age: 61
End: 2021-03-03

## 2021-03-04 ENCOUNTER — HOSPITAL ENCOUNTER (OUTPATIENT)
Dept: ULTRASOUND IMAGING | Facility: CLINIC | Age: 61
Discharge: HOME | End: 2021-03-04
Attending: SURGERY | Admitting: SURGERY
Payer: COMMERCIAL

## 2021-03-04 DIAGNOSIS — I77.0 AVF (ARTERIOVENOUS FISTULA) (H): ICD-10-CM

## 2021-03-04 DIAGNOSIS — N18.6 ESRD (END STAGE RENAL DISEASE) ON DIALYSIS (H): ICD-10-CM

## 2021-03-04 DIAGNOSIS — Z99.2 ESRD (END STAGE RENAL DISEASE) ON DIALYSIS (H): ICD-10-CM

## 2021-03-04 PROCEDURE — 93990 DOPPLER FLOW TESTING: CPT

## 2021-03-22 ENCOUNTER — TRANSFERRED RECORDS (OUTPATIENT)
Dept: HEALTH INFORMATION MANAGEMENT | Facility: CLINIC | Age: 61
End: 2021-03-22

## 2021-03-24 ENCOUNTER — HOSPITAL ENCOUNTER (OUTPATIENT)
Facility: CLINIC | Age: 61
End: 2021-03-24
Admitting: RADIOLOGY
Payer: COMMERCIAL

## 2021-03-24 DIAGNOSIS — Z11.59 ENCOUNTER FOR SCREENING FOR OTHER VIRAL DISEASES: ICD-10-CM

## 2021-03-25 ENCOUNTER — TELEPHONE (OUTPATIENT)
Dept: INTERVENTIONAL RADIOLOGY/VASCULAR | Facility: CLINIC | Age: 61
End: 2021-03-25

## 2021-03-26 RX ORDER — NALOXONE HYDROCHLORIDE 0.4 MG/ML
0.4 INJECTION, SOLUTION INTRAMUSCULAR; INTRAVENOUS; SUBCUTANEOUS
Status: CANCELLED | OUTPATIENT
Start: 2021-03-26

## 2021-03-26 RX ORDER — NALOXONE HYDROCHLORIDE 0.4 MG/ML
0.2 INJECTION, SOLUTION INTRAMUSCULAR; INTRAVENOUS; SUBCUTANEOUS
Status: CANCELLED | OUTPATIENT
Start: 2021-03-26

## 2021-03-26 RX ORDER — FENTANYL CITRATE 50 UG/ML
25-50 INJECTION, SOLUTION INTRAMUSCULAR; INTRAVENOUS EVERY 5 MIN PRN
Status: CANCELLED | OUTPATIENT
Start: 2021-03-26

## 2021-03-26 RX ORDER — LIDOCAINE 40 MG/G
CREAM TOPICAL
Status: CANCELLED | OUTPATIENT
Start: 2021-03-26

## 2021-03-26 RX ORDER — FLUMAZENIL 0.1 MG/ML
0.2 INJECTION, SOLUTION INTRAVENOUS
Status: CANCELLED | OUTPATIENT
Start: 2021-03-26

## 2021-03-27 ENCOUNTER — TRANSFERRED RECORDS (OUTPATIENT)
Dept: HEALTH INFORMATION MANAGEMENT | Facility: CLINIC | Age: 61
End: 2021-03-27

## 2021-04-05 DIAGNOSIS — Z11.59 ENCOUNTER FOR SCREENING FOR OTHER VIRAL DISEASES: ICD-10-CM

## 2021-04-07 ENCOUNTER — TELEPHONE (OUTPATIENT)
Dept: OTHER | Facility: CLINIC | Age: 61
End: 2021-04-07

## 2021-04-07 NOTE — TELEPHONE ENCOUNTER
"LOV with Dr. Corbin on 6/18/20.  Patient was due for follow up in November 2020 but after several attempts to contact patient, patient did not follow up.   \"Some concerned about the stenosis at the cephalic/subclavian junction.  Very little we can do for this except for follow this.  Would repeat a fistula duplex in 6 months with particular attention to this area.  This is also discussed with the patient.\"    It appears patient completed US that was due on 3/4/21 and no- showed for OV with Dr. Corbin on 3/11/21.  Routing to scheduling to contact patient and schedule follow up in-person visit with Dr. Corbin  "

## 2021-04-07 NOTE — TELEPHONE ENCOUNTER
[facility]    Who is the name of the provider?:  [facility]       What is the location you see this provider at?: [place]    Reason for call:  Does patient need an appt with Dr. Corbin?  States their PA is saying Dr. Corbin wants to see patient.    Can we leave a detailed message on this number?  YES

## 2021-04-15 ENCOUNTER — HOSPITAL ENCOUNTER (OUTPATIENT)
Facility: CLINIC | Age: 61
Discharge: HOME | End: 2021-04-15
Attending: RADIOLOGY | Admitting: RADIOLOGY
Payer: COMMERCIAL

## 2021-04-15 ENCOUNTER — APPOINTMENT (OUTPATIENT)
Dept: INTERVENTIONAL RADIOLOGY/VASCULAR | Facility: CLINIC | Age: 61
End: 2021-04-15
Attending: RADIOLOGY
Payer: COMMERCIAL

## 2021-04-15 VITALS
SYSTOLIC BLOOD PRESSURE: 173 MMHG | OXYGEN SATURATION: 97 % | TEMPERATURE: 98.2 F | HEART RATE: 84 BPM | RESPIRATION RATE: 16 BRPM | DIASTOLIC BLOOD PRESSURE: 81 MMHG

## 2021-04-15 DIAGNOSIS — T82.858A STENOSIS OF AV FISTULA (H): ICD-10-CM

## 2021-04-15 PROCEDURE — 250N000009 HC RX 250: Performed by: NURSE PRACTITIONER

## 2021-04-15 PROCEDURE — 250N000013 HC RX MED GY IP 250 OP 250 PS 637: Performed by: NURSE PRACTITIONER

## 2021-04-15 PROCEDURE — 999N000099 HC STATISTIC MODERATE SEDATION < 10 MIN

## 2021-04-15 PROCEDURE — 76937 US GUIDE VASCULAR ACCESS: CPT

## 2021-04-15 PROCEDURE — 250N000011 HC RX IP 250 OP 636: Performed by: NURSE PRACTITIONER

## 2021-04-15 PROCEDURE — 272N000566 HC SHEATH CR3

## 2021-04-15 PROCEDURE — 272N000302 HC DEVICE INFLATION CR5

## 2021-04-15 PROCEDURE — C1725 CATH, TRANSLUMIN NON-LASER: HCPCS

## 2021-04-15 PROCEDURE — 255N000002 HC RX 255 OP 636: Performed by: RADIOLOGY

## 2021-04-15 PROCEDURE — 272N000116 HC CATH CR1

## 2021-04-15 PROCEDURE — 999N000163 HC STATISTIC SIMPLE TUBE INSERTION/CHARGE, PORT, CATH, FISTULOGRAM

## 2021-04-15 PROCEDURE — C1769 GUIDE WIRE: HCPCS

## 2021-04-15 PROCEDURE — 272N000196 HC ACCESSORY CR5

## 2021-04-15 RX ORDER — IOPAMIDOL 612 MG/ML
100 INJECTION, SOLUTION INTRAVASCULAR ONCE
Status: DISCONTINUED | OUTPATIENT
Start: 2021-04-15 | End: 2021-04-15 | Stop reason: DRUGHIGH

## 2021-04-15 RX ORDER — IOPAMIDOL 612 MG/ML
50 INJECTION, SOLUTION INTRAVASCULAR ONCE
Status: COMPLETED | OUTPATIENT
Start: 2021-04-15 | End: 2021-04-15

## 2021-04-15 RX ORDER — NALOXONE HYDROCHLORIDE 0.4 MG/ML
0.2 INJECTION, SOLUTION INTRAMUSCULAR; INTRAVENOUS; SUBCUTANEOUS
Status: DISCONTINUED | OUTPATIENT
Start: 2021-04-15 | End: 2021-04-15 | Stop reason: HOSPADM

## 2021-04-15 RX ORDER — NALOXONE HYDROCHLORIDE 0.4 MG/ML
0.4 INJECTION, SOLUTION INTRAMUSCULAR; INTRAVENOUS; SUBCUTANEOUS
Status: DISCONTINUED | OUTPATIENT
Start: 2021-04-15 | End: 2021-04-15 | Stop reason: HOSPADM

## 2021-04-15 RX ORDER — LIDOCAINE 40 MG/G
CREAM TOPICAL
Status: DISCONTINUED | OUTPATIENT
Start: 2021-04-15 | End: 2021-04-15 | Stop reason: HOSPADM

## 2021-04-15 RX ORDER — HYDRALAZINE HYDROCHLORIDE 50 MG/1
50 TABLET, FILM COATED ORAL ONCE
Status: COMPLETED | OUTPATIENT
Start: 2021-04-15 | End: 2021-04-15

## 2021-04-15 RX ORDER — FENTANYL CITRATE 50 UG/ML
25-50 INJECTION, SOLUTION INTRAMUSCULAR; INTRAVENOUS EVERY 5 MIN PRN
Status: DISCONTINUED | OUTPATIENT
Start: 2021-04-15 | End: 2021-04-15 | Stop reason: HOSPADM

## 2021-04-15 RX ORDER — FLUMAZENIL 0.1 MG/ML
0.2 INJECTION, SOLUTION INTRAVENOUS
Status: DISCONTINUED | OUTPATIENT
Start: 2021-04-15 | End: 2021-04-15 | Stop reason: HOSPADM

## 2021-04-15 RX ORDER — CARVEDILOL 25 MG/1
25 TABLET ORAL ONCE
Status: COMPLETED | OUTPATIENT
Start: 2021-04-15 | End: 2021-04-15

## 2021-04-15 RX ADMIN — CARVEDILOL 25 MG: 25 TABLET, FILM COATED ORAL at 14:40

## 2021-04-15 RX ADMIN — IOPAMIDOL 45 ML: 612 INJECTION, SOLUTION INTRAVENOUS at 13:56

## 2021-04-15 RX ADMIN — HYDRALAZINE HYDROCHLORIDE 50 MG: 50 TABLET, FILM COATED ORAL at 14:40

## 2021-04-15 RX ADMIN — LIDOCAINE HYDROCHLORIDE 1 ML: 10 INJECTION, SOLUTION INFILTRATION; PERINEURAL at 13:45

## 2021-04-15 RX ADMIN — MIDAZOLAM HYDROCHLORIDE 1 MG: 1 INJECTION, SOLUTION INTRAMUSCULAR; INTRAVENOUS at 13:45

## 2021-04-15 RX ADMIN — FENTANYL CITRATE 50 MCG: 50 INJECTION, SOLUTION INTRAMUSCULAR; INTRAVENOUS at 13:45

## 2021-04-15 NOTE — PRE-PROCEDURE
GENERAL PRE-PROCEDURE:   Procedure:  Dialysis fistulagram with possible interventional  Date/Time:  4/15/2021 1:27 PM    Written consent obtained?: Yes    Risks and benefits: Risks, benefits and alternatives were discussed    Consent given by:  Patient  Patient states understanding of procedure being performed: Yes    Patient's understanding of procedure matches consent: Yes    Procedure consent matches procedure scheduled: Yes    Expected level of sedation:  Moderate  Appropriately NPO:  Yes  ASA Class:  Class 2- mild systemic disease, no acute problems, no functional limitations  Mallampati  :  Grade 2- soft palate, base of uvula, tonsillar pillars, and portion of posterior pharyngeal wall visible  Lungs:  Lungs clear with good breath sounds bilaterally  Heart:  Normal heart sounds and rate  History & Physical reviewed:  History and physical reviewed and no updates needed  Statement of review:  I have reviewed the lab findings, diagnostic data, medications, and the plan for sedation

## 2021-04-15 NOTE — PROGRESS NOTES
MD Notification    Notified Person: MD    Notified Person Name: Amy JEANIE    Notification Date/Time: 4/15 1430    Notification Interaction: call    Purpose of Notification: elevated BP post-procedure    Orders Received: PTA BP meds ordered    Comments:

## 2021-04-15 NOTE — DISCHARGE INSTRUCTIONS
Fistulagram Discharge Instructions     After you go home:      You may resume your normal diet    Have an adult stay with you for 6 hours if you received sedation       For 24 hours - due to the sedation you received:    Relax and take it easy    Do NOT make any important or legal decisions    Do NOT drive or operate machines at home or at work    Do NOT drink alcohol    Care of Puncture Site:      For the first 48 hrs, check your puncture site every couple hours while you are awake     You may remove/change the bandage tomorrow    You may shower tomorrow    No tub baths, whirlpools or swimming until your puncture site has fully healed    If puncture site starts to bleed - apply light pressure to site for 5 minutes or until bleeding stops     Activity       You should try to elevate your arm for the remainder of today to prevent increased swelling    You may go back to your normal activity in 24 hours     Wait 48 hours before lifting, straining, exercise or other strenuous activity    Medicines:      You may resume all your medications    For minor pain, you may take Acetaminophen (Tylenol) or Ibuprofen (Advil)                 Call the provider who ordered this procedure if:      Increased pain or a large or growing hard lump around the site    Blood or fluid is draining from the site    The site is red, swollen, hot or tender    Chills or a fever greater than 101 F (38 C)    Pain that is getting worse    Any questions or concerns      Call  911 or go to the Emergency Room if:    Severe pain or trouble breathing    Bleeding that you cannot control      If you have questions call:          [facility] @ [phone]

## 2021-04-15 NOTE — PROGRESS NOTES
[facility] Discharge Nursing Note    Patient Information  Name: Roxy Beaulieu  Age: 60 year old    Discharge Education:  Discharge instructions reviewed: Yes  Patient/patient representative verbalizes understanding: Yes  Patient discharging on new medications: No  Medication education completed: N/A    Discharge Plans:   Discharge location: to assisted living (home)  Discharge ride contacted: Yes  Approximate discharge time: 15    Discharge Criteria:  Discharge criteria met and vital signs stable: Yes, BP came down to pt baseline after home meds    Patient Belongs:  Patient belongings returned to patient: Yes    Autumn Hernadez RN

## 2021-04-15 NOTE — PROGRESS NOTES
[facility] Post Procedure Note    Patient Information  Name: Roxy Beaulieu  Age: 60 year old    Post Procedure  Time patient returned to Care Suites: 1410  Concerns/abnormal assessment: elevated BP   If abnormal assessment, provider notified: Yes  Plan/Other: 1hr bedrest then discharge    Rosa Ferguson RN

## 2021-04-15 NOTE — PRE-PROCEDURE
GENERAL PRE-PROCEDURE:   Procedure:  Right upper arm fistula fistulagram with possible angioplasty and/or stent placement with intravenous moderate sedation,    Date/Time:  4/15/2021 11:55 AM    Written consent obtained?: Yes    Risks and benefits: Risks, benefits and alternatives were discussed    Consent given by:  Patient  Patient states understanding of procedure being performed: Yes    Patient's understanding of procedure matches consent: Yes    Procedure consent matches procedure scheduled: Yes    Expected level of sedation:  Moderate  Appropriately NPO:  Yes  ASA Class:  Class 3- Severe systemic disease, definite functional limitations  Mallampati  :  Grade 3- soft palate visible, posterior pharyngeal wall not visible  Lungs:  Lungs clear with good breath sounds bilaterally and crackles right base  Heart:  Normal heart sounds and rate  History & Physical reviewed:  History and physical reviewed and no updates needed  Statement of review:  I have reviewed the lab findings, diagnostic data, medications, and the plan for sedation

## 2021-04-15 NOTE — PROGRESS NOTES
Care Suites Admission Nursing Note    Patient Information  Name: Roxy Beaulieu  Age: 60 year old  Reason for admission: fistulagram  [facility] arrival time: 1200    Visitor Information  Name: n/a  Informed of visitor restrictions: N/A  1 visitor allowed per patient   Visitor must screen negative for COVID symptoms   Visitor must wear a mask  Waiting rooms closed to visitors    Patient Admission/Assessment   Pre-procedure assessment complete: Yes  If abnormal assessment/labs, provider notified: N/A  NPO: Yes  Medications held per instructions/orders: Yes  Consent: obtained  If applicable, pregnancy test status: n/a  Patient oriented to room: Yes  Education/questions answered: Yes  Plan/other: proceed    Discharge Planning  Discharge name/phone number: travel on med. Transport   [phone]  Overnight post sedation caregiver: [facility]  Discharge location: home  PATIENT/VISITOR WELLNESS SCREENING    Step 1 Patient Screening    1. In the last month, have you been in contact with someone who was confirmed or suspected to have Coronavirus/COVID-19? No    2. Do you have the following symptoms?  Fever/Chills? No   Cough? No   Shortness of breath? No   New loss of taste or smell? No  Sore throat? No  Muscle or body aches? No  Headaches? No  Fatigue? No  Vomiting or diarrhea? No    Step 2 Visitor Screening    1. Name of Visitor (1 visitor per patient): n/a    2. In the last month, have you been in contact with someone who was confirmed or suspected to have Coronavirus/COVID-19? No    3. Do you have the following symptoms?  Fever/Chills? No   Cough? No   Shortness of breath? No   Skin rash? No   Loss of taste or smell? No  Sore throat? No  Runny or stuffy nose? No  Muscle or body aches? No  Headaches? No  Fatigue? No  Vomiting or diarrhea? No    If the visitor has positive symptoms, notify supervisor/manger  Per policy, the visitor will need to leave the facility     Step 3 Refer to logic grid below for actions    NO  SYMPTOM(S)    ACTIONS:  1. Standard rooming process  2. Provider to assess per normal protocol  3. Implement precautions as needed and per guidelines     POSITIVE SYMPTOM(S)  If positive for ANY of the following symptoms: fever, cough, shortness of breath, rash    ACTION:  1. Continue to have the patient wear a mask   2. Room patient as soon as possible  3. Don appropriate PPE when entering room  4. Provider evaluation    Kirt Salomon RN

## 2021-04-15 NOTE — IR NOTE
Interventional Radiology Intra-procedural Nursing Note    Patient Name: Roxy Beaulieu  Medical Record Number: [id number]  Today's Date: April 15, 2021    Start Time: 1344  End of procedure time: 1353  Procedure:  Right upper arm fistula fistulagram with possible angioplasty and/or stent placement with intravenous moderate sedation  Report given to: [facility] Suit[facility] RN  Time pt departs:  1401    Other Notes: Pt into IR suite 1 via cart. Pt awake and alert. To table in supine position. Monitoring equipment applied. VSS. Tele SR. Dr. Ontiveros in room. Time out and procedure started. Pt tolerated procedure well. Debrief with Dr. Ontiveros. pressure held until hemostasis achieved. Dressing clean, dry and intact. No complications. Pt transferred back to Care Suit[facility].    Medications:    Versed 1 mg  Fentanyl 50 mcg  Lidocaine 1% 1 ml    Jt Reynoso RN

## 2021-05-07 ENCOUNTER — RECORDS - HEALTHEAST (OUTPATIENT)
Dept: LAB | Facility: CLINIC | Age: 61
End: 2021-05-07

## 2021-05-07 NOTE — H&P
[facility]  History and Physical  Hospitalist       Date of Admission:  6/23/2020    Assessment & Plan   Roxy Beaulieu is a 60 year old female with ESRD on HD M/W/F, combined CHF, hypertensive cardiomyopathy EF 45-50%, DM2, CVA with resulting left hemiparesis, PE/DVT, HTN, DLD who presented to [facility] on 6/23/2020 from [facility] [place] with nausea, vomiting, and 2 episodes hematemesis.  Patient commonly has heartburn and it has caused nausea and vomiting in the past but never hematemesis.      In the ED, /63, HR 79, RR 18, SpO2 98%, temp 98.1.  BMP revealed sodium 123, potassium 5.1, chloride 76, CO2 33, BUN 83, Cr 9.17, GFR 4.  LFTs within normal limits.  CBC showed WBC 12.3, Hgb 11, Plt 285 with ANC 11.1.  Trop <0.015.  INR 1.14.  EKG showed NSR.       Abdominal pain, N/V, Hematemesis related to Esophagitis  Hgb stable at 11.  No recurrent episodes in-house.  Patient underwent EGD with [facility] ([facility]) who found her to have significant esophagitis in the lower 1/3 of the esophagus.  Biopsies taken, pending.  They recommend BID PPI and repeat EGD in 4-6 weeks.  Will also have sucralfate available for symptoms.      ESRD on HD  Missed dialysis yesterday. No significant electrolyte abnormalities prompting urgent dialysis.  Discussed with Dr Vaughn, her Nephrologist, who plans to dialyze her tomorrow AM.  Continue renal diet and home medications.      Hyponatremia, hypochloremia, elevated BUN likely realted to dehydration.   Reviewed with Dr Vaughn.  He recommends restriction of free water (just sips) to prevent further dilution and suggests gentle hydration overnight with NS at 50 mL/hr to help with dehydration and hyponatremia.  He reports patient has had issues with volume overload recently requiring her to have more fluid pulled off during dialysis and will address this tomorrow when he sees her.     DM2  Continue PTA 70/30 18u in AM, 9u with dinner plus low intensity ISS.  Renal diet.       "Hypertensive cardiomyopathy   Combined CHF, appears compensated   Hypertension   Dyslipidemia  Continue PTA carvedilol, amlodipine, hydralazine, simvastatin, ASA.     Recurrent PE/DVT  Noted. Not currently anticoagulated.  Most recent event was DVT 5/2019.     CVA with resulting left hemiparesis, noted.    DVT Prophylaxis:  PCDs  Code Status: Full Code    Disposition:   Back to LT tomorrow if tolerating PO, electrolytes improving, Hgb stable, and cleared to DC by Nephrology    Autumn FLOYD    PRIMARY CARE PROVIDER: Keisha Nguyen    CHIEF COMPLAINT  Hematemesis    History is obtained from the patient and supplemented by Care Everywhere and [facility] records.    HISTORY OF PRESENT ILLNESS  Roxy Beaulieu is a 60 year old female with ESRD on HD M/W/F, combined CHF, hypertensive cardiomyopathy EF 45-50%, DM2, CVA with resulting left hemiparesis, PE/DVT, HTN, DLD who presented to [facility] ED on 6/23/2020 from [facility] with nausea, vomiting, and 2 episodes hematemesis.  Patient commonly has heartburn and it has caused nausea and vomiting in the past but never hematemesis.      In the ED, /63, HR 79, RR 18, SpO2 98%, temp 98.1.  BMP revealed sodium 123, potassium 5.1, chloride 76, CO2 33, BUN 83, Cr 9.17, GFR 4.  LFTs within normal limits.  CBC showed WBC 12.3, Hgb 11, Plt 285 with ANC 11.1.  Trop <0.015.  INR 1.14.  EKG showed NSR.  GI consulted.  Registered to Observation.     Patient seen on the floor where she reports she missed dialysis yesterday because she \"didn't feel good\".  She can't really expand on this but notes having some nausea and vomiting.  No fever, chills, shortness of breath, chest pain.  She has not had hematemesis before.  Known GERD but no previous EGD on file in [facility] or Care Everywhere.  She reports her legs are a little swollen but notes she doesn't actually feel all that bad today.     PAST MEDICAL HISTORY  1. ESRD on HD.  Previously saw Dr Odonnell, now following with Dr Vaughn. " Dialyzes M/W/F.   2. Lacunar CVA 10/2019 with resulting mild left hemiparesis.  Etiology for CVA felt related to poorly controlled HTN in setting of noncompliance.   3. Combined CHF.  Echo 10/2019 ([facility]) showed EF 66%, improved from 5/2019 when Echo showed moderate-to-severe concentric LVH with EF 45-50%, G3 LVDD, borderline global LV hypokinesis, normal RV size and function, 1+ MR, 1+ TR, and a trivial pericardial effusion.    4. Hypertensive cardiomyopathy.  Lexiscan 12/2018 negative for inducible ischemia.   5. DM2, insulin dependent.  HgbA1c in Feb 2020 was 6.5.  Complicated by diabetic nephropathy, neuropathy, and diabetic foot ulcers requiring toe amputations.  6. Peripheral vascular disease.  Follows with Dr Corbin.   7. Bilateral carotid stenosis, R>L.   8. Recurrent PE/DVT, most recent thrombotic event 5/2019 with extensive LLE DVT.  Previously on Coumadin but was noncompliant.  Not currently anticoagulated.  Note, had a positive HIT antibody screen but FARHAT negative.  9. Cognitive impairment.  10. History of noncompliance with medications, diet, and lifestyle modifications; improved since at assisted living.       PAST SURGICAL HISTORY  I have reviewed this patient's surgical history and updated it with pertinent information if needed.  Past Surgical History:   Procedure Laterality Date     AMPUTATE TOE(S) Left 5/1/2017    Procedure: AMPUTATE TOE(S);  Amputate first and second left toes;  Surgeon: Verna Fofana DPM, Podiatry/Foot and Ankle Surgery;  Location: [facility] OR     AMPUTATE TOE(S) Left 11/3/2018    Procedure: Partial left 3rd toe amputation;  Surgeon: Verna Fofana DPM, Podiatry/Foot and Ankle Surgery;  Location: [facility] OR     CREATE FISTULA ARTERIOVENOUS UPPER EXTREMITY Right 2/11/2020    Procedure: RIGHT PROXIMAL RADIAL TO CEPHALIC ARTERIOVENOUS FISTULA CREATION;  Surgeon: Sumanth Corbin MD;  Location: [facility] OR     IR CVC TUNNEL PLACEMENT > 5 YRS OF AGE  6/4/2019     OPEN REDUCTION  INTERNAL FIXATION FIBULA Left 3/16/2018    Procedure: OPEN REDUCTION INTERNAL FIXATION FIBULA;  Open reduction and internal fixation of displaced left lateral malleolar fracture;  Surgeon: Sumanth Wen MD;  Location: [facility] OR     REVISION FISTULA ARTERIOVENOUS UPPER EXTREMITY Right 4/28/2020    Procedure: ONLAY VEIN PATCH RIGHT RADIAL TO CEPHALIC FISTULA WITH LIGATION OF SIDE BRANCH ;  Surgeon: Sumanth Corbin MD;  Location:  OR       PRIOR TO ADMISSION MEDICATIONS  Prior to Admission Medications   Prescriptions Last Dose Informant Patient Reported? Taking?   HYDROcodone-acetaminophen (NORCO) 5-325 MG tablet  Nursing Home Yes Yes   Sig: Take 1-2 tablets by mouth every 4 hours as needed (For pain 1-5 give 1 tablet; for pain 6-10 give 2 tablets)   acetaminophen (TYLENOL) 325 MG tablet  Nursing Home No No   Sig: Take 2 tablets (650 mg) by mouth every 6 hours as needed for mild pain or fever (> 101 F)   amLODIPine (NORVASC) 5 MG tablet 6/22/2020 at [facility] Nursing Home Yes Yes   Sig: Take 5 mg by mouth At Bedtime    aspirin (ASA) 325 MG EC tablet 6/22/2020 at HS Nursing Home Yes Yes   Sig: Take 325 mg by mouth At Bedtime   bisacodyl (DULCOLAX) 10 MG suppository  Nursing Home No Yes   Sig: Place 1 suppository (10 mg) rectally daily as needed for constipation   calcium acetate (PHOSLO) 667 MG CAPS capsule 6/23/2020 at x1 Nursing Home Yes Yes   Sig: Take 667 mg by mouth 4 times daily With Snacks and Meals   calcium carbonate (TUMS) 500 MG chewable tablet 6/23/2020 at x1 Nursing Home Yes Yes   Sig: Take 2 chew tab by mouth 3 times daily as needed for heartburn   carvedilol (COREG) 25 MG tablet 6/22/2020 at x2 Nursing Home No Yes   Sig: Take 1 tablet (25 mg) by mouth 2 times daily (with meals)   furosemide (LASIX) 80 MG tablet 6/23/2020 at AM Nursing Home Yes Yes   Sig: Take 60 mg by mouth four times a week On Tuesday, Thursday, Saturday, and Sunday    hydrALAZINE (APRESOLINE) 50 MG tablet 6/22/2020 at x2 [facility]  Home Yes Yes   Sig: Take 50 mg by mouth 2 times daily   insulin NPH-Regular 70/30 susp 6/22/2020 at PM Nursing Home No Yes   Sig: Inject 9 Units Subcutaneous daily (with dinner) Note takes 18 Units with breakfast   insulin aspart (NOVOLOG PEN) 100 UNIT/ML pen 6/22/2020 at Unknown time longterm No Yes   Sig: TID w/meals: -249=2U; 250-299=4u; 300-349=6u;350-399=8U; 400 & above 10units   insulin isophane & regular (HUMULIN MIX 70/30 PEN , NOVOLIN MIX 70/30 PEN) susp 6/22/2020 at AM Nursing Home Yes Yes   Sig: Inject 18 Units Subcutaneous every morning (in addition to PM dose)   omeprazole (PRILOSEC OTC) 20 MG EC tablet 6/23/2020 at AM Nursing Home Yes Yes   Sig: Take 20 mg by mouth daily   ondansetron (ZOFRAN) 4 MG tablet 6/22/2020 at Unknown time Nursing Home Yes Yes   Sig: Take 4 mg by mouth every 12 hours as needed for nausea   senna-docusate (SENOKOT-S/PERICOLACE) 8.6-50 MG tablet  Nursing Home Yes Yes   Sig: Take 1 tablet by mouth 2 times daily as needed for constipation   simvastatin (ZOCOR) 20 MG tablet 6/22/2020 at HS Nursing Home No Yes   Sig: Take 1 tablet (20 mg) by mouth At Bedtime      Facility-Administered Medications: None       ALLERGIES  Allergies   Allergen Reactions     Heparin Hives     HIT-serotonin assay negative. NOT HIT       SOCIAL HISTORY  Currently living in assisted living.  Prior to CVA in Oct 2019, was living alone in her condo.  Just before her stroke, she had sold her condo and was staying with her sister while looking for a new apartment.  Former smoker.  No alcohol use.      FAMILY HISTORY  Birth father had HTN and DLD.  Mother had history of substance abuse and depression.    REVIEW OF SYSTEMS:  14 point comprehensive ROS undertaken with pertinent positives and negatives as above and otherwise unremarkable.     PHYSICAL EXAM  Temp: 98.6  F (37  C) Temp src: Oral BP: (!) 170/82 Pulse: 80 Heart Rate: 79 Resp: 18 SpO2: 90 % O2 Device: None (Room air)    Vital Signs with  Ranges  Temp:  [98.1  F (36.7  C)-98.6  F (37  C)] 98.6  F (37  C)  Pulse:  [77-81] 80  Heart Rate:  [79-81] 79  Resp:  [18] 18  BP: (146-175)/(63-82) 170/82  SpO2:  [90 %-100 %] 90 %  198 lbs 8 oz    GENERAL:  Pleasant, cooperative, alert. Well developed, well nourished. Poor historian.  HEENT: Normocephalic, atraumatic.  Extra occular mm intact.  Sclera clear. PERRL.  Mucous membranes a bit dry  PULMONARY: Clear to auscultation bilaterally    CARDIAC: Regular rate and rhythm.  No appreciated murmur.     ABDOMEN: Soft, nontender non distended.   MUSCULOSKELETAL:  Moving x 4 spontaneously with CMS intact x4.  Normal bulk and tone.  Trace BLE edema.  No significant venous stasis changes.   NEURO: Alert and oriented x3.  CN II-XII grossly intact and symmetric.  No focal deficits; residual left sided weakness.   SKIN:  Warm, pink, dry.    DATA  Data reviewed today:  I personally reviewed no images or EKG's today.    Recent Labs   Lab 06/23/20  0919   WBC 12.3*   HGB 11.0*   MCV 88      INR 1.14   *   POTASSIUM 5.1   CHLORIDE 76*   CO2 33*   BUN 83*   CR 9.17*   ANIONGAP 14   GILL 9.4   *   ALBUMIN 3.6   PROTTOTAL 7.3   BILITOTAL 0.3   ALKPHOS 71   ALT 11   AST 16   TROPI <0.015       No results found for this or any previous visit (from the past 24 hour(s)).     Doxepin Pregnancy And Lactation Text: This medication is Pregnancy Category C and it isn't known if it is safe during pregnancy. It is also excreted in breast milk and breast feeding isn't recommended.

## 2021-06-29 NOTE — RESULT ENCOUNTER NOTE
Had a fistulogram and angioplasty 4/15/21.  Focal mild stenosis otherwise fine.  Repeat Duplex in several months to follow-up this area on AVF.       Sumanth Corbin MD

## 2021-07-14 ENCOUNTER — LAB REQUISITION (OUTPATIENT)
Dept: LAB | Facility: CLINIC | Age: 61
End: 2021-07-14
Payer: COMMERCIAL

## 2021-07-14 ENCOUNTER — LAB (OUTPATIENT)
Dept: LAB | Facility: CLINIC | Age: 61
End: 2021-07-14
Payer: COMMERCIAL

## 2021-07-14 DIAGNOSIS — M62.81 MUSCLE WEAKNESS (GENERALIZED): ICD-10-CM

## 2021-07-14 DIAGNOSIS — N18.6 ESRD (END STAGE RENAL DISEASE) ON DIALYSIS (H): Primary | ICD-10-CM

## 2021-07-14 DIAGNOSIS — Z99.2 ESRD (END STAGE RENAL DISEASE) ON DIALYSIS (H): Primary | ICD-10-CM

## 2021-07-14 DIAGNOSIS — E78.5 HYPERLIPIDEMIA, UNSPECIFIED: ICD-10-CM

## 2021-07-14 DIAGNOSIS — E87.5 HYPERKALEMIA: ICD-10-CM

## 2021-07-14 DIAGNOSIS — I12.0 HYPERTENSIVE CHRONIC KIDNEY DISEASE WITH STAGE 5 CHRONIC KIDNEY DISEASE OR END STAGE RENAL DISEASE (H): ICD-10-CM

## 2021-07-14 DIAGNOSIS — E11.22 TYPE 2 DIABETES MELLITUS WITH DIABETIC CHRONIC KIDNEY DISEASE (H): ICD-10-CM

## 2021-07-14 LAB — POTASSIUM BLD-SCNC: 6.8 MMOL/L (ref 3.4–5.3)

## 2021-07-14 PROCEDURE — 84132 ASSAY OF SERUM POTASSIUM: CPT

## 2021-07-15 LAB
ALBUMIN SERPL-MCNC: 3.3 G/DL (ref 3.5–5)
ALP SERPL-CCNC: 132 U/L (ref 45–120)
ALT SERPL W P-5'-P-CCNC: 10 U/L (ref 0–45)
ANION GAP SERPL CALCULATED.3IONS-SCNC: 11 MMOL/L (ref 5–18)
AST SERPL W P-5'-P-CCNC: 12 U/L (ref 0–40)
BILIRUB SERPL-MCNC: 0.3 MG/DL (ref 0–1)
BUN SERPL-MCNC: 61 MG/DL (ref 8–22)
CALCIUM SERPL-MCNC: 9.6 MG/DL (ref 8.5–10.5)
CHLORIDE BLD-SCNC: 94 MMOL/L (ref 98–107)
CO2 SERPL-SCNC: 27 MMOL/L (ref 22–31)
CREAT SERPL-MCNC: 7.14 MG/DL (ref 0.6–1.1)
DEPRECATED CALCIDIOL+CALCIFEROL SERPL-MC: 11 UG/L (ref 30–80)
ERYTHROCYTE [DISTWIDTH] IN BLOOD BY AUTOMATED COUNT: 13.6 % (ref 10–15)
GFR SERPL CREATININE-BSD FRML MDRD: 6 ML/MIN/1.73M2
GLUCOSE BLD-MCNC: 341 MG/DL (ref 70–125)
HBA1C MFR BLD: 9 %
HCT VFR BLD AUTO: 33.9 % (ref 35–47)
HGB BLD-MCNC: 10.6 G/DL (ref 11.7–15.7)
MCH RBC QN AUTO: 29.7 PG (ref 26.5–33)
MCHC RBC AUTO-ENTMCNC: 31.3 G/DL (ref 31.5–36.5)
MCV RBC AUTO: 95 FL (ref 78–100)
PLATELET # BLD AUTO: 168 10E3/UL (ref 150–450)
POTASSIUM BLD-SCNC: 6.1 MMOL/L (ref 3.5–5)
PROT SERPL-MCNC: 6.6 G/DL (ref 6–8)
RBC # BLD AUTO: 3.57 10E6/UL (ref 3.8–5.2)
SODIUM SERPL-SCNC: 132 MMOL/L (ref 136–145)
TSH SERPL DL<=0.005 MIU/L-ACNC: 1.41 UIU/ML (ref 0.3–5)
WBC # BLD AUTO: 8.1 10E3/UL (ref 4–11)

## 2021-07-15 PROCEDURE — 80053 COMPREHEN METABOLIC PANEL: CPT | Mod: ORL | Performed by: PHYSICIAN ASSISTANT

## 2021-07-15 PROCEDURE — 82306 VITAMIN D 25 HYDROXY: CPT | Mod: ORL | Performed by: PHYSICIAN ASSISTANT

## 2021-07-15 PROCEDURE — 84443 ASSAY THYROID STIM HORMONE: CPT | Mod: ORL | Performed by: PHYSICIAN ASSISTANT

## 2021-07-15 PROCEDURE — 83036 HEMOGLOBIN GLYCOSYLATED A1C: CPT | Mod: ORL | Performed by: PHYSICIAN ASSISTANT

## 2021-07-15 PROCEDURE — 36415 COLL VENOUS BLD VENIPUNCTURE: CPT | Mod: ORL | Performed by: PHYSICIAN ASSISTANT

## 2021-07-15 PROCEDURE — 85027 COMPLETE CBC AUTOMATED: CPT | Mod: ORL | Performed by: PHYSICIAN ASSISTANT

## 2021-07-15 PROCEDURE — P9604 ONE-WAY ALLOW PRORATED TRIP: HCPCS | Mod: ORL | Performed by: PHYSICIAN ASSISTANT

## 2021-07-22 DIAGNOSIS — T82.9XXA COMPLICATION OF ARTERIOVENOUS DIALYSIS FISTULA, INITIAL ENCOUNTER: Primary | ICD-10-CM

## 2021-08-09 ENCOUNTER — LAB REQUISITION (OUTPATIENT)
Dept: LAB | Facility: CLINIC | Age: 61
End: 2021-08-09
Payer: COMMERCIAL

## 2021-08-09 DIAGNOSIS — Z11.59 ENCOUNTER FOR SCREENING FOR OTHER VIRAL DISEASES: ICD-10-CM

## 2021-08-11 PROCEDURE — U0005 INFEC AGEN DETEC AMPLI PROBE: HCPCS | Mod: ORL | Performed by: FAMILY MEDICINE

## 2021-08-12 LAB — SARS-COV-2 RNA RESP QL NAA+PROBE: NEGATIVE

## 2021-08-16 ENCOUNTER — LAB REQUISITION (OUTPATIENT)
Dept: LAB | Facility: CLINIC | Age: 61
End: 2021-08-16
Payer: COMMERCIAL

## 2021-08-16 DIAGNOSIS — Z11.59 ENCOUNTER FOR SCREENING FOR OTHER VIRAL DISEASES: ICD-10-CM

## 2021-08-18 PROCEDURE — U0003 INFECTIOUS AGENT DETECTION BY NUCLEIC ACID (DNA OR RNA); SEVERE ACUTE RESPIRATORY SYNDROME CORONAVIRUS 2 (SARS-COV-2) (CORONAVIRUS DISEASE [COVID-19]), AMPLIFIED PROBE TECHNIQUE, MAKING USE OF HIGH THROUGHPUT TECHNOLOGIES AS DESCRIBED BY CMS-2020-01-R: HCPCS | Mod: ORL | Performed by: FAMILY MEDICINE

## 2021-08-19 LAB — SARS-COV-2 RNA RESP QL NAA+PROBE: NEGATIVE

## 2021-08-24 ENCOUNTER — LAB REQUISITION (OUTPATIENT)
Dept: LAB | Facility: CLINIC | Age: 61
End: 2021-08-24
Payer: COMMERCIAL

## 2021-08-24 DIAGNOSIS — Z11.59 ENCOUNTER FOR SCREENING FOR OTHER VIRAL DISEASES: ICD-10-CM

## 2021-08-25 PROCEDURE — U0005 INFEC AGEN DETEC AMPLI PROBE: HCPCS | Mod: ORL | Performed by: FAMILY MEDICINE

## 2021-08-26 LAB — SARS-COV-2 RNA RESP QL NAA+PROBE: NEGATIVE

## 2021-09-01 ENCOUNTER — LAB REQUISITION (OUTPATIENT)
Dept: LAB | Facility: CLINIC | Age: 61
End: 2021-09-01
Payer: COMMERCIAL

## 2021-09-01 DIAGNOSIS — Z11.59 ENCOUNTER FOR SCREENING FOR OTHER VIRAL DISEASES: ICD-10-CM

## 2021-09-02 PROCEDURE — U0003 INFECTIOUS AGENT DETECTION BY NUCLEIC ACID (DNA OR RNA); SEVERE ACUTE RESPIRATORY SYNDROME CORONAVIRUS 2 (SARS-COV-2) (CORONAVIRUS DISEASE [COVID-19]), AMPLIFIED PROBE TECHNIQUE, MAKING USE OF HIGH THROUGHPUT TECHNOLOGIES AS DESCRIBED BY CMS-2020-01-R: HCPCS | Mod: ORL | Performed by: FAMILY MEDICINE

## 2021-09-05 LAB — SARS-COV-2 RNA RESP QL NAA+PROBE: NEGATIVE

## 2021-09-08 ENCOUNTER — LAB REQUISITION (OUTPATIENT)
Dept: LAB | Facility: CLINIC | Age: 61
End: 2021-09-08
Payer: COMMERCIAL

## 2021-09-08 DIAGNOSIS — Z11.59 ENCOUNTER FOR SCREENING FOR OTHER VIRAL DISEASES: ICD-10-CM

## 2021-09-09 PROCEDURE — U0005 INFEC AGEN DETEC AMPLI PROBE: HCPCS | Mod: ORL | Performed by: FAMILY MEDICINE

## 2021-09-10 LAB — SARS-COV-2 RNA RESP QL NAA+PROBE: NEGATIVE

## 2021-09-14 ENCOUNTER — LAB REQUISITION (OUTPATIENT)
Dept: LAB | Facility: CLINIC | Age: 61
End: 2021-09-14
Payer: COMMERCIAL

## 2021-09-14 DIAGNOSIS — Z11.59 ENCOUNTER FOR SCREENING FOR OTHER VIRAL DISEASES: ICD-10-CM

## 2021-09-15 PROCEDURE — U0005 INFEC AGEN DETEC AMPLI PROBE: HCPCS | Mod: ORL | Performed by: FAMILY MEDICINE

## 2021-09-15 PROCEDURE — U0003 INFECTIOUS AGENT DETECTION BY NUCLEIC ACID (DNA OR RNA); SEVERE ACUTE RESPIRATORY SYNDROME CORONAVIRUS 2 (SARS-COV-2) (CORONAVIRUS DISEASE [COVID-19]), AMPLIFIED PROBE TECHNIQUE, MAKING USE OF HIGH THROUGHPUT TECHNOLOGIES AS DESCRIBED BY CMS-2020-01-R: HCPCS | Mod: ORL

## 2021-09-17 LAB — SARS-COV-2 RNA RESP QL NAA+PROBE: NEGATIVE

## 2021-10-19 ENCOUNTER — LAB REQUISITION (OUTPATIENT)
Dept: LAB | Facility: CLINIC | Age: 61
End: 2021-10-19
Payer: COMMERCIAL

## 2021-10-19 DIAGNOSIS — E11.8 TYPE 2 DIABETES MELLITUS WITH UNSPECIFIED COMPLICATIONS (H): ICD-10-CM

## 2021-10-27 LAB — HBA1C MFR BLD: 7.2 %

## 2021-10-27 PROCEDURE — 83036 HEMOGLOBIN GLYCOSYLATED A1C: CPT | Mod: ORL | Performed by: PHYSICIAN ASSISTANT

## 2021-10-27 PROCEDURE — 36415 COLL VENOUS BLD VENIPUNCTURE: CPT | Mod: ORL | Performed by: PHYSICIAN ASSISTANT

## 2021-10-27 PROCEDURE — P9604 ONE-WAY ALLOW PRORATED TRIP: HCPCS | Mod: ORL | Performed by: PHYSICIAN ASSISTANT

## 2021-12-01 ENCOUNTER — TELEPHONE (OUTPATIENT)
Dept: MULTI SPECIALTY CLINIC | Facility: CLINIC | Age: 61
End: 2021-12-01

## 2021-12-01 ENCOUNTER — LAB REQUISITION (OUTPATIENT)
Dept: LAB | Facility: CLINIC | Age: 61
End: 2021-12-01
Payer: COMMERCIAL

## 2021-12-01 ENCOUNTER — TELEPHONE (OUTPATIENT)
Dept: MULTI SPECIALTY CLINIC | Facility: CLINIC | Age: 61
End: 2021-12-01
Payer: COMMERCIAL

## 2021-12-01 DIAGNOSIS — Z11.59 ENCOUNTER FOR SCREENING FOR OTHER VIRAL DISEASES: ICD-10-CM

## 2021-12-01 NOTE — TELEPHONE ENCOUNTER
Pt is requesting rx for Veltassa 8.4gm    Thank you!  Brigid Wick [facility]

## 2021-12-01 NOTE — TELEPHONE ENCOUNTER
Call to [facility], spoke with technician, advised [facility] refill team does not do refills for nephrology, advised to send refill request to nephrology team

## 2021-12-01 NOTE — TELEPHONE ENCOUNTER
PT requesting Rx for Veltassa 8.4gm pack    Thank you!  Brigid Wick [facility]

## 2021-12-02 PROCEDURE — U0003 INFECTIOUS AGENT DETECTION BY NUCLEIC ACID (DNA OR RNA); SEVERE ACUTE RESPIRATORY SYNDROME CORONAVIRUS 2 (SARS-COV-2) (CORONAVIRUS DISEASE [COVID-19]), AMPLIFIED PROBE TECHNIQUE, MAKING USE OF HIGH THROUGHPUT TECHNOLOGIES AS DESCRIBED BY CMS-2020-01-R: HCPCS | Mod: ORL | Performed by: FAMILY MEDICINE

## 2021-12-03 LAB — SARS-COV-2 RNA RESP QL NAA+PROBE: NEGATIVE

## 2022-02-22 ENCOUNTER — LAB REQUISITION (OUTPATIENT)
Dept: LAB | Facility: CLINIC | Age: 62
End: 2022-02-22
Payer: COMMERCIAL

## 2022-02-22 DIAGNOSIS — E11.21 TYPE 2 DIABETES MELLITUS WITH DIABETIC NEPHROPATHY (H): ICD-10-CM

## 2022-02-23 PROCEDURE — 83036 HEMOGLOBIN GLYCOSYLATED A1C: CPT | Mod: ORL | Performed by: PHYSICIAN ASSISTANT

## 2022-02-23 PROCEDURE — 36415 COLL VENOUS BLD VENIPUNCTURE: CPT | Mod: ORL | Performed by: PHYSICIAN ASSISTANT

## 2022-02-23 PROCEDURE — P9603 ONE-WAY ALLOW PRORATED MILES: HCPCS | Mod: ORL | Performed by: PHYSICIAN ASSISTANT

## 2022-03-02 LAB — HBA1C MFR BLD: 6.3 %

## 2022-07-26 ENCOUNTER — LAB REQUISITION (OUTPATIENT)
Dept: LAB | Facility: CLINIC | Age: 62
End: 2022-07-26
Payer: COMMERCIAL

## 2022-08-10 LAB
CHOLEST SERPL-MCNC: 84 MG/DL
HBA1C MFR BLD: 7.4 %
HDLC SERPL-MCNC: 30 MG/DL
LDLC SERPL CALC-MCNC: 28 MG/DL
NONHDLC SERPL-MCNC: 54 MG/DL
TRIGL SERPL-MCNC: 128 MG/DL

## 2022-08-10 PROCEDURE — 36415 COLL VENOUS BLD VENIPUNCTURE: CPT | Mod: ORL | Performed by: PHYSICIAN ASSISTANT

## 2022-08-10 PROCEDURE — 80061 LIPID PANEL: CPT | Mod: ORL | Performed by: PHYSICIAN ASSISTANT

## 2022-08-10 PROCEDURE — P9604 ONE-WAY ALLOW PRORATED TRIP: HCPCS | Mod: ORL | Performed by: PHYSICIAN ASSISTANT

## 2022-08-10 PROCEDURE — 83036 HEMOGLOBIN GLYCOSYLATED A1C: CPT | Mod: ORL | Performed by: PHYSICIAN ASSISTANT

## 2022-08-25 ENCOUNTER — LAB REQUISITION (OUTPATIENT)
Dept: LAB | Facility: CLINIC | Age: 62
End: 2022-08-25
Payer: COMMERCIAL

## 2022-08-25 DIAGNOSIS — E78.5 HYPERLIPIDEMIA, UNSPECIFIED: ICD-10-CM

## 2022-08-25 DIAGNOSIS — E11.59 TYPE 2 DIABETES MELLITUS WITH OTHER CIRCULATORY COMPLICATIONS (H): ICD-10-CM

## 2022-08-25 LAB
CHOLEST SERPL-MCNC: 85 MG/DL
HBA1C MFR BLD: 7.4 %
HDLC SERPL-MCNC: 27 MG/DL
LDLC SERPL CALC-MCNC: 23 MG/DL
NONHDLC SERPL-MCNC: 58 MG/DL
TRIGL SERPL-MCNC: 175 MG/DL

## 2022-08-25 PROCEDURE — 80061 LIPID PANEL: CPT | Mod: ORL | Performed by: PHYSICIAN ASSISTANT

## 2022-08-25 PROCEDURE — 36415 COLL VENOUS BLD VENIPUNCTURE: CPT | Mod: ORL | Performed by: PHYSICIAN ASSISTANT

## 2022-08-25 PROCEDURE — P9603 ONE-WAY ALLOW PRORATED MILES: HCPCS | Mod: ORL | Performed by: PHYSICIAN ASSISTANT

## 2022-08-25 PROCEDURE — 83036 HEMOGLOBIN GLYCOSYLATED A1C: CPT | Mod: ORL | Performed by: PHYSICIAN ASSISTANT

## 2022-09-02 ENCOUNTER — LAB REQUISITION (OUTPATIENT)
Dept: LAB | Facility: CLINIC | Age: 62
End: 2022-09-02

## 2022-09-02 DIAGNOSIS — N18.6 END STAGE RENAL DISEASE (H): ICD-10-CM

## 2022-09-02 LAB — POTASSIUM SERPL-SCNC: 5.3 MMOL/L (ref 3.4–5.3)

## 2022-09-02 PROCEDURE — 84132 ASSAY OF SERUM POTASSIUM: CPT | Performed by: INTERNAL MEDICINE

## 2022-09-30 ENCOUNTER — LAB REQUISITION (OUTPATIENT)
Dept: LAB | Facility: CLINIC | Age: 62
End: 2022-09-30

## 2022-09-30 DIAGNOSIS — E87.5 HYPERKALEMIA: ICD-10-CM

## 2022-09-30 LAB — POTASSIUM SERPL-SCNC: 5.2 MMOL/L (ref 3.4–5.3)

## 2022-09-30 PROCEDURE — 84132 ASSAY OF SERUM POTASSIUM: CPT | Performed by: INTERNAL MEDICINE

## 2022-11-22 ENCOUNTER — LAB REQUISITION (OUTPATIENT)
Dept: LAB | Facility: CLINIC | Age: 62
End: 2022-11-22
Payer: COMMERCIAL

## 2022-11-22 DIAGNOSIS — E11.59 TYPE 2 DIABETES MELLITUS WITH OTHER CIRCULATORY COMPLICATIONS (H): ICD-10-CM

## 2022-11-22 DIAGNOSIS — D21.9 BENIGN NEOPLASM OF CONNECTIVE AND OTHER SOFT TISSUE, UNSPECIFIED: ICD-10-CM

## 2022-11-22 DIAGNOSIS — D64.1 SECONDARY SIDEROBLASTIC ANEMIA DUE TO DISEASE (H): ICD-10-CM

## 2022-12-11 ENCOUNTER — LAB REQUISITION (OUTPATIENT)
Dept: LAB | Facility: CLINIC | Age: 62
End: 2022-12-11
Payer: COMMERCIAL

## 2022-12-11 DIAGNOSIS — R42 DIZZINESS AND GIDDINESS: ICD-10-CM

## 2022-12-11 DIAGNOSIS — I25.10 ATHEROSCLEROTIC HEART DISEASE OF NATIVE CORONARY ARTERY WITHOUT ANGINA PECTORIS: ICD-10-CM

## 2022-12-13 LAB
ANION GAP SERPL CALCULATED.3IONS-SCNC: 19 MMOL/L (ref 7–15)
BASOPHILS # BLD AUTO: 0.1 10E3/UL (ref 0–0.2)
BASOPHILS NFR BLD AUTO: 1 %
BUN SERPL-MCNC: 65 MG/DL (ref 8–23)
CALCIUM SERPL-MCNC: 8.6 MG/DL (ref 8.8–10.2)
CHLORIDE SERPL-SCNC: 94 MMOL/L (ref 98–107)
CREAT SERPL-MCNC: 11.6 MG/DL (ref 0.51–0.95)
CRP SERPL-MCNC: 11.1 MG/L
DEPRECATED HCO3 PLAS-SCNC: 23 MMOL/L (ref 22–29)
EOSINOPHIL # BLD AUTO: 0.2 10E3/UL (ref 0–0.7)
EOSINOPHIL NFR BLD AUTO: 2 %
ERYTHROCYTE [DISTWIDTH] IN BLOOD BY AUTOMATED COUNT: 14.5 % (ref 10–15)
GFR SERPL CREATININE-BSD FRML MDRD: 3 ML/MIN/1.73M2
GLUCOSE SERPL-MCNC: 129 MG/DL (ref 70–99)
HCT VFR BLD AUTO: 27.8 % (ref 35–47)
HGB BLD-MCNC: 7.9 G/DL (ref 11.7–15.7)
IMM GRANULOCYTES # BLD: 0.1 10E3/UL
IMM GRANULOCYTES NFR BLD: 1 %
LYMPHOCYTES # BLD AUTO: 0.9 10E3/UL (ref 0.8–5.3)
LYMPHOCYTES NFR BLD AUTO: 10 %
MCH RBC QN AUTO: 27.7 PG (ref 26.5–33)
MCHC RBC AUTO-ENTMCNC: 28.4 G/DL (ref 31.5–36.5)
MCV RBC AUTO: 98 FL (ref 78–100)
MONOCYTES # BLD AUTO: 0.6 10E3/UL (ref 0–1.3)
MONOCYTES NFR BLD AUTO: 7 %
NEUTROPHILS # BLD AUTO: 7 10E3/UL (ref 1.6–8.3)
NEUTROPHILS NFR BLD AUTO: 79 %
NRBC # BLD AUTO: 0 10E3/UL
NRBC BLD AUTO-RTO: 0 /100
PLATELET # BLD AUTO: 270 10E3/UL (ref 150–450)
POTASSIUM SERPL-SCNC: 5.8 MMOL/L (ref 3.4–5.3)
RBC # BLD AUTO: 2.85 10E6/UL (ref 3.8–5.2)
SODIUM SERPL-SCNC: 136 MMOL/L (ref 136–145)
WBC # BLD AUTO: 8.8 10E3/UL (ref 4–11)

## 2022-12-13 PROCEDURE — P9604 ONE-WAY ALLOW PRORATED TRIP: HCPCS | Mod: ORL | Performed by: EMERGENCY MEDICINE

## 2022-12-13 PROCEDURE — 85025 COMPLETE CBC W/AUTO DIFF WBC: CPT | Mod: ORL | Performed by: EMERGENCY MEDICINE

## 2022-12-13 PROCEDURE — 80048 BASIC METABOLIC PNL TOTAL CA: CPT | Mod: ORL | Performed by: EMERGENCY MEDICINE

## 2022-12-13 PROCEDURE — 36415 COLL VENOUS BLD VENIPUNCTURE: CPT | Mod: ORL | Performed by: EMERGENCY MEDICINE

## 2022-12-13 PROCEDURE — 86140 C-REACTIVE PROTEIN: CPT | Mod: ORL | Performed by: EMERGENCY MEDICINE

## 2023-01-01 ENCOUNTER — HOSPITAL ENCOUNTER (EMERGENCY)
Facility: CLINIC | Age: 63
Discharge: STILL ADMITTED OR ADMINISTRATIVE | End: 2023-10-31
Attending: EMERGENCY MEDICINE | Admitting: EMERGENCY MEDICINE
Payer: COMMERCIAL

## 2023-01-01 ENCOUNTER — TELEPHONE (OUTPATIENT)
Dept: OPHTHALMOLOGY | Facility: CLINIC | Age: 63
End: 2023-01-01
Payer: COMMERCIAL

## 2023-01-01 ENCOUNTER — LAB REQUISITION (OUTPATIENT)
Dept: LAB | Facility: CLINIC | Age: 63
End: 2023-01-01

## 2023-01-01 ENCOUNTER — OFFICE VISIT (OUTPATIENT)
Dept: OPHTHALMOLOGY | Facility: CLINIC | Age: 63
End: 2023-01-01
Attending: STUDENT IN AN ORGANIZED HEALTH CARE EDUCATION/TRAINING PROGRAM
Payer: COMMERCIAL

## 2023-01-01 ENCOUNTER — HOSPITAL ENCOUNTER (EMERGENCY)
Facility: CLINIC | Age: 63
Discharge: HOME | End: 2023-11-08
Attending: EMERGENCY MEDICINE | Admitting: EMERGENCY MEDICINE
Payer: COMMERCIAL

## 2023-01-01 ENCOUNTER — HOSPITAL ENCOUNTER (EMERGENCY)
Facility: CLINIC | Age: 63
Discharge: OTHER INSTITUTION | End: 2023-10-31
Attending: EMERGENCY MEDICINE | Admitting: EMERGENCY MEDICINE
Payer: COMMERCIAL

## 2023-01-01 ENCOUNTER — OFFICE VISIT (OUTPATIENT)
Dept: OPHTHALMOLOGY | Facility: CLINIC | Age: 63
End: 2023-01-01
Attending: OPHTHALMOLOGY
Payer: COMMERCIAL

## 2023-01-01 ENCOUNTER — APPOINTMENT (OUTPATIENT)
Dept: CT IMAGING | Facility: CLINIC | Age: 63
End: 2023-01-01
Attending: EMERGENCY MEDICINE
Payer: COMMERCIAL

## 2023-01-01 ENCOUNTER — TELEPHONE (OUTPATIENT)
Dept: OPHTHALMOLOGY | Facility: CLINIC | Age: 63
End: 2023-01-01

## 2023-01-01 ENCOUNTER — NURSE TRIAGE (OUTPATIENT)
Dept: NURSING | Facility: CLINIC | Age: 63
End: 2023-01-01

## 2023-01-01 ENCOUNTER — LAB REQUISITION (OUTPATIENT)
Dept: LAB | Facility: CLINIC | Age: 63
End: 2023-01-01
Payer: COMMERCIAL

## 2023-01-01 ENCOUNTER — HOSPITAL ENCOUNTER (OUTPATIENT)
Facility: AMBULATORY SURGERY CENTER | Age: 63
Discharge: HOME | End: 2023-11-27
Attending: OPHTHALMOLOGY | Admitting: OPHTHALMOLOGY
Payer: COMMERCIAL

## 2023-01-01 ENCOUNTER — HOSPITAL ENCOUNTER (EMERGENCY)
Facility: CLINIC | Age: 63
Discharge: HOME | End: 2023-11-26
Attending: EMERGENCY MEDICINE | Admitting: EMERGENCY MEDICINE
Payer: COMMERCIAL

## 2023-01-01 ENCOUNTER — PREP FOR PROCEDURE (OUTPATIENT)
Dept: OPHTHALMOLOGY | Facility: CLINIC | Age: 63
End: 2023-01-01
Payer: COMMERCIAL

## 2023-01-01 ENCOUNTER — MEDICAL CORRESPONDENCE (OUTPATIENT)
Dept: HEALTH INFORMATION MANAGEMENT | Facility: CLINIC | Age: 63
End: 2023-01-01
Payer: COMMERCIAL

## 2023-01-01 ENCOUNTER — HOSPITAL ENCOUNTER (OUTPATIENT)
Facility: CLINIC | Age: 63
Setting detail: OBSERVATION
Discharge: OTHER INSTITUTION | End: 2023-10-28
Attending: EMERGENCY MEDICINE | Admitting: EMERGENCY MEDICINE
Payer: COMMERCIAL

## 2023-01-01 VITALS
DIASTOLIC BLOOD PRESSURE: 69 MMHG | HEART RATE: 76 BPM | SYSTOLIC BLOOD PRESSURE: 153 MMHG | RESPIRATION RATE: 16 BRPM | TEMPERATURE: 97.8 F | OXYGEN SATURATION: 98 %

## 2023-01-01 VITALS
DIASTOLIC BLOOD PRESSURE: 56 MMHG | HEART RATE: 69 BPM | RESPIRATION RATE: 16 BRPM | BODY MASS INDEX: 27.28 KG/M2 | WEIGHT: 180 LBS | TEMPERATURE: 96.9 F | HEIGHT: 68 IN | SYSTOLIC BLOOD PRESSURE: 138 MMHG | OXYGEN SATURATION: 94 %

## 2023-01-01 VITALS
HEIGHT: 68 IN | SYSTOLIC BLOOD PRESSURE: 180 MMHG | DIASTOLIC BLOOD PRESSURE: 91 MMHG | HEART RATE: 78 BPM | RESPIRATION RATE: 16 BRPM | WEIGHT: 201 LBS | BODY MASS INDEX: 30.46 KG/M2 | TEMPERATURE: 97.6 F | OXYGEN SATURATION: 97 %

## 2023-01-01 VITALS
DIASTOLIC BLOOD PRESSURE: 62 MMHG | HEART RATE: 78 BPM | OXYGEN SATURATION: 97 % | TEMPERATURE: 98 F | SYSTOLIC BLOOD PRESSURE: 133 MMHG | RESPIRATION RATE: 20 BRPM

## 2023-01-01 VITALS
DIASTOLIC BLOOD PRESSURE: 76 MMHG | HEART RATE: 67 BPM | OXYGEN SATURATION: 97 % | RESPIRATION RATE: 12 BRPM | SYSTOLIC BLOOD PRESSURE: 128 MMHG | TEMPERATURE: 98.2 F

## 2023-01-01 VITALS
DIASTOLIC BLOOD PRESSURE: 101 MMHG | RESPIRATION RATE: 16 BRPM | TEMPERATURE: 98.7 F | HEART RATE: 85 BPM | SYSTOLIC BLOOD PRESSURE: 165 MMHG

## 2023-01-01 DIAGNOSIS — E08.3511 PROLIFERATIVE DIABETIC RETINOPATHY OF RIGHT EYE WITH MACULAR EDEMA ASSOCIATED WITH DIABETES MELLITUS DUE TO UNDERLYING CONDITION (H): ICD-10-CM

## 2023-01-01 DIAGNOSIS — E11.3599 TYPE 2 DIABETES MELLITUS WITH VITREOUS HEMORRHAGE AND PROLIFERATIVE RETINOPATHY (H): ICD-10-CM

## 2023-01-01 DIAGNOSIS — H04.123 DRY EYES, BILATERAL: ICD-10-CM

## 2023-01-01 DIAGNOSIS — K21.00 GASTROESOPHAGEAL REFLUX DISEASE WITH ESOPHAGITIS: ICD-10-CM

## 2023-01-01 DIAGNOSIS — H40.211 ACUTE ANGLE-CLOSURE GLAUCOMA OF RIGHT EYE: ICD-10-CM

## 2023-01-01 DIAGNOSIS — Z99.2 ESRD (END STAGE RENAL DISEASE) ON DIALYSIS (H): ICD-10-CM

## 2023-01-01 DIAGNOSIS — E08.3512 PROLIFERATIVE DIABETIC RETINOPATHY OF LEFT EYE WITH MACULAR EDEMA ASSOCIATED WITH DIABETES MELLITUS DUE TO UNDERLYING CONDITION (H): Primary | ICD-10-CM

## 2023-01-01 DIAGNOSIS — E11.9 DIABETES MELLITUS TYPE 2, INSULIN DEPENDENT (H): ICD-10-CM

## 2023-01-01 DIAGNOSIS — N18.6 TYPE 2 DIABETES MELLITUS WITH ESRD (END-STAGE RENAL DISEASE) (H): Primary | ICD-10-CM

## 2023-01-01 DIAGNOSIS — H43.13: ICD-10-CM

## 2023-01-01 DIAGNOSIS — R51.9 ACUTE NONINTRACTABLE HEADACHE, UNSPECIFIED HEADACHE TYPE: ICD-10-CM

## 2023-01-01 DIAGNOSIS — E11.22 TYPE 2 DIABETES MELLITUS WITH ESRD (END-STAGE RENAL DISEASE) (H): Primary | ICD-10-CM

## 2023-01-01 DIAGNOSIS — E87.1 HYPONATREMIA: ICD-10-CM

## 2023-01-01 DIAGNOSIS — H43.10 TYPE 2 DIABETES MELLITUS WITH VITREOUS HEMORRHAGE AND PROLIFERATIVE RETINOPATHY (H): ICD-10-CM

## 2023-01-01 DIAGNOSIS — Z98.890 POST-OPERATIVE STATE: Primary | ICD-10-CM

## 2023-01-01 DIAGNOSIS — H43.11 VITREOUS HEMORRHAGE, RIGHT (H): ICD-10-CM

## 2023-01-01 DIAGNOSIS — K21.00 GASTROESOPHAGEAL REFLUX DISEASE WITH ESOPHAGITIS WITHOUT HEMORRHAGE: ICD-10-CM

## 2023-01-01 DIAGNOSIS — H40.9 GLAUCOMA OF RIGHT EYE, UNSPECIFIED GLAUCOMA TYPE: ICD-10-CM

## 2023-01-01 DIAGNOSIS — H40.51X3 NEOVASCULAR GLAUCOMA OF RIGHT EYE, SEVERE STAGE: Primary | ICD-10-CM

## 2023-01-01 DIAGNOSIS — E08.3512 PROLIFERATIVE DIABETIC RETINOPATHY OF LEFT EYE WITH MACULAR EDEMA ASSOCIATED WITH DIABETES MELLITUS DUE TO UNDERLYING CONDITION (H): ICD-10-CM

## 2023-01-01 DIAGNOSIS — H40.51X4 NEOVASCULAR GLAUCOMA OF RIGHT EYE, INDETERMINATE STAGE: Primary | ICD-10-CM

## 2023-01-01 DIAGNOSIS — E87.5 HYPERKALEMIA: ICD-10-CM

## 2023-01-01 DIAGNOSIS — Z99.2 ENCOUNTER FOR EXTRACORPOREAL DIALYSIS (H): ICD-10-CM

## 2023-01-01 DIAGNOSIS — H40.9 ACUTE GLAUCOMA: ICD-10-CM

## 2023-01-01 DIAGNOSIS — N18.6 MALIGNANT HYPERTENSION WITH HEART FAILURE AND END-STAGE RENAL DIS (H): ICD-10-CM

## 2023-01-01 DIAGNOSIS — E11.3292 NONPROLIFERATIVE DIABETIC RETINOPATHY OF LEFT EYE (H): ICD-10-CM

## 2023-01-01 DIAGNOSIS — I13.2 MALIGNANT HYPERTENSION WITH HEART FAILURE AND END-STAGE RENAL DIS (H): ICD-10-CM

## 2023-01-01 DIAGNOSIS — R94.31 ABNORMAL ELECTROCARDIOGRAM (ECG) (EKG): ICD-10-CM

## 2023-01-01 DIAGNOSIS — H40.051 RAISED INTRAOCULAR PRESSURE OF RIGHT EYE: Primary | ICD-10-CM

## 2023-01-01 DIAGNOSIS — H57.11 ACUTE RIGHT EYE PAIN: ICD-10-CM

## 2023-01-01 DIAGNOSIS — Z79.4 DIABETES MELLITUS TYPE 2, INSULIN DEPENDENT (H): ICD-10-CM

## 2023-01-01 DIAGNOSIS — N18.6 ESRD (END STAGE RENAL DISEASE) ON DIALYSIS (H): ICD-10-CM

## 2023-01-01 DIAGNOSIS — Z79.4 ENCOUNTER FOR LONG-TERM (CURRENT) USE OF INSULIN (H): ICD-10-CM

## 2023-01-01 LAB
AMMONIA PLAS-SCNC: 22 UMOL/L (ref 11–51)
ANION GAP SERPL CALCULATED.3IONS-SCNC: 15 MMOL/L (ref 7–15)
ANION GAP SERPL CALCULATED.3IONS-SCNC: 16 MMOL/L (ref 7–15)
ANION GAP SERPL CALCULATED.3IONS-SCNC: 16 MMOL/L (ref 7–15)
ANION GAP SERPL CALCULATED.3IONS-SCNC: 18 MMOL/L (ref 7–15)
ANION GAP SERPL CALCULATED.3IONS-SCNC: 18 MMOL/L (ref 7–15)
ANION GAP SERPL CALCULATED.3IONS-SCNC: 19 MMOL/L (ref 7–15)
BASOPHILS # BLD AUTO: 0.1 10E3/UL (ref 0–0.2)
BASOPHILS NFR BLD AUTO: 0 %
BASOPHILS NFR BLD AUTO: 1 %
BASOPHILS NFR BLD AUTO: 1 %
BUN SERPL-MCNC: 39.3 MG/DL (ref 8–23)
BUN SERPL-MCNC: 51.9 MG/DL (ref 8–23)
BUN SERPL-MCNC: 55.4 MG/DL (ref 8–23)
BUN SERPL-MCNC: 63 MG/DL (ref 8–23)
BUN SERPL-MCNC: 64.9 MG/DL (ref 8–23)
BUN SERPL-MCNC: 68.4 MG/DL (ref 8–23)
CALCIUM SERPL-MCNC: 8 MG/DL (ref 8.8–10.2)
CALCIUM SERPL-MCNC: 8.5 MG/DL (ref 8.8–10.2)
CALCIUM SERPL-MCNC: 8.6 MG/DL (ref 8.8–10.2)
CALCIUM SERPL-MCNC: 9.1 MG/DL (ref 8.8–10.2)
CALCIUM SERPL-MCNC: 9.3 MG/DL (ref 8.8–10.2)
CALCIUM SERPL-MCNC: 9.5 MG/DL (ref 8.8–10.2)
CHLORIDE SERPL-SCNC: 87 MMOL/L (ref 98–107)
CHLORIDE SERPL-SCNC: 88 MMOL/L (ref 98–107)
CHLORIDE SERPL-SCNC: 89 MMOL/L (ref 98–107)
CHLORIDE SERPL-SCNC: 92 MMOL/L (ref 98–107)
CHLORIDE SERPL-SCNC: 92 MMOL/L (ref 98–107)
CHLORIDE SERPL-SCNC: 96 MMOL/L (ref 98–107)
CREAT SERPL-MCNC: 5.06 MG/DL (ref 0.51–0.95)
CREAT SERPL-MCNC: 6.61 MG/DL (ref 0.51–0.95)
CREAT SERPL-MCNC: 7.42 MG/DL (ref 0.51–0.95)
CREAT SERPL-MCNC: 8.11 MG/DL (ref 0.51–0.95)
CREAT SERPL-MCNC: 8.67 MG/DL (ref 0.51–0.95)
CREAT SERPL-MCNC: 9.99 MG/DL (ref 0.51–0.95)
DEPRECATED HCO3 PLAS-SCNC: 22 MMOL/L (ref 22–29)
DEPRECATED HCO3 PLAS-SCNC: 25 MMOL/L (ref 22–29)
DEPRECATED HCO3 PLAS-SCNC: 26 MMOL/L (ref 22–29)
DEPRECATED HCO3 PLAS-SCNC: 27 MMOL/L (ref 22–29)
EGFRCR SERPLBLD CKD-EPI 2021: 4 ML/MIN/1.73M2
EGFRCR SERPLBLD CKD-EPI 2021: 5 ML/MIN/1.73M2
EGFRCR SERPLBLD CKD-EPI 2021: 5 ML/MIN/1.73M2
EGFRCR SERPLBLD CKD-EPI 2021: 6 ML/MIN/1.73M2
EGFRCR SERPLBLD CKD-EPI 2021: 6 ML/MIN/1.73M2
EGFRCR SERPLBLD CKD-EPI 2021: 9 ML/MIN/1.73M2
EOSINOPHIL # BLD AUTO: 0.1 10E3/UL (ref 0–0.7)
EOSINOPHIL # BLD AUTO: 0.3 10E3/UL (ref 0–0.7)
EOSINOPHIL # BLD AUTO: 0.3 10E3/UL (ref 0–0.7)
EOSINOPHIL NFR BLD AUTO: 1 %
EOSINOPHIL NFR BLD AUTO: 2 %
EOSINOPHIL NFR BLD AUTO: 3 %
ERYTHROCYTE [DISTWIDTH] IN BLOOD BY AUTOMATED COUNT: 15.3 % (ref 10–15)
ERYTHROCYTE [DISTWIDTH] IN BLOOD BY AUTOMATED COUNT: 15.3 % (ref 10–15)
ERYTHROCYTE [DISTWIDTH] IN BLOOD BY AUTOMATED COUNT: 15.4 % (ref 10–15)
ERYTHROCYTE [DISTWIDTH] IN BLOOD BY AUTOMATED COUNT: 15.4 % (ref 10–15)
ERYTHROCYTE [DISTWIDTH] IN BLOOD BY AUTOMATED COUNT: 15.8 % (ref 10–15)
GLUCOSE BLDC GLUCOMTR-MCNC: 105 MG/DL (ref 70–99)
GLUCOSE BLDC GLUCOMTR-MCNC: 111 MG/DL (ref 70–99)
GLUCOSE BLDC GLUCOMTR-MCNC: 115 MG/DL (ref 70–99)
GLUCOSE BLDC GLUCOMTR-MCNC: 126 MG/DL (ref 70–99)
GLUCOSE BLDC GLUCOMTR-MCNC: 132 MG/DL (ref 70–99)
GLUCOSE BLDC GLUCOMTR-MCNC: 148 MG/DL (ref 70–99)
GLUCOSE BLDC GLUCOMTR-MCNC: 150 MG/DL (ref 70–99)
GLUCOSE BLDC GLUCOMTR-MCNC: 163 MG/DL (ref 70–99)
GLUCOSE BLDC GLUCOMTR-MCNC: 178 MG/DL (ref 70–99)
GLUCOSE BLDC GLUCOMTR-MCNC: 191 MG/DL (ref 70–99)
GLUCOSE BLDC GLUCOMTR-MCNC: 198 MG/DL (ref 70–99)
GLUCOSE BLDC GLUCOMTR-MCNC: 272 MG/DL (ref 70–99)
GLUCOSE BLDC GLUCOMTR-MCNC: 49 MG/DL (ref 70–99)
GLUCOSE BLDC GLUCOMTR-MCNC: 67 MG/DL (ref 70–99)
GLUCOSE BLDC GLUCOMTR-MCNC: 84 MG/DL (ref 70–99)
GLUCOSE BLDC GLUCOMTR-MCNC: 92 MG/DL (ref 70–99)
GLUCOSE BLDC GLUCOMTR-MCNC: 94 MG/DL (ref 70–99)
GLUCOSE BLDC GLUCOMTR-MCNC: 96 MG/DL (ref 70–99)
GLUCOSE BLDC GLUCOMTR-MCNC: 96 MG/DL (ref 70–99)
GLUCOSE SERPL-MCNC: 103 MG/DL (ref 70–99)
GLUCOSE SERPL-MCNC: 168 MG/DL (ref 70–99)
GLUCOSE SERPL-MCNC: 186 MG/DL (ref 70–99)
GLUCOSE SERPL-MCNC: 238 MG/DL (ref 70–99)
GLUCOSE SERPL-MCNC: 54 MG/DL (ref 70–99)
GLUCOSE SERPL-MCNC: 84 MG/DL (ref 70–99)
HBA1C MFR BLD: 7.7 %
HBA1C MFR BLD: NORMAL %
HBV SURFACE AB SERPL IA-ACNC: 120.06 M[IU]/ML
HBV SURFACE AB SERPL IA-ACNC: REACTIVE M[IU]/ML
HBV SURFACE AG SERPL QL IA: NONREACTIVE
HCT VFR BLD AUTO: 33.8 % (ref 35–47)
HCT VFR BLD AUTO: 34.4 % (ref 35–47)
HCT VFR BLD AUTO: 34.8 % (ref 35–47)
HCT VFR BLD AUTO: 36 % (ref 35–47)
HCT VFR BLD AUTO: 37.3 % (ref 35–47)
HGB BLD-MCNC: 10.4 G/DL (ref 11.7–15.7)
HGB BLD-MCNC: 10.5 G/DL (ref 11.7–15.7)
HGB BLD-MCNC: 11 G/DL (ref 11.7–15.7)
HGB BLD-MCNC: 11 G/DL (ref 11.7–15.7)
HGB BLD-MCNC: 11.4 G/DL (ref 11.7–15.7)
HOLD SPECIMEN: NORMAL
IMM GRANULOCYTES # BLD: 0.1 10E3/UL
IMM GRANULOCYTES NFR BLD: 1 %
INR PPP: 1.11 (ref 0.85–1.15)
LYMPHOCYTES # BLD AUTO: 0.5 10E3/UL (ref 0.8–5.3)
LYMPHOCYTES # BLD AUTO: 0.6 10E3/UL (ref 0.8–5.3)
LYMPHOCYTES # BLD AUTO: 1 10E3/UL (ref 0.8–5.3)
LYMPHOCYTES NFR BLD AUTO: 12 %
LYMPHOCYTES NFR BLD AUTO: 5 %
LYMPHOCYTES NFR BLD AUTO: 6 %
MAGNESIUM SERPL-MCNC: 2.1 MG/DL (ref 1.7–2.3)
MAGNESIUM SERPL-MCNC: 2.3 MG/DL (ref 1.7–2.3)
MCH RBC QN AUTO: 29.1 PG (ref 26.5–33)
MCH RBC QN AUTO: 29.6 PG (ref 26.5–33)
MCH RBC QN AUTO: 29.8 PG (ref 26.5–33)
MCHC RBC AUTO-ENTMCNC: 30.5 G/DL (ref 31.5–36.5)
MCHC RBC AUTO-ENTMCNC: 30.6 G/DL (ref 31.5–36.5)
MCHC RBC AUTO-ENTMCNC: 30.6 G/DL (ref 31.5–36.5)
MCHC RBC AUTO-ENTMCNC: 30.8 G/DL (ref 31.5–36.5)
MCHC RBC AUTO-ENTMCNC: 31.6 G/DL (ref 31.5–36.5)
MCV RBC AUTO: 94 FL (ref 78–100)
MCV RBC AUTO: 95 FL (ref 78–100)
MCV RBC AUTO: 96 FL (ref 78–100)
MCV RBC AUTO: 97 FL (ref 78–100)
MCV RBC AUTO: 98 FL (ref 78–100)
MONOCYTES # BLD AUTO: 0.7 10E3/UL (ref 0–1.3)
MONOCYTES # BLD AUTO: 1 10E3/UL (ref 0–1.3)
MONOCYTES # BLD AUTO: 1 10E3/UL (ref 0–1.3)
MONOCYTES NFR BLD AUTO: 13 %
MONOCYTES NFR BLD AUTO: 7 %
MONOCYTES NFR BLD AUTO: 9 %
NEUTROPHILS # BLD AUTO: 10 10E3/UL (ref 1.6–8.3)
NEUTROPHILS # BLD AUTO: 5.5 10E3/UL (ref 1.6–8.3)
NEUTROPHILS # BLD AUTO: 7.6 10E3/UL (ref 1.6–8.3)
NEUTROPHILS NFR BLD AUTO: 70 %
NEUTROPHILS NFR BLD AUTO: 83 %
NEUTROPHILS NFR BLD AUTO: 84 %
NRBC # BLD AUTO: 0 10E3/UL
NRBC BLD AUTO-RTO: 0 /100
PLATELET # BLD AUTO: 167 10E3/UL (ref 150–450)
PLATELET # BLD AUTO: 174 10E3/UL (ref 150–450)
PLATELET # BLD AUTO: 189 10E3/UL (ref 150–450)
PLATELET # BLD AUTO: 189 10E3/UL (ref 150–450)
PLATELET # BLD AUTO: 233 10E3/UL (ref 150–450)
POTASSIUM SERPL-SCNC: 4.7 MMOL/L (ref 3.4–5.3)
POTASSIUM SERPL-SCNC: 5 MMOL/L (ref 3.4–5.3)
POTASSIUM SERPL-SCNC: 5 MMOL/L (ref 3.4–5.3)
POTASSIUM SERPL-SCNC: 5.4 MMOL/L (ref 3.4–5.3)
POTASSIUM SERPL-SCNC: 5.7 MMOL/L (ref 3.4–5.3)
POTASSIUM SERPL-SCNC: 6 MMOL/L (ref 3.4–5.3)
POTASSIUM SERPL-SCNC: 6.4 MMOL/L (ref 3.4–5.3)
RBC # BLD AUTO: 3.51 10E6/UL (ref 3.8–5.2)
RBC # BLD AUTO: 3.52 10E6/UL (ref 3.8–5.2)
RBC # BLD AUTO: 3.71 10E6/UL (ref 3.8–5.2)
RBC # BLD AUTO: 3.78 10E6/UL (ref 3.8–5.2)
RBC # BLD AUTO: 3.85 10E6/UL (ref 3.8–5.2)
SODIUM SERPL-SCNC: 128 MMOL/L (ref 135–145)
SODIUM SERPL-SCNC: 132 MMOL/L (ref 135–145)
SODIUM SERPL-SCNC: 132 MMOL/L (ref 135–145)
SODIUM SERPL-SCNC: 134 MMOL/L (ref 135–145)
SODIUM SERPL-SCNC: 135 MMOL/L (ref 135–145)
SODIUM SERPL-SCNC: 137 MMOL/L (ref 135–145)
WBC # BLD AUTO: 12 10E3/UL (ref 4–11)
WBC # BLD AUTO: 7.2 10E3/UL (ref 4–11)
WBC # BLD AUTO: 7.7 10E3/UL (ref 4–11)
WBC # BLD AUTO: 7.8 10E3/UL (ref 4–11)
WBC # BLD AUTO: 9 10E3/UL (ref 4–11)

## 2023-01-01 PROCEDURE — 92134 CPTRZ OPH DX IMG PST SGM RTA: CPT | Mod: 26 | Performed by: OPHTHALMOLOGY

## 2023-01-01 PROCEDURE — 92235 FLUORESCEIN ANGRPH MLTIFRAME: CPT | Mod: 26 | Performed by: OPHTHALMOLOGY

## 2023-01-01 PROCEDURE — 70450 CT HEAD/BRAIN W/O DYE: CPT

## 2023-01-01 PROCEDURE — 250N000013 HC RX MED GY IP 250 OP 250 PS 637

## 2023-01-01 PROCEDURE — 36415 COLL VENOUS BLD VENIPUNCTURE: CPT | Performed by: PHYSICIAN ASSISTANT

## 2023-01-01 PROCEDURE — 82962 GLUCOSE BLOOD TEST: CPT

## 2023-01-01 PROCEDURE — 250N000013 HC RX MED GY IP 250 OP 250 PS 637: Performed by: STUDENT IN AN ORGANIZED HEALTH CARE EDUCATION/TRAINING PROGRAM

## 2023-01-01 PROCEDURE — 99232 SBSQ HOSP IP/OBS MODERATE 35: CPT | Performed by: PHYSICIAN ASSISTANT

## 2023-01-01 PROCEDURE — 250N000009 HC RX 250: Performed by: PHYSICIAN ASSISTANT

## 2023-01-01 PROCEDURE — 85025 COMPLETE CBC W/AUTO DIFF WBC: CPT | Performed by: PHYSICIAN ASSISTANT

## 2023-01-01 PROCEDURE — 92250 FUNDUS PHOTOGRAPHY W/I&R: CPT | Performed by: OPHTHALMOLOGY

## 2023-01-01 PROCEDURE — G0463 HOSPITAL OUTPT CLINIC VISIT: HCPCS | Performed by: OPHTHALMOLOGY

## 2023-01-01 PROCEDURE — 80048 BASIC METABOLIC PNL TOTAL CA: CPT | Performed by: PHYSICIAN ASSISTANT

## 2023-01-01 PROCEDURE — 250N000013 HC RX MED GY IP 250 OP 250 PS 637: Performed by: EMERGENCY MEDICINE

## 2023-01-01 PROCEDURE — 250N000011 HC RX IP 250 OP 636: Mod: JZ | Performed by: EMERGENCY MEDICINE

## 2023-01-01 PROCEDURE — 83735 ASSAY OF MAGNESIUM: CPT | Performed by: EMERGENCY MEDICINE

## 2023-01-01 PROCEDURE — 99284 EMERGENCY DEPT VISIT MOD MDM: CPT | Performed by: EMERGENCY MEDICINE

## 2023-01-01 PROCEDURE — 87340 HEPATITIS B SURFACE AG IA: CPT | Performed by: STUDENT IN AN ORGANIZED HEALTH CARE EDUCATION/TRAINING PROGRAM

## 2023-01-01 PROCEDURE — 90935 HEMODIALYSIS ONE EVALUATION: CPT | Performed by: STUDENT IN AN ORGANIZED HEALTH CARE EDUCATION/TRAINING PROGRAM

## 2023-01-01 PROCEDURE — 250N000011 HC RX IP 250 OP 636

## 2023-01-01 PROCEDURE — 250N000013 HC RX MED GY IP 250 OP 250 PS 637: Performed by: PHYSICIAN ASSISTANT

## 2023-01-01 PROCEDURE — 92235 FLUORESCEIN ANGRPH MLTIFRAME: CPT

## 2023-01-01 PROCEDURE — 250N000012 HC RX MED GY IP 250 OP 636 PS 637: Performed by: PHYSICIAN ASSISTANT

## 2023-01-01 PROCEDURE — 85027 COMPLETE CBC AUTOMATED: CPT | Performed by: PHYSICIAN ASSISTANT

## 2023-01-01 PROCEDURE — G0378 HOSPITAL OBSERVATION PER HR: HCPCS

## 2023-01-01 PROCEDURE — 250N000011 HC RX IP 250 OP 636: Mod: JZ

## 2023-01-01 PROCEDURE — 99239 HOSP IP/OBS DSCHRG MGMT >30: CPT | Performed by: PHYSICIAN ASSISTANT

## 2023-01-01 PROCEDURE — 99291 CRITICAL CARE FIRST HOUR: CPT | Mod: 25

## 2023-01-01 PROCEDURE — 250N000011 HC RX IP 250 OP 636: Performed by: EMERGENCY MEDICINE

## 2023-01-01 PROCEDURE — 96374 THER/PROPH/DIAG INJ IV PUSH: CPT | Performed by: EMERGENCY MEDICINE

## 2023-01-01 PROCEDURE — 82310 ASSAY OF CALCIUM: CPT | Performed by: PHYSICIAN ASSISTANT

## 2023-01-01 PROCEDURE — 99285 EMERGENCY DEPT VISIT HI MDM: CPT | Performed by: EMERGENCY MEDICINE

## 2023-01-01 PROCEDURE — 96375 TX/PRO/DX INJ NEW DRUG ADDON: CPT | Performed by: EMERGENCY MEDICINE

## 2023-01-01 PROCEDURE — 85025 COMPLETE CBC W/AUTO DIFF WBC: CPT | Performed by: EMERGENCY MEDICINE

## 2023-01-01 PROCEDURE — 92250 FUNDUS PHOTOGRAPHY W/I&R: CPT

## 2023-01-01 PROCEDURE — 99213 OFFICE O/P EST LOW 20 MIN: CPT | Mod: 24 | Performed by: OPHTHALMOLOGY

## 2023-01-01 PROCEDURE — 83036 HEMOGLOBIN GLYCOSYLATED A1C: CPT | Performed by: PHYSICIAN ASSISTANT

## 2023-01-01 PROCEDURE — 99284 EMERGENCY DEPT VISIT MOD MDM: CPT | Mod: 25 | Performed by: EMERGENCY MEDICINE

## 2023-01-01 PROCEDURE — 86706 HEP B SURFACE ANTIBODY: CPT | Performed by: STUDENT IN AN ORGANIZED HEALTH CARE EDUCATION/TRAINING PROGRAM

## 2023-01-01 PROCEDURE — 92012 INTRM OPH EXAM EST PATIENT: CPT | Mod: GC | Performed by: OPHTHALMOLOGY

## 2023-01-01 PROCEDURE — 36415 COLL VENOUS BLD VENIPUNCTURE: CPT | Mod: ORL | Performed by: FAMILY MEDICINE

## 2023-01-01 PROCEDURE — G0257 UNSCHED DIALYSIS ESRD PT HOS: HCPCS

## 2023-01-01 PROCEDURE — 82962 GLUCOSE BLOOD TEST: CPT | Performed by: PATHOLOGY

## 2023-01-01 PROCEDURE — 250N000009 HC RX 250: Performed by: EMERGENCY MEDICINE

## 2023-01-01 PROCEDURE — 250N000009 HC RX 250

## 2023-01-01 PROCEDURE — 67028 INJECTION EYE DRUG: CPT | Mod: 50

## 2023-01-01 PROCEDURE — P9604 ONE-WAY ALLOW PRORATED TRIP: HCPCS | Mod: ORL | Performed by: FAMILY MEDICINE

## 2023-01-01 PROCEDURE — 250N000013 HC RX MED GY IP 250 OP 250 PS 637: Performed by: PEDIATRICS

## 2023-01-01 PROCEDURE — 99024 POSTOP FOLLOW-UP VISIT: CPT | Performed by: OPHTHALMOLOGY

## 2023-01-01 PROCEDURE — 99207 PR APP CREDIT; MD BILLING SHARED VISIT: CPT | Performed by: PHYSICIAN ASSISTANT

## 2023-01-01 PROCEDURE — 67028 INJECTION EYE DRUG: CPT | Mod: RT

## 2023-01-01 PROCEDURE — 99222 1ST HOSP IP/OBS MODERATE 55: CPT | Mod: FS | Performed by: PEDIATRICS

## 2023-01-01 PROCEDURE — 99283 EMERGENCY DEPT VISIT LOW MDM: CPT | Performed by: EMERGENCY MEDICINE

## 2023-01-01 PROCEDURE — 85610 PROTHROMBIN TIME: CPT | Performed by: EMERGENCY MEDICINE

## 2023-01-01 PROCEDURE — 76512 OPH US DX B-SCAN: CPT

## 2023-01-01 PROCEDURE — 80048 BASIC METABOLIC PNL TOTAL CA: CPT | Performed by: EMERGENCY MEDICINE

## 2023-01-01 PROCEDURE — 99204 OFFICE O/P NEW MOD 45 MIN: CPT | Mod: GC | Performed by: OPHTHALMOLOGY

## 2023-01-01 PROCEDURE — 99207 FUNDUS AUTOFLUORESCENCE IMAGE (FAF) OU (BOTH EYES): CPT | Mod: 26 | Performed by: OPHTHALMOLOGY

## 2023-01-01 PROCEDURE — 99223 1ST HOSP IP/OBS HIGH 75: CPT | Mod: FS | Performed by: PHYSICIAN ASSISTANT

## 2023-01-01 PROCEDURE — 36415 COLL VENOUS BLD VENIPUNCTURE: CPT | Performed by: EMERGENCY MEDICINE

## 2023-01-01 PROCEDURE — 90935 HEMODIALYSIS ONE EVALUATION: CPT

## 2023-01-01 PROCEDURE — 83735 ASSAY OF MAGNESIUM: CPT | Mod: ORL | Performed by: FAMILY MEDICINE

## 2023-01-01 PROCEDURE — 82140 ASSAY OF AMMONIA: CPT | Performed by: EMERGENCY MEDICINE

## 2023-01-01 PROCEDURE — 258N000003 HC RX IP 258 OP 636: Performed by: STUDENT IN AN ORGANIZED HEALTH CARE EDUCATION/TRAINING PROGRAM

## 2023-01-01 PROCEDURE — 66710 CILIARY TRANSSLERAL THERAPY: CPT | Mod: RT | Performed by: OPHTHALMOLOGY

## 2023-01-01 PROCEDURE — 92134 CPTRZ OPH DX IMG PST SGM RTA: CPT | Performed by: OPHTHALMOLOGY

## 2023-01-01 PROCEDURE — 92134 CPTRZ OPH DX IMG PST SGM RTA: CPT

## 2023-01-01 PROCEDURE — 258N000003 HC RX IP 258 OP 636: Performed by: PEDIATRICS

## 2023-01-01 PROCEDURE — 250N000009 HC RX 250: Performed by: STUDENT IN AN ORGANIZED HEALTH CARE EDUCATION/TRAINING PROGRAM

## 2023-01-01 PROCEDURE — 76512 OPH US DX B-SCAN: CPT | Mod: 26 | Performed by: OPHTHALMOLOGY

## 2023-01-01 PROCEDURE — 67028 INJECTION EYE DRUG: CPT | Mod: 50 | Performed by: OPHTHALMOLOGY

## 2023-01-01 PROCEDURE — 99207 FUNDUS PHOTOS OU (BOTH EYES): CPT | Mod: 26 | Performed by: OPHTHALMOLOGY

## 2023-01-01 PROCEDURE — 99285 EMERGENCY DEPT VISIT HI MDM: CPT | Mod: 25

## 2023-01-01 PROCEDURE — 84132 ASSAY OF SERUM POTASSIUM: CPT

## 2023-01-01 PROCEDURE — G0463 HOSPITAL OUTPT CLINIC VISIT: HCPCS | Mod: 25

## 2023-01-01 PROCEDURE — 96374 THER/PROPH/DIAG INJ IV PUSH: CPT

## 2023-01-01 PROCEDURE — 99207 PR SERVICE NOT STAFFED W/SUPERV PROV: CPT | Performed by: STUDENT IN AN ORGANIZED HEALTH CARE EDUCATION/TRAINING PROGRAM

## 2023-01-01 PROCEDURE — 66710 CILIARY TRANSSLERAL THERAPY: CPT | Mod: RT

## 2023-01-01 RX ORDER — MAGNESIUM HYDROXIDE/ALUMINUM HYDROXICE/SIMETHICONE 120; 1200; 1200 MG/30ML; MG/30ML; MG/30ML
15 SUSPENSION ORAL ONCE
Status: COMPLETED | OUTPATIENT
Start: 2023-01-01 | End: 2023-01-01

## 2023-01-01 RX ORDER — DORZOLAMIDE HCL 20 MG/ML
1 SOLUTION/ DROPS OPHTHALMIC ONCE
Status: COMPLETED | OUTPATIENT
Start: 2023-01-01 | End: 2023-01-01

## 2023-01-01 RX ORDER — BALANCED SALT SOLUTION 6.4; .75; .48; .3; 3.9; 1.7 MG/ML; MG/ML; MG/ML; MG/ML; MG/ML; MG/ML
SOLUTION OPHTHALMIC PRN
Status: DISCONTINUED | OUTPATIENT
Start: 2023-01-01 | End: 2023-01-01 | Stop reason: HOSPADM

## 2023-01-01 RX ORDER — ATROPINE SULFATE 10 MG/ML
SOLUTION/ DROPS OPHTHALMIC PRN
Status: DISCONTINUED | OUTPATIENT
Start: 2023-01-01 | End: 2023-01-01 | Stop reason: HOSPADM

## 2023-01-01 RX ORDER — ACETAZOLAMIDE 250 MG/1
250 TABLET ORAL ONCE
Status: COMPLETED | OUTPATIENT
Start: 2023-01-01 | End: 2023-01-01

## 2023-01-01 RX ORDER — ONDANSETRON 4 MG/1
4 TABLET, ORALLY DISINTEGRATING ORAL ONCE
Status: COMPLETED | OUTPATIENT
Start: 2023-01-01 | End: 2023-01-01

## 2023-01-01 RX ORDER — FUROSEMIDE 80 MG
80 TABLET ORAL
Start: 2023-01-01

## 2023-01-01 RX ORDER — DORZOLAMIDE HCL 20 MG/ML
1 SOLUTION/ DROPS OPHTHALMIC 2 TIMES DAILY
Qty: 10 ML | Refills: 0 | Status: SHIPPED | OUTPATIENT
Start: 2023-01-01

## 2023-01-01 RX ORDER — ACETAMINOPHEN 325 MG/1
650 TABLET ORAL EVERY 6 HOURS PRN
Status: DISCONTINUED | OUTPATIENT
Start: 2023-01-01 | End: 2023-01-01 | Stop reason: HOSPADM

## 2023-01-01 RX ORDER — AMLODIPINE BESYLATE 5 MG/1
5 TABLET ORAL AT BEDTIME
Status: DISCONTINUED | OUTPATIENT
Start: 2023-01-01 | End: 2023-01-01

## 2023-01-01 RX ORDER — IOPAMIDOL 755 MG/ML
67 INJECTION, SOLUTION INTRAVASCULAR ONCE
Status: COMPLETED | OUTPATIENT
Start: 2023-01-01 | End: 2023-01-01

## 2023-01-01 RX ORDER — ALBUMIN (HUMAN) 12.5 G/50ML
50 SOLUTION INTRAVENOUS
Status: DISCONTINUED | OUTPATIENT
Start: 2023-01-01 | End: 2023-01-01

## 2023-01-01 RX ORDER — HYDRALAZINE HYDROCHLORIDE 25 MG/1
25 TABLET, FILM COATED ORAL AT BEDTIME
COMMUNITY

## 2023-01-01 RX ORDER — LATANOPROST 50 UG/ML
1 SOLUTION/ DROPS OPHTHALMIC DAILY
Status: DISCONTINUED | OUTPATIENT
Start: 2023-01-01 | End: 2023-01-01

## 2023-01-01 RX ORDER — ACETAZOLAMIDE 500 MG/1
500 CAPSULE, EXTENDED RELEASE ORAL
Status: DISCONTINUED | OUTPATIENT
Start: 2023-01-01 | End: 2023-01-01

## 2023-01-01 RX ORDER — TETRACAINE HYDROCHLORIDE 5 MG/ML
1-2 SOLUTION OPHTHALMIC ONCE
Status: COMPLETED | OUTPATIENT
Start: 2023-01-01 | End: 2023-01-01

## 2023-01-01 RX ORDER — OMEPRAZOLE 20 MG/1
20 TABLET, DELAYED RELEASE ORAL DAILY
Start: 2023-01-01

## 2023-01-01 RX ORDER — TIMOLOL MALEATE 5 MG/ML
1 SOLUTION/ DROPS OPHTHALMIC 2 TIMES DAILY
Qty: 10 ML | Refills: 0 | Status: SHIPPED | OUTPATIENT
Start: 2023-01-01

## 2023-01-01 RX ORDER — PANTOPRAZOLE SODIUM 40 MG/1
40 TABLET, DELAYED RELEASE ORAL
Status: DISCONTINUED | OUTPATIENT
Start: 2023-01-01 | End: 2023-01-01 | Stop reason: HOSPADM

## 2023-01-01 RX ORDER — ACETAZOLAMIDE 500 MG/5ML
250 INJECTION, POWDER, LYOPHILIZED, FOR SOLUTION INTRAVENOUS ONCE
Qty: 2.5 ML | Refills: 0 | Status: COMPLETED | OUTPATIENT
Start: 2023-01-01 | End: 2023-01-01

## 2023-01-01 RX ORDER — ASPIRIN 325 MG
325 TABLET, DELAYED RELEASE (ENTERIC COATED) ORAL DAILY
Status: DISCONTINUED | OUTPATIENT
Start: 2023-01-01 | End: 2023-01-01 | Stop reason: HOSPADM

## 2023-01-01 RX ORDER — BRIMONIDINE TARTRATE 2 MG/ML
1 SOLUTION/ DROPS OPHTHALMIC EVERY 8 HOURS SCHEDULED
Status: DISCONTINUED | OUTPATIENT
Start: 2023-01-01 | End: 2023-01-01

## 2023-01-01 RX ORDER — CARVEDILOL 25 MG/1
25 TABLET ORAL
Start: 2023-01-01

## 2023-01-01 RX ORDER — DORZOLAMIDE HYDROCHLORIDE AND TIMOLOL MALEATE 20; 5 MG/ML; MG/ML
1 SOLUTION/ DROPS OPHTHALMIC ONCE
Status: COMPLETED | OUTPATIENT
Start: 2023-01-01 | End: 2023-01-01

## 2023-01-01 RX ORDER — BISACODYL 10 MG
10 SUPPOSITORY, RECTAL RECTAL DAILY PRN
Status: DISCONTINUED | OUTPATIENT
Start: 2023-01-01 | End: 2023-01-01 | Stop reason: HOSPADM

## 2023-01-01 RX ORDER — CARVEDILOL 25 MG/1
25 TABLET ORAL EVERY EVENING
Status: DISCONTINUED | OUTPATIENT
Start: 2023-01-01 | End: 2023-01-01 | Stop reason: HOSPADM

## 2023-01-01 RX ORDER — HYDRALAZINE HYDROCHLORIDE 25 MG/1
25 TABLET, FILM COATED ORAL AT BEDTIME
Status: DISCONTINUED | OUTPATIENT
Start: 2023-01-01 | End: 2023-01-01 | Stop reason: HOSPADM

## 2023-01-01 RX ORDER — AMLODIPINE BESYLATE 10 MG/1
10 TABLET ORAL
Status: DISCONTINUED | OUTPATIENT
Start: 2023-01-01 | End: 2023-01-01 | Stop reason: HOSPADM

## 2023-01-01 RX ORDER — MINERAL OIL AND WHITE PETROLATUM 150; 830 MG/G; MG/G
1 OINTMENT OPHTHALMIC AT BEDTIME
Status: DISCONTINUED | OUTPATIENT
Start: 2023-01-01 | End: 2023-01-01

## 2023-01-01 RX ORDER — ACETAZOLAMIDE 125 MG/1
250 TABLET ORAL 2 TIMES DAILY
Qty: 4 TABLET | Refills: 0 | Status: SHIPPED | OUTPATIENT
Start: 2023-01-01 | End: 2023-01-01

## 2023-01-01 RX ORDER — TIMOLOL MALEATE 5 MG/ML
1 SOLUTION/ DROPS OPHTHALMIC 2 TIMES DAILY
Status: DISCONTINUED | OUTPATIENT
Start: 2023-01-01 | End: 2023-01-01 | Stop reason: HOSPADM

## 2023-01-01 RX ORDER — ONDANSETRON 2 MG/ML
4 INJECTION INTRAMUSCULAR; INTRAVENOUS EVERY 6 HOURS PRN
Status: DISCONTINUED | OUTPATIENT
Start: 2023-01-01 | End: 2023-01-01 | Stop reason: HOSPADM

## 2023-01-01 RX ORDER — DORZOLAMIDE HCL 20 MG/ML
1 SOLUTION/ DROPS OPHTHALMIC 2 TIMES DAILY
Status: DISCONTINUED | OUTPATIENT
Start: 2023-01-01 | End: 2023-01-01 | Stop reason: HOSPADM

## 2023-01-01 RX ORDER — AMOXICILLIN 250 MG
1 CAPSULE ORAL 2 TIMES DAILY PRN
Status: DISCONTINUED | OUTPATIENT
Start: 2023-01-01 | End: 2023-01-01 | Stop reason: HOSPADM

## 2023-01-01 RX ORDER — TIMOLOL 5 MG/ML
1 SOLUTION/ DROPS OPHTHALMIC 2 TIMES DAILY
Status: DISCONTINUED | OUTPATIENT
Start: 2023-01-01 | End: 2023-01-01

## 2023-01-01 RX ORDER — CLOPIDOGREL BISULFATE 75 MG/1
75 TABLET ORAL DAILY
Status: DISCONTINUED | OUTPATIENT
Start: 2023-01-01 | End: 2023-01-01 | Stop reason: HOSPADM

## 2023-01-01 RX ORDER — POLYETHYLENE GLYCOL 3350 17 G/17G
17 POWDER, FOR SOLUTION ORAL DAILY PRN
Status: DISCONTINUED | OUTPATIENT
Start: 2023-01-01 | End: 2023-01-01 | Stop reason: HOSPADM

## 2023-01-01 RX ORDER — CALCIUM CARBONATE 500 MG/1
1000 TABLET, CHEWABLE ORAL 3 TIMES DAILY PRN
Status: DISCONTINUED | OUTPATIENT
Start: 2023-01-01 | End: 2023-01-01 | Stop reason: HOSPADM

## 2023-01-01 RX ORDER — ATROPINE SULFATE 10 MG/ML
1-2 SOLUTION/ DROPS OPHTHALMIC 2 TIMES DAILY
Qty: 5 ML | Refills: 0 | Status: SHIPPED | OUTPATIENT
Start: 2023-01-01

## 2023-01-01 RX ORDER — MIDODRINE HYDROCHLORIDE 5 MG/1
10 TABLET ORAL DAILY PRN
Status: DISCONTINUED | OUTPATIENT
Start: 2023-01-01 | End: 2023-01-01 | Stop reason: HOSPADM

## 2023-01-01 RX ORDER — DEXTROSE MONOHYDRATE 25 G/50ML
25-50 INJECTION, SOLUTION INTRAVENOUS
Status: DISCONTINUED | OUTPATIENT
Start: 2023-01-01 | End: 2023-01-01 | Stop reason: HOSPADM

## 2023-01-01 RX ORDER — BRIMONIDINE TARTRATE 2 MG/ML
1 SOLUTION/ DROPS OPHTHALMIC 3 TIMES DAILY
Qty: 10 ML | Refills: 0 | Status: SHIPPED | OUTPATIENT
Start: 2023-01-01 | End: 2023-01-01

## 2023-01-01 RX ORDER — SIMVASTATIN 20 MG
20 TABLET ORAL AT BEDTIME
Status: DISCONTINUED | OUTPATIENT
Start: 2023-01-01 | End: 2023-01-01 | Stop reason: HOSPADM

## 2023-01-01 RX ORDER — LATANOPROST 50 UG/ML
1 SOLUTION/ DROPS OPHTHALMIC AT BEDTIME
Status: DISCONTINUED | OUTPATIENT
Start: 2023-01-01 | End: 2023-01-01 | Stop reason: HOSPADM

## 2023-01-01 RX ORDER — SEVELAMER CARBONATE 800 MG/1
800 TABLET, FILM COATED ORAL
Status: DISCONTINUED | OUTPATIENT
Start: 2023-01-01 | End: 2023-01-01 | Stop reason: HOSPADM

## 2023-01-01 RX ORDER — BRIMONIDINE TARTRATE 2 MG/ML
1 SOLUTION/ DROPS OPHTHALMIC 2 TIMES DAILY
Status: DISCONTINUED | OUTPATIENT
Start: 2023-01-01 | End: 2023-01-01 | Stop reason: HOSPADM

## 2023-01-01 RX ORDER — HYDRALAZINE HYDROCHLORIDE 25 MG/1
25 TABLET, FILM COATED ORAL
Status: DISCONTINUED | OUTPATIENT
Start: 2023-01-01 | End: 2023-01-01 | Stop reason: HOSPADM

## 2023-01-01 RX ORDER — BRIMONIDINE TARTRATE 2 MG/ML
1 SOLUTION/ DROPS OPHTHALMIC 3 TIMES DAILY
Qty: 10 ML | Refills: 0 | Status: SHIPPED | OUTPATIENT
Start: 2023-01-01

## 2023-01-01 RX ORDER — DEXAMETHASONE SODIUM PHOSPHATE 4 MG/ML
INJECTION, SOLUTION INTRA-ARTICULAR; INTRALESIONAL; INTRAMUSCULAR; INTRAVENOUS; SOFT TISSUE PRN
Status: DISCONTINUED | OUTPATIENT
Start: 2023-01-01 | End: 2023-01-01 | Stop reason: HOSPADM

## 2023-01-01 RX ORDER — NICOTINE POLACRILEX 4 MG
15-30 LOZENGE BUCCAL
Status: DISCONTINUED | OUTPATIENT
Start: 2023-01-01 | End: 2023-01-01 | Stop reason: HOSPADM

## 2023-01-01 RX ORDER — AMLODIPINE BESYLATE 10 MG/1
10 TABLET ORAL
Start: 2023-01-01

## 2023-01-01 RX ORDER — TIMOLOL MALEATE 5 MG/ML
1 SOLUTION/ DROPS OPHTHALMIC ONCE
Status: COMPLETED | OUTPATIENT
Start: 2023-01-01 | End: 2023-01-01

## 2023-01-01 RX ORDER — LATANOPROST 50 UG/ML
1 SOLUTION/ DROPS OPHTHALMIC ONCE
Status: COMPLETED | OUTPATIENT
Start: 2023-01-01 | End: 2023-01-01

## 2023-01-01 RX ORDER — BRIMONIDINE TARTRATE 2 MG/ML
1 SOLUTION/ DROPS OPHTHALMIC ONCE
Status: COMPLETED | OUTPATIENT
Start: 2023-01-01 | End: 2023-01-01

## 2023-01-01 RX ORDER — ONDANSETRON 2 MG/ML
4 INJECTION INTRAMUSCULAR; INTRAVENOUS EVERY 30 MIN PRN
Status: DISCONTINUED | OUTPATIENT
Start: 2023-01-01 | End: 2023-01-01 | Stop reason: HOSPADM

## 2023-01-01 RX ORDER — CLOPIDOGREL BISULFATE 75 MG/1
75 TABLET ORAL DAILY
COMMUNITY

## 2023-01-01 RX ORDER — TETRACAINE HYDROCHLORIDE 5 MG/ML
SOLUTION OPHTHALMIC PRN
Status: DISCONTINUED | OUTPATIENT
Start: 2023-01-01 | End: 2023-01-01 | Stop reason: HOSPADM

## 2023-01-01 RX ORDER — CARVEDILOL 25 MG/1
25 TABLET ORAL EVERY EVENING
Start: 2023-01-01

## 2023-01-01 RX ORDER — DORZOLAMIDE HCL 20 MG/ML
1 SOLUTION/ DROPS OPHTHALMIC 2 TIMES DAILY
Status: DISCONTINUED | OUTPATIENT
Start: 2023-01-01 | End: 2023-01-01

## 2023-01-01 RX ORDER — PROPARACAINE HYDROCHLORIDE 5 MG/ML
1 SOLUTION/ DROPS OPHTHALMIC ONCE
Status: COMPLETED | OUTPATIENT
Start: 2023-01-01 | End: 2023-01-01

## 2023-01-01 RX ORDER — CALCIUM CARBONATE 500 MG/1
500 TABLET, CHEWABLE ORAL DAILY PRN
Status: DISCONTINUED | OUTPATIENT
Start: 2023-01-01 | End: 2023-01-01 | Stop reason: HOSPADM

## 2023-01-01 RX ORDER — CARVEDILOL 25 MG/1
25 TABLET ORAL 2 TIMES DAILY WITH MEALS
Status: DISCONTINUED | OUTPATIENT
Start: 2023-01-01 | End: 2023-01-01

## 2023-01-01 RX ORDER — PREDNISOLONE ACETATE 10 MG/ML
1 SUSPENSION/ DROPS OPHTHALMIC 4 TIMES DAILY
Qty: 10 ML | Refills: 1 | Status: SHIPPED | OUTPATIENT
Start: 2023-01-01

## 2023-01-01 RX ORDER — AMLODIPINE BESYLATE 10 MG/1
10 TABLET ORAL DAILY
Status: DISCONTINUED | OUTPATIENT
Start: 2023-01-01 | End: 2023-01-01

## 2023-01-01 RX ORDER — CALCIUM ACETATE 667 MG/1
667 CAPSULE ORAL 4 TIMES DAILY
Status: DISCONTINUED | OUTPATIENT
Start: 2023-01-01 | End: 2023-01-01

## 2023-01-01 RX ORDER — TIMOLOL 5 MG/ML
1 SOLUTION/ DROPS OPHTHALMIC ONCE
Status: DISCONTINUED | OUTPATIENT
Start: 2023-01-01 | End: 2023-01-01

## 2023-01-01 RX ORDER — SEVELAMER CARBONATE 800 MG/1
1600 TABLET, FILM COATED ORAL
COMMUNITY

## 2023-01-01 RX ORDER — CARVEDILOL 25 MG/1
25 TABLET ORAL EVERY MORNING
Status: DISCONTINUED | OUTPATIENT
Start: 2023-01-01 | End: 2023-01-01

## 2023-01-01 RX ORDER — ACETAZOLAMIDE 250 MG/1
250 TABLET ORAL
Status: DISCONTINUED | OUTPATIENT
Start: 2023-01-01 | End: 2023-01-01

## 2023-01-01 RX ORDER — ACETAZOLAMIDE 500 MG/5ML
250 INJECTION, POWDER, LYOPHILIZED, FOR SOLUTION INTRAVENOUS ONCE
Status: COMPLETED | OUTPATIENT
Start: 2023-01-01 | End: 2023-01-01

## 2023-01-01 RX ORDER — FUROSEMIDE 40 MG
80 TABLET ORAL DAILY
Status: DISCONTINUED | OUTPATIENT
Start: 2023-01-01 | End: 2023-01-01

## 2023-01-01 RX ORDER — OMEPRAZOLE 20 MG/1
40 TABLET, DELAYED RELEASE ORAL 2 TIMES DAILY
Status: DISCONTINUED | OUTPATIENT
Start: 2023-01-01 | End: 2023-01-01

## 2023-01-01 RX ORDER — HYDRALAZINE HYDROCHLORIDE 25 MG/1
50 TABLET, FILM COATED ORAL 2 TIMES DAILY
Status: DISCONTINUED | OUTPATIENT
Start: 2023-01-01 | End: 2023-01-01

## 2023-01-01 RX ORDER — ACETAZOLAMIDE 500 MG/1
500 CAPSULE, EXTENDED RELEASE ORAL DAILY
Qty: 30 CAPSULE | Refills: 1 | Status: CANCELLED | OUTPATIENT
Start: 2023-01-01

## 2023-01-01 RX ORDER — CARVEDILOL 25 MG/1
25 TABLET ORAL
Status: DISCONTINUED | OUTPATIENT
Start: 2023-01-01 | End: 2023-01-01 | Stop reason: HOSPADM

## 2023-01-01 RX ORDER — LIDOCAINE HYDROCHLORIDE 20 MG/ML
15 INJECTION, SOLUTION INFILTRATION; PERINEURAL ONCE
Status: DISCONTINUED | OUTPATIENT
Start: 2023-01-01 | End: 2023-01-01

## 2023-01-01 RX ORDER — ONDANSETRON 4 MG/1
4 TABLET, ORALLY DISINTEGRATING ORAL EVERY 6 HOURS PRN
Status: DISCONTINUED | OUTPATIENT
Start: 2023-01-01 | End: 2023-01-01 | Stop reason: HOSPADM

## 2023-01-01 RX ORDER — BRIMONIDINE TARTRATE 2 MG/ML
1 SOLUTION/ DROPS OPHTHALMIC 2 TIMES DAILY
Qty: 10 ML | Refills: 0 | Status: SHIPPED | OUTPATIENT
Start: 2023-01-01 | End: 2023-01-01

## 2023-01-01 RX ORDER — SODIUM CHLORIDE, SODIUM LACTATE, POTASSIUM CHLORIDE, CALCIUM CHLORIDE 600; 310; 30; 20 MG/100ML; MG/100ML; MG/100ML; MG/100ML
INJECTION, SOLUTION INTRAVENOUS CONTINUOUS
Status: DISCONTINUED | OUTPATIENT
Start: 2023-01-01 | End: 2023-01-01 | Stop reason: HOSPADM

## 2023-01-01 RX ORDER — ACETAZOLAMIDE 250 MG/1
500 TABLET ORAL ONCE
Status: COMPLETED | OUTPATIENT
Start: 2023-01-01 | End: 2023-01-01

## 2023-01-01 RX ORDER — FUROSEMIDE 40 MG
80 TABLET ORAL
Status: DISCONTINUED | OUTPATIENT
Start: 2023-01-01 | End: 2023-01-01 | Stop reason: HOSPADM

## 2023-01-01 RX ORDER — LATANOPROST 50 UG/ML
1 SOLUTION/ DROPS OPHTHALMIC AT BEDTIME
Qty: 7.5 ML | Refills: 0 | Status: SHIPPED | OUTPATIENT
Start: 2023-01-01

## 2023-01-01 RX ORDER — ACETAMINOPHEN 650 MG/1
650 SUPPOSITORY RECTAL EVERY 6 HOURS PRN
Status: DISCONTINUED | OUTPATIENT
Start: 2023-01-01 | End: 2023-01-01 | Stop reason: HOSPADM

## 2023-01-01 RX ORDER — LIDOCAINE 40 MG/G
CREAM TOPICAL
Status: DISCONTINUED | OUTPATIENT
Start: 2023-01-01 | End: 2023-01-01 | Stop reason: HOSPADM

## 2023-01-01 RX ADMIN — ACETAZOLAMIDE 250 MG: 250 TABLET ORAL at 15:23

## 2023-01-01 RX ADMIN — PANTOPRAZOLE SODIUM 40 MG: 40 TABLET, DELAYED RELEASE ORAL at 17:43

## 2023-01-01 RX ADMIN — SODIUM ZIRCONIUM CYCLOSILICATE 10 G: 10 POWDER, FOR SUSPENSION ORAL at 15:18

## 2023-01-01 RX ADMIN — BRIMONIDINE TARTRATE 1 DROP: 2 SOLUTION/ DROPS OPHTHALMIC at 11:15

## 2023-01-01 RX ADMIN — CARVEDILOL 25 MG: 25 TABLET, FILM COATED ORAL at 08:52

## 2023-01-01 RX ADMIN — CARVEDILOL 25 MG: 25 TABLET, FILM COATED ORAL at 18:12

## 2023-01-01 RX ADMIN — ACETAZOLAMIDE 250 MG: 250 TABLET ORAL at 21:56

## 2023-01-01 RX ADMIN — HYDRALAZINE HYDROCHLORIDE 25 MG: 25 TABLET, FILM COATED ORAL at 20:56

## 2023-01-01 RX ADMIN — LATANOPROST 1 DROP: 50 SOLUTION OPHTHALMIC at 21:32

## 2023-01-01 RX ADMIN — CLOPIDOGREL BISULFATE 75 MG: 75 TABLET ORAL at 08:01

## 2023-01-01 RX ADMIN — ONDANSETRON 4 MG: 2 INJECTION INTRAMUSCULAR; INTRAVENOUS at 09:44

## 2023-01-01 RX ADMIN — BRIMONIDINE TARTRATE 1 DROP: 2 SOLUTION/ DROPS OPHTHALMIC at 21:58

## 2023-01-01 RX ADMIN — HYDRALAZINE HYDROCHLORIDE 25 MG: 25 TABLET, FILM COATED ORAL at 17:50

## 2023-01-01 RX ADMIN — ALUMINUM HYDROXIDE, MAGNESIUM HYDROXIDE, AND SIMETHICONE 15 ML: 200; 200; 20 SUSPENSION ORAL at 09:42

## 2023-01-01 RX ADMIN — ACETAZOLAMIDE 250 MG: 250 TABLET ORAL at 14:19

## 2023-01-01 RX ADMIN — ACETAZOLAMIDE 250 MG: 250 TABLET ORAL at 21:31

## 2023-01-01 RX ADMIN — Medication 1.25 MG: at 13:31

## 2023-01-01 RX ADMIN — ONDANSETRON 4 MG: 4 TABLET, ORALLY DISINTEGRATING ORAL at 05:21

## 2023-01-01 RX ADMIN — SEVELAMER CARBONATE 800 MG: 800 TABLET, FILM COATED ORAL at 11:13

## 2023-01-01 RX ADMIN — INSULIN HUMAN 17 UNITS: 100 INJECTION, SUSPENSION SUBCUTANEOUS at 21:56

## 2023-01-01 RX ADMIN — BRIMONIDINE TARTRATE 1 DROP: 2 SOLUTION/ DROPS OPHTHALMIC at 13:41

## 2023-01-01 RX ADMIN — ACETAZOLAMIDE SODIUM 250 MG: 500 INJECTION, POWDER, LYOPHILIZED, FOR SOLUTION INTRAVENOUS at 09:18

## 2023-01-01 RX ADMIN — TIMOLOL MALEATE 1 DROP: 5 SOLUTION/ DROPS OPHTHALMIC at 08:02

## 2023-01-01 RX ADMIN — DORZOLAMIDE HYDROCHLORIDE 1 DROP: 20 SOLUTION/ DROPS OPHTHALMIC at 08:03

## 2023-01-01 RX ADMIN — TIMOLOL MALEATE 1 DROP: 5 SOLUTION OPHTHALMIC at 21:32

## 2023-01-01 RX ADMIN — CARVEDILOL 25 MG: 25 TABLET, FILM COATED ORAL at 21:56

## 2023-01-01 RX ADMIN — BRIMONIDINE TARTRATE 1 DROP: 2 SOLUTION/ DROPS OPHTHALMIC at 21:32

## 2023-01-01 RX ADMIN — SODIUM CHLORIDE 250 ML: 9 INJECTION, SOLUTION INTRAVENOUS at 07:46

## 2023-01-01 RX ADMIN — ASPIRIN 325 MG: 325 TABLET, COATED ORAL at 11:14

## 2023-01-01 RX ADMIN — TIMOLOL MALEATE 1 DROP: 5 SOLUTION/ DROPS OPHTHALMIC at 08:03

## 2023-01-01 RX ADMIN — SODIUM ZIRCONIUM CYCLOSILICATE 10 G: 10 POWDER, FOR SUSPENSION ORAL at 19:04

## 2023-01-01 RX ADMIN — AMLODIPINE BESYLATE 10 MG: 10 TABLET ORAL at 08:01

## 2023-01-01 RX ADMIN — SEVELAMER CARBONATE 800 MG: 800 TABLET, FILM COATED ORAL at 17:50

## 2023-01-01 RX ADMIN — LATANOPROST 1 DROP: 50 SOLUTION OPHTHALMIC at 02:37

## 2023-01-01 RX ADMIN — HYDRALAZINE HYDROCHLORIDE 25 MG: 25 TABLET, FILM COATED ORAL at 21:32

## 2023-01-01 RX ADMIN — FUROSEMIDE 80 MG: 40 TABLET ORAL at 08:01

## 2023-01-01 RX ADMIN — SEVELAMER CARBONATE 800 MG: 800 TABLET, FILM COATED ORAL at 14:19

## 2023-01-01 RX ADMIN — ACETAZOLAMIDE 500 MG: 250 TABLET ORAL at 20:44

## 2023-01-01 RX ADMIN — LATANOPROST 1 DROP: 50 SOLUTION OPHTHALMIC at 20:56

## 2023-01-01 RX ADMIN — BRIMONIDINE TARTRATE 1 DROP: 2 SOLUTION/ DROPS OPHTHALMIC at 21:42

## 2023-01-01 RX ADMIN — DORZOLAMIDE HYDROCHLORIDE 1 DROP: 20 SOLUTION/ DROPS OPHTHALMIC at 08:26

## 2023-01-01 RX ADMIN — CALCIUM CARBONATE (ANTACID) CHEW TAB 500 MG 500 MG: 500 CHEW TAB at 10:49

## 2023-01-01 RX ADMIN — Medication: at 10:53

## 2023-01-01 RX ADMIN — SIMVASTATIN 20 MG: 20 TABLET, FILM COATED ORAL at 21:56

## 2023-01-01 RX ADMIN — AMLODIPINE BESYLATE 10 MG: 10 TABLET ORAL at 08:26

## 2023-01-01 RX ADMIN — CALCIUM CARBONATE (ANTACID) CHEW TAB 500 MG 500 MG: 500 CHEW TAB at 09:26

## 2023-01-01 RX ADMIN — TIMOLOL MALEATE 1 DROP: 5 SOLUTION/ DROPS OPHTHALMIC at 20:59

## 2023-01-01 RX ADMIN — ASPIRIN 325 MG: 325 TABLET, COATED ORAL at 08:26

## 2023-01-01 RX ADMIN — SODIUM CHLORIDE 250 ML: 9 INJECTION, SOLUTION INTRAVENOUS at 10:54

## 2023-01-01 RX ADMIN — CALCIUM CARBONATE (ANTACID) CHEW TAB 500 MG 500 MG: 500 CHEW TAB at 06:45

## 2023-01-01 RX ADMIN — DORZOLAMIDE HCL 1 DROP: 20 SOLUTION/ DROPS OPHTHALMIC at 02:38

## 2023-01-01 RX ADMIN — SEVELAMER CARBONATE 800 MG: 800 TABLET, FILM COATED ORAL at 14:25

## 2023-01-01 RX ADMIN — PANTOPRAZOLE SODIUM 40 MG: 40 TABLET, DELAYED RELEASE ORAL at 17:50

## 2023-01-01 RX ADMIN — ACETAZOLAMIDE 250 MG: 250 TABLET ORAL at 21:41

## 2023-01-01 RX ADMIN — CARVEDILOL 25 MG: 25 TABLET, FILM COATED ORAL at 21:31

## 2023-01-01 RX ADMIN — TETRACAINE HYDROCHLORIDE 1 DROP: 5 SOLUTION OPHTHALMIC at 02:36

## 2023-01-01 RX ADMIN — BRIMONIDINE TARTRATE 1 DROP: 2 SOLUTION/ DROPS OPHTHALMIC at 08:27

## 2023-01-01 RX ADMIN — FAMOTIDINE 20 MG: 10 INJECTION, SOLUTION INTRAVENOUS at 09:43

## 2023-01-01 RX ADMIN — TIMOLOL MALEATE 1 DROP: 5 SOLUTION/ DROPS OPHTHALMIC at 08:27

## 2023-01-01 RX ADMIN — TIMOLOL MALEATE 1 DROP: 5 SOLUTION/ DROPS OPHTHALMIC at 20:24

## 2023-01-01 RX ADMIN — FUROSEMIDE 80 MG: 40 TABLET ORAL at 08:26

## 2023-01-01 RX ADMIN — DORZOLAMIDE HYDROCHLORIDE AND TIMOLOL MALEATE 1 DROP: 22.3; 6.8 SOLUTION/ DROPS OPHTHALMIC at 21:41

## 2023-01-01 RX ADMIN — INSULIN ASPART 1 UNITS: 100 INJECTION, SOLUTION INTRAVENOUS; SUBCUTANEOUS at 17:49

## 2023-01-01 RX ADMIN — DORZOLAMIDE HYDROCHLORIDE 1 DROP: 20 SOLUTION/ DROPS OPHTHALMIC at 21:58

## 2023-01-01 RX ADMIN — ASPIRIN 325 MG: 325 TABLET, COATED ORAL at 08:01

## 2023-01-01 RX ADMIN — DORZOLAMIDE HYDROCHLORIDE 1 DROP: 20 SOLUTION/ DROPS OPHTHALMIC at 21:31

## 2023-01-01 RX ADMIN — HYDRALAZINE HYDROCHLORIDE 25 MG: 25 TABLET, FILM COATED ORAL at 21:56

## 2023-01-01 RX ADMIN — SEVELAMER CARBONATE 800 MG: 800 TABLET, FILM COATED ORAL at 18:14

## 2023-01-01 RX ADMIN — SODIUM ZIRCONIUM CYCLOSILICATE 10 G: 10 POWDER, FOR SUSPENSION ORAL at 19:58

## 2023-01-01 RX ADMIN — ACETAZOLAMIDE 250 MG: 250 TABLET ORAL at 08:00

## 2023-01-01 RX ADMIN — ACETAZOLAMIDE SODIUM 250 MG: 500 INJECTION, POWDER, LYOPHILIZED, FOR SOLUTION INTRAVENOUS at 12:31

## 2023-01-01 RX ADMIN — CLOPIDOGREL BISULFATE 75 MG: 75 TABLET ORAL at 11:14

## 2023-01-01 RX ADMIN — SEVELAMER CARBONATE 800 MG: 800 TABLET, FILM COATED ORAL at 08:00

## 2023-01-01 RX ADMIN — BRIMONIDINE TARTRATE 1 DROP: 2 SOLUTION/ DROPS OPHTHALMIC at 21:31

## 2023-01-01 RX ADMIN — SODIUM CHLORIDE 300 ML: 9 INJECTION, SOLUTION INTRAVENOUS at 10:54

## 2023-01-01 RX ADMIN — Medication: at 07:47

## 2023-01-01 RX ADMIN — DORZOLAMIDE HCL 1 DROP: 20 SOLUTION/ DROPS OPHTHALMIC at 08:02

## 2023-01-01 RX ADMIN — DORZOLAMIDE HYDROCHLORIDE 1 DROP: 20 SOLUTION/ DROPS OPHTHALMIC at 11:15

## 2023-01-01 RX ADMIN — SIMVASTATIN 20 MG: 20 TABLET, FILM COATED ORAL at 20:57

## 2023-01-01 RX ADMIN — BRIMONIDINE TARTRATE 1 DROP: 2 SOLUTION/ DROPS OPHTHALMIC at 08:03

## 2023-01-01 RX ADMIN — LATANOPROST 1 DROP: 50 SOLUTION OPHTHALMIC at 21:33

## 2023-01-01 RX ADMIN — SEVELAMER CARBONATE 800 MG: 800 TABLET, FILM COATED ORAL at 20:55

## 2023-01-01 RX ADMIN — TIMOLOL MALEATE 1 DROP: 5 SOLUTION/ DROPS OPHTHALMIC at 21:31

## 2023-01-01 RX ADMIN — PROPARACAINE HYDROCHLORIDE 1 DROP: 5 SOLUTION/ DROPS OPHTHALMIC at 12:29

## 2023-01-01 RX ADMIN — BRIMONIDINE TARTRATE 1 DROP: 2 SOLUTION/ DROPS OPHTHALMIC at 02:38

## 2023-01-01 RX ADMIN — SIMVASTATIN 20 MG: 20 TABLET, FILM COATED ORAL at 21:31

## 2023-01-01 RX ADMIN — TIMOLOL MALEATE 1 DROP: 5 SOLUTION/ DROPS OPHTHALMIC at 11:15

## 2023-01-01 RX ADMIN — DORZOLAMIDE HYDROCHLORIDE 1 DROP: 20 SOLUTION/ DROPS OPHTHALMIC at 20:24

## 2023-01-01 RX ADMIN — BRIMONIDINE TARTRATE 1 DROP: 2 SOLUTION/ DROPS OPHTHALMIC at 08:05

## 2023-01-01 RX ADMIN — ACETAMINOPHEN 650 MG: 325 TABLET, FILM COATED ORAL at 18:23

## 2023-01-01 RX ADMIN — BRIMONIDINE TARTRATE 1 DROP: 2 SOLUTION/ DROPS OPHTHALMIC at 20:24

## 2023-01-01 RX ADMIN — LATANOPROST 1 DROP: 50 SOLUTION OPHTHALMIC at 21:42

## 2023-01-01 RX ADMIN — DEXTROSE 30 G: 15 GEL ORAL at 14:23

## 2023-01-01 RX ADMIN — DORZOLAMIDE HYDROCHLORIDE 1 DROP: 20 SOLUTION/ DROPS OPHTHALMIC at 21:32

## 2023-01-01 RX ADMIN — INSULIN HUMAN 17 UNITS: 100 INJECTION, SUSPENSION SUBCUTANEOUS at 08:26

## 2023-01-01 RX ADMIN — SODIUM CHLORIDE 300 ML: 9 INJECTION, SOLUTION INTRAVENOUS at 07:47

## 2023-01-01 RX ADMIN — INSULIN ASPART 2 UNITS: 100 INJECTION, SOLUTION INTRAVENOUS; SUBCUTANEOUS at 08:36

## 2023-01-01 RX ADMIN — CLOPIDOGREL BISULFATE 75 MG: 75 TABLET ORAL at 08:26

## 2023-01-01 RX ADMIN — TIMOLOL MALEATE 1 DROP: 5 SOLUTION OPHTHALMIC at 02:37

## 2023-01-01 ASSESSMENT — CONF VISUAL FIELD
METHOD: COUNTING FINGERS
OD_SUPERIOR_TEMPORAL_RESTRICTION: 1
OS_SUPERIOR_TEMPORAL_RESTRICTION: 1
METHOD: COUNTING FINGERS
OD_SUPERIOR_NASAL_RESTRICTION: 1
OD_INFERIOR_TEMPORAL_RESTRICTION: 1
OD_INFERIOR_TEMPORAL_RESTRICTION: 1
OS_SUPERIOR_TEMPORAL_RESTRICTION: 0
OD_SUPERIOR_NASAL_RESTRICTION: 1
OD_INFERIOR_NASAL_RESTRICTION: 1
OS_INFERIOR_TEMPORAL_RESTRICTION: 0
OS_NORMAL: 1
OD_INFERIOR_NASAL_RESTRICTION: 1
OD_SUPERIOR_TEMPORAL_RESTRICTION: 1
OD_INFERIOR_TEMPORAL_RESTRICTION: 1
OD_SUPERIOR_TEMPORAL_RESTRICTION: 1
METHOD: COUNTING FINGERS
OS_INFERIOR_TEMPORAL_RESTRICTION: 0
OS_SUPERIOR_NASAL_RESTRICTION: 0
OS_SUPERIOR_TEMPORAL_RESTRICTION: 1
OD_SUPERIOR_NASAL_RESTRICTION: 1
OD_INFERIOR_NASAL_RESTRICTION: 1
OS_INFERIOR_NASAL_RESTRICTION: 0
OS_NORMAL: 1
OS_INFERIOR_NASAL_RESTRICTION: 0
OD_INFERIOR_NASAL_RESTRICTION: 1
OD_SUPERIOR_TEMPORAL_RESTRICTION: 1
OD_INFERIOR_TEMPORAL_RESTRICTION: 1
OD_SUPERIOR_NASAL_RESTRICTION: 1
OS_SUPERIOR_TEMPORAL_RESTRICTION: 0
OS_SUPERIOR_NASAL_RESTRICTION: 0

## 2023-01-01 ASSESSMENT — ACTIVITIES OF DAILY LIVING (ADL)
ADLS_ACUITY_SCORE: 36
ADLS_ACUITY_SCORE: 36
ADLS_ACUITY_SCORE: 35
ADLS_ACUITY_SCORE: 43
ADLS_ACUITY_SCORE: 33
ADLS_ACUITY_SCORE: 35
ADLS_ACUITY_SCORE: 35
ADLS_ACUITY_SCORE: 36
ADLS_ACUITY_SCORE: 35
ADLS_ACUITY_SCORE: 35
ADLS_ACUITY_SCORE: 36
ADLS_ACUITY_SCORE: 35
ADLS_ACUITY_SCORE: 36
ADLS_ACUITY_SCORE: 35
ADLS_ACUITY_SCORE: 36
ADLS_ACUITY_SCORE: 35
ADLS_ACUITY_SCORE: 35
ADLS_ACUITY_SCORE: 33
ADLS_ACUITY_SCORE: 36
ADLS_ACUITY_SCORE: 35
ADLS_ACUITY_SCORE: 35
ADLS_ACUITY_SCORE: 36
ADLS_ACUITY_SCORE: 35
ADLS_ACUITY_SCORE: 36
ADLS_ACUITY_SCORE: 35
ADLS_ACUITY_SCORE: 36
ADLS_ACUITY_SCORE: 35
ADLS_ACUITY_SCORE: 36
ADLS_ACUITY_SCORE: 35
ADLS_ACUITY_SCORE: 36
DEPENDENT_IADLS:: CLEANING;COOKING;LAUNDRY;MEAL PREPARATION;MEDICATION MANAGEMENT;TRANSPORTATION
ADLS_ACUITY_SCORE: 36
ADLS_ACUITY_SCORE: 36
ADLS_ACUITY_SCORE: 35
ADLS_ACUITY_SCORE: 36
ADLS_ACUITY_SCORE: 36
ADLS_ACUITY_SCORE: 35
ADLS_ACUITY_SCORE: 33
ADLS_ACUITY_SCORE: 36
ADLS_ACUITY_SCORE: 35

## 2023-01-01 ASSESSMENT — TONOMETRY
IOP_METHOD: TONOPEN
OD_IOP_MMHG: 30
OD_IOP_MMHG: 43
OD_IOP_MMHG: 35
OS_IOP_MMHG: 12
IOP_METHOD: TONOPEN
IOP_METHOD: APPLANATION
OD_IOP_MMHG: 24
OS_IOP_MMHG: 17
IOP_METHOD: TONOPEN
OS_IOP_MMHG: 12
OD_IOP_MMHG: 37
IOP_METHOD: TONOPEN
IOP_METHOD: APPLANATION
IOP_METHOD: TONOPEN
OS_IOP_MMHG: 17
OD_IOP_MMHG: 36
IOP_METHOD: TONOPEN
OD_IOP_MMHG: 37
OD_IOP_MMHG: 38
OD_IOP_MMHG: 36
OD_IOP_MMHG: 37
OS_IOP_MMHG: 9
IOP_METHOD: ICARE
IOP_METHOD: APPLANATION
IOP_METHOD: TONOPEN
OD_IOP_MMHG: 24

## 2023-01-01 ASSESSMENT — CUP TO DISC RATIO
OS_RATIO: 0.3
OS_RATIO: 0.3

## 2023-01-01 ASSESSMENT — ENCOUNTER SYMPTOMS
HEADACHES: 1
CONFUSION: 1
EYE PAIN: 1

## 2023-01-01 ASSESSMENT — VISUAL ACUITY
OS_SC: 20/40
OD_CC: HM
OS_CC+: -2
OS_SC: 20/40
OS_SC: 20/40
CORRECTION_TYPE: GLASSES
OS_SC: 20/50
OD_SC: HM
METHOD: SNELLEN - LINEAR
OS_SC+: +1
METHOD: SNELLEN - LINEAR
OS_CC: 20/30
OD_SC: LP
OS_SC+: +2
METHOD: SNELLEN - LINEAR
OD_SC: LP
METHOD: SNELLEN - LINEAR
OD_SC: LP WITH PROJECTION
OS_SC+: -2
CORRECTION_TYPE: GLASSES
METHOD: SNELLEN - LINEAR

## 2023-01-01 ASSESSMENT — SLIT LAMP EXAM - LIDS
COMMENTS: NORMAL

## 2023-01-01 ASSESSMENT — EXTERNAL EXAM - RIGHT EYE
OD_EXAM: NORMAL

## 2023-01-01 ASSESSMENT — REFRACTION_WEARINGRX
SPECS_TYPE: SVL
OS_CYLINDER: SPHERE
OD_CYLINDER: SPHERE
OD_SPHERE: +1.00
OS_SPHERE: +1.00

## 2023-01-01 ASSESSMENT — GONIOSCOPY
OD_TEMPORAL: NVA
OS_SUPERIOR: OPEN TO CB
OS_TEMPORAL: OPEN TO CB
OD_INFERIOR: NVA
OD_NASAL: NVA
OS_INFERIOR: OPEN TO CB
OS_NASAL: OPEN TO CB
OD_SUPERIOR: NVA

## 2023-01-01 ASSESSMENT — EXTERNAL EXAM - LEFT EYE
OS_EXAM: NORMAL

## 2023-02-14 ENCOUNTER — LAB REQUISITION (OUTPATIENT)
Dept: LAB | Facility: CLINIC | Age: 63
End: 2023-02-14
Payer: COMMERCIAL

## 2023-02-14 DIAGNOSIS — K21.9 GASTRO-ESOPHAGEAL REFLUX DISEASE WITHOUT ESOPHAGITIS: ICD-10-CM

## 2023-02-14 DIAGNOSIS — E11.59 TYPE 2 DIABETES MELLITUS WITH OTHER CIRCULATORY COMPLICATIONS (H): ICD-10-CM

## 2023-02-15 LAB
ALBUMIN SERPL BCG-MCNC: 3.8 G/DL (ref 3.5–5.2)
ALP SERPL-CCNC: 154 U/L (ref 35–104)
ALT SERPL W P-5'-P-CCNC: 15 U/L (ref 10–35)
ANION GAP SERPL CALCULATED.3IONS-SCNC: 15 MMOL/L (ref 7–15)
AST SERPL W P-5'-P-CCNC: 22 U/L (ref 10–35)
BILIRUB SERPL-MCNC: 0.3 MG/DL
BUN SERPL-MCNC: 32 MG/DL (ref 8–23)
CALCIUM SERPL-MCNC: 9.2 MG/DL (ref 8.8–10.2)
CHLORIDE SERPL-SCNC: 97 MMOL/L (ref 98–107)
CREAT SERPL-MCNC: 6.49 MG/DL (ref 0.51–0.95)
DEPRECATED HCO3 PLAS-SCNC: 28 MMOL/L (ref 22–29)
ERYTHROCYTE [DISTWIDTH] IN BLOOD BY AUTOMATED COUNT: 16.3 % (ref 10–15)
GFR SERPL CREATININE-BSD FRML MDRD: 7 ML/MIN/1.73M2
GLUCOSE SERPL-MCNC: 64 MG/DL (ref 70–99)
HCT VFR BLD AUTO: 39.4 % (ref 35–47)
HGB BLD-MCNC: 11.7 G/DL (ref 11.7–15.7)
MAGNESIUM SERPL-MCNC: 2.6 MG/DL (ref 1.7–2.3)
MCH RBC QN AUTO: 30.1 PG (ref 26.5–33)
MCHC RBC AUTO-ENTMCNC: 29.7 G/DL (ref 31.5–36.5)
MCV RBC AUTO: 101 FL (ref 78–100)
PLATELET # BLD AUTO: 212 10E3/UL (ref 150–450)
POTASSIUM SERPL-SCNC: 4.9 MMOL/L (ref 3.4–5.3)
PROT SERPL-MCNC: 7.2 G/DL (ref 6.4–8.3)
RBC # BLD AUTO: 3.89 10E6/UL (ref 3.8–5.2)
SODIUM SERPL-SCNC: 140 MMOL/L (ref 136–145)
WBC # BLD AUTO: 6.2 10E3/UL (ref 4–11)

## 2023-02-15 PROCEDURE — 36415 COLL VENOUS BLD VENIPUNCTURE: CPT | Mod: ORL | Performed by: PHYSICIAN ASSISTANT

## 2023-02-15 PROCEDURE — 83735 ASSAY OF MAGNESIUM: CPT | Mod: ORL | Performed by: PHYSICIAN ASSISTANT

## 2023-02-15 PROCEDURE — 80053 COMPREHEN METABOLIC PANEL: CPT | Mod: ORL | Performed by: PHYSICIAN ASSISTANT

## 2023-02-15 PROCEDURE — 85027 COMPLETE CBC AUTOMATED: CPT | Mod: ORL | Performed by: PHYSICIAN ASSISTANT

## 2023-02-15 PROCEDURE — 83036 HEMOGLOBIN GLYCOSYLATED A1C: CPT | Mod: ORL | Performed by: PHYSICIAN ASSISTANT

## 2023-02-15 PROCEDURE — P9603 ONE-WAY ALLOW PRORATED MILES: HCPCS | Mod: ORL | Performed by: PHYSICIAN ASSISTANT

## 2023-08-07 NOTE — PROGRESS NOTES
[facility]    Hospitalist Progress Note    Date of Service (when I saw the patient): 05/29/2019  Provider:  Nakul Montes MD   Text Page  7am - 6PM       Assessment & Plan   Roxy Beaulieu is a 59 year old female who  has a past medical history of Diabetes, Gangrene of toe, High cholesterol, Hypertension, Obesity, PE (pulmonary thromboembolism), smoking, CKD stage IV, Cardiomyopathy, heart failure, type 2 diabetes mellitus with long-term current use of insulin. She was admitted on 5/27/2019 with worsening shortness of breath, leg swelling, hypoxemia, elevated NT BNP and nonocclusive left lower extremity DVT.    1.  Nonocclusive left lower extremity DVT likely complicated with PE.  Patient has well-documented history of DVT/PE.  She is currently on warfarin, on admission her INR is normal.  Historically she has tested with subtherapeutic reading in the past raising concern for noncompliance.  She was once suspicious for HIT but it was ruled out by proper testing.  She has tolerated heparin therapy in the past.  She has been admitted on heparin drip, Warfarin resumed.  Today's INR is 1.23.  Her poor kidney function precludes for CTA with PE protocol.  She is clinically and hemodynamically stable, much better symptomatically.    2.  Acute hypoxemic respiratory failure.  Suspect main contributor factor is #1, other possible contributors are: Anemia, decompensated heart failure and volume overload with interstitial edema and pleural effusion.  Improving with current treatment.  Continue on oxygen supplementation as needed.    3.  Acute decompensation of chronic hypertensive cardiomyopathy.  Likely from noncompliance with diet, medication and DVT/PE.  Chronic anemia also.  -Continue cardiac diet with sodium restriction, strict POPEYE's, daily weight, cardiac telemetry, O2 supplement per NC, IV diuretic on hold by nephrology recommendation. Input appreciated.  -Echocardiogram shows.  LVEF 45 -  50%  Severe LVH.  Grade III or advanced diastolic dysfunction.    4.  Acute on chronic kidney disease is stage IV-V  Worsening from all of the above. Significant proteinuria. Nephrology consult requested to help with diuresis appreciated.    5.  Type 2 diabetes mellitus on insulin.  A1c 10.5.  On treatment with NPH 70/30,  20 units at bedtime.  - Moderate CHO  - NPH 70/30 total of 16 units at bedtime.  - Insulin sliding scale.    6.  Essential hypertension.  -Amlodipine 5 mg 1 tablet daily.  - Carvedilol 50 mg twice daily.  - Hydralazine 50 mg 3 times daily.    7.  Hyperlipidemia. No statin listed in her home medication.  -Abnormal lipid panel fasting with low HDLc and LDLc    8.  Anemia of chronic disease.  Current hemoglobin 7.0.  - Anemia study.    - Transfuse if hemoglobin <7.0 or becoming very symptomatic.    DVT Prophylaxis: Heparin gtt and Warfarin  Code Status: Full Code    Disposition: Expected discharge in 2- 3 days once compensated and INR at goal.    Interval History   She feels better than yesterday, no chest pain or shortness of breath.  Appetite is improving.       -Data reviewed today: I reviewed all new labs and imaging results over the last 24 hours. I personally reviewed the EKG tracing showing NSR.    Physical Exam   Temp: 97.5  F (36.4  C) Temp src: Oral BP: 139/64 Pulse: 72 Heart Rate: 68 Resp: 18 SpO2: 97 % O2 Device: None (Room air) Oxygen Delivery: 2 LPM  Vitals:    05/27/19 2314 05/28/19 0316 05/29/19 0608   Weight: 101.8 kg (224 lb 8 oz) 101.4 kg (223 lb 8 oz) 100.5 kg (221 lb 8 oz)     Vital Signs with Ranges  Temp:  [97.5  F (36.4  C)-98.7  F (37.1  C)] 97.5  F (36.4  C)  Pulse:  [72] 72  Heart Rate:  [68-76] 68  Resp:  [18] 18  BP: (138-160)/(64-77) 139/64  SpO2:  [97 %-99 %] 97 %  I/O last 3 completed shifts:  In: 1491.1 [P.O.:1165; I.V.:326.1]  Out: 2125 [Urine:2125]    GEN:  Alert, oriented x 3, appears comfortable, NAD.  HEENT:  Normocephalic/atraumatic, no scleral icterus, no nasal  discharge, mouth moist.  CV:  Regular rate and rhythm, no murmur or JVD.  S1 + S2 noted, no S3 or S4.  LUNGS:  Clear to auscultation bilaterally without rales/rhonchi/wheezing/retractions.  Symmetric chest rise on inhalation noted.  ABD:  Active bowel sounds, soft, non-tender/non-distended.  No rebound/guarding/rigidity.  EXT: Bilateral lower extremity edema 3+, easy pitting, no cyanosis.  No joint synovitis noted.  SKIN:  Dry to touch, no exanthems noted in the visualized areas.       Medications     HEParin 1,400 Units/hr (05/29/19 1224)     - MEDICATION INSTRUCTIONS -       Warfarin Therapy Reminder         amLODIPine  5 mg Oral Daily     calcium acetate  667 mg Oral TID w/meals     carvedilol  50 mg Oral BID w/meals     hydrALAZINE  50 mg Oral TID     insulin aspart  1-7 Units Subcutaneous TID AC     insulin aspart  1-5 Units Subcutaneous At Bedtime     insulin isophane & regular  16 Units Subcutaneous At Bedtime     iron sucrose (VENOFER) intermittent infusion  200 mg Intravenous Daily     warfarin  7.5 mg Oral ONCE at 18:00       Data   Recent Labs   Lab 05/29/19  0622 05/28/19  0713 05/28/19  0001 05/27/19 2010   WBC  --  5.4 6.7 6.5   HGB  --  7.0* 7.4* 8.4*   MCV  --  88 87 86   PLT  --  185 192 226   INR 1.15* 1.23*  --  1.04    143  --  141   POTASSIUM 4.9 4.4  --  4.6   CHLORIDE 110* 112*  --  110*   CO2 26 25  --  24   BUN 61* 65*  --  64*   CR 4.26* 3.97*  --  3.79*   ANIONGAP 6 6  --  7   GILL 7.9* 7.7*  --  7.8*   * 124*  --  203*   ALBUMIN 2.5*  --   --  2.9*   PROTTOTAL  --   --   --  6.9   BILITOTAL  --   --   --  0.3   ALKPHOS  --   --   --  155*   ALT  --   --   --  56*   AST  --   --   --  38   TROPI  --   --   --  0.027       No results found for this or any previous visit (from the past 24 hour(s)).    Disclaimer: This note consists of symbols derived from keyboarding, dictation and/or voice recognition software. As a result, there may be errors in the script that have gone  undetected. Please consider this when interpreting information found in this chart.     Patient in labor/Ruptured membranes/Change in fetal status

## 2023-10-25 PROBLEM — H40.211 ACUTE ANGLE-CLOSURE GLAUCOMA OF RIGHT EYE: Status: ACTIVE | Noted: 2023-01-01

## 2023-10-25 PROBLEM — R51.9 ACUTE NONINTRACTABLE HEADACHE, UNSPECIFIED HEADACHE TYPE: Status: ACTIVE | Noted: 2023-01-01

## 2023-10-25 NOTE — LETTER
Transition Communication Hand-off for Care Transitions to Next Level of Care Provider    Name: Roxy Beaulieu  : 1960  MRN #: [id number]  Primary Care Provider: [facility]     Primary Clinic: [address] [place], [place] [id number]     Reason for Hospitalization:  ESRD (end stage renal disease) on dialysis (H) [N18.6, Z99.2]  Acute angle-closure glaucoma of right eye [H40.211]  Acute nonintractable headache, unspecified headache type [R51.9]  Admit Date/Time: 10/25/2023  9:27 AM  Discharge Date: 10/28/2023  Payor Source: Payor: ARE / Plan: [employer] [facility] DUAL / Product Type: HMO /           Discharge Needs Assessment:  Needs      Flowsheet Row Most Recent Value   Equipment Currently Used at Home wheelchair, power          Follow-up plan:    Future Appointments   Date Time Provider Department Center   10/30/2023  9:15 AM Kenan Roman MD [facility]   2023  9:50 AM Trish Holden MD [facility]       Any outstanding tests or procedures:              Key Recommendations:      ANUPAM BLACKWELL    AVS/Discharge Summary is the source of truth; this is a helpful guide for improved communication of patient story

## 2023-10-25 NOTE — ED PROVIDER NOTES
"    [place] EMERGENCY DEPARTMENT ([facility])    10/25/23       ED PROVIDER NOTE   ED 14 9:32 AM   History     Chief Complaint   Patient presents with    Eye Problem     The history is provided by the patient and medical records.     Roxy Beaulieu is a 63 year old female assisted-living resident with history of ESRD on dialysis, HTN, PVD, type 2 DM, hyperkalemia, lacunar stroke with hemiplegia and hemiparesis affecting left dominant side who was transferred from [facility] for further evaluation and management of right eye pain, right eye vision loss, evaluation of possible endophthalmitis, severe headache, and Ophthalmology consult. History limited, patient seems confused, gives inconsistent history.  She does not know what hospitalist she is in, states that she has been to many hospitals.  She initially reported left eye pain, but then stated the pain was behind her right eye and that her left eye is her \"good eye. She can't remember why she was seen at outside ER. Ultimately she states that she has had right eye pain for the past few days but worse today. She can't see out of her right eye, can only see out of her left eye.     Social history: Resides in assisted living.     I have reviewed the Medications, Allergies, Past Medical and Surgical History, and Social History in the Remote system.  Past Medical History:   Diagnosis Date    Anemia     Cerebral artery occlusion with cerebral infarction (H)     affected left side with weakness in arm and leg    CHF (congestive heart failure) (H)     Congestive heart failure (CHF) (H) 12/17/2018    Diabetes (H)     ESRD on dialysis (H)     HD STARTED 6/2019    Gangrene of toe (H) 4/29/2017    Hemiplegia and hemiparesis following cerebral infarction affecting left dominant side (H)     High cholesterol     Hypertension     Hypertensive cardiomyopathy (H)     Obesity 4/29/2017    PE (pulmonary thromboembolism) (H)     RECURRENT    Personal history of " tobacco use, presenting hazards to health 04/29/2017    Renal insufficiency     SOB (shortness of breath)     Toe ulcer, left, with unspecified severity (H) 11/2/2018    Ulcer of toe of left foot (H)     GANGRENE    Uncontrolled type 2 diabetes mellitus with hyperglycemia, with long-term current use of insulin (H) 4/29/2017    VTE (venous thromboembolism)        Past Surgical History:   Procedure Laterality Date    AMPUTATE TOE(S) Left 5/1/2017    Procedure: AMPUTATE TOE(S);  Amputate first and second left toes;  Surgeon: Verna Fofana DPM, Podiatry/Foot and Ankle Surgery;  Location: [facility] OR    AMPUTATE TOE(S) Left 11/3/2018    Procedure: Partial left 3rd toe amputation;  Surgeon: Verna Fofana DPM, Podiatry/Foot and Ankle Surgery;  Location: [facility] OR    AMPUTATE TOE(S)      CREATE FISTULA ARTERIOVENOUS UPPER EXTREMITY Right 2/11/2020    Procedure: RIGHT PROXIMAL RADIAL TO CEPHALIC ARTERIOVENOUS FISTULA CREATION;  Surgeon: Sumanth Corbin MD;  Location:  OR    ESOPHAGOSCOPY, GASTROSCOPY, DUODENOSCOPY (EGD), COMBINED Left 6/23/2020    Procedure: ESOPHAGOGASTRODUODENOSCOPY, WITH biopsies using biopsy forceps;  Surgeon: Elliott Rubi MD;  Location:  GI    IR CVC TUNNEL PLACEMENT > 5 YRS OF AGE  6/4/2019    IR CVC TUNNEL REMOVAL RIGHT  8/18/2020    IR DIALYSIS FISTULOGRAM RIGHT  4/15/2021    IR TUNNELED CATHETER COMPLETE REPLACEMENT  10/2/2019    IR TUNNELED CATHETER COMPLETE REPLACEMENT  10/2/2019    OPEN REDUCTION INTERNAL FIXATION ANKLE  2018    OPEN REDUCTION INTERNAL FIXATION FIBULA Left 3/16/2018    Procedure: OPEN REDUCTION INTERNAL FIXATION FIBULA;  Open reduction and internal fixation of displaced left lateral malleolar fracture;  Surgeon: Sumanth Wen MD;  Location:  OR    REVISION FISTULA ARTERIOVENOUS UPPER EXTREMITY Right 4/28/2020    Procedure: ONLAY VEIN PATCH RIGHT RADIAL TO CEPHALIC FISTULA WITH LIGATION OF SIDE BRANCH ;  Surgeon: Sumanth Corbin MD;  Location:  OR        Past medical history, past surgical history, medications, and allergies were reviewed with the patient. Additional pertinent items: None    No family history on file.    Social History     Tobacco Use    Smoking status: Former     Types: Cigarettes     Quit date: 2016     Years since quittin.2    Smokeless tobacco: Never   Substance Use Topics    Alcohol use: No        Social history was reviewed with the patient. Additional pertinent items: None    Past Medical History  Past Medical History:   Diagnosis Date    Anemia     Cerebral artery occlusion with cerebral infarction (H)     affected left side with weakness in arm and leg    CHF (congestive heart failure) (H)     Congestive heart failure (CHF) (H) 2018    Diabetes (H)     ESRD on dialysis (H)     HD STARTED 2019    Gangrene of toe (H) 2017    Hemiplegia and hemiparesis following cerebral infarction affecting left dominant side (H)     High cholesterol     Hypertension     Hypertensive cardiomyopathy (H)     Obesity 2017    PE (pulmonary thromboembolism) (H)     RECURRENT    Personal history of tobacco use, presenting hazards to health 2017    Renal insufficiency     SOB (shortness of breath)     Toe ulcer, left, with unspecified severity (H) 2018    Ulcer of toe of left foot (H)     GANGRENE    Uncontrolled type 2 diabetes mellitus with hyperglycemia, with long-term current use of insulin (H) 2017    VTE (venous thromboembolism)      Past Surgical History:   Procedure Laterality Date    AMPUTATE TOE(S) Left 2017    Procedure: AMPUTATE TOE(S);  Amputate first and second left toes;  Surgeon: Verna Fofana DPM, Podiatry/Foot and Ankle Surgery;  Location: [facility] OR    AMPUTATE TOE(S) Left 11/3/2018    Procedure: Partial left 3rd toe amputation;  Surgeon: Verna Fofana DPM, Podiatry/Foot and Ankle Surgery;  Location: [facility] OR    AMPUTATE TOE(S)      CREATE FISTULA ARTERIOVENOUS UPPER EXTREMITY Right 2020     Procedure: RIGHT PROXIMAL RADIAL TO CEPHALIC ARTERIOVENOUS FISTULA CREATION;  Surgeon: Sumanth Corbin MD;  Location: [facility] OR    ESOPHAGOSCOPY, GASTROSCOPY, DUODENOSCOPY (EGD), COMBINED Left 6/23/2020    Procedure: ESOPHAGOGASTRODUODENOSCOPY, WITH biopsies using biopsy forceps;  Surgeon: Elliott Rubi MD;  Location: [facility] GI    IR CVC TUNNEL PLACEMENT > 5 YRS OF AGE  6/4/2019    IR CVC TUNNEL REMOVAL RIGHT  8/18/2020    IR DIALYSIS FISTULOGRAM RIGHT  4/15/2021    IR TUNNELED CATHETER COMPLETE REPLACEMENT  10/2/2019    IR TUNNELED CATHETER COMPLETE REPLACEMENT  10/2/2019    OPEN REDUCTION INTERNAL FIXATION ANKLE  2018    OPEN REDUCTION INTERNAL FIXATION FIBULA Left 3/16/2018    Procedure: OPEN REDUCTION INTERNAL FIXATION FIBULA;  Open reduction and internal fixation of displaced left lateral malleolar fracture;  Surgeon: Sumanth Wen MD;  Location: [facility] OR    REVISION FISTULA ARTERIOVENOUS UPPER EXTREMITY Right 4/28/2020    Procedure: ONLAY VEIN PATCH RIGHT RADIAL TO CEPHALIC FISTULA WITH LIGATION OF SIDE BRANCH ;  Surgeon: Sumanth Corbin MD;  Location:  OR     acetaminophen (TYLENOL) 325 MG tablet  amLODIPine (NORVASC) 5 MG tablet  aspirin (ASA) 325 MG EC tablet  bisacodyl (DULCOLAX) 10 MG suppository  calcium acetate (PHOSLO) 667 MG CAPS capsule  calcium carbonate (TUMS) 500 MG chewable tablet  carvedilol (COREG) 25 MG tablet  furosemide (LASIX) 80 MG tablet  hydrALAZINE (APRESOLINE) 50 MG tablet  insulin aspart (NOVOLOG PEN) 100 UNIT/ML pen  insulin NPH-Regular 70/30 susp  omeprazole (PRILOSEC OTC) 20 MG EC tablet  ondansetron (ZOFRAN) 4 MG tablet  senna-docusate (SENOKOT-S/PERICOLACE) 8.6-50 MG tablet  simvastatin (ZOCOR) 20 MG tablet  sucralfate (CARAFATE) 1 GM/10ML suspension      Allergies   Allergen Reactions    Heparin Hives     HIT-serotonin assay negative. NOT HIT     Family History  No family history on file.  Social History   Social History     Tobacco Use    Smoking status:  Former     Types: Cigarettes     Quit date: 2016     Years since quittin.2    Smokeless tobacco: Never   Substance Use Topics    Alcohol use: No    Drug use: No         A medically appropriate review of systems was performed with pertinent positives and negatives noted in the HPI, and all other systems negative.   Physical Exam   BP: (!) 166/64  Pulse: 77  Temp: 97.7  F (36.5  C)  Resp: 18  SpO2: 95 %    Physical Exam    Physical Exam   Constitutional:   well nourished, well developed, resting comfortably   HENT:   Head: Normocephalic and atraumatic.   Eyes: Conjunctivae are normal. Pupils are equal, round, and reactive to light.   TMS are clear, pharynx has no erythema or exudate, mucous membranes are moist  Neck:   no adenopathy, no bony tenderness  Cardiovascular: regular rate and rhythm without murmurs or gallops  Pulmonary/Chest: Clear to auscultation bilaterally, with no wheezes or retractions. No respiratory distress.  GI: Soft with good bowel sounds.  Non-tender, non-distended, with no guarding, no rebound, no peritoneal signs.   Back:  No bony or CVA tenderness   Musculoskeletal:  no edema or clubbing   Skin: Skin is warm and dry. No rash noted.   Neurological: alert and oriented to person, place, and time. Nonfocal exam  Psychiatric:  normal mood and affect.     ED Course     ED Course as of 10/25/23 1308   Wed Oct 25, 2023   1139 Ophthalmology resident came and discussed findings     Procedures              Results for orders placed or performed during the hospital encounter of 10/25/23 (from the past 24 hour(s))   Ammonia (on ice)   Result Value Ref Range    Ammonia 22 11 - 51 umol/L   [place] Draw    Narrative    The following orders were created for panel order [place] Draw.  Procedure                               Abnormality         Status                     ---------                               -----------         ------                     Extra Red Top Tube[[id number]]                                In process                 Extra Green Top (Lithium...[[id number]]                      In process                 Extra Purple Top Tube[[id number]]                            In process                   Please view results for these tests on the individual orders.   [place] Draw *Canceled*    Narrative    The following orders were created for panel order [place] Draw.  Procedure                               Abnormality         Status                     ---------                               -----------         ------                       Please view results for these tests on the individual orders.   [place] Draw *Canceled*    Narrative    The following orders were created for panel order [place] Draw.  Procedure                               Abnormality         Status                     ---------                               -----------         ------                     Extra Blue Top Tube[[id number]]                                                           Please view results for these tests on the individual orders.     Medications   acetaZOLAMIDE (DIAMOX SEQUEL) 12 hr capsule 500 mg (has no administration in time range)   acetaZOLAMIDE (DIAMOX) injection 250 mg (250 mg Intravenous $Given 10/25/23 123)         Chart was reviewed, pertinent findings copied and pasted below:    Epic/SpectraRep records reviewed. In addition, I received a phone call from PA at [facility] who assessed her. Apparently she has a history of end-stage renal disease and had developed right eye pain after dialysis yesterday with a right postocular headache.  Eye pain is worsening.  The physician assistant at [facility] reported that her intraocular pressures were okay, CT head negative.  [facility] staff had planned to admit her for pain control and MRI, but will now send her for ophthalmologic evaluation.  Dr. Kraft of ophthalmology was told about this patient.     EXAM: CT HEAD BRAIN WO  LOCATION: [facility]  IMAGING  DATE: 10/25/2023    INDICATION: Acute right sided HA. Right ocular pain. Right IOP 22>17>15>17. Left IOP 15>15>15>14  COMPARISON: 11/27/2022  TECHNIQUE: Routine CT Head without IV contrast. Multiplanar reformats. Dose reduction techniques were used.    FINDINGS:  INTRACRANIAL CONTENTS: No intracranial hemorrhage, extraaxial collection, or mass effect.  No CT evidence of acute infarct. Mild volume loss and presumed chronic small vessel ischemia. Small chronic right corona radiata infarct again noted.    VISUALIZED ORBITS/SINUSES/MASTOIDS: No intraorbital abnormality. No paranasal sinus mucosal disease. No middle ear or mastoid effusion.    BONES/SOFT TISSUES: No acute abnormality.     C-REACTIVE PROTEIN (10/25/2023 12:59 AM CDT)  Lab Results - C-REACTIVE PROTEIN (10/25/2023 12:59 AM CDT)  Component Value Ref Range Analysis Time Performed At   C-REACTIVE PROTEIN 0.3 <0.5 mg/dL 10/25/2023 1:28 AM [facility] LABORATORY     (ABNORMAL) SEDIMENTATION RATE (10/25/2023 12:59 AM CDT)  Lab Results - (ABNORMAL) SEDIMENTATION RATE (10/25/2023 12:59 AM CDT)  Component Value Ref Range Analysis Time Performed At   SEDIMENTATION RATE 67 (H) <30 mm/hr 10/25/2023 1:14 AM [facility] LABORATORY       (ABNORMAL) BASIC METABOLIC PANEL (10/25/2023 12:59 AM CDT)  Lab Results - (ABNORMAL) BASIC METABOLIC PANEL (10/25/2023 12:59 AM CDT)  Component Value Ref Range Analysis Time Performed At   SODIUM 133 (L) 136 - 145 mmol/L 10/25/2023 1:28 AM [facility] LABORATORY   POTASSIUM 5.2 (H) 3.5 - 5.1 mmol/L 10/25/2023 1:28 AM [facility] LABORATORY   CHLORIDE 91 (L) 98 - 107 mmol/L 10/25/2023 1:28 AM [facility] LABORATORY   CO2,TOTAL 29 22 - 29 mmol/L 10/25/2023 1:28 AM [facility] LABORATORY   ANION GAP 13 5 - 18 10/25/2023 1:28 AM [facility] LABORATORY   GLUCOSE 188 (H) 70 - 99 mg/dL 10/25/2023 1:28 AM CDT [facility] LABORATORY   CALCIUM 9.4 8.8 - 10.2 mg/dL 10/25/2023 1:28  AM [facility] LABORATORY   BUN 42 (H) 8 - 23 mg/dL 10/25/2023 1:28 AM [facility] LABORATORY   CREATININE 5.22 (H) 0.50 - 0.90 mg/dL 10/25/2023 1:28 AM [facility] LABORATORY   BUN/CREAT RATIO 8 (L) 10 - 20 10/25/2023 1:28 AM [facility] LABORATORY   eGFR 9 (L) >90 mL/min/1.73m2 10/25/2023 1:28 AM [facility] LABORATORY        (ABNORMAL) CBC W PLT NO DIFF (10/25/2023 12:59 AM CDT)  Lab Results - (ABNORMAL) CBC W PLT NO DIFF (10/25/2023 12:59 AM CDT)  Component Value Ref Range Analysis Time Performed At   WHITE BLOOD COUNT 7.9 4.5 - 11.0 thou/cu mm 10/25/2023 1:10 AM [facility] LABORATORY   RED BLOOD COUNT 3.82 (L) 4.00 - 5.20 mil/cu mm 10/25/2023 1:10 AM [facility] LABORATORY   HEMOGLOBIN 11.2 (L) 12.0 - 16.0 g/dL 10/25/2023 1:10 AM [facility] LABORATORY   HEMATOCRIT 34.5 33.0 - 51.0 % 10/25/2023 1:10 AM [facility] LABORATORY   MCV 90 80 - 100 fL 10/25/2023 1:10 AM [facility] LABORATORY   MCH 29.3 26.0 - 34.0 pg 10/25/2023 1:10 AM [facility] LABORATORY   MCHC 32.5 32.0 - 36.0 g/dL 10/25/2023 1:10 AM [facility] LABORATORY   RDW 15.2 11.5 - 15.5 % 10/25/2023 1:10 AM [facility] LABORATORY   PLATELET COUNT 188 140 - 440 thou/cu mm 10/25/2023 1:10 AM [facility] LABORATORY   MPV 10.7 6.5 - 11.0 fL 10/25/2023 1:10 AM [facility] LABORATORY   NRBC 0.0 % 10/25/2023 1:10 AM [facility] LABORATORY   ABS NRBC 0.0 thou /cu mm 10/25/2023 1:10 AM [facility] LABORATORY          Critical care was not performed.     Medical Decision Making  The patient's presentation was of high complexity (a chronic illness severe exacerbation, progression, or side effect of treatment).    The patient's evaluation involved:  review of external note(s) from 2 sources (prior visits and ED notes from [place])  review of 3+ test result(s) ordered prior to this encounter (laboratory studies and head  CT)  ordering and/or review of 2 test(s) in this encounter (see separate area of note for details)  discussion of management or test interpretation with another health professional (ophthalmology)    The patient's management necessitated moderate risk (prescription drug management including medications given in the ED) and high risk (a decision regarding hospitalization).     Assessments & Plan (with Medical Decision Making)       I have reviewed the nursing notes.  EMERGENCY DEPARTMENT COURSE: Patient was seen and examined at 0936 am in room 14.     See note above for laboratory studies done at outside facility.  Ammonia today is 22    The patient was seen by Dr. Jauregui of ophthalmology.  Please see his separate documentation for details.  Dr. Jauregui believes the patient has acute angle-closure glaucoma with a intraocular pressure of 75 in her right eye.  He was treated with Diamox as well as timolol, Cosopt brimonidine, dorzolamide, latanoprost.  Her headache has improved after initial treatment.  However, she needs hourly eyedrops. He does note that Benadryl should be avoided.    Roxy Beaulieu is a 63 year old female with end-stage renal disease on dialysis and lacunar stroke with hemiplegia as well as memory difficulties who presents with right eye pain and right-sided headache.  She has an acute angle-closure glaucoma.  She has been seen and treated by ophthalmology.  She will be admitted to the medicine service on an observation basis for further evaluation and treatment.  I spoke with his hospitalist Dr. Schwab, regarding admission    I have reviewed the findings, diagnosis, plan and need for follow up with the patient.    New Prescriptions    No medications on file       Final diagnoses:   Acute angle-closure glaucoma of right eye   Acute nonintractable headache, unspecified headache type   ESRD (end stage renal disease) on dialysis (H)       Emmie LY, am serving as a trained medical scribe  to document services personally performed by Gracy Smith MD based on the provider's statements to me on October 25, 2023.  This document has been checked and approved by the attending provider.    I, Gracy Smith MD, was physically present and have reviewed and verified the accuracy of this note documented by Emmie Briseno, medical scribe.       This note was created in part by the use of Dragon voice recognition dictation system. Inadvertent grammatical errors and typographical errors may still exist.    Gracy Smith MD      10/25/2023   [facility] EMERGENCY DEPARTMENT       Gracy Smith MD  10/25/23 [id number]

## 2023-10-25 NOTE — CONSULTS
OPHTHALMOLOGY CONSULT NOTE  10/25/23    Patient: Roxy Beaulieu    ASSESSMENT/PLAN:     Roxy Beaulieu is a 63 year old female who presented with     #Acute Angle Closure Glaucoma right eye  63 F pmhx stroke presents  to outside ED for intense intractable headache and pain behind the right eye. Outside hospital's assessment was that this was an acute endophthalmitis or orbital cellulitis. States that since her stroke she has poor short term memory and does not remember the duration of her symptoms. Presents with painful, unilateral vision loss in conjunction with headache and IOP of 75 in right eye. This presentation is most consistent with acute angle closure glaucoma although I also considered corneal abrasion/ulcer, complex migraine, globe rupture, uveitis, endophthalmitis, orbital/preseptal cellulitis, and optic neuritis in the context of painful vision loss. No history of hyperopia. No family history of angle closure glaucoma. No history of sickle cell disease or trait. No foreign body sensation, no FB on exam. No recent eye trauma or suspected microtrauma (dust, sand, etc). Negative Betty sign.  Visual Acuity: Baseline is LP in the right eye, currently LP;p left eye 20/50 PH NI  Gonioscopy showed: No visualization of angle structures  Fundus exam was not obtained due to no view  B scan showed phakic lenses and shallow Anterior chamber chamber. No vitreous opacities suggestive of tractional retinal detachment or vitreous hemorrhage    Upon presentation, the patient received the following:  -Timolol 0.5% 1 drop affected eye  -Brimonidine 0.1% 1 drop affected eye  -Dorzolamide 2% 1 drop affected eye  -Latanoprost 0.005% 1 drop affected eye  -Acetazolamide 250mg IV  -Analgesia and antiemetics PRN per primary team  Supine positioning in lighted room with hourly IOP checks    She experienced nearly immediate relief of headache and eye pain. Injection decreased. Demonstrated improvement of Intraocular  pressure on max drop therapy after 1 hour of repeated treatment q15 mins.     Plan:   -Timolol 0.5% 1 drop affected eye BID  -Brimonidine 0.1% 1 drop affected eye BID   -Dorzolamide 2% 1 drop affected eye BID  -Latanoprost 0.005% 1 drop affected eye daily  -Diamox sequels 250 BID  (half dose because patient is on dialysis)    It is our pleasure to participate in this patient's care and treatment. Please contact us with any further questions or concerns.    Discussed with Dr. Tillman (senior) who agreed with this assessment and plan.     Ophthalmology will continue to follow while on ED observation until pressures downtrend to an appropriate range. Please contact ophthalmologist on call with any questions or concerns. We appreciate your care of this patient.    Bolivar Jauregui MD, PGY2  Ophthalmology Resident  [facility]    HISTORY OF PRESENTING ILLNESS:     Roxy Beaulieu is a 63 year old female who presented on 10/25/23 with red painful eye.    Outside ED had normal pressures and red eye  Eye is LP at baseline  States that she has had unknown surgeries on her eyes but does not know which ones or what they were for  States that since her stroke her memory has been poor and so she does not know entirely why she is here.   Endorsed pain and significant photophobia on intake.    10+ review of systems were otherwise negative except for that which has been stated above.      OCULAR/MEDICAL/SURGICAL HISTORIES:     Past Ocular History:   Last eye exam: n/a   Previously diagnosed ocular conditions: n/a  Prior eye surgery/laser: n/a  Contact lens wear: n/a  Glasses: wearing  Eyedrops: none    Pertinent Systemic Medications:   Current Facility-Administered Medications   Medication    acetaZOLAMIDE (DIAMOX SEQUEL) 12 hr capsule 500 mg    acetaZOLAMIDE (DIAMOX) injection 250 mg     Current Outpatient Medications   Medication    acetaminophen (TYLENOL) 325 MG tablet    amLODIPine (NORVASC) 5 MG tablet    aspirin  (ASA) 325 MG EC tablet    bisacodyl (DULCOLAX) 10 MG suppository    calcium acetate (PHOSLO) 667 MG CAPS capsule    calcium carbonate (TUMS) 500 MG chewable tablet    carvedilol (COREG) 25 MG tablet    furosemide (LASIX) 80 MG tablet    hydrALAZINE (APRESOLINE) 50 MG tablet    insulin aspart (NOVOLOG PEN) 100 UNIT/ML pen    insulin NPH-Regular 70/30 susp    omeprazole (PRILOSEC OTC) 20 MG EC tablet    ondansetron (ZOFRAN) 4 MG tablet    senna-docusate (SENOKOT-S/PERICOLACE) 8.6-50 MG tablet    simvastatin (ZOCOR) 20 MG tablet    sucralfate (CARAFATE) 1 GM/10ML suspension     Past Medical History:  Past Medical History:   Diagnosis Date    Anemia     Cerebral artery occlusion with cerebral infarction (H)     affected left side with weakness in arm and leg    CHF (congestive heart failure) (H)     Congestive heart failure (CHF) (H) 12/17/2018    Diabetes (H)     ESRD on dialysis (H)     HD STARTED 6/2019    Gangrene of toe (H) 4/29/2017    Hemiplegia and hemiparesis following cerebral infarction affecting left dominant side (H)     High cholesterol     Hypertension     Hypertensive cardiomyopathy (H)     Obesity 4/29/2017    PE (pulmonary thromboembolism) (H)     RECURRENT    Personal history of tobacco use, presenting hazards to health 04/29/2017    Renal insufficiency     SOB (shortness of breath)     Toe ulcer, left, with unspecified severity (H) 11/2/2018    Ulcer of toe of left foot (H)     GANGRENE    Uncontrolled type 2 diabetes mellitus with hyperglycemia, with long-term current use of insulin (H) 4/29/2017    VTE (venous thromboembolism)        Past Surgical History:   Past Surgical History:   Procedure Laterality Date    AMPUTATE TOE(S) Left 5/1/2017    Procedure: AMPUTATE TOE(S);  Amputate first and second left toes;  Surgeon: Verna Fofana DPM, Podiatry/Foot and Ankle Surgery;  Location: [facility] OR    AMPUTATE TOE(S) Left 11/3/2018    Procedure: Partial left 3rd toe amputation;  Surgeon: Verna Fofana DPM,  Podiatry/Foot and Ankle Surgery;  Location: [facility] OR    AMPUTATE TOE(S)      CREATE FISTULA ARTERIOVENOUS UPPER EXTREMITY Right 2/11/2020    Procedure: RIGHT PROXIMAL RADIAL TO CEPHALIC ARTERIOVENOUS FISTULA CREATION;  Surgeon: Sumanth Corbin MD;  Location:  OR    ESOPHAGOSCOPY, GASTROSCOPY, DUODENOSCOPY (EGD), COMBINED Left 6/23/2020    Procedure: ESOPHAGOGASTRODUODENOSCOPY, WITH biopsies using biopsy forceps;  Surgeon: Elliott Rubi MD;  Location: [facility] GI    IR CVC TUNNEL PLACEMENT > 5 YRS OF AGE  6/4/2019    IR CVC TUNNEL REMOVAL RIGHT  8/18/2020    IR DIALYSIS FISTULOGRAM RIGHT  4/15/2021    IR TUNNELED CATHETER COMPLETE REPLACEMENT  10/2/2019    IR TUNNELED CATHETER COMPLETE REPLACEMENT  10/2/2019    OPEN REDUCTION INTERNAL FIXATION ANKLE  2018    OPEN REDUCTION INTERNAL FIXATION FIBULA Left 3/16/2018    Procedure: OPEN REDUCTION INTERNAL FIXATION FIBULA;  Open reduction and internal fixation of displaced left lateral malleolar fracture;  Surgeon: Sumanth Wen MD;  Location:  OR    REVISION FISTULA ARTERIOVENOUS UPPER EXTREMITY Right 4/28/2020    Procedure: ONLAY VEIN PATCH RIGHT RADIAL TO CEPHALIC FISTULA WITH LIGATION OF SIDE BRANCH ;  Surgeon: Sumanth Corbin MD;  Location:  OR       Family History:   No history of macular degeneration or glaucoma    Social History:   No tobacco use    EXAMINATION:     Base Eye Exam       Visual Acuity (Snellen - Linear)         Right Left    Dist sc LP     Near cc  20/50              Tonometry (Tonopen, 1030 AM)         Right Left    Pressure 75 23              Tonometry #2 (Tonopen, 11:18 AM)         Right Left    Pressure 60               Pupils         Dark Light Shape React APD    Right 3.5 3.5 Round none Yes    Left 2 1 Round Brisk None              Visual Fields         Left Right     Full     Restrictions  Total superior temporal, inferior temporal, superior nasal, inferior nasal deficiencies              Extraocular Movement          Right Left     -- -1 --   -1  -1   -- -1 --    -- -- --   --  --   -- -- --                     Slit Lamp and Fundus Exam       External Exam         Right Left    External Normal Normal              Slit Lamp Exam         Right Left    Lids/Lashes Protective ptosis; heavy dermatochalasis Normal    Conjunctiva/Sclera 360 injection, inferior chemosis White and quiet    Cornea diffuse microcystic edema with temporal and nasal D folds Clear    Anterior Chamber Shallow; No AC cell or flare Deep and quiet    Iris Round Round and reactive    Lens Clear Clear    Anterior Vitreous Normal Normal              Fundus Exam         Right Left    Disc no view undilated                    Labs/Studies/Imaging Performed    B scan without evidence of curvilinear vitreous opacities, hypoechoic regions within the retina or choiroid. Right eye demonstrates shallow chamber and phakic lens.     Bolivar Jauregui MD  Resident Physician, PGY2  Department of Ophthalmology  10/25/23 10:30 AM

## 2023-10-25 NOTE — PHARMACY-ADMISSION MEDICATION HISTORY
Pharmacist Admission Medication History    Admission medication history is complete. The information provided in this note is only as accurate as the sources available at the time of the update.    Information Source(s): Patient's pharmacy, Hospital records, Facility (U/NH/) medication list/MAR, and CareEverywhere/SureScripts via  Medication list from assisted living facility and EMR    Pertinent Information: Patient is currently a resident at [facility]. Medications were updated to reflect what the patient is currently receiving in her assisted living facility. Dialysis days on chart review are Tuesday, Thursday and Saturday. Patient was admitted to [facility] Emergency Department this morning and received doses of amlodipine 5 mg (different than her PTA dose), asa 325 mg, carvedilol 25 mg, clopidogrel 75 mg, pantoprazole 40 mg, and sevelemer carbonate 1600 mg. Last doses included in PTA med list.     Changes made to PTA medication list:  Added:   Clopidogrel 75 mg tablets  Patiromer (VELTASSA) 8.4 g packet  Sevelamer carbonate (RENVELA) 800 MG tablet  Deleted:   Calcium Carbonate 500 mg chewable tablet  Sucralfate 1 gm/10 ml  Changed:   AmLODIPine (NORVASC) 5 MG tablet->AmLODIPine (NORVASC) 10 MG tablet  Carvedilol (COREG) 25 MG tablet: Take 1 tablet (25 mg) by mouth 2 times daily (with meals)-> Take 1 tablet (25 mg) every morning on Sunday, Monday, Wednesday, Friday and take 1 tablet (25 mg) every evening. (Medication note added)  Furosemide (LASIX) 80 MG tablet: Take 60 mg by mouth four times a week On Tuesday, Thursday, Saturday, and Sunday->Furosemide (LASIX) 80 MG tablet: Take 80 mg by mouth daily  HydrALAZINE (APRESOLINE) 50 MG tablet: Take 50 mg twice daily->HydrALAZINE (APRESOLINE) 25 MG tablet: Take 25 mg by mouth four times a week Take 1 tablet (25 mg) every afternoon on Sunday, Monday, Wednesday, and Friday at 4 pm.  Second entry added for hydrALAZINE (APRESOLINE) 25 MG  tablet: Take 25 mg by mouth at bedtime.  Insulin NPH-Regular 70/30 susp: Inject 18 Units Subcutaneous daily (with breakfast) AND 9 Units daily (with dinner).-> Inject 17 Units Subcutaneous daily on Sunday, Monday, Wednesday & Friday. (Medication note added)  Omeprazole (PRILOSEC OTC) 20 MG EC tablet: Take 2 tablets (40 mg) by mouth 2 times daily->Take 20 mg by mouth daily 4:30 pm.    Allergies reviewed and updates made in EHR:  Yes    Medication History Completed By: Madalyn Dimas [facility] 10/25/2023 5:55 PM    Prior to Admission medications    Medication Sig Last Dose Taking? Auth Provider Long Term End Date   acetaminophen (TYLENOL) 325 MG tablet Take 2 tablets (650 mg) by mouth every 6 hours as needed for mild pain or fever (> 101 F) Unknown at PRN Yes Kamaljit Rojas MD     amLODIPine (NORVASC) 10 MG tablet Take 10 mg by mouth at bedtime 10/25/2023 at 0829 Yes Unknown, Entered By History No    aspirin (ASA) 325 MG EC tablet Take 325 mg by mouth every morning 10/25/2023 at 0829 Yes Unknown, Entered By History No    bisacodyl (DULCOLAX) 10 MG suppository Place 1 suppository (10 mg) rectally daily as needed for constipation Unknown at PRN Yes Kamaljit Rojas MD     carvedilol (COREG) 25 MG tablet Take 1 tablet (25 mg) by mouth 2 times daily (with meals)  Patient taking differently: Take 25 mg by mouth 2 times daily (with meals) Take 1 tablet (25 mg) every morning on Sunday, Monday, Wednesday, Friday and take 1 tablet (25 mg) every evening. 10/25/2023 at 0829 Yes Kamaljit Rojas MD Yes    clopidogrel (PLAVIX) 75 MG tablet Take 75 mg by mouth daily 10/25/2023 at 0829 Yes Unknown, Entered By History     furosemide (LASIX) 80 MG tablet Take 80 mg by mouth daily 10/24/2023 at 0800 Yes Reported, Patient     hydrALAZINE (APRESOLINE) 25 MG tablet Take 25 mg by mouth at bedtime 10/24/2023 at PM Yes Unknown, Entered By History Yes    hydrALAZINE (APRESOLINE) 25 MG tablet Take 25 mg by mouth four times a week Take 1 tablet  (25 mg) every afternoon on Sunday, Monday, Wednesday, and Friday at 4 pm. Past Week at 1600 Yes Reported, Patient Yes    insulin aspart (NOVOLOG PEN) 100 UNIT/ML pen TID w/meals: -249=2U; 250-299=4u; 300-349=6u;350-399=8U; 400 & above 10units 10/24/2023 at Unknown Yes Effie Hidalgo, RIK CNP Yes    insulin NPH-Regular 70/30 susp Inject 18 Units Subcutaneous daily (with breakfast) AND 9 Units daily (with dinner).  Patient taking differently: Inject 17 Units Subcutaneous daily on Sunday, Monday, Wednesday & Friday. Past Week at 0800 Yes Keisha Nguyen APRN CNP Yes    omeprazole (PRILOSEC OTC) 20 MG EC tablet Take 2 tablets (40 mg) by mouth 2 times daily  Patient taking differently: Take 20 mg by mouth daily 4:30 pm 10/24/2023 at 1630 Yes Diogo Dillard MD     ondansetron (ZOFRAN) 4 MG tablet Take 4 mg by mouth every 12 hours as needed for nausea Unknown at PRN Yes Reported, Patient     patiromer (VELTASSA) 8.4 g packet Take 8.4 g by mouth daily On dialysis days at 4:30 pm Past Week at 1630 Yes Unknown, Entered By History     senna-docusate (SENOKOT-S/PERICOLACE) 8.6-50 MG tablet Take 1 tablet by mouth 2 times daily as needed for constipation Unknown at PRN Yes Reported, Patient     sevelamer carbonate (RENVELA) 800 MG tablet Take 1,600 mg by mouth 3 times daily (with meals) 10/25/2023 at 0830 Yes Unknown, Entered By History     simvastatin (ZOCOR) 20 MG tablet Take 1 tablet (20 mg) by mouth At Bedtime 10/24/2023 at 2000 Yes Kamaljit Rojas MD Yes

## 2023-10-25 NOTE — PROGRESS NOTES
Care Management    10/25: CHW met with pt at bedside, introduced self and explained the role of a Community Health Worker. CHW reviewed the Medicare Outpatient Observation Notice (MOON) and answered any questions and concerns the pt had. CHW encouraged patient to call to follow up with their insurance to receive the most accurate information. Patient signed [place] form and refused a copy. CHW faxed to [phone] and placed the original form in pt's chart.        [facility]  ICU & ED/OBS  Phone: [phone]

## 2023-10-25 NOTE — TELEPHONE ENCOUNTER
Telephone Note    I was contacted by [facility] ER regarding this patient on 10/25/23. The following information was obtained via phone call with this provider.      Patient is a 63-year-old female who presents with headache and right eye pain after her dialysis treatment. ED provider notes eye redness, chemosis, swelling. IOP 15-20 both eyes on multiple checks.      CT head without contrast (outside read):  FINDINGS:   INTRACRANIAL CONTENTS: No intracranial hemorrhage, extraaxial collection, or mass effect.  No CT evidence of acute infarct. Mild volume loss and presumed chronic small vessel ischemia. Small chronic right corona radiata infarct again noted.     VISUALIZED ORBITS/SINUSES/MASTOIDS: No intraorbital abnormality. No paranasal sinus mucosal disease. No middle ear or mastoid effusion.     BONES/SOFT TISSUES: No acute abnormality.      Provider is concerned about orbital cellulitis or other ocular infection. I conveyed to the consulting physician that I could provide some general thoughts regarding this patient but that a formal consult and evaluation would be necessary for me to provide an active role in the patient's care. Given the reported examination findings, I emphasized the importance of ophthalmology evaluation and I offered to see the patient in the ED today. Provider will reach out to our ED to initiated transfer for ophthalmology evaluation.     Simi Kraft MD  Ophthalmology Resident, PGY-3

## 2023-10-25 NOTE — H&P
[facility]    History and Physical - Hospitalist Service       Date of Admission:  10/25/2023    Assessment & Plan   Roxy Beaulieu is a 63 year old female admitted Medicine Observation on 10/25/2023. She has a history of ESRD on HD, HTN, HLD, DM2, PVD, CVA with left hemiplegia, prior PE, and polyneuropathy. She is being admitted with acute angle closure glaucoma of the right eye.    # Acute angle closure glaucoma R eye:  Presented with acute pain, swelling, erythema of R eye with associated visual impairment and R sided headache. CT head negative. Initial concern for endophthalmitis therefore transferred to [facility] for ophthalmology consult. In the ED ophthalmology evaluated patient and felt presentation was c/w acute angle closure glaucoma, confirmed with elevated IOP and Gonioscopy. Almost immediate improvement following treatment. Admitted to observation for ongoing management.   - Acetazolamide 250mg BID (renal dosing)  - Timolol 0.5% 1 drop BID  - Brimonidine 0.1% 1 drop BID  - Dorzolamide 2% 1 drop BID  - Latanoprost 0.005% 1 drop daily  - Ophthalmology to follow on OBS unit  - No need to further monitor IOP per consultant    # ESRD on HD:  Dialyzes via RUE graft TTS. Last run on 10/24 prior to onset of ocular symptoms. Unclear if patient will be ready for discharge to make next run on 10/26.  - Case discussed with nephrology, consult placed in case patient needs HD in AM  - Add-on BMP  - Continue PTA lasix and phoslo    # HTN:  Hypertensive in ED but did not receive PTA meds.  - Restart amlodipine 5mg daily, Coreg 25mg BID, hydralazine 50mg BID    # DM2:  No acute concerns.   - A1C pending  - Restart PTA NPH 70/30, 18 units in AM and 9 units in PM  - MSSI  - Hypoglycemia protocol    # Hx CVA:  Reported to have residual L hemiplegia but unable to assess completely on exam today. CT head at OSH negative for acute intracranial pathology.  - ASA + statin    # Hx  VTE:  No longer on anticoagulation.     # Possible cognitive dysfunction:  Poor historian in general. Unclear if previously diagnosed with cognitive dysfunction or dementia. No acute confusion here. No agitation.   - Montir          Observation Goals: -diagnostic tests and consults completed and resulted, -vital signs normal or at patient baseline, -tolerating oral intake to maintain hydration, -adequate pain control on oral analgesics, -safe disposition plan has been identified, -cleared by Ophthalmology to continue treatment as outpatient, Nurse to notify provider when observation goals have been met and patient is ready for discharge.  Diet: Regular Diet Adult    DVT Prophylaxis: Low Risk/Ambulatory with no VTE prophylaxis indicated  V[place]ura Catheter: Not present  Lines: None     Cardiac Monitoring: None  Code Status: Full Code      Clinically Significant Risk Factors Present on Admission                # Drug Induced Platelet Defect: home medication list includes an antiplatelet medication   # Hypertension: Home medication list includes antihypertensive(s)                 Disposition Plan      Expected Discharge Date: 10/26/2023                The patient's care was discussed with the Attending Physician, Dr. Ferguson, Patient, and Ophthalmology Consultant(s).    Elia Garnett PA-C  Hospitalist Service  [facility]  Securely message with [employer] (more info)  Text page via [facility] Paging/Directory     ______________________________________________________________________    Chief Complaint   R eye pain and swelling    History is obtained from the patient and EMR    History of Present Illness   Roxy Beaulieu is a 63 year old female who was transferred from [place] ED with acute pain, swelling, erythema or the R eye with vision loss and associated R sided headache. Symptoms started abruptly towards end of dialysis run. Evaluated in ED. CT head negative. Initial concern  for endopthalmitis therefore transferred to [facility] for ophthalmology consult. Patient was evaluated by ophtho on arrival and subsequently diagnosed with acute angle closure glaucoma. Eye drops were initiated and symptoms improved.  She is now feeling much better. She still has some pain. Swelling has significantly improved.       Past Medical History    Past Medical History:   Diagnosis Date    Anemia     Cerebral artery occlusion with cerebral infarction (H)     affected left side with weakness in arm and leg    CHF (congestive heart failure) (H)     Congestive heart failure (CHF) (H) 12/17/2018    Diabetes (H)     ESRD on dialysis (H)     HD STARTED 6/2019    Gangrene of toe (H) 4/29/2017    Hemiplegia and hemiparesis following cerebral infarction affecting left dominant side (H)     High cholesterol     Hypertension     Hypertensive cardiomyopathy (H)     Obesity 4/29/2017    PE (pulmonary thromboembolism) (H)     RECURRENT    Personal history of tobacco use, presenting hazards to health 04/29/2017    Renal insufficiency     SOB (shortness of breath)     Toe ulcer, left, with unspecified severity (H) 11/2/2018    Ulcer of toe of left foot (H)     GANGRENE    Uncontrolled type 2 diabetes mellitus with hyperglycemia, with long-term current use of insulin (H) 4/29/2017    VTE (venous thromboembolism)        Past Surgical History   Past Surgical History:   Procedure Laterality Date    AMPUTATE TOE(S) Left 5/1/2017    Procedure: AMPUTATE TOE(S);  Amputate first and second left toes;  Surgeon: Verna Fofana DPM, Podiatry/Foot and Ankle Surgery;  Location: [facility] OR    AMPUTATE TOE(S) Left 11/3/2018    Procedure: Partial left 3rd toe amputation;  Surgeon: Verna Fofana DPM, Podiatry/Foot and Ankle Surgery;  Location: [facility] OR    AMPUTATE TOE(S)      CREATE FISTULA ARTERIOVENOUS UPPER EXTREMITY Right 2/11/2020    Procedure: RIGHT PROXIMAL RADIAL TO CEPHALIC ARTERIOVENOUS FISTULA CREATION;  Surgeon: Sumanth Corbin MD;   Location:  OR    ESOPHAGOSCOPY, GASTROSCOPY, DUODENOSCOPY (EGD), COMBINED Left 6/23/2020    Procedure: ESOPHAGOGASTRODUODENOSCOPY, WITH biopsies using biopsy forceps;  Surgeon: Elliott Rubi MD;  Location: [facility] GI    IR CVC TUNNEL PLACEMENT > 5 YRS OF AGE  6/4/2019    IR CVC TUNNEL REMOVAL RIGHT  8/18/2020    IR DIALYSIS FISTULOGRAM RIGHT  4/15/2021    IR TUNNELED CATHETER COMPLETE REPLACEMENT  10/2/2019    IR TUNNELED CATHETER COMPLETE REPLACEMENT  10/2/2019    OPEN REDUCTION INTERNAL FIXATION ANKLE  2018    OPEN REDUCTION INTERNAL FIXATION FIBULA Left 3/16/2018    Procedure: OPEN REDUCTION INTERNAL FIXATION FIBULA;  Open reduction and internal fixation of displaced left lateral malleolar fracture;  Surgeon: Sumanth Wen MD;  Location: [facility] OR    REVISION FISTULA ARTERIOVENOUS UPPER EXTREMITY Right 4/28/2020    Procedure: ONLAY VEIN PATCH RIGHT RADIAL TO CEPHALIC FISTULA WITH LIGATION OF SIDE BRANCH ;  Surgeon: Sumanth Corbin MD;  Location:  OR       Prior to Admission Medications   Prior to Admission Medications   Prescriptions Last Dose Informant Patient Reported? Taking?   acetaminophen (TYLENOL) 325 MG tablet  Nursing Home No No   Sig: Take 2 tablets (650 mg) by mouth every 6 hours as needed for mild pain or fever (> 101 F)   amLODIPine (NORVASC) 5 MG tablet  Nursing Home Yes No   Sig: Take 5 mg by mouth At Bedtime    aspirin (ASA) 325 MG EC tablet  Nursing Home Yes No   Sig: Take 325 mg by mouth At Bedtime   bisacodyl (DULCOLAX) 10 MG suppository  Nursing Home No No   Sig: Place 1 suppository (10 mg) rectally daily as needed for constipation   calcium acetate (PHOSLO) 667 MG CAPS capsule  Nursing Home Yes No   Sig: Take 667 mg by mouth 4 times daily With Snacks and Meals   calcium carbonate (TUMS) 500 MG chewable tablet  Nursing Home Yes No   Sig: Take 2 chew tab by mouth 3 times daily as needed for heartburn   carvedilol (COREG) 25 MG tablet  Nursing Home No No   Sig: Take 1 tablet (25  mg) by mouth 2 times daily (with meals)   furosemide (LASIX) 80 MG tablet  Nursing Home Yes No   Sig: Take 60 mg by mouth four times a week On Tuesday, Thursday, Saturday, and Sunday    hydrALAZINE (APRESOLINE) 50 MG tablet  Nursing Home Yes No   Sig: Take 50 mg by mouth 2 times daily   insulin NPH-Regular 70/30 susp   No No   Sig: Inject 18 Units Subcutaneous daily (with breakfast) AND 9 Units daily (with dinner).   insulin aspart (NOVOLOG PEN) 100 UNIT/ML pen  Nursing Home No No   Sig: TID w/meals: -249=2U; 250-299=4u; 300-349=6u;350-399=8U; 400 & above 10units   omeprazole (PRILOSEC OTC) 20 MG EC tablet   No No   Sig: Take 2 tablets (40 mg) by mouth 2 times daily   ondansetron (ZOFRAN) 4 MG tablet  Nursing Home Yes No   Sig: Take 4 mg by mouth every 12 hours as needed for nausea   senna-docusate (SENOKOT-S/PERICOLACE) 8.6-50 MG tablet  Nursing Home Yes No   Sig: Take 1 tablet by mouth 2 times daily as needed for constipation   simvastatin (ZOCOR) 20 MG tablet  Nursing Home No No   Sig: Take 1 tablet (20 mg) by mouth At Bedtime   sucralfate (CARAFATE) 1 GM/10ML suspension   No No   Sig: Take 10 mLs (1 g) by mouth 4 times daily as needed (heartburn, reflux, nausea, vomiting)      Facility-Administered Medications: None          Physical Exam   Vital Signs: Temp: 97.8  F (36.6  C) Temp src: Oral BP: (!) 184/78 Pulse: 71   Resp: 16 SpO2: 95 % O2 Device: None (Room air)    Weight: 0 lbs 0 oz    General Appearance:  Awake. Alert. Oriented x2. NAD.   HEENT:  No scleral icterus. Slight R exophthalmous, severe conjuctival erythema. No periorbital edema or erythema. Moist mucous membranes.   Respiratory:  Normal effort. CTAB. No wheezing, rhonchi, rales.   Cardiovascular:  RRR. S1/S2. No murmurs. No gallops.    GI:  Soft, non-distended, non-tender, +BS. No mass.   Extremity:  No pitting edema. No calf tenderness.   Skin:  No visible rash. No jaundice.   Neuro:  Grossly non-focal.     Medical Decision Making       65  MINUTES SPENT BY ME on the date of service doing chart review, history, exam, documentation & further activities per the note.      Data   ------------------------- PAST 24 HR DATA REVIEWED -----------------------------------------------    I have personally reviewed the following data over the past 24 hrs:    7.7  \   11.0 (L)   / 174     N/A N/A N/A /  N/A   N/A N/A N/A \       Imaging results reviewed over the past 24 hrs:   Recent Results (from the past 24 hour(s))   CT Head w/o Contrast    Narrative    For Patients: As a result of the 21st Century Cures Act, medical imaging exams and procedure reports are released immediately into your electronic medical record. You may view this report before your referring provider. If you have questions, please contact your health care provider.    EXAM: CT HEAD BRAIN WO  LOCATION: [facility] MEDICAL IMAGING  DATE: 10/25/2023    INDICATION: Acute right sided HA. Right ocular pain. Right IOP 22>17>15>17. Left IOP 15>15>15>14  COMPARISON: 11/27/2022  TECHNIQUE: Routine CT Head without IV contrast. Multiplanar reformats. Dose reduction techniques were used.    FINDINGS:  INTRACRANIAL CONTENTS: No intracranial hemorrhage, extraaxial collection, or mass effect.  No CT evidence of acute infarct. Mild volume loss and presumed chronic small vessel ischemia. Small chronic right corona radiata infarct again noted.    VISUALIZED ORBITS/SINUSES/MASTOIDS: No intraorbital abnormality. No paranasal sinus mucosal disease. No middle ear or mastoid effusion.    BONES/SOFT TISSUES: No acute abnormality.    Impression    1.  No acute intracranial abnormality or significant change compared to the prior study..

## 2023-10-25 NOTE — ED TRIAGE NOTES
Transfer from [facility] for opth consult  Patient with possible endophthalmitis.  Here to see ophthalmology.  Apparently end-stage renal disease developed right eye pain after dialysis with a right headache.  Eye pain is worsening, pressures okay, CT head negative,they were going to admit her for pain control and MRI but will now send her for ophthalmologic evaluation.         Triage Assessment (Adult)       Row Name 10/25/23 0963          Triage Assessment    Airway WDL WDL        Respiratory WDL    Respiratory WDL WDL        Skin Circulation/Temperature WDL    Skin Circulation/Temperature WDL WDL        Cardiac WDL    Cardiac WDL WDL        Peripheral/Neurovascular WDL    Peripheral Neurovascular WDL WDL        Cognitive/Neuro/Behavioral WDL    Cognitive/Neuro/Behavioral WDL WDL

## 2023-10-25 NOTE — ED NOTES
Bed: IN06  Expected date: 10/25/23  Expected time: 8:00 AM  Means of arrival: Ambulance  Comments:  Roxy Beaulieu 63 F  Telephone note from PA at [facility].  Patient with possible endophthalmitis.  Here to see ophthalmology.  Apparently end-stage renal disease developed right eye pain after dialysis with a right headache.  Eye pain is worsening, pressures okay, CT head negative,they were going to admit her for pain control and MRI but will now send her for ophthalmologic evaluation.  Dr. Kraft of ophthalmology was told about this patient     Gracy Smith MD  10/25/23 0936

## 2023-10-25 NOTE — INTERIM SUMMARY
OPHTHALMOLOGY Brief Progress Note  10/25/23    Pressure check at 1700 was 49 in the right eye, continued downtrending from 75  Patient states that she is in no acute discomfort or distress. Resting comfortably on exam.   Will plan to perform pressure recheck in the AM and discharge if pressures have normalized  Continue plan as outlined in consult note

## 2023-10-25 NOTE — PROGRESS NOTES
Brief Nephrology Note      ESRD pt admitted for management of glaucoma, reportedly ran yesterday, labs today pending but do not anticipate need for extra run.  Will write full consult note tomorrow when dialyzing unless labs today show urgent issue.      Alphonse Jaramillo, APRN CNS  Clinical Nurse Specialist  [phone]

## 2023-10-26 NOTE — PROGRESS NOTES
Observation goals    -diagnostic tests and consults completed and resulted -Not met, Ophthalmology clinic tomorrow    -vital signs normal or at patient baseline - MET    -tolerating oral intake to maintain hydration - MET    -adequate pain control on oral analgesics -MET; patient denies any pain    -safe disposition plan has been identified - MET; will return to LTC facility when medically ready    -cleared by Ophthalmology to continue treatment as outpatient -Not met    Nurse to notify provider when observation goals have been met and patient is ready for discharge.     BP (!) 142/66 (BP Location: Right arm, Cuff Size: Adult Regular)   Pulse 68   Temp 98.3  F (36.8  C) (Oral)   Resp 21   SpO2 98%

## 2023-10-26 NOTE — PROGRESS NOTES
End of shift observation goal summary:    -diagnostic tests and consults completed and resulted -pending; CBC/BMP ordered for this morning. Opthalmology plans to check IOP in AM 10/26    -vital signs normal or at patient baseline - MET    -tolerating oral intake to maintain hydration - MET    -adequate pain control on oral analgesics -MET; patient denies any pain    -safe disposition plan has been identified - MET; will return to LTC facility at discharge    -cleared by Ophthalmology to continue treatment as outpatient -Pending; ophthalmology plans to see patient this AM 10/26    Nurse to notify provider when observation goals have been met and patient is ready for discharge.     Keren Reyes RN on 10/26/2023 at 4:49 AM

## 2023-10-26 NOTE — PROGRESS NOTES
Observation goals    -diagnostic tests and consults completed and resulted -Not met, Ophthalmology to check IOP this morning    -vital signs normal or at patient baseline - MET    -tolerating oral intake to maintain hydration - MET    -adequate pain control on oral analgesics -MET; patient denies any pain    -safe disposition plan has been identified - MET; will return to LTC facility at discharge    -cleared by Ophthalmology to continue treatment as outpatient -Not met    Nurse to notify provider when observation goals have been met and patient is ready for discharge.     BP (!) 144/76 (BP Location: Left arm)   Pulse 68   Temp 98.3  F (36.8  C) (Oral)   Resp 16   SpO2 99%

## 2023-10-26 NOTE — PROGRESS NOTES
HEMODIALYSIS TREATMENT NOTE    Date: 10/26/2023  Time: 1:22 PM    Data:  Pre Wt: 101.1 kg (bed weight)     Desired Wt: 100.1  kg   Post Wt: 100.1 kg   Weight change:  0 kg  Ultrafiltration - Post Run Net Total Removed (mL):    Vascular Access Status: Fistula  patent  Dialyzer Rinse: Streaked  Total Blood Volume Processed: 39.1 L   Total Dialysis (Treatment) Time:     Dialysate Bath: K 2, Ca 2.5  Heparin: None    Lab:   Yes  Hep B  Interventions:  3 hrs of HD initiated via RUE AVF. Pt tolerated 1L fluid removal. No medication r/t HD given. AVF cannulated with 16 g needles. Pt run gentle run at 250 BFR d/t acute angle closure glaucoma. Pt complained of headache the last 10 min. Dr. Mello aware.Hemostasis achieved within 10 min. Handoff report given to JASE Morin.    Assessment:  A&Ox4  Calm and cooperative   Denied of pain and SOB  VSS  AVF +T/B     Plan:    Next HD per renal

## 2023-10-26 NOTE — CONSULTS
OPHTHALMOLOGY CONSULT NOTE  10/25/23    Patient: Roxy Beaulieu    ASSESSMENT/PLAN:     Roxy Beaulieu is a 63 year old female who presented with     #Acute Angle Closure Glaucoma right eye  63 F pmhx stroke presents  to outside ED for intense intractable headache and pain behind the right eye. Outside hospital's assessment was that this was an acute endophthalmitis or orbital cellulitis. States that since her stroke she has poor short term memory and does not remember the duration of her symptoms. Presents with painful, unilateral vision loss in conjunction with headache and IOP of 75 in right eye. This presentation is most consistent with acute angle closure glaucoma although I also considered corneal abrasion/ulcer, complex migraine, globe rupture, uveitis, endophthalmitis, orbital/preseptal cellulitis, and optic neuritis in the context of painful vision loss. No history of hyperopia. No family history of angle closure glaucoma. No history of sickle cell disease or trait. No foreign body sensation, no FB on exam. No recent eye trauma or suspected microtrauma (dust, sand, etc). Negative Betty sign.  Visual Acuity: Baseline is LP in the right eye, currently LP;p left eye 20/50 PH NI  Gonioscopy showed: No visualization of angle structures  Fundus exam was not obtained due to no view  B scan showed phakic lenses and shallow Anterior chamber chamber. No vitreous opacities suggestive of tractional retinal detachment or vitreous hemorrhage    Upon presentation, the patient received the following:  -Timolol 0.5% 1 drop affected eye  -Brimonidine 0.1% 1 drop affected eye  -Dorzolamide 2% 1 drop affected eye  -Latanoprost 0.005% 1 drop affected eye  -Acetazolamide 250mg IV  -Analgesia and antiemetics PRN per primary team    Interval update 10/26/23:  Continues to deny pain. No improvement in vision. Pressure today is mid-40's, so it is continuing to downtrend, though at a slower rate than would be preferred.  Can follow up in clinic tomorrow for Laser peripheral iridotomy (LPI).     Plan:   -We will bring this patient to clinic on 10/27 for laser peripheral iridotomy (LPI)  -Timolol 0.5% 1 drop affected eye BID  -Brimonidine 0.1% 1 drop affected eye BID   -Dorzolamide 2% 1 drop affected eye BID  -Latanoprost 0.005% 1 drop affected eye daily  -Diamox sequels 250 BID  (half dose because patient is on dialysis)  -Continue antiemetics and analgesia As needed per primary team  -Ophthalmology will continue to follow while inpatient    It is our pleasure to participate in this patient's care and treatment. Please contact us with any further questions or concerns.    Discussed with Dr. Tillman (senior) who agreed with this assessment and plan.     Ophthalmology will continue to follow while on ED observation until pressures downtrend to an appropriate range. Please contact ophthalmologist on call with any questions or concerns. We appreciate your care of this patient.    Bolivar Jauregui MD, PGY2  Ophthalmology Resident  [facility]    HISTORY OF PRESENTING ILLNESS:     Roxy Beaulieu is a 63 year old female who presented on 10/25/23 with red painful eye.    Outside ED had normal pressures and red eye  Eye is LP at baseline  States that she has had unknown surgeries on her eyes but does not know which ones or what they were for  States that since her stroke her memory has been poor and so she does not know entirely why she is here.   Endorsed pain and significant photophobia on intake.    10+ review of systems were otherwise negative except for that which has been stated above.    Family History:   No history of macular degeneration or glaucoma    Social History:   No tobacco use    EXAMINATION:     Base Eye Exam       Visual Acuity (Snellen - Linear)         Right Left    Dist sc LP     Near cc  20/50              Tonometry (Tonopen, 1030 AM)         Right Left    Pressure 75 23              Tonometry #2  (Tonopen, 11:18 AM)         Right Left    Pressure 60               Pupils         Dark Light Shape React APD    Right 3.5 3.5 Round none Yes    Left 2 1 Round Brisk None              Visual Fields         Left Right     Full     Restrictions  Total superior temporal, inferior temporal, superior nasal, inferior nasal deficiencies              Extraocular Movement         Right Left     -- -1 --   -1  -1   -- -1 --    -- -- --   --  --   -- -- --                     Slit Lamp and Fundus Exam       External Exam         Right Left    External Normal Normal              Slit Lamp Exam         Right Left    Lids/Lashes Protective ptosis; heavy dermatochalasis Normal    Conjunctiva/Sclera 360 injection, inferior chemosis White and quiet    Cornea diffuse microcystic edema with temporal and nasal D folds Clear    Anterior Chamber Shallow; No AC cell or flare Deep and quiet    Iris Round Round and reactive    Lens Clear Clear    Anterior Vitreous Normal Normal              Fundus Exam         Right Left    Disc no view undilated                    Labs/Studies/Imaging Performed    B scan without evidence of curvilinear vitreous opacities, hypoechoic regions within the retina or choiroid. Right eye demonstrates shallow chamber and phakic lens.     Bolivar Jauregui MD  Resident Physician, PGY2  Department of Ophthalmology  10/25/23 10:30 AM

## 2023-10-26 NOTE — CONSULTS
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     # ESKD: HD dependent since 2019; pt dialyzes TTS at [facility] with Dr. Cervantes. EDW 98 kg. Tx time: 4 hrs.   - Dialysis per TTS schedule; since we are dialyzing at slower BFR today due to acute angle closure glaucoma, clearance will not be as good. If pt remains inpatient tomorrow, can dialyze again if needed but would prefer to avoid further osmotic shifts (ordered Lokelma to manage K which will help avoid HD tomorrow).    # Recurrent hyperkalemia: 5.7  - note pt dialyzes on 1.5K bath at OP HD unit  - PTA patiromer  - ordered lokelma 10 mg tid x 6 doses   - changed to renal diet    # BMD: PO calictiriol 0.25 mcg qday, Renvela 1600 mg tid WM; recent PTH 1181, phos in 9's  - continue PTA medications    # BP/Volume: PTA amlodipine 10 mg qday, coreg 25 mg bid on non HD days, lasix 80 mg qday, hydralazine 25 mg bid (hold morning dose on dialysis days)  - at outpatient unit, BP's usually start in 160-170's and drop into 's, at times 60-70's  - take midodrine 10 mg every HD  - Goal is to avoid large drops in blood pressure which increase ICP and may aggravate glaucoma    # Anemia of CKD: PTA mircera 60 mcg [id number]  - can continue mircera at OP HD    # Acute Angle Closure Glaucoma R eye: pt reports poor sight in R eye since her stroke  - seen by ophtho in ED, started on multiple eye drops, was given acetazolamide 250 mg; relief was nearly immediate, per note  - although we know dialysis increases intracranial pressure, there is mixed  data on the effects of HD on IOP. However in setting of acute angle closure we will be careful to avoid large osmotic shifts as well as large drops in BP; will dialyze at slower BFR of 250 and will only remove 1 kg fluid today. Note: in spite of slow gentle HD, pt still developed HA and eye pain the last 15 minutes of dialysis.    Recommendations were communicated to primary team via this note    Seen and discussed with Dr. Riaz Okeefe PA   Division of Renal Disease and Hypertension  [phone]      REASON FOR CONSULT: ESKD/dialysis    HISTORY OF PRESENT ILLNESS:  Roxy Beaulieu is a 63 year old female with PMH of ESRD on HD, HTN, HLD, DM2, PVD, CVA with left hemiplegia, prior PE, and polyneuropathy, admitted with acute angle closure glaucoma of the right eye. Pt has very poor short term memory since her stroke so most of history is per chart review and phone conversation with pt's OP dialysis unit. She developed a severe headache with R eye pain on dialysis on Tuesday and was taken to OSH ED that night and was ultimately transferred to [facility] for ophthalmology. Her R eye was treated with nearly immediate relief and ophtho continues to follow.     There is limited date re dialysis increasing IOP, though we know it increases ICP. We are dialyze patient at lower BFR today to avoid osmotic shifts and we are only pulling 1L fluid to avoid large drop in BP. Pt is seen on dialysis reporting that she's had minimal vision in R eye since her stroke. She doesn't remember having severe HA and eye pain (c/w report of very poor short term memory). She denies n/v, CP, SOB, chills and is resting comfortably on dialysis until the last 15 minutes in which she again developed HA and R eye pain.     PAST MEDICAL HISTORY:  Reviewed with patient on 10/26/2023     Past Medical History:   Diagnosis Date    Anemia     Cerebral artery occlusion with cerebral infarction (H)     affected left side with weakness in arm and  leg    CHF (congestive heart failure) (H)     Congestive heart failure (CHF) (H) 12/17/2018    Diabetes (H)     ESRD on dialysis (H)     HD STARTED 6/2019    Gangrene of toe (H) 4/29/2017    Hemiplegia and hemiparesis following cerebral infarction affecting left dominant side (H)     High cholesterol     Hypertension     Hypertensive cardiomyopathy (H)     Obesity 4/29/2017    PE (pulmonary thromboembolism) (H)     RECURRENT    Personal history of tobacco use, presenting hazards to health 04/29/2017    Renal insufficiency     SOB (shortness of breath)     Toe ulcer, left, with unspecified severity (H) 11/2/2018    Ulcer of toe of left foot (H)     GANGRENE    Uncontrolled type 2 diabetes mellitus with hyperglycemia, with long-term current use of insulin (H) 4/29/2017    VTE (venous thromboembolism)        Past Surgical History:   Procedure Laterality Date    AMPUTATE TOE(S) Left 5/1/2017    Procedure: AMPUTATE TOE(S);  Amputate first and second left toes;  Surgeon: Verna Fofana DPM, Podiatry/Foot and Ankle Surgery;  Location: [facility] OR    AMPUTATE TOE(S) Left 11/3/2018    Procedure: Partial left 3rd toe amputation;  Surgeon: Verna Fofana DPM, Podiatry/Foot and Ankle Surgery;  Location: [facility] OR    AMPUTATE TOE(S)      CREATE FISTULA ARTERIOVENOUS UPPER EXTREMITY Right 2/11/2020    Procedure: RIGHT PROXIMAL RADIAL TO CEPHALIC ARTERIOVENOUS FISTULA CREATION;  Surgeon: Sumanth Corbin MD;  Location:  OR    ESOPHAGOSCOPY, GASTROSCOPY, DUODENOSCOPY (EGD), COMBINED Left 6/23/2020    Procedure: ESOPHAGOGASTRODUODENOSCOPY, WITH biopsies using biopsy forceps;  Surgeon: Elliott Rubi MD;  Location:  GI    IR CVC TUNNEL PLACEMENT > 5 YRS OF AGE  6/4/2019    IR CVC TUNNEL REMOVAL RIGHT  8/18/2020    IR DIALYSIS FISTULOGRAM RIGHT  4/15/2021    IR TUNNELED CATHETER COMPLETE REPLACEMENT  10/2/2019    IR TUNNELED CATHETER COMPLETE REPLACEMENT  10/2/2019    OPEN REDUCTION INTERNAL FIXATION ANKLE  2018    OPEN  REDUCTION INTERNAL FIXATION FIBULA Left 3/16/2018    Procedure: OPEN REDUCTION INTERNAL FIXATION FIBULA;  Open reduction and internal fixation of displaced left lateral malleolar fracture;  Surgeon: Sumanth Wen MD;  Location: [facility] OR    REVISION FISTULA ARTERIOVENOUS UPPER EXTREMITY Right 4/28/2020    Procedure: ONLAY VEIN PATCH RIGHT RADIAL TO CEPHALIC FISTULA WITH LIGATION OF SIDE BRANCH ;  Surgeon: Sumanth Corbin MD;  Location: [facility] OR        MEDICATIONS:  PTA Meds  Prior to Admission medications    Medication Sig Last Dose Taking? Auth Provider Long Term End Date   acetaminophen (TYLENOL) 325 MG tablet Take 2 tablets (650 mg) by mouth every 6 hours as needed for mild pain or fever (> 101 F) Unknown at PRN Yes Kamaljit Rojas MD     amLODIPine (NORVASC) 10 MG tablet Take 10 mg by mouth at bedtime 10/25/2023 at 0829 Yes Unknown, Entered By History No    aspirin (ASA) 325 MG EC tablet Take 325 mg by mouth every morning 10/25/2023 at 0829 Yes Unknown, Entered By History No    bisacodyl (DULCOLAX) 10 MG suppository Place 1 suppository (10 mg) rectally daily as needed for constipation Unknown at PRN Yes Kamaljit Rojas MD     carvedilol (COREG) 25 MG tablet Take 1 tablet (25 mg) by mouth 2 times daily (with meals)  Patient taking differently: Take 25 mg by mouth 2 times daily (with meals) Take 1 tablet (25 mg) every morning on Sunday, Monday, Wednesday, Friday and take 1 tablet (25 mg) every evening. 10/25/2023 at 0829 Yes Kamaljit Rojas MD Yes    clopidogrel (PLAVIX) 75 MG tablet Take 75 mg by mouth daily 10/25/2023 at 0829 Yes Unknown, Entered By History     furosemide (LASIX) 80 MG tablet Take 80 mg by mouth daily 10/24/2023 at 0800 Yes Reported, Patient     hydrALAZINE (APRESOLINE) 25 MG tablet Take 25 mg by mouth at bedtime 10/24/2023 at PM Yes Unknown, Entered By History Yes    hydrALAZINE (APRESOLINE) 25 MG tablet Take 25 mg by mouth four times a week Take 1 tablet (25 mg) every afternoon on Sunday,  Monday, Wednesday, and Friday at 4 pm. Past Week at 1600 Yes Reported, Patient Yes    insulin aspart (NOVOLOG PEN) 100 UNIT/ML pen TID w/meals: -249=2U; 250-299=4u; 300-349=6u;350-399=8U; 400 & above 10units 10/24/2023 at Unknown Yes Effie Hidalgo, RIK CNP Yes    insulin NPH-Regular 70/30 susp Inject 18 Units Subcutaneous daily (with breakfast) AND 9 Units daily (with dinner).  Patient taking differently: Inject 17 Units Subcutaneous daily on Sunday, Monday, Wednesday & Friday. Past Week at 0800 Yes Keisha Nguyen APRN CNP Yes    omeprazole (PRILOSEC OTC) 20 MG EC tablet Take 2 tablets (40 mg) by mouth 2 times daily  Patient taking differently: Take 20 mg by mouth daily 4:30 pm 10/24/2023 at 1630 Yes Diogo Dillard MD     ondansetron (ZOFRAN) 4 MG tablet Take 4 mg by mouth every 12 hours as needed for nausea Unknown at PRN Yes Reported, Patient     patiromer (VELTASSA) 8.4 g packet Take 8.4 g by mouth daily On dialysis days at 4:30 pm Past Week at 1630 Yes Unknown, Entered By History     senna-docusate (SENOKOT-S/PERICOLACE) 8.6-50 MG tablet Take 1 tablet by mouth 2 times daily as needed for constipation Unknown at PRN Yes Reported, Patient     sevelamer carbonate (RENVELA) 800 MG tablet Take 1,600 mg by mouth 3 times daily (with meals) 10/25/2023 at 0830 Yes Unknown, Entered By History     simvastatin (ZOCOR) 20 MG tablet Take 1 tablet (20 mg) by mouth At Bedtime 10/24/2023 at 2000 Yes Kamaljit Rojas MD Yes       Current Meds   acetaZOLAMIDE  250 mg Oral BID    amLODIPine  10 mg Oral Daily    aspirin  325 mg Oral Daily    brimonidine  1 drop Right Eye BID    carvedilol  25 mg Oral QPM    [START ON 10/27/2023] carvedilol  25 mg Oral Once per day on Sunday Monday Wednesday Friday    clopidogrel  75 mg Oral Daily    dorzolamide  1 drop Right Eye BID    furosemide  80 mg Oral Daily    hydrALAZINE  25 mg Oral At Bedtime    hydrALAZINE  25 mg Oral Once per day on Sunday Monday Wednesday Friday     insulin aspart  1-7 Units Subcutaneous TID AC    insulin aspart  1-5 Units Subcutaneous At Bedtime    insulin NPH-Regular  17 Units Subcutaneous Once per day on     latanoprost  1 drop Right Eye At Bedtime    pantoprazole  40 mg Oral Daily at 4 pm    sevelamer carbonate  800 mg Oral TID w/meals    simvastatin  20 mg Oral At Bedtime    sodium chloride (PF)  3 mL Intracatheter Q8H    timolol maleate  1 drop Right Eye BID     Infusion Meds      ALLERGIES:    Allergies   Allergen Reactions    Heparin Hives     HIT-serotonin assay negative. NOT HIT       REVIEW OF SYSTEMS:  A comprehensive of systems was negative except as noted above.    SOCIAL HISTORY:   Social History     Socioeconomic History    Marital status: Single     Spouse name: Not on file    Number of children: Not on file    Years of education: Not on file    Highest education level: Not on file   Occupational History    Not on file   Tobacco Use    Smoking status: Former     Types: Cigarettes     Quit date: 2016     Years since quittin.2    Smokeless tobacco: Never   Substance and Sexual Activity    Alcohol use: No    Drug use: No    Sexual activity: Not on file   Other Topics Concern    Parent/sibling w/ CABG, MI or angioplasty before 65F 55M? Not Asked   Social History Narrative    Not on file     Social Determinants of Health     Financial Resource Strain: Not on file   Food Insecurity: Not on file   Transportation Needs: Not on file   Physical Activity: Not on file   Stress: Not on file   Social Connections: Not on file   Interpersonal Safety: Not on file   Housing Stability: Not on file     Reviewed with patient        FAMILY MEDICAL HISTORY:   No known Family history of kidney disease  Reviewed with patient      PHYSICAL EXAM:   Temp  Av.1  F (36.7  C)  Min: 97.7  F (36.5  C)  Max: 98.4  F (36.9  C)      Pulse  Av.4  Min: 62  Max: 77 Resp  Av.3  Min: 14  Max: 18  SpO2  Av %  Min: 94 %  Max: 99  %       BP (!) 144/76 (BP Location: Left arm)   Pulse 68   Temp 98.3  F (36.8  C) (Oral)   Resp 16   SpO2 99%       Admit       GENERAL APPEARANCE: NAD  EYES: R eye closed  Pulmonary: no increased WOB  CV: RRR   - Edema trace to 1  GI: soft, nontender, normal bowel sounds  MS: no evidence of inflammation in joints, no muscle tenderness  : no leon  SKIN: no rash, warm, dry, no cyanosis  NEURO: R eye closed, able to converse but very poor memory    LABS:   I have reviewed the following labs:  CMP  Recent Labs   Lab 10/26/23  0800 10/26/23  0644 10/25/23  2154 10/25/23  1758 10/25/23  1221   NA  --  135  --   --  132*   POTASSIUM  --  5.7*  --   --  6.0*   CHLORIDE  --  92*  --   --  88*   CO2  --  27  --   --  26   ANIONGAP  --  16*  --   --  18*   GLC 84 84 178* 198* 238*   BUN  --  68.4*  --   --  51.9*   CR  --  8.11*  --   --  6.61*   GFRESTIMATED  --  5*  --   --  6*   GILL  --  9.3  --   --  9.1     CBC  Recent Labs   Lab 10/26/23  0644 10/25/23  1221   HGB 10.5* 11.0*   WBC 7.2 7.7   RBC 3.52* 3.78*   HCT 34.4* 36.0   MCV 98 95   MCH 29.8 29.1   MCHC 30.5* 30.6*   RDW 15.4* 15.3*    174     INRNo lab results found in last 7 days.  ABGNo lab results found in last 7 days.   URINE STUDIES  Recent Labs   Lab Test 06/23/20  1835 04/24/20  0358 04/18/20  1315 10/01/19  2129   COLOR Light Yellow Yellow Yellow Straw   APPEARANCE Clear Clear Slightly Cloudy Clear   URINEGLC 300* 300* >1000* >1000 mg/dL*   URINEBILI Negative Negative Negative Negative   URINEKETONE Negative Negative Negative Negative   SG 1.013 1.010 1.020 1.013   UBLD Trace* Trace* Trace* Trace*   URINEPH 8.0* 7.5* 6.5 8.0   PROTEIN 600* 300* >600* 600 mg/dL*   UROBILINOGEN  --   --   --  <2.0 E.U./dL   NITRITE Negative Negative Negative Negative   LEUKEST Moderate* Moderate* Moderate* Negative   RBCU 7* 2 1 0-2   WBCU 12* 8* 11* 0-5     Recent Labs   Lab Test 12/19/18  0825 10/05/18  1155   UTPG 6.15* 9.00*     PTH  Recent Labs   Lab Test  06/01/19  1302 10/06/18  0609   PTHI 210* 248*     IRON STUDIES  Recent Labs   Lab Test 10/31/19  0556 05/28/19  1157 10/06/18  0609   IRON 24* 32* 33*   * 192* 170*   IRONSAT 14* 17 19   MITRA  --  107 165       IMAGING:  Reviewed    NJ Walker

## 2023-10-26 NOTE — TELEPHONE ENCOUNTER
Pt in observation/inpatient     Pt to come to 9th floor PWB eye clinic tomorrow for likely LPI procedure following hospital consult    Scheduled at 0930 with Dr. Roman after review with providers.    Updated nursing about appt and set up transportation.    Matthew Seals RN 2:58 PM 10/26/23

## 2023-10-26 NOTE — PROGRESS NOTES
Observation goals     -diagnostic tests and consults completed and resulted -Not met, Ophthalmology clinic tomorrow at 0930, transport orders already in    -vital signs normal or at patient baseline - MET     -tolerating oral intake to maintain hydration - MET    -adequate pain control on oral analgesics -MET; patient endorses minimal eye pain rated 2/10    -safe disposition plan has been identified - MET; will return to LTC facility when medically ready    -cleared by Ophthalmology to continue treatment as outpatient -Not met    Nurse to notify provider when observation goals have been met and patient is ready for discharge.      /67 (BP Location: Left arm)   Pulse 79   Temp 98.1  F (36.7  C) (Oral)   Resp 20   SpO2 93%

## 2023-10-26 NOTE — CONSULTS
Care Management Initial Consult    General Information  Assessment completed with: Patient, Caregiver ([facility] RN), Dhiraj RN  Type of CM/SW Visit: Initial Assessment    Primary Care Provider verified and updated as needed: Yes   Readmission within the last 30 days: no previous admission in last 30 days      Reason for Consult: discharge planning  Advance Care Planning: Advance Care Planning Reviewed: present on chart          Communication Assessment  Patient's communication style: spoken language (English or Bilingual)    Hearing Difficulty or Deaf: no   Wear Glasses or Blind: no    Cognitive  Cognitive/Neuro/Behavioral: WDL                      Living Environment:   People in home: facility resident     Current living Arrangements: assisted living  Name of Facility: [facility] (P: [phone]; F: [phone])   Able to return to prior arrangements: yes       Family/Social Support:  Care provided by: self ([facility] staff)  Provides care for: no one  Marital Status: Single  Facility resident(s)/Staff, Sibling(s)          Description of Support System: Supportive, Involved    Support Assessment: Adequate social supports, Adequate family and caregiver support, Patient communicates needs well met    Current Resources:   Patient receiving home care services: No     Community Resources: OP Dialysis  Equipment currently used at home: wheelchair, power  Supplies currently used at home: Incontinence Supplies, Diabetic Supplies    Employment/Financial:  Employment Status:          Financial Concerns: none   Referral to Financial Worker: No       Does the patient's insurance plan have a 3 day qualifying hospital stay waiver?  Yes     Which insurance plan 3 day waiver is available? Alternative insurance waiver    Will the waiver be used for post-acute placement? No    Lifestyle & Psychosocial Needs:  Social Determinants of Health     Food Insecurity: Not on file   Depression: Not on file   Housing Stability:  Not on file   Tobacco Use: Medium Risk (7/1/2021)    Patient History     Smoking Tobacco Use: Former     Smokeless Tobacco Use: Never     Passive Exposure: Not on file   Financial Resource Strain: Not on file   Alcohol Use: Not on file   Transportation Needs: Not on file   Physical Activity: Not on file   Interpersonal Safety: Not on file   Stress: Not on file   Social Connections: Not on file       Functional Status:  Prior to admission patient needed assistance:   Dependent ADLs:: Ambulation-cane, Ambulation-walker, Incontinence  Dependent IADLs:: Cleaning, Cooking, Laundry, Meal Preparation, Medication Management, Transportation  Assesssment of Functional Status: At functional baseline    Mental Health Status:  Mental Health Status: No Current Concerns       Chemical Dependency Status:  Chemical Dependency Status: No Current Concerns             Values/Beliefs:  Spiritual, Cultural Beliefs, Judaism Practices, Values that affect care: no               Additional Information:  Roxy Beaulieu is a 63 year old female admitted Medicine Observation on 10/25/2023. She has a history of ESRD on HD, HTN, HLD, DM2, PVD, CVA with left hemiplegia, prior PE, and polyneuropathy. She is being admitted with acute angle closure glaucoma of the right eye.     Writer spoke with TRAV Cook RN Supervisor (or DON?) at [facility] (P: [phone]; F: [phone]). Confirmed pt is a resident on their detention. Pt receives assistance with her meds, meals, cleaning, and they make transportation arrangements for her through her insurance. Pt is appropriate to return when medically appropriate. Joyce would appreciate communication ahead of time prior to pt's return.    Per Joyce new meds should be sent to [facility] (P: [phone]; F: [phone]) ([address] [place] [address]) at discharge. If pt requires eye drops that evening, may need to coordinate with TRAV if we can fill them and send them  with pt.    Pt denies any financial, safety, or abuse concerns at this time. Pt reports that she is eager to return back to [facility] at discharge and requests that w/c medical transportation be arranged for her.     [facility] (P: [phone]; F: [phone])     [facility] (P: [phone]; F: [phone]) ([address] [place] [address])    pt dialyzes TTS at [facility] with Dr. Cervantes     Social work will continue to follow and provide assistance to ensure a safe and timely discharge.   ________________    VINCENT Schilling, [facility]  ED/Observation   [facility]  Phone: [phone]  Pager: [phone]  Fax: [phone]    On-call pager, [phone], 4:00pm to midnight

## 2023-10-26 NOTE — PROGRESS NOTES
[facility]    Medicine Progress Note - Hospitalist Service    Date of Admission:  10/25/2023    Assessment & Plan   Roxy Beaulieu is a 63 year old female admitted Medicine Observation on 10/25/2023. She has a history of ESRD on HD, HTN, HLD, DM2, PVD, CVA with left hemiplegia, prior PE, and polyneuropathy. She is being admitted with acute angle closure glaucoma of the right eye.     # Acute angle closure glaucoma R eye:  See H&P for details. Symptoms continue to improve. No pain or conjunctival erythema on exam today. IOP still mildly elevated per ophthalmology, recommend ongoing management in hospital.  - Acetazolamide 250mg BID (renal dosing)  - Timolol 0.5% 1 drop BID  - Brimonidine 0.1% 1 drop BID  - Dorzolamide 2% 1 drop BID  - Latanoprost 0.005% 1 drop daily  - Ophthalmology to follow on OBS unit  - Possible laser iridotomy 10/27 if improving     # ESRD on HD:  Dialyzes via RUE graft TTS. Still makes some urine.  Last run on 10/24.   - Nephrology consulted, will need HD today  - Continue PTA lasix and phoslo  - Daily BMP    # Hyperkalemia:  Secondary to ESRD. K 6.0 yesterday, repeat 5.7 today.   - Nephrology consulted for HD  - Will hold off on shifting for now     # HTN:  Well controlled on PTA amlodipine 5mg daily, Coreg 25mg BID, hydralazine 50mg BID, and diuretics.      # DM2:  No acute concerns. Sugars well controlled.  - NPH (70/30) 18 units in AM and 9 units in PM  - MSSI  - Hypoglycemia protocol     # Hx CVA:  Residual L hemiplegia. CT head at OSH negative for acute intracranial pathology.  - Continue ASA + statin     # Hx VTE:  No longer on anticoagulation.           Observation Goals: -diagnostic tests and consults completed and resulted, -vital signs normal or at patient baseline, -tolerating oral intake to maintain hydration, -adequate pain control on oral analgesics, -safe disposition plan has been identified, -cleared by Ophthalmology to  continue treatment as outpatient, Nurse to notify provider when observation goals have been met and patient is ready for discharge.  Diet: Regular Diet Adult    DVT Prophylaxis: Low Risk/Ambulatory with no VTE prophylaxis indicated  Ventura Catheter: Not present  Lines: None     Cardiac Monitoring: None  Code Status: Full Code      Clinically Significant Risk Factors Present on Admission        # Hyperkalemia: Highest K = 6 mmol/L in last 2 days, will monitor as appropriate         # Drug Induced Platelet Defect: home medication list includes an antiplatelet medication   # Hypertension: Home medication list includes antihypertensive(s)     # DMII: A1C = 7.7 % (Ref range: <5.7 %) within past 6 months       # Financial/Environmental Concerns: none         Disposition Plan      Expected Discharge Date: 10/26/2023      Destination: assisted living            The patient's care was discussed with the Attending Physician, Dr. Ferguson, Patient, and Ophthalmology Team.    Elia Garnett PA-C  Hospitalist Service  [facility]  Securely message with Vocera (more info)  Text page via [facility] Paging/Directory   ______________________________________________________________________    Interval History   No acute events overnight. Reports minimal to no pain in R eye. No headache. Vision at baseline (has had issues with poor vision in R eye long term). No other complaints. No dyspnea or worsening edema. No chest pain. No GI or  complaints.    Physical Exam   Vital Signs: Temp: 98.3  F (36.8  C) Temp src: Oral BP: 136/66 Pulse: 65   Resp: 16 SpO2: 95 % O2 Device: None (Room air)    Weight: 0 lbs 0 oz    General Appearance:  Awake. Alert. Oriented x3. NAD.   HEENT:  No scleral icterus. No significant conjunctival erythema. Moist mucous membranes.   Respiratory:  Normal effort. CTAB. No wheezing, rhonchi, rales.   Cardiovascular:  RRR. S1/S2. No murmurs. No gallops.   GI:  Soft,  non-distended, non-tender, +BS. No mass. No HSM.   Extremity:  No pitting edema. No calf tenderness.   Skin:  No visible rash. No jaundice.   Neuro:  Grossly non-focal.     Medical Decision Making       45 MINUTES SPENT BY ME on the date of service doing chart review, history, exam, documentation & further activities per the note.      Data     I have personally reviewed the following data over the past 24 hrs:    7.2  \   10.5 (L)   / 189     135 92 (L) 68.4 (H) /  84   5.7 (H) 27 8.11 (H) \     TSH: N/A T4: N/A A1C: 7.7 (H)       Imaging results reviewed over the past 24 hrs:   No results found for this or any previous visit (from the past 24 hour(s)).

## 2023-10-27 NOTE — PROGRESS NOTES
Pt arrived back from appointment at eye clinic. BG on arrival was 49 per our glucometers. Patient alert, oriented, and talking; denying any hypoglycemia symptoms. 30 g glucose gel given, along with 8 oz apple juice with 2 sugar packets mixed in. Recheck after 15 minutes was 96.

## 2023-10-27 NOTE — CARE PLAN
Observation Goals:    -diagnostic tests and consults completed and resulted: Not met- ophthalmology apt this am  -vital signs normal or at patient baseline - met  -tolerating oral intake to maintain hydration - met  -adequate pain control on oral analgesics - met  -safe disposition plan has been identified - met  -cleared by Ophthalmology to continue treatment as outpatient - not met

## 2023-10-27 NOTE — PROGRESS NOTES
Observation Goals:     -diagnostic tests and consults completed and resulted: Not met- ophthalmology apt this am    -vital signs normal or at patient baseline - met    -tolerating oral intake to maintain hydration - met    -adequate pain control on oral analgesics - met    -safe disposition plan has been identified - met    -cleared by Ophthalmology to continue treatment as outpatient - not met         /59 (BP Location: Left arm)   Pulse 75   Temp 98.3  F (36.8  C) (Oral)   Resp 20   SpO2 96%

## 2023-10-27 NOTE — PROGRESS NOTES
Observation Goals:     -diagnostic tests and consults completed and resulted: Progressing, Pt currently at ophthalmology appointment    -vital signs normal or at patient baseline - met    -tolerating oral intake to maintain hydration - met    -adequate pain control on oral analgesics - met    -safe disposition plan has been identified - met    -cleared by Ophthalmology to continue treatment as outpatient - not met          /59 (BP Location: Left arm)   Pulse 75   Temp 98.3  F (36.8  C) (Oral)   Resp 20   SpO2 96%

## 2023-10-27 NOTE — PROVIDER NOTIFICATION
Observation Goals:     -diagnostic tests and consults completed and resulted: Progressing, ophthalmology appointment completed, will dialyze here tomorrow    -vital signs normal or at patient baseline - met    -tolerating oral intake to maintain hydration - met    -adequate pain control on oral analgesics - met    -safe disposition plan has been identified - met, pt will discharge back to AL facility tomorrow after dialysis    -cleared by Ophthalmology to continue treatment as outpatient - not met          /70   Pulse 69   Temp 97.6  F (36.4  C)   Resp 18   SpO2 94%

## 2023-10-27 NOTE — NURSING NOTE
Chief Complaints and History of Present Illnesses   Patient presents with    Follow Up     Acute angle closure glaucoma, right eye     Chief Complaint(s) and History of Present Illness(es)       Follow Up              Laterality: right eye    Course: stable    Associated symptoms: eye pain, redness, tearing and headache    Treatments tried: eye drops    Pain scale: 3/10    Comments: Acute angle closure glaucoma, right eye              Comments    She states that her right eye feels a bit sore today.  She states that her vision has seemed stable in each eye.    Milady Tavera, COT 9:18 AM  October 27, 2023

## 2023-10-27 NOTE — PROGRESS NOTES
CC -   PDR OU    INTERVAL HISTORY - Initial visit with [facility] -   Roxy Beaulieu is a  63 year old year-old patient with history of presumed PDR OU, presented to ED with severe NVG OD and severe pain OD.  U/S OD VH no RD/CD per notes.Given gtts and referred to retina clinic after seeing resident clinic Dr. Roman. Extensive NVA found on gonio OD. Initial IOP OD 75    H/o DM II   Was seen at [facility] 2/2015, found dense HM PSC cataract OD and mod cataract OS. with some NPDR noted OS. Had CE/IOL OU 2015 and on 5/2015  VA OD was 20/40 and OS 20/30, no DFE recorded at     On 6/2019 at [facility] found HM OD with VH, OS with severe NPDR and DME , VA OS 20/50. was advised to see retina, did not. Lost to follow-up      PAST OCULAR SURGERY  CE/IOL OU ~ 2015 (Natali @ [facility])      RETINAL IMAGING:  None today      ASSESSMENT & PLAN    # PDR OD with DM II unknown DME and NVG   - could have RVO but more likely DME by history   - no view to posterior d/t VH     - no h/o PRP   - Avastin today 10/27/23    - VA verified HM after injection (better than pre-inj)     - will need PPV if VH fails to clear   - uncertain vision potential       - r/b/a IVT anti-VEGF d/w patient   - infection, blindness, need for surgery   - off-label use of Avastin      # NVG OD   - presented to outside ED and N 10/25/23  - initial IOP 70s  - extensive NVA  - seen by Patrick 10/25/23 in acute clnic    - IOP improved with MMT  - Avastin today 10/27/23    - will return next week to see resident clinic      # VH OD   - uncertain duration   - noted in 2019, uncertain if cleared & recurrent or chronic   - heme not dehemoglobulinized     - will plan U/S at return next week      # PDR OS with DM II and DME   - very likely based on exam and VH   - less likely severe NPDR with DME      - advise Avastin today   - will plan FA at return next week      # VH OS   - very mild        return to clinic: Next week in resident clinic: FA OU  transit OS Optos, U/S OD, OCT OU      ATTESTATION     Attending Attestation:     Complete documentation of historical and exam elements from today's encounter can be found in the full encounter summary report (not reduplicated in this progress note).  I personally obtained the chief complaint(s) and history of present illness.  I confirmed and edited as necessary the review of systems, past medical/surgical history, family history, social history, and examination findings as documented by others; and I examined the patient myself.  I personally reviewed the relevant tests, images, and reports as documented above.  I personally reviewed the ophthalmic test(s) associated with this encounter, agree with the interpretation(s) as documented by the resident/fellow, and have edited the corresponding report(s) as necessary.   I formulated and edited as necessary the assessment and plan and discussed the findings and management plan with the patient and family and No resident or fellow assisted with the procedures performed.  I performed the procedures myself.    Trish Holden MD, PhD  , Vitreoretinal Surgery  Department of Ophthalmology  [facility]

## 2023-10-27 NOTE — PROGRESS NOTES
[facility]    Medicine Progress Note - Hospitalist Service    Date of Admission:  10/25/2023    Assessment & Plan   Roxy Beaulieu is a 63 year old female admitted Medicine Observation on 10/25/2023. She has a history of ESRD on HD, HTN, HLD, DM2, PVD, CVA with left hemiplegia, prior PE, and polyneuropathy. She is being admitted with acute angle closure glaucoma of the right eye.     # Acute angle closure glaucoma R eye: See H&P for details. Overall stable but mild R eye pain reported at end of HD run yesterday. IOP 20's today in clinic. Ophthalmology notes also indicate proliferative diabetic retinopathy, vitreous hemorrhage, and probable neovascular glaucoma secondary to DM. Underwent Avastin injection today.  - OK to stop Acetazolamide  - Timolol 0.5% 1 drop BID  - Brimonidine 0.1% 1 drop BID  - Dorzolamide 2% 1 drop BID  - Latanoprost 0.005% 1 drop daily  - Follow up with Ophthalmology as directed     # ESRD on HD:  Dialyzes via RUE graft TTS. Still makes some urine.  Last run on 10/26, noted to have worsening R eye pain at end of run. HD known to exacerbated angle closure glaucoma. Nephrology ordered Lokelma in hopes to manage K without additional HD runs d/t eye concerns.   - Nephrology consulted  - HD q TTS  - Continue PTA lasix and phoslo  - Daily BMP    # Hyperkalemia:  Secondary to ESRD. K 5.7 yesterday prior to HD. Started on Lokelma to avoid extra HD runs for hyperkalemia alone (see above). Left for Ophtho appointment before AM labs.  - Nephrology following  - Lokelma 10g TID  - BMP daily     # HTN:  Well controlled on PTA amlodipine 5mg daily, Coreg 25mg BID, hydralazine 50mg BID, and diuretics.      # DM2:  No acute concerns. Sugars well controlled.  - NPH (70/30) 18 units in AM and 9 units in PM  - MSSI  - Hypoglycemia protocol     # Hx CVA:  Residual L hemiplegia. CT head at OSH negative for acute intracranial pathology.  - Continue ASA + statin      # Hx VTE:  No longer on anticoagulation.           Observation Goals: -diagnostic tests and consults completed and resulted, -vital signs normal or at patient baseline, -tolerating oral intake to maintain hydration, -adequate pain control on oral analgesics, -safe disposition plan has been identified, -cleared by Ophthalmology to continue treatment as outpatient, Nurse to notify provider when observation goals have been met and patient is ready for discharge.  Diet: Renal Diet (dialysis)    DVT Prophylaxis: Low Risk/Ambulatory with no VTE prophylaxis indicated  Ventura Catheter: Not present  Lines: None     Cardiac Monitoring: None  Code Status: Full Code      Clinically Significant Risk Factors Present on Admission        # Hyperkalemia: Highest K = 5.7 mmol/L in last 2 days, will monitor as appropriate         # Drug Induced Platelet Defect: home medication list includes an antiplatelet medication   # Hypertension: Home medication list includes antihypertensive(s)     # DMII: A1C = 7.7 % (Ref range: <5.7 %) within past 6 months         # Financial/Environmental Concerns: none         Disposition Plan      Expected Discharge Date: 10/27/2023      Destination: assisted living            The patient's care was discussed with the Attending Physician, Dr. Ferguson, Patient, and Ophthalmology Team.    Elia Garnett PA-C  Hospitalist Service  [facility]  Securely message with [employer] (more info)  Text page via [facility] Paging/Directory   ______________________________________________________________________    Interval History   Per nephrology notes pt developed R eye pain towards end of HD yesterday, which is known to exacerbated acute angle closure glaucoma.     Feeling well in general. No pain today. Glucose 49 after appointment. Has had nothing to eat today but got insulin. Asympatomtic.     Physical Exam   Vital Signs: Temp: 98.3  F (36.8  C) Temp src: Oral BP: 131/59  Pulse: 75   Resp: 20 SpO2: 96 % O2 Device: None (Room air)    Weight: 0 lbs 0 oz    General Appearance:  Awake. Alert. Oriented x3. NAD.   HEENT:  No swelling or erythema of R eye.  Respiratory:  Normal effort. CTAB. No wheezing, rhonchi, rales.   Cardiovascular:  RRR. S1/S2. No murmurs. No gallops.   GI:  Soft, non-distended, non-tender, +BS. No mass. No HSM.   Extremity:  No pitting edema. No calf tenderness.   Skin:  No visible rash. No jaundice.   Neuro:  Grossly non-focal.     Medical Decision Making       45 MINUTES SPENT BY ME on the date of service doing chart review, history, exam, documentation & further activities per the note.      Data         Imaging results reviewed over the past 24 hrs:   No results found for this or any previous visit (from the past 24 hour(s)).

## 2023-10-27 NOTE — PROGRESS NOTES
Ophthalmology Acute Clinic    Date of Visit: 10/27/23    Chief Complaint(s) and History of Present Illness(es)       Follow Up    In right eye.  Since onset it is stable.  Associated symptoms include eye pain, redness, tearing and headache.  Treatments tried include eye drops.  Pain was noted as 3/10. Additional comments: Acute angle closure glaucoma, right eye             Comments    She states that her right eye feels a bit sore today.  She states that her vision has seemed stable in each eye.    Lab Results       Component                Value               Date                       A1C                      7.7                 10/25/2023                 A1C                                          02/15/2023              Comment:    The previously reported result may be inaccurate due to a laboratory instrument calibration issue. Providers should order a new test to be performed if clinically indicated.  [facility] will issue a credit for this test.  This is a corrected result. Previous result was 6.7 % on 2/15/2023 at  6:47 PM CST       A1C                      7.4                 08/25/2022                 A1C                      7.4                 08/10/2022                 A1C                      6.3                 02/23/2022                 A1C                      5.8                 06/23/2020                 A1C                      6.5                 02/13/2020                 A1C                      9.7                 10/28/2019                 A1C                      10.5                05/28/2019                 A1C                      11.6                10/04/2018                HAMIDA Baptiste 9:18 AM  October 27, 2023                           HPI:   Roxy Beaulieu is a 63 year old female who presents from the hospital for elevated eye pressures concerning for acute angle closure glaucoma.     63 F pmhx stroke presented to outside ED for intense intractable  headache and pain behind the right eye. Outside hospital's assessment was that this was an acute endophthalmitis or orbital cellulitis. States that since her stroke she has poor short term memory and does not remember the duration of her symptoms. Presents with painful, unilateral vision loss in conjunction with headache and IOP of 75 in right eye.     Upon presentation, the patient received the following:  -Timolol 0.5% 1 drop affected eye  -Brimonidine 0.1% 1 drop affected eye  -Dorzolamide 2% 1 drop affected eye  -Latanoprost 0.005% 1 drop affected eye  -Acetazolamide 250mg IV  -Analgesia and antiemetics PRN per primary team    Interval updates:  10/26/23 - Continues to deny pain. No improvement in vision. Pressure today is mid-40's, so it is continuing to downtrend, though at a slower rate than would be preferred. Can follow up in clinic tomorrow for Laser peripheral iridotomy (LPI).   10/27/23 - No pain. No change in vision. IOP today 20's.     Review of systems for the eyes was negative other than the pertinent positives/negatives listed in the HPI.      Past Ocular history:   - Most recent Eye Exam: over 5 years ago, per patient  - Glasses: no  - Contact lens wear: no  - Ocular Surgical History: CE IOL in both eyes over 5 years ago    PMH:   Past Medical History:   Diagnosis Date    Anemia     Cerebral artery occlusion with cerebral infarction (H)     affected left side with weakness in arm and leg    CHF (congestive heart failure) (H)     Congestive heart failure (CHF) (H) 12/17/2018    Diabetes (H)     ESRD on dialysis (H)     HD STARTED 6/2019    Gangrene of toe (H) 4/29/2017    Hemiplegia and hemiparesis following cerebral infarction affecting left dominant side (H)     High cholesterol     Hypertension     Hypertensive cardiomyopathy (H)     Obesity 4/29/2017    PE (pulmonary thromboembolism) (H)     RECURRENT    Personal history of tobacco use, presenting hazards to health 04/29/2017    Renal  insufficiency     SOB (shortness of breath)     Toe ulcer, left, with unspecified severity (H) 11/2/2018    Ulcer of toe of left foot (H)     GANGRENE    Uncontrolled type 2 diabetes mellitus with hyperglycemia, with long-term current use of insulin (H) 4/29/2017    VTE (venous thromboembolism)          FH: No glaucoma or AMD.       Imaging:  B scan 10/25/23 showed phakic lenses and shallow Anterior chamber chamber. No vitreous opacities suggestive of tractional retinal detachment or vitreous hemorrhage      Assessment & Plan      Roxy Beaulieu is a 63 year old female with the following diagnoses:   1. Neovascular glaucoma of right eye, severe stage    2. Proliferative diabetic retinopathy of left eye with macular edema associated with diabetes mellitus due to underlying condition (H)    3. Proliferative diabetic retinopathy of right eye with macular edema associated with diabetes mellitus due to underlying condition (H)    4. Type 2 diabetes mellitus with vitreous hemorrhage and proliferative retinopathy (H)    5. Vitreous hemorrhage, both eyes (H)       #Neovascular glaucoma secondary to diabetes, right eye  #Proliferative diabetic retinopathy, right eye  #Vitreous hemorrhage, right eye  Patient presented with findings concerning for angle closure crisis in the right eye with IOP up to 75. IOP now improved to 24 today on max medical therapy and Diamox 250 sequels.   Visual Acuity: LP with projection, no change.   Gonioscopy showed: severe neovascularization of the angle 360 degrees.  Anterior exam with mild diffuse corneal edema, moderate depth angle by Van Herick, quiet AC, neovascularization of the iris, IOL well centered, and vitreous hemorrhage.  Fundus exam with very poor view but consistent with diffuse vitreous hemorrhage.  -Above findings are consistent with neovascularization secondary to diabetes as the cause of severely elevated IOP and vitreous hemorrhage. Patient would likely benefit from  intravitreal anti-VEGF.     #Proliferative diabetic retinopathy, left eye  #Vitreous hemorrhage, left eye  VA 20/50 today, stable. No flashes or floaters.   SLE notable for clear cornea, deep and quiet AC, and well centered CE IOL.   Dilated exam notable for trace vitreous hemorrhages, and severe diabetic retinopathy.   -Above findings are consistent with proliferative diabetic retinopathy and would likely benefit from intravitreal anti-VEGF.     Plan:  -Okay to discharge from the hospital, from an ophthalmology perspective  -r/b/a of bilateral intravitreal Avastin injections discussed today, and patient wished to proceed.  Bilateral Avastin intravitreal injections performed today in Retina clinic  -Continue Timolol 0.5% 1 drop affected eye BID  -Continue Brimonidine 0.1% 1 drop affected eye BID   -Continue Dorzolamide 2% 1 drop affected eye BID  -Continue Latanoprost 0.005% 1 drop affected eye daily  -STOP Diamox sequels now that patient is leaving the hospital and IOP is better controlled  -Continue antiemetics and analgesia As needed per primary team    Return and RD precautions discussed     Patient disposition:   -Follow up in Acute Eye Clinic on Monday for IOP check  -Follow up in Retina Clinic in 4 weeks for likely repeat injections     Patient seen with Dr. Lawton and Dr. Holden (Retina)     Kenan Roman MD  Resident Physician, PGY-2  Department of Ophthalmology

## 2023-10-27 NOTE — PROGRESS NOTES
Brief Nephrology Note:    Pt has been at eye clinic most of the day. Unfortunately BMP was not collected this morning so we still don't know patient's potassium level. We had ordered for 6 doses of lokelma but it appears she's only gotten one dose. We had ordered this in an attempt to avoid dialysis today since dialysis aggravates acute angle closure glaucoma and pt developed eye pain at the end of dialysis yesterday. Please page us if potassium comes back > 6.0. Otherwise please continue with Lokelma and we will plan for dialysis tomorrow.    Sri Okeefe PA-C  [phone]

## 2023-10-27 NOTE — PROGRESS NOTES
Care Management Follow Up    Length of Stay (days): 0    Expected Discharge Date: 10/28/2023     Concerns to be Addressed: discharge planning     Patient plan of care discussed at interdisciplinary rounds: Yes    Anticipated Discharge Disposition: Dialysis Services, Assisted Living     Anticipated Discharge Services: None  Anticipated Discharge DME: None    Patient/family educated on Medicare website which has current facility and service quality ratings:  (N/A)  Education Provided on the Discharge Plan: Yes  Patient/Family in Agreement with the Plan: yes    Referrals Placed by CM/SW:    Private pay costs discussed: Not applicable    Additional Information:  Chart reviewed. Case discussed with bedside RN and medical provider. Pt to discharge tomorrow.    Writer spoke with JASE Cook at [facility] (P: [phone]; F: [phone]). Confirmed pt can return over the weekend. Joyce recommended that meds be filled at [facility], as their pharmacy does not fill on weekends. Joyce requested a call tomorrow AM with discharge time.    Writer scheduled a [id number] w/c ride via [employer] (P: [phone]).    ________________    VINCENT Schilling, [facility]  ED/Observation   [employer]  Phone: [phone]  Pager: [phone]  Fax: [phone]    On-call pager, [phone], 4:00pm to midnight

## 2023-10-27 NOTE — PROGRESS NOTES
re: Lin Barnettieux obs 3  Pt is getting lokelma 3x daily but there aren't orders to check her potassium today, should we check it and if so should I wait to give it? thanks  Chantel Cordero RN [id number]

## 2023-10-28 NOTE — PROGRESS NOTES
../63 (BP Location: Left arm, Patient Position: Semi-Bojorquez's, Cuff Size: Adult Regular)   Pulse 67   Temp 97.8  F (36.6  C) (Oral)   Resp 20   SpO2 97%   -diagnostic tests and consults completed and resulted- not met; Dialysis scheduled for tomorrow   -vital signs normal or at patient baseline- met  -tolerating oral intake to maintain hydration- met  -adequate pain control on oral analgesics- met; denies pain at this time   -safe disposition plan has been identified - Met; will discharge to [facility] tomorrow   -cleared by Ophthalmology to continue treatment as outpatient- met

## 2023-10-28 NOTE — PROGRESS NOTES
Hemodialysis Progress Note    I personally reviewed the vital signs, medications, labs, and imaging.  Subjective: I examined the patient during dialysis. Tolerating iHD without issues at the time of my evaluation. K down to 4.7. Calcium 8. Phos not updated yet. Acetazolamide has been stopped. Last HCO 25. Looks like plan to discharge tomorrow. Hopeful she will tolerate iHD without signficant R eye or head pain now that she's had extensive treatment for acute angle closure glaucoma.     Objective:  BP (!) 140/60   Pulse 65   Temp 98  F (36.7  C) (Oral)   Resp 30   SpO2 97%   No intake or output data in the 24 hours ending 10/28/23 1003      Problem list & Plan  # ESKD on Dialysis TThSat  -Diagnosis: ESKD on iHD  -Continue iHD TThSat while in house.   -Right AVF  --+ thrill  + bruit    # MBD  - Calcitriol 0.25mcg qDay, Sevelamer 1600mg TID w/ meals. Please update phos with next lab draw. If Phos < 4 please hold Sevelamer    # Anemia of Renal Disease  - Mircera 60mcg q2Wks. Hemoglobin 10.4.    Mat Mello MD  Date of Service (when I saw the patient): October 28, 2023

## 2023-10-28 NOTE — PROGRESS NOTES
../70   Pulse 69   Temp 97.6  F (36.4  C)   Resp 18   SpO2 94%   ..  -diagnostic tests and consults completed and resulted- not met; Dialysis scheduled for tomorrow   -vital signs normal or at patient baseline- met  -tolerating oral intake to maintain hydration- met  -adequate pain control on oral analgesics- met; denies pain at this time   -safe disposition plan has been identified - Met; will discharge to [facility] tomorrow   -cleared by Ophthalmology to continue treatment as outpatient- met

## 2023-10-28 NOTE — PROGRESS NOTES
HEMODIALYSIS TREATMENT NOTE    Date: 10/28/2023  Time: 11:49 AM    Data:  Weight change:  2 kg  Ultrafiltration - Post Run Net Total Removed (mL): 2000 mL  Vascular Access Status: patent  Dialyzer Rinse: Streaked, Light  Total Blood Volume Processed: 51 L Liters  Total Dialysis (Treatment) Time: 3 Hours    Lab:   No    Interventions:  RAVF cannulated with 15 gauge needles  Needles removed  Pressure dressing applied    Assessment:  Pt ran for 3 hrs on a K2/ Ca 3 bath with a /  and 2 L pulled. RAVF positive for T/B. Pt rinsed back 5 mins early due to pt keeps moving arm/access around and moving in bed. Hemostasis achieved in about 10 mins. Pt alert and oriented x4 with no complaints. Report given to PCN.      Plan:    Per renal team

## 2023-10-28 NOTE — PROGRESS NOTES
../40 (BP Location: Left arm)   Pulse 71   Temp 97.8  F (36.6  C) (Oral)   Resp 16   SpO2 99%       -diagnostic tests and consults completed and resulted- not met; Dialysis scheduled for tomorrow   -vital signs normal or at patient baseline- met  -tolerating oral intake to maintain hydration- met  -adequate pain control on oral analgesics- met; denies pain at this time   -safe disposition plan has been identified - Met; will discharge to [facility] tomorrow   -cleared by Ophthalmology to continue treatment as outpatient- met

## 2023-10-28 NOTE — DISCHARGE SUMMARY
[facility]  Hospitalist Discharge Summary      Date of Admission:  10/25/2023  Date of Discharge:  10/28/2023  Discharging Provider: Elia Garnett PA-C  Discharge Service: Hospitalist Service    Discharge Diagnoses   Acute angle closure glaucoma of R eye  ESRD on HD  Hyperkalemia, resolved  Hypertension  DM type 2  Hx CVA    Follow-ups Needed After Discharge   Follow-up Appointments     Adult [facility] Follow-up and recommended labs and tests      Follow up with primary care provider, [facility],   within 7 days for hospital follow- up.  The following labs/tests are   recommended: CBC, CMP, Mg.      Follow up with Dr. Roman of Ophthalmology scheduled 9/30/23.    Follow up with Nephrology as directed, continue outpatient dialysis TTS.    Appointments on [place] and/or [facility] (with [facility] or [facility]   provider or service). Call [phone] if you haven't heard regarding   these appointments within 7 days of discharge.          Discharge Disposition   Discharged to assisted living  Condition at discharge: Stable    Hospital Course   Roxy Beaulieu is a 63 year old female admitted Medicine Observation on 10/25/2023. She has a history of ESRD on HD, HTN, HLD, DM2, PVD, CVA with left hemiplegia, prior PE, and polyneuropathy. She is being admitted with acute angle closure glaucoma of the right eye after presenting with acute pain, swelling and erythema of R eye during hemodialysis. See below for further details of hospital admission. She is now medically stable for discharge back to her assisted living facility.      # Acute angle closure glaucoma R eye: Presented with acute pain, swelling, erythema of R eye with associated visual impairment and R sided headache. CT head negative. Initial concern at for endophthalmitis at outside ED, therefore transferred to [facility] for ophthalmology consult. In the ED, ophthalmology evaluated patient and felt  presentation was c/w acute angle closure glaucoma, confirmed with elevated IOP and Gonioscopy. This was likely precipitated by HD. Symptoms improved almost immediately following treatment. Patient was monitored in hospital with frequent assessment by Ophthalmology. 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 Instead treated with Avastin injection today. Patient continued to have mild R eye pain during dialysis, which resolved following run. Ophthalmology is aware of this and recommend ongoing management with eye drops and outpatient follow up already scheduled next week.   - Timolol 0.5% 1 drop BID  - Brimonidine 0.1% 1 drop BID  - Dorzolamide 2% 1 drop BID  - Latanoprost 0.005% 1 drop daily  - Follow up with Ophthalmology scheduled 10/30     # ESRD on HD:  Dialyzes via RUE graft TTS. Still makes some urine.  Noted to have worsening R eye pain at end of run on 10/26. HD known to exacerbated angle closure glaucoma. Hyperkalemic on arrival, managed with HD. Nephrology ordered Lokelma in hopes to avoid additional HD runs d/t eye concerns. No ongoing issues with hyperkalemia noted.   - Continue HD q TTS  - Repeat labs next week    # Hyperkalemia:  Secondary to ESRD. K 5.7 prior to HD. Started on Lokelma by nephrology to avoid extra HD runs for hyperkalemia (see above). Resolved.  - No need to continue lokelma on discharge  - Repeat BMP next week     # HTN:  Well controlled on PTA amlodipine 5mg daily, Coreg 25mg BID, hydralazine 50mg BID, and diuretics. Nephrology recommends holding BP meds on dialysis days in hopes to avoid exacerbating glaucoma. OK to take PM dose of antihypertensives if BID dosing.      # DM2:  No acute concerns. Sugars well controlled on NPH (70/30) 18 units in AM and 9 units in PM with sliding scale novolog AC/HS.      # Hx CVA:  Residual L hemiplegia. CT head  at OSH negative for acute intracranial pathology. Continue ASA + statin     # Hx VTE:  No longer on anticoagulation.        Consultations This Hospital Stay   CARE MANAGEMENT / SOCIAL WORK IP CONSULT  NEPHROLOGY ESRD ADULT IP CONSULT    Code Status   Full Code    Time Spent on this Encounter   I, Elia Garnett PA-C, personally saw the patient today and spent greater than 30 minutes discharging this patient.       Elia Garnett PA-C  [facility] [address] [address]  Phone: [phone]  Fax: [phone]  ______________________________________________________________________    Physical Exam   Vital Signs: Temp: 97.8  F (36.6  C) Temp src: Oral BP: (!) 153/69 Pulse: 76   Resp: 16 SpO2: 98 % O2 Device: None (Room air)    Weight: 0 lbs 0 oz    General Appearance:  Awake. Alert. Oriented x3. NAD.   HEENT:  No scleral icterus or conjunctival erythema. Moist mucous membranes.   Respiratory:  Normal effort. CTAB.   Cardiovascular:  RRR. S1/S2. No murmurs. No gallops.   GI:  Soft, non-distended, non-tender, +BS.   Extremity:  No pitting edema. No calf tenderness.   Skin:  No visible rash.  Neuro:  Grossly non-focal.        Primary Care Physician   [facility]    Discharge Orders      Reason for your hospital stay    Admitted with acute angle closure glaucoma     Activity    Your activity upon discharge: activity as tolerated     Adult [facility] Follow-up and recommended labs and tests    Follow up with primary care provider, [facility], within 7 days for hospital follow- up.  The following labs/tests are recommended: CBC, CMP, Mg.      Follow up with Dr. Roman of Ophthalmology scheduled 9/30/23.    Follow up with Nephrology as directed, continue outpatient dialysis TTS.    Appointments on [place] and/or [facility] (with [facility] or [facility] provider or service). Call [phone] if you haven't heard regarding these  appointments within 7 days of discharge.     Diet    Follow this diet upon discharge: Renal Diet (dialysis)       Significant Results and Procedures   Lab results reviewed over the past 24 hrs:   Recent Labs   Lab 10/28/23  0620 10/27/23  1422 10/26/23  0644 10/25/23  1221   * 134* 135 132*   POTASSIUM 4.7 5.0 5.7* 6.0*   CHLORIDE 89* 92* 92* 88*   CO2 25 26 27 26   ANIONGAP 18* 16* 16* 18*   BUN 64.9* 55.4* 68.4* 51.9*   CR 8.67* 7.42* 8.11* 6.61*   GILL 8.0* 8.6* 9.3 9.1     Recent Labs   Lab 10/28/23  0620 10/26/23  0644 10/25/23  1221   WBC 7.8 7.2 7.7   RBC 3.51* 3.52* 3.78*   HGB 10.4* 10.5* 11.0*   HCT 33.8* 34.4* 36.0   MCV 96 98 95   MCH 29.6 29.8 29.1   MCHC 30.8* 30.5* 30.6*   RDW 15.3* 15.4* 15.3*    189 174     Recent Labs   Lab 10/28/23  0839 10/28/23  0620 10/28/23  0144 10/27/23  2230 10/27/23  2214 10/27/23  2154 10/27/23  1753 10/27/23  1547 10/27/23  1458 10/27/23  1441 10/27/23  1422 10/27/23  1421   * 103* 111* 105* 96 67* 148* 126* 94 96 54* 49*             Results for orders placed or performed during the hospital encounter of 04/15/21   IR Dialysis Fistulogram Right    [facility] RADIOLOGY  LOCATION: [facility]  DATE: 4/15/2021    PROCEDURE: DIALYSIS ARTERIOVENOUS SHUNT EVALUATION AND PERIPHERAL  VENOUS OUTFLOW ANGIOPLASTY    INTERVENTIONAL RADIOLOGIST: Gurjit Ontiveros MD.    INDICATION: 60-year-old female with end-stage renal disease and a  right upper arm brachiocephalic fistula. Ultrasound suggest the  presence of a perianastomotic inflow stenosis.    CONSENT: The risks, benefits and alternatives of dialysis  arteriovenous shunt evaluation with possible angioplasty, stent  placement, thrombolysis and/or tunneled dialysis catheter placement  were discussed with the patient  in detail. All questions were  answered. Informed consent was given to proceed with the procedure.    MODERATE SEDATION: Versed 1 mg IV; Fentanyl 50 mcg IV.  Under  physician  supervision, Versed and fentanyl were administered for  moderate sedation. Pulse oximetry, heart rate and blood pressure were  continuously monitored by an independent trained observer. The  physician spent 9 minutes of face-to-face sedation time with the  patient.    CONTRAST: 45 mL Isovue-300.  ANTIBIOTICS: None.  ADDITIONAL MEDICATIONS: None.    FLUOROSCOPIC TIME: 1.4 minutes.  RADIATION DOSE: Air Kerma: 10 mGy.    COMPLICATIONS: No immediate complications.    STERILE BARRIER TECHNIQUE: Maximum sterile barrier technique was used.  Cutaneous antisepsis was performed at the operative site with  application of 2% chlorhexidine and large sterile drape. Prior to the  procedure, the  and assistant performed hand hygiene and wore  hat, mask, sterile gown, and sterile gloves during the entire  procedure.    PROCEDURE:    Using real-time ultrasound guidance, access to the right upper arm  arteriovenous shunt was achieved directed towards the arterial  anastomosis and conversion was made for a short 7 [place] vascular  sheath.     Diagnostic fistulography was obtained from the arterial inflow through  the right atrium.    Angioplasty was performed using a 5 mm balloon at the arterial  anastomosis. Completion fistulography was performed.     The sheath was removed and compression applied to the puncture site  until hemostasis was achieved.     FINDINGS:  On physical examination there is a brisk nonpulsatile thrill  throughout the right upper arm brachiocephalic fistula.    Ultrasound demonstrates a patent and pulsatile right upper arm  brachiocephalic fistula with a probable stenosis at the arterial  anastomosis. A permanent image was stored.    The diagnostic fistulography reveals a patent right upper arm  brachiocephalic fistula. There is a moderate stenosis at the arterial  anastomosis estimated at percent. The venous outflow and central veins  are widely patent.    Following 5 mm angioplasty of the anastomotic  stenosis there is no  residual significant stenosis.    At the completion of the procedure there is restoration of a brisk  nonpulsatile thrill throughout the arterial venous shunt.      Impression    IMPRESSION:    Dialysis arteriovenous shunt evaluation revealing a significant  arterial anastomotic stenosis (peripheral venous outflow stenosis),  successfully treated with angioplasty as detailed above.    ____________________________________________________________________    CPT codes for physician reference only:  43293  35447.    JAYJAY MICHAEL MD       Discharge Medications   Current Discharge Medication List        START taking these medications    Details   brimonidine (ALPHAGAN) 0.2 % ophthalmic solution Place 1 drop into the right eye 2 times daily  Qty: 10 mL, Refills: 0    Associated Diagnoses: Acute angle-closure glaucoma of right eye      dorzolamide (TRUSOPT) 2 % ophthalmic solution Place 1 drop into the right eye 2 times daily  Qty: 10 mL, Refills: 0    Associated Diagnoses: Acute angle-closure glaucoma of right eye      latanoprost (XALATAN) 0.005 % ophthalmic solution Place 1 drop into the right eye at bedtime  Qty: 7.5 mL, Refills: 0    Associated Diagnoses: Acute angle-closure glaucoma of right eye      timolol maleate (TIMOPTIC) 0.5 % ophthalmic solution Place 1 drop into the right eye 2 times daily  Qty: 10 mL, Refills: 0    Associated Diagnoses: Acute angle-closure glaucoma of right eye           CONTINUE these medications which have CHANGED    Details   amLODIPine (NORVASC) 10 MG tablet Take 1 tablet (10 mg) by mouth four times a week    Associated Diagnoses: Malignant hypertension with heart failure and end-stage renal dis (H)      !! carvedilol (COREG) 25 MG tablet Take 1 tablet (25 mg) by mouth every evening    Associated Diagnoses: Malignant hypertension with heart failure and end-stage renal dis (H)      !! carvedilol (COREG) 25 MG tablet Take 1 tablet (25 mg) by mouth four times a week     Associated Diagnoses: Malignant hypertension with heart failure and end-stage renal dis (H)      furosemide (LASIX) 80 MG tablet Take 1 tablet (80 mg) by mouth four times a week    Associated Diagnoses: Malignant hypertension with heart failure and end-stage renal dis (H)      insulin NPH-Regular (HUMULIN 70/30;NOVOLIN 70/30) susp Inject 18 Units Subcutaneous daily (with breakfast) AND 9 Units daily (with dinner).    Associated Diagnoses: Diabetes mellitus type 2, insulin dependent (H)      omeprazole (PRILOSEC OTC) 20 MG EC tablet Take 1 tablet (20 mg) by mouth daily 4:30 pm    Associated Diagnoses: Gastroesophageal reflux disease with esophagitis without hemorrhage       !! - Potential duplicate medications found. Please discuss with provider.        CONTINUE these medications which have NOT CHANGED    Details   acetaminophen (TYLENOL) 325 MG tablet Take 2 tablets (650 mg) by mouth every 6 hours as needed for mild pain or fever (> 101 F)    Associated Diagnoses: Cerebrovascular accident (CVA) due to thrombosis of precerebral artery (H)      aspirin (ASA) 325 MG EC tablet Take 325 mg by mouth every morning      bisacodyl (DULCOLAX) 10 MG suppository Place 1 suppository (10 mg) rectally daily as needed for constipation    Associated Diagnoses: Cerebrovascular accident (CVA) due to thrombosis of precerebral artery (H)      clopidogrel (PLAVIX) 75 MG tablet Take 75 mg by mouth daily      !! hydrALAZINE (APRESOLINE) 25 MG tablet Take 25 mg by mouth at bedtime      !! hydrALAZINE (APRESOLINE) 25 MG tablet Take 25 mg by mouth four times a week Take 1 tablet (25 mg) every afternoon on Sunday, Monday, Wednesday, and Friday at 4 pm.      insulin aspart (NOVOLOG PEN) 100 UNIT/ML pen TID w/meals: -249=2U; 250-299=4u; 300-349=6u;350-399=8U; 400 & above 10units  Qty: 9 mL, Refills: 4    Associated Diagnoses: Insulin dependent type 2 diabetes mellitus (H)      ondansetron (ZOFRAN) 4 MG tablet Take 4 mg by mouth every  12 hours as needed for nausea      patiromer (VELTASSA) 8.4 g packet Take 8.4 g by mouth daily On dialysis days at 4:30 pm      senna-docusate (SENOKOT-S/PERICOLACE) 8.6-50 MG tablet Take 1 tablet by mouth 2 times daily as needed for constipation      sevelamer carbonate (RENVELA) 800 MG tablet Take 1,600 mg by mouth 3 times daily (with meals)      simvastatin (ZOCOR) 20 MG tablet Take 1 tablet (20 mg) by mouth At Bedtime    Associated Diagnoses: Cerebrovascular accident (CVA) due to thrombosis of precerebral artery (H)       !! - Potential duplicate medications found. Please discuss with provider.        STOP taking these medications       calcium carbonate (TUMS) 500 MG chewable tablet Comments:   Reason for Stopping:             Allergies   Allergies   Allergen Reactions    Heparin Hives     HIT-serotonin assay negative. NOT HIT

## 2023-10-28 NOTE — TELEPHONE ENCOUNTER
Call received from JASE Adams with pt's Assisted Living facility, [facility].    She is calling to get clarification on pt's hospital discharge medications.    Consent to Communicate does not list Assisted Living facility, (currently only lists pt's mother).    Notified that either pt would need to give consent to speak with caller or pt would need to call themselves.    Katina Osman RN  [facility]      Reason for Disposition   [1] Caller has URGENT medicine question about med that PCP or specialist prescribed AND [2] triager unable to answer question    Protocols used: Medication Question Call-A-

## 2023-10-28 NOTE — PROGRESS NOTES
Care Management Discharge Note    Discharge Date: 10/28/2023       Discharge Disposition: Dialysis Services, Assisted Living    [facility]   (P: [phone]; F: [phone])     Discharge Services: None    Discharge DME: None    Discharge Transportation: agency    Private pay costs discussed: Not applicable    Does the patient's insurance plan have a 3 day qualifying hospital stay waiver?  No    PAS Confirmation Code: N/A  Patient/family educated on Medicare website which has current facility and service quality ratings:  (N/A)    Education Provided on the Discharge Plan: Yes  Persons Notified of Discharge Plans: Nurse at [facility]  Patient/Family in Agreement with the Plan: yes    Handoff Referral Completed: Yes    Additional Information:  Chart reviewed. Case discussed with bedside RN and medical provider. Pt is doing well and can discharge this afternoon. Pt's medications to be filled here before discharge per [facility].     1405   spoke with JASE Wilson at [facility] (P: [phone]) confirmed pt will discharge this afternoon. Pt's ride should be arriving by 2pm. Pt's discharge orders has been signed. RN ask JOCELYNE to have it faxed to [phone].     JOCELYNE print discharge orders and faxed to Steve LAGUNA at [facility].     _______________________    ANUPAM Hong, LSW  ED/OBS   [facility]  Phone: [phone]  Pager: [phone]  Fax: [phone]     On-call pager, [phone], 4:00 pm to midnight

## 2023-10-28 NOTE — PROGRESS NOTES
Discharge instructions gone over with patient with no additional questions by patient. EMS here to take patient back to assisted living facility. Patient sent with all her belongings. No IV present to remove.

## 2023-10-31 NOTE — DISCHARGE INSTRUCTIONS
Please go directly to dialysis today as scheduled.     You will receive a call to coordinate the laser procedure for treatment of the eyes.     Please take the drops as prescribed:  -Timolol 0.5% 1 drop affected eye BID  -Brimonidine 0.1% 1 drop affected eye BID   -Dorzolamide 2% 1 drop affected eye BID  -Latanoprost 0.005% 1 drop affected eye daily  Please take the oral medications as prescribed.   -Diamox sequels 250 BID  (half dose because patient is on dialysis)

## 2023-10-31 NOTE — ED TRIAGE NOTES
Triage Assessment (Adult)       Row Name 10/31/23 0605          Triage Assessment    Airway WDL WDL        Respiratory WDL    Respiratory WDL WDL        Skin Circulation/Temperature WDL    Skin Circulation/Temperature WDL WDL        Cardiac WDL    Cardiac WDL WDL        Peripheral/Neurovascular WDL    Peripheral Neurovascular WDL WDL        Cognitive/Neuro/Behavioral WDL    Cognitive/Neuro/Behavioral WDL WDL

## 2023-10-31 NOTE — PROGRESS NOTES
Care Management Discharge Note    Discharge Date:  10/31/2023       Discharge Disposition: Assisted Living - [facility] (P: [phone]; F: [phone])    Discharge Services: None    Discharge DME: None    Discharge Transportation: agency    Private pay costs discussed: Not applicable    Does the patient's insurance plan have a 3 day qualifying hospital stay waiver?  No    PAS Confirmation Code: N/A  Patient/family educated on Medicare website which has current facility and service quality ratings: other (see comments) (n/A)    Education Provided on the Discharge Plan: Yes  Persons Notified of Discharge Plans: Pt; RN; MD; detention; HD unit    Patient/Family in Agreement with the Plan: yes    Handoff Referral Completed: No    Additional Information:  Chrystalr received a curbside consult from ED MD Navas. Pt is cleared to return back to detention, however, pt is scheduled for HD at 1030 at [facility].     Writer met with pt and confirmed she is normally transported to HD in a w/c that she has at home. Pt is agreeable to w/c transport at discharge to HD unit and from there to detention.     Writer scheduled a ASAP p/up via [facility] (P: [phone]) to bring pt to her HD unit.    Writer left VM with [facility] at [facility] (P: [phone]; F: [phone]) RE: pt's return to TRAV after HD and the need for them to ensure that pt's return ride to bring pt's w/c when they come to pick pt up. Writer received call from [facility], stating they have already cancelled pt's HD rides for the day.     Discussed w/ CHW-Naem and discussed the details of the ride she would need to arrange for pt to be picked up from HD unit.    [facility]   [address], [place], [address]  P: [phone]  F: [phone]   - Writer spoke with Kalani,  at HD clinic. Confirmed pt's HD time at 1030 and that pt would be done at 1415. Pt would need ride  to pick her up at 1430.     ________________    VINCENT Schilling, [facility]  ED/Observation   [facility]  Phone: [phone]  Pager: [phone]  Fax: [phone]    On-call pager, [phone], 4:00pm to midnight

## 2023-10-31 NOTE — ED PROVIDER NOTES
EMERGENCY DEPARTMENT ENCOUNTER      NAME: oRxy Beaulieu  AGE: 63 year old female  YOB: 1960  MRN: [id number]  EVALUATION DATE & TIME: No admission date for patient encounter.    PCP: Taqueria Swanson Physician    ED PROVIDER: Sekou Vaughn MD        Chief Complaint   Patient presents with    Headache    Altered Mental Status         FINAL IMPRESSION:  1. Glaucoma of right eye, unspecified glaucoma type    2. Hyponatremia    3. Acute right eye pain    4. Hyperkalemia          ED COURSE & MEDICAL DECISION MAKING:    Pertinent Labs & Imaging studies reviewed. (See chart for details)  63 year old female presents to the Emergency Department for evaluation of right eye pain and headache.      Patient appears to be at baseline mentation.  No baseline neurological deficits that appear to be new.     Patient seems to have some photophobia to right eye.  Right eye is injected.  Patient's care is complicated by her memory loss and her care facility not returning phone calls for hours as well as her power of  not returning the phone call.    Patient states she has right eye pain and headache around her right eye.    Intermittently complaining of headache and right eye pain.  Per chart review was recently discharged from [facility] for acute angle-closure glaucoma.  She did miss an appointment yesterday.    She is on 4 eyedrops for her eyes.  Patient thinks she got them.  She was scheduled get her last eyedrops at 8 PM.  Eventually she will get a hold of patient's care facility but they were unsure if she got her evening eyedrops but he thinks he may have    We will obtain BMP and CBC    BMP shows mild hyperkalemia my hyponatremia likely secondary to need for dialysis as scheduled for later today.  Patient gets dialysis Tuesday Thursday Saturday at 1015.    Highly doubt temporal arteritis.  Has been followed closely by ophthalmology      ED Course as of 10/31/23 0326   Mon Oct 30, 2023    2232 I met with the patient for an initial encounter and evaluation.   2355 I performed a tonometry procedure on the patient.   Tue Oct 31, 2023   0036 I spoke with registration regarding the patient's inpatient information. Unable to obtain at the moment.   0037 IOP right eye pressure on right eye was 40   0037 I left message for Yuki HUERTA to call ER to provide history since patient's care facility is now picking up the phone to why patient is here tonight   0051 I attempted to patient's care facility again and there is no answer and the mailbox is full.  I attempted to call patient's power of  again and no answer   0114 Eventually was able to get a hold of patient's  he states he covers multiple floors.   He does not have access to patient's medication logs or medications on her current floor.  He is uncertain if she got her medications this evening.  He reports that ambulance was called because she complained of eye pain and headache.     0115 He does report she could go back tonight and he also reports that she could follow-up with an eye doctor tomorrow and is unsure why she missed her eye doctor appointment yesterday.   0123 I spoke with ophthalmology, Dr. Kraft, regarding patient's care.   0231 I rechecked the patient's visual acuity.   0237 Repeat 47 and 49 after the drops.   0241 WBC(!): 12.0  Denies fever or cough or abdominal pain   0241 Despite repeat dosing of eyedrops pressures are still elevated and she still complains of eye pain and right-sided headache     Unfortunately despite repeat dosing for prescription eyedrops her pressures have gone up to 49 and 47 to right eye complains of right eye pain.  Has known acute angle-closure glaucoma.  With end-stage renal disease and not making much urine I am not sure mannitol be much help for her pressures therefore not given    Care very complicated by the fact that she has short-term memory loss and lives in the facility  where she missed her appointment yesterday for follow-up    Attempted to call patient's power of  and I left voicemail.    I feel with patient having increased intraocular pressure despite repeated treatments she needs to be seen by an ophthalmologist tonight since I am not sure if she will actually be able to follow-up with ophthalmology in a timely manner since she already missed her scheduled outpatient visit yesterday. She does have mild hyponatremia and hyperkalemia that will likely correct when she gets her dialysis that scheduled for later this morning.    I spoke with Dr. [facility] who graciously excepted transfer of patient    Medical Decision Making    History:  Supplemental history from: Documented in chart, if applicable and EMS, Nurse  External Record(s) reviewed: Documented in chart, if applicable., Inpatient Record: Patient was admitted to [facility] from 10/25-10/28. See [facility] for more information., and Outpatient Record: Patient visited [facility] on 10/27. See [facility] for more information.    Work Up:  Chart documentation includes differential considered and any EKGs or imaging independently interpreted by provider, where specified.  In additional to work up documented, I considered the following work up: Documented in chart, if applicable.    External consultation:  Discussion of management with another provider: Documented in chart, if applicable    Complicating factors:  Care impacted by chronic illness: Cerebrovascular Disease  Care affected by social determinants of health: Access to Medical Care    Disposition considerations:  Transfer to [facility] for ophthalmology to see          Voice recognition software was used in the creation of this note. Any grammatical or nonsensical errors are due to inherent errors with the software and are not the intention of the writer.      "    At the conclusion of the encounter I discussed the results of all of the tests and the disposition. The questions were answered. The patient or family acknowledged understanding and was agreeable with the care plan.         The patient is critically ill and has required 40 minutes of critical care time exclusive of procedures. This includes time spent interviewing the patient, ordering tests and medications, monitoring vital signs, reviewing results, patient updates, discussing the case with family and consultants, and admission.      MEDICATIONS GIVEN IN THE EMERGENCY:  Medications   tetracaine (PONTOCAINE) 0.5 % ophthalmic solution 1-2 drop (1 drop Right Eye $Given 10/31/23 0236)   latanoprost (XALATAN) 0.005 % ophthalmic solution 1 drop (1 drop Right Eye $Given 10/31/23 0237)   timolol maleate (TIMOPTIC) 0.5 % ophthalmic solution 1 drop (1 drop Right Eye $Given 10/31/23 0237)   dorzolamide (TRUSOPT) 2 % ophthalmic solution 1 drop (1 drop Right Eye $Given 10/31/23 0238)   brimonidine (ALPHAGAN) 0.2 % ophthalmic solution 1 drop (1 drop Right Eye $Given 10/31/23 0238)       NEW PRESCRIPTIONS STARTED AT TODAY'S ER VISIT  New Prescriptions    No medications on file          =================================================================    HPI    Triage note  \"Pt reports that she is having severe headache. Says her right eye has been hurting for a few years, but was diagnosed with glaucoma. She keeps stating that \"I do not know why I am here\". She is going back and forth stating that it is her glaucoma right eye is the pain, but than states that it is her headache. HX of stroke. Pt is intermittently confused. EMS and her assisted living reports that she is normally not confused.   Pt is a dialysis patient, tomorrow she has it scheduled.        Triage Assessment (Adult)       Row Name 10/30/23 5172          Triage Assessment    Airway WDL WDL        Respiratory WDL    Respiratory WDL WDL        Skin " "Circulation/Temperature WDL    Skin Circulation/Temperature WDL WDL        Cardiac WDL    Cardiac WDL WDL        Cognitive/Neuro/Behavioral WDL    Cognitive/Neuro/Behavioral WDL X     Level of Consciousness confused        Julian Coma Scale    Best Eye Response 4-->(E4) spontaneous     Best Motor Response 6-->(M6) obeys commands     Best Verbal Response 4-->(V4) confused     Johnie Coma Scale Score 14                     \"      Patient information was obtained from: EMS, Nurse    Use of : N/A       Roxy Beaulieu is a 63 year old female with a pertinent history of hypertension, diabetes, congestive heart failure, stroke, anemia, renal insufficiency, ESRD, CVA, PE, PVD, glaucoma, dementia, who presents to this ED by EMS for evaluation of a headache and altered mental status.    She is not actually here for altered mental status.  She is here for headache and right eye pain.  Nursing was concerned about altered mental status but per chart review this appears to be her baseline.  I did speak with the care facility who states that this is her baseline.    Per chart review, patient was admitted to [facility] from 10/25-10/28. Started to have swelling and right sided headaches, during hemodialysis. Elevated IOP and Gonioscopy. Started on: Timolol 0.5% 1 drop BID, Brimonidine 0.1% 1 drop BID, Dorzolamide 2% 1 drop BID, Latanoprost 0.005% 1 drop daily. Started on Lokelma by nephrology. Hypertension controlled. Continued ASA and statin.     Per chart review, patient visited [facility] on 10/27 for an eye follow-up. Started Avastin, VA verified HM after injection. IOP improved with MMT. Plan for US next week.    Patient reports an onset of intermittent right eye pain. She endorses a headache and right eye pain. She currently is unable to walk due to her last stroke, which causes her to have weakness on her right leg. She uses a cane " for assistance. Patient has dialysis every Tuesday, Thursday, and Saturday. She is still on her eye drops for glaucoma. No other medical concerns.    REVIEW OF SYSTEMS   Review of Systems   Eyes:  Positive for pain (right side).   Neurological:  Positive for headaches.   Psychiatric/Behavioral:  Positive for confusion.        PAST MEDICAL HISTORY:  Past Medical History:   Diagnosis Date    Anemia     Cerebral artery occlusion with cerebral infarction (H)     affected left side with weakness in arm and leg    CHF (congestive heart failure) (H)     Congestive heart failure (CHF) (H) 12/17/2018    Diabetes (H)     ESRD on dialysis (H)     HD STARTED 6/2019    Gangrene of toe (H) 4/29/2017    Hemiplegia and hemiparesis following cerebral infarction affecting left dominant side (H)     High cholesterol     Hypertension     Hypertensive cardiomyopathy (H)     Obesity 4/29/2017    PE (pulmonary thromboembolism) (H)     RECURRENT    Personal history of tobacco use, presenting hazards to health 04/29/2017    Renal insufficiency     SOB (shortness of breath)     Toe ulcer, left, with unspecified severity (H) 11/2/2018    Ulcer of toe of left foot (H)     GANGRENE    Uncontrolled type 2 diabetes mellitus with hyperglycemia, with long-term current use of insulin (H) 4/29/2017    VTE (venous thromboembolism)        PAST SURGICAL HISTORY:  Past Surgical History:   Procedure Laterality Date    AMPUTATE TOE(S) Left 5/1/2017    Procedure: AMPUTATE TOE(S);  Amputate first and second left toes;  Surgeon: Verna Fofana DPM, Podiatry/Foot and Ankle Surgery;  Location: [facility] OR    AMPUTATE TOE(S) Left 11/3/2018    Procedure: Partial left 3rd toe amputation;  Surgeon: Verna Fofana DPM, Podiatry/Foot and Ankle Surgery;  Location: [facility] OR    AMPUTATE TOE(S)      CREATE FISTULA ARTERIOVENOUS UPPER EXTREMITY Right 2/11/2020    Procedure: RIGHT PROXIMAL RADIAL TO CEPHALIC ARTERIOVENOUS FISTULA CREATION;  Surgeon: Sumanth Corbin MD;   Location: [facility] OR    ESOPHAGOSCOPY, GASTROSCOPY, DUODENOSCOPY (EGD), COMBINED Left 6/23/2020    Procedure: ESOPHAGOGASTRODUODENOSCOPY, WITH biopsies using biopsy forceps;  Surgeon: Elliott Rubi MD;  Location: [facility] GI    IR CVC TUNNEL PLACEMENT > 5 YRS OF AGE  6/4/2019    IR CVC TUNNEL REMOVAL RIGHT  8/18/2020    IR DIALYSIS FISTULOGRAM RIGHT  4/15/2021    IR TUNNELED CATHETER COMPLETE REPLACEMENT  10/2/2019    IR TUNNELED CATHETER COMPLETE REPLACEMENT  10/2/2019    OPEN REDUCTION INTERNAL FIXATION ANKLE  2018    OPEN REDUCTION INTERNAL FIXATION FIBULA Left 3/16/2018    Procedure: OPEN REDUCTION INTERNAL FIXATION FIBULA;  Open reduction and internal fixation of displaced left lateral malleolar fracture;  Surgeon: Sumanth Wen MD;  Location: [facility] OR    REVISION FISTULA ARTERIOVENOUS UPPER EXTREMITY Right 4/28/2020    Procedure: ONLAY VEIN PATCH RIGHT RADIAL TO CEPHALIC FISTULA WITH LIGATION OF SIDE BRANCH ;  Surgeon: Sumanth Corbin MD;  Location: [facility] OR           CURRENT MEDICATIONS:    acetaminophen (TYLENOL) 325 MG tablet  amLODIPine (NORVASC) 10 MG tablet  aspirin (ASA) 325 MG EC tablet  bisacodyl (DULCOLAX) 10 MG suppository  brimonidine (ALPHAGAN) 0.2 % ophthalmic solution  carvedilol (COREG) 25 MG tablet  carvedilol (COREG) 25 MG tablet  clopidogrel (PLAVIX) 75 MG tablet  dorzolamide (TRUSOPT) 2 % ophthalmic solution  furosemide (LASIX) 80 MG tablet  hydrALAZINE (APRESOLINE) 25 MG tablet  hydrALAZINE (APRESOLINE) 25 MG tablet  insulin aspart (NOVOLOG PEN) 100 UNIT/ML pen  insulin NPH-Regular (HUMULIN 70/30;NOVOLIN 70/30) susp  latanoprost (XALATAN) 0.005 % ophthalmic solution  omeprazole (PRILOSEC OTC) 20 MG EC tablet  ondansetron (ZOFRAN) 4 MG tablet  patiromer (VELTASSA) 8.4 g packet  senna-docusate (SENOKOT-S/PERICOLACE) 8.6-50 MG tablet  sevelamer carbonate (RENVELA) 800 MG tablet  simvastatin (ZOCOR) 20 MG tablet  timolol maleate (TIMOPTIC) 0.5 % ophthalmic  solution        ALLERGIES:  Allergies   Allergen Reactions    Heparin Hives     HIT-serotonin assay negative. NOT HIT       FAMILY HISTORY:  Family History   Problem Relation Age of Onset    Glaucoma No family hx of     Macular Degeneration No family hx of        SOCIAL HISTORY:   Social History     Socioeconomic History    Marital status: Single   Tobacco Use    Smoking status: Former     Types: Cigarettes     Quit date: 2016     Years since quittin.2    Smokeless tobacco: Never   Substance and Sexual Activity    Alcohol use: No    Drug use: No       VITALS:  /70   Pulse 95   Temp 98  F (36.7  C) (Temporal)   Resp 20   SpO2 95%     PHYSICAL EXAM      Vitals: /70   Pulse 95   Temp 98  F (36.7  C) (Temporal)   Resp 20   SpO2 95%   General: Appears in no acute distress, awake, alert, interactive.   Eyes: Conjunctival injection right eye.  Mildly dilated right pupil.  Photophobic.  Visual perception to light right eye.   HENT: Atraumatic.  Neck: Supple.  Respiratory/Chest: Respiration unlabored.  Heart: Histalet patent right arm  Abdomen: non distended  Musculoskeletal: Normal extremities. No edema or erythema.  Skin: Normal color. No rash or diaphoresis.  Neurologic: Face symmetric, moves all extremities spontaneously. Speech clear.  Psychiatric: Oriented to person, place, and time. Affect appropriate.  Patient knows what year it is and knows she is in the hospital.       LAB:  All pertinent labs reviewed and interpreted.  Results for orders placed or performed during the hospital encounter of 10/31/23   Head CT w/o contrast    Impression    IMPRESSION:  1.  No acute intracranial process.     Result Value Ref Range    INR 1.11 0.85 - 1.15   Basic metabolic panel   Result Value Ref Range    Sodium 128 (L) 135 - 145 mmol/L    Potassium 5.4 (H) 3.4 - 5.3 mmol/L    Chloride 87 (L) 98 - 107 mmol/L    Carbon Dioxide (CO2) 22 22 - 29 mmol/L    Anion Gap 19 (H) 7 - 15 mmol/L    Urea Nitrogen 63.0  (H) 8.0 - 23.0 mg/dL    Creatinine 9.99 (H) 0.51 - 0.95 mg/dL    GFR Estimate 4 (L) >60 mL/min/1.73m2    Calcium 8.5 (L) 8.8 - 10.2 mg/dL    Glucose 186 (H) 70 - 99 mg/dL   CBC with platelets and differential   Result Value Ref Range    WBC Count 12.0 (H) 4.0 - 11.0 10e3/uL    RBC Count 3.71 (L) 3.80 - 5.20 10e6/uL    Hemoglobin 11.0 (L) 11.7 - 15.7 g/dL    Hematocrit 34.8 (L) 35.0 - 47.0 %    MCV 94 78 - 100 fL    MCH 29.6 26.5 - 33.0 pg    MCHC 31.6 31.5 - 36.5 g/dL    RDW 15.4 (H) 10.0 - 15.0 %    Platelet Count 233 150 - 450 10e3/uL    % Neutrophils 83 %    % Lymphocytes 5 %    % Monocytes 9 %    % Eosinophils 2 %    % Basophils 0 %    % Immature Granulocytes 1 %    NRBCs per 100 WBC 0 <1 /100    Absolute Neutrophils 10.0 (H) 1.6 - 8.3 10e3/uL    Absolute Lymphocytes 0.6 (L) 0.8 - 5.3 10e3/uL    Absolute Monocytes 1.0 0.0 - 1.3 10e3/uL    Absolute Eosinophils 0.3 0.0 - 0.7 10e3/uL    Absolute Basophils 0.1 0.0 - 0.2 10e3/uL    Absolute Immature Granulocytes 0.1 <=0.4 10e3/uL    Absolute NRBCs 0.0 10e3/uL       RADIOLOGY:  Reviewed all pertinent imaging. Please see official radiology report.  Head CT w/o contrast   Final Result   IMPRESSION:   1.  No acute intracranial process.               PROCEDURES:  PROCEDURE: Tonometry    INDICATIONS: Right eye pain   PROCEDURE PROVIDER: Dr Sekou Vaughn   CONSENT: Risks, benefits and alternatives were discussed with and Verbal consent was obtained from Patient.   MEDICATIONS: 2 gtts tetracaine to facilitate intraocular pressure testing     DETAILS: Icare tonometery to right eye   COMPLICATIONS: Pressures 40     Repeat tonometry at 3 AM shows right eye IOP of 47 and 49 despite glaucoma eyedrops.     ICherie, am serving as a scribe to document services personally performed by Danyel Vaughn MD based on my observation and the provider's statements to me. I, Dr. Danyel Vaughn, attest that Cherie Larios is acting in a scribe capacity, has observed my performance of the  services and has documented them in accordance with my direction.    Danyel Vaughn MD  Emergency Medicine  [facility] EMERGENCY ROOM  [address] [facility] [facility] [address] [address] [phone]       Danyel Vaughn MD  10/31/23 032

## 2023-10-31 NOTE — ED PROVIDER NOTES
ED Provider Note  [facility]      History     Chief Complaint   Patient presents with    Eye Problem     Pt a transfer from [facility] for right sided glaucoma and Ophthalmology consult.      MARCE Beaulieu is a 63 year old female who has past medical history of end-stage renal disease on dialysis Tuesday Thursday Saturday, glaucoma of the right eye, type 2 diabetes, CHF, CVA presenting with eye pain.  The patient presented to an outside hospital with eye pain found to have elevated pressures in the 40s concerning for acute glaucoma.  The patient was given latanoprost, timolol and dorzolamide without resolution of symptoms or pressures.  The patient was sent here for ophthalmology.  Outside labs show mild hyperkalemia likely in the setting of needing dialysis today.  Head CT also was done that was normal.  Patient continues to have eye pain and decreased vision in the right eye.    Past Medical History  Past Medical History:   Diagnosis Date    Anemia     Cerebral artery occlusion with cerebral infarction (H)     affected left side with weakness in arm and leg    CHF (congestive heart failure) (H)     Congestive heart failure (CHF) (H) 12/17/2018    Diabetes (H)     ESRD on dialysis (H)     HD STARTED 6/2019    Gangrene of toe (H) 4/29/2017    Hemiplegia and hemiparesis following cerebral infarction affecting left dominant side (H)     High cholesterol     Hypertension     Hypertensive cardiomyopathy (H)     Obesity 4/29/2017    PE (pulmonary thromboembolism) (H)     RECURRENT    Personal history of tobacco use, presenting hazards to health 04/29/2017    Renal insufficiency     SOB (shortness of breath)     Toe ulcer, left, with unspecified severity (H) 11/2/2018    Ulcer of toe of left foot (H)     GANGRENE    Uncontrolled type 2 diabetes mellitus with hyperglycemia, with long-term current use of insulin (H) 4/29/2017    VTE (venous thromboembolism)      Past Surgical History:    Procedure Laterality Date    AMPUTATE TOE(S) Left 5/1/2017    Procedure: AMPUTATE TOE(S);  Amputate first and second left toes;  Surgeon: Verna Fofana DPM, Podiatry/Foot and Ankle Surgery;  Location: [facility] OR    AMPUTATE TOE(S) Left 11/3/2018    Procedure: Partial left 3rd toe amputation;  Surgeon: Verna Fofana DPM, Podiatry/Foot and Ankle Surgery;  Location: [facility] OR    AMPUTATE TOE(S)      CREATE FISTULA ARTERIOVENOUS UPPER EXTREMITY Right 2/11/2020    Procedure: RIGHT PROXIMAL RADIAL TO CEPHALIC ARTERIOVENOUS FISTULA CREATION;  Surgeon: Sumanth Corbin MD;  Location:  OR    ESOPHAGOSCOPY, GASTROSCOPY, DUODENOSCOPY (EGD), COMBINED Left 6/23/2020    Procedure: ESOPHAGOGASTRODUODENOSCOPY, WITH biopsies using biopsy forceps;  Surgeon: Elliott Rubi MD;  Location: [facility] GI    IR CVC TUNNEL PLACEMENT > 5 YRS OF AGE  6/4/2019    IR CVC TUNNEL REMOVAL RIGHT  8/18/2020    IR DIALYSIS FISTULOGRAM RIGHT  4/15/2021    IR TUNNELED CATHETER COMPLETE REPLACEMENT  10/2/2019    IR TUNNELED CATHETER COMPLETE REPLACEMENT  10/2/2019    OPEN REDUCTION INTERNAL FIXATION ANKLE  2018    OPEN REDUCTION INTERNAL FIXATION FIBULA Left 3/16/2018    Procedure: OPEN REDUCTION INTERNAL FIXATION FIBULA;  Open reduction and internal fixation of displaced left lateral malleolar fracture;  Surgeon: Sumanth Wen MD;  Location: [facility] OR    REVISION FISTULA ARTERIOVENOUS UPPER EXTREMITY Right 4/28/2020    Procedure: ONLAY VEIN PATCH RIGHT RADIAL TO CEPHALIC FISTULA WITH LIGATION OF SIDE BRANCH ;  Surgeon: Sumanth Corbin MD;  Location:  OR     acetaminophen (TYLENOL) 325 MG tablet  amLODIPine (NORVASC) 10 MG tablet  aspirin (ASA) 325 MG EC tablet  bisacodyl (DULCOLAX) 10 MG suppository  brimonidine (ALPHAGAN) 0.2 % ophthalmic solution  carvedilol (COREG) 25 MG tablet  carvedilol (COREG) 25 MG tablet  clopidogrel (PLAVIX) 75 MG tablet  dorzolamide (TRUSOPT) 2 % ophthalmic solution  furosemide (LASIX) 80 MG  tablet  hydrALAZINE (APRESOLINE) 25 MG tablet  hydrALAZINE (APRESOLINE) 25 MG tablet  insulin aspart (NOVOLOG PEN) 100 UNIT/ML pen  insulin NPH-Regular (HUMULIN 70/30;NOVOLIN 70/30) susp  latanoprost (XALATAN) 0.005 % ophthalmic solution  omeprazole (PRILOSEC OTC) 20 MG EC tablet  ondansetron (ZOFRAN) 4 MG tablet  patiromer (VELTASSA) 8.4 g packet  senna-docusate (SENOKOT-S/PERICOLACE) 8.6-50 MG tablet  sevelamer carbonate (RENVELA) 800 MG tablet  simvastatin (ZOCOR) 20 MG tablet  timolol maleate (TIMOPTIC) 0.5 % ophthalmic solution      Allergies   Allergen Reactions    Heparin Hives     HIT-serotonin assay negative. NOT HIT     Family History  Family History   Problem Relation Age of Onset    Glaucoma No family hx of     Macular Degeneration No family hx of      Social History   Social History     Tobacco Use    Smoking status: Former     Types: Cigarettes     Quit date: 2016     Years since quittin.2    Smokeless tobacco: Never   Substance Use Topics    Alcohol use: No    Drug use: No         A medically appropriate review of systems was performed with pertinent positives and negatives noted in the HPI, and all other systems negative.    Physical Exam      Physical Exam  Physical Exam   Constitutional: oriented to person, place, and time. appears well-developed and well-nourished.   HENT:   Head: Right eye injected, pupil mid dilated, photophobia present.  Neck: Normal range of motion.   Pulmonary/Chest: Effort normal. No respiratory distress.   Cardiac: No murmurs, rubs, gallops. RRR.  Abdominal: Abdomen soft, nontender, nondistended. No rebound tenderness.  MSK: Long bones without deformity or evidence of trauma  Neurological: alert and oriented to person, place, and time.   Skin: Skin is warm and dry.   Psychiatric:  normal mood and affect.  behavior is normal. Thought content normal.       ED Course, Procedures, & Data      Procedures                Results for orders placed or performed during the  hospital encounter of 10/31/23   Head CT w/o contrast     Status: None    Narrative    EXAM: CT HEAD W/O CONTRAST  LOCATION: [facility]  DATE: 10/30/2023    INDICATION: right sided headache  COMPARISON: 10/25/2023  TECHNIQUE: Routine CT Head without IV contrast. Multiplanar reformats. Dose reduction techniques were used.    FINDINGS:  INTRACRANIAL CONTENTS: No intracranial hemorrhage, extraaxial collection, or mass effect.  No CT evidence of acute infarct. Mild presumed chronic small vessel ischemic changes. Mild generalized volume loss. No hydrocephalus.     VISUALIZED ORBITS/SINUSES/MASTOIDS: No intraorbital abnormality. No paranasal sinus mucosal disease. Scattered fluid/membrane thickening in the left mastoid air cells. No apparent mass in the posterior nasopharynx or skull base.    BONES/SOFT TISSUES: No acute abnormality.      Impression    IMPRESSION:  1.  No acute intracranial process.     INR     Status: Normal   Result Value Ref Range    INR 1.11 0.85 - 1.15   Basic metabolic panel     Status: Abnormal   Result Value Ref Range    Sodium 128 (L) 135 - 145 mmol/L    Potassium 5.4 (H) 3.4 - 5.3 mmol/L    Chloride 87 (L) 98 - 107 mmol/L    Carbon Dioxide (CO2) 22 22 - 29 mmol/L    Anion Gap 19 (H) 7 - 15 mmol/L    Urea Nitrogen 63.0 (H) 8.0 - 23.0 mg/dL    Creatinine 9.99 (H) 0.51 - 0.95 mg/dL    GFR Estimate 4 (L) >60 mL/min/1.73m2    Calcium 8.5 (L) 8.8 - 10.2 mg/dL    Glucose 186 (H) 70 - 99 mg/dL   CBC with platelets and differential     Status: Abnormal   Result Value Ref Range    WBC Count 12.0 (H) 4.0 - 11.0 10e3/uL    RBC Count 3.71 (L) 3.80 - 5.20 10e6/uL    Hemoglobin 11.0 (L) 11.7 - 15.7 g/dL    Hematocrit 34.8 (L) 35.0 - 47.0 %    MCV 94 78 - 100 fL    MCH 29.6 26.5 - 33.0 pg    MCHC 31.6 31.5 - 36.5 g/dL    RDW 15.4 (H) 10.0 - 15.0 %    Platelet Count 233 150 - 450 10e3/uL    % Neutrophils 83 %    % Lymphocytes 5 %    % Monocytes 9 %    % Eosinophils 2 %    % Basophils 0  %    % Immature Granulocytes 1 %    NRBCs per 100 WBC 0 <1 /100    Absolute Neutrophils 10.0 (H) 1.6 - 8.3 10e3/uL    Absolute Lymphocytes 0.6 (L) 0.8 - 5.3 10e3/uL    Absolute Monocytes 1.0 0.0 - 1.3 10e3/uL    Absolute Eosinophils 0.3 0.0 - 0.7 10e3/uL    Absolute Basophils 0.1 0.0 - 0.2 10e3/uL    Absolute Immature Granulocytes 0.1 <=0.4 10e3/uL    Absolute NRBCs 0.0 10e3/uL   CBC with platelets + differential     Status: Abnormal    Narrative    The following orders were created for panel order CBC with platelets + differential.  Procedure                               Abnormality         Status                     ---------                               -----------         ------                     CBC with platelets and d...[[id number]]  Abnormal            Final result                 Please view results for these tests on the individual orders.     Medications - No data to display  Labs Ordered and Resulted from Time of ED Arrival to Time of ED Departure - No data to display  No orders to display          Critical care was not performed.     Medical Decision Making  The patient's presentation was of high complexity (an acute health issue posing potential threat to life or bodily function).    The patient's evaluation involved:  ordering and/or review of 3+ test(s) in this encounter (labs, CT from OSH)  discussion of management or test interpretation with another health professional (Dr. Vaughn from outside hospital)    The patient's management necessitated further care after sign-out to Dr. Navas (see their note for further management).    Assessment & Plan    MDM  Patient presenting with exacerbation of acute glaucoma.  She does have a mid fixed pupil that is injected and has elevated eye pressures.  Ophthalmology paged.  Patient has had multiple drops prior to arrival, will discuss ophthalmology regarding further doses.    I have reviewed the nursing notes. I have reviewed the findings, diagnosis, plan and  need for follow up with the patient.    New Prescriptions    No medications on file       Final diagnoses:   Acute glaucoma       Gurjit Avelar MD    [facility] EMERGENCY DEPARTMENT  10/31/2023     Gurjit Avelar MD  10/31/23 0614

## 2023-10-31 NOTE — PROGRESS NOTES
Care Management    10/31: CHW called pt's health insurance transportation line ph. [phone] to schedule wheelchair transport from HD to pt's TRAV. Ride pickup time is 2:30PM. CHW will continue to follow and assist as needed.    [facility]  ICU & ED/OBS  Phone: [phone]

## 2023-10-31 NOTE — TELEPHONE ENCOUNTER
Telephone Note    I was contacted by Dr. Vaughn from  ED regarding this patient on 10/31/23. The following information was obtained via phone call with this provider.      Patient is a 63-year-old female who presents from care facility with right eye pain. Patient was recently seen by our resident/retina providers for PDR OD with DME, NVG, and vit heme. Baseline VA LP in that eye. IOP previously elevate to 70 at that visit.    Patient's past medical history:   On dialysis    Outside provider's exam revealed:   - Visual acuity of LP in the right eye  - Intraocular pressures are 40 on the right     Patient is confused, does not know why she is presenting to ED per report. ED provider planning on giving round of IOP lowering drops as prescribed to the patient.     I conveyed to the consulting physician that I could provide some general thoughts regarding this patient but that a formal consult and evaluation would be necessary for me to provide an active role in the patient's care. Given the reported examination findings, I emphasized the importance of ophthalmology evaluation. I offered to see the patient in our ED today. As alternatives, I also offered the patient follow up with the eye clinic this week, based on provider comfort. I conveyed to the provider that if there was any concern about the patient's care or my suggestions, the patient should be sent to our ED for evaluation right away.      Following communication with the patient, the provider and will try to reach out to guardian and lower iop with home drops. If any concerns, will transfer to our ED for ophthalmology evaluation.     Simi Kraft MD  Ophthalmology Resident, PGY-3

## 2023-10-31 NOTE — CONSULTS
OPHTHALMOLOGY CONSULT NOTE  10/31/23    Patient: Roxy Beaulieu    ASSESSMENT/PLAN:     Roxy Beaulieu is a 63 year old female who presented with angle closure crisis due to angle closure glaucoma in the right eye.     #Acute angle closure crisis, right eye  #Hx Angle closure crisis, right eye  #Hx Neovascular glaucoma, right eye  Right eye is seeing HM today (stable). Patient was originally scheduled to be seen 10/30 (yesterday) in the resident clinic for Laser peripheral iridotomy (LPI) but after not showing up to clinic and her long term care facility could not be reached after multiple attempts by phone, she instead presented to the emergency department 10/31/23 with worsened pain and redness of the right eye. On exam, pressure was 48 by tonopen and vision was HM (at baseline) with substantial injection and tenderness to gentle pressure or manipulation of the eyelid. After three rounds of max antitensive drops, the patient was prescribed acetazolamide 250 mg IV. This regimen lowered her intraocular pressure to 33. Given that this is the same regimen she was supposed to be receiving prior to arrival, as well as the missed clinic visit and inability to follow up with long term care facility, there is concern that this is not     Plan:   - Follow up in resident clinic for Laser peripheral iridotomy (LPI) today or at next available opening   - Continue prior antitensive regimen:   -Timolol 0.5% 1 drop affected eye BID  -Brimonidine 0.1% 1 drop affected eye BID   -Dorzolamide 2% 1 drop affected eye BID  -Latanoprost 0.005% 1 drop affected eye daily  -Diamox sequels 250 BID  (half dose because patient is on dialysis)    #Monocular precautions, left eye  #Proliferative diabetic retinopathy, left eye  #Vitreous hemorrhage, left eye  VA 20/50 today, stable. No flashes or floaters. SLE notable for clear cornea, deep and quiet AC, and well centered CE IOL. Dilated exam notable for trace vitreous hemorrhages,  and severe diabetic retinopathy.   Above findings are consistent with proliferative diabetic retinopathy and would likely benefit from intravitreal anti-VEGF.  It is our pleasure to participate in this patient's care and treatment. Please contact us with any further questions or concerns.  - Follow up in retina clinic as scheduled for ongoing Avastin injections    Discussed with Dr. Tillman who agreed with this assessment and plan.     Ophthalmology will recommend close clinic follow up. Please contact ophthalmologist on call with any questions or concerns. We appreciate your care of this patient.    Bolivar Jauregui MD, PGY2  Ophthalmology Resident  [facility]    HISTORY OF PRESENTING ILLNESS:     Roxy Beaulieu is a 63 year old female who presented on 10/31/23 with recurrence of angle closure crisis due to known right eye neovascular glaucoma.    H/o DM II: Nonproliferative diabetic retinopathy OS, proliferative diabetic retinopathy OD.     Was seen at [facility] 2/2015, found dense HM PSC cataract OD and mod cataract OS. with some NPDR noted OS. Had CE/IOL OU 2015 and on 5/2015  VA OD was 20/40 and OS 20/30, no DFE recorded at      On 6/2019 at [facility] found HM OD with VH, OS with severe NPDR and DME , VA OS 20/50. was advised to see retina, did not. Lost to follow-up    Last seen 10/27/2023 with Dr. Holden, s/p Avastin injection OU.  Plan was to follow up this 10/30/2023 (yesterday) for FA OU transit OS Optos, U/S OD, OCT OU but the patient no-showed and her nursing home could not be reached after multiple attempts by phone. Today she started to develop symptoms of acute angle closure and instead presented to the emergency department.    10+ review of systems were otherwise negative except for that which has been stated above.      OCULAR/MEDICAL/SURGICAL HISTORIES:     Past Ocular History:   Last eye exam: 10/27/2023   Previously diagnosed ocular conditions:   - Left eye:  Proliferative diabetic retinopathy, DME  - Right eye: Proliferative diabetic retinopathy, Neovascularization of the iris, Neovascular glaucoma, vitreous hemorrhage (uncertain duration, ?chronic since 2019)  Prior eye surgery/laser: s/p Avastin injection x 1 OU (10/27/2023)  Contact lens wear: NONE  Glasses: wears  Eyedrops: dorzolamide, timolol, brimonidine    Pertinent Systemic Medications:   Current Facility-Administered Medications   Medication    acetaZOLAMIDE (DIAMOX) injection 250 mg    bevacizumab (AVASTIN) intravitreal inj 1.25 mg    bevacizumab (AVASTIN) intravitreal inj 1.25 mg    brimonidine (ALPHAGAN) 0.2 % ophthalmic solution 1 drop    calcium carbonate (TUMS) chewable tablet 500 mg    dorzolamide (TRUSOPT) 2 % ophthalmic solution 1 drop    latanoprost (XALATAN) 0.005 % ophthalmic solution 1 drop    timolol maleate (TIMOPTIC) 0.5 % ophthalmic solution 1 drop     Current Outpatient Medications   Medication    acetaminophen (TYLENOL) 325 MG tablet    amLODIPine (NORVASC) 10 MG tablet    aspirin (ASA) 325 MG EC tablet    bisacodyl (DULCOLAX) 10 MG suppository    brimonidine (ALPHAGAN) 0.2 % ophthalmic solution    carvedilol (COREG) 25 MG tablet    carvedilol (COREG) 25 MG tablet    clopidogrel (PLAVIX) 75 MG tablet    dorzolamide (TRUSOPT) 2 % ophthalmic solution    furosemide (LASIX) 80 MG tablet    hydrALAZINE (APRESOLINE) 25 MG tablet    hydrALAZINE (APRESOLINE) 25 MG tablet    insulin aspart (NOVOLOG PEN) 100 UNIT/ML pen    insulin NPH-Regular (HUMULIN 70/30;NOVOLIN 70/30) susp    latanoprost (XALATAN) 0.005 % ophthalmic solution    omeprazole (PRILOSEC OTC) 20 MG EC tablet    ondansetron (ZOFRAN) 4 MG tablet    patiromer (VELTASSA) 8.4 g packet    senna-docusate (SENOKOT-S/PERICOLACE) 8.6-50 MG tablet    sevelamer carbonate (RENVELA) 800 MG tablet    simvastatin (ZOCOR) 20 MG tablet    timolol maleate (TIMOPTIC) 0.5 % ophthalmic solution       Past Medical History:  Past Medical History:   Diagnosis  Date    Anemia     Cerebral artery occlusion with cerebral infarction (H)     affected left side with weakness in arm and leg    CHF (congestive heart failure) (H)     Congestive heart failure (CHF) (H) 12/17/2018    Diabetes (H)     ESRD on dialysis (H)     HD STARTED 6/2019    Gangrene of toe (H) 4/29/2017    Hemiplegia and hemiparesis following cerebral infarction affecting left dominant side (H)     High cholesterol     Hypertension     Hypertensive cardiomyopathy (H)     Obesity 4/29/2017    PE (pulmonary thromboembolism) (H)     RECURRENT    Personal history of tobacco use, presenting hazards to health 04/29/2017    Renal insufficiency     SOB (shortness of breath)     Toe ulcer, left, with unspecified severity (H) 11/2/2018    Ulcer of toe of left foot (H)     GANGRENE    Uncontrolled type 2 diabetes mellitus with hyperglycemia, with long-term current use of insulin (H) 4/29/2017    VTE (venous thromboembolism)        Past Surgical History:   Past Surgical History:   Procedure Laterality Date    AMPUTATE TOE(S) Left 5/1/2017    Procedure: AMPUTATE TOE(S);  Amputate first and second left toes;  Surgeon: Verna Fofana DPM, Podiatry/Foot and Ankle Surgery;  Location: [facility] OR    AMPUTATE TOE(S) Left 11/3/2018    Procedure: Partial left 3rd toe amputation;  Surgeon: Verna Fofana DPM, Podiatry/Foot and Ankle Surgery;  Location: [facility] OR    AMPUTATE TOE(S)      CREATE FISTULA ARTERIOVENOUS UPPER EXTREMITY Right 2/11/2020    Procedure: RIGHT PROXIMAL RADIAL TO CEPHALIC ARTERIOVENOUS FISTULA CREATION;  Surgeon: Sumanth Corbin MD;  Location:  OR    ESOPHAGOSCOPY, GASTROSCOPY, DUODENOSCOPY (EGD), COMBINED Left 6/23/2020    Procedure: ESOPHAGOGASTRODUODENOSCOPY, WITH biopsies using biopsy forceps;  Surgeon: Elliott Rubi MD;  Location:  GI    IR CVC TUNNEL PLACEMENT > 5 YRS OF AGE  6/4/2019    IR CVC TUNNEL REMOVAL RIGHT  8/18/2020    IR DIALYSIS FISTULOGRAM RIGHT  4/15/2021    IR TUNNELED CATHETER  COMPLETE REPLACEMENT  10/2/2019    IR TUNNELED CATHETER COMPLETE REPLACEMENT  10/2/2019    OPEN REDUCTION INTERNAL FIXATION ANKLE  2018    OPEN REDUCTION INTERNAL FIXATION FIBULA Left 3/16/2018    Procedure: OPEN REDUCTION INTERNAL FIXATION FIBULA;  Open reduction and internal fixation of displaced left lateral malleolar fracture;  Surgeon: Sumanth Wen MD;  Location: [facility] OR    REVISION FISTULA ARTERIOVENOUS UPPER EXTREMITY Right 4/28/2020    Procedure: ONLAY VEIN PATCH RIGHT RADIAL TO CEPHALIC FISTULA WITH LIGATION OF SIDE BRANCH ;  Surgeon: Sumanth Corbin MD;  Location: [facility] OR       Family History:   No history of macular degeneration or glaucoma    Social History:   No tobacco use    EXAMINATION:     Base Eye Exam       Visual Acuity (Snellen - Linear)         Right Left    Near sc HM 20/50              Tonometry (Tonopen, 8:54 AM)         Right Left    Pressure 49 14              Tonometry #2 (Tonopen, 8:55 AM)         Right Left    Pressure 46               Pupils         Shape React APD    Right Round Minimal Yes    Left Round Brisk None              Visual Fields         Left Right     Full     Restrictions  Total superior temporal, inferior temporal, superior nasal, inferior nasal deficiencies              Extraocular Movement         Right Left     Full, Ortho Full, Ortho              Neuro/Psych       Oriented x3: Yes    Mood/Affect: Normal              Dilation       Left eye: 1.0% Mydriacyl, 2.5% Tutu Synephrine @ 8:55 AM                  Slit Lamp and Fundus Exam       External Exam         Right Left    External Normal Normal              Slit Lamp Exam         Right Left    Lids/Lashes Protective ptosis; heavy dermatochalasis Normal    Conjunctiva/Sclera 360 injection, inferior chemosis White and quiet    Cornea diffuse 1+ microcystic edema with temporal and nasal trace D folds Clear    Anterior Chamber Shallow; No AC cell or flare Deep and quiet    Iris NVI, round Dilated    Lens PC IOL  centered PC IOL centered    Anterior Vitreous diffuse vit heme syneresis, mild inferior VH              Fundus Exam         Right Left    Disc no view Normal, flat    C/D Ratio  0.3    Macula  Blunted FLR, scattered DBH and MAs    Vessels  Mildly attenuated, normal course    Periphery  DIffuse DHB, MA's in all quadrants                    Labs/Studies/Imaging Performed  none     Bolivar Jauregui MD  Resident Physician, PGY2  Department of Ophthalmology  10/31/23 8:18 AM

## 2023-10-31 NOTE — ED TRIAGE NOTES
"Pt reports that she is having severe headache. Says her right eye has been hurting for a few years, but was diagnosed with glaucoma. She keeps stating that \"I do not know why I am here\". She is going back and forth stating that it is her glaucoma right eye is the pain, but than states that it is her headache. HX of stroke. Pt is intermittently confused. EMS and her assisted living reports that she is normally not confused.   Pt is a dialysis patient, tomorrow she has it scheduled.    Triage Assessment (Adult)       Row Name 10/30/23 2267          Triage Assessment    Airway WDL WDL        Respiratory WDL    Respiratory WDL WDL        Skin Circulation/Temperature WDL    Skin Circulation/Temperature WDL WDL        Cardiac WDL    Cardiac WDL WDL        Cognitive/Neuro/Behavioral WDL    Cognitive/Neuro/Behavioral WDL X     Level of Consciousness confused        Bim Coma Scale    Best Eye Response 4-->(E4) spontaneous     Best Motor Response 6-->(M6) obeys commands     Best Verbal Response 4-->(V4) confused     Bim Coma Scale Score 14                     "

## 2023-10-31 NOTE — ED NOTES
EMS transport arrived. Pt became nauseated after transferring from bed to stretcher. ODT zofran given.

## 2023-11-03 NOTE — NURSING NOTE
"Chief Complaints and History of Present Illnesses   Patient presents with    Follow Up     1 week follow up Neovascular glaucoma of right eye, severe stage     Chief Complaint(s) and History of Present Illness(es)       Follow Up              Laterality: right eye    Associated symptoms: eye pain and tearing.  Negative for flashes and floaters    Treatments tried: no treatments    Pain scale: 6/10    Comments: 1 week follow up Neovascular glaucoma of right eye, severe stage              Comments    Pt states she can't see through right eye.   Stabbing eye pain (6/10) right eye \"every time I blink\" with tearing  No flashes or floaters.  No complaints left eye .     Timoteo Rodriguez 10:23 AM November 3, 2023                    "

## 2023-11-03 NOTE — PROGRESS NOTES
"Ophthalmology Acute Clinic    Date of Visit: 11/03/23     Chief Complaint(s) and History of Present Illness(es)       Follow Up    In right eye.  Associated symptoms include eye pain and tearing.  Negative for flashes and floaters.  Treatments tried include no treatments.  Pain was noted as 6/10. Additional comments: 1 week follow up Neovascular glaucoma of right eye, severe stage             Comments    Pt states she can't see through right eye.   Stabbing eye pain (6/10) right eye \"every time I blink\" with tearing  No flashes or floaters.  No complaints left eye .     Timoteo Ho 10:23 AM November 3, 2023                      HPI:   Roxy Beaulieu is a 63 year old female who presents from the hospital for elevated eye pressures concerning for acute angle closure glaucoma.     63 F pmhx stroke presented to outside ED for intense intractable headache and pain behind the right eye. Outside hospital's assessment was that this was an acute endophthalmitis or orbital cellulitis. States that since her stroke she has poor short term memory and does not remember the duration of her symptoms. Presents with painful, unilateral vision loss in conjunction with headache and IOP of 75 in right eye.     Interval updates:  10/26/23 - Continues to deny pain. No improvement in vision. Pressure today is mid-40's, so it is continuing to downtrend, though at a slower rate than would be preferred. Can follow up in clinic tomorrow for Laser peripheral iridotomy (LPI).   10/27/23 - No pain. No change in vision. IOP today 20's. Okay to discharge home.  10/31/23 - pt presented again to the ED with high IOP, restarted on 250 mg diamox  11/03/23  - doing well today. Some stinging pain in the right eye but no deep pain or headache/nausea/vomiting. No new concerns.     Review of systems for the eyes was negative other than the pertinent positives/negatives listed in the HPI.        Past Ocular history:   - Most recent Eye Exam: over 5 years " ago, per patient  - Glasses: no  - Contact lens wear: no  - Ocular Surgical History: CE IOL in both eyes over 5 years ago    Roxy Beaulieu is a  63 year old year-old patient with history of presumed PDR OU, presented to ED with severe NVG OD and severe pain OD.  U/S OD VH no RD/CD per notes.Given gtts and referred to retina clinic after seeing resident clinic Dr. Roman. Extensive NVA found on gonio OD. Initial IOP OD 75    H/o DM II   Was seen at [facility] 2/2015, found dense HM PSC cataract OD and mod cataract OS. with some NPDR noted OS. Had CE/IOL OU 2015 and on 5/2015  VA OD was 20/40 and OS 20/30, no DFE recorded at     On 6/2019 at [facility] found HM OD with VH, OS with severe NPDR and DME , VA OS 20/50. was advised to see retina, did not. Lost to follow-up    PMH:   Past Medical History:   Diagnosis Date    Anemia     Cerebral artery occlusion with cerebral infarction (H)     affected left side with weakness in arm and leg    CHF (congestive heart failure) (H)     Congestive heart failure (CHF) (H) 12/17/2018    Diabetes (H)     ESRD on dialysis (H)     HD STARTED 6/2019    Gangrene of toe (H) 4/29/2017    Hemiplegia and hemiparesis following cerebral infarction affecting left dominant side (H)     High cholesterol     Hypertension     Hypertensive cardiomyopathy (H)     Obesity 4/29/2017    PE (pulmonary thromboembolism) (H)     RECURRENT    Personal history of tobacco use, presenting hazards to health 04/29/2017    Renal insufficiency     SOB (shortness of breath)     Toe ulcer, left, with unspecified severity (H) 11/2/2018    Ulcer of toe of left foot (H)     GANGRENE    Uncontrolled type 2 diabetes mellitus with hyperglycemia, with long-term current use of insulin (H) 4/29/2017    VTE (venous thromboembolism)      FH: No glaucoma or AMD.     Imaging:  UBM 11/03/23   Left eye - formed chambers, IOL in place, vitreous opacities consistent with heme, no evidence of retinal detachment or mass  lesions.     Fluorescein angiography, left eye 11/03/23    transit time. Diffuse MA's and pinpoint leakage. No areas of neovascularization.     Fundus photos 11/03/23   Right eye - poor view  Left eye - clear view, nerve pink without cupping or heme, macula with numerous MA's and DBH; Diffuse MA's and DBH in all quadrants of the periphery. No identified holes, tears, or neovascularization.     Autofluorescence photos 11/03/23   Right eye - poor view  Left eye - consistent with fundus photos and exam    OCT macula 11/03/23   Right eye - no view  Left eye - clear media, detached hyaloid face      Assessment & Plan      Roxy Beaulieu is a 63 year old female with the following diagnoses:   1. Neovascular glaucoma of right eye, severe stage    2. Vitreous hemorrhage, right (H)    3. 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 Nonproliferative diabetic retinopathy of left eye (H)    5. Dry eyes, bilateral       Patient presented to the ED 10/15/23 with findings concerning for angle closure crisis in the RIGHT eye with IOP up to 75. IOP improved but still elevated on max medical therapy and Diamox 250 sequels.   Visual Acuity: LP with projection, no change.   Gonioscopy: severe neovascularization of the angle 360 degrees.  Anterior exam with improved corneal edema, moderate depth quiet AC, neovascularization of the iris, IOL well centered, and vitreous hemorrhage.  Fundus exam with poor view, vitreous hemorrhage.    #Neovascular glaucoma of the right eye, unknown etiology  NV in the angle 360 degrees  VA LP with projection. Unknown baseline but was 20/40 BCVA in 2015 per chart review.   IOP max: 75  IOP today: 37  Suspect NV as cause of elevated IOP, however unclear etiology. The left eye has only mod-severe NPDR, making diabetes a less likely cause of NV. Suspect prior retinal vein/artery occlusion right eye, but unknown due to obscured view from vitreous hemorrhage.      #Proliferative diabetic retinopathy, right eye  #Vitreous hemorrhage, right eye  s/p avastin 10/27/23  Due to having only moderate-severe NPDR in the left eye, suspect the etiology of neovascularization potentially RVO/SERRANO as opposed to diabetes, though unclear at this time    #Mod-Severe non-proliferative diabetic retinopathy, left eye  #Possible small vitreous hemorrhage, left eye  VA 20/50, stable. No flashes or floaters.   s/p Avastin 10/27/23 due to concern for PDR with small vit heme  FA 11/03/23 revealed no evidence of NV, most consistent with mod-severe NPDR.       Plan:  -r/b/a of PPV right eye with Dr. Holden discussed in order to clear dense vitreous hemorrhage, in hopes of allowing better assessment of visual potential and etiology of neovascular glaucoma.    Patient wishes to proceed   H&P from recent hospital admission   Taking ASA only   Patient available any day except Tues/Thurs/Sat (dialysis days)  -Continue Timolol 0.5% 1 drop affected eye BID  -Continue Brimonidine 0.1% 1 drop affected eye BID   -Continue Dorzolamide 2% 1 drop affected eye BID  -Continue Latanoprost 0.005% 1 drop affected eye daily  -Continue Diamox sequels 250 mg daily  -Start artificial tear ointment at bedtime for comfort  -Glaucoma referral following PPV right eye    Return and RD precautions discussed     Patient disposition:   -Retina surgical scheduler to assist in planning PPV right eye, possible Monday 10/6    Patient seen with Dr. Adina Roman MD  Resident Physician, PGY-2  Department of Ophthalmology

## 2023-11-03 NOTE — PATIENT INSTRUCTIONS
Continue all current eye drops and diamox as previously prescribed.   Start artificial tear ointment at bedtime, both eyes.   Will contact you for surgery scheduling.

## 2023-11-06 NOTE — TELEPHONE ENCOUNTER
Patient is schedule for surgery with: Dr. Holden    Surgery Date: 11/10/23     Location: [facility]    H&P: to be completed by by nursing home doctor     Post-op: 1 day (pt not available at dialysis), 11/13, 12/4    Patient will receive a phone call from pre-admission nurses 1-2 days prior to surgery with arrival time and NPO instructions.    Patient aware times are subject to change up until day before surgery.     Patient questions/concerns: N/A     Surgery packet was sent via US mail  11/6/23      Sariah Thurman on 11/6/2023 at 9:11 AM

## 2023-11-06 NOTE — PROGRESS NOTES
CC -   PDR OU    INTERVAL HISTORY - No changes since JONATHAN with me, tolerated injections last visit well       PMH -   Roxy Beaulieu is a  63 year old year-old patient with history of presumed PDR OU, presented to ED with severe NVG OD and severe pain OD.  U/S OD VH no RD/CD per notes.Given gtts and referred to retina clinic after seeing resident clinic Dr. Roman. Extensive NVA found on gonio OD. Initial IOP OD 75    H/o DM II   Was seen at [facility] 2/2015, found dense HM PSC cataract OD and mod cataract OS. with some NPDR noted OS. Had CE/IOL OU 2015 and on 5/2015  VA OD was 20/40 and OS 20/30, no DFE recorded at     On 6/2019 at [facility] found HM OD with VH, OS with severe NPDR and DME , VA OS 20/50. was advised to see retina, did not. Lost to follow-up      PAST OCULAR SURGERY  CE/IOL OU ~ 2015 (Natali @ [facility])      RETINAL IMAGING:  U/S B-scan 11/3/23  OD - VH, partial PVD, no RD/CD/mass      FA 11/3/23  OD - unable  OS - mod macular ischemia, mod peripheral ischemia, no NV        ASSESSMENT & PLAN    # Likely CRVO OD   - cannot r/o PDR but less likely  based on FA of OS 11/3/23   - unclear onset, patient poor historian   - needs PRP Tx for long term maintenance to prevent recurrent NVG and pain        - likely ischemic, cause of NVG OD   - s/p Avastin 10/27/23   - unable to give PRP at present d/t VH   - will plan PPV/EL with Avastin next week   - uncertain vision potential, has APD     - patient signs her own consent forms per patient & per Epic     - r/b/a d/w patient: vision loss, blindness, infection, bleeding   - retinal detachment, need for more surgeries, need for gas or oil bubble and bubble restrictions   - diplopia, refractive change   - persistent blurriness, distortion, or scotoma   - participation/performance by fellow or resident        # NVG OD   - presented to outside ED and [facility] 10/25/23  - initial IOP 70s  - extensive NVA  - seen by Patrick 10/25/23 in acute  clnic  - IOP initially improved with MMT  - started Avastin 10/27/23   - IOP higher today but no pain        # VH OD   - uncertain duration   - noted in 2019, uncertain if cleared & recurrent or chronic   - heme not dehemoglobulinized     - plan PPV        # Severe  NPDR with DM II and DME OS   - FA 11/3/23 shows moderate peripheral ischemia   - may be masked by Avastin 10/27/23     - monitor        # DME OS   - s/p Avastin 10/27/23   - mild today   - monitor      # VH OS   - very mild, ?etiology, no RD/RT   - no NV on FA 10/27/23 but ?masked by Avastin 10/27/23        return to clinic: Surgery next Friday       ATTESTATION     Attending Attestation:     Complete documentation of historical and exam elements from today's encounter can be found in the full encounter summary report (not reduplicated in this progress note).  I personally obtained the chief complaint(s) and history of present illness.  I confirmed and edited as necessary the review of systems, past medical/surgical history, family history, social history, and examination findings as documented by others; and I examined the patient myself.  I personally reviewed the relevant tests, images, and reports as documented above.  I personally reviewed the ophthalmic test(s) associated with this encounter, agree with the interpretation(s) as documented by the resident/fellow, and have edited the corresponding report(s) as necessary.   I formulated and edited as necessary the assessment and plan and discussed the findings and management plan with the patient and family    Trish Holden MD, PhD  , Vitreoretinal Surgery  Department of Ophthalmology  [facility]

## 2023-11-07 NOTE — ED TRIAGE NOTES
Triage Assessment & Note:    There were no vitals taken for this visit.      Patient presents with: Pt with hx of right eye issues (surgery scheduled for later this month) BIBA ([facility]) from assisted living for increase right pain, headache, and hypertension.   4 zofran  2.5 morphine  Bg 201  No reports of fever, cough, SOB, CP, or travel.      Home Treatments/Remedies: Home medications    Febrile / Afebrile: afebrile    Duration of C/o: 2 wks    Treasure Perez RN  November 7, 2023

## 2023-11-08 NOTE — DISCHARGE INSTRUCTIONS
-Continue Timolol 0.5% 1 drop affected eye BID  -Increase Brimonidine 0.1% 1 drop affected eye TID   -Continue Dorzolamide 2% 1 drop affected eye BID  -Continue Latanoprost 0.005% 1 drop affected eye nightly  -Increase Diamox sequels 250 mg twice daily  -Follow up with Dr. Holden for anticipated procedure 11/10/23

## 2023-11-08 NOTE — ED PROVIDER NOTES
ED Provider Note  [facility]      History     Chief Complaint   Patient presents with    Eye Pain    Headache    Hypertension     HPI  Roxy Beaulieu is a 63 year old female who presents with right eye pain and headache. Patient has a known history of glaucoma of the right eye with elevated eye pressures and has had multiple visits for this issue. She also has a history of ESRD and is on hemodialysis T/Th/Sat. She presented today for right eye pain and headache. This is a chronic issue for her and is worse today. She has been getting dialysis as scheduled per her report. No fevers, chills, chest pain, neck pain, weakness, numbness, head trauma, or other acute new complaints at this time. She has progressive chronic vision loss in right eye and chronic pain. Today's pain is acute on chronic and similar to prior.     Past Medical History  Past Medical History:   Diagnosis Date    Anemia     Cerebral artery occlusion with cerebral infarction (H)     affected left side with weakness in arm and leg    CHF (congestive heart failure) (H)     Congestive heart failure (CHF) (H) 12/17/2018    Diabetes (H)     Dialysis patient (H24)     ESRD on dialysis (H)     HD STARTED 6/2019    Gangrene of toe (H) 04/29/2017    Hemiplegia and hemiparesis following cerebral infarction affecting left dominant side (H)     High cholesterol     Hypertension     Hypertensive cardiomyopathy (H)     Obesity 04/29/2017    PE (pulmonary thromboembolism) (H)     RECURRENT    Personal history of tobacco use, presenting hazards to health 04/29/2017    Renal insufficiency     SOB (shortness of breath)     Toe ulcer, left, with unspecified severity (H) 11/02/2018    Ulcer of toe of left foot (H)     GANGRENE    Uncontrolled type 2 diabetes mellitus with hyperglycemia, with long-term current use of insulin (H) 04/29/2017    VTE (venous thromboembolism)      Past Surgical History:   Procedure Laterality Date    AMPUTATE  TOE(S) Left 5/1/2017    Procedure: AMPUTATE TOE(S);  Amputate first and second left toes;  Surgeon: Verna Fofana DPM, Podiatry/Foot and Ankle Surgery;  Location: [facility] OR    AMPUTATE TOE(S) Left 11/3/2018    Procedure: Partial left 3rd toe amputation;  Surgeon: Verna Fofana DPM, Podiatry/Foot and Ankle Surgery;  Location: [facility] OR    AMPUTATE TOE(S)      CREATE FISTULA ARTERIOVENOUS UPPER EXTREMITY Right 2/11/2020    Procedure: RIGHT PROXIMAL RADIAL TO CEPHALIC ARTERIOVENOUS FISTULA CREATION;  Surgeon: Sumanth Corbin MD;  Location: [facility] OR    ESOPHAGOSCOPY, GASTROSCOPY, DUODENOSCOPY (EGD), COMBINED Left 6/23/2020    Procedure: ESOPHAGOGASTRODUODENOSCOPY, WITH biopsies using biopsy forceps;  Surgeon: Elliott Rubi MD;  Location: [facility] GI    IR CVC TUNNEL PLACEMENT > 5 YRS OF AGE  6/4/2019    IR CVC TUNNEL REMOVAL RIGHT  8/18/2020    IR DIALYSIS FISTULOGRAM RIGHT  4/15/2021    IR TUNNELED CATHETER COMPLETE REPLACEMENT  10/2/2019    IR TUNNELED CATHETER COMPLETE REPLACEMENT  10/2/2019    OPEN REDUCTION INTERNAL FIXATION ANKLE  2018    OPEN REDUCTION INTERNAL FIXATION FIBULA Left 3/16/2018    Procedure: OPEN REDUCTION INTERNAL FIXATION FIBULA;  Open reduction and internal fixation of displaced left lateral malleolar fracture;  Surgeon: Sumanth Wen MD;  Location: [facility] OR    REVISION FISTULA ARTERIOVENOUS UPPER EXTREMITY Right 4/28/2020    Procedure: ONLAY VEIN PATCH RIGHT RADIAL TO CEPHALIC FISTULA WITH LIGATION OF SIDE BRANCH ;  Surgeon: Sumanth Corbin MD;  Location:  OR     brimonidine (ALPHAGAN) 0.2 % ophthalmic solution  acetaminophen (TYLENOL) 325 MG tablet  amLODIPine (NORVASC) 10 MG tablet  aspirin (ASA) 325 MG EC tablet  bisacodyl (DULCOLAX) 10 MG suppository  carvedilol (COREG) 25 MG tablet  carvedilol (COREG) 25 MG tablet  clopidogrel (PLAVIX) 75 MG tablet  dorzolamide (TRUSOPT) 2 % ophthalmic solution  furosemide (LASIX) 80 MG tablet  hydrALAZINE (APRESOLINE) 25 MG  "tablet  hydrALAZINE (APRESOLINE) 25 MG tablet  insulin aspart (NOVOLOG PEN) 100 UNIT/ML pen  insulin NPH-Regular (HUMULIN 70/30;NOVOLIN 70/30) susp  latanoprost (XALATAN) 0.005 % ophthalmic solution  omeprazole (PRILOSEC OTC) 20 MG EC tablet  ondansetron (ZOFRAN) 4 MG tablet  patiromer (VELTASSA) 8.4 g packet  senna-docusate (SENOKOT-S/PERICOLACE) 8.6-50 MG tablet  sevelamer carbonate (RENVELA) 800 MG tablet  simvastatin (ZOCOR) 20 MG tablet  timolol maleate (TIMOPTIC) 0.5 % ophthalmic solution      Allergies   Allergen Reactions    Heparin Hives     HIT-serotonin assay negative. NOT HIT     Family History  Family History   Problem Relation Age of Onset    Glaucoma No family hx of     Macular Degeneration No family hx of      Social History   Social History     Tobacco Use    Smoking status: Former     Types: Cigarettes     Quit date: 2016     Years since quittin.3    Smokeless tobacco: Never   Substance Use Topics    Alcohol use: No    Drug use: No      Past medical history, past surgical history, medications, allergies, family history, and social history were reviewed with the patient. No additional pertinent items.      A complete review of systems was performed with pertinent positives and negatives noted in the HPI, and all other systems negative.    Physical Exam   BP: (!) 180/91  Pulse: 78  Temp: 97.6  F (36.4  C)  Resp: 16  Height: 172.7 cm (5' 8\")  Weight: 91.2 kg (201 lb)  SpO2: 97 %  Physical Exam  Vitals reviewed.   Constitutional:       Appearance: Normal appearance.   HENT:      Head: Normocephalic and atraumatic.      Nose: Nose normal. No rhinorrhea.      Mouth/Throat:      Mouth: Mucous membranes are dry.   Eyes:      General: No scleral icterus.        Right eye: No discharge.         Left eye: No discharge.      Extraocular Movements: Extraocular movements intact.      Conjunctiva/sclera: Conjunctivae normal.   Cardiovascular:      Rate and Rhythm: Normal rate and regular rhythm. "   Pulmonary:      Effort: Pulmonary effort is normal. No respiratory distress.   Abdominal:      Palpations: Abdomen is soft.      Tenderness: There is no abdominal tenderness.   Musculoskeletal:         General: Normal range of motion.      Cervical back: Normal range of motion and neck supple.   Skin:     General: Skin is warm and dry.   Neurological:      Mental Status: She is alert. Mental status is at baseline.      Sensory: No sensory deficit.      Gait: Gait normal.   Psychiatric:         Mood and Affect: Mood normal.         Behavior: Behavior normal.           ED Course, Procedures, & Data      Evaluated by ophthalmology who     Procedures       A consult was attained from the ophthalmology service.          Results for orders placed or performed during the hospital encounter of 11/07/23   Basic metabolic panel     Status: Abnormal   Result Value Ref Range    Sodium 137 135 - 145 mmol/L    Potassium 5.0 3.4 - 5.3 mmol/L    Chloride 96 (L) 98 - 107 mmol/L    Carbon Dioxide (CO2) 26 22 - 29 mmol/L    Anion Gap 15 7 - 15 mmol/L    Urea Nitrogen 39.3 (H) 8.0 - 23.0 mg/dL    Creatinine 5.06 (H) 0.51 - 0.95 mg/dL    GFR Estimate 9 (L) >60 mL/min/1.73m2    Calcium 9.5 8.8 - 10.2 mg/dL    Glucose 168 (H) 70 - 99 mg/dL   Magnesium     Status: Normal   Result Value Ref Range    Magnesium 2.1 1.7 - 2.3 mg/dL   CBC with platelets and differential     Status: Abnormal   Result Value Ref Range    WBC Count 9.0 4.0 - 11.0 10e3/uL    RBC Count 3.85 3.80 - 5.20 10e6/uL    Hemoglobin 11.4 (L) 11.7 - 15.7 g/dL    Hematocrit 37.3 35.0 - 47.0 %    MCV 97 78 - 100 fL    MCH 29.6 26.5 - 33.0 pg    MCHC 30.6 (L) 31.5 - 36.5 g/dL    RDW 15.8 (H) 10.0 - 15.0 %    Platelet Count 167 150 - 450 10e3/uL    % Neutrophils 84 %    % Lymphocytes 6 %    % Monocytes 7 %    % Eosinophils 1 %    % Basophils 1 %    % Immature Granulocytes 1 %    NRBCs per 100 WBC 0 <1 /100    Absolute Neutrophils 7.6 1.6 - 8.3 10e3/uL    Absolute Lymphocytes 0.5  (L) 0.8 - 5.3 10e3/uL    Absolute Monocytes 0.7 0.0 - 1.3 10e3/uL    Absolute Eosinophils 0.1 0.0 - 0.7 10e3/uL    Absolute Basophils 0.1 0.0 - 0.2 10e3/uL    Absolute Immature Granulocytes 0.1 <=0.4 10e3/uL    Absolute NRBCs 0.0 10e3/uL   CBC with Platelets & Differential     Status: Abnormal    Narrative    The following orders were created for panel order CBC with Platelets & Differential.  Procedure                               Abnormality         Status                     ---------                               -----------         ------                     CBC with platelets and d...[[id number]]  Abnormal            Final result                 Please view results for these tests on the individual orders.     Medications   acetaZOLAMIDE (DIAMOX) tablet 250 mg (250 mg Oral $Given by Other 11/7/23 2141)   brimonidine (ALPHAGAN) 0.2 % ophthalmic solution 1 drop (1 drop Right Eye $Given by Other 11/7/23 2142)   dorzolamide-timolol (COSOPT) ophthalmic solution 1 drop (1 drop Right Eye $Given by Other 11/7/23 2141)   latanoprost (XALATAN) 0.005 % ophthalmic solution 1 drop (1 drop Right Eye $Given by Other 11/7/23 2142)   iopamidol (ISOVUE-370) solution 67 mL (67 mLs Intravenous Not Given 11/8/23 0020)   sodium chloride (PF) 0.9% PF flush 90 mL (90 mLs Intravenous Not Given 11/8/23 0020)     Labs Ordered and Resulted from Time of ED Arrival to Time of ED Departure   BASIC METABOLIC PANEL - Abnormal       Result Value    Sodium 137      Potassium 5.0      Chloride 96 (*)     Carbon Dioxide (CO2) 26      Anion Gap 15      Urea Nitrogen 39.3 (*)     Creatinine 5.06 (*)     GFR Estimate 9 (*)     Calcium 9.5      Glucose 168 (*)    CBC WITH PLATELETS AND DIFFERENTIAL - Abnormal    WBC Count 9.0      RBC Count 3.85      Hemoglobin 11.4 (*)     Hematocrit 37.3      MCV 97      MCH 29.6      MCHC 30.6 (*)     RDW 15.8 (*)     Platelet Count 167      % Neutrophils 84      % Lymphocytes 6      % Monocytes 7      %  Eosinophils 1      % Basophils 1      % Immature Granulocytes 1      NRBCs per 100 WBC 0      Absolute Neutrophils 7.6      Absolute Lymphocytes 0.5 (*)     Absolute Monocytes 0.7      Absolute Eosinophils 0.1      Absolute Basophils 0.1      Absolute Immature Granulocytes 0.1      Absolute NRBCs 0.0     MAGNESIUM - Normal    Magnesium 2.1       No orders to display          Critical care was not performed.     Medical Decision Making  The patient's presentation was of moderate complexity (an acute complicated injury).    The patient's evaluation involved:  review of external note(s) from 1 sources (see separate area of note for details)  discussion of management or test interpretation with another health professional (see separate area of note for details)    The patient's management necessitated moderate risk (prescription drug management including medications given in the ED).    Assessment & Plan    Roxy Beaulieu is a 63 year old female who presents with right eye pain and headache. Patient has a known history of glaucoma of the right eye with elevated eye pressures and has had multiple visits for this issue. She also has a history of ESRD and is on hemodialysis T/Th/Sat. She presented today for right eye pain and headache.  Patient is nontoxic appearing on arrival. Vitals are concerning for elevated BP to 180/91, otherwise within normal limits. I attempted to obtain eye pressures with tonopen however was unsuccessful with our device. Ophthalmology was consulted and evaluated patient. They stated intraocular pressures were elevated, consistent with prior elevations. Acetazolamide and eye drops given with subsequent drop in intraocular pressures and improvement in headache. They recommended CTA head/neck which was ordered. Patient went to CT however she then refused to have the scan performed. She expressed understanding of risks of missing scan and has autonomy to make decisions and refuse CT. Patient  improved in ED and discharged with outpatient follow up and return precautions. Adjustments to eye drop medications and prescriptions made as recommended by ophthalmology service.     I have reviewed the nursing notes. I have reviewed the findings, diagnosis, plan and need for follow up with the patient.    Discharge Medication List as of 11/8/2023 12:20 AM          Final diagnoses:   Glaucoma of right eye, unspecified glaucoma type       Sumanth Francis MD  [facility] EMERGENCY DEPARTMENT  11/7/2023     Sumanth Francis MD  11/11/23 0059

## 2023-11-08 NOTE — ED NOTES
[facility] wheelchair crew arrived to transport pt home.  Pt moved to their wheelchair, paperwork given to crew and pt wheeled out of ED by [facility] crew staff.

## 2023-11-08 NOTE — CONSULTS
OPHTHALMOLOGY CONSULT NOTE  11/07/23    Patient: Roxy Beaulieu    ASSESSMENT/PLAN:     Roxy Beaulieu is a 63 year old female who presented with increased IOP right eye in the setting of known NVG due to unknown etiology.    #Neovascular glaucoma of the right eye, unknown etiology  NV in the angle 360 degrees  VA LP without projection. Unknown baseline but was 20/40 BCVA in 2015 per chart review.   IOP max: 75  IOP today: 73  Suspect NV as cause of elevated IOP, however unclear etiology. The left eye has only mod-severe NPDR, making diabetes a less likely cause of NV. Suspect prior retinal vein/artery occlusion right eye, but unknown due to obscured view from vitreous hemorrhage. Given she is experiencing light-induced amaurosis and she has a history of carotid bifurcation mild stenosis noted in 2019 in the context of asymmetric neovascularization, will obtain CTA head/neck.  Today her pressure was elevated. Given patient's dementia, it is unclear if she is getting her glaucoma medication or at what frequency. She reported her pain was 5/10 on initial evaluation while IOP was 75 but after receiving one round of glaucoma medication her pressure decreased to 1/10. She received several rounds of max medical therapy in the ED (timolol/dorzolamide/brimonidine) and her IOP dropped to 54 and her pain resolved completely. 250mg diamox given when we could not get her IOP below 50 with topical therapy. Her creatinine clearance today was 16. Unclear if she had received her diamox today but per AAO guidelines ok to dose 250mg twice daily.  Following diamox, her IOP decreased to 44 via applanation. At time of discharge she had resolution of eye pain.  -CTA head/neck today  -Continue Timolol 0.5% 1 drop affected eye BID  -Increase Brimonidine 0.1% 1 drop affected eye TID   -Continue Dorzolamide 2% 1 drop affected eye BID  -Continue Latanoprost 0.005% 1 drop affected eye nightly  -Increase Diamox sequels 250 mg twice  daily  -Follow up with Dr. Holden for anticipated procedure 11/10/23    Not addressed today:    #Proliferative diabetic retinopathy, right eye  #Vitreous hemorrhage, right eye  s/p avastin 10/27/23  Due to having only moderate-severe NPDR in the left eye, suspect the etiology of neovascularization potentially RVO/SERRANO as opposed to diabetes, though unclear at this time     #Mod-Severe non-proliferative diabetic retinopathy, left eye  #Possible small vitreous hemorrhage, left eye  VA 20/50, stable. No flashes or floaters.   s/p Avastin 10/27/23 due to concern for PDR with small vit heme  FA 11/03/23 revealed no evidence of NV, most consistent with mod-severe NPDR.     It is our pleasure to participate in this patient's care and treatment. Please contact us with any further questions or concerns.    Discussed with Dr. Matson who agreed with this assessment and plan.     Ophthalmology will sign off. Please contact ophthalmologist on call with any questions or concerns. We appreciate your care of this patient.    Thank you for entrusting us with your care  Kristin Fisher MD, PGY3  Ophthalmology Resident  [facility]    HISTORY OF PRESENTING ILLNESS:     Roxy Beaulieu is a 63 year old female with a history of dementia, diabetes, neovascular glaucoma who presented today with increased pain in the right eye as well as right-sided headaches.    On discussion, Lin was not oriented to situation. She was unclear how she had come here today. She was unclear if the pain today was worse than prior. She is not sure when the pain worsened. She does not remember how many times she received drops today or if she had her pills.    She did, however, remember that she had dialysis today. She also stated that her pain in her right eye was 5/10. She denies any headache. She denied any worsening vision. When asked regarding visual symptoms in response to bright lights she reports photophobia and blackouts of vision  when she is exposure to bright lights.      Ocular history:  2/2015, found dense HM PSC cataract OD and mod cataract OS. with some NPDR noted left eye  5/2015 CEIOL each eye   6/2019 at [facility] found HM OD with VH, OS with severe NPDR and DME, lost to follow up  10/26/23 - Continues to deny pain. No improvement in vision. Pressure today is mid-40's, so it is continuing to downtrend, though at a slower rate than would be preferred. Can follow up in clinic tomorrow for Laser peripheral iridotomy (LPI).   10/27/23 - No pain. No change in vision. IOP today 20's. Okay to discharge home.  10/31/23 - pt presented again to the ED with high IOP, restarted on 250 mg diamox  11/03/23  - doing well . Some stinging pain in the right eye but no deep pain or headache/nausea/vomiting. No new concerns.   11/07/23 - increased eye pain and headache    10+ review of systems were otherwise negative except for that which has been stated above.      OCULAR/MEDICAL/SURGICAL HISTORIES:     Past Ocular History:   Last eye exam: 11/3/23  - Glasses: no  - Contact lens wear: no  - Ocular Surgical History: CE IOL in both eyes over 5 years ago    Pertinent Systemic Medications:     Current Facility-Administered Medications:     bevacizumab (AVASTIN) intravitreal inj 1.25 mg, 1.25 mg, Intravitreal, Q28 Days, Kenan Roman MD, 1.25 mg at 10/27/23 1331    bevacizumab (AVASTIN) intravitreal inj 1.25 mg, 1.25 mg, Intravitreal, Q28 Days, Kenan Roman MD, 1.25 mg at 10/27/23 1331    Current Outpatient Medications:     brimonidine (ALPHAGAN) 0.2 % ophthalmic solution, Place 1 drop into the right eye 3 times daily (Patient taking differently: Place 1 drop into the right eye 2 times daily), Disp: 10 mL, Rfl: 0    acetaminophen (TYLENOL) 325 MG tablet, Take 2 tablets (650 mg) by mouth every 6 hours as needed for mild pain or fever (> 101 F), Disp: , Rfl:     amLODIPine (NORVASC) 10 MG tablet, Take 1 tablet (10 mg) by mouth four times a week (Patient  taking differently: Take 10 mg by mouth daily), Disp: , Rfl:     aspirin (ASA) 325 MG EC tablet, Take 325 mg by mouth every morning, Disp: , Rfl:     bisacodyl (DULCOLAX) 10 MG suppository, Place 1 suppository (10 mg) rectally daily as needed for constipation, Disp: , Rfl:     carvedilol (COREG) 25 MG tablet, Take 1 tablet (25 mg) by mouth every evening, Disp: , Rfl:     carvedilol (COREG) 25 MG tablet, Take 1 tablet (25 mg) by mouth four times a week, Disp: , Rfl:     clopidogrel (PLAVIX) 75 MG tablet, Take 75 mg by mouth daily, Disp: , Rfl:     dorzolamide (TRUSOPT) 2 % ophthalmic solution, Place 1 drop into the right eye 2 times daily, Disp: 10 mL, Rfl: 0    furosemide (LASIX) 80 MG tablet, Take 1 tablet (80 mg) by mouth four times a week (Patient taking differently: Take 80 mg by mouth daily), Disp: , Rfl:     hydrALAZINE (APRESOLINE) 25 MG tablet, Take 25 mg by mouth at bedtime, Disp: , Rfl:     hydrALAZINE (APRESOLINE) 25 MG tablet, Take 25 mg by mouth four times a week Take 1 tablet (25 mg) every afternoon on Sunday, Monday, Wednesday, and Friday at 4 pm., Disp: , Rfl:     insulin aspart (NOVOLOG PEN) 100 UNIT/ML pen, TID w/meals: -249=2U; 250-299=4u; 300-349=6u;350-399=8U; 400 & above 10units, Disp: 9 mL, Rfl: 4    insulin NPH-Regular (HUMULIN 70/30;NOVOLIN 70/30) susp, Inject 18 Units Subcutaneous daily (with breakfast) AND 9 Units daily (with dinner)., Disp: , Rfl:     latanoprost (XALATAN) 0.005 % ophthalmic solution, Place 1 drop into the right eye at bedtime, Disp: 7.5 mL, Rfl: 0    omeprazole (PRILOSEC OTC) 20 MG EC tablet, Take 1 tablet (20 mg) by mouth daily 4:30 pm, Disp: , Rfl:     ondansetron (ZOFRAN) 4 MG tablet, Take 4 mg by mouth every 12 hours as needed for nausea, Disp: , Rfl:     patiromer (VELTASSA) 8.4 g packet, Take 8.4 g by mouth daily On dialysis days at 4:30 pm, Disp: , Rfl:     senna-docusate (SENOKOT-S/PERICOLACE) 8.6-50 MG tablet, Take 1 tablet by mouth 2 times daily as  needed for constipation, Disp: , Rfl:     sevelamer carbonate (RENVELA) 800 MG tablet, Take 1,600 mg by mouth 3 times daily (with meals), Disp: , Rfl:     simvastatin (ZOCOR) 20 MG tablet, Take 1 tablet (20 mg) by mouth At Bedtime, Disp: , Rfl:     timolol maleate (TIMOPTIC) 0.5 % ophthalmic solution, Place 1 drop into the right eye 2 times daily, Disp: 10 mL, Rfl: 0        Past Medical History:  Past Medical History:   Diagnosis Date    Anemia     Cerebral artery occlusion with cerebral infarction (H)     affected left side with weakness in arm and leg    CHF (congestive heart failure) (H)     Congestive heart failure (CHF) (H) 12/17/2018    Diabetes (H)     Dialysis patient (H24)     ESRD on dialysis (H)     HD STARTED 6/2019    Gangrene of toe (H) 04/29/2017    Hemiplegia and hemiparesis following cerebral infarction affecting left dominant side (H)     High cholesterol     Hypertension     Hypertensive cardiomyopathy (H)     Obesity 04/29/2017    PE (pulmonary thromboembolism) (H)     RECURRENT    Personal history of tobacco use, presenting hazards to health 04/29/2017    Renal insufficiency     SOB (shortness of breath)     Toe ulcer, left, with unspecified severity (H) 11/02/2018    Ulcer of toe of left foot (H)     GANGRENE    Uncontrolled type 2 diabetes mellitus with hyperglycemia, with long-term current use of insulin (H) 04/29/2017    VTE (venous thromboembolism)        Past Surgical History:   Past Surgical History:   Procedure Laterality Date    AMPUTATE TOE(S) Left 5/1/2017    Procedure: AMPUTATE TOE(S);  Amputate first and second left toes;  Surgeon: Verna Fofana DPM, Podiatry/Foot and Ankle Surgery;  Location: [facility] OR    AMPUTATE TOE(S) Left 11/3/2018    Procedure: Partial left 3rd toe amputation;  Surgeon: Verna Fofana DPM, Podiatry/Foot and Ankle Surgery;  Location: [facility] OR    AMPUTATE TOE(S)      CREATE FISTULA ARTERIOVENOUS UPPER EXTREMITY Right 2/11/2020    Procedure: RIGHT PROXIMAL RADIAL TO  CEPHALIC ARTERIOVENOUS FISTULA CREATION;  Surgeon: Sumanth Corbin MD;  Location:  OR    ESOPHAGOSCOPY, GASTROSCOPY, DUODENOSCOPY (EGD), COMBINED Left 6/23/2020    Procedure: ESOPHAGOGASTRODUODENOSCOPY, WITH biopsies using biopsy forceps;  Surgeon: Elliott Rubi MD;  Location:  GI    IR CVC TUNNEL PLACEMENT > 5 YRS OF AGE  6/4/2019    IR CVC TUNNEL REMOVAL RIGHT  8/18/2020    IR DIALYSIS FISTULOGRAM RIGHT  4/15/2021    IR TUNNELED CATHETER COMPLETE REPLACEMENT  10/2/2019    IR TUNNELED CATHETER COMPLETE REPLACEMENT  10/2/2019    OPEN REDUCTION INTERNAL FIXATION ANKLE  2018    OPEN REDUCTION INTERNAL FIXATION FIBULA Left 3/16/2018    Procedure: OPEN REDUCTION INTERNAL FIXATION FIBULA;  Open reduction and internal fixation of displaced left lateral malleolar fracture;  Surgeon: Sumanth Wen MD;  Location:  OR    REVISION FISTULA ARTERIOVENOUS UPPER EXTREMITY Right 4/28/2020    Procedure: ONLAY VEIN PATCH RIGHT RADIAL TO CEPHALIC FISTULA WITH LIGATION OF SIDE BRANCH ;  Surgeon: Sumanth Corbin MD;  Location:  OR       Family History:   No history of macular degeneration or glaucoma    Social History:  No tobacco use, previously did smoke    EXAMINATION:     Base Eye Exam       Visual Acuity (Snellen - Linear)         Right Left    Near sc LP without projection               Tonometry (Tonopen, 7:20 PM)         Right Left    Pressure 73 21              Tonometry #2 (Tonopen, 7:41 PM)         Right Left    Pressure 65               Tonometry #3 (Tonopen, 8:18 PM)         Right Left    Pressure 54               Tonometry #4 (Applanation, 2130)         Right Left    Pressure 44               Pupils         React APD    Right Sluggish Yes    Left Brisk None              Visual Fields         Left Right     Full     Restrictions  Total superior temporal, inferior temporal, superior nasal, inferior nasal deficiencies              Extraocular Movement         Right Left     Full Full               Neuro/Psych       Oriented x3: Yes    Mood/Affect: Normal   Disoriented to time, person, situation. She cannot remember why she came in today or how she got here.             Dilation    Dilation deferred right eye due to pressure and left eye due to recent dilated eye exam and no new symptoms left eye. Bscan performed right eye, please see note                 Slit Lamp and Fundus Exam       External Exam         Right Left    External Normal Normal              Slit Lamp Exam         Right Left    Lids/Lashes Protective ptosis; heavy dermatochalasis Normal    Conjunctiva/Sclera 360 ciliary flush, inferior chemosis White and quiet    Cornea diffuse 1+ microcystic edema with nasal and inferior trace D folds Clear    Anterior Chamber Shallow; No AC cell or flare Deep and quiet    Iris NVI, round Dilated    Lens PC IOL centered PC IOL centered    Anterior Vitreous diffuse vit heme syneresis, mild inferior VH              Fundus Exam         Right Left    Disc no view Normal, flat    C/D Ratio  0.3                    Labs/Studies/Imaging Performed  Creatinine 5.06, dialyzed today  B-scan right eye :  Retina flat and attached, mild vitreous debris present, negative for tears or holes, negative for other masses, lesions or elevations.     Thank you for entrusting us with your care  Kiana Fisher MD  Resident Physician, PGY3  Department of Ophthalmology  11/07/23 7:17 PM

## 2023-11-09 NOTE — TELEPHONE ENCOUNTER
[facility] Call Center    Phone Message    May a detailed message be left on voicemail: yes     Reason for Call: Other: Ezio from assisted living states that Dr Holden called and spoke to her this morning and we would like Ezio to reach out to PCP to see if patient can be off blood thinner for 5 days so she can have her surgery. PCP said it's ok but patient does not feel comfortable being off of aspirin and plavix for 5 days since she had 2 strokes. Ezio thinks it'll be a good idea to speak to patient regarding this during Mondays appointment with Dr Holden.    Ezio can be reached at [phone] with questions. Thank you.    Action Taken: Message routed to:  [facility] Eye    Travel Screening: Not Applicable

## 2023-11-09 NOTE — TELEPHONE ENCOUNTER
Patients surgery has been cancelled per Dr. Holden. Post op on 11/13 was kept on per dave Thurman on 11/9/2023 at 9:36 AM

## 2023-11-13 NOTE — TELEPHONE ENCOUNTER
Patient did not come to clinic today. Attempted to reach patient no answer. Spoke to JASE Holguin at Assisted Living facility. Patient missed appt today, on dialysis T/R/Sa, will try to arrange return on W or F.  Ezio confirmed diamox not on med list, unclear when was d/c'd    Patient surgery last Friday cancelled d/t discovering she was on ASA and Plavix, concern for hemorrhagic choroidals d/t high IOP and rapid decompression of eye while on ASA & Plavix (patient had denied being on plavix at last clinc visit)    Patient has significant dementia but no healthcare POA and signs own forms. Eligio'ts sister has other POA and will ask patient if OK to speak to her sister at next visit    Will try to schedule patient to see provider for tube or CPC ASAP, will defer PPV/PRP until can get glaucoma care

## 2023-11-14 PROBLEM — H40.51X4 NEOVASCULAR GLAUCOMA OF RIGHT EYE, INDETERMINATE STAGE: Status: ACTIVE | Noted: 2023-01-01

## 2023-11-14 NOTE — TELEPHONE ENCOUNTER
CORDELIAM to schedule surgery with Dr.Olson Anna C. Schoenecker on 11/14/2023 at 1:34 PM    
[[id number]]

## 2023-11-15 NOTE — PROGRESS NOTES
Chief Complaint   Patient presents with    Follow Up       HPI       Follow Up    In both eyes.  Associated symptoms include Negative for eye pain, headache, nausea, flashes and floaters.  Treatments tried include eye drops.             Comments    Here for follow up. VA is about the same. She stopped using one of the 3 eye drops due to burning; she does not recall name or color bottle. No flashes or floaters. No pain.    Valentino Villa COT 12:23 PM November 15, 2023             Last edited by Valentino Villa on 11/15/2023 12:26 PM.          Assessment & Plan   Roxy Beaulieu is a 63 year old female with the following diagnoses:   Encounter Diagnoses   Name Primary?    Neovascular glaucoma of right eye, indeterminate stage Yes    Vitreous hemorrhage, right (H)     Proliferative diabetic retinopathy of left eye with macular edema associated with diabetes mellitus due to underlying condition (H)        Likely CRVO right eye leading to Neovascular glaucoma and vitreous hemorrhage    Initial IOP 70's 10/25/2023 at [facility]   Extensive NVA on exam   S/p Avastin 10/27/2023  IOP manageable now in high 30's on topicals only  Plan is to perform ECP to lower IOP less precipitously then Dr Holden can do PPV    Plan  - Schedule right eye CPC 11/27  - Continue all eye drops: latanoprost, timolol, dorzolamide; brimonidine causing ocular surface pain and pt wishes to hold off  - Following with Dr Holden as well    Patient disposition: Return for POD#1 after ECP.      [phone]  to contact patient via  of residence    My privilege to be part of your care,  Stuart Tillman MD, MSc  Ophthalmology PGY-4 resident physician    Attending Physician Attestation:  Complete documentation of historical and exam elements from today's encounter can be found in the full encounter summary report (not reduplicated in this progress note).  I personally obtained the chief complaint(s) and history of present illness.  I confirmed and  edited as necessary the review of systems, past medical/surgical history, family history, social history, and examination findings as documented by others; and I examined the patient myself.  I personally reviewed the relevant tests, images, and reports as documented above.  I formulated and edited as necessary the assessment and plan and discussed the findings and management plan with the patient and family. Today with Roxy Beaulieu , I reviewed the indications, risks, benefits, and alternatives of the proposed surgical procedure including, but not limited to, failure obtain the desired result  and need for additional surgery, bleeding, infection, loss of vision, loss of the eye, and the remote possibility of permanent damage to any organ system or death with the use of anesthesia.  I provided multiple opportunities for the questions, answered all questions to the best of my ability, and confirmed that my answers and my discussion were understood.    . - Krish Nguyen MD

## 2023-11-15 NOTE — NURSING NOTE
Chief Complaints and History of Present Illnesses   Patient presents with    Follow Up     Chief Complaint(s) and History of Present Illness(es)       Follow Up              Laterality: both eyes    Associated symptoms: Negative for eye pain, flashes and floaters    Treatments tried: eye drops              Comments    Here for follow up. VA is about the same. She stopped using one of the 3 eye drops due to burning; she does not recall name or color bottle. No flashes or floaters. No pain.    Valentino Villa COT 12:23 PM November 15, 2023

## 2023-11-26 NOTE — ED TRIAGE NOTES
Pt comes from [facility] for IOP in the R eye. R pupil is dilated, sluggish, & pt is unable to see out of it. Pt states this has been since her eye issues have been going on recently. Pt is disoriented to month only. R sclera is reddened. RR even & unlabored. Calm and cooperative. Pleasant. Call light within reach.       Triage Assessment (Adult)       Row Name 11/25/23 2015          Triage Assessment    Airway WDL WDL        Respiratory WDL    Respiratory WDL WDL        Skin Circulation/Temperature WDL    Skin Circulation/Temperature WDL WDL        Cardiac WDL    Cardiac WDL WDL        Peripheral/Neurovascular WDL    Peripheral Neurovascular WDL WDL        Cognitive/Neuro/Behavioral WDL    Cognitive/Neuro/Behavioral WDL WDL

## 2023-11-26 NOTE — ED NOTES
Bed: ED08  Expected date:   Expected time:   Means of arrival:   Comments:  Transfer from [facility]

## 2023-11-26 NOTE — ED PROVIDER NOTES
ED Provider Note  [facility]      History     Chief Complaint   Patient presents with    Eye Problem     The history is provided by the patient and medical records.     Roxy Beaulieu is a 63 year old female with a history of neovascular glaucoma of right eye, DMII, ESRD, and HTN presents to the ED via EMS transfer from [facility] for increased IOP in right eye.  She reports pain to the right eye over the last year which seems worse today.  She also notes diminished vision in the right eye over the last year which seems to worsen today as well and she describes it is blurry and only able to see objects waved in front of her eye.  No trauma.  No fevers.  Patient was seen at outside hospital and was found to have elevated intraocular pressures and was transferred here for ophthalmology evaluation.    Past Medical History  Past Medical History:   Diagnosis Date    Anemia     Cerebral artery occlusion with cerebral infarction (H)     affected left side with weakness in arm and leg    CHF (congestive heart failure) (H)     Congestive heart failure (CHF) (H) 12/17/2018    Diabetes (H)     Dialysis patient (H24)     ESRD on dialysis (H)     HD STARTED 6/2019    Gangrene of toe (H) 04/29/2017    Hemiplegia and hemiparesis following cerebral infarction affecting left dominant side (H)     High cholesterol     Hypertension     Hypertensive cardiomyopathy (H)     Obesity 04/29/2017    PE (pulmonary thromboembolism) (H)     RECURRENT    Personal history of tobacco use, presenting hazards to health 04/29/2017    Renal insufficiency     SOB (shortness of breath)     Toe ulcer, left, with unspecified severity (H) 11/02/2018    Ulcer of toe of left foot (H)     GANGRENE    Uncontrolled type 2 diabetes mellitus with hyperglycemia, with long-term current use of insulin (H) 04/29/2017    VTE (venous thromboembolism)      Past Surgical History:   Procedure Laterality Date    AMPUTATE TOE(S) Left 5/1/2017     Procedure: AMPUTATE TOE(S);  Amputate first and second left toes;  Surgeon: Verna Fofana DPM, Podiatry/Foot and Ankle Surgery;  Location: [facility] OR    AMPUTATE TOE(S) Left 11/3/2018    Procedure: Partial left 3rd toe amputation;  Surgeon: Verna Fofana DPM, Podiatry/Foot and Ankle Surgery;  Location: [facility] OR    AMPUTATE TOE(S)      CREATE FISTULA ARTERIOVENOUS UPPER EXTREMITY Right 2/11/2020    Procedure: RIGHT PROXIMAL RADIAL TO CEPHALIC ARTERIOVENOUS FISTULA CREATION;  Surgeon: Sumanth Corbin MD;  Location: [facility] OR    ESOPHAGOSCOPY, GASTROSCOPY, DUODENOSCOPY (EGD), COMBINED Left 6/23/2020    Procedure: ESOPHAGOGASTRODUODENOSCOPY, WITH biopsies using biopsy forceps;  Surgeon: Elliott Rubi MD;  Location: [facility] GI    IR CVC TUNNEL PLACEMENT > 5 YRS OF AGE  6/4/2019    IR CVC TUNNEL REMOVAL RIGHT  8/18/2020    IR DIALYSIS FISTULOGRAM RIGHT  4/15/2021    IR TUNNELED CATHETER COMPLETE REPLACEMENT  10/2/2019    IR TUNNELED CATHETER COMPLETE REPLACEMENT  10/2/2019    OPEN REDUCTION INTERNAL FIXATION ANKLE  2018    OPEN REDUCTION INTERNAL FIXATION FIBULA Left 3/16/2018    Procedure: OPEN REDUCTION INTERNAL FIXATION FIBULA;  Open reduction and internal fixation of displaced left lateral malleolar fracture;  Surgeon: Sumanth Wen MD;  Location: [facility] OR    REVISION FISTULA ARTERIOVENOUS UPPER EXTREMITY Right 4/28/2020    Procedure: ONLAY VEIN PATCH RIGHT RADIAL TO CEPHALIC FISTULA WITH LIGATION OF SIDE BRANCH ;  Surgeon: Sumanth Corbin MD;  Location: [facility] OR     acetaZOLAMIDE (DIAMOX) 125 MG tablet  acetaminophen (TYLENOL) 325 MG tablet  amLODIPine (NORVASC) 10 MG tablet  aspirin (ASA) 325 MG EC tablet  bisacodyl (DULCOLAX) 10 MG suppository  brimonidine (ALPHAGAN) 0.2 % ophthalmic solution  carvedilol (COREG) 25 MG tablet  carvedilol (COREG) 25 MG tablet  clopidogrel (PLAVIX) 75 MG tablet  dorzolamide (TRUSOPT) 2 % ophthalmic solution  furosemide (LASIX) 80 MG tablet  hydrALAZINE (APRESOLINE) 25 MG  tablet  hydrALAZINE (APRESOLINE) 25 MG tablet  insulin aspart (NOVOLOG PEN) 100 UNIT/ML pen  insulin NPH-Regular (HUMULIN 70/30;NOVOLIN 70/30) susp  latanoprost (XALATAN) 0.005 % ophthalmic solution  omeprazole (PRILOSEC OTC) 20 MG EC tablet  ondansetron (ZOFRAN) 4 MG tablet  patiromer (VELTASSA) 8.4 g packet  senna-docusate (SENOKOT-S/PERICOLACE) 8.6-50 MG tablet  sevelamer carbonate (RENVELA) 800 MG tablet  simvastatin (ZOCOR) 20 MG tablet  timolol maleate (TIMOPTIC) 0.5 % ophthalmic solution      Allergies   Allergen Reactions    Heparin Hives     HIT-serotonin assay negative. NOT HIT     Family History  Family History   Problem Relation Age of Onset    Glaucoma No family hx of     Macular Degeneration No family hx of      Social History   Social History     Tobacco Use    Smoking status: Former     Types: Cigarettes     Quit date: 2016     Years since quittin.3    Smokeless tobacco: Never   Substance Use Topics    Alcohol use: No    Drug use: No         A medically appropriate review of systems was performed with pertinent positives and negatives noted in the HPI, and all other systems negative.    Physical Exam   BP: (!) 165/101  Pulse: 85  Temp: 98.7  F (37.1  C)  Resp: 16  Physical Exam  Constitutional:       General: She is not in acute distress.     Appearance: Normal appearance. She is not toxic-appearing.   HENT:      Head: Normocephalic and atraumatic.      Nose: Nose normal. No congestion.      Mouth/Throat:      Mouth: Mucous membranes are moist.      Pharynx: Oropharynx is clear.   Eyes:      Extraocular Movements: Extraocular movements intact.      Comments: Ciliary flush  Pupils round, equal   Cardiovascular:      Rate and Rhythm: Normal rate and regular rhythm.   Pulmonary:      Effort: Pulmonary effort is normal. No respiratory distress.   Musculoskeletal:         General: Normal range of motion.      Cervical back: Normal range of motion.   Skin:     General: Skin is warm and dry.    Neurological:      General: No focal deficit present.      Mental Status: She is alert and oriented to person, place, and time.           ED Course, Procedures, & Data      Procedures                      No results found for any visits on 11/25/23.  Medications   acetaZOLAMIDE (DIAMOX) tablet 500 mg (500 mg Oral $Given 11/25/23 2044)   brimonidine (ALPHAGAN) 0.2 % ophthalmic solution 1 drop (1 drop Right Eye $Given by Other 11/25/23 2132)   timolol maleate (TIMOPTIC) 0.5 % ophthalmic solution 1 drop (1 drop Right Eye $Given by Other 11/25/23 2132)   dorzolamide (TRUSOPT) 2 % ophthalmic solution 1 drop (1 drop Right Eye $Given by Other 11/25/23 2132)   latanoprost (XALATAN) 0.005 % ophthalmic solution 1 drop (1 drop Right Eye $Given by Other 11/25/23 2132)     Labs Ordered and Resulted from Time of ED Arrival to Time of ED Departure - No data to display  No orders to display          Critical care was not performed.     Medical Decision Making  The patient's presentation was of moderate complexity (an acute complicated injury).    The patient's evaluation involved:  review of external note(s) from 3+ sources (outside ED note, ophtho notes, telephone encounters)  review of 3+ test result(s) ordered prior to this encounter (cbc, bmp, inr)  discussion of management or test interpretation with another health professional (ophthalmology)    The patient's management necessitated moderate risk (prescription drug management including medications given in the ED).    Assessment & Plan    63-year-old female with history of neovascular glaucoma here with elevated intraocular pressure.  She was initially seen at an outside facility for increased pain and pressure in the right eye.  She had elevated intraocular pressures there was a transferred here for further evaluation by ophthalmology.  Patient continued to note some eye pain on the right side on presentation.  Ophthalmology was consulted and evaluated patient in ED.  They  recommended giving her Diamox as well as eyedrops.  The patient did receive these and reported improvement in her symptoms.  Ophthalmology recommended she be discharged with prescription for Diamox 250 mg for 2 doses and that she continue with eyedrops and follow-up for her previously scheduled surgery on Monday.  No emergent intervention recommended.  The patient was stable for discharge.  Return precautions were discussed including worsening of pain, vision and all questions were answered.    I have reviewed the nursing notes. I have reviewed the findings, diagnosis, plan and need for follow up with the patient.    New Prescriptions    ACETAZOLAMIDE (DIAMOX) 125 MG TABLET    Take 2 tablets (250 mg) by mouth 2 times daily for 2 doses       Final diagnoses:   Raised intraocular pressure of right eye       Mario Duncan  [facility] EMERGENCY DEPARTMENT  11/25/2023     Mario Duncan MD  11/25/23 4095

## 2023-11-26 NOTE — CONSULTS
OPHTHALMOLOGY CONSULT NOTE  23    Patient: Roxy Beaulieu  Consulted by: ED  Reason for Consult: R eye pain    HISTORY OF PRESENTING ILLNESS:     Roxy Beaulieu is a 63 year old female with history of neovascular glaucoma likely secondary to CRVO OD and vitreous hemorrhage who was transferred from [facility] ED due to eye pain and concerns for elevated eye pressure. At [facility], pressures in the 50s. Patient s/p 1 round of max medical therapy.     Patient scheduled for right eye CPC with Dr. Nguyen on 2023.     Patient with recent multiple ED visits due to eye pain, each time attributed to high IOP. Most recently presented to ED on  due to increased pain of the right eye, found to have elevated IOP to the 70s likely secondary to neovascular glaucoma.     Review of systems were otherwise negative except for that which has been stated above.      OCULAR/MEDICAL/SURGICAL HISTORIES:     Past Ocular History:  Last eye exam: 11/15/2023  Prior eye surgery/laser: CE IOL in both eyes over 5 years ago   Contact lens wear: denies  Glasses: denies   Eyedrops: timolol-dorzalamide, latanoprost, and brimonidine (though patient has not been using due to ocular irritation)      Family History:  No FH of macular degeneration or glacuoma     Social History:  Social History     Tobacco Use    Smoking status: Former     Types: Cigarettes     Quit date: 2016     Years since quittin.3    Smokeless tobacco: Never   Substance Use Topics    Alcohol use: No    Drug use: No         Past Medical History:   Diagnosis Date    Anemia     Cerebral artery occlusion with cerebral infarction (H)     affected left side with weakness in arm and leg    CHF (congestive heart failure) (H)     Congestive heart failure (CHF) (H) 2018    Diabetes (H)     Dialysis patient (H24)     ESRD on dialysis (H)     HD STARTED 2019    Gangrene of toe (H) 2017    Hemiplegia and hemiparesis following cerebral infarction  affecting left dominant side (H)     High cholesterol     Hypertension     Hypertensive cardiomyopathy (H)     Obesity 04/29/2017    PE (pulmonary thromboembolism) (H)     RECURRENT    Personal history of tobacco use, presenting hazards to health 04/29/2017    Renal insufficiency     SOB (shortness of breath)     Toe ulcer, left, with unspecified severity (H) 11/02/2018    Ulcer of toe of left foot (H)     GANGRENE    Uncontrolled type 2 diabetes mellitus with hyperglycemia, with long-term current use of insulin (H) 04/29/2017    VTE (venous thromboembolism)        Past Surgical History:   Procedure Laterality Date    AMPUTATE TOE(S) Left 5/1/2017    Procedure: AMPUTATE TOE(S);  Amputate first and second left toes;  Surgeon: Verna Fofana DPM, Podiatry/Foot and Ankle Surgery;  Location: [facility] OR    AMPUTATE TOE(S) Left 11/3/2018    Procedure: Partial left 3rd toe amputation;  Surgeon: Verna Fofana DPM, Podiatry/Foot and Ankle Surgery;  Location:  OR    AMPUTATE TOE(S)      CREATE FISTULA ARTERIOVENOUS UPPER EXTREMITY Right 2/11/2020    Procedure: RIGHT PROXIMAL RADIAL TO CEPHALIC ARTERIOVENOUS FISTULA CREATION;  Surgeon: Sumanth Corbin MD;  Location:  OR    ESOPHAGOSCOPY, GASTROSCOPY, DUODENOSCOPY (EGD), COMBINED Left 6/23/2020    Procedure: ESOPHAGOGASTRODUODENOSCOPY, WITH biopsies using biopsy forceps;  Surgeon: Elliott Rubi MD;  Location:  GI    IR CVC TUNNEL PLACEMENT > 5 YRS OF AGE  6/4/2019    IR CVC TUNNEL REMOVAL RIGHT  8/18/2020    IR DIALYSIS FISTULOGRAM RIGHT  4/15/2021    IR TUNNELED CATHETER COMPLETE REPLACEMENT  10/2/2019    IR TUNNELED CATHETER COMPLETE REPLACEMENT  10/2/2019    OPEN REDUCTION INTERNAL FIXATION ANKLE  2018    OPEN REDUCTION INTERNAL FIXATION FIBULA Left 3/16/2018    Procedure: OPEN REDUCTION INTERNAL FIXATION FIBULA;  Open reduction and internal fixation of displaced left lateral malleolar fracture;  Surgeon: Sumanth Wen MD;  Location:  OR    REVISION  FISTULA ARTERIOVENOUS UPPER EXTREMITY Right 4/28/2020    Procedure: ONLAY VEIN PATCH RIGHT RADIAL TO CEPHALIC FISTULA WITH LIGATION OF SIDE BRANCH ;  Surgeon: Sumanth Corbin MD;  Location:  OR       EXAMINATION:     Base Eye Exam       Visual Acuity (Snellen - Linear)         Right Left    Dist sc LP with projection 20/200    Dist ph sc  20/50   Does not have glasses with her             Tonometry (Tonopen, 8:29 PM)         Right Left    Pressure Undectable 25              Tonometry #2 (Tonopen, 9:01 PM)         Right Left    Pressure 55               Tonometry #3 (Tonopen, 9:30 PM)         Right Left    Pressure Undetectable               Pupils         Dark Light Shape React APD    Right 3 3 Round Sluggish 1+    Left 3 2 Round Brisk None              Visual Fields         Left Right    Restrictions  Total superior temporal, inferior temporal, superior nasal, inferior nasal deficiencies              Extraocular Movement         Right Left     Full, Ortho Full, Ortho              Neuro/Psych       Oriented x3: Yes    Mood/Affect: Normal              Dilation       Left eye: 2.5% Tutu Synephrine, 1.0% Mydriacyl @ 9:03 PM   Deferred dilation of the right eye due to increased eye pressure                  Slit Lamp and Fundus Exam       External Exam         Right Left    External Normal Normal              Slit Lamp Exam         Right Left    Lids/Lashes Protective ptosis; heavy dermatochalasis Normal    Conjunctiva/Sclera 360 ciliary flush White and quiet    Cornea Whorl epi, bullae, PEE, epi intact Clear    Anterior Chamber Shallow; No AC cell or flare Deep and quiet    Iris NVI, round Round and reactive -> pharm dilated    Lens PCIOL PCIOL    Anterior Vitreous diffuse vit heme               Fundus Exam         Right Left    Disc no view Normal, flat    C/D Ratio  0.3    Macula  Blunted FLR, scattered DBH and MAs    Vessels  Mildy attenuated    Periphery  Diffuse DBH, MA's in all quadrants                     Labs/Studies/Imaging Performed:  B scan (11/25/23)  OD: mild vitreous debris, retina is attached, no tears or holes, no other masses, lesions or elevations            ASSESSMENT/PLAN:     Roxy Beaulieu is a 63 year old female who presents with     #Neovascular glaucoma of the right eye, unknown etiology  NV in the angle 360 degrees on previous exam   Stable VA at LP with projection. Unknown baseline but was 20/40 BCVA in 2015 per chart review.   IOP max: 75  Suspect NV as cause of elevated IOP, however unclear etiology. The left eye has only mod-severe NPDR, making diabetes a less likely cause of NV. Suspect prior retinal vein/artery occlusion right eye, but unknown due to obscured view from vitreous hemorrhage.   - IOP today: Undetectable at presentation; 54 at discharge with multiple rounds of maximum medical drops and 250 mg diamox PO x 2. Patient reports that pain has resolved.    - Discussed with pharmacy regarding discharging patient on diamox BID according to the AAO guidelines for patients on hemodialysis (https://www.aao.org/eyenet/article/acetazolamide-considerations-systemic-administrati#:~:text=The%20following%20dosages%20are%20recommended,min%3A%20Do%20not%20give%20acetazolamide). However, pharmacy concerned about accumulation and recommend alternative therapy. Will prescribe two doses of reduced-dose diamox to bridge patient to surgery in case additional pain arises    - Not addressed today -     #Proliferative diabetic retinopathy, right eye  #Vitreous hemorrhage, right eye  s/p avastin 10/27/23  Due to having only moderate-severe NPDR in the left eye, suspect the etiology of neovascularization potentially RVO/SERRANO as opposed to diabetes, though unclear at this time     #Mod-Severe non-proliferative diabetic retinopathy, left eye  #Possible small vitreous hemorrhage, left eye  VA 20/50, stable. No flashes or floaters.   s/p Avastin 10/27/23 due to concern for PDR with small vit heme  FA  11/03/23 revealed no evidence of NV, most consistent with mod-severe NPDR.       RECOMMENDATIONS:  - Patient scheduled for cyclophotocoagulation of the right eye 11/27/2023   - Continue timolol BID to the right eye  - Continue brimonidine TID to the right eye (patient reports not using due to ocular surface irritation; encouraged use)   - Continue latanoprost at bedtime   - Continue dorzolamide BID to the right eye   - Start 250 mg diamox BID for 2 doses 11/26/2023     It is our pleasure to participate in this patient's care and treatment. Please contact us with any further questions or concerns.    Patient discussed with senior resident Dr. Rangel.    Fauzia Bansal MD  Resident Physician, PGY-2  Department of Ophthalmology

## 2023-11-26 NOTE — DISCHARGE INSTRUCTIONS
You are seen by ophthalmology in the emergency department.  They believe that your symptoms are probably related to your neovascular glaucoma.  They recommend that you continue taking all of your prior eyedrops and we have also prescribed Diamox which should help with the pressure in your eye.  You will take 2 doses of 250 mg of Diamox tomorrow.  You should also follow-up with your previously scheduled surgery next week.    If you develop worsening symptoms including worsening pain, changes in your vision, or other new symptoms or concerns please return the emergency department.

## 2023-11-27 NOTE — LETTER
[address] [address]  Phone: [phone]  Fax: [phone]    November 27, 2023        Roxy Beaulieu  [address] [address] [address]      To whom it may concern:    RE: Roxy Beaulieu underwent laser for her glaucoma in the right eye today.  Please help her with the following orders until her next visit:    1) Keep patch/shield in place until tomorrow morning, then ok to remove    2) After patch is removed, resume eye drops previously used in the right eye without change    3) After patch is removed, start atropine drops (provided) twice a day right eye and prednisolone drops (provided) four times a day right eye     4) Discontinue acetazolamide     5) Next appointment is scheduled for 10:15 AM at the [facility] ([address] [place] [address]; [place] [address])    Please contact me for questions or concerns.      Sincerely,    Krish Nguyen MD  , Comprehensive Ophthalmology  Department of Ophthalmology and Visual Neurosciences  [facility]

## 2023-11-27 NOTE — DISCHARGE INSTRUCTIONS
"[facility]  Home Care Following Your Procedure  Call a doctor if you have signs of infection (fever, growing tenderness at the surgery site, a large amount of drainage or bleeding, severe pain, foul-smelling drainage, redness, swelling).  Today you received a Marcaine or bupivacaine block to numb the nerves near your surgery site.  This is a block using local anesthetic or \"numbing\" medication injected around the nerves to anesthetize or \"numb\" the area supplied by those nerves.  This block is injected into the muscle layer near your surgical site.  The medication may numb the location where you had surgery for 6-18 hours, but may last up to 24 hours.  If your surgical site is an arm or leg you should be careful with your affected limb, since it is possible to injure your limb without being aware of it due to the numbing.  Until full feeling returns, you should guard against bumping or hitting your limb, and avoid extreme hot or cold temperatures on the skin.  As the block wears off, the feeling will return as a tingling or prickly sensation near your surgical site.  You will experience more discomfort from your incision as the feeling returns.  You may want to take a pain pill (a narcotic or Tylenol if this was prescribed by your surgeon) when you start to experience mild pain before the pain becomes more severe.  If your pain medications do not control your pain you should notifiy your surgeon.        Tylenol/Acetaminophen Consumption    If you feel your pain relief is insufficient, you may take Tylenol/Acetaminophen in addition to your narcotic pain medication.   Be careful not to exceed 4,000 mg of Tylenol/Acetaminophen in a 24 hour period from all sources.  If you are taking extra strength Tylenol/acetaminophen (500 mg), the maximum dose is 8 tablets in 24 hours.  If you are taking regular strength acetaminophen (325 mg), the maximum dose is 12 tablets in 24 " hours.      Your doctor is:       Dr. Krish Nguyen, Ophthalmology: [phone]             Or dial [phone] and ask for the resident on call for:  Ophthalmology  For emergency care, call the:  [place]:  [phone] (TTY for hearing impaired: [phone])

## 2023-11-27 NOTE — OP NOTE
PREOPERATIVE DIAGNOSIS: Neovascular glaucoma, Right   POSTOPERATIVE DIAGNOSIS: Same   PROCEDURES:   Diode laser cyclophotocoagulation, Right   SURGEON: Krish Nguyen M.D.  Assistant: Roman Allison MD; Simi Kraft MD    INDICATIONS: The patient Roxy Beaulieu presented to the eye clinic with uncontrolled elevated intraocular pressure of the Right . The risks, benefits and alternatives to diode laser cyclophotocoagulation were discussed. The patient elected to proceed. All questions were answered to the patient's satisfaction.   DESCRIPTION OF PROCEDURE:    The patient was taken to the operating room, and cardiorespiratory and blood pressure monitors were placed. A retrobulbar block of 2% lidocaine with 0.75% bupivacaine and Wydase was given. A lid speculum was placed. Using the Diode laser with the G probe, a total of 12 burns were placed at the limbus for 360 degrees, sparing the 3 and 9 o'clock hours. The power for each burn was [id number] milliwatts with a duration of 3-4 seconds.  Subconjunctival Dexamethasone (2 mg) was injected. Neomycin/polymyxin/dexamethasone ointment and atropine ointment were placed at the close of the case.  The eye was then patched, followed by a Gilman shield. The patient tolerated the procedure well    PLAN: The patient will be discharged to home and will follow up tomorrow morning in the eye clinic.  EBL:  < 1 mL   Complications:  None

## 2023-11-28 NOTE — TELEPHONE ENCOUNTER
[facility] Call Center    Phone Message    May a detailed message be left on voicemail: yes     Reason for Call: Other: Assisted living called requesting a call back. They are needing clarification on eye drop directions and eye patch directions. There are two sets of directions for both. Please call Kemi to advise.      Action Taken: Other: eye    Travel Screening: Not Applicable

## 2023-11-28 NOTE — TELEPHONE ENCOUNTER
Spoke to [facility] center this AM and at 1220 after confirming with Dr. Nguyen.    Pt to be using drops today/removing patch today as in letter with orders created yesterday.    Nursing verbally demonstrated understanding.    Pt been refusing eye drops per care center secondary to stinging.    [facility] center to continue to offer drops as ordered and Dr. Nguyen aware and will review further at appt tomorrow-- may recommend artificial tear use 5 minutes before medicated eye drops-- would need order per care center    Matthew Seals RN 12:24 PM 11/28/23

## 2023-11-29 NOTE — LETTER
[address] [address]  Phone: [phone]  Fax: [phone]    November 27, 2023        Roxy Beaulieu  [address] [facility] [address]      To whom it may concern:    RE: Roxy Beaulieu returned for PostOp exam after laser for her glaucoma in the right eye.  Please help her with the following orders until her next visit:    Start artificial tears prior to medicated drop instillation  Continue Predforte four times a day right eye   Continue atropine daily right eye   Continue Dorzolamide twice a day right eye   Continue timolol twice a day right eye   Continue brimonidine twice a day right eye   Continue latanoprost at bedtime right eye   Space drop application 5 minutes apart    Please contact me for questions or concerns.      Sincerely,    Krish Nguyen MD  , Comprehensive Ophthalmology  Department of Ophthalmology and Visual Neurosciences  [facility]

## 2023-11-29 NOTE — PROGRESS NOTES
Chief Complaint(s) and History of Present Illness(es)     Post Op (Ophthalmology) Right Eye            Associated symptoms: Negative for eye pain, flashes and floaters    Comments: 2 day post op cyclophotocoagulation LE          Comments    DOS 11/27/2023. Pt states LE feels sore but no pain. Pt states vision is   the same, no flashes, floaters, or concerns.   Pt states one of her drops sting so bad she can't use it, unsure the name.   Pt unsure the names of the drops she is using    Elda Trinidad OA 9:48 AM November 29, 2023             Review of systems for the eyes was negative other than the pertinent positives/negatives listed in the HPI.      Assessment & Plan      Roxy Beauleiu is a 63 year old female with the following diagnoses:   1. Post-operative state - Right Eye         POD2 s/p CPC right eye   Intermittent drop use since surgery due to stinging.  Patient has been refusing drop application from her nursing staff report  Did not get any drops this morning    Intraocular pressure improving after drop this morning in clinic  Intraocular pressure 30 mm Hg on dismissal    Start artificial tears prior to medicated drop instillation  Continue Predforte four times a day right eye   Continue atropine daily right eye   Continue Dorzolamide twice a day right eye   Continue timolol twice a day right eye   Continue brimonidine twice a day right eye   Continue latanoprost at bedtime right eye   Space drop application 5 minutes apart    Patient disposition:   Return in about 1 week (around 12/6/2023) for VT only.           Attending Physician Attestation:  Complete documentation of historical and exam elements from today's encounter can be found in the full encounter summary report (not reduplicated in this progress note).  I personally obtained the chief complaint(s) and history of present illness.  I confirmed and edited as necessary the review of systems, past medical/surgical history, family history, social  history, and examination findings as documented by others; and I examined the patient myself.  I personally reviewed the relevant tests, images, and reports as documented above.  I formulated and edited as necessary the assessment and plan and discussed the findings and management plan with the patient and family. . - Krish Nguyen MD

## 2023-11-29 NOTE — NURSING NOTE
Chief Complaints and History of Present Illnesses   Patient presents with    Post Op (Ophthalmology) Right Eye     2 day post op cyclophotocoagulation MARILEE     Chief Complaint(s) and History of Present Illness(es)       Post Op (Ophthalmology) Right Eye              Associated symptoms: Negative for eye pain, flashes and floaters    Comments: 2 day post op cyclophotocoagulation LE              Comments    DOS 11/27/2023. Pt states LE feels sore but no pain. Pt states vision is the same, no flashes, floaters, or concerns.   Pt states one of her drops sting so bad she can't use it, unsure the name. Pt unsure the names of the drops she is using    Elda NELSON 9:48 AM November 29, 2023

## 2023-11-29 NOTE — PROGRESS NOTES
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    Past Ocular history:   - Glasses:***  - Contact lens wear: ***  - Ocular Surgical History: ***  - Current Eye drops: ***    PMH: ***    FH: *** glaucoma or ***AMD.     Review of systems for the eyes was negative other than the pertinent positives/negatives listed in the HPI.      Imaging:       Assessment & Plan      Roxy Beaulieu is a 63 year old female with the following diagnoses:   No diagnosis found.         Patient disposition:   No follow-ups on file.    Patient seen with  ***    Mukesh Madera MD  Resident Physician, PGY-2  Department of Ophthalmology  11/28/23 7:06 PM

## 2023-12-04 NOTE — NURSING NOTE
Chief Complaints and History of Present Illnesses   Patient presents with    Follow Up     Chief Complaint(s) and History of Present Illness(es)       Follow Up              Laterality: both eyes    Associated symptoms: Negative for double vision, eye pain, flashes and floaters    Treatments tried: eye drops    Pain scale: 0/10              Comments    Pt reports no noticeable changes in vision since last seen, BE  Compliant with drops and taking as prescribed; lgtts 8 AM    Mg Wiley OA, December 4, 2023

## 2023-12-04 NOTE — PROGRESS NOTES
CC -   PDR OU    INTERVAL HISTORY - Had CPC OD since JONATHAN with me, no change vision, OD pain improving       PMH -   Roxy Beaulieu is a  63 year old year-old patient with history of presumed PDR OU, presented to ED with severe NVG OD and severe pain OD.  U/S OD VH no RD/CD per notes.Given gtts and referred to retina clinic after seeing resident clinic Dr. Roman. Extensive NVA found on gonio OD. Initial IOP OD 75    H/o DM II   Was seen at [facility] 2/2015, found dense HM PSC cataract OD and mod cataract OS. with some NPDR noted OS. Had CE/IOL OU 2015 and on 5/2015  VA OD was 20/40 and OS 20/30, no DFE recorded at     On 6/2019 at [facility] found HM OD with VH, OS with severe NPDR and DME , VA OS 20/50. was advised to see retina, did not. 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   - cannot r/o PDR but less likely  based on FA of OS 11/3/23   - unclear onset, patient poor historian   - needs PRP Tx for long term maintenance to prevent recurrent NVG and pain      - likely ischemic, cause of NVG OD   - s/p Avastin 10/27/23   - unable to give PRP at present d/t VH       - 12/4/23 - needs repeat Avastin   - defer today d/t recent CPC and discomfort   - RTC 2 weeks for Avastin     - plan PRP when VH improves          # NVG OD   - presented to outside ED and [facility] 10/25/23  - initial IOP 70s  - extensive NVA  - seen by Patrick 10/25/23 in acute clnic  - s/p CPC 11/27/23    - tx wtih Avastin for now (see above)  - will need PRP when view clears for long term management        # VH OD   - uncertain  duration   - noted in 2019, uncertain if cleared & recurrent or chronic   - heme not dehemoglobulinized   - severe 11/2023     - improving today        # Severe  NPDR with DM II and DME OS   - FA 11/3/23 shows moderate peripheral ischemia   - may be masked by Avastin 10/27/23     - monitor   - repeat FA in future        # DME OS   - s/p Avastin 10/27/23   - mild today   - monitor      # VH OS   - very mild, ?etiology, no RD/RT   - no NV on FA 10/27/23 but ?masked by Avastin 10/27/23   - monitor            ATTESTATION     Attending Attestation:     Complete documentation of historical and exam elements from today's encounter can be found in the full encounter summary report (not reduplicated in this progress note).  I personally obtained the chief complaint(s) and history of present illness.  I confirmed and edited as necessary the review of systems, past medical/surgical history, family history, social history, and examination findings as documented by others; and I examined the patient myself.  I personally reviewed the relevant tests, images, and reports as documented above.  I personally reviewed the ophthalmic test(s) associated with this encounter, agree with the interpretation(s) as documented by the resident/fellow, and have edited the corresponding report(s) as necessary.   I formulated and edited as necessary the assessment and plan and discussed the findings and management plan with the patient and family    Trish Holden MD, PhD  , Vitreoretinal Surgery  Department of Ophthalmology  [facility]

## 2024-01-01 ENCOUNTER — LAB REQUISITION (OUTPATIENT)
Dept: LAB | Facility: CLINIC | Age: 64
End: 2024-01-01
Payer: COMMERCIAL

## 2024-01-01 ENCOUNTER — TELEPHONE (OUTPATIENT)
Dept: OPHTHALMOLOGY | Facility: CLINIC | Age: 64
End: 2024-01-01
Payer: COMMERCIAL

## 2024-01-01 ENCOUNTER — DOCUMENTATION ONLY (OUTPATIENT)
Dept: OTHER | Facility: CLINIC | Age: 64
End: 2024-01-01
Payer: COMMERCIAL

## 2024-01-01 DIAGNOSIS — E11.59 TYPE 2 DIABETES MELLITUS WITH OTHER CIRCULATORY COMPLICATIONS (H): ICD-10-CM

## 2024-01-01 LAB — HBA1C MFR BLD: 9 %

## 2024-01-01 PROCEDURE — 83036 HEMOGLOBIN GLYCOSYLATED A1C: CPT | Mod: ORL | Performed by: FAMILY MEDICINE

## 2024-01-01 PROCEDURE — 36415 COLL VENOUS BLD VENIPUNCTURE: CPT | Mod: ORL | Performed by: FAMILY MEDICINE

## 2024-01-01 PROCEDURE — P9603 ONE-WAY ALLOW PRORATED MILES: HCPCS | Mod: ORL | Performed by: FAMILY MEDICINE

## 2024-01-29 NOTE — TELEPHONE ENCOUNTER
[facility] Call Center    Phone Message    May a detailed message be left on voicemail: yes     Reason for Call: Order(s): Other:   Reason for requested: Orders needed for prednisolone, latanoprost, timolol all out of these, so would need Rx refills also.      Also need orders for brimonidine, dorzolamide and atropine.    Date needed: Asap  Provider name: Krish Nguyen MD    Action Taken: Message routed to:  [facility] Ophthalmology    Travel Screening: Not Applicable

## 2024-01-29 NOTE — TELEPHONE ENCOUNTER
Jaycee wasn't aware that patient missed her appointments.  Unable to find appointment on a Wednesday.  Patient has dialysis on Tuesday and Thursday.  To be reviewed with provider to see if patient should continue drops before return appointment.

## 2024-01-30 NOTE — TELEPHONE ENCOUNTER
[facility] Call Center    Phone Message    May a detailed message be left on voicemail: yes     Reason for Call: Other: Jaycee calling in again, returning a missed call. She was unsure who called and writer unable to find outbound call to pt. Jaycee is requesting to see if Dr Holden can see this patient on a Wednesday as Lin has dialis on every tue and Thursday. Please call Jaycee back to discuss further and leave direct call back line as call center is not able to schedule or accommodate the request. Thank You.       Action Taken: Other: [facility] eye     Travel Screening: Not Applicable

## 2024-01-30 NOTE — TELEPHONE ENCOUNTER
Called and spoke to Jaycee     Made an appointment for 2/23 @ 9 am with Dr. Sheldon     Pt is not able to come in Tues or Wed due to dialysis    Confirmed clinic address     Wait time     Maida Alas Communication Facilitator on 1/30/2024 at 3:41 PM

## 2024-03-09 NOTE — PLAN OF CARE
SLP:  Went back to meet with Pt to provide notebook and written reminders for having a snack before bed and ordering breakfast the evening before to prevent drops in blood sugar.  Will plan to set up alarms on phone tomorrow pending Pt remembers to call sister to bring .  Pt recognizing the link between not eating and controlling her blood sugar but unable to verbalize/execute how to prevent drops in blood sugar w/o A.     No

## 2024-12-03 NOTE — ED AVS SNAPSHOT
[facility] Emergency Department    [address]    [facility] [address]    Phone:  [phone]    Fax:  [phone]                                       Roxy Beaulieu   MRN: [id number]    Department:  [facility] Emergency Department   Date of Visit:  5/19/2018           After Visit Summary Signature Page     I have received my discharge instructions, and my questions have been answered. I have discussed any challenges I see with this plan with the nurse or doctor.    ..........................................................................................................................................  Patient/Patient Representative Signature      ..........................................................................................................................................  Patient Representative Print Name and Relationship to Patient    ..................................................               ................................................  Date                                            Time    ..........................................................................................................................................  Reviewed by Signature/Title    ...................................................              ..............................................  Date                                                            Time           Imaging Studies/Medications

## (undated) DEVICE — KIT CULTURE ESWAB AEROBE/ANAEROBE WHITE TOP R723480

## (undated) DEVICE — PACK AV FISTULA CUSTOM SCV15AVFS1

## (undated) DEVICE — SU SILK 4-0 TIE 24" SA73H

## (undated) DEVICE — SYR 03ML LL W/O NDL 309657

## (undated) DEVICE — SU PROLENE 7-0 BV-1DA 24" 8702H

## (undated) DEVICE — LINEN TOWEL PACK X10 5473

## (undated) DEVICE — DECANTER BAG 2002S

## (undated) DEVICE — PACK LOWER EXTREMITY RIDGES

## (undated) DEVICE — SU MONOCRYL 5-0 P-3 18" UND Y493G

## (undated) DEVICE — BNDG ELASTIC 4" DBL LENGTH UNSTERILE 6611-14

## (undated) DEVICE — BNDG ROLLER GAUZE CONFORM 4"X4YD 41-54

## (undated) DEVICE — SU VICRYL 3-0 PS-2 27" UND J427H

## (undated) DEVICE — GLOVE PROTEXIS W/NEU-THERA 7.5  2D73TE75

## (undated) DEVICE — SOL WATER IRRIG 1000ML BOTTLE 2F7114

## (undated) DEVICE — GLOVE ESTEEM POWDER FREE SMT 7.5  2D72PT75

## (undated) DEVICE — SU SILK 3-0 TIE 24" SA74H

## (undated) DEVICE — NDL 19GA 1.5"

## (undated) DEVICE — SU PROLENE 6-0 C-1DA 30" 8706H

## (undated) DEVICE — GLOVE PROTEXIS POWDER FREE 7.0 ORTHOPEDIC 2D73ET70

## (undated) DEVICE — GLOVE PROTEXIS BLUE W/NEU-THERA 6.5  2D73EB65

## (undated) DEVICE — TOURNIQUET CUFF 18" REPRO RED 60-7070-103

## (undated) DEVICE — NDL 27GA 1.25" 305136

## (undated) DEVICE — DRSG KERLIX 4 1/2"X4YDS ROLL 6730

## (undated) DEVICE — SUCTION CANISTER MEDIVAC LINER 3000ML W/LID 65651-530

## (undated) DEVICE — SYR 10ML SLIP TIP W/O NDL

## (undated) DEVICE — SU PROLENE 4-0 PS-2 18" 8682G

## (undated) DEVICE — BLADE KNIFE SURG 15 371115

## (undated) DEVICE — SU VICRYL 3-0 SH 27" J316H

## (undated) DEVICE — PREP SKIN SCRUB TRAY 4461A

## (undated) DEVICE — SOL NACL 0.9% IRRIG 1000ML BOTTLE 2F7124

## (undated) DEVICE — GLOVE PROTEXIS BLUE W/NEU-THERA 7.5  2D73EB75

## (undated) DEVICE — LINEN HALF SHEET 5512

## (undated) DEVICE — ENDO FORCEP ENDOJAW BIOPSY 2.8MMX160CM FB-220K

## (undated) DEVICE — SOL NACL 0.9% INJ 250ML BAG 2B1322Q

## (undated) DEVICE — LINEN TOWEL PACK X5 5464

## (undated) DEVICE — GLOVE PROTEXIS W/NEU-THERA 6.5  2D73TE65

## (undated) DEVICE — KIT ENDO TURNOVER/PROCEDURE W/CLEAN A SCOPE LINERS 103888

## (undated) DEVICE — EYE DRSG PAD OVAL

## (undated) DEVICE — ESU GROUND PAD ADULT W/CORD E7507

## (undated) DEVICE — LINEN FULL SHEET 5511

## (undated) DEVICE — PAD CHUX UNDERPAD 23X24" 7136

## (undated) DEVICE — BAG CLEAR TRASH 1.3M 39X33" P4040C

## (undated) DEVICE — SYR 20ML LL W/O NDL 302830

## (undated) DEVICE — TUBE CULTURE AEROBIC/ANAEROBIC W/O SWABS A.C.T.I.1. 12401

## (undated) DEVICE — DRAPE C-ARM 60X42" 1013

## (undated) DEVICE — VESSEL LOOPS YELLOW MINI 31145660

## (undated) DEVICE — NDL 18GA 1.5" 305196

## (undated) DEVICE — SYR 10ML FINGER CONTROL W/O NDL 309695

## (undated) DEVICE — CAST PADDING 4" STERILE 9044S

## (undated) DEVICE — PREP CHLORAPREP 26ML TINTED ORANGE  260815

## (undated) DEVICE — NDL ANGIOCATH 20GA 1.25" 4056

## (undated) DEVICE — SU VICRYL 4-0 PS-2 18" UND J496H

## (undated) DEVICE — ENDO SNARE HEXAGONAL ISNARE 2.5X4.0CM W/25GA NDL

## (undated) DEVICE — DRSG GAUZE 4X4" TRAY

## (undated) DEVICE — EYE SHIELD PLASTIC

## (undated) DEVICE — SU VICRYL 2-0 CT-2 27" UND J269H

## (undated) DEVICE — BOWL 32OZ STERILE 01232

## (undated) DEVICE — ESU GROUND PAD UNIVERSAL W/O CORD

## (undated) DEVICE — GLOVE PROTEXIS BLUE W/NEU-THERA 7.0  2D73EB70

## (undated) DEVICE — DRILL BIT SYN QUICK COUPLING 2.8X165MM 310.288

## (undated) DEVICE — PREP POVIDONE IODINE SCRUB 7.5% 120ML

## (undated) DEVICE — DRILL BIT QUICK COUPLING 2.0X125MM  310.21

## (undated) DEVICE — PREP POVIDONE IODINE SOLUTION 10% 4OZ

## (undated) DEVICE — DRSG GAUZE 4X4" 3033

## (undated) DEVICE — TOURNIQUET CUFF 30" REPRO BLUE 60-7070-105

## (undated) DEVICE — CAST PADDING 4" UNSTERILE 9044

## (undated) DEVICE — SUCTION CANISTER STRAW 65652-008

## (undated) DEVICE — PREP POVIDONE IODINE SCRUB 7.5% 4OZ APL82212

## (undated) DEVICE — LINEN ORTHO ACL PACK 5447

## (undated) DEVICE — STPL SKIN 35W 6.9MM  PXW35

## (undated) DEVICE — PREP POVIDONE IODINE SOLUTION 10% 120ML

## (undated) DEVICE — SOL WATER IRRIG 500ML BOTTLE 2F7113

## (undated) DEVICE — SU MONOCRYL 5-0 PS-2 18" UND Y495G

## (undated) DEVICE — GEL ULTRASOUND AQUASONIC 20GM 01-01

## (undated) DEVICE — GLOVE PROTEXIS POWDER FREE 7.5 ORTHOPEDIC 2D73ET75

## (undated) DEVICE — CAST BUCKET

## (undated) DEVICE — EYE PROBE CYCLO G6 LASER G-PROBE 15980

## (undated) DEVICE — DECANTER VIAL 2006S

## (undated) DEVICE — APPLICATOR COTTON TIP 3" PKG OF 10 34831010

## (undated) DEVICE — SYR 01ML 27GA 0.5" NDL TBC 309623

## (undated) DEVICE — SUCTION MANIFOLD NEPTUNE 2 SYS 4 PORT 0702-020-000

## (undated) DEVICE — SPECIMEN CULTURETTE DBL SWAB 220109

## (undated) DEVICE — DRSG ABDOMINAL 07 1/2X8" 7197D

## (undated) DEVICE — NDL 30GA 0.5" 305106

## (undated) DEVICE — DRILL BIT QUICK COUPLING 2.5X110MM GOLD 310.25

## (undated) RX ORDER — CEFAZOLIN SODIUM 2 G/100ML
INJECTION, SOLUTION INTRAVENOUS
Status: DISPENSED
Start: 2018-03-16

## (undated) RX ORDER — PROPOFOL 10 MG/ML
INJECTION, EMULSION INTRAVENOUS
Status: DISPENSED
Start: 2018-03-16

## (undated) RX ORDER — PROPOFOL 10 MG/ML
INJECTION, EMULSION INTRAVENOUS
Status: DISPENSED
Start: 2020-04-28

## (undated) RX ORDER — EPHEDRINE SULFATE 50 MG/ML
INJECTION, SOLUTION INTRAMUSCULAR; INTRAVENOUS; SUBCUTANEOUS
Status: DISPENSED
Start: 2018-03-16

## (undated) RX ORDER — CEFAZOLIN SODIUM 2 G/100ML
INJECTION, SOLUTION INTRAVENOUS
Status: DISPENSED
Start: 2020-04-28

## (undated) RX ORDER — LIDOCAINE HYDROCHLORIDE 10 MG/ML
INJECTION, SOLUTION INFILTRATION; PERINEURAL
Status: DISPENSED
Start: 2021-04-15

## (undated) RX ORDER — GLYCOPYRROLATE 0.2 MG/ML
INJECTION INTRAMUSCULAR; INTRAVENOUS
Status: DISPENSED
Start: 2018-03-16

## (undated) RX ORDER — CEFAZOLIN SODIUM 2 G/100ML
INJECTION, SOLUTION INTRAVENOUS
Status: DISPENSED
Start: 2020-02-11

## (undated) RX ORDER — FENTANYL CITRATE 50 UG/ML
INJECTION, SOLUTION INTRAMUSCULAR; INTRAVENOUS
Status: DISPENSED
Start: 2020-06-23

## (undated) RX ORDER — LIDOCAINE HYDROCHLORIDE 10 MG/ML
INJECTION, SOLUTION EPIDURAL; INFILTRATION; INTRACAUDAL; PERINEURAL
Status: DISPENSED
Start: 2018-03-16

## (undated) RX ORDER — FENTANYL CITRATE 50 UG/ML
INJECTION, SOLUTION INTRAMUSCULAR; INTRAVENOUS
Status: DISPENSED
Start: 2020-04-28

## (undated) RX ORDER — LIDOCAINE HYDROCHLORIDE 10 MG/ML
INJECTION, SOLUTION INFILTRATION; PERINEURAL
Status: DISPENSED
Start: 2020-04-28

## (undated) RX ORDER — ONDANSETRON 2 MG/ML
INJECTION INTRAMUSCULAR; INTRAVENOUS
Status: DISPENSED
Start: 2018-03-16

## (undated) RX ORDER — GLYCOPYRROLATE 0.2 MG/ML
INJECTION INTRAMUSCULAR; INTRAVENOUS
Status: DISPENSED
Start: 2018-11-03

## (undated) RX ORDER — DEXAMETHASONE SODIUM PHOSPHATE 4 MG/ML
INJECTION, SOLUTION INTRA-ARTICULAR; INTRALESIONAL; INTRAMUSCULAR; INTRAVENOUS; SOFT TISSUE
Status: DISPENSED
Start: 2018-11-03

## (undated) RX ORDER — PROPOFOL 10 MG/ML
INJECTION, EMULSION INTRAVENOUS
Status: DISPENSED
Start: 2020-02-11

## (undated) RX ORDER — LABETALOL HYDROCHLORIDE 5 MG/ML
INJECTION, SOLUTION INTRAVENOUS
Status: DISPENSED
Start: 2020-02-11

## (undated) RX ORDER — FENTANYL CITRATE 50 UG/ML
INJECTION, SOLUTION INTRAMUSCULAR; INTRAVENOUS
Status: DISPENSED
Start: 2017-05-01

## (undated) RX ORDER — FENTANYL CITRATE 50 UG/ML
INJECTION, SOLUTION INTRAMUSCULAR; INTRAVENOUS
Status: DISPENSED
Start: 2018-11-03

## (undated) RX ORDER — ASPIRIN 81 MG/1
TABLET ORAL
Status: DISPENSED
Start: 2020-02-11

## (undated) RX ORDER — DEXAMETHASONE SODIUM PHOSPHATE 4 MG/ML
INJECTION, SOLUTION INTRA-ARTICULAR; INTRALESIONAL; INTRAMUSCULAR; INTRAVENOUS; SOFT TISSUE
Status: DISPENSED
Start: 2018-03-16

## (undated) RX ORDER — ONDANSETRON 2 MG/ML
INJECTION INTRAMUSCULAR; INTRAVENOUS
Status: DISPENSED
Start: 2018-11-03

## (undated) RX ORDER — LIDOCAINE HYDROCHLORIDE 20 MG/ML
INJECTION, SOLUTION EPIDURAL; INFILTRATION; INTRACAUDAL; PERINEURAL
Status: DISPENSED
Start: 2020-04-28

## (undated) RX ORDER — BUPIVACAINE HYDROCHLORIDE 7.5 MG/ML
INJECTION, SOLUTION EPIDURAL; RETROBULBAR
Status: DISPENSED
Start: 2023-01-01

## (undated) RX ORDER — FENTANYL CITRATE 50 UG/ML
INJECTION, SOLUTION INTRAMUSCULAR; INTRAVENOUS
Status: DISPENSED
Start: 2018-03-16

## (undated) RX ORDER — FENTANYL CITRATE 50 UG/ML
INJECTION, SOLUTION INTRAMUSCULAR; INTRAVENOUS
Status: DISPENSED
Start: 2020-02-11

## (undated) RX ORDER — BUPIVACAINE HYDROCHLORIDE 5 MG/ML
INJECTION, SOLUTION EPIDURAL; INTRACAUDAL
Status: DISPENSED
Start: 2017-05-01

## (undated) RX ORDER — BUPIVACAINE HYDROCHLORIDE 5 MG/ML
INJECTION, SOLUTION EPIDURAL; INTRACAUDAL
Status: DISPENSED
Start: 2020-04-28

## (undated) RX ORDER — ONDANSETRON 2 MG/ML
INJECTION INTRAMUSCULAR; INTRAVENOUS
Status: DISPENSED
Start: 2020-04-28

## (undated) RX ORDER — PROPOFOL 10 MG/ML
INJECTION, EMULSION INTRAVENOUS
Status: DISPENSED
Start: 2018-11-03

## (undated) RX ORDER — HEPARIN SODIUM 1000 [USP'U]/ML
INJECTION, SOLUTION INTRAVENOUS; SUBCUTANEOUS
Status: DISPENSED
Start: 2020-04-28

## (undated) RX ORDER — FENTANYL CITRATE 50 UG/ML
INJECTION, SOLUTION INTRAMUSCULAR; INTRAVENOUS
Status: DISPENSED
Start: 2021-04-15

## (undated) RX ORDER — NEOSTIGMINE METHYLSULFATE 1 MG/ML
VIAL (ML) INJECTION
Status: DISPENSED
Start: 2018-03-16

## (undated) RX ORDER — LIDOCAINE HYDROCHLORIDE 10 MG/ML
INJECTION, SOLUTION EPIDURAL; INFILTRATION; INTRACAUDAL; PERINEURAL
Status: DISPENSED
Start: 2018-11-03

## (undated) RX ORDER — BUPIVACAINE HYDROCHLORIDE 5 MG/ML
INJECTION, SOLUTION EPIDURAL; INTRACAUDAL
Status: DISPENSED
Start: 2018-11-03